# Patient Record
Sex: FEMALE | Race: WHITE | NOT HISPANIC OR LATINO | Employment: OTHER | URBAN - METROPOLITAN AREA
[De-identification: names, ages, dates, MRNs, and addresses within clinical notes are randomized per-mention and may not be internally consistent; named-entity substitution may affect disease eponyms.]

---

## 2017-05-09 ENCOUNTER — ONCOLOGY VISIT (OUTPATIENT)
Dept: ONCOLOGY | Facility: CLINIC | Age: 82
End: 2017-05-09
Attending: INTERNAL MEDICINE
Payer: COMMERCIAL

## 2017-05-09 VITALS
HEIGHT: 62 IN | TEMPERATURE: 98.5 F | WEIGHT: 112.1 LBS | BODY MASS INDEX: 20.63 KG/M2 | DIASTOLIC BLOOD PRESSURE: 79 MMHG | RESPIRATION RATE: 16 BRPM | OXYGEN SATURATION: 96 % | SYSTOLIC BLOOD PRESSURE: 130 MMHG | HEART RATE: 77 BPM

## 2017-05-09 DIAGNOSIS — C49.9 SARCOMA (H): Primary | ICD-10-CM

## 2017-05-09 DIAGNOSIS — R53.83 FATIGUE, UNSPECIFIED TYPE: ICD-10-CM

## 2017-05-09 DIAGNOSIS — E03.9 HYPOTHYROIDISM, UNSPECIFIED TYPE: Primary | ICD-10-CM

## 2017-05-09 DIAGNOSIS — C49.9 SARCOMA (H): ICD-10-CM

## 2017-05-09 DIAGNOSIS — F41.9 ANXIETY: ICD-10-CM

## 2017-05-09 LAB — TSH SERPL DL<=0.005 MIU/L-ACNC: 2.47 MU/L (ref 0.4–4)

## 2017-05-09 PROCEDURE — 99212 OFFICE O/P EST SF 10 MIN: CPT | Mod: ZF

## 2017-05-09 PROCEDURE — 36415 COLL VENOUS BLD VENIPUNCTURE: CPT | Performed by: INTERNAL MEDICINE

## 2017-05-09 PROCEDURE — 84443 ASSAY THYROID STIM HORMONE: CPT | Performed by: INTERNAL MEDICINE

## 2017-05-09 PROCEDURE — 99215 OFFICE O/P EST HI 40 MIN: CPT | Mod: 25 | Performed by: INTERNAL MEDICINE

## 2017-05-09 RX ORDER — ACETAMINOPHEN 500 MG
500-1000 TABLET ORAL PRN
Status: ON HOLD | COMMUNITY
End: 2018-06-14

## 2017-05-09 ASSESSMENT — PAIN SCALES - GENERAL: PAINLEVEL: NO PAIN (0)

## 2017-05-09 NOTE — LETTER
"5/9/2017       RE: Jigna Allen  [address] [place] [address]     Dear Colleague,    Thank you for referring your patient, Jigna Allen, to the [facility]. Please see a copy of my visit note below.    5-9-17     I saw Jigna Allen for f/u of a sarcoma of the right calf.   -  Background   In brief, she was doing well until August 2009, when after doing a lot of walking while on vacation, she developed some discomfort and swelling in the proximal right calf. 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   She had 4 cycles of doxil/ifos finishing about Feb 2010. There was <10% viable cells at surgery.   She finished post op XRT about June 2010. She did have a post-op staph infection and had some subsequent wound healing problems.   -       Interval history    She had problems w fatigue last year and was treated for Lyme disease, though labs were confusing.  She had an episode of Lyme disease treated in July 2011    She is anxious and just got a zoloft Rx but didn't start yet due to the warning sheets.  Her mother in law is now 99 and is living w [facility] and they are taking care of her and that's stressful  [place] was [place] in [place].  She is on T4 and was told she had \"low blood\" in [place].    She had CTS surgery on her hand L last summer but is not sure it helped much and it now tingles.    She has a rectocoele and found a pessary very effective.    She notes occasional tinnitus that can be bothersome and has a history of hearing loss and now has a hearing aid but it does not help much and she often does not use it.  Dr Ojeda felt the CT finding in T4 was a benign hemangioma.     The rest of a 10 point ROS is otherwise negative as I confirmed w/ her today.   -   Background PMH, FH,SH   PH notable for rectocele, diverticulosis, eczema, hyperlipidemia, hypothyroidism, one episode of documented leukopenia. She had cataracts removed.   History " "of patellofemoral pain syndrome   FH notable for a cancer in the father, uncle, and some cousins and also glioblastoma in a brother. Paternal grandmother had stomach cancer. Her father had testicular cancer and  at age 87.   She has two adult children. 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 She does not smoke or abuse alcohol or other drugs.   She is  and came with her .   ALLERGIES: She describes allergies to Niacin and ferrous sulfate manifested as a red rash.   -   On examination, she appeared comfortable in good spirits.     /79 (BP Location: Left arm, Patient Position: Chair, Cuff Size: Adult Regular)  Pulse 77  Temp 98.5  F (36.9  C) (Oral)  Resp 16  Ht 1.575 m (5' 2.01\")  Wt 50.8 kg (112 lb 1.6 oz)  SpO2 96%  BMI 20.5 kg/m2  HEENT No icterus.  Some bilateral decreased hearing.   NECK no thyromegaly.  CHEST chest is clear.   CV RRR with no significant murmur.   ABD No HSMT.   LYMPH No lymphadenopathy.   NEURO Romberg and heel/toe walking are good but heel is better L.   EXT R leg healed w/ XRT changes.  No significant edema.    Mood a bit anxious.    -   No labs.    -   I reviewed the new CXR and I see no clear recurrence, but it has not yet been formally read.     -  She has a h/o of a likely hemangioma in a pedicle.     We will plan to see her about 5-9 (she is now about 7 years out) with a CXR if the formal read today is OK.  We will check a TSH today  I reviewed some relaxation web sites w her and she will do that tid for 3 weeks and take the zoloft from Dr Ko daily for 3 weeks and then see how things are going.  I suggested they have her mother in law see a doctor too as she sounds like she may be angry/depressed and might benefit from some intervention.  We will arrange a visit w palliative re the anxiety to assess.  She will call if she has other questions in the interim.   t> 40 min most C&C  -  Edouard Bryan M.D.   Professor, Hematology, " Oncology and Transplantation   Phone: [phone]     cc:   Wayne Castelan MD   [address] [address]     Elisabeth Ko MD   [facility] [place]   [address] [place] [address]     Bryon Ojeda M.D.   [place] [address]

## 2017-05-09 NOTE — MR AVS SNAPSHOT
After Visit Summary   5/9/2017    Jigna Allen    MRN: [id number]           Patient Information     Date Of Birth          1935        Visit Information        Provider Department      5/9/2017 9:30 AM Edouard Bryan MD [facility]        Today's Diagnoses     Hypothyroidism, unspecified type    -  1    Sarcoma (H)        Anxiety        Fatigue, unspecified type           Follow-ups after your visit        Additional Services     PALLIATIVE CARE REFERRAL       Need to be seen in next 1-2 weeks                  Your next 10 appointments already scheduled     May 15, 2018  8:30 AM CDT   (Arrive by 8:15 AM)   XR CHEST 2 VIEWS with UCXR1   [facility] [place] [facility] ([facility] [place])    [address]  [address] [facility] [address] [phone]           Please bring a list of your current medicines to your exam. (Include vitamins, minerals and over-thecounter medicines.) Leave your valuables at home.  Tell your doctor if there is a chance you may be pregnant.  You do not need to do anything special for this exam.            May 15, 2018  9:30 AM CDT   (Arrive by 9:15 AM)   Return Visit with Edouard Bryan MD   [facility] ([facility] [place])    [address]  [address] [facility] [address] [phone]              Who to contact     If you have questions or need follow up information about today's clinic visit or your schedule please contact [facility] directly at [phone].  Normal or non-critical lab and imaging results will be communicated to you by MyChart, letter or phone within 4 business days after the clinic has received the results. If you do not hear from us within 7 days, please contact the clinic through MyChart or phone. If you have a critical or abnormal lab result, we will notify you by phone as soon as possible.  Submit refill requests  "through [employer] or call your pharmacy and they will forward the refill request to us. Please allow 3 business days for your refill to be completed.          Additional Information About Your Visit        [email or web address] Information     [employer] gives you secure access to your electronic health record. If you see a primary care provider, you can also send messages to your care team and make appointments. If you have questions, please call your primary care clinic.  If you do not have a primary care provider, please call [phone] and they will assist you.        Care EveryWhere ID     This is your Care EveryWhere ID. This could be used by other organizations to access your [place] medical records  [id number]        Your Vitals Were     Pulse Temperature Respirations Height Pulse Oximetry BMI (Body Mass Index)    77 98.5  F (36.9  C) (Oral) 16 1.575 m (5' 2.01\") 96% 20.5 kg/m2       Blood Pressure from Last 3 Encounters:   05/09/17 130/79   09/16/16 138/67   09/15/16 148/68    Weight from Last 3 Encounters:   05/10/17 51.3 kg (113 lb 1.6 oz)   05/09/17 50.8 kg (112 lb 1.6 oz)   09/16/16 53.4 kg (117 lb 12.8 oz)              We Performed the Following     PALLIATIVE CARE REFERRAL     TSH with free T4 reflex        Primary Care Provider Office Phone # Fax #    Elisabeth Ko [phone]       [address] [address]        Thank you!     Thank you for choosing [facility]  for your care. Our goal is always to provide you with excellent care. Hearing back from our patients is one way we can continue to improve our services. Please take a few minutes to complete the written survey that you may receive in the mail after your visit with us. Thank you!             Your Updated Medication List - Protect others around you: Learn how to safely use, store and throw away your medicines at www.disposemymeds.org.          This list is accurate as of: 5/9/17 11:59 " PM.  Always use your most recent med list.                   Brand Name Dispense Instructions for use    acetaminophen 500 MG tablet    TYLENOL     Take 500-1,000 mg by mouth as needed       doxycycline Monohydrate 100 MG Tabs     56 tablet    Take 100 mg by mouth 2 times daily       LEVOTHROID 75 MCG tablet   Generic drug:  levothyroxine      Take 88 mcg by mouth daily       TYLENOL PM EXTRA STRENGTH PO      Take 1 tablet by mouth nightly as needed Reported on 5/9/2017       VITAMIN B 12 PO      Take by mouth daily Reported on 5/9/2017

## 2017-05-09 NOTE — NURSING NOTE
"Oncology Rooming Note    May 9, 2017 9:39 AM   Jigna Allen is a 82 year old female who presents for:    Chief Complaint   Patient presents with     Oncology Clinic Visit     Sarcoma F/U     Initial Vitals: /79 (BP Location: Left arm, Patient Position: Chair, Cuff Size: Adult Regular)  Pulse 77  Temp 98.5  F (36.9  C) (Oral)  Resp 16  Ht 1.575 m (5' 2.01\")  Wt 50.8 kg (112 lb 1.6 oz)  SpO2 96%  BMI 20.5 kg/m2 Estimated body mass index is 20.5 kg/(m^2) as calculated from the following:    Height as of this encounter: 1.575 m (5' 2.01\").    Weight as of this encounter: 50.8 kg (112 lb 1.6 oz). Body surface area is 1.49 meters squared.  No Pain (0) Comment: Data Unavailable   No LMP recorded. Patient is postmenopausal.  Allergies reviewed: Yes  Medications reviewed: Yes    Medications: Medication refills not needed today.  Pharmacy name entered into [employer]:    [facility] - [place], [place] - [address] - [place], [place] - [address]    Clinical concerns:  provider was notified.    7 minutes for nursing intake (face to face time)     Waleska Jose CMA              "

## 2017-05-09 NOTE — PROGRESS NOTES
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   -  Background   In brief, she was doing well until 2009, when after doing a lot of walking while on vacation, she developed some discomfort and swelling in the proximal right calf. 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   She had 4 cycles of doxil/ifos finishing about 2010. There was <10% viable cells at surgery.   She finished post op XRT about 2010. She did have a post-op staph infection and had some subsequent wound healing problems.   -       Interval history    She had problems w fatigue last year and was treated for Lyme disease, though labs were confusing.  She had an episode of Lyme disease treated in 2011    She is anxious and just got a zoloft Rx but didn't start yet due to the warning sheets.  Her mother in law is now 99 and is living w [facility] and they are taking care of her and that's stressful  [place].  She is on T4 and was told she had \"low blood\" in [place].    She had CTS surgery on her hand L last summer but is not sure it helped much and it now tingles.    She has a rectocoele and found a pessary very effective.    She notes occasional tinnitus that can be bothersome and has a history of hearing loss and now has a hearing aid but it does not help much and she often does not use it.  Dr Ojeda felt the CT finding in T4 was a benign hemangioma.     The rest of a 10 point ROS is otherwise negative as I confirmed w/ her today.   -   Background PMH, FH,SH   PH notable for rectocele, diverticulosis, eczema, hyperlipidemia, hypothyroidism, one episode of documented leukopenia. She had cataracts removed.   History of patellofemoral pain syndrome   FH notable for a cancer in the father, uncle, and some cousins and also glioblastoma in a brother. Paternal grandmother had stomach cancer. Her father had testicular cancer and  at age 87.   She has " "two adult children. 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 She does not smoke or abuse alcohol or other drugs.   She is  and came with her .   ALLERGIES: She describes allergies to Niacin and ferrous sulfate manifested as a red rash.   -   On examination, she appeared comfortable in good spirits.     /79 (BP Location: Left arm, Patient Position: Chair, Cuff Size: Adult Regular)  Pulse 77  Temp 98.5  F (36.9  C) (Oral)  Resp 16  Ht 1.575 m (5' 2.01\")  Wt 50.8 kg (112 lb 1.6 oz)  SpO2 96%  BMI 20.5 kg/m2  HEENT No icterus.  Some bilateral decreased hearing.   NECK no thyromegaly.  CHEST chest is clear.   CV RRR with no significant murmur.   ABD No HSMT.   LYMPH No lymphadenopathy.   NEURO Romberg and heel/toe walking are good but heel is better L.   EXT R leg healed w/ XRT changes.  No significant edema.    Mood a bit anxious.    -   No labs.    -   I reviewed the new CXR and I see no clear recurrence, but it has not yet been formally read.     -  She has a h/o of a likely hemangioma in a pedicle.     We will plan to see her about 5-9 (she is now about 7 years out) with a CXR if the formal read today is OK.  We will check a TSH today  I reviewed some relaxation web sites w her and she will do that tid for 3 weeks and take the zoloft from Dr Ko daily for 3 weeks and then see how things are going.  I suggested they have her mother in law see a doctor too as she sounds like she may be angry/depressed and might benefit from some intervention.  We will arrange a visit w palliative re the anxiety to assess.  She will call if she has other questions in the interim.   t> 40 min most C&C  -  Edouard Bryan M.D.   Professor, Hematology, Oncology and Transplantation   Phone: [phone]   cc:   Wayne Castelan MD   [address] [address]   Elisabeth Ko MD   [facility] [place]   [address] [place] [address]   Bryon SEGOVIA " OMAR Ojeda   [facility] [address]

## 2017-05-10 ENCOUNTER — OFFICE VISIT (OUTPATIENT)
Dept: PALLIATIVE CARE | Facility: CLINIC | Age: 82
End: 2017-05-10
Attending: EMERGENCY MEDICINE
Payer: COMMERCIAL

## 2017-05-10 VITALS
BODY MASS INDEX: 20.81 KG/M2 | RESPIRATION RATE: 12 BRPM | HEART RATE: 76 BPM | TEMPERATURE: 98.6 F | OXYGEN SATURATION: 98 % | HEIGHT: 62 IN | WEIGHT: 113.1 LBS

## 2017-05-10 DIAGNOSIS — F43.22 ADJUSTMENT DISORDER WITH ANXIOUS MOOD: Primary | ICD-10-CM

## 2017-05-10 DIAGNOSIS — C49.9 SARCOMA (H): ICD-10-CM

## 2017-05-10 PROCEDURE — 99212 OFFICE O/P EST SF 10 MIN: CPT | Mod: ZF

## 2017-05-10 PROCEDURE — 99204 OFFICE O/P NEW MOD 45 MIN: CPT | Mod: GC | Performed by: EMERGENCY MEDICINE

## 2017-05-10 ASSESSMENT — PAIN SCALES - GENERAL: PAINLEVEL: NO PAIN (0)

## 2017-05-10 NOTE — NURSING NOTE
"Oncology Rooming Note    May 10, 2017 2:43 PM   Jigna Allen is a 82 year old female who presents for:    Chief Complaint   Patient presents with     Oncology Clinic Visit     sarcoma      Initial Vitals: Pulse 76  Temp 98.6  F (37  C) (Oral)  Resp 12  Ht 1.575 m (5' 2.01\")  Wt 51.3 kg (113 lb 1.6 oz)  SpO2 98%  BMI 20.68 kg/m2 Estimated body mass index is 20.68 kg/(m^2) as calculated from the following:    Height as of this encounter: 1.575 m (5' 2.01\").    Weight as of this encounter: 51.3 kg (113 lb 1.6 oz). Body surface area is 1.5 meters squared.  No Pain (0) Comment: Data Unavailable   No LMP recorded. Patient is postmenopausal.  Allergies reviewed: Yes  Medications reviewed: Yes    Medications: Medication refills not needed today.  Pharmacy name entered into [employer]:    [facility] - [place], [place] - [address] [facility] [address] - [place], [place] - [address]    Clinical concerns: no Dr. Yates was NOT notified.    5 minutes for nursing intake (face to face time)     [place] CMA              "

## 2017-05-10 NOTE — LETTER
5/10/2017       RE: Jigna Allen  [address] [place] [address]     Dear Colleague,    Thank you for referring your patient, Jigna Allen, to the [facility] at [facility]. Please see a copy of my visit note below.    Palliative Care Outpatient Clinic Consultation Note    Patient:  Jigna Allen    Chief Complaint:   Jigna Allen 82 year old female who is presenting to the palliative medicine clinic today at the request of Dr. Bryan for a palliative care consultation secondary to anxiety.   The patient's primary care provider is:  Elisabeth Ko.     History of Present Illness:  Jigna Allen is a 82 year old female with a previous history of a sarcoma in her right leg. In 2009, she noticed some swelling and pain in her right calf. 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 By 2/2010 she completed 4 cycles of chemotherapy with doxil/ifosphamide before undergoing surgical resection. She had post op XRT until 6/2010. Jigna has noticed some weight loss and fatigue since last summer. At that time, her 98 year old mother in law had moved in with her and her . 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 At first she thought her symptoms were a sign that her cancer has returned and sought out her oncologist - Dr. Bryan. His evaluation showed no evidence of recurrence and referred her to the palliative medicine clinic to help with her anxiety.    About 2 weeks ago, her primary doctor gave her a prescription for Zoloft 50 mg. She held off on taking any but took her first dose last night. It caused some loose stools and a restless feeling making sleeping very difficult. She feels shame for not running her business well in the past and is very concerned about  any upcoming financial implications it can have. She also has feelings of guilt over the situation with her mother in law living with her and her . She is realizing she isn't able to travel or enjoy the life of custodial she expected with her living there.    Distressing Symptom/s:  Anxiety with some associated poor appetite and some weight loss.      Patient's Disease Understanding: Jigna admits she gets anxious about her current living and financial situation. She feels ashamed of the position she is in. She was told by a counselor from [employer] that this is a curable condition.      Coping: She admits she in not coping well with her stress and is hopeful for help through the clinic.     Social History  Living Situation: In a home with  Harley  Children: 2 adult daughters, one in [place] and one in [place], [place].  Actual/Potential Caregiver(s): family and a close friend - Keely  Support System: family and friends.  Occupation: retired   Hobbies: swimming, walking, reading and traveling  Substance Use/History of misuse: none  Financial Concerns: currently yes, is having tax and IRS issues.  Spiritual Background: maverick  Spiritual Concerns/Needs: none  Social History   Substance Use Topics     Smoking status: Never Smoker     Smokeless tobacco: Never Used     Alcohol use Not on file       Family History  No family history on file. 97 yo mother is alive but frail      Advance Care Planning:  Advance Directive:    Brought in a copy today dated 10/27/14.  Mentions: attempt full resuscitation unless my doctor has already diagnosed me with severe permanent brain damage or a terminal illness.  Where is written copy located: will be scanned into [employer]  Health Care Agent Contact Information:  Harley Tiffany  SHANEL:   None     Allergies   Allergen Reactions     Niacin      skin rash     Ferrous Sulfate Rash     Current Outpatient Prescriptions   Medication Sig Dispense Refill      acetaminophen (TYLENOL) 500 MG tablet Take 500-1,000 mg by mouth as needed       doxycycline Monohydrate 100 MG TABS Take 100 mg by mouth 2 times daily (Patient not taking: Reported on 5/9/2017) 56 tablet 0     Cyanocobalamin (VITAMIN B 12 PO) Take by mouth daily Reported on 5/9/2017       levothyroxine (LEVOTHROID) 75 MCG tablet Take 88 mcg by mouth daily        Diphenhydramine-APAP, sleep, (TYLENOL PM EXTRA STRENGTH PO) Take 1 tablet by mouth nightly as needed Reported on 5/9/2017       No past medical history on file.  No past surgical history on file.    REVIEW OF SYSTEMS:   ROS: 10 point ROS neg other than the symptoms noted above in the HPI and here:  Palliative Symptom Review (0=no symptom/no concern, 1=mild, 2=moderate, 3=severe):      Pain: 0      Fatigue: 1      Nausea: 0      Constipation: 0      Diarrhea: 1      Depressive Symptoms: 1      Anxiety: 2      Drowsiness: 0      Poor Appetite: 1      Shortness of Breath: 0      Insomnia: 1-2      Overall (0 good/no concerns, 3 very poor):  1           Physical Exam:   Vitals were reviewed  Temp: 98.6  F (37  C) Temp src: Oral   Pulse: 76   Resp: 12 SpO2: 98 %      Gen: Appears comfortable thin cooperative  HEENT:  Conjunctiva clear. Sclera anicteric pharynx clear.  CV: RRR without murmur  Resp: normal work of breathing, without wheezing  Abd: soft, nt, nd, +BS   Msk: no gross deformity   Skin:  no jaundice  Ext: warm, well perfused. no edema  Neuro:  EOM, vision, Speech fluent. Moves all extremities equally  Mental status/Psych: alert. Oriented. Asks/answers questions appropriately. Affect is normal        Data Reviewed:  LABS:  5/9/17  TSH  2.47    IMAGING:  CXR 5/9/17  Impression:   1. Clear lungs.  2. [place] right thoracic scoliosis.      Impressions:  Palliative Performance Score:  80-90%  Decision Making Capacity:  Good     Remote history of pleomorphic sarcoma  Anxiety     Recommendations & Counseling:  Jigna is reluctant to take anymore Zoloft with the  loose stools and restlessness last night. I recommended she continue the Zoloft but to take a half a pill for the next few days (25 mg) and take it in the morning which might not effect her sleep as much. I also placed a referral to our palliative  for counseling as Jigna feels her needs are urgent.    The patient was discussed with Dr. Cathy Yates MD  Palliative medicine fellow  Beeper [phone]    Attending Note:  Patient seen and evaluated with Dr Yates and I agree with/confirm his findings/recs in this note. Long-term sarcoma survivor.   Anxiety and mood disturbance related to high social stressors. Just initiating sertraline. Needs counseling/psychotherapy.     Thank you for involving us in the patient's care.   Kvng Morgan MD / Palliative Medicine / Pager [phone] / [facility] Inpatient Team Consult Pager [phone] (answered 8am-430pm M-F) - ok to text page via Track[employer] / After-Hours Answering Service [phone] / Palliative Clinic in the [facility] at the [facility] - [phone] (scheduling); [phone] (triage).      Again, thank you for allowing me to participate in the care of your patient.      Sincerely,    He Yates MD

## 2017-05-10 NOTE — PROGRESS NOTES
Palliative Care Outpatient Clinic Consultation Note    Patient:  Jigna Allen    Chief Complaint:   Jigna Allen 82 year old female who is presenting to the palliative medicine clinic today at the request of Dr. Bryan for a palliative care consultation secondary to anxiety.   The patient's primary care provider is:  Elisabeth Ko.     History of Present Illness:  Jigna Allen is a 82 year old female with a previous history of a sarcoma in her right leg. In 2009, she noticed some swelling and pain in her right calf. 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 By 2/2010 she completed 4 cycles of chemotherapy with doxil/ifosphamide before undergoing surgical resection. She had post op XRT until 6/2010. Jigna has noticed some weight loss and fatigue since last summer. At that time, her 98 year old mother in law had moved in with her and her . 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 At first she thought her symptoms were a sign that her cancer has returned and sought out her oncologist - Dr. Bryan. His evaluation showed no evidence of recurrence and referred her to the palliative medicine clinic to help with her anxiety.    About 2 weeks ago, her primary doctor gave her a prescription for Zoloft 50 mg. She held off on taking any but took her first dose last night. It caused some loose stools and a restless feeling making sleeping very difficult. She feels shame for not running her business well in the past and is very concerned about any upcoming financial implications it can have. She also has feelings of guilt over the situation with her mother in law living with her and her . She is realizing she isn't able to travel or enjoy the life of [place] she expected with her living there.    Distressing  Symptom/s:  Anxiety with some associated poor appetite and some weight loss.      Patient's Disease Understanding: Jigna admits she gets anxious about her current living and financial situation. She feels ashamed of the position she is in. She was told by a counselor from [facility] that this is a curable condition.      Coping: She admits she in not coping well with her stress and is hopeful for help through the clinic.     Social History  Living Situation: In a home with  Harley  Children: 2 adult daughters, one in [place] and one in [place], [place].  Actual/Potential Caregiver(s): family and a close friend - Keely  Support System: family and friends.  Occupation: retired   Hobbies: swimming, walking, reading and traveling  Substance Use/History of misuse: none  Financial Concerns: currently yes, is having tax and IRS issues.  Spiritual Background: Faith  Spiritual Concerns/Needs: none  Social History   Substance Use Topics     Smoking status: Never Smoker     Smokeless tobacco: Never Used     Alcohol use Not on file       Family History  No family history on file. 97 yo mother is alive but frail      Advance Care Planning:  Advance Directive:    Brought in a copy today dated 10/27/14.  Mentions: attempt full resuscitation unless my doctor has already diagnosed me with severe permanent brain damage or a terminal illness.  Where is written copy located: will be scanned into [employer]  Health Care Agent Contact Information:  Harley Allen  SHANEL:   None     Allergies   Allergen Reactions     Niacin      skin rash     Ferrous Sulfate Rash     Current Outpatient Prescriptions   Medication Sig Dispense Refill     acetaminophen (TYLENOL) 500 MG tablet Take 500-1,000 mg by mouth as needed       doxycycline Monohydrate 100 MG TABS Take 100 mg by mouth 2 times daily (Patient not taking: Reported on 5/9/2017) 56 tablet 0     Cyanocobalamin (VITAMIN B 12 PO) Take by mouth daily Reported on 5/9/2017        levothyroxine (LEVOTHROID) 75 MCG tablet Take 88 mcg by mouth daily        Diphenhydramine-APAP, sleep, (TYLENOL PM EXTRA STRENGTH PO) Take 1 tablet by mouth nightly as needed Reported on 5/9/2017       No past medical history on file.  No past surgical history on file.    REVIEW OF SYSTEMS:   ROS: 10 point ROS neg other than the symptoms noted above in the HPI and here:  Palliative Symptom Review (0=no symptom/no concern, 1=mild, 2=moderate, 3=severe):      Pain: 0      Fatigue: 1      Nausea: 0      Constipation: 0      Diarrhea: 1      Depressive Symptoms: 1      Anxiety: 2      Drowsiness: 0      Poor Appetite: 1      Shortness of Breath: 0      Insomnia: 1-2      Overall (0 good/no concerns, 3 very poor):  1           Physical Exam:   Vitals were reviewed  Temp: 98.6  F (37  C) Temp src: Oral   Pulse: 76   Resp: 12 SpO2: 98 %      Gen: Appears comfortable thin cooperative  HEENT:  Conjunctiva clear. Sclera anicteric pharynx clear.  CV: RRR without murmur  Resp: normal work of breathing, without wheezing  Abd: soft, nt, nd, +BS   Msk: no gross deformity   Skin:  no jaundice  Ext: warm, well perfused. no edema  Neuro:  EOM, vision, Speech fluent. Moves all extremities equally  Mental status/Psych: alert. Oriented. Asks/answers questions appropriately. Affect is normal        Data Reviewed:  LABS:  5/9/17  TSH  2.47    IMAGING:  CXR 5/9/17  Impression:   1. Clear lungs.  2. [place] right thoracic scoliosis.      Impressions:  Palliative Performance Score:  80-90%  Decision Making Capacity:  Good     Remote history of pleomorphic sarcoma  Anxiety     Recommendations & Counseling:  Jigna is reluctant to take anymore Zoloft with the loose stools and restlessness last night. I recommended she continue the Zoloft but to take a half a pill for the next few days (25 mg) and take it in the morning which might not effect her sleep as much. I also placed a referral to our palliative  for counseling as Jigna feels  her needs are urgent.    The patient was discussed with Dr. Cathy Yates MD  Palliative medicine fellow  Beeper [phone]    Attending Note:  Patient seen and evaluated with Dr Yates and I agree with/confirm his findings/recs in this note. Long-term sarcoma survivor.   Anxiety and mood disturbance related to high social stressors. Just initiating sertraline. Needs counseling/psychotherapy.     Thank you for involving us in the patient's care.   Kvng Morgan MD / Palliative Medicine / Pager [phone] / [facility] Inpatient Team Consult Pager [phone] (answered 8am-430pm M-F) - ok to text page via [employer] / After-Hours Answering Service [phone] / Palliative Clinic in the [facility] at the [facility] - [phone] (scheduling); [phone] (triage).

## 2017-05-10 NOTE — MR AVS SNAPSHOT
After Visit Summary   5/10/2017    Jigna Allen    MRN: [id number]           Patient Information     Date Of Birth          1935        Visit Information        Provider Department      5/10/2017 2:45 PM He Yates MD [facility]        Today's Diagnoses     Adjustment disorder with anxious mood    -  1    Sarcoma (H)           Follow-ups after your visit        Your next 10 appointments already scheduled     May 17, 2017  2:00 PM CDT   (Arrive by 1:45 PM)   NEW WITH ROOM with Mary Torres [facility],  2 117 CONSULT RM   [facility] ([facility])    [address] [place] [id number] [phone]            May 15, 2018  8:30 AM CDT   (Arrive by 8:15 AM)   XR CHEST 2 VIEWS with UCXR1   [facility] ([facility])    [address] [place] [id number] [phone]           Please bring a list of your current medicines to your exam. (Include vitamins, minerals and over-thecounter medicines.) Leave your valuables at home.  Tell your doctor if there is a chance you may be pregnant.  You do not need to do anything special for this exam.            May 15, 2018  9:30 AM CDT   (Arrive by 9:15 AM)   Return Visit with Edouard Bryan MD   [facility] ([facility])    [address] [place] [address] [phone]              Who to contact     If you have questions or need follow up information about today's clinic visit or your schedule please contact [facility] directly at [phone].  Normal or non-critical lab and imaging results will be communicated to you by MyChart, letter or phone within 4 business days after the clinic has received the results. If you do not hear from us within 7 days, please contact the clinic through  "MyChart or phone. If you have a critical or abnormal lab result, we will notify you by phone as soon as possible.  Submit refill requests through [employer] or call your pharmacy and they will forward the refill request to us. Please allow 3 business days for your refill to be completed.          Additional Information About Your Visit        [employer]hart Information     [employer] gives you secure access to your electronic health record. If you see a primary care provider, you can also send messages to your care team and make appointments. If you have questions, please call your primary care clinic.  If you do not have a primary care provider, please call [phone] and they will assist you.        Care EveryWhere ID     This is your Care EveryWhere ID. This could be used by other organizations to access your [place] medical records  [id number]        Your Vitals Were     Pulse Temperature Respirations Height Pulse Oximetry BMI (Body Mass Index)    76 98.6  F (37  C) (Oral) 12 1.575 m (5' 2.01\") 98% 20.68 kg/m2       Blood Pressure from Last 3 Encounters:   05/09/17 130/79   09/16/16 138/67   09/15/16 148/68    Weight from Last 3 Encounters:   05/10/17 51.3 kg (113 lb 1.6 oz)   05/09/17 50.8 kg (112 lb 1.6 oz)   09/16/16 53.4 kg (117 lb 12.8 oz)              Today, you had the following     No orders found for display       Primary Care Provider Office Phone # Fax #    Elisabeth Ko [phone]       [address] [address]        Thank you!     Thank you for choosing [facility]  for your care. Our goal is always to provide you with excellent care. Hearing back from our patients is one way we can continue to improve our services. Please take a few minutes to complete the written survey that you may receive in the mail after your visit with us. Thank you!             Your Updated Medication List - Protect others around you: Learn how to safely use, " store and throw away your medicines at www.disposemymeds.org.          This list is accurate as of: 5/10/17 11:59 PM.  Always use your most recent med list.                   Brand Name Dispense Instructions for use    acetaminophen 500 MG tablet    TYLENOL     Take 500-1,000 mg by mouth as needed       doxycycline Monohydrate 100 MG Tabs     56 tablet    Take 100 mg by mouth 2 times daily       LEVOTHROID 75 MCG tablet   Generic drug:  levothyroxine      Take 88 mcg by mouth daily       TYLENOL PM EXTRA STRENGTH PO      Take 1 tablet by mouth nightly as needed Reported on 5/9/2017       VITAMIN B 12 PO      Take by mouth daily Reported on 5/9/2017

## 2017-05-16 PROBLEM — F43.22 ADJUSTMENT DISORDER WITH ANXIOUS MOOD: Status: ACTIVE | Noted: 2017-05-16

## 2017-05-19 ENCOUNTER — OFFICE VISIT (OUTPATIENT)
Dept: PALLIATIVE CARE | Facility: CLINIC | Age: 82
End: 2017-05-19
Attending: SOCIAL WORKER
Payer: COMMERCIAL

## 2017-05-19 DIAGNOSIS — F41.1 GENERALIZED ANXIETY DISORDER: Primary | ICD-10-CM

## 2017-05-19 PROCEDURE — 40000114 ZZH STATISTIC NO CHARGE CLINIC VISIT

## 2017-05-19 PROCEDURE — 90791 PSYCH DIAGNOSTIC EVALUATION: CPT | Mod: ZP | Performed by: SOCIAL WORKER

## 2017-05-19 NOTE — LETTER
2017       RE: Jigna Allen  [address] [place] [address]     Dear Colleague,    Thank you for referring your patient, Jigna Allen, to the [facility]. Please see a copy of my visit note below.    Palliative Care Counseling Services - Clinical Social Work Initial Evaluation    PLEASE NOTE:  THIS IS A MENTAL HEALTH NOTE.  OTHER PROVIDERS VIEWING THIS NOTE SHOULD USE THIS ONLY FOR UNDERSTANDING THE CONTEXT OF THE PATIENT S EXPERIENCE.  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 I met with her today for initial palliative care clinical social work evaluation.    Referred by: Dr Myron Yates, palliative MD fellow    Reason for referral: Anxiety    Preferred Name: Jigna    Mental Status Exam: (List all that apply)      Appearance: Appropriate      Eye Contact: Good       Orientation: Yes, x4      Mood: Anxious      Affect: Appropriate      Thought Content: Clear         Thought Form: Logical      Psychomotor Behavior: Normal    Family:       Marital status:     Years :     Years together:      Name of spouse/partner: Ganesh      Children: 2 adult daughters - [place] (miscarriage last summer, around same time as anxiety onset for Jigna) and [place] (2 children)      Parents: Father  and Mother       Siblings:       Other:     Living situation: House      Number of stories:       Difficulty accessing and/or getting around living space: no    Education highest level: PhD in Library Sciences     Employment history:      Current employment status:  Retired         Kind of work:  , retired in  with accolades, 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, a passion      Spouses/SO current employment status: Retired      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    Financial:       Descriptor: Comfortable - concerns and fears about some records hard to locate from consulting years      Sources of income: Social Security (custodial) and custodial Account(s)       Health insurance: Through employer and COBRA    Legal concerns: yes       Area(s) of concern: has no LTC insurance, fears/regrets related to that - also having some fears re: having misplaced some records from past consulting    Support system: Family and Friends - especially friend Kia - enjoying book \"Staying Sharp\" and trying to implement -  seems not to fully understand her recent concerns/fears    Jenna/Spirituality:       Belong to a jenna community: yes     Specify:  Juma      Identify with a particular Christian: Juma - has taken \"[employer] trek\" in [place]      Identify as spiritual: yes      How find expression: Prayer, Reading Bible/other Scientologist text and Attending services    Hope:      What do you hope for:    \"My mother in law to settled in well in her own place for the summer\" - \"My  is hoping we can take a trip to [place] after that, but I am feeling empty of hope lately. 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 I had worked very hard on it. I feel stymied, and now I am getting older so I feel like I have missed these opportunities.      What gives you hope:    Not feeling a lot of hope right now.     Coping: Try to think of people I could see, projects that would make me happy. I have not been doing this things and have been delaying/avoiding visits with friends lately.     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 - I fought against a move that would threaten it, and eventually my suggestions were followed and the collection is OK today, but I worried I would lose my job over it - I was very scared but I stood up for what I believed in,  Perceived/Actual threat: N/A         Symptoms of stress disorder: denies active      Duration of disturbance:       Symptoms currently being experienced: No      Significant distress/impairment present: no    Medical History/Issues (patient account): Just returned from trip to [place] in Sep 2009 and noticed a lump back of my knee, it was a traumatic surprised to have it diagnosed as sarcoma. I have really never been ill. I went to Urgent Care and from there I was admitted to [facility] \"suspicious of cancer\"! I had chemo first then surgery and radiation, got a staph infection in my leg and the whole process was about 2 years in total. I am worried that my cancer has recurred, but my oncologist says that is anxiety, that I do not have cancer now based on the regular scans and tests.    Mental Health History (patient account): I was diagnosed with anxiety last summer, and I was referred to a counselor who I respected. They gave me \"The Anxiety Handbook\". I have used it, but my anxiety lingers.     My primary care doctor Dr Ko recently prescribed me Sertraline 50 mg, I waited a few days before taking it, I didn't know what to expect. I hope it will help me sleep, Have been taking about 10 days so far.         Suicidal ideation: No - \"But I am worried that I am allowing myself to get run down, like with my recent weight loss, and that that is somehow passively suicidal\"      Suicide attempt(s): no  When:   How:       Protective factors: Temple beliefs, telling myself \"you just have to get through this\"      Current Risk: Low to moderate    Current Mental Health Concerns: Anxiety, depressed mood, discouragement, grief " - Specific fears including cancer returning, other health worries ie Lyme dz, colon problems, worries about financial records she has misplaced, and withdrawing from friends, feeling hopeless at times.    Grief vs Depression:  Endorses symptoms for: Grief and depressive episode    CD History:       Alcohol use: Never      Amount:       History of problem behaviors related to alcohol use: no      Street drug use: Never      Substances used:       History of problem behaviors related to street drug use: no      Taking medications as prescribed?  Yes    Health Care Directive: Has one:  yes       If yes, copy in EMR: Yes       Basic information regarding health care directive provided: no       Health Care Agent(s): Named in health care directive     POLST: Has one: no       If yes, copy in EMR: No      Basic information regarding POLST provided: no            Pain/Discomfort Issues: Fatigue, Sleep problems, Anxiety, Existential distress   and depressed mood, weight loss    Internal Resources (positive memories, sources of michelle): Family life - where I was raised - raising kids, traveling with them to help them become internationally astute, acquire languages - one daughter speaks fluent Sammarinese and the other is almost fluent in Czech as well as Sammarinese. I have enjoyed studying Azerbaijani and Wolof.    Perceived Needs: Has seen therapists in past for sleep problems and anxiety (in summer 2016). Is receptive to continued counseling in both areas, especially anxiety. Receptive to body-mind approaches.    Resource needs/Referrals: None    Assessment:  Jigna presents as a very accomplished, educated and intelligent woman with many strengths and resources, who has been struggling with intense anxiety and insomnia along with unexplained and unintended weight loss since summer 2016, during which time her daughter lost a baby to miscarriage and she became worried about some consulting income records she misplaced, as well as  worrying that her sarcoma was perhaps returning. In recent years she has also experienced significant loss and grief professionally including related to writing a book and winning a Fulbright to [place], then [place], only to have both cancelled for reasons outside her control. She describes withdrawing recently from social supports and worries that she could die from her weight loss if it continues. Sleep continues to be a problem despite 7 weeks of CBT-I last fall; she admits she has stopped using the CBT-I guidelines she received then. Of note, she also describes caring for her mother in law 24/7 at her home along with her , and describes her MIL as critical and difficult to care for at times. She is hoping for a break from this role over the summer as her MIL stays at a summer home.    Intervention: Initial clinical social work evaluation was conducted.  Clinical social work interventions available in Palliative Care Clinic were discussed, including counseling related to serious illness, behavioral interventions for symptom management, consultation regarding goals of care/health care directive/POLST, and Life Legacy work.     Plan: Jigna will follow up in 1 - 2 weeks.    DSM5 Diagnoses: 300.02 (F41.1) Generalized Anxiety Disorder    Time Spent with Patient/Family: 50 minutes    Mary Torres [facility]    DO NOT SEND ANY LETTERS

## 2017-05-19 NOTE — MR AVS SNAPSHOT
After Visit Summary   5/19/2017    Jigna Allen    MRN: [id number]           Patient Information     Date Of Birth          1935        Visit Information        Provider Department      5/19/2017 10:00 AM Mary Torres LICSW; UC 2 114 CONSULT [facility]        Today's Diagnoses     Generalized anxiety disorder    -  1       Follow-ups after your visit        Your next 10 appointments already scheduled     Jun 07, 2017 11:00 AM CDT   (Arrive by 10:45 AM)   RETURN WITH ROOM with CATHERINE Conley, UC 2 118 CONSULT [facility] ([facility])    [address]  2nd Floor  [place] [address] [phone]            Jun 14, 2017 10:00 AM CDT   (Arrive by 9:45 AM)   RETURN WITH ROOM with CATHERINE Conley, UC 2 118 CONSULT [facility] ([facility])    [address]  2nd Floor  [facility] [address]   [phone]            Jun 21, 2017 10:00 AM CDT   (Arrive by 9:45 AM)   RETURN WITH ROOM with CATHERINE Conley, UC 2 118 CONSULT [facility] ([facility])    [address]  [address]nd [facility] [address] [phone]            May 15, 2018  8:30 AM CDT   (Arrive by 8:15 AM)   XR CHEST 2 VIEWS with UCXR1   [facility] ([facility])    [address]  1st Floor  [place] [address] [phone]           Please bring a list of your current medicines to your exam. (Include vitamins, minerals and over-thecounter medicines.) Leave your valuables at home.  Tell your doctor if there is a chance you may be pregnant.  You do not need to do anything special for this exam.            May 15, 2018  9:30 AM CDT   (Arrive by 9:15 AM)   Return Visit with Edouard Bryan MD   [facility] ([facility])    [address]  2nd Floor  [facility] [address] [phone]              Who to contact     If you have questions or need follow up information about today's clinic visit or your schedule please contact [facility] directly at [phone].  Normal or non-critical lab and imaging results will be communicated to you by MyChart, letter or phone within 4 business days after the clinic has received the results. If you do not hear from us within 7 days, please contact the clinic through [employer] or phone. If you have a critical or abnormal lab result, we will notify you by phone as soon as possible.  Submit refill requests through [employer] or call your pharmacy and they will forward the refill request to us. Please allow 3 business days for your refill to be completed.          Additional Information About Your Visit        [employer] Information     [employer] gives you secure access to your electronic health record. If you see a primary care provider, you can also send messages to your care team and make appointments. If you have questions, please call your primary care clinic.  If you do not have a primary care provider, please call [phone] and they will assist you.        Care EveryWhere ID     This is your Care EveryWhere ID. This could be used by other organizations to access your [place] medical records  [id number]         Blood Pressure from Last 3 Encounters:   05/09/17 130/79   09/16/16 138/67   09/15/16 148/68    Weight from Last 3 Encounters:   05/10/17 51.3 kg (113 lb 1.6 oz)   05/09/17 50.8 kg (112 lb 1.6 oz)   09/16/16 53.4 kg (117 lb 12.8 oz)              Today, you had the following     No orders found for display       Primary Care Provider Office Phone # Fax #    Elisabeth Ko [phone]       [email or web address] [address] [facility] [address]  [facility] [address]        Thank you!     Thank you for choosing [facility]  for your care. Our goal is always to provide you with excellent care. Hearing back from our patients is one way we can continue to improve our services. Please take a few minutes to complete the written survey that you may receive in the mail after your visit with us. Thank you!             Your Updated Medication List - Protect others around you: Learn how to safely use, store and throw away your medicines at www.disposemymeds.org.          This list is accurate as of: 5/19/17 11:59 PM.  Always use your most recent med list.                   Brand Name Dispense Instructions for use    acetaminophen 500 MG tablet    TYLENOL     Take 500-1,000 mg by mouth as needed       doxycycline Monohydrate 100 MG Tabs     56 tablet    Take 100 mg by mouth 2 times daily       LEVOTHROID 75 MCG tablet   Generic drug:  levothyroxine      Take 88 mcg by mouth daily       TYLENOL PM EXTRA STRENGTH PO      Take 1 tablet by mouth nightly as needed Reported on 5/9/2017       VITAMIN B 12 PO      Take by mouth daily Reported on 5/9/2017

## 2017-05-19 NOTE — PROGRESS NOTES
Palliative Care Counseling Services - Clinical Social Work Initial Evaluation    PLEASE NOTE:  THIS IS A MENTAL HEALTH NOTE.  OTHER PROVIDERS VIEWING THIS NOTE SHOULD USE THIS ONLY FOR UNDERSTANDING THE CONTEXT OF THE PATIENT S EXPERIENCE.  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 I met with her today for initial palliative care clinical social work evaluation.    Referred by: Dr Myron Yates, palliative MD fellow    Reason for referral: Anxiety    Preferred Name: Jigna    Mental Status Exam: (List all that apply)      Appearance: Appropriate      Eye Contact: Good       Orientation: Yes, x4      Mood: Anxious      Affect: Appropriate      Thought Content: Clear         Thought Form: Logical      Psychomotor Behavior: Normal    Family:       Marital status:     Years :     Years together:      Name of spouse/partner: Ganesh      Children: 2 adult daughters - [place] (miscarriage last summer, around same time as anxiety onset for Jigna) and [place] (2 children)      Parents: Father  and Mother       Siblings:       Other:     Living situation: House      Number of stories:       Difficulty accessing and/or getting around living space: no    Education highest level: PhD in Library Sciences     Employment history:      Current employment status:  Retired         Kind of work:  , retired in  with accolades, 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, a passion      Spouses/SO current employment status: Retired      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    Financial:       Descriptor: Comfortable - concerns and fears about some records hard to locate from consulting years      Sources of income: Social Security ([facility]) and [facility] Account(s)       Health insurance: Through employer and COBRA    Legal  "concerns: yes       Area(s) of concern: has no LTC insurance, fears/regrets related to that - also having some fears re: having misplaced some records from past consulting    Support system: Family and Friends - especially friend Kia - enjoying book \"Staying Sharp\" and trying to implement -  seems not to fully understand her recent concerns/fears    Jenna/Spirituality:       Belong to a jenna community: yes     Specify:  Jew      Identify with a particular Orthodoxy: Jew - has taken \"[place] trek\" in [place]      Identify as spiritual: yes      How find expression: Prayer, Reading Bible/other Anglican text and Attending services    Hope:      What do you hope for:    \"My mother in law to settled in well in her own place for the summer\" - \"My  is hoping we can take a trip to [place] after that, but I am feeling empty of hope lately. 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 I had worked very hard on it. I feel stymied, and now I am getting older so I feel like I have missed these opportunities.      What gives you hope:    Not feeling a lot of hope right now.     Coping: Try to think of people I could see, projects that would make me happy. I have not been doing this things and have been delaying/avoiding visits with friends lately.     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 - I fought against a move that would threaten it, and eventually my suggestions were followed and the collection is OK today, but I worried I would lose my job over it - I was very scared but I stood up for what I believed in,  " "Perceived/Actual threat: N/A         Symptoms of stress disorder: denies active      Duration of disturbance:       Symptoms currently being experienced: No      Significant distress/impairment present: no    Medical History/Issues (patient account): Just returned from trip to [place] in Sep 2009 and noticed a lump back of my knee, it was a traumatic surprised to have it diagnosed as sarcoma. I have really never been ill. I went to Urgent Care and from there I was admitted to [facility] \"suspicious of cancer\"! I had chemo first then surgery and radiation, got a staph infection in my leg and the whole process was about 2 years in total. I am worried that my cancer has recurred, but my oncologist says that is anxiety, that I do not have cancer now based on the regular scans and tests.    Mental Health History (patient account): I was diagnosed with anxiety last summer, and I was referred to a counselor who I respected. They gave me \"The Anxiety Handbook\". I have used it, but my anxiety lingers.     My primary care doctor Dr Ko recently prescribed me Sertraline 50 mg, I waited a few days before taking it, I didn't know what to expect. I hope it will help me sleep, Have been taking about 10 days so far.         Suicidal ideation: No - \"But I am worried that I am allowing myself to get run down, like with my recent weight loss, and that that is somehow passively suicidal\"      Suicide attempt(s): no  When:   How:       Protective factors: Mormonism beliefs, telling myself \"you just have to get through this\"      Current Risk: Low to moderate    Current Mental Health Concerns: Anxiety, depressed mood, discouragement, grief - Specific fears including cancer returning, other health worries ie Lyme dz, colon problems, worries about financial records she has misplaced, and withdrawing from friends, feeling hopeless at times.    Grief vs Depression:  Endorses symptoms for: Grief and depressive episode    CD History: "       Alcohol use: Never      Amount:       History of problem behaviors related to alcohol use: no      Street drug use: Never      Substances used:       History of problem behaviors related to street drug use: no      Taking medications as prescribed?  Yes    Health Care Directive: Has one:  yes       If yes, copy in EMR: Yes       Basic information regarding health care directive provided: no       Health Care Agent(s): Named in health care directive     POLST: Has one: no       If yes, copy in EMR: No      Basic information regarding POLST provided: no            Pain/Discomfort Issues: Fatigue, Sleep problems, Anxiety, Existential distress   and depressed mood, weight loss    Internal Resources (positive memories, sources of michelle): Family life - where I was raised - raising kids, traveling with them to help them become internationally astute, acquire languages - one daughter speaks fluent [place] and the other is almost fluent in [place] as well as [place]. I have enjoyed studying Bengali and Yi.    Perceived Needs: Has seen therapists in past for sleep problems and anxiety (in summer 2016). Is receptive to continued counseling in both areas, especially anxiety. Receptive to body-mind approaches.    Resource needs/Referrals: None    Assessment:  Jigna presents as a very accomplished, educated and intelligent woman with many strengths and resources, who has been struggling with intense anxiety and insomnia along with unexplained and unintended weight loss since summer 2016, during which time her daughter lost a baby to miscarriage and she became worried about some consulting income records she misplaced, as well as worrying that her sarcoma was perhaps returning. In recent years she has also experienced significant loss and grief professionally including related to writing a book and winning a Fulbright to [place], then [place], only to have both cancelled for reasons outside her control. She describes  withdrawing recently from social supports and worries that she could die from her weight loss if it continues. Sleep continues to be a problem despite 7 weeks of CBT-I last fall; she admits she has stopped using the CBT-I guidelines she received then. Of note, she also describes caring for her mother in law 24/7 at her home along with her , and describes her MIL as critical and difficult to care for at times. She is hoping for a break from this role over the summer as her MIL stays at a summer home.    Intervention: Initial clinical social work evaluation was conducted.  Clinical social work interventions available in Palliative Care Clinic were discussed, including counseling related to serious illness, behavioral interventions for symptom management, consultation regarding goals of care/health care directive/POLST, and Life Legacy work.     Plan: Jigna will follow up in 1 - 2 weeks.    DSM5 Diagnoses: 300.02 (F41.1) Generalized Anxiety Disorder    Time Spent with Patient/Family: 50 minutes    Mary Torres [facility]    DO NOT SEND ANY LETTERS

## 2017-05-24 ENCOUNTER — TELEPHONE (OUTPATIENT)
Dept: PALLIATIVE CARE | Facility: CLINIC | Age: 82
End: 2017-05-24

## 2017-05-24 NOTE — TELEPHONE ENCOUNTER
Palliative Care Clinical Social Work Phone Contact    Data: Jigna and I had agreed she would see me at 10am today 5/24, but I unfortunately neglected to complete the process with scheduling, so another person was scheduled at that time. Jigna contacted scheduling asking about this yesterday.    Intervention: Called and left message on home phone, then reached on cell phone, offered 11am appt today since another patient cancelled, and discussed with Jigna. She expressed preference to meet next Wednesday May 31 at 10am instead. I requested this to be scheduled for her. I also shared my direct office number with her and encouraged contacting me that way as needed in the future. She and  are planning trip up north to help her mother in law get settled in a new home over the holiday weekend.    Response/Assessment: Appreciative of outreach; frustrated with scheduling problems yesterday; receptive to meeting on May 31. Anticipating trip up north with hopes for mother in law to be settled in a new housing situation (she has been living with Jigna and  for several years).    Plan: See for psychotherapy with palliative care focus on May 31 at 10am at [facility].    JOURDAN Chilel, [facility]  Clinical , Palliative Care Program  [facility]  Office Phone: [phone]  [facility]: [phone]  [facility]: [phone]

## 2017-05-31 ENCOUNTER — OFFICE VISIT (OUTPATIENT)
Dept: PALLIATIVE CARE | Facility: CLINIC | Age: 82
End: 2017-05-31
Attending: SOCIAL WORKER
Payer: COMMERCIAL

## 2017-05-31 DIAGNOSIS — F32.1 MODERATE SINGLE CURRENT EPISODE OF MAJOR DEPRESSIVE DISORDER (H): ICD-10-CM

## 2017-05-31 DIAGNOSIS — F41.1 GAD (GENERALIZED ANXIETY DISORDER): Primary | ICD-10-CM

## 2017-05-31 PROCEDURE — 40000114 ZZH STATISTIC NO CHARGE CLINIC VISIT

## 2017-05-31 PROCEDURE — 90834 PSYTX W PT 45 MINUTES: CPT | Mod: ZP | Performed by: SOCIAL WORKER

## 2017-05-31 NOTE — PROGRESS NOTES
"Palliative Care Clinical Social Work Return Appointment    PLEASE NOTE:  THIS IS A MENTAL HEALTH NOTE.  OTHER PROVIDERS VIEWING THIS NOTE SHOULD USE THIS ONLY FOR UNDERSTANDING THE CONTEXT OF THE PATIENT S EXPERIENCE.  TOPICS DESCRIBED IN THIS NOTE SHOULD NOT BE REFERENCED TO THE PATIENT BY MEDICAL PROVIDERS.    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    Mental Status Exam:(List all that apply)      Appearance: Appropriate      Eye Contact: Good       Orientation: Yes, x4      Mood: Anxious, some low mood also      Affect: Appropriate, full range      Thought Content: Clear         Thought Form: Logical      Psychomotor Behavior: Normal    Visit summary:   Mental Health (Anxiety, depression, other symptoms): Jigna describes symptoms consistent with anxiety disorder as well as episode of major depression. Discussion today included ACT focused approach with focus on increasing tolerance of uncomfortable feelings while taking intentional actions toward valued/enjoyed activities that have been reduced in recent months. Discussion includes impacts of mental health stigma and \"snap out of it\" types of advice from loved ones. Jigna voices many fears and concerns, and we discuss cognitive factors, behavioral factors and avoidance in contributing to anxiety and depression as well as physiological causes for which SSRI is probably the most appropriate intervention,along with therapy. She has been taking Sertraline for 3 weeks, daily, half pill in mornings, and feels it is causing some drowsiness and worries about driving when taking it.     Insomnia may also be contributing to drowsiness. Today we discuss past CBT-I work with [facility]. Although she worked toward improving sleep efficiency for 6 - 7 weeks with this provider, she was finding it frustrating and difficult to feel sleep deprived (a part of the " "treatment process in her case - she was trying to sleep only 12 - 6, found that she woke at 4am anyway and had trouble resuming sleep, felt very tired at 6am). She chose to begin sleeping in later in mornings, allowing naps, and generally discontinued CBT-I efforts when she was in [place]. She is not sure she would like to resume that kind of treatment but we discuss this option as well as continuing CBT-I generally to assess best strategies. She is currently going to bed around 11:30 or 12 midnight, and waking several times, including early in morning, then staying in bed till around 8am. She has a TV show she enjoys from 10:30 - 11:30. She feels tired a lot and worries that she may not be safe to drive.    We also discuss her concerns about weight loss. She describes eating 3 substantial meals today. There is loss of enjoyment of cooking and eating, partly related to caring for CONSTANCE who is critical and limited regarding diet. (CONSTANCE has [place] for the [place], just dropped off 2 days ago.) She has appt with PCP next week regarding this concern. She has met with a dietician through [facility]. Cognitive coping options discussed including \"I am working with the appropriate professionals to address this concern.\" She voices worry that she could be wasting away due to this weight loss. Her weight was typically ~ 135 and is now 110.    There are fears about wellbeing of CONSTANCE as well as caregiver strain.    Depression symptoms endorsed include anhedonia, loss of interest in eating, trouble sleeping, low mood, negative self perception. We discuss today her fears that depression may be a worse mental christiane condition than anxiety or less treatable. Encouragement provided regarding recovery potential.    Adjustment to Illness: Not focus today. Discussion of differences and similarities between physical and mental health conditions.    Relationships: Describes feeling her  has limited understanding of her feelings " "of \"not feeling normal\" or \"not feeling healthy.\" Is feeling relief about her mother in law being moved into house [place] for the summer with daily caregivers lined up; yet still worried that there may be problems with this plan. If not, hope is for MIL to be up north all summer, reducing caregiving strain for Jigna.    Also talks about a close friend who she has not seen for 1;1 visit in several months. This friend has voiced \"I don't want to lose you\" and Jigna perceives her friend could be hurt by hearing how emotionally off Jigna is feeling. Exploration of thoughts and feelings and encouragement to consider spending time with this close friend despite feeling anxious; discussion of square breathing, soft belly breathing, and self compassion/self calming approaches to manage/reduce anxiety in the moment.    End of Life and Advance Care Planning: Not focus today.    Main therapeutic interventions provided this session include:  Facilitating processing of feelings and thoughts; ACT approaches including values focus; acceptance and committed action discussion; psychoeducation; assessment; treatment planning    Plan:  Jigna will return for psychotherapy with palliative care focus in 1 week at [facility] on Wed June 7 at 11am. Jigna plans to use relaxed square breathing, self calming, and choose 1 - 3 activities that would be normal for her to engage in this week, to do despite presence of anxiety or depressed mood symptoms.    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      Anticipated number of sessions to complete episode of care:  12         Treatment " Goal(s)  Date Goal Dates  Reviewed Status   5/31/2017 Goal 1:  Client will develop effective strategies for anxiety following treatment for sarcoma.     Initiated    Objective #1A (Client Action)  Client will Identify triggers for anxiety/panic attacks, Identify early signs/symptoms of anxiety and Practice self awareness exercises.    Intervention(s)  Therapist will Provide psychoeducation and Facilitate processing of thoughts and feelings .    Initiated    Objective #1B  Client will Use/practice relaxation strategies and Identify effective coping strategies.    Intervention(s)  Therapist will Provide psychoeducation, Provide behavioral intervention training and Facilitate structured problem solving.    Initiated    Objective #1C  Client will Effectively communicate with medical provider re needed information and Effectively communicate with family/friends re needs.    Intervention(s)  Therapist will Provide psychoeducation and Facilitate processing of thoughts and feelings.    Initiated           Date Goal Dates  Reviewed Status    Goal 2:  Client will develop effective strategies for coping with depressed mood and other difficult emotions following sarcoma treatment..    Initiated    Objective #2A (Client Action)  Client will Identify triggers/early signs of depressed mood.    Intervention(s) (Therapist Action)  Therapist will provide psychoeducation and Facilitate processing of thoughts and feelings.    Initiated    Objective #2B  Client will Follow realistic exercise plan, Identify activities that provide comfort and/or pleasure, Participate in activities that provide comfort and/or pleasure and Identify an activity that would provide meaning/contribute to feeling of self worth, and effectively communicate needs to others.    Intervention(s)  Therapist will Provide psychoeducation, Facilitate processing of thoughts and feelings and Facilitate structured problem solving.    Initiated    Objective #2C  Client will  Participate in self-regulation practice (e.g.: meditation, relaxation exercises, self hypnosis) per day.    Intervention(s)  Therapist will Provide psychoeducation and Provide behavioral intervention training.    Initiated           Date Goal Dates  Reviewed Status   5/31/2017 Goal 3:  Client will address and work toward resolving concerns about her wellbeing with health care providers as needed.    Initiated    Objective #3A (Client Action)  Client will Communicate effectively with medical provider re needed information for example related to sleep and weight concerns.    Intervention(s) (Therapist Action)  Therapist will Make referrals to appropriate colleagues as needed, Facilitate processing of thoughts and feelings and Facilitate structured problem solving.    Initiated                     Client has not reviewed nor agreed to the above plan. Plan to review at future session.    JOURDAN Chilel, LICSW      DO NOT SEND ANY LETTERS

## 2017-05-31 NOTE — LETTER
"5/31/2017       RE: Jigna Allen  [address] [place] [address]     Dear Colleague,    Thank you for referring your patient, Jigna Allen, to the [facility]. Please see a copy of my visit note below.    Palliative Care Clinical Social Work Return Appointment    PLEASE NOTE:  THIS IS A MENTAL HEALTH NOTE.  OTHER PROVIDERS VIEWING THIS NOTE SHOULD USE THIS ONLY FOR UNDERSTANDING THE CONTEXT OF THE PATIENT S EXPERIENCE.  TOPICS DESCRIBED IN THIS NOTE SHOULD NOT BE REFERENCED TO THE PATIENT BY MEDICAL PROVIDERS.    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    Mental Status Exam:(List all that apply)      Appearance: Appropriate      Eye Contact: Good       Orientation: Yes, x4      Mood: Anxious, some low mood also      Affect: Appropriate, full range      Thought Content: Clear         Thought Form: Logical      Psychomotor Behavior: Normal    Visit summary:   Mental Health (Anxiety, depression, other symptoms): Jigna describes symptoms consistent with anxiety disorder as well as episode of major depression. Discussion today included ACT focused approach with focus on increasing tolerance of uncomfortable feelings while taking intentional actions toward valued/enjoyed activities that have been reduced in recent months. Discussion includes impacts of mental health stigma and \"snap out of it\" types of advice from loved ones. Jigna voices many fears and concerns, and we discuss cognitive factors, behavioral factors and avoidance in contributing to anxiety and depression as well as physiological causes for which SSRI is probably the most appropriate intervention,along with therapy. She has been taking Sertraline for 3 weeks, daily, half pill in mornings, and feels it is causing some drowsiness and worries about driving when taking it. " "    Insomnia may also be contributing to drowsiness. Today we discuss past CBT-I work with therapist [facility]. Although she worked toward improving sleep efficiency for 6 - 7 weeks with this provider, she was finding it frustrating and difficult to feel sleep deprived (a part of the treatment process in her case - she was trying to sleep only 12 - 6, found that she woke at 4am anyway and had trouble resuming sleep, felt very tired at 6am). She chose to begin sleeping in later in mornings, allowing naps, and generally discontinued CBT-I efforts when she was in [place]. She is not sure she would like to resume that kind of treatment but we discuss this option as well as continuing CBT-I generally to assess best strategies. She is currently going to bed around 11:30 or 12 midnight, and waking several times, including early in morning, then staying in bed till around 8am. She has a TV show she enjoys from 10:30 - 11:30. She feels tired a lot and worries that she may not be safe to drive.    We also discuss her concerns about weight loss. She describes eating 3 substantial meals today. There is loss of enjoyment of cooking and eating, partly related to caring for CONSTANCE who is critical and limited regarding diet. (CONSTANCE has moved [place] for the [place], just dropped off 2 days ago.) She has appt with PCP next week regarding this concern. She has met with a dietician through [facility]. Cognitive coping options discussed including \"I am working with the appropriate professionals to address this concern.\" She voices worry that she could be wasting away due to this weight loss. Her weight was typically ~ 135 and is now 110.    There are fears about wellbeing of CONSTANCE as well as caregiver strain.    Depression symptoms endorsed include anhedonia, loss of interest in eating, trouble sleeping, low mood, negative self perception. We discuss today her fears that depression may be a worse mental christiane condition than anxiety " "or less treatable. Encouragement provided regarding recovery potential.    Adjustment to Illness: Not focus today. Discussion of differences and similarities between physical and mental health conditions.    Relationships: Describes feeling her  has limited understanding of her feelings of \"not feeling normal\" or \"not feeling healthy.\" Is feeling relief about her mother in law being moved into house up north for the summer with daily caregivers lined up; yet still worried that there may be problems with this plan. If not, hope is for MIL to be up north all summer, reducing caregiving strain for Jigna.    Also talks about a close friend who she has not seen for 1;1 visit in several months. This friend has voiced \"I don't want to lose you\" and Jigna perceives her friend could be hurt by hearing how emotionally off Jigna is feeling. Exploration of thoughts and feelings and encouragement to consider spending time with this close friend despite feeling anxious; discussion of square breathing, soft belly breathing, and self compassion/self calming approaches to manage/reduce anxiety in the moment.    End of Life and Advance Care Planning: Not focus today.    Main therapeutic interventions provided this session include:  Facilitating processing of feelings and thoughts; ACT approaches including values focus; acceptance and committed action discussion; psychoeducation; assessment; treatment planning    Plan:  Jigna will return for psychotherapy with palliative care focus [place] 1 [place] [facility] [place] at 11am. Jigna plans to use relaxed square breathing, self calming, and choose 1 - 3 activities that would be normal for her to engage in this week, to do despite presence of anxiety or depressed mood symptoms.    Time spent with patient/family:  50 minutes    Palliative Care Counseling Treatment Plan    Client's Name: Jigna Valdez  YOB: 1935     Date: 5/31/2017    Initial DSM5 Diagnoses:   296.22 Major " Depressive Disorder, Single Episode, Moderate _  300.02 (F41.1) Generalized Anxiety Disorder      Referral / Collaboration:  .Depending on needs over time, referral to a community therapist may be indicated.      Anticipated number of sessions to complete episode of care:  12         Treatment Goal(s)  Date Goal Dates  Reviewed Status   5/31/2017 Goal 1:  Client will develop effective strategies for anxiety following treatment for sarcoma.     Initiated    Objective #1A (Client Action)  Client will Identify triggers for anxiety/panic attacks, Identify early signs/symptoms of anxiety and Practice self awareness exercises.    Intervention(s)  Therapist will Provide psychoeducation and Facilitate processing of thoughts and feelings .    Initiated    Objective #1B  Client will Use/practice relaxation strategies and Identify effective coping strategies.    Intervention(s)  Therapist will Provide psychoeducation, Provide behavioral intervention training and Facilitate structured problem solving.    Initiated    Objective #1C  Client will Effectively communicate with medical provider re needed information and Effectively communicate with family/friends re needs.    Intervention(s)  Therapist will Provide psychoeducation and Facilitate processing of thoughts and feelings.    Initiated           Date Goal Dates  Reviewed Status    Goal 2:  Client will develop effective strategies for coping with depressed mood and other difficult emotions following sarcoma treatment..    Initiated    Objective #2A (Client Action)  Client will Identify triggers/early signs of depressed mood.    Intervention(s) (Therapist Action)  Therapist will provide psychoeducation and Facilitate processing of thoughts and feelings.    Initiated    Objective #2B  Client will Follow realistic exercise plan, Identify activities that provide comfort and/or pleasure, Participate in activities that provide comfort and/or pleasure and Identify an activity that  would provide meaning/contribute to feeling of self worth, and effectively communicate needs to others.    Intervention(s)  Therapist will Provide psychoeducation, Facilitate processing of thoughts and feelings and Facilitate structured problem solving.    Initiated    Objective #2C  Client will Participate in self-regulation practice (e.g.: meditation, relaxation exercises, self hypnosis) per day.    Intervention(s)  Therapist will Provide psychoeducation and Provide behavioral intervention training.    Initiated           Date Goal Dates  Reviewed Status   5/31/2017 Goal 3:  Client will address and work toward resolving concerns about her wellbeing with health care providers as needed.    Initiated    Objective #3A (Client Action)  Client will Communicate effectively with medical provider re needed information for example related to sleep and weight concerns.    Intervention(s) (Therapist Action)  Therapist will Make referrals to appropriate colleagues as needed, Facilitate processing of thoughts and feelings and Facilitate structured problem solving.    Initiated                     Client has not reviewed nor agreed to the above plan. Plan to review at future session.    JOURDAN Chilel, LICSW      DO NOT SEND ANY LETTERS

## 2017-05-31 NOTE — MR AVS SNAPSHOT
After Visit Summary   5/31/2017    Jigna Allen    MRN: [id number]           Patient Information     Date Of Birth          1935        Visit Information        Provider Department      5/31/2017 10:00 AM Mary Torres LICSW; [facility]ULT [facility]        Today's Diagnoses     YAMILET (generalized anxiety disorder)    -  1    Moderate single current episode of major depressive disorder (H)           Follow-ups after your visit        Your next 10 appointments already scheduled     Jun 07, 2017 11:00 AM CDT   (Arrive by 10:45 AM)   RETURN WITH ROOM with CATHERINE Conley, UC [facility]ULT [facility] ([facility])    [address] [place] [address]  [address] [facility] [id number] [phone]            Jun 14, 2017 10:00 AM CDT   (Arrive by 9:45 AM)   RETURN WITH ROOM with CATHERINE Conley, UC 2 118 [facility]ULT [facility] ([facility])    [address] [place] [address]  [address] [facility] [address]   [phone]            Jun 21, 2017 10:00 AM CDT   (Arrive by 9:45 AM)   RETURN WITH ROOM with CATHERINE Conley, UC 2 118 [facility]ULT [facility] ([facility])    [address] [place] [address]  [address] [facility] [address] [phone]            May 15, 2018  8:30 AM CDT   (Arrive by 8:15 AM)   XR CHEST 2 VIEWS with UCXR1   [facility] ([facility])    [address] [place] [address]  [address] [facility] [id number] [phone]           Please bring a list of your current medicines to your exam. (Include vitamins, minerals and over-thecounter medicines.) Leave your valuables at home.  Tell your doctor if there is a chance you may be pregnant.  You do not need to do anything special for this exam.            May 15, 2018  9:30 AM CDT   (Arrive by 9:15  AM)   Return Visit with Edouard Bryan MD   [facility] ([facility])    [address]  [address]nd Floor  [facility] [address] [phone]              Who to contact     If you have questions or need follow up information about today's clinic visit or your schedule please contact [facility] directly at [phone].  Normal or non-critical lab and imaging results will be communicated to you by [employer], letter or phone within 4 business days after the clinic has received the results. If you do not hear from us within 7 days, please contact the clinic through [employer] or phone. If you have a critical or abnormal lab result, we will notify you by phone as soon as possible.  Submit refill requests through [employer] or call your pharmacy and they will forward the refill request to us. Please allow 3 business days for your refill to be completed.          Additional Information About Your Visit        [employer] Information     [employer] gives you secure access to your electronic health record. If you see a primary care provider, you can also send messages to your care team and make appointments. If you have questions, please call your primary care clinic.  If you do not have a primary care provider, please call [phone] and they will assist you.        Care EveryWhere ID     This is your Care EveryWhere ID. This could be used by other organizations to access your [facility] medical records  [id number]         Blood Pressure from Last 3 Encounters:   05/09/17 130/79   09/16/16 138/67   09/15/16 148/68    Weight from Last 3 Encounters:   05/10/17 51.3 kg (113 lb 1.6 oz)   05/09/17 50.8 kg (112 lb 1.6 oz)   09/16/16 53.4 kg (117 lb 12.8 oz)              Today, you had the following     No orders found for display       Primary Care Provider Office Phone # Fax #    Elisabeth Ko [phone]       [facility] [address]  [address]  [facility] [address]        Thank you!     Thank you for choosing [facility]  for your care. Our goal is always to provide you with excellent care. Hearing back from our patients is one way we can continue to improve our services. Please take a few minutes to complete the written survey that you may receive in the mail after your visit with us. Thank you!             Your Updated Medication List - Protect others around you: Learn how to safely use, store and throw away your medicines at www.disposemymeds.org.          This list is accurate as of: 5/31/17  4:43 PM.  Always use your most recent med list.                   Brand Name Dispense Instructions for use    acetaminophen 500 MG tablet    TYLENOL     Take 500-1,000 mg by mouth as needed       doxycycline Monohydrate 100 MG Tabs     56 tablet    Take 100 mg by mouth 2 times daily       LEVOTHROID 75 MCG tablet   Generic drug:  levothyroxine      Take 88 mcg by mouth daily       TYLENOL PM EXTRA STRENGTH PO      Take 1 tablet by mouth nightly as needed Reported on 5/9/2017       VITAMIN B 12 PO      Take by mouth daily Reported on 5/9/2017

## 2017-06-07 ENCOUNTER — OFFICE VISIT (OUTPATIENT)
Dept: PALLIATIVE CARE | Facility: CLINIC | Age: 82
End: 2017-06-07
Attending: SOCIAL WORKER
Payer: COMMERCIAL

## 2017-06-07 DIAGNOSIS — Z51.5 ENCOUNTER FOR PALLIATIVE CARE: ICD-10-CM

## 2017-06-07 DIAGNOSIS — F41.1 GAD (GENERALIZED ANXIETY DISORDER): Primary | ICD-10-CM

## 2017-06-07 DIAGNOSIS — F32.1 MODERATE SINGLE CURRENT EPISODE OF MAJOR DEPRESSIVE DISORDER (H): ICD-10-CM

## 2017-06-07 PROCEDURE — 40000114 ZZH STATISTIC NO CHARGE CLINIC VISIT

## 2017-06-07 PROCEDURE — 90834 PSYTX W PT 45 MINUTES: CPT | Mod: ZP | Performed by: SOCIAL WORKER

## 2017-06-07 NOTE — LETTER
"6/7/2017       RE: Jigna Allen  [address] [place] [address]     Dear Colleague,    Thank you for referring your patient, Jigna Allen, to the [facility] at [facility]. Please see a copy of my visit note below.    Palliative Care Clinical Social Work Return Appointment    PLEASE NOTE:  THIS IS A MENTAL HEALTH NOTE.  OTHER PROVIDERS VIEWING THIS NOTE SHOULD USE THIS ONLY FOR UNDERSTANDING THE CONTEXT OF THE PATIENT S EXPERIENCE.  TOPICS DESCRIBED IN THIS NOTE SHOULD NOT BE REFERENCED TO THE PATIENT BY MEDICAL PROVIDERS.    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    Mental Status Exam:(List all that apply)      Appearance: Appropriate      Eye Contact: Good       Orientation: Yes, x4      Mood: Anxious, some low mood also      Affect: Appropriate, full range      Thought Content: Clear         Thought Form: Logical      Psychomotor Behavior: Normal    Visit summary:   Mental Health (Anxiety, depression, other symptoms): Jigna continues to endorse and show symptoms of anxiety today. She does endorse symptoms of depression. Discussion of her concerns about being labeled as depressed today. She is receptive to perspective that this helps us to focus our efforts to help her feel healthy and well again. She saw her PCP recently who prescribed Mirtazapine along with Sertraline. Jigna is not feeling comfortable with the plan of treating her mental health concerns with medications. We discuss other approaches and I encourage her to consider these medications as resources if needed over time. She has stopped Sertraline in past 3 days. She feels that it did not help. She took half doses daily for 3 weeks and did not notice any benefits. She has chosen not to start Mirtazapine.     She describes her goal as \"to become healthy again, hopefully without using " "medication - to improve my diet and regain weight (just met with RD today and there are some recommendations especially re: increasing healthy fats); to improve my sleep to 7 - 8 hours per night; to care for others and be social, at least going out to lunch once a week; to increase physical activity daily; to make forward strides on projects again; to find michelle.\"    We discuss work of psychiatrist Dionicio Hoang MD - We will discus further next week. Provided titles for bibliotherapy - Chemistry of Michelle and Chemistry of Calm. We also discuss complementary medicine approaches to mental health care, including massage, healing touch, and acupuncture.    We review homework from last week - to participate in 1 - 3 normal activities, including social activities, despite feelings of anxiety or depressed mood, using relaxed square breathing and a self calming approach as needed. She did reach out to her cousin and good friend Kia. They were not available. We discuss the benefits of having reached out. She also attended a large social event which she would have preferred to avoid. She found she was able to talk with many people and present in an appropriate way, even though she does not feel like herself recently.    Discussion today of potential visit to see daughter in [place] this weekend. She knows it is best time for daughter and family yet feels worried that her daughter will be troubled to see her \"not herself\" due to anxiety and depressed mood. I encourage her that she can and must make her own choices and I support this - yet also encourage her to consider the potential that 1) others cannot perceive our private events/state as much as we often imagine; 2) she may be able to model for her daughter some positive ways of coping/living with anxiety/depression. Her daughter has said \"I'm glad you are getting help, mom.\" Daughter and  have been having some fertility struggles. Daughter is  in school.    We " "review relationship between sleep and anxiety/ depressed mood. 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 Her focus today is wanting to improve sleep. She expresses much resistance to revisiting the \"sleep deprivation\" plan she was on with previous CBT-I therapist last fall. We review sleep hygiene guidelines in detail, seeking things she can change this week. We also review sleep diary which she will complete this week.     Homework this week is focused on sleep hygiene changes:  - Eliminating watching TV after 8pm;   - Avoiding naps and if unable to avoid, setting timer for 15 minutes;   - Setting alarm for 7:30am in the mornings;   - Going to bed when tired or around 12 mid.   - Avoiding chocolate after 2pm.    Adjustment to Illness: Not focus today.    Relationships: See above. Jigna continues to voice thoughts about wanting to avoid being with others during this time, because she does not feel like herself and is concerned that others may reject,  or feel differently about her if they see her as she is currently feeling. Motivational interviewing, psychoeducation, exploration of unhelpful cognitions including \"mind reading\" and \"emotional reasoning\" explored today, with emphasis on rolling with resistance and encouraging Jigna to consider continuing some social connections during this time since they have been sources of michelle in the past and remain important to her. Regaining confidence in social contexts is a priority for Jigna, but she prefers to focus on feeling better as a first step.    End of Life and Advance Care Planning: Not focus today.    Main therapeutic interventions provided this session include:  Facilitating processing of feelings and thoughts; ACT approaches including values focus; acceptance and committed action discussion; psychoeducation; assessment; treatment planning    Plan:  Jigna will return for psychotherapy with palliative care " focus in 1 week at [facility] on Wed June 14 at 10am.     Jigna plans to make changes to her sleep hygiene including: - Eliminating watching TV after 8pm; - Avoiding naps and if unable to avoid, settng timer for 15 minutes; - Setting alarm for 7:30am in the mornings; - Going to bed when tired or around 12 mid. - Avoiding chocolate after 2pm.    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      Anticipated number of sessions to complete episode of care:  12         Treatment Goal(s)  Date Goal Dates  Reviewed Status   5/31/2017 Goal 1:  Client will develop effective strategies for anxiety following treatment for sarcoma.     New    Objective #1A (Client Action)  Client will Identify triggers for anxiety/panic attacks, Identify early signs/symptoms of anxiety and Practice self awareness exercises.    Intervention(s)  Therapist will Provide psychoeducation and Facilitate processing of thoughts and feelings .    New    Objective #1B  Client will Use/practice relaxation strategies and Identify effective coping strategies.    Intervention(s)  Therapist will Provide psychoeducation, Provide behavioral intervention training and Facilitate structured problem solving.    New    Objective #1C  Client will Effectively communicate with medical provider re needed information and Effectively communicate with family/friends re needs.    Intervention(s)  Therapist will Provide psychoeducation and Facilitate processing of thoughts and feelings.    New           Date Goal Dates  Reviewed Status   5/31/2017 Goal 2:  Client will increase michelle in her life and develop effective  strategies for coping with depressed mood and other difficult emotions.    New    Objective #2A (Client Action)  Client will Identify triggers/early signs of depressed mood.    Intervention(s) (Therapist Action)  Therapist will provide psychoeducation and Facilitate processing of thoughts and feelings.    New    Objective #2B  Client will Follow realistic exercise plan, Identify activities that provide comfort and/or pleasure, Participate in activities that provide comfort and/or pleasure and Identify an activity that would provide meaning/contribute to feeling of self worth, and effectively communicate needs to others. Develop new goals and increase productive or creative daily activities. Over time, increase frequency of seeing friends and family members.    Intervention(s)  Therapist will Provide psychoeducation, Facilitate processing of thoughts and feelings and Facilitate structured problem solving.    New    Objective #2C  Client will Participate in self-regulation practice (e.g.: meditation, relaxation exercises, self hypnosis) per day and/or gratitude practices.    Intervention(s)  Therapist will Provide psychoeducation and Provide behavioral intervention training.    New           Date Goal Dates  Reviewed Status   5/31/2017 Goal 3:  Client will make progress toward increased wellbeing and health, including working to other health care providers as needed.    New    Objective #3A (Client Action)  Client will Communicate effectively with medical provider re needed information for example related to sleep and diet/ weight-loss concerns.    Intervention(s) (Therapist Action)  Therapist will Make referrals to appropriate colleagues as needed, Facilitate processing of thoughts and feelings and Facilitate structured problem solving.   New    Objective #3B (Client Action)  Client will improve sleep, with long term goal to sleep well for 7 - 8 hours each night.    Intervention(s) (Therapist Action)  Therapist will  provide Cognitive Behavioral Therapy for Insomnia, review sleep hygiene options, and provide structured problem solving. Therapist will also address anxiety and depression with goal to assist with      Objective #3C (Client Action)  Client will continue to increase physical activity (adding stretching and faster walking 10 min per day in addition to daily slow walk) and adjust diet as advised by dietitian and physician, toward return to healthy weight and energy level.    Intervention(s) (Therapist Action)  Therapist will provide structured problem solving, psychoeducation, and bibliotherapy and referrals as appropriate.         Client has reviewed and agreed to the above plan on June 7, 2017.    JOURDAN Chilel, LICSW      DO NOT SEND ANY LETTERS

## 2017-06-07 NOTE — PROGRESS NOTES
"Palliative Care Clinical Social Work Return Appointment    PLEASE NOTE:  THIS IS A MENTAL HEALTH NOTE.  OTHER PROVIDERS VIEWING THIS NOTE SHOULD USE THIS ONLY FOR UNDERSTANDING THE CONTEXT OF THE PATIENT S EXPERIENCE.  TOPICS DESCRIBED IN THIS NOTE SHOULD NOT BE REFERENCED TO THE PATIENT BY MEDICAL PROVIDERS.    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    Mental Status Exam:(List all that apply)      Appearance: Appropriate      Eye Contact: Good       Orientation: Yes, x4      Mood: Anxious, some low mood also      Affect: Appropriate, full range      Thought Content: Clear         Thought Form: Logical      Psychomotor Behavior: Normal    Visit summary:   Mental Health (Anxiety, depression, other symptoms): Jigna continues to endorse and show symptoms of anxiety today. She does endorse symptoms of depression. Discussion of her concerns about being labeled as depressed today. She is receptive to perspective that this helps us to focus our efforts to help her feel healthy and well again. She saw her PCP recently who prescribed Mirtazapine along with Sertraline. Jigna is not feeling comfortable with the plan of treating her mental health concerns with medications. We discuss other approaches and I encourage her to consider these medications as resources if needed over time. She has stopped Sertraline in past 3 days. She feels that it did not help. She took half doses daily for 3 weeks and did not notice any benefits. She has chosen not to start Mirtazapine.     She describes her goal as \"to become healthy again, hopefully without using medication - to improve my diet and regain weight (just met with RD today and there are some recommendations especially re: increasing healthy fats); to improve my sleep to 7 - 8 hours per night; to care for others and be social, at least going out to lunch once a week; to increase " "physical activity daily; to make forward strides on projects again; to find michelle.\"    We discuss work of psychiatrist Dionicio Hoang MD - We will discus further next week. Provided titles for bibliotherapy - Chemistry of Michelle and Chemistry of Calm. We also discuss complementary medicine approaches to mental health care, including massage, healing touch, and acupuncture.    We review homework from last week - to participate in 1 - 3 normal activities, including social activities, despite feelings of anxiety or depressed mood, using relaxed square breathing and a self calming approach as needed. She did reach out to her cousin and good friend Kia. They were not available. We discuss the benefits of having reached out. She also attended a large social event which she would have preferred to avoid. She found she was able to talk with many people and present in an appropriate way, even though she does not feel like herself recently.    Discussion today of potential visit to see daughter in [place] this weekend. She knows it is best time for daughter and family yet feels worried that her daughter will be troubled to see her \"not herself\" due to anxiety and depressed mood. I encourage her that she can and must make her own choices and I support this - yet also encourage her to consider the potential that 1) others cannot perceive our private events/state as much as we often imagine; 2) she may be able to model for her daughter some positive ways of coping/living with anxiety/depression. Her daughter has said \"I'm glad you are getting help, mom.\" Daughter and  have been having some fertility struggles. Daughter is  in school.    We review relationship between sleep and anxiety/ depressed mood. 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 Her focus today is wanting to improve sleep. She expresses much resistance to revisiting the \"sleep deprivation\" plan she was " "on with previous CBT-I therapist last fall. We review sleep hygiene guidelines in detail, seeking things she can change this week. We also review sleep diary which she will complete this week.     Homework this week is focused on sleep hygiene changes:  - Eliminating watching TV after 8pm;   - Avoiding naps and if unable to avoid, setting timer for 15 minutes;   - Setting alarm for 7:30am in the mornings;   - Going to bed when tired or around 12 mid.   - Avoiding chocolate after 2pm.    Adjustment to Illness: Not focus today.    Relationships: See above. Jigna continues to voice thoughts about wanting to avoid being with others during this time, because she does not feel like herself and is concerned that others may reject,  or feel differently about her if they see her as she is currently feeling. Motivational interviewing, psychoeducation, exploration of unhelpful cognitions including \"mind reading\" and \"emotional reasoning\" explored today, with emphasis on rolling with resistance and encouraging Jigna to consider continuing some social connections during this time since they have been sources of michelle in the past and remain important to her. Regaining confidence in social contexts is a priority for Jigna, but she prefers to focus on feeling better as a first step.    End of Life and Advance Care Planning: Not focus today.    Main therapeutic interventions provided this session include:  Facilitating processing of feelings and thoughts; ACT approaches including values focus; acceptance and committed action discussion; psychoeducation; assessment; treatment planning    Plan:  Jigna will return for psychotherapy with palliative care focus in 1 week at [facility] [place] on Wed June 14 at 10am.     Jigna plans to make changes to her sleep hygiene including: - Eliminating watching TV after 8pm; - Avoiding naps and if unable to avoid, settng timer for 15 minutes; - Setting alarm for 7:30am in the mornings; - Going to bed when " tired or around 12 mid. - Avoiding chocolate after 2pm.    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      Anticipated number of sessions to complete episode of care:  12         Treatment Goal(s)  Date Goal Dates  Reviewed Status   5/31/2017 Goal 1:  Client will develop effective strategies for anxiety following treatment for sarcoma.     New    Objective #1A (Client Action)  Client will Identify triggers for anxiety/panic attacks, Identify early signs/symptoms of anxiety and Practice self awareness exercises.    Intervention(s)  Therapist will Provide psychoeducation and Facilitate processing of thoughts and feelings .    New    Objective #1B  Client will Use/practice relaxation strategies and Identify effective coping strategies.    Intervention(s)  Therapist will Provide psychoeducation, Provide behavioral intervention training and Facilitate structured problem solving.    New    Objective #1C  Client will Effectively communicate with medical provider re needed information and Effectively communicate with family/friends re needs.    Intervention(s)  Therapist will Provide psychoeducation and Facilitate processing of thoughts and feelings.    New           Date Goal Dates  Reviewed Status   5/31/2017 Goal 2:  Client will increase michelle in her life and develop effective strategies for coping with depressed mood and other difficult emotions.    New    Objective #2A (Client Action)  Client will Identify triggers/early signs of depressed mood.    Intervention(s) (Therapist Action)  Therapist will provide psychoeducation and Facilitate processing of  thoughts and feelings.    New    Objective #2B  Client will Follow realistic exercise plan, Identify activities that provide comfort and/or pleasure, Participate in activities that provide comfort and/or pleasure and Identify an activity that would provide meaning/contribute to feeling of self worth, and effectively communicate needs to others. Develop new goals and increase productive or creative daily activities. Over time, increase frequency of seeing friends and family members.    Intervention(s)  Therapist will Provide psychoeducation, Facilitate processing of thoughts and feelings and Facilitate structured problem solving.    New    Objective #2C  Client will Participate in self-regulation practice (e.g.: meditation, relaxation exercises, self hypnosis) per day and/or gratitude practices.    Intervention(s)  Therapist will Provide psychoeducation and Provide behavioral intervention training.    New           Date Goal Dates  Reviewed Status   5/31/2017 Goal 3:  Client will make progress toward increased wellbeing and health, including working to other health care providers as needed.    New    Objective #3A (Client Action)  Client will Communicate effectively with medical provider re needed information for example related to sleep and diet/ weight-loss concerns.    Intervention(s) (Therapist Action)  Therapist will Make referrals to appropriate colleagues as needed, Facilitate processing of thoughts and feelings and Facilitate structured problem solving.   New    Objective #3B (Client Action)  Client will improve sleep, with long term goal to sleep well for 7 - 8 hours each night.    Intervention(s) (Therapist Action)  Therapist will provide Cognitive Behavioral Therapy for Insomnia, review sleep hygiene options, and provide structured problem solving. Therapist will also address anxiety and depression with goal to assist with      Objective #3C (Client Action)  Client will continue to increase physical  activity (adding stretching and faster walking 10 min per day in addition to daily slow walk) and adjust diet as advised by dietitian and physician, toward return to healthy weight and energy level.    Intervention(s) (Therapist Action)  Therapist will provide structured problem solving, psychoeducation, and bibliotherapy and referrals as appropriate.         Client has reviewed and agreed to the above plan on June 7, 2017.    JOURDAN Chilel, LICSW      DO NOT SEND ANY LETTERS

## 2017-06-07 NOTE — MR AVS SNAPSHOT
After Visit Summary   6/7/2017    Jigna Allen    MRN: [id number]           Patient Information     Date Of Birth          1935        Visit Information        Provider Department      6/7/2017 11:00 AM Mary Torres LICSW; UC 2 118 CONSULT [facility]        Today's Diagnoses     YAMILET (generalized anxiety disorder)    -  1    Moderate single current episode of major depressive disorder (H)        Encounter for palliative care           Follow-ups after your visit        Your next 10 appointments already scheduled     Jun 14, 2017 10:00 AM CDT   (Arrive by 9:45 AM)   RETURN WITH ROOM with CATHERINE Conley, UC 2 118 CONSULT [facility] ([facility])    [address] [place] [address] [id number] [phone]            Jun 21, 2017 10:00 AM CDT   (Arrive by 9:45 AM)   RETURN WITH ROOM with CATHERINE Conley, UC 2 118 CONSULT [facility] ([facility])    [address] [place] [address] [id number] [phone]            May 15, 2018  8:30 AM CDT   (Arrive by 8:15 AM)   XR CHEST 2 VIEWS with UCXR1   [facility] ([facility])    [address] [place] [id number] [phone]           Please bring a list of your current medicines to your exam. (Include vitamins, minerals and over-thecounter medicines.) Leave your valuables at home.  Tell your doctor if there is a chance you may be pregnant.  You do not need to do anything special for this exam.            May 15, 2018  9:30 AM CDT   (Arrive by 9:15 AM)   Return Visit with Edouard Bryan MD   [facility] ([facility])    [address] [place] [address] [id number] [phone]              Who to contact     If you have  questions or need follow up information about today's clinic visit or your schedule please contact [facility] directly at [phone].  Normal or non-critical lab and imaging results will be communicated to you by MyChart, letter or phone within 4 business days after the clinic has received the results. If you do not hear from us within 7 days, please contact the clinic through [employer] or phone. If you have a critical or abnormal lab result, we will notify you by phone as soon as possible.  Submit refill requests through [employer] or call your pharmacy and they will forward the refill request to us. Please allow 3 business days for your refill to be completed.          Additional Information About Your Visit        [employer] Information     [employer] gives you secure access to your electronic health record. If you see a primary care provider, you can also send messages to your care team and make appointments. If you have questions, please call your primary care clinic.  If you do not have a primary care provider, please call [phone] and they will assist you.        Care EveryWhere ID     This is your Care EveryWhere ID. This could be used by other organizations to access your [place] medical records  [id number]         Blood Pressure from Last 3 Encounters:   05/09/17 130/79   09/16/16 138/67   09/15/16 148/68    Weight from Last 3 Encounters:   05/10/17 51.3 kg (113 lb 1.6 oz)   05/09/17 50.8 kg (112 lb 1.6 oz)   09/16/16 53.4 kg (117 lb 12.8 oz)              Today, you had the following     No orders found for display       Primary Care Provider Office Phone # Fax #    Elisabeth Ko [phone]       [place] [address] [address]        Thank you!     Thank you for choosing [facility]  for your care. Our goal is always to provide you with excellent care. Hearing back from our patients is one way we can continue to improve our  services. Please take a few minutes to complete the written survey that you may receive in the mail after your visit with us. Thank you!             Your Updated Medication List - Protect others around you: Learn how to safely use, store and throw away your medicines at www.disposemymeds.org.          This list is accurate as of: 6/7/17  3:01 PM.  Always use your most recent med list.                   Brand Name Dispense Instructions for use    acetaminophen 500 MG tablet    TYLENOL     Take 500-1,000 mg by mouth as needed       doxycycline Monohydrate 100 MG Tabs     56 tablet    Take 100 mg by mouth 2 times daily       LEVOTHROID 75 MCG tablet   Generic drug:  levothyroxine      Take 88 mcg by mouth daily       TYLENOL PM EXTRA STRENGTH PO      Take 1 tablet by mouth nightly as needed Reported on 5/9/2017       VITAMIN B 12 PO      Take by mouth daily Reported on 5/9/2017

## 2017-06-16 ENCOUNTER — OFFICE VISIT (OUTPATIENT)
Dept: PALLIATIVE CARE | Facility: CLINIC | Age: 82
End: 2017-06-16
Attending: SOCIAL WORKER
Payer: COMMERCIAL

## 2017-06-16 DIAGNOSIS — F41.1 GAD (GENERALIZED ANXIETY DISORDER): Primary | ICD-10-CM

## 2017-06-16 DIAGNOSIS — Z51.5 ENCOUNTER FOR PALLIATIVE CARE: ICD-10-CM

## 2017-06-16 DIAGNOSIS — F32.1 MODERATE SINGLE CURRENT EPISODE OF MAJOR DEPRESSIVE DISORDER (H): ICD-10-CM

## 2017-06-16 PROCEDURE — 90834 PSYTX W PT 45 MINUTES: CPT | Mod: ZP | Performed by: SOCIAL WORKER

## 2017-06-16 NOTE — LETTER
"6/16/2017       RE: Jigna Allen  [address] [place] [address]     Dear Colleague,    Thank you for referring your patient, Jigna Allen, to the [facility] at [facility]. Please see a copy of my visit note below.    error    Palliative Care Clinical Social Work Return Appointment    PLEASE NOTE:  THIS IS A MENTAL HEALTH NOTE.  OTHER PROVIDERS VIEWING THIS NOTE SHOULD USE THIS ONLY FOR UNDERSTANDING THE CONTEXT OF THE PATIENT S EXPERIENCE.  TOPICS DESCRIBED IN THIS NOTE SHOULD NOT BE REFERENCED TO THE PATIENT BY MEDICAL PROVIDERS.    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    Mental Status Exam:(List all that apply)      Appearance: Appropriate      Eye Contact: Good       Orientation: Yes, x4      Mood: Anxious, some low mood also      Affect: Appropriate, full range      Thought Content: Clear         Thought Form: Logical      Psychomotor Behavior: Normal    Visit summary:   Mental Health (Anxiety, depression, other symptoms):  Discussion today of different forms of anxiety using Dr Dionicio Hoang' model (Chemistry of Calm). Jigna identifies scarcity/hoarding; worry; and rumination as common styles, naming bottom-up, top-down and \"muddled-middle\" styles of anxiety. Psychoeducation and discussion of addressing anxiety holistically. She also brings book \"Staying Sharp\" to share, which is also by Dr Hoang. In addition to structured problem solving, psychoeducation and motivational interviewing related to sleep hygiene and other lifestyle changes Jigna is working on, increased focus today on thoughts and feelings. Beginning to explore themes re: mindfulness of thoughts, defusion from thoughts, development of self as observer, and present moment focus. Of note, ruminating thoughts re: worries about how she will cope in future when MIL returns and " "needs caregiving.     Mindfulness practice training today using image of sitting on riverbank and observing as thoughts come, pass by and go - If you get into the river at some point, return to riverbank again and return to observing.    Reviewed homework. Generally has not made the sleep hygiene changes we discussed, partly due to trip to visit daughter. Enjoyed the trip and found it nourishing, including 1 really good night's sleep there initially \"putting myself in someone else's complete care.\" Was able to avoid late night TV there and also avoided naps except during drives there and back. Discussion of achievable goals, slow progress, and the barrier of getting frustrated/ discouraged.    Homework plan for this week: Focusing on no TV after 8pm (read/leave room where  is watching); set alarm for 7:30am, get up right away, eat breakfast, take a short walk outside. Plans to avoid naps all together. If lying down to nap, plans to put alarm in another room and set for 15 minutes. Importance of improving sleep rated as very high.    Adjustment to Illness: Continues to work with dietician on adjusting diet. Enjoyed eating at daughter's with fresher and different foods. Considering going to a different grocery store at home. Also saw PT when visiting daughter and plans to add the exercises in daily.    Relationships: Visit with daughter supportive and helpful. She felt very cared for and encouraged by her daughter. Daughter also told her that she did not notice anything strange about how Jigna is acting. This was a relief in some ways regarding others' experiences of her private events/ internal experience. They may visit daughter again in August.    Relationship with  includes some conflicts as Jigna wants to make changes, ie re: grocery store.    Fears about returning to caregiving role for MIL are important. Discussion at next session or soon is indicated re: determining a workable plan for Jigna and " setting boundaries if she needs to do so for her wellbeing.    End of Life and Advance Care Planning: Not focus today.    Main therapeutic interventions provided this session include:  Facilitating processing of feelings and thoughts; ACT approaches including mindfulness, defusion from thoughts, and committed action; psychoeducation; assessment; collaborative treatment planning; motivational interviewing and discussion of goal to have small successes each week yet accept the larger process may take time.    Plan:  Jigna will return for psych[facility] with [facility] [place] [facility] [place] at 10am.     Before next session, Jigna plans to:  Go to bed when tired  Set alarm for 7:30 every morning, rise right away, eat breakfast and take a short walk  Avoid naps or set alarm outside room for 15 minutes  Begin noticing the feelings that arise when having ruminative thoughts (ie re MIL)  Consider getting Chemistry of Calm/ bibliotherapy      Time spent with patient/family:  50 minutes    Palliative Care Counseling Treatment Plan    Client's Name: Jigna Valdez  YOB: 1935     Date: 5/31/2017    Initial DSM5 Diagnoses:   296.22 Major Depressive Disorder, Single Episode, Moderate _  300.02 (F41.1) Generalized Anxiety Disorder      Referral / Collaboration:  .Depending on needs over time, referral to a community therapist may be indicated.      Anticipated number of sessions to complete episode of care:  12         Treatment Goal(s)  Date Goal Dates  Reviewed Status   5/31/2017 Goal 1:  Client will develop effective strategies for anxiety following treatment for sarcoma.     New    Objective #1A (Client Action)  Client will Identify triggers for anxiety/panic attacks, Identify early signs/symptoms of anxiety and Practice self awareness exercises.    Intervention(s)  Therapist will Provide psychoeducation and Facilitate processing of thoughts and feelings .    New    Objective #1B  Client will Use/practice  relaxation strategies and Identify effective coping strategies.    Intervention(s)  Therapist will Provide psychoeducation, Provide behavioral intervention training and Facilitate structured problem solving.    New    Objective #1C  Client will Effectively communicate with medical provider re needed information and Effectively communicate with family/friends re needs.    Intervention(s)  Therapist will Provide psychoeducation and Facilitate processing of thoughts and feelings.    New           Date Goal Dates  Reviewed Status   5/31/2017 Goal 2:  Client will increase michelle in her life and develop effective strategies for coping with depressed mood and other difficult emotions.    New    Objective #2A (Client Action)  Client will Identify triggers/early signs of depressed mood.    Intervention(s) (Therapist Action)  Therapist will provide psychoeducation and Facilitate processing of thoughts and feelings.    New    Objective #2B  Client will Follow realistic exercise plan, Identify activities that provide comfort and/or pleasure, Participate in activities that provide comfort and/or pleasure and Identify an activity that would provide meaning/contribute to feeling of self worth, and effectively communicate needs to others. Develop new goals and increase productive or creative daily activities. Over time, increase frequency of seeing friends and family members.    Intervention(s)  Therapist will Provide psychoeducation, Facilitate processing of thoughts and feelings and Facilitate structured problem solving.    New    Objective #2C  Client will Participate in self-regulation practice (e.g.: meditation, relaxation exercises, self hypnosis) per day and/or gratitude practices.    Intervention(s)  Therapist will Provide psychoeducation and Provide behavioral intervention training.    New           Date Goal Dates  Reviewed Status   5/31/2017 Goal 3:  Client will make progress toward increased wellbeing and health, including  working to other health care providers as needed.    New    Objective #3A (Client Action)  Client will Communicate effectively with medical provider re needed information for example related to sleep and diet/ weight-loss concerns.    Intervention(s) (Therapist Action)  Therapist will Make referrals to appropriate colleagues as needed, Facilitate processing of thoughts and feelings and Facilitate structured problem solving.   New    Objective #3B (Client Action)  Client will improve sleep, with long term goal to sleep well for 7 - 8 hours each night.    Intervention(s) (Therapist Action)  Therapist will provide Cognitive Behavioral Therapy for Insomnia, review sleep hygiene options, and provide structured problem solving. Therapist will also address anxiety and depression with goal to assist with      Objective #3C (Client Action)  Client will continue to increase physical activity (adding stretching and faster walking 10 min per day in addition to daily slow walk) and adjust diet as advised by dietitian and physician, toward return to healthy weight and energy level.    Intervention(s) (Therapist Action)  Therapist will provide structured problem solving, psychoeducation, and bibliotherapy and referrals as appropriate.         Client has reviewed and agreed to the above plan on June 7, 2017.    JOURDAN Chilel, LICSW      DO NOT SEND ANY LETTERS

## 2017-06-16 NOTE — MR AVS SNAPSHOT
After Visit Summary   6/16/2017    Jigna Allen    MRN: [id number]           Patient Information     Date Of Birth          1935        Visit Information        Provider Department      6/16/2017 10:00 AM Mary Torres LICSW; UC 2 118 CONSULT [facility]        Today's Diagnoses     YAMILET (generalized anxiety disorder)    -  1    Moderate single current episode of major depressive disorder (H)        Encounter for palliative care           Follow-ups after your visit        Your next 10 appointments already scheduled     Jun 21, 2017 10:00 AM CDT   (Arrive by 9:45 AM)   RETURN WITH ROOM with CATHERINE Conley, UC 2 118 CONSULT [facility] ([facility])    [address] [place] [address]  [address] [facility] [address]   [phone]            Jun 28, 2017 11:00 AM CDT   (Arrive by 10:45 AM)   RETURN WITH ROOM with CATHERINE Conley, UC 2 116 CONSULT [facility] ([facility])    [address] [place] [address]  [address] [facility] [address]   [phone]            Jul 12, 2017  9:00 AM CDT   (Arrive by 8:45 AM)   RETURN WITH ROOM with CATHERINE Conley UC 2 119 CONSULT [facility] ([facility])    [address] [place] [id number]   [phone]            May 15, 2018  8:30 AM CDT   (Arrive by 8:15 AM)   XR CHEST 2 VIEWS with UCXR1   [facility] ([facility])    [address] [place] [address]  [address] [facility] [address]   [phone]           Please bring a list of your current medicines to your exam. (Include vitamins, minerals and over-thecounter medicines.) Leave your valuables at home.  Tell your doctor if there is a chance you may be pregnant.  You do not need to do anything special for this exam.            May 15,  2018  9:30 AM CDT   (Arrive by 9:15 AM)   Return Visit with Edouard Bryan MD   [facility] ([facility])    [address] [address] [phone]              Who to contact     If you have questions or need follow up information about today's clinic visit or your schedule please contact [facility] directly at [phone].  Normal or non-critical lab and imaging results will be communicated to you by [employer], letter or phone within 4 business days after the clinic has received the results. If you do not hear from us within 7 days, please contact the clinic through [employer] or phone. If you have a critical or abnormal lab result, we will notify you by phone as soon as possible.  Submit refill requests through [employer] or call your pharmacy and they will forward the refill request to us. Please allow 3 business days for your refill to be completed.          Additional Information About Your Visit        [employer] Information     [employer] gives you secure access to your electronic health record. If you see a primary care provider, you can also send messages to your care team and make appointments. If you have questions, please call your primary care clinic.  If you do not have a primary care provider, please call [phone] and they will assist you.        Care EveryWhere ID     This is your Care EveryWhere ID. This could be used by other organizations to access your [place] medical records  [id number]         Blood Pressure from Last 3 Encounters:   05/09/17 130/79   09/16/16 138/67   09/15/16 148/68    Weight from Last 3 Encounters:   05/10/17 51.3 kg (113 lb 1.6 oz)   05/09/17 50.8 kg (112 lb 1.6 oz)   09/16/16 53.4 kg (117 lb 12.8 oz)              Today, you had the following     No orders found for display       Primary Care Provider Office Phone # Fax #    Elisabeth Ko [phone]        [facility] [address]  [facility] [address]        Thank you!     Thank you for choosing [facility]IN[facility]  for your care. Our goal is always to provide you with excellent care. Hearing back from our patients is one way we can continue to improve our services. Please take a few minutes to complete the written survey that you may receive in the mail after your visit with us. Thank you!             Your Updated Medication List - Protect others around you: Learn how to safely use, store and throw away your medicines at www.disposemymeds.org.          This list is accurate as of: 6/16/17 11:59 PM.  Always use your most recent med list.                   Brand Name Dispense Instructions for use    acetaminophen 500 MG tablet    TYLENOL     Take 500-1,000 mg by mouth as needed       doxycycline Monohydrate 100 MG Tabs     56 tablet    Take 100 mg by mouth 2 times daily       LEVOTHROID 75 MCG tablet   Generic drug:  levothyroxine      Take 88 mcg by mouth daily       TYLENOL PM EXTRA STRENGTH PO      Take 1 tablet by mouth nightly as needed Reported on 5/9/2017       VITAMIN B 12 PO      Take by mouth daily Reported on 5/9/2017

## 2017-06-17 NOTE — PROGRESS NOTES
"Palliative Care Clinical Social Work Return Appointment    PLEASE NOTE:  THIS IS A MENTAL HEALTH NOTE.  OTHER PROVIDERS VIEWING THIS NOTE SHOULD USE THIS ONLY FOR UNDERSTANDING THE CONTEXT OF THE PATIENT S EXPERIENCE.  TOPICS DESCRIBED IN THIS NOTE SHOULD NOT BE REFERENCED TO THE PATIENT BY MEDICAL PROVIDERS.    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    Mental Status Exam:(List all that apply)      Appearance: Appropriate      Eye Contact: Good       Orientation: Yes, x4      Mood: Anxious, some low mood also      Affect: Appropriate, full range      Thought Content: Clear         Thought Form: Logical      Psychomotor Behavior: Normal    Visit summary:   Mental Health (Anxiety, depression, other symptoms):  Discussion today of different forms of anxiety using Dr Dionicio Hoang' model (Chemistry of Calm). Jigna identifies scarcity/hoarding; worry; and rumination as common styles, naming bottom-up, top-down and \"muddled-middle\" styles of anxiety. Psychoeducation and discussion of addressing anxiety holistically. She also brings book \"Staying Sharp\" to share, which is also by Dr Hoang. In addition to structured problem solving, psychoeducation and motivational interviewing related to sleep hygiene and other lifestyle changes Jigna is working on, increased focus today on thoughts and feelings. Beginning to explore themes re: mindfulness of thoughts, defusion from thoughts, development of self as observer, and present moment focus. Of note, ruminating thoughts re: worries about how she will cope in future when MIL returns and needs caregiving.     Mindfulness practice training today using image of sitting on riverbank and observing as thoughts come, pass by and go - If you get into the river at some point, return to riverbank again and return to observing.    Reviewed homework. Generally has not made the sleep hygiene " "changes we discussed, partly due to trip to visit daughter. Enjoyed the trip and found it nourishing, including 1 really good night's sleep there initially \"putting myself in someone else's complete care.\" Was able to avoid late night TV there and also avoided naps except during drives there and back. Discussion of achievable goals, slow progress, and the barrier of getting frustrated/ discouraged.    Homework plan for this week: Focusing on no TV after 8pm (read/leave room where  is watching); set alarm for 7:30am, get up right away, eat breakfast, take a short walk outside. Plans to avoid naps all together. If lying down to nap, plans to put alarm in another room and set for 15 minutes. Importance of improving sleep rated as very high.    Adjustment to Illness: Continues to work with dietician on adjusting diet. Enjoyed eating at daughter's with fresher and different foods. Considering going to a different grocery store at home. Also saw PT when visiting daughter and plans to add the exercises in daily.    Relationships: Visit with daughter supportive and helpful. She felt very cared for and encouraged by her daughter. Daughter also told her that she did not notice anything strange about how Jigna is acting. This was a relief in some ways regarding others' experiences of her private events/ internal experience. They may visit daughter again in August.    Relationship with  includes some conflicts as Jigna wants to make changes, ie re: grocery store.    Fears about returning to caregiving role for MIL are important. Discussion at next session or soon is indicated re: determining a workable plan for Jigna and setting boundaries if she needs to do so for her wellbeing.    End of Life and Advance Care Planning: Not focus today.    Main therapeutic interventions provided this session include:  Facilitating processing of feelings and thoughts; ACT approaches including mindfulness, defusion from thoughts, " and committed action; psychoeducation; assessment; collaborative treatment planning; motivational interviewing and discussion of goal to have small successes each week yet accept the larger process may take time.    Plan:  Jigna will return for psychotherapy with palliative care focus at [facility] on Wed June 21 at 10am.     Before next session, Jigna plans to:  Go to bed when tired  Set alarm for 7:30 every morning, rise right away, eat breakfast and take a short walk  Avoid naps or set alarm outside room for 15 minutes  Begin noticing the feelings that arise when having ruminative thoughts (ie re MIL)  Consider getting Chemistry of Calm/ bibliotherapy      Time spent with patient/family:  50 minutes    Palliative Care Counseling Treatment Plan    Client's Name: Jigna Valdez  YOB: 1935     Date: 5/31/2017    Initial DSM5 Diagnoses:   296.22 Major Depressive Disorder, Single Episode, Moderate _  300.02 (F41.1) Generalized Anxiety Disorder      Referral / Collaboration:  .Depending on needs over time, referral to a community therapist may be indicated.      Anticipated number of sessions to complete episode of care:  12         Treatment Goal(s)  Date Goal Dates  Reviewed Status   5/31/2017 Goal 1:  Client will develop effective strategies for anxiety following treatment for sarcoma.     New    Objective #1A (Client Action)  Client will Identify triggers for anxiety/panic attacks, Identify early signs/symptoms of anxiety and Practice self awareness exercises.    Intervention(s)  Therapist will Provide psychoeducation and Facilitate processing of thoughts and feelings .    New    Objective #1B  Client will Use/practice relaxation strategies and Identify effective coping strategies.    Intervention(s)  Therapist will Provide psychoeducation, Provide behavioral intervention training and Facilitate structured problem solving.    New    Objective #1C  Client will Effectively communicate with medical provider re  needed information and Effectively communicate with family/friends re needs.    Intervention(s)  Therapist will Provide psychoeducation and Facilitate processing of thoughts and feelings.    New           Date Goal Dates  Reviewed Status   5/31/2017 Goal 2:  Client will increase michelle in her life and develop effective strategies for coping with depressed mood and other difficult emotions.    New    Objective #2A (Client Action)  Client will Identify triggers/early signs of depressed mood.    Intervention(s) (Therapist Action)  Therapist will provide psychoeducation and Facilitate processing of thoughts and feelings.    New    Objective #2B  Client will Follow realistic exercise plan, Identify activities that provide comfort and/or pleasure, Participate in activities that provide comfort and/or pleasure and Identify an activity that would provide meaning/contribute to feeling of self worth, and effectively communicate needs to others. Develop new goals and increase productive or creative daily activities. Over time, increase frequency of seeing friends and family members.    Intervention(s)  Therapist will Provide psychoeducation, Facilitate processing of thoughts and feelings and Facilitate structured problem solving.    New    Objective #2C  Client will Participate in self-regulation practice (e.g.: meditation, relaxation exercises, self hypnosis) per day and/or gratitude practices.    Intervention(s)  Therapist will Provide psychoeducation and Provide behavioral intervention training.    New           Date Goal Dates  Reviewed Status   5/31/2017 Goal 3:  Client will make progress toward increased wellbeing and health, including working to other health care providers as needed.    New    Objective #3A (Client Action)  Client will Communicate effectively with medical provider re needed information for example related to sleep and diet/ weight-loss concerns.    Intervention(s) (Therapist Action)  Therapist will Make  referrals to appropriate colleagues as needed, Facilitate processing of thoughts and feelings and Facilitate structured problem solving.   New    Objective #3B (Client Action)  Client will improve sleep, with long term goal to sleep well for 7 - 8 hours each night.    Intervention(s) (Therapist Action)  Therapist will provide Cognitive Behavioral Therapy for Insomnia, review sleep hygiene options, and provide structured problem solving. Therapist will also address anxiety and depression with goal to assist with      Objective #3C (Client Action)  Client will continue to increase physical activity (adding stretching and faster walking 10 min per day in addition to daily slow walk) and adjust diet as advised by dietitian and physician, toward return to healthy weight and energy level.    Intervention(s) (Therapist Action)  Therapist will provide structured problem solving, psychoeducation, and bibliotherapy and referrals as appropriate.         Client has reviewed and agreed to the above plan on June 7, 2017.    JOURDAN Chilel, LICSW      DO NOT SEND ANY LETTERS

## 2017-06-21 ENCOUNTER — OFFICE VISIT (OUTPATIENT)
Dept: PALLIATIVE CARE | Facility: CLINIC | Age: 82
End: 2017-06-21
Attending: SOCIAL WORKER
Payer: COMMERCIAL

## 2017-06-21 DIAGNOSIS — F41.1 GAD (GENERALIZED ANXIETY DISORDER): Primary | ICD-10-CM

## 2017-06-21 DIAGNOSIS — F32.1 MODERATE SINGLE CURRENT EPISODE OF MAJOR DEPRESSIVE DISORDER (H): ICD-10-CM

## 2017-06-21 DIAGNOSIS — Z51.5 ENCOUNTER FOR PALLIATIVE CARE: ICD-10-CM

## 2017-06-21 PROCEDURE — 40000114 ZZH STATISTIC NO CHARGE CLINIC VISIT

## 2017-06-21 PROCEDURE — 90834 PSYTX W PT 45 MINUTES: CPT | Mod: ZP | Performed by: SOCIAL WORKER

## 2017-06-21 NOTE — PROGRESS NOTES
Palliative Care Clinical Social Work Return Appointment    PLEASE NOTE:  THIS IS A MENTAL HEALTH NOTE.  OTHER PROVIDERS VIEWING THIS NOTE SHOULD USE THIS ONLY FOR UNDERSTANDING THE CONTEXT OF THE PATIENT S EXPERIENCE.  TOPICS DESCRIBED IN THIS NOTE SHOULD NOT BE REFERENCED TO THE PATIENT BY MEDICAL PROVIDERS.    Jigna is an 82 year old woman with history of sarcoma and concerns of anxiety, depression and insomnia, seen today for a return psychotherapy appointment.    Mental Status Exam:(List all that apply)      Appearance: Appropriate      Eye Contact: Good       Orientation: Yes, x4      Mood: Anxious, some low mood also      Affect: Appropriate, full range      Thought Content: Clear         Thought Form: Logical      Psychomotor Behavior: Normal    Visit summary:   Mental Health (Anxiety, depression, other symptoms): Discussion today focused on person and environment / diathesis and stress models of mental health, and I encouraged more detailed discussion of her experiences and concerns related to mother in law, age 99, for whom Jigna has been playing a key caregiving role for about 30 years, and more so during past decade. Themes include chronic stress, resentment that has increased, values and goals and hopes for this time in Jigna's life (ie desire to spend more time with her daughters and doing other creative work ie related to writing, libraries/books), specific challenges related to CONSTANCE's personality (ie stating that she will die if she is asked to live in an assisted living setting) and concerns for her 's wellbeing also.     Ongoing work with insomnia. Discussion today of subjective nature of the project and of what defines success as absence of distress re sleep patterns and rest. Also review that sleep hygiene is first step, with CBT-I interventions to be considered next if still not satisfied with sleep. Description of usual pattern is: bed at 12, wakes at 3 and trouble falling back to sleep,  but does fall asleep since awakened by alarm, up at 7:30, having trouble avoiding 1 hour morning nap and 1 hour afternoon nap. Also has intermittent foot pain (arthritis) and this makes initiating sleep harder.  Having trouble not watching TV his  in evenings. We discuss perhaps making the cut off 9pm to stop watching TV.    Adjustment to Illness: Continues to work with dietician on adjusting diet. Has been striving to eat more calories and follow guidance of dietician. Some discussion today of her uncertainty about how long she will live and how long her  will live. This motivates focus on how she wants to spend the time she has.    Relationships: Focus today on challenging care giving relationship with mother in law. Having some feelings of dread as she plans trip to visit [facility] over . Discussion of daughter in [place] and her perspective that Jigna needs to protect herself at this point from the stress of caring for [facility]. Other daughter speaks with [facility] daily and enjoys these calls, is less aware of the caregiving stress for Jigna.  tends to encourage and expect that Jigna will put aside her feelings and wishes and continue care giving activities along with him. Shares that her father  in  after short illness, her mother had LTC insurance and got care that way, and with 5 siblings they supported her collectively till her death in . Sister lives out of state and is supportive and aware of stress of recent years. 3 brothers also live out of state and are not aware. Jigna is worried that Ganesh could be depressed or have other health problems going on. He seems to sleep the majority of the time recently.    Themes include discussion of benefits of setting boundaries, costs of not doing so, and self care/ looking at bigger picture as part of care giver's responsibility. Today we agreed that Jigna will invite  Ganesh to next session for further discussion.    End of Life and Advance Care  Planning: Discussion of recent work with atty to address estate planning for Jigna and  as well as his mother, especially related to their home. Jigna and Ganesh are aware of continuum of care options yet MIL has always strongly voiced desire to live with them [place].    Main therapeutic interventions provided this session include:  Facilitating processing of feelings and thoughts; ACT approaches including values and committed action; psychoeducation; motivational interviewing and reviewed discussion of goal to have small successes each week yet accept the larger process may take time.    Plan:  Jigna will return for psychotherapy with palliative care focus at [facility] [place] on Wed June 28 at 10am.     Before next session, Jigna plans to:  Go to bed when tired  Set alarm for 7:30 every morning, rise right away, eat breakfast and take a short walk  Avoid naps or set alarm outside room for 15 minutes  Avoid TV after 9pm  Discuss caregiving concerns with , invite him to our next meeting for visit focused on planning for MIL care in future    Next appts reviewed because times are a bit different.      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      Anticipated number of sessions to complete episode of care:  12         Treatment Goal(s)  Date Goal Dates  Reviewed Status   5/31/2017 Goal 1:  Client will develop effective strategies for anxiety following treatment for sarcoma.     New    Objective #1A (Client Action)  Client will Identify triggers for anxiety/panic attacks, Identify early  signs/symptoms of anxiety and Practice self awareness exercises.    Intervention(s)  Therapist will Provide psychoeducation and Facilitate processing of thoughts and feelings .    New    Objective #1B  Client will Use/practice relaxation strategies and Identify effective coping strategies.    Intervention(s)  Therapist will Provide psychoeducation, Provide behavioral intervention training and Facilitate structured problem solving.    New    Objective #1C  Client will Effectively communicate with medical provider re needed information and Effectively communicate with family/friends re needs.    Intervention(s)  Therapist will Provide psychoeducation and Facilitate processing of thoughts and feelings.    New           Date Goal Dates  Reviewed Status   5/31/2017 Goal 2:  Client will increase michelle in her life and develop effective strategies for coping with depressed mood and other difficult emotions.    New    Objective #2A (Client Action)  Client will Identify triggers/early signs of depressed mood.    Intervention(s) (Therapist Action)  Therapist will provide psychoeducation and Facilitate processing of thoughts and feelings.    New    Objective #2B  Client will Follow realistic exercise plan, Identify activities that provide comfort and/or pleasure, Participate in activities that provide comfort and/or pleasure and Identify an activity that would provide meaning/contribute to feeling of self worth, and effectively communicate needs to others. Develop new goals and increase productive or creative daily activities. Over time, increase frequency of seeing friends and family members.    Intervention(s)  Therapist will Provide psychoeducation, Facilitate processing of thoughts and feelings and Facilitate structured problem solving.    New    Objective #2C  Client will Participate in self-regulation practice (e.g.: meditation, relaxation exercises, self hypnosis) per day and/or gratitude  practices.    Intervention(s)  Therapist will Provide psychoeducation and Provide behavioral intervention training.    New           Date Goal Dates  Reviewed Status   5/31/2017 Goal 3:  Client will make progress toward increased wellbeing and health, including working to other health care providers as needed.    New    Objective #3A (Client Action)  Client will Communicate effectively with medical provider re needed information for example related to sleep and diet/ weight-loss concerns.    Intervention(s) (Therapist Action)  Therapist will Make referrals to appropriate colleagues as needed, Facilitate processing of thoughts and feelings and Facilitate structured problem solving.   New    Objective #3B (Client Action)  Client will improve sleep, with long term goal to sleep well for 7 - 8 hours each night.    Intervention(s) (Therapist Action)  Therapist will provide Cognitive Behavioral Therapy for Insomnia, review sleep hygiene options, and provide structured problem solving. Therapist will also address anxiety and depression with goal to assist with      Objective #3C (Client Action)  Client will continue to increase physical activity (adding stretching and faster walking 10 min per day in addition to daily slow walk) and adjust diet as advised by dietitian and physician, toward return to healthy weight and energy level.    Intervention(s) (Therapist Action)  Therapist will provide structured problem solving, psychoeducation, and bibliotherapy and referrals as appropriate.         Client has reviewed and agreed to the above plan on June 7, 2017.    JOURDAN Chilel, LICSW      DO NOT SEND ANY LETTERS

## 2017-06-21 NOTE — LETTER
6/21/2017       RE: Jigna Allen  [address] [place] [address]     Dear Colleague,    Thank you for referring your patient, Jigna Allen, to the [facility] at [facility]. Please see a copy of my visit note below.    Palliative Care Clinical Social Work Return Appointment    PLEASE NOTE:  THIS IS A MENTAL HEALTH NOTE.  OTHER PROVIDERS VIEWING THIS NOTE SHOULD USE THIS ONLY FOR UNDERSTANDING THE CONTEXT OF THE PATIENT S EXPERIENCE.  TOPICS DESCRIBED IN THIS NOTE SHOULD NOT BE REFERENCED TO THE PATIENT BY MEDICAL PROVIDERS.    Jigna is an 82 year old woman with history of sarcoma and concerns of anxiety, depression and insomnia, seen today for a return psychotherapy appointment.    Mental Status Exam:(List all that apply)      Appearance: Appropriate      Eye Contact: Good       Orientation: Yes, x4      Mood: Anxious, some low mood also      Affect: Appropriate, full range      Thought Content: Clear         Thought Form: Logical      Psychomotor Behavior: Normal    Visit summary:   Mental Health (Anxiety, depression, other symptoms): Discussion today focused on person and environment / diathesis and stress models of mental health, and I encouraged more detailed discussion of her experiences and concerns related to mother in law, age 99, for whom Jigna has been playing a key caregiving role for about 30 years, and more so during past decade. Themes include chronic stress, resentment that has increased, values and goals and hopes for this time in Jigna's life (ie desire to spend more time with her daughters and doing other creative work ie related to writing, libraries/books), specific challenges related to CONSTANCE's personality (ie stating that she will die if she is asked to live in an assisted living setting) and concerns for her 's wellbeing also.     Ongoing work with insomnia. Discussion today of subjective nature of the project and of what  defines success as absence of distress re sleep patterns and rest. Also review that sleep hygiene is first step, with CBT-I interventions to be considered next if still not satisfied with sleep. Description of usual pattern is: bed at 12, wakes at 3 and trouble falling back to sleep, but does fall asleep since awakened by alarm, up at 7:30, having trouble avoiding 1 hour morning nap and 1 hour afternoon nap. Also has intermittent foot pain (arthritis) and this makes initiating sleep harder.  Having trouble not watching TV his  in evenings. We discuss perhaps making the cut off 9pm to stop watching TV.    Adjustment to Illness: Continues to work with dietician on adjusting diet. Has been striving to eat more calories and follow guidance of dietician. Some discussion today of her uncertainty about how long she will live and how long her  will live. This motivates focus on how she wants to spend the time she has.    Relationships: Focus today on challenging care giving relationship with mother in law. Having some feelings of dread as she plans trip to visit [facility] over . Discussion of daughter in [place] and her perspective that Jigna needs to protect herself at this point from the stress of caring for [facility]. Other daughter speaks with [facility] daily and enjoys these calls, is less aware of the caregiving stress for Jigna.  tends to encourage and expect that Jigna will put aside her feelings and wishes and continue care giving activities along with him. Shares that her father  in  after short illness, her mother had LTC insurance and got care that way, and with 5 siblings they supported her collectively till her death in . Sister lives out of state and is supportive and aware of stress of recent years. 3 brothers also live out of state and are not aware. Jigna is worried that Ganesh could be depressed or have other health problems going on. He seems to sleep the majority of the time  recently.    Themes include discussion of benefits of setting boundaries, costs of not doing so, and self care/ looking at bigger picture as part of care giver's responsibility. Today we agreed that Jigna will invite  Ganesh to next session for further discussion.    End of Life and Advance Care Planning: Discussion of recent work with atty to address estate planning for Jigna and  as well as his mother, especially related to their home. Jigna and Ganesh are aware of continuum of care options yet MIL has always strongly voiced desire to live with them [place].    Main therapeutic interventions provided this session include:  Facilitating processing of feelings and thoughts; ACT approaches including values and committed action; psychoeducation; motivational interviewing and reviewed discussion of goal to have small successes each week yet accept the larger process may take time.    Plan:  Jigna will return for psychotherapy with palliative care focus at [facility] on Wed June 28 at 10am.     Before next session, Jigna plans to:  Go to bed when tired  Set alarm for 7:30 every morning, rise right away, eat breakfast and take a short walk  Avoid naps or set alarm outside room for 15 minutes  Avoid TV after 9pm  Discuss caregiving concerns with , invite him to our next meeting for visit focused on planning for MIL care in future    Next appts reviewed because times are a bit different.      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      Anticipated number of sessions to complete episode of  care:  12         Treatment Goal(s)  Date Goal Dates  Reviewed Status   5/31/2017 Goal 1:  Client will develop effective strategies for anxiety following treatment for sarcoma.     New    Objective #1A (Client Action)  Client will Identify triggers for anxiety/panic attacks, Identify early signs/symptoms of anxiety and Practice self awareness exercises.    Intervention(s)  Therapist will Provide psychoeducation and Facilitate processing of thoughts and feelings .    New    Objective #1B  Client will Use/practice relaxation strategies and Identify effective coping strategies.    Intervention(s)  Therapist will Provide psychoeducation, Provide behavioral intervention training and Facilitate structured problem solving.    New    Objective #1C  Client will Effectively communicate with medical provider re needed information and Effectively communicate with family/friends re needs.    Intervention(s)  Therapist will Provide psychoeducation and Facilitate processing of thoughts and feelings.    New           Date Goal Dates  Reviewed Status   5/31/2017 Goal 2:  Client will increase michelle in her life and develop effective strategies for coping with depressed mood and other difficult emotions.    New    Objective #2A (Client Action)  Client will Identify triggers/early signs of depressed mood.    Intervention(s) (Therapist Action)  Therapist will provide psychoeducation and Facilitate processing of thoughts and feelings.    New    Objective #2B  Client will Follow realistic exercise plan, Identify activities that provide comfort and/or pleasure, Participate in activities that provide comfort and/or pleasure and Identify an activity that would provide meaning/contribute to feeling of self worth, and effectively communicate needs to others. Develop new goals and increase productive or creative daily activities. Over time, increase frequency of seeing friends and family members.    Intervention(s)  Therapist will Provide  psychoeducation, Facilitate processing of thoughts and feelings and Facilitate structured problem solving.    New    Objective #2C  Client will Participate in self-regulation practice (e.g.: meditation, relaxation exercises, self hypnosis) per day and/or gratitude practices.    Intervention(s)  Therapist will Provide psychoeducation and Provide behavioral intervention training.    New           Date Goal Dates  Reviewed Status   5/31/2017 Goal 3:  Client will make progress toward increased wellbeing and health, including working to other health care providers as needed.    New    Objective #3A (Client Action)  Client will Communicate effectively with medical provider re needed information for example related to sleep and diet/ weight-loss concerns.    Intervention(s) (Therapist Action)  Therapist will Make referrals to appropriate colleagues as needed, Facilitate processing of thoughts and feelings and Facilitate structured problem solving.   New    Objective #3B (Client Action)  Client will improve sleep, with long term goal to sleep well for 7 - 8 hours each night.    Intervention(s) (Therapist Action)  Therapist will provide Cognitive Behavioral Therapy for Insomnia, review sleep hygiene options, and provide structured problem solving. Therapist will also address anxiety and depression with goal to assist with      Objective #3C (Client Action)  Client will continue to increase physical activity (adding stretching and faster walking 10 min per day in addition to daily slow walk) and adjust diet as advised by dietitian and physician, toward return to healthy weight and energy level.    Intervention(s) (Therapist Action)  Therapist will provide structured problem solving, psychoeducation, and bibliotherapy and referrals as appropriate.         Client has reviewed and agreed to the above plan on June 7, 2017.    JOURDAN Chilel, LICSW      DO NOT SEND ANY LETTERS

## 2017-06-21 NOTE — MR AVS SNAPSHOT
After Visit Summary   6/21/2017    Jigna Allen    MRN: [id number]           Patient Information     Date Of Birth          1935        Visit Information        Provider Department      6/21/2017 10:00 AM Mary Torres LICSW; [facility]ULT [facility]        Today's Diagnoses     YAMILET (generalized anxiety disorder)    -  1    Moderate single current episode of major depressive disorder (H)        Encounter for palliative care           Follow-ups after your visit        Your next 10 appointments already scheduled     Jun 28, 2017 11:00 AM CDT   (Arrive by 10:45 AM)   RETURN WITH ROOM with CATHERINE Conley, [facility] ([facility])    [address]  [address] [facility] [address]   [phone]            Jul 12, 2017  9:00 AM CDT   (Arrive by 8:45 AM)   RETURN WITH ROOM with CATHERINE Conley, UC [facility]ULT [facility] ([facility])    [address]  [address] [facility] [address]   [phone]            Jul 19, 2017 10:00 AM CDT   (Arrive by 9:45 AM)   RETURN WITH ROOM with CATHERINE Conley, [facility]ULT [facility] ([facility])    [address]  [address] [facility] [address]   [phone]            Jul 26, 2017 10:00 AM CDT   (Arrive by 9:45 AM)   RETURN WITH ROOM with CATHERINE Conley [facility] ([facility])    [address]  [address]  [place] [address]   [phone]            May 15, 2018  8:30 AM CDT   (Arrive by 8:15 AM)   XR CHEST 2 VIEWS with UCXR1   [facility] ([facility])    [address]  [address]st [address]  [place] [address]   [phone]            Please bring a list of your current medicines to your exam. (Include vitamins, minerals and over-thecounter medicines.) Leave your valuables at home.  Tell your doctor if there is a chance you may be pregnant.  You do not need to do anything special for this exam.            May 15, 2018  9:30 AM CDT   (Arrive by 9:15 AM)   Return Visit with Edouard Bryan MD   [facility] ([facility])    [address]  [address]  [facility] [address] [phone]              Who to contact     If you have questions or need follow up information about today's clinic visit or your schedule please contact [facility] directly at [phone].  Normal or non-critical lab and imaging results will be communicated to you by MyChart, letter or phone within 4 business days after the clinic has received the results. If you do not hear from us within 7 days, please contact the clinic through MyChart or phone. If you have a critical or abnormal lab result, we will notify you by phone as soon as possible.  Submit refill requests through [employer] or call your pharmacy and they will forward the refill request to us. Please allow 3 business days for your refill to be completed.          Additional Information About Your Visit        [employer] Information     [employer] gives you secure access to your electronic health record. If you see a primary care provider, you can also send messages to your care team and make appointments. If you have questions, please call your primary care clinic.  If you do not have a primary care provider, please call [phone] and they will assist you.        Care EveryWhere ID     This is your Care EveryWhere ID. This could be used by other organizations to access your [place] medical records  [id number]         Blood Pressure from Last 3 Encounters:   05/09/17 130/79   09/16/16 138/67   09/15/16 148/68    Weight from Last 3  Encounters:   05/10/17 51.3 kg (113 lb 1.6 oz)   05/09/17 50.8 kg (112 lb 1.6 oz)   09/16/16 53.4 kg (117 lb 12.8 oz)              Today, you had the following     No orders found for display       Primary Care Provider Office Phone # Fax #    Elisabeth Ko [phone]       [facility] [address]  [facility] [address]        Equal Access to Services     MIKEY CANO : Hadii aad ku hadasho Soomaali, waaxda luqadaha, qaybta kaalmada adeegyada, waxay idiin hayaan cecil coker . So [facility] [phone].    ATENCIÓN: Si habla español, tiene a jade disposición servicios gratuitos de asistencia lingüística. [facility] [phone].    We comply with applicable federal civil rights laws and [place] laws. We do not discriminate on the basis of race, color, national origin, age, disability sex, sexual orientation or gender identity.            Thank you!     Thank you for choosing [facility]  for your care. Our goal is always to provide you with excellent care. Hearing back from our patients is one way we can continue to improve our services. Please take a few minutes to complete the written survey that you may receive in the mail after your visit with us. Thank you!             Your Updated Medication List - Protect others around you: Learn how to safely use, store and throw away your medicines at www.disposemymeds.org.          This list is accurate as of: 6/21/17  4:49 PM.  Always use your most recent med list.                   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

## 2017-06-28 ENCOUNTER — OFFICE VISIT (OUTPATIENT)
Dept: PALLIATIVE CARE | Facility: CLINIC | Age: 82
End: 2017-06-28
Attending: SOCIAL WORKER
Payer: COMMERCIAL

## 2017-06-28 DIAGNOSIS — F32.1 MODERATE SINGLE CURRENT EPISODE OF MAJOR DEPRESSIVE DISORDER (H): ICD-10-CM

## 2017-06-28 DIAGNOSIS — F41.1 GAD (GENERALIZED ANXIETY DISORDER): Primary | ICD-10-CM

## 2017-06-28 DIAGNOSIS — Z51.5 ENCOUNTER FOR PALLIATIVE CARE: ICD-10-CM

## 2017-06-28 PROCEDURE — 40000114 ZZH STATISTIC NO CHARGE CLINIC VISIT

## 2017-06-28 PROCEDURE — 90834 PSYTX W PT 45 MINUTES: CPT | Mod: ZP | Performed by: SOCIAL WORKER

## 2017-06-28 NOTE — PROGRESS NOTES
"Palliative Care Clinical Social Work Return Appointment    PLEASE NOTE:  THIS IS A MENTAL HEALTH NOTE.  OTHER PROVIDERS VIEWING THIS NOTE SHOULD USE THIS ONLY FOR UNDERSTANDING THE CONTEXT OF THE PATIENT S EXPERIENCE.  TOPICS DESCRIBED IN THIS NOTE SHOULD NOT BE REFERENCED TO THE PATIENT BY MEDICAL PROVIDERS.    Jigna is an 82 year old woman with history of sarcoma and concerns of anxiety, depression and insomnia, seen today for a return psychotherapy appointment.    Mental Status Exam:(List all that apply)      Appearance: Appropriate      Eye Contact: Good       Orientation: Yes, x4      Mood: anxious, engaged, verbal      Affect: Appropriate, full range      Thought Content: Clear         Thought Form: Logical      Psychomotor Behavior: Normal    Visit summary:   Mental Health (Anxiety, depression, other symptoms): Anxiety and depression continue to be present, yet with improvement and increasing self awareness described. Jigna describes seeing clearly the impact of her caregiving role on her mood and experience of her life. Her  was not willing to join her today to discuss that. She tells me she thinks that is because he knows it would be a discussion of caregiving concerns, and he doesn't want to face the need for changes. Jigna also describes observing her own patterns of avoidance and \"denial\".  Self talk themes include critical messages and tone. Self compassion education provided today including the opportunity to see our suffering (her caregiving situation, chronic stress, anxiety and depression symptoms); remember that many people experience similar suffering, giving it context (there are others facing similar family caregiving challenges); and speaking kindly to ourselves (ie \"I'm taking small steps to improve things for myself, I'm doing what I can\"). Jigna was receptive to this perspective taking.     Jigna continues to process regarding desire to improve sleep. She shares completed sleep diary " "today. She continues to struggle to initiate sleep and to wake 1 - 2x/night. She is in bed ~ 8 - 9 hours per night. She feels tired every day. She has sometimes watched TV after 9pm and does want to change this, especially since the shows/news often increase anxiety. She also naps daily 10 - 30 mins and wants to change this because of goal to sleep through the night. Her sleep goal for the coming 2 weeks is to continue to avoid evening TV and naps. We also discuss that I can review CBT-I treatment options and assess additional potential interventions.Discussion also of relationship between anxiety and sleep. She describes \"pushing thoughts away\" at HS. This does not always work and rumination/worry do impact sleep.    We discuss mindfulness practices in working with anxiety and depression. I provided education in mindful walking practice focusing on five senses. Psychoeducation provided re benefits in reducing fusion with thoughts, expanding perspective. She plans to use this approach to fully enjoy her walks whenever she remembers. Walks are daily.    Psychoeducation provided regarding boundaries, challenges of setting new boundaries in close relationships, and importance of sometimes allowing others' needs not to be met when to meet their every need is causing us harm - ie discussion that as her MIL returns for visit this coming week, she can make small adjustments to sometimes prioritize her own wishes and needs - ie take a walk on her own; cook something she likes; be aware of impact of critical statements by MIL; and perhaps schedule time away with friend Kia, who has been supporting her in considering making changes in her caregiving role due to impact on her. She describes ambivalence about making changes - loyalty to her , habit, resistance to facing anger from  or MIL, gratitude to , religous beliefs that caregiving is right. Yet, she does voice that she has been \"in collapse\" and " thinks a change is needed. Discussion of making very small changes to start out - she was receptive.      Adjustment to Illness: Not focus today    Relationships: Continuing to process caregiving role for [employer], concerns about her , his resistance to any changes in the caregiving routine, fears for impacts on herself. Friend Kia recently spoke to her about her worries for her, is supportive.    End of Life and Advance Care Planning: Not focus today.    Main therapeutic interventions provided this session include:  Facilitating processing of feelings and thoughts; psychoeducation especially regarding anxiety, sleep, and boundaries; motivational interviewing     Plan:  Jigna will return for psychotherapy with palliative care focus at [facility] [place] on Tuesday July 11 at 10am.     Before next session, Jigna plans to:  Go to bed when tired  Set alarm for 7:30 every morning, rise right away, eat breakfast and take a short walk  Avoid naps or set alarm outside room for 15 minutes  Avoid TV after 9pm  Choose small actions of being more assertive in caregiving situation - toward things she will enjoy - ie take a walk, see a friend, cook something she likes      Time spent with patient/family:  50 minutes    Palliative Care Counseling Treatment Plan    Client's Name: Jigna Valdez  YOB: 1935     Date: 5/31/2017    Initial DSM5 Diagnoses:   296.22 Major Depressive Disorder, Single Episode, Moderate _  300.02 (F41.1) Generalized Anxiety Disorder      Referral / Collaboration:  .Depending on needs over time, referral to a community therapist may be indicated.      Anticipated number of sessions to complete episode of care:  12         Treatment Goal(s)  Date Goal Dates  Reviewed Status   5/31/2017 Goal 1:  Client will develop effective strategies for anxiety following treatment for sarcoma.     New    Objective #1A (Client Action)  Client will Identify triggers for anxiety/panic attacks, Identify early signs/symptoms of  anxiety and Practice self awareness exercises.    Intervention(s)  Therapist will Provide psychoeducation and Facilitate processing of thoughts and feelings .    New    Objective #1B  Client will Use/practice relaxation strategies and Identify effective coping strategies.    Intervention(s)  Therapist will Provide psychoeducation, Provide behavioral intervention training and Facilitate structured problem solving.    New    Objective #1C  Client will Effectively communicate with medical provider re needed information and Effectively communicate with family/friends re needs.    Intervention(s)  Therapist will Provide psychoeducation and Facilitate processing of thoughts and feelings.    New           Date Goal Dates  Reviewed Status   5/31/2017 Goal 2:  Client will increase michelle in her life and develop effective strategies for coping with depressed mood and other difficult emotions.    New    Objective #2A (Client Action)  Client will Identify triggers/early signs of depressed mood.    Intervention(s) (Therapist Action)  Therapist will provide psychoeducation and Facilitate processing of thoughts and feelings.    New    Objective #2B  Client will Follow realistic exercise plan, Identify activities that provide comfort and/or pleasure, Participate in activities that provide comfort and/or pleasure and Identify an activity that would provide meaning/contribute to feeling of self worth, and effectively communicate needs to others. Develop new goals and increase productive or creative daily activities. Over time, increase frequency of seeing friends and family members.    Intervention(s)  Therapist will Provide psychoeducation, Facilitate processing of thoughts and feelings and Facilitate structured problem solving.    New    Objective #2C  Client will Participate in self-regulation practice (e.g.: meditation, relaxation exercises, self hypnosis) per day and/or gratitude practices.    Intervention(s)  Therapist will  Provide psychoeducation and Provide behavioral intervention training.    New           Date Goal Dates  Reviewed Status   5/31/2017 Goal 3:  Client will make progress toward increased wellbeing and health, including working to other health care providers as needed.    New    Objective #3A (Client Action)  Client will Communicate effectively with medical provider re needed information for example related to sleep and diet/ weight-loss concerns.    Intervention(s) (Therapist Action)  Therapist will Make referrals to appropriate colleagues as needed, Facilitate processing of thoughts and feelings and Facilitate structured problem solving.   New    Objective #3B (Client Action)  Client will improve sleep, with long term goal to sleep well for 7 - 8 hours each night.    Intervention(s) (Therapist Action)  Therapist will provide Cognitive Behavioral Therapy for Insomnia, review sleep hygiene options, and provide structured problem solving. Therapist will also address anxiety and depression with goal to assist with      Objective #3C (Client Action)  Client will continue to increase physical activity (adding stretching and faster walking 10 min per day in addition to daily slow walk) and adjust diet as advised by dietitian and physician, toward return to healthy weight and energy level.    Intervention(s) (Therapist Action)  Therapist will provide structured problem solving, psychoeducation, and bibliotherapy and referrals as appropriate.         Client has reviewed and agreed to the above plan on June 7, 2017.    JOURDAN Chilel, LICSW      DO NOT SEND ANY LETTERS

## 2017-06-28 NOTE — LETTER
"6/28/2017       RE: Jigna Allen  [address] [place] [address]     Dear Colleague,    Thank you for referring your patient, Jigna Allen, to the [facility] at [facility]. Please see a copy of my visit note below.    Palliative Care Clinical Social Work Return Appointment    PLEASE NOTE:  THIS IS A MENTAL HEALTH NOTE.  OTHER PROVIDERS VIEWING THIS NOTE SHOULD USE THIS ONLY FOR UNDERSTANDING THE CONTEXT OF THE PATIENT S EXPERIENCE.  TOPICS DESCRIBED IN THIS NOTE SHOULD NOT BE REFERENCED TO THE PATIENT BY MEDICAL PROVIDERS.    Jigna is an 82 year old woman with history of sarcoma and concerns of anxiety, depression and insomnia, seen today for a return psychotherapy appointment.    Mental Status Exam:(List all that apply)      Appearance: Appropriate      Eye Contact: Good       Orientation: Yes, x4      Mood: anxious, engaged, verbal      Affect: Appropriate, full range      Thought Content: Clear         Thought Form: Logical      Psychomotor Behavior: Normal    Visit summary:   Mental Health (Anxiety, depression, other symptoms): Anxiety and depression continue to be present, yet with improvement and increasing self awareness described. Jigna describes seeing clearly the impact of her caregiving role on her mood and experience of her life. Her  was not willing to join her today to discuss that. She tells me she thinks that is because he knows it would be a discussion of caregiving concerns, and he doesn't want to face the need for changes. Jigna also describes observing her own patterns of avoidance and \"denial\".  Self talk themes include critical messages and tone. Self compassion education provided today including the opportunity to see our suffering (her caregiving situation, chronic stress, anxiety and depression symptoms); remember that many people experience similar suffering, giving it context (there are others facing similar family " "caregiving challenges); and speaking kindly to ourselves (ie \"I'm taking small steps to improve things for myself, I'm doing what I can\"). Jigna was receptive to this perspective taking.     Jigna continues to process regarding desire to improve sleep. She shares completed sleep diary today. She continues to struggle to initiate sleep and to wake 1 - 2x/night. She is in bed ~ 8 - 9 hours per night. She feels tired every day. She has sometimes watched TV after 9pm and does want to change this, especially since the shows/news often increase anxiety. She also naps daily 10 - 30 mins and wants to change this because of goal to sleep through the night. Her sleep goal for the coming 2 weeks is to continue to avoid evening TV and naps. We also discuss that I can review CBT-I treatment options and assess additional potential interventions.Discussion also of relationship between anxiety and sleep. She describes \"pushing thoughts away\" at HS. This does not always work and rumination/worry do impact sleep.    We discuss mindfulness practices in working with anxiety and depression. I provided education in mindful walking practice focusing on five senses. Psychoeducation provided re benefits in reducing fusion with thoughts, expanding perspective. She plans to use this approach to fully enjoy her walks whenever she remembers. Walks are daily.    Psychoeducation provided regarding boundaries, challenges of setting new boundaries in close relationships, and importance of sometimes allowing others' needs not to be met when to meet their every need is causing us harm - ie discussion that as her MIL returns for visit this coming week, she can make small adjustments to sometimes prioritize her own wishes and needs - ie take a walk on her own; cook something she likes; be aware of impact of critical statements by MIL; and perhaps schedule time away with friend Kia, who has been supporting her in considering making changes in her " "caregiving role due to impact on her. She describes ambivalence about making changes - loyalty to her , habit, resistance to facing anger from  or MIL, gratitude to , religous beliefs that caregiving is right. Yet, she does voice that she has been \"in collapse\" and thinks a change is needed. Discussion of making very small changes to start out - she was receptive.      Adjustment to Illness: Not focus today    Relationships: Continuing to process caregiving role for MIL, concerns about her , his resistance to any changes in the caregiving routine, fears for impacts on herself. Friend Kia recently spoke to her about her worries for her, is supportive.    End of Life and Advance Care Planning: Not focus today.    Main therapeutic interventions provided this session include:  Facilitating processing of feelings and thoughts; psychoeducation especially regarding anxiety, sleep, and boundaries; motivational interviewing     Plan:  [place]ign[place] [facility] [place] 10[place].     Before next session, Jigna plans to:  Go to bed when tired  Set alarm for 7:30 every morning, rise right away, eat breakfast and take a short walk  Avoid naps or set alarm outside room for 15 minutes  Avoid TV after 9pm  Choose small actions of being more assertive in caregiving situation - toward things she will enjoy - ie take a walk, see a friend, cook something she likes      Time spent with patient/family:  50 minutes    Palliative Care Counseling Treatment Plan    Client's Name: Jigna Valdez  YOB: 1935     Date: 5/31/2017    Initial DSM5 Diagnoses:   296.22 Major Depressive Disorder, Single Episode, Moderate _  300.02 (F41.1) Generalized Anxiety Disorder      Referral / Collaboration:  .Depending on needs over time, referral to a community therapist may be indicated.      Anticipated number of sessions to complete episode of " care:  12         Treatment Goal(s)  Date Goal Dates  Reviewed Status   5/31/2017 Goal 1:  Client will develop effective strategies for anxiety following treatment for sarcoma.     New    Objective #1A (Client Action)  Client will Identify triggers for anxiety/panic attacks, Identify early signs/symptoms of anxiety and Practice self awareness exercises.    Intervention(s)  Therapist will Provide psychoeducation and Facilitate processing of thoughts and feelings .    New    Objective #1B  Client will Use/practice relaxation strategies and Identify effective coping strategies.    Intervention(s)  Therapist will Provide psychoeducation, Provide behavioral intervention training and Facilitate structured problem solving.    New    Objective #1C  Client will Effectively communicate with medical provider re needed information and Effectively communicate with family/friends re needs.    Intervention(s)  Therapist will Provide psychoeducation and Facilitate processing of thoughts and feelings.    New           Date Goal Dates  Reviewed Status   5/31/2017 Goal 2:  Client will increase michelle in her life and develop effective strategies for coping with depressed mood and other difficult emotions.    New    Objective #2A (Client Action)  Client will Identify triggers/early signs of depressed mood.    Intervention(s) (Therapist Action)  Therapist will provide psychoeducation and Facilitate processing of thoughts and feelings.    New    Objective #2B  Client will Follow realistic exercise plan, Identify activities that provide comfort and/or pleasure, Participate in activities that provide comfort and/or pleasure and Identify an activity that would provide meaning/contribute to feeling of self worth, and effectively communicate needs to others. Develop new goals and increase productive or creative daily activities. Over time, increase frequency of seeing friends and family members.    Intervention(s)  Therapist will Provide  psychoeducation, Facilitate processing of thoughts and feelings and Facilitate structured problem solving.    New    Objective #2C  Client will Participate in self-regulation practice (e.g.: meditation, relaxation exercises, self hypnosis) per day and/or gratitude practices.    Intervention(s)  Therapist will Provide psychoeducation and Provide behavioral intervention training.    New           Date Goal Dates  Reviewed Status   5/31/2017 Goal 3:  Client will make progress toward increased wellbeing and health, including working to other health care providers as needed.    New    Objective #3A (Client Action)  Client will Communicate effectively with medical provider re needed information for example related to sleep and diet/ weight-loss concerns.    Intervention(s) (Therapist Action)  Therapist will Make referrals to appropriate colleagues as needed, Facilitate processing of thoughts and feelings and Facilitate structured problem solving.   New    Objective #3B (Client Action)  Client will improve sleep, with long term goal to sleep well for 7 - 8 hours each night.    Intervention(s) (Therapist Action)  Therapist will provide Cognitive Behavioral Therapy for Insomnia, review sleep hygiene options, and provide structured problem solving. Therapist will also address anxiety and depression with goal to assist with      Objective #3C (Client Action)  Client will continue to increase physical activity (adding stretching and faster walking 10 min per day in addition to daily slow walk) and adjust diet as advised by dietitian and physician, toward return to healthy weight and energy level.    Intervention(s) (Therapist Action)  Therapist will provide structured problem solving, psychoeducation, and bibliotherapy and referrals as appropriate.         Client has reviewed and agreed to the above plan on June 7, 2017.    JOURDAN Chilel, LICSW      DO NOT SEND ANY LETTERS

## 2017-06-28 NOTE — MR AVS SNAPSHOT
After Visit Summary   6/28/2017    Jigna Allen    MRN: [id number]           Patient Information     Date Of Birth          1935        Visit Information        Provider Department      6/28/2017 11:00 AM Mary Torres LICSW; UC [facility] CONSULT [facility]        Today's Diagnoses     YAMILET (generalized anxiety disorder)    -  1    Encounter for palliative care        Moderate single current episode of major depressive disorder (H)           Follow-ups after your visit        Your next 10 appointments already scheduled     Jul 11, 2017 10:00 AM CDT   (Arrive by 9:45 AM)   RETURN WITH ROOM with CATHERINE Conley, UC 2 [facility] CONSULT [facility] ([facility])    [address] [place] [id number]   [phone]            Jul 12, 2017  9:00 AM CDT   (Arrive by 8:45 AM)   RETURN WITH ROOM with CATHERINE Conley, UC 2 119 CONSULT [facility] ([facility])    [address] [place] [address]  [address] [facility] [address]   [phone]            Jul 19, 2017 10:00 AM CDT   (Arrive by 9:45 AM)   RETURN WITH ROOM with CATHERINE Conley UC 2 119 CONSULT [facility] ([facility])    [address] [place] [address]  [address] [facility] [address]   [phone]            Jul 26, 2017 10:00 AM CDT   (Arrive by 9:45 AM)   RETURN WITH ROOM with CATHERINE Conley UC 2 118 CONSULT [facility] ([facility])    [address] [place] [address]  [address] [facility] [address]   [phone]            Aug 02, 2017 10:00 AM CDT   (Arrive by 9:45 AM)   RETURN WITH ROOM with CATHERINE Conley UC 2 119 CONSULT [facility] ([facility])    [address] [place] [address]  [facility] [address] [phone]              Who to contact     If you have questions or need follow up information about today's clinic visit or your schedule please contact [facility] directly at [phone].  Normal or non-critical lab and imaging results will be communicated to you by MyChart, letter or phone within 4 business days after the clinic has received the results. If you do not hear from us within 7 days, please contact the clinic through MyChart or phone. If you have a critical or abnormal lab result, we will notify you by phone as soon as possible.  Submit refill requests through [employer] or call your pharmacy and they will forward the refill request to us. Please allow 3 business days for your refill to be completed.          Additional Information About Your Visit        [facility] Information     [employer] gives you secure access to your electronic health record. If you see a primary care provider, you can also send messages to your care team and make appointments. If you have questions, please call your primary care clinic.  If you do not have a primary care provider, please call [phone] and they will assist you.        Care EveryWhere ID     This is your Care EveryWhere ID. This could be used by other organizations to access your [facility] medical records  [id number]         Blood Pressure from Last 3 Encounters:   05/09/17 130/79   09/16/16 138/67   09/15/16 148/68    Weight from Last 3 Encounters:   05/10/17 51.3 kg (113 lb 1.6 oz)   05/09/17 50.8 kg (112 lb 1.6 oz)   09/16/16 53.4 kg (117 lb 12.8 oz)              Today, you had the following     No orders found for display       Primary Care Provider Office Phone # Fax #    Elisabeth Ko [phone]       [facility] [address]  [facility] [address]        Equal Access to Services     MIKEY CANO AH: Denis Hu, kristen escalante, cesia wilson,  narinder marquezfish trujillo'aan ah. So [facility] [phone].    ATENCIÓN: Si yadiel ortega, tiene a jade disposición servicios gratuitos de asistencia lingüística. Samira al [phone].    We comply with applicable federal civil rights laws and [place] laws. We do not discriminate on the basis of race, color, national origin, age, disability sex, sexual orientation or gender identity.            Thank you!     Thank you for choosing [facility]  for your care. Our goal is always to provide you with excellent care. Hearing back from our patients is one way we can continue to improve our services. Please take a few minutes to complete the written survey that you may receive in the mail after your visit with us. Thank you!             Your Updated Medication List - Protect others around you: Learn how to safely use, store and throw away your medicines at www.disposemymeds.org.          This list is accurate as of: 6/28/17  4:21 PM.  Always use your most recent med list.                   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

## 2017-07-11 ENCOUNTER — OFFICE VISIT (OUTPATIENT)
Dept: PALLIATIVE CARE | Facility: CLINIC | Age: 82
End: 2017-07-11
Attending: SOCIAL WORKER
Payer: COMMERCIAL

## 2017-07-11 DIAGNOSIS — Z51.5 ENCOUNTER FOR PALLIATIVE CARE: ICD-10-CM

## 2017-07-11 DIAGNOSIS — F41.1 GAD (GENERALIZED ANXIETY DISORDER): Primary | ICD-10-CM

## 2017-07-11 PROCEDURE — 40000114 ZZH STATISTIC NO CHARGE CLINIC VISIT

## 2017-07-11 PROCEDURE — 90834 PSYTX W PT 45 MINUTES: CPT | Mod: ZP | Performed by: SOCIAL WORKER

## 2017-07-11 NOTE — PROGRESS NOTES
"Palliative Care Clinical Social Work Return Appointment    PLEASE NOTE:  THIS IS A MENTAL HEALTH NOTE.  OTHER PROVIDERS VIEWING THIS NOTE SHOULD USE THIS ONLY FOR UNDERSTANDING THE CONTEXT OF THE PATIENT S EXPERIENCE.  TOPICS DESCRIBED IN THIS NOTE SHOULD NOT BE REFERENCED TO THE PATIENT BY MEDICAL PROVIDERS.    Jigna (\"Kwame\") is an 82 year old woman with history of sarcoma and concerns of anxiety, depression and insomnia, seen today for a return psychotherapy appointment.    Mental Status Exam:(List all that apply)      Appearance: Appropriate      Eye Contact: Good       Orientation: Yes, x4      Mood: anxious, engaged, verbal      Affect: Appropriate, full range      Thought Content: Clear         Thought Form: Logical      Psychomotor Behavior: Normal    Visit summary:   Mental Health (Anxiety, depression, other symptoms): Jigna describes improvements in her anxiety symptoms. She reports regular use of five senses mindful walk. She enjoys this and shared it with her MIL in their daily walks; [facility] also enjoyed this practice. We also discuss benefits of setting boundaries, addressing needed communication, and reduction of avoidance through deliberate small steps of exposure (ie increasing driving, which she has been fearing). She describes recent cognitive reassessment at home ie re her fears about driving. Setting boundaries did increase her feeling of options and control during visit from [facility] last week, ie going out to lunch with a cousin on her own.     Today I introduced mindful breathing practice as an added stress reduction practice. Reviewed the \"three breaths practice\" and encouraged regular use of this along with five senses walk as part of overall practices to reduce and manage anxiety.    Adjustment to Illness: We also discuss her fears that her cancer may be recurring ie in her lower spine (she is having trouble walking; appt with PCP in 2 weeks about this); numbness in left hand; and ongoing " fatigue. We discuss that even though her oncologist has been fairly sure that her fears are not founded, she does have the option to advocate for herself and make requests for more evaluation if she finds she is having concerning symptoms. She plans to consider this, and will see PCP soon as well.    Discussion of fatigue vs insomnia. There are still sleep problems, but Jigna does identify fatigue. We discuss that post- cancer treatment fatigue does occur, and that [facility] care MDs sometimes treat this with medication (Adderall). Jigna has preferred to avoid medications (including d/c ing SSRI). We discuss that behavioral strategies to reduce and manage fatigue include behavioral activation or having an active and engaged life, including physical activity as well as other meaningful activities in daily life.    Did not discuss work with RD today. Jigna is having problems with her leg, but otherwise likes to walk regularly, would enjoy Tom, and we discuss chair yoga.    Regarding sleep problems, we discussed that Jigna continues to watch TV in evenings with . She is feeling very tired in mornings when alarm goes off at 7:30. She prefers not to sleep till 8am but we discuss this option. She reports waking when her  comes to bed, later than her; often she then has trouble returning to sleep. We discuss trialing sleeping in a separate room and bed to see if avoiding sleep interruption is helpful. She agrees to do so 1 - 2x this week.    Relationships: Reports that visits with her cousin and her sister and brother in law were particularly enjoyable during the past week. She invited her sister over for dinner and enjoyed cooking the meal. She also has offered help to a neighbor needing a ride to the [place] and feels good about that.    Continuing to process caregiving role for CONSTANCE. Describes feeling less significantly impacted by her week long visit with use of setting intentional small boundaries - ie  "being in another room when she needed a break; choosing not to always respond to calls/requests; choosing not to call on phone daily now that [place]; and lunch out with cousin alone despite MIL wanting to come too. It is important to her during this time of her life to have activities other than caregiving for MIL. Travel with , seeing their daughters, and time for creativity and service to others in the community are all named as valued activities for Jigna.    We discuss \"serenity prayer\" framework of changing what we can change, accepting what we cannot, including for example the choices and actions of others in many cases, including  and MIL.     I also provided information about caregiver coaching and counseling services through [facility].    End of Life and Advance Care Planning: Not focus today.    Main therapeutic interventions provided this session include:  Facilitating processing of feelings and thoughts; psychoeducation regarding anxiety, fatigue, sleep, and boundaries; motivational interviewing     Plan:  Jigna will return for psychotherapy with palliative care focus at [facility] on Wednesday July 19 at 11am.     Before next session, Jigna plans to:  Sleep in her own separate room/ bed 1 - 2x/week  Go to bed when tired, avoid TV for 30 min before bed, avoid naps  Use mindfulness practices to reduce stress and anxiety including \"five senses walk\" and \"three breaths practice\"  Continue to choose small actions of being more assertive in caregiving situation - toward things she will enjoy - ie take a walk, see a friend, cook something she likes    Time spent with patient/family:  50 minutes    Palliative Care Counseling Treatment Plan    Client's Name: Jigna Valdez  YOB: 1935     Date: 5/31/2017    Initial DSM5 Diagnoses:   296.22 Major Depressive Disorder, Single Episode, Moderate _  300.02 (F41.1) Generalized Anxiety Disorder      Referral / Collaboration:  .Depending " on needs over time, referral to a community therapist may be indicated.      Anticipated number of sessions to complete episode of care:  12         Treatment Goal(s)  Date Goal Dates  Reviewed Status   5/31/2017 Goal 1:  Client will develop effective strategies for anxiety following treatment for sarcoma.     Continued    Objective #1A (Client Action)  Client will Identify triggers for anxiety/panic attacks, Identify early signs/symptoms of anxiety and Practice self awareness exercises.    Intervention(s)  Therapist will Provide psychoeducation and Facilitate processing of thoughts and feelings .    Continued    Objective #1B  Client will Use/practice relaxation strategies and Identify effective coping strategies.    Intervention(s)  Therapist will Provide psychoeducation, Provide behavioral intervention training and Facilitate structured problem solving.    Continued    Objective #1C  Client will Effectively communicate with medical provider re needed information and Effectively communicate with family/friends re needs.    Intervention(s)  Therapist will Provide psychoeducation and Facilitate processing of thoughts and feelings.    Continued           Date Goal Dates  Reviewed Status   5/31/2017 Goal 2:  Client will increase michelle in her life and develop effective strategies for coping with depressed mood and other difficult emotions.    Continued    Objective #2A (Client Action)  Client will Identify triggers/early signs of depressed mood.    Intervention(s) (Therapist Action)  Therapist will provide psychoeducation and Facilitate processing of thoughts and feelings.    Continued    Objective #2B  Client will Follow realistic exercise plan, Identify activities that provide comfort and/or pleasure, Participate in activities that provide comfort and/or pleasure and Identify an activity that would provide meaning/contribute to feeling of self worth, and effectively communicate needs to others. Develop new goals and  increase productive or creative daily activities. Over time, increase frequency of seeing friends and family members.    Intervention(s)  Therapist will Provide psychoeducation, Facilitate processing of thoughts and feelings and Facilitate structured problem solving.    Continued    Objective #2C  Client will Participate in self-regulation practice (e.g.: meditation, relaxation exercises, self hypnosis) per day and/or gratitude practices.    Intervention(s)  Therapist will Provide psychoeducation and Provide behavioral intervention training.    Continued           Date Goal Dates  Reviewed Status   5/31/2017 Goal 3:  Client will make progress toward increased wellbeing and health, including working to other health care providers as needed.    Continued    Objective #3A (Client Action)  Client will Communicate effectively with medical provider re needed information for example related to sleep and diet/ weight-loss concerns.    Intervention(s) (Therapist Action)  Therapist will Make referrals to appropriate colleagues as needed, Facilitate processing of thoughts and feelings and Facilitate structured problem solving.   Continued    Objective #3B (Client Action)  Client will improve sleep, with long term goal to sleep well for 7 - 8 hours each night.    Intervention(s) (Therapist Action)  Therapist will provide Cognitive Behavioral Therapy for Insomnia, review sleep hygiene options, and provide structured problem solving. Therapist will also address anxiety and depression with goal to assist with  Continued    Objective #3C (Client Action)  Client will continue to increase physical activity (adding stretching and faster walking 10 min per day in addition to daily slow walk) and adjust diet as advised by dietitian and physician, toward return to healthy weight and energy level.    Intervention(s) (Therapist Action)  Therapist will provide structured problem solving, psychoeducation, and bibliotherapy and referrals as  appropriate.  Continued       Client has reviewed and agreed to the above plan on June 7, 2017.    JOURDAN Chilel, LICSW      DO NOT SEND ANY LETTERS

## 2017-07-11 NOTE — LETTER
"7/11/2017       RE: Jigna Allen  [address] [place] [address]     Dear Colleague,    Thank you for referring your patient, Jigna Allen, to the [facility] at [facility]. Please see a copy of my visit note below.    Palliative Care Clinical Social Work Return Appointment    PLEASE NOTE:  THIS IS A MENTAL HEALTH NOTE.  OTHER PROVIDERS VIEWING THIS NOTE SHOULD USE THIS ONLY FOR UNDERSTANDING THE CONTEXT OF THE PATIENT S EXPERIENCE.  TOPICS DESCRIBED IN THIS NOTE SHOULD NOT BE REFERENCED TO THE PATIENT BY MEDICAL PROVIDERS.    Jigna (\"Kah-facundo\") is an 82 year old woman with history of sarcoma and concerns of anxiety, depression and insomnia, seen today for a return psychotherapy appointment.    Mental Status Exam:(List all that apply)      Appearance: Appropriate      Eye Contact: Good       Orientation: Yes, x4      Mood: anxious, engaged, verbal      Affect: Appropriate, full range      Thought Content: Clear         Thought Form: Logical      Psychomotor Behavior: Normal    Visit summary:   Mental Health (Anxiety, depression, other symptoms): Jigna describes improvements in her anxiety symptoms. She reports regular use of five senses mindful walk. She enjoys this and shared it with her MIL in their daily walks; [facility] also enjoyed this practice. We also discuss benefits of setting boundaries, addressing needed communication, and reduction of avoidance through deliberate small steps of exposure (ie increasing driving, which she has been fearing). She describes recent cognitive reassessment at home ie re her fears about driving. Setting boundaries did increase her feeling of options and control during visit from [facility] last week, ie going out to lunch with a cousin on her own.     Today I introduced mindful breathing practice as an added stress reduction practice. Reviewed the \"three breaths practice\" and encouraged regular use of this along with five " senses walk as part of overall practices to reduce and manage anxiety.    Adjustment to Illness: We also discuss her fears that her cancer may be recurring ie in her lower spine (she is having trouble walking; appt with PCP in 2 weeks about this); numbness in left hand; and ongoing fatigue. We discuss that even though her oncologist has been fairly sure that her fears are not founded, she does have the option to advocate for herself and make requests for more evaluation if she finds she is having concerning symptoms. She plans to consider this, and will see PCP soon as well.    Discussion of fatigue vs insomnia. There are still sleep problems, but Jigna does identify fatigue. We discuss that post- cancer treatment fatigue does occur, and that [facility] care MDs sometimes treat this with medication (Adderall). Jigna has preferred to avoid medications (including d/c ing SSRI). We discuss that behavioral strategies to reduce and manage fatigue include behavioral activation or having an active and engaged life, including physical activity as well as other meaningful activities in daily life.    Did not discuss work with RD today. Jigna is having problems with her leg, but otherwise likes to walk regularly, would enjoy Tom, and we discuss chair yoga.    Regarding sleep problems, we discussed that Jigna continues to watch TV in evenings with . She is feeling very tired in mornings when alarm goes off at 7:30. She prefers not to sleep till 8am but we discuss this option. She reports waking when her  comes to bed, later than her; often she then has trouble returning to sleep. We discuss trialing sleeping in a separate room and bed to see if avoiding sleep interruption is helpful. She agrees to do so 1 - 2x this week.    Relationships: Reports that visits with her cousin and her sister and brother in law were particularly enjoyable during the past week. She invited her sister over for dinner and enjoyed cooking  "the meal. She also has offered help to a neighbor needing a ride to the  and feels good about that.    Continuing to process caregiving role for CONSTANCE. Describes feeling less significantly impacted by her week long visit with use of setting intentional small boundaries - ie being in another room when she needed a break; choosing not to always respond to calls/requests; choosing not to call on phone daily now that [place]; and lunch out with cousin alone despite MIL wanting to come too. It is important to her during this time of her life to have activities other than caregiving for MIL. Travel with , seeing their daughters, and time for creativity and service to others in the community are all named as valued activities for Jigna.    We discuss \"serenity prayer\" framework of changing what we can change, accepting what we cannot, including for example the choices and actions of others in many cases, including  and MIL.     I also provided information about caregiver coaching and counseling services through [facility].    End of Life and Advance Care Planning: Not focus today.    Main therapeutic interventions provided this session include:  Facilitating processing of feelings and thoughts; psychoeducation regarding anxiety, fatigue, sleep, and boundaries; motivational interviewing     Plan:  Jigna will return for psychotherapy with palliative care focus at [facility] on Wednesday July 19 at 11am.     Before next session, Jigna plans to:  Sleep in her own separate room/ bed 1 - 2x/week  Go to bed when tired, avoid TV for 30 min before bed, avoid naps  Use mindfulness practices to reduce stress and anxiety including \"five senses walk\" and \"three breaths practice\"  Continue to choose small actions of being more assertive in caregiving situation - toward things she will enjoy - ie take a walk, see a friend, cook something she likes    Time spent with patient/family:  50 minutes    Palliative " Care Counseling Treatment Plan    Client's Name: Jigna Valdez  YOB: 1935     Date: 5/31/2017    Initial DSM5 Diagnoses:   296.22 Major Depressive Disorder, Single Episode, Moderate _  300.02 (F41.1) Generalized Anxiety Disorder      Referral / Collaboration:  .Depending on needs over time, referral to a community therapist may be indicated.      Anticipated number of sessions to complete episode of care:  12         Treatment Goal(s)  Date Goal Dates  Reviewed Status   5/31/2017 Goal 1:  Client will develop effective strategies for anxiety following treatment for sarcoma.     Continued    Objective #1A (Client Action)  Client will Identify triggers for anxiety/panic attacks, Identify early signs/symptoms of anxiety and Practice self awareness exercises.    Intervention(s)  Therapist will Provide psychoeducation and Facilitate processing of thoughts and feelings .    Continued    Objective #1B  Client will Use/practice relaxation strategies and Identify effective coping strategies.    Intervention(s)  Therapist will Provide psychoeducation, Provide behavioral intervention training and Facilitate structured problem solving.    Continued    Objective #1C  Client will Effectively communicate with medical provider re needed information and Effectively communicate with family/friends re needs.    Intervention(s)  Therapist will Provide psychoeducation and Facilitate processing of thoughts and feelings.    Continued           Date Goal Dates  Reviewed Status   5/31/2017 Goal 2:  Client will increase michelle in her life and develop effective strategies for coping with depressed mood and other difficult emotions.    Continued    Objective #2A (Client Action)  Client will Identify triggers/early signs of depressed mood.    Intervention(s) (Therapist Action)  Therapist will provide psychoeducation and Facilitate processing of thoughts and feelings.    Continued    Objective #2B  Client will Follow realistic exercise  plan, Identify activities that provide comfort and/or pleasure, Participate in activities that provide comfort and/or pleasure and Identify an activity that would provide meaning/contribute to feeling of self worth, and effectively communicate needs to others. Develop new goals and increase productive or creative daily activities. Over time, increase frequency of seeing friends and family members.    Intervention(s)  Therapist will Provide psychoeducation, Facilitate processing of thoughts and feelings and Facilitate structured problem solving.    Continued    Objective #2C  Client will Participate in self-regulation practice (e.g.: meditation, relaxation exercises, self hypnosis) per day and/or gratitude practices.    Intervention(s)  Therapist will Provide psychoeducation and Provide behavioral intervention training.    Continued           Date Goal Dates  Reviewed Status   5/31/2017 Goal 3:  Client will make progress toward increased wellbeing and health, including working to other health care providers as needed.    Continued    Objective #3A (Client Action)  Client will Communicate effectively with medical provider re needed information for example related to sleep and diet/ weight-loss concerns.    Intervention(s) (Therapist Action)  Therapist will Make referrals to appropriate colleagues as needed, Facilitate processing of thoughts and feelings and Facilitate structured problem solving.   Continued    Objective #3B (Client Action)  Client will improve sleep, with long term goal to sleep well for 7 - 8 hours each night.    Intervention(s) (Therapist Action)  Therapist will provide Cognitive Behavioral Therapy for Insomnia, review sleep hygiene options, and provide structured problem solving. Therapist will also address anxiety and depression with goal to assist with  Continued    Objective #3C (Client Action)  Client will continue to increase physical activity (adding stretching and faster walking 10 min per  day in addition to daily slow walk) and adjust diet as advised by dietitian and physician, toward return to healthy weight and energy level.    Intervention(s) (Therapist Action)  Therapist will provide structured problem solving, psychoeducation, and bibliotherapy and referrals as appropriate.  Continued       Client has reviewed and agreed to the above plan on June 7, 2017.    JOURDAN Chilel, LICSW      DO NOT SEND ANY LETTERS

## 2017-07-11 NOTE — MR AVS SNAPSHOT
After Visit Summary   7/11/2017    Jigna Allen    MRN: [id number]           Patient Information     Date Of Birth          1935        Visit Information        Provider Department      7/11/2017 10:00 AM Mary Torres LICSW; UC 2 [facility] CONSULT [facility]        Today's Diagnoses     YAMILET (generalized anxiety disorder)    -  1    Encounter for palliative care           Follow-ups after your visit        Your next 10 appointments already scheduled     Jul 19, 2017 10:00 AM CDT   (Arrive by 9:45 AM)   RETURN WITH ROOM with CATHERINE Conley, UC 2 119 CONSULT [facility] ([facility])    [address] [place] [address]  [address] [facility] [id number] [phone]            Jul 26, 2017 10:00 AM CDT   (Arrive by 9:45 AM)   RETURN WITH ROOM with CATHERINE Conley, UC 2 118 CONSULT [facility] ([facility])    [address] [place] [address]  [address] [facility] [address]   [phone]            Aug 02, 2017 10:00 AM CDT   (Arrive by 9:45 AM)   RETURN WITH ROOM with CATHERINE Conley, UC 2 [facility] CONSULT [facility] ([facility])    [address] [place] [address]   [phone]            Aug 09, 2017 10:00 AM CDT   (Arrive by 9:45 AM)   RETURN WITH ROOM with CATHERINE Conley, UC 2 119 CONSULT [facility] ([facility])    [address] [place] [address]  [address] [facility] [id number] [phone]            May 15, 2018  8:30 AM CDT   (Arrive by 8:15 AM)   XR CHEST 2 VIEWS with UCXR1   [facility] ([facility])    [address] [place] [address]  [address] [facility] [id number] [phone]           Please bring a list of your current medicines to your exam. (Include  vitamins, minerals and over-thecounter medicines.) Leave your valuables at home.  Tell your doctor if there is a chance you may be pregnant.  You do not need to do anything special for this exam.            May 15, 2018  9:30 AM CDT   (Arrive by 9:15 AM)   Return Visit with Edouard Bryan MD   [facility] ([facility])    [address]  2nd Floor  [facility] [address] [phone]              Who to contact     If you have questions or need follow up information about today's clinic visit or your schedule please contact [facility] directly at [phone].  Normal or non-critical lab and imaging results will be communicated to you by [employer], letter or phone within 4 business days after the clinic has received the results. If you do not hear from us within 7 days, please contact the clinic through [employer] or phone. If you have a critical or abnormal lab result, we will notify you by phone as soon as possible.  Submit refill requests through [employer] or call your pharmacy and they will forward the refill request to us. Please allow 3 business days for your refill to be completed.          Additional Information About Your Visit        [facility]-i Information     [employer] gives you secure access to your electronic health record. If you see a primary care provider, you can also send messages to your care team and make appointments. If you have questions, please call your primary care clinic.  If you do not have a primary care provider, please call [phone] and they will assist you.        Care EveryWhere ID     This is your Care EveryWhere ID. This could be used by other organizations to access your [place] medical records  [id number]         Blood Pressure from Last 3 Encounters:   05/09/17 130/79   09/16/16 138/67   09/15/16 148/68    Weight from Last 3 Encounters:   05/10/17 51.3 kg (113 lb 1.6 oz)   05/09/17 50.8 kg (112 lb 1.6  oz)   09/16/16 53.4 kg (117 lb 12.8 oz)              Today, you had the following     No orders found for display       Primary Care Provider Office Phone # Fax #    Elisabeth Ko [phone]       [facility] [address]  [facility] [address]        Equal Access to Services     [facility]: Hadii aad ku hadasho Soomaali, waaxda luqadaha, qaybta kaalmada adeegyada, waxay idiin hayaan adeeg khsoysh laalen . So [facility] [phone].    ATENCIÓN: Si habla español, tiene a jade disposición servicios gratuitos de asistencia lingüística. Llame al [phone].    We comply with applicable federal civil rights laws and [place] laws. We do not discriminate on the basis of race, color, national origin, age, disability sex, sexual orientation or gender identity.            Thank you!     Thank you for choosing [facility]  for your care. Our goal is always to provide you with excellent care. Hearing back from our patients is one way we can continue to improve our services. Please take a few minutes to complete the written survey that you may receive in the mail after your visit with us. Thank you!             Your Updated Medication List - Protect others around you: Learn how to safely use, store and throw away your medicines at www.disposemymeds.org.          This list is accurate as of: 7/11/17  1:52 PM.  Always use your most recent med list.                   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

## 2017-07-19 ENCOUNTER — OFFICE VISIT (OUTPATIENT)
Dept: PALLIATIVE CARE | Facility: CLINIC | Age: 82
End: 2017-07-19
Attending: SOCIAL WORKER
Payer: COMMERCIAL

## 2017-07-19 DIAGNOSIS — Z51.5 ENCOUNTER FOR PALLIATIVE CARE: ICD-10-CM

## 2017-07-19 DIAGNOSIS — F41.1 GAD (GENERALIZED ANXIETY DISORDER): Primary | ICD-10-CM

## 2017-07-19 PROCEDURE — 90834 PSYTX W PT 45 MINUTES: CPT | Mod: ZP | Performed by: SOCIAL WORKER

## 2017-07-19 PROCEDURE — 40000114 ZZH STATISTIC NO CHARGE CLINIC VISIT

## 2017-07-19 NOTE — LETTER
"7/19/2017       RE: Jigna Allen  [address] [place] [address]     Dear Colleague,    Thank you for referring your patient, Jigna Allen, to the [facility] at [facility]. Please see a copy of my visit note below.    Palliative Care Clinical Social Work Return Appointment    PLEASE NOTE:  THIS IS A MENTAL HEALTH NOTE.  OTHER PROVIDERS VIEWING THIS NOTE SHOULD USE THIS ONLY FOR UNDERSTANDING THE CONTEXT OF THE PATIENT S EXPERIENCE.  TOPICS DESCRIBED IN THIS NOTE SHOULD NOT BE REFERENCED TO THE PATIENT BY MEDICAL PROVIDERS.    Jigna (\"Kwame\") is an 82 year old woman with history of sarcoma and concerns of anxiety, depression, fatigue and insomnia, seen today for a return psychotherapy appointment.    Mental Status Exam:(List all that apply)      Appearance: Appropriate      Eye Contact: Good       Orientation: Yes, x4      Mood: anxious, engaged, verbal      Affect: Appropriate, full range      Thought Content: Clear         Thought Form: Logical      Psychomotor Behavior: Normal    Visit summary:   Mental Health (Anxiety, depression, other symptoms): Jigna described continued significant progress this past week. To address depressed mood and fatigue, she has been striving to return to activities which she previously enjoyed. Recently she names 4 social activities in which she engaged with her . She notes feeling engaged and enjoying these activities, although a bit of a sense of distance or observing herself which she did not like. She noted that although she has thoughts that people will notice there is something different or wrong with her (ie anxiety), she noticed that several interactions went smoothly and there were no signs that others thought she was different nor any expressions of concern from them. The sense of purpose and social activity focus was helpful and she was pleased that she succeeded in doing these activities. We " "discuss michelle and increase in social, intellectual, and physical activities, ideally on most days, as indications for depressed mood and for anhedonia and fatigue.    Jigna continues to have anxiety about upcoming times of needing to provide care and support for her mother in law. We brainstormed specific strategies for coping during possible long periods of caregiving (6 months Sep - 2018, in [place] and [place], living with  and mother in law). She likes the idea of continuing to take small steps to set boundaries for herself and her wishes. We discussed the potential of scheduled 2 hour breaks for her own activities every morning, perhaps more time in afternoons as well. Reframing of caregiving as similar to \"a job\" and balance of time off with time on. She is contemplating this approach vs traveling to see daughters during the time MIL is at her home, vs staying in [place] for parts of time  and MIL go to [place].    She is also trying to decide whether to attend a family  in [facility]. This would be a longer drive on her own, in face of anxiety about driving. She has also been digging into family history with goal to have the right things to say at this event.    Discussion of sleep. She is discouraged about it. We discussed reasons she had not followed through on homework to try sleeping in a separate room to avoid being awakened around 1 - 2am by  who comes to bed later. Barrier is light in the room. Discussed room darkening as first step. Psychoeducation re: \"sleep hunger\" vs \"waking urge\" and the peaks and valleys for each - and related risks of trouble resuming sleep following being awakened after just initiating sleep at night.    Adjustment to Illness: Continues to have fears about cancer recurrence, as well as some shame and hesitance to ask her oncology team about her concerns. Will see PCP tomorrow and plans to ask re what she feels is a lump under right knee. Right leg has been more bowed " "making walking harder. Also plans to discuss sleep and fatigue concerns with PCP, which I encouraged. We also discussed that she has not yet exhausted \"low hanging fruit\" re sleep hygiene - plan is to continue working on that. If eventually these efforts are not effective re: sleep, I encouraged her to reconsider her Rx for Remeron from PCP.    Relationships: Enjoying various social activities last week. Has been in contact with daughter in [place]. Has been talking with  about travel to [place] or to [place]. Anxiety about this, yet interest also. Describes losing track of  at [facility] and relates this to need to have a plan for reconnecting if they travel together.    End of Life and Advance Care Planning: Not focus today.    Main therapeutic interventions provided this session include:  Facilitating processing of feelings and thoughts; psychoeducation regarding anxiety, fatigue, sleep, depression, and boundaries; motivational interviewing     Plan:  [place] [facility] [place].     Before next session, Jigna plans to:  Engage in \"normal activities\" to resume social and physical activities (consider swimming with a friend, aerobics)  Sleep in her own separate room/ bed 1 - 2x/week; darken window in that room  Go to bed when tired, avoid TV for 30 min before bed, avoid naps  Use mindfulness practices to reduce stress and anxiety including \"five senses walk\" and \"three breaths practice\"  Continue to choose small actions of being more assertive in caregiving situation - toward things she will enjoy - ie take a walk, see a friend, cook something she likes. Consider developing daily routine (morning activities scheduled) in advance of return to 24/7 caregiving in September.    Time spent with patient/family:  50 minutes    Palliative Care Counseling Treatment Plan    Client's Name: Jigna Valdez  YOB: 1935     Date: 5/31/2017    Initial " DSM5 Diagnoses:   296.22 Major Depressive Disorder, Single Episode, Moderate _  300.02 (F41.1) Generalized Anxiety Disorder      Referral / Collaboration:  .Depending on needs over time, referral to a community therapist may be indicated.      Anticipated number of sessions to complete episode of care:  12         Treatment Goal(s)  Date Goal Dates  Reviewed Status   5/31/2017 Goal 1:  Client will develop effective strategies for anxiety following treatment for sarcoma.     Continued    Objective #1A (Client Action)  Client will Identify triggers for anxiety/panic attacks, Identify early signs/symptoms of anxiety and Practice self awareness exercises.    Intervention(s)  Therapist will Provide psychoeducation and Facilitate processing of thoughts and feelings .    Continued    Objective #1B  Client will Use/practice relaxation strategies and Identify effective coping strategies.    Intervention(s)  Therapist will Provide psychoeducation, Provide behavioral intervention training and Facilitate structured problem solving.    Continued    Objective #1C  Client will Effectively communicate with medical provider re needed information and Effectively communicate with family/friends re needs.    Intervention(s)  Therapist will Provide psychoeducation and Facilitate processing of thoughts and feelings.    Continued           Date Goal Dates  Reviewed Status   5/31/2017 Goal 2:  Client will increase michelle in her life and develop effective strategies for coping with depressed mood and other difficult emotions.    Continued    Objective #2A (Client Action)  Client will Identify triggers/early signs of depressed mood.    Intervention(s) (Therapist Action)  Therapist will provide psychoeducation and Facilitate processing of thoughts and feelings.    Continued    Objective #2B  Client will Follow realistic exercise plan, Identify activities that provide comfort and/or pleasure, Participate in activities that provide comfort and/or  pleasure and Identify an activity that would provide meaning/contribute to feeling of self worth, and effectively communicate needs to others. Develop new goals and increase productive or creative daily activities. Over time, increase frequency of seeing friends and family members.    Intervention(s)  Therapist will Provide psychoeducation, Facilitate processing of thoughts and feelings and Facilitate structured problem solving.    Continued    Objective #2C  Client will Participate in self-regulation practice (e.g.: meditation, relaxation exercises, self hypnosis) per day and/or gratitude practices.    Intervention(s)  Therapist will Provide psychoeducation and Provide behavioral intervention training.    Continued           Date Goal Dates  Reviewed Status   5/31/2017 Goal 3:  Client will make progress toward increased wellbeing and health, including working to other health care providers as needed.    Continued    Objective #3A (Client Action)  Client will Communicate effectively with medical provider re needed information for example related to sleep and diet/ weight-loss concerns.    Intervention(s) (Therapist Action)  Therapist will Make referrals to appropriate colleagues as needed, Facilitate processing of thoughts and feelings and Facilitate structured problem solving.   Continued    Objective #3B (Client Action)  Client will improve sleep, with long term goal to sleep well for 7 - 8 hours each night.    Intervention(s) (Therapist Action)  Therapist will provide Cognitive Behavioral Therapy for Insomnia, review sleep hygiene options, and provide structured problem solving. Therapist will also address anxiety and depression with goal to assist with  Continued    Objective #3C (Client Action)  Client will continue to increase physical activity (adding stretching and faster walking 10 min per day in addition to daily slow walk) and adjust diet as advised by dietitian and physician, toward return to healthy  weight and energy level.    Intervention(s) (Therapist Action)  Therapist will provide structured problem solving, psychoeducation, and bibliotherapy and referrals as appropriate.  Continued       Client has reviewed and agreed to the above plan on June 7, 2017.    JOURDAN Chilel, [facility]      DO NOT SEND ANY LETTERS    Again, thank you for allowing me to participate in the care of your patient.      Sincerely,    CATHERINE Lancaster

## 2017-07-19 NOTE — PROGRESS NOTES
"Palliative Care Clinical Social Work Return Appointment    PLEASE NOTE:  THIS IS A MENTAL HEALTH NOTE.  OTHER PROVIDERS VIEWING THIS NOTE SHOULD USE THIS ONLY FOR UNDERSTANDING THE CONTEXT OF THE PATIENT S EXPERIENCE.  TOPICS DESCRIBED IN THIS NOTE SHOULD NOT BE REFERENCED TO THE PATIENT BY MEDICAL PROVIDERS.    Jigna (\"Kwame\") is an 82 year old woman with history of sarcoma and concerns of anxiety, depression, fatigue and insomnia, seen today for a return psychotherapy appointment.    Mental Status Exam:(List all that apply)      Appearance: Appropriate      Eye Contact: Good       Orientation: Yes, x4      Mood: anxious, engaged, verbal      Affect: Appropriate, full range      Thought Content: Clear         Thought Form: Logical      Psychomotor Behavior: Normal    Visit summary:   Mental Health (Anxiety, depression, other symptoms): Jigna described continued significant progress this past week. To address depressed mood and fatigue, she has been striving to return to activities which she previously enjoyed. Recently she names 4 social activities in which she engaged with her . She notes feeling engaged and enjoying these activities, although a bit of a sense of distance or observing herself which she did not like. She noted that although she has thoughts that people will notice there is something different or wrong with her (ie anxiety), she noticed that several interactions went smoothly and there were no signs that others thought she was different nor any expressions of concern from them. The sense of purpose and social activity focus was helpful and she was pleased that she succeeded in doing these activities. We discuss michelle and increase in social, intellectual, and physical activities, ideally on most days, as indications for depressed mood and for anhedonia and fatigue.    Jigna continues to have anxiety about upcoming times of needing to provide care and support for her mother in law. We " "brainstormed specific strategies for coping during possible long periods of caregiving (6 months Sep - 2018, in [place] and [place], living with  and mother in law). She likes the idea of continuing to take small steps to set boundaries for herself and her wishes. We discussed the potential of scheduled 2 hour breaks for her own activities every morning, perhaps more time in afternoons as well. Reframing of caregiving as similar to \"a job\" and balance of time off with time on. She is contemplating this approach vs traveling to see daughters during the time MIL is at her home, vs staying in [place] for parts of time  and MIL go to [place].    She is also trying to decide whether to attend a family  in [facility]. This would be a longer drive on her own, in face of anxiety about driving. She has also been digging into family history with goal to have the right things to say at this event.    Discussion of sleep. She is discouraged about it. We discussed reasons she had not followed through on homework to try sleeping in a separate room to avoid being awakened around 1 - 2am by  who comes to bed later. Barrier is light in the room. Discussed room darkening as first step. Psychoeducation re: \"sleep hunger\" vs \"waking urge\" and the peaks and valleys for each - and related risks of trouble resuming sleep following being awakened after just initiating sleep at night.    Adjustment to Illness: Continues to have fears about cancer recurrence, as well as some shame and hesitance to ask her oncology team about her concerns. Will see PCP tomorrow and plans to ask re what she feels is a lump under right knee. Right leg has been more bowed making walking harder. Also plans to discuss sleep and fatigue concerns with PCP, which I encouraged. We also discussed that she has not yet exhausted \"low hanging fruit\" re sleep hygiene - plan is to continue working on that. If eventually these efforts are not effective re: sleep, " "I encouraged her to reconsider her Rx for Remeron from PCP.    Relationships: Enjoying various social activities last week. Has been in contact with daughter in [place]. Has been talking with  about travel to [place] or to [place]. Anxiety about this, yet interest also. Describes losing track of  at [facility] and relates this to need to have a plan for reconnecting if they travel together.    End of Life and Advance Care Planning: Not focus today.    Main therapeutic interventions provided this session include:  Facilitating processing of feelings and thoughts; psychoeducation regarding anxiety, fatigue, sleep, depression, and boundaries; motivational interviewing     Plan:  Jigna will return [place] psychotherapy with [place] care [place] [facility] [place] at 11am.     Before next session, Jigna plans to:  Engage in \"normal activities\" to resume social and physical activities (consider swimming with a friend, aerobics)  Sleep in her own separate room/ bed 1 - 2x/week; darken window in that room  Go to bed when tired, avoid TV for 30 min before bed, avoid naps  Use mindfulness practices to reduce stress and anxiety including \"five senses walk\" and \"three breaths practice\"  Continue to choose small actions of being more assertive in caregiving situation - toward things she will enjoy - ie take a walk, see a friend, cook something she likes. Consider developing daily routine (morning activities scheduled) in advance of return to 24/7 caregiving in September.    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      Anticipated number of sessions to " complete episode of care:  12         Treatment Goal(s)  Date Goal Dates  Reviewed Status   5/31/2017 Goal 1:  Client will develop effective strategies for anxiety following treatment for sarcoma.     Continued    Objective #1A (Client Action)  Client will Identify triggers for anxiety/panic attacks, Identify early signs/symptoms of anxiety and Practice self awareness exercises.    Intervention(s)  Therapist will Provide psychoeducation and Facilitate processing of thoughts and feelings .    Continued    Objective #1B  Client will Use/practice relaxation strategies and Identify effective coping strategies.    Intervention(s)  Therapist will Provide psychoeducation, Provide behavioral intervention training and Facilitate structured problem solving.    Continued    Objective #1C  Client will Effectively communicate with medical provider re needed information and Effectively communicate with family/friends re needs.    Intervention(s)  Therapist will Provide psychoeducation and Facilitate processing of thoughts and feelings.    Continued           Date Goal Dates  Reviewed Status   5/31/2017 Goal 2:  Client will increase michelle in her life and develop effective strategies for coping with depressed mood and other difficult emotions.    Continued    Objective #2A (Client Action)  Client will Identify triggers/early signs of depressed mood.    Intervention(s) (Therapist Action)  Therapist will provide psychoeducation and Facilitate processing of thoughts and feelings.    Continued    Objective #2B  Client will Follow realistic exercise plan, Identify activities that provide comfort and/or pleasure, Participate in activities that provide comfort and/or pleasure and Identify an activity that would provide meaning/contribute to feeling of self worth, and effectively communicate needs to others. Develop new goals and increase productive or creative daily activities. Over time, increase frequency of seeing friends and family  members.    Intervention(s)  Therapist will Provide psychoeducation, Facilitate processing of thoughts and feelings and Facilitate structured problem solving.    Continued    Objective #2C  Client will Participate in self-regulation practice (e.g.: meditation, relaxation exercises, self hypnosis) per day and/or gratitude practices.    Intervention(s)  Therapist will Provide psychoeducation and Provide behavioral intervention training.    Continued           Date Goal Dates  Reviewed Status   5/31/2017 Goal 3:  Client will make progress toward increased wellbeing and health, including working to other health care providers as needed.    Continued    Objective #3A (Client Action)  Client will Communicate effectively with medical provider re needed information for example related to sleep and diet/ weight-loss concerns.    Intervention(s) (Therapist Action)  Therapist will Make referrals to appropriate colleagues as needed, Facilitate processing of thoughts and feelings and Facilitate structured problem solving.   Continued    Objective #3B (Client Action)  Client will improve sleep, with long term goal to sleep well for 7 - 8 hours each night.    Intervention(s) (Therapist Action)  Therapist will provide Cognitive Behavioral Therapy for Insomnia, review sleep hygiene options, and provide structured problem solving. Therapist will also address anxiety and depression with goal to assist with  Continued    Objective #3C (Client Action)  Client will continue to increase physical activity (adding stretching and faster walking 10 min per day in addition to daily slow walk) and adjust diet as advised by dietitian and physician, toward return to healthy weight and energy level.    Intervention(s) (Therapist Action)  Therapist will provide structured problem solving, psychoeducation, and bibliotherapy and referrals as appropriate.  Continued       Client has reviewed and agreed to the above plan on June 7, 2017.    Mary  JOURDAN Torres, LICSW      DO NOT SEND ANY LETTERS

## 2017-07-19 NOTE — MR AVS SNAPSHOT
After Visit Summary   7/19/2017    Jigna Allen    MRN: [id number]           Patient Information     Date Of Birth          1935        Visit Information        Provider Department      7/19/2017 10:00 AM Mary Torres LICSW; UC 2 119 CONSULT [facility]        Today's Diagnoses     YAMILET (generalized anxiety disorder)    -  1    Encounter for palliative care           Follow-ups after your visit        Your next 10 appointments already scheduled     Jul 26, 2017 10:00 AM CDT   (Arrive by 9:45 AM)   RETURN WITH ROOM with CATHERINE Conley, UC 2 118 CONSULT [facility]    [address] [place] [id number] [phone]            Aug 02, 2017 10:00 AM CDT   (Arrive by 9:45 AM)   RETURN WITH ROOM with CATHERINE Conley, UC 2 119 CONSULT [facility]    [address] [place] [id number] [phone]            Aug 09, 2017 10:00 AM CDT   (Arrive by 9:45 AM)   RETURN WITH ROOM with CATHERINE Conley, UC 2 119 CONSULT [facility]    [address] [place] [address] [id number] [phone]            Aug 16, 2017 10:00 AM CDT   (Arrive by 9:45 AM)   RETURN WITH ROOM with CATHERINE Conley UC 2 119 CONSULT [facility]    [address] [place] [address] [id number] [phone]            Aug 30, 2017 10:00 AM CDT   (Arrive by 9:45 AM)   RETURN WITH ROOM with CATHERINE Conley UC 2 119 CONSULT [facility]    [address] [place] [id number] [phone]              Who to contact      If you have questions or need follow up information about today's clinic visit or your schedule please contact [facility] directly at [phone].  Normal or non-critical lab and imaging results will be communicated to you by MyChart, letter or phone within 4 business days after the clinic has received the results. If you do not hear from us within 7 days, please contact the clinic through Mingle360hart or phone. If you have a critical or abnormal lab result, we will notify you by phone as soon as possible.  Submit refill requests through DripDrop or call your pharmacy and they will forward the refill request to us. Please allow 3 business days for your refill to be completed.          Additional Information About Your Visit        DripDrop Information     DripDrop gives you secure access to your electronic health record. If you see a primary care provider, you can also send messages to your care team and make appointments. If you have questions, please call your primary care clinic.  If you do not have a primary care provider, please call [phone] and they will assist you.        Care EveryWhere ID     This is your Care EveryWhere ID. This could be used by other organizations to access your [place] medical records  [id number]         Blood Pressure from Last 3 Encounters:   05/09/17 130/79   09/16/16 138/67   09/15/16 148/68    Weight from Last 3 Encounters:   05/10/17 51.3 kg (113 lb 1.6 oz)   05/09/17 50.8 kg (112 lb 1.6 oz)   09/16/16 53.4 kg (117 lb 12.8 oz)              Today, you had the following     No orders found for display       Primary Care Provider Office Phone # Fax #    Elisabeth Ko [phone]       [facility] [address] [facility] [address]        Equal Access to Services     MIKEY CANO : Denis Hu, kristen escalante, cesia wilson, narinder potter. So [facility] [phone].    ATENCIÓN: Si  yadiel ortega, tiene a jade disposición servicios gratuitos de asistencia lingüística. Samira cartwright [phone].    We comply with applicable federal civil rights laws and [place] laws. We do not discriminate on the basis of race, color, national origin, age, disability sex, sexual orientation or gender identity.            Thank you!     Thank you for choosing [facility]  for your care. Our goal is always to provide you with excellent care. Hearing back from our patients is one way we can continue to improve our services. Please take a few minutes to complete the written survey that you may receive in the mail after your visit with us. Thank you!             Your Updated Medication List - Protect others around you: Learn how to safely use, store and throw away your medicines at www.disposemymeds.org.          This list is accurate as of: 7/19/17  3:20 PM.  Always use your most recent med list.                   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

## 2017-07-26 ENCOUNTER — OFFICE VISIT (OUTPATIENT)
Dept: PALLIATIVE CARE | Facility: CLINIC | Age: 82
End: 2017-07-26
Attending: SOCIAL WORKER
Payer: COMMERCIAL

## 2017-07-26 DIAGNOSIS — Z51.5 ENCOUNTER FOR PALLIATIVE CARE: ICD-10-CM

## 2017-07-26 DIAGNOSIS — F41.1 GAD (GENERALIZED ANXIETY DISORDER): Primary | ICD-10-CM

## 2017-07-26 PROCEDURE — 40000114 ZZH STATISTIC NO CHARGE CLINIC VISIT

## 2017-07-26 PROCEDURE — 90834 PSYTX W PT 45 MINUTES: CPT | Mod: ZP | Performed by: SOCIAL WORKER

## 2017-07-26 NOTE — PROGRESS NOTES
"Palliative Care Clinical Social Work Return Appointment    PLEASE NOTE:  THIS IS A MENTAL HEALTH NOTE.  OTHER PROVIDERS VIEWING THIS NOTE SHOULD USE THIS ONLY FOR UNDERSTANDING THE CONTEXT OF THE PATIENT S EXPERIENCE.  TOPICS DESCRIBED IN THIS NOTE SHOULD NOT BE REFERENCED TO THE PATIENT BY MEDICAL PROVIDERS.    Jigna (\"Kwame\") is an 82 year old woman with history of sarcoma, seen today for a return psychotherapy appointment to address Generalized Anxiety Disorder, depressed mood, fatigue and insomnia.    Mental Status Exam:(List all that apply)      Appearance: Appropriate      Eye Contact: Good       Orientation: Yes, x4      Mood: discouraged, anxious, verbal and engaged      Affect: Appropriate, full range      Thought Content: Clear         Thought Form: Logical      Psychomotor Behavior: Normal    Visit summary:   Mental Health (Anxiety, depression, other symptoms): Focus today on cognitive coping. We reflect on Jigna's significant progress in recent weeks especially with increasing social and physical activity in her life, despite continuing to experience some anxiety and fatigue. She has gone from perhaps 1 social event per week at maximum last spring, to several major social activities during the past week and continuing to plan and schedule these activities. She does note feeling energized when interacting with people, and acknowledges that her life is much more active compared with several months ago.     Overall, Jigna continues to experience anxiety. She is also feeling some discouragement about sleep, fatigue, her right knee bowing and trouble walking, and about generally not feeling well. She saw her PCP this week, who continues to tell her that there does not appear to be a physical reason for her feelings of malaise, etc, although she is referred to ortho re: her knee. We also discuss again her fears that she is having a recurrence of her chemo. She also processes difficult thoughts connected with " planned visit to assist her mother in law in early August, sooner than anticipated.     We discussed fatigue, sleepiness and insomnia, and clarified that Jigna's symptoms are consistent with mild-moderate fatigue, possibly related to past cancer treatment and yet she describes it as arising just in the past 18 months, although she completed cancer treatment 7 years ago. Sleepiness is not her key problem. Insomnia as subjectively experienced remains clinically significant. She does note that she has transitioned to sleeping in her own room, with room darkened. She is often going to bed about 10:30 or 11, then waking at 4am and feeling that it is hard to get back to sleep, yet finding she is sometimes still waking to the alarm at 7:30. There is a small improvement noted by being in this room, in that she is not awakening at 12:30 or 1 as her  comes to bed. She is still very concerned about her sleep. Regarding fatigue, we discuss trialing use of a light therapy box 30 min per day. She has one and agrees to do this. We discuss that maintaining/increasing social and physical activities are also indicated. In addition to attending an event at her former employer, a sarcoma event, and a family  this past week, Jigna also plans a lunch event today and attended an exercise class yesterday.    Cognitive factors related to anxiety and depressed mood explored in more depth today. Continued education and interventions related to thought defusion. Discussion of unhelpful thoughts (cognitive distortions) as well as tracking situation-thought-feeling-action as well as developing helpful cognitive coping statements with which to respond internally to increase options when faced with a troubling situation (ie her mother in law communicating her wishes or criticisms regarding Jigna's cooking, housekeeping or other activities). Review that in relation to caregiving role she has options regarding her own actions (ie  "choosing to take 2 hours away vs 24/7 caregiving) and also regarding how she relates to her habitual thoughts. There are themes of catastrophizing and emotional reasoning and we discuss these and generally what it means for thoughts to be helpful vs not helpful. Discussion of \"even though... I can...\" type of cognitive coping statements. She agreed to track times of thoughts that are disturbing her. She is aware that many thoughts are \"tapes\" for her or repetitive, ruminative distressing thoughts. She voices interest and willingness in working with these thoughts to decrease their negative impact on her. Emphasized this work is addititive, along with continued behavioral changes and boundary setting in caregiving role.    Adjustment to Illness: Continued fears that there is a physical reason for her knee problem and her fatigue. We discuss her recent PCP appt, reassurance there and referral to ortho. We also discuss again whether she would like to request further oncology assessment. She is hesitating, not wanting to request anything unwarranted. We discuss option for me to consult with the team at her request. She wonders about getting an MRI. She fears sarcoma returning at site where it was previously behind knee or in lungs. Cognitive interventions and encouragement to advocate for herself as needed to increase her confidence that all assessment is complete.     Relationships: Describes positive activities recently with  including road trip to family  and visiting family [place] after that. Is worried about planned visit to [facility] in early Aug. Enjoying social events, especially seeing many former colleagues at a recent celebration.    End of Life and Advance Care Planning: Not focus today.    Main therapeutic interventions provided this session include:  Facilitating processing of feelings and thoughts; cognitive coping facilitation and education; psychoeducation regarding anxiety, fatigue, sleep, " "depression; motivational interviewing    Plan:  Jigna will return for psychotherapy with palliative care focus at [facility] in 1 week.    Before next session, Jigna plans to:  Use light box 30 min per day in morning.  Use written thought record to track any distressing thoughts, and develop helpful cognitive coping statements  Add daily routine of 1 hour working on sorting items including books and kitchenware, then rewarding herself/relaxing    Continue regular social and physical activities  Continue to sleep in her own separate darkened room; go to bed when tired, avoid TV for 30 min before bed, avoid naps, wake at 7:30.  Continue mindfulness practices to reduce stress and anxiety including \"five senses walk\" and \"three breaths practice\"  Continue to choose small actions of being more assertive in caregiving situation - toward things she will enjoy - ie take a walk, see a friend, cook something she likes.  Consider developing daily routine (morning activities scheduled) in advance of return to 24/7 caregiving in September.        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      Anticipated number of sessions to complete episode of care:  12         Treatment Goal(s)  Date Goal Dates  Reviewed Status   5/31/2017 Goal 1:  Client will develop effective strategies for anxiety following treatment for sarcoma.     Continued    Objective #1A (Client Action)  Client will Identify triggers for anxiety/panic attacks, Identify early signs/symptoms of anxiety and Practice self awareness exercises.    Intervention(s)  Therapist will Provide psychoeducation and " Facilitate processing of thoughts and feelings .    Continued    Objective #1B  Client will Use/practice relaxation strategies and Identify effective coping strategies.    Intervention(s)  Therapist will Provide psychoeducation, Provide behavioral intervention training and Facilitate structured problem solving.    Continued    Objective #1C  Client will Effectively communicate with medical provider re needed information and Effectively communicate with family/friends re needs.    Intervention(s)  Therapist will Provide psychoeducation and Facilitate processing of thoughts and feelings.    Continued           Date Goal Dates  Reviewed Status   5/31/2017 Goal 2:  Client will increase michelle in her life and develop effective strategies for coping with depressed mood and other difficult emotions.    Continued    Objective #2A (Client Action)  Client will Identify triggers/early signs of depressed mood.    Intervention(s) (Therapist Action)  Therapist will provide psychoeducation and Facilitate processing of thoughts and feelings.    Continued    Objective #2B  Client will Follow realistic exercise plan, Identify activities that provide comfort and/or pleasure, Participate in activities that provide comfort and/or pleasure and Identify an activity that would provide meaning/contribute to feeling of self worth, and effectively communicate needs to others. Develop new goals and increase productive or creative daily activities. Over time, increase frequency of seeing friends and family members.    Intervention(s)  Therapist will Provide psychoeducation, Facilitate processing of thoughts and feelings and Facilitate structured problem solving.    Continued    Objective #2C  Client will Participate in self-regulation practice (e.g.: meditation, relaxation exercises, self hypnosis) per day and/or gratitude practices.    Intervention(s)  Therapist will Provide psychoeducation and Provide behavioral intervention training.     Continued           Date Goal Dates  Reviewed Status   5/31/2017 Goal 3:  Client will make progress toward increased wellbeing and health, including working to other health care providers as needed.    Continued    Objective #3A (Client Action)  Client will Communicate effectively with medical provider re needed information for example related to sleep and diet/ weight-loss concerns.    Intervention(s) (Therapist Action)  Therapist will Make referrals to appropriate colleagues as needed, Facilitate processing of thoughts and feelings and Facilitate structured problem solving.   Continued    Objective #3B (Client Action)  Client will improve sleep, with long term goal to sleep well for 7 - 8 hours each night.    Intervention(s) (Therapist Action)  Therapist will provide Cognitive Behavioral Therapy for Insomnia, review sleep hygiene options, and provide structured problem solving. Therapist will also address anxiety and depression with goal to assist with  Continued    Objective #3C (Client Action)  Client will continue to increase physical activity (adding stretching and faster walking 10 min per day in addition to daily slow walk) and adjust diet as advised by dietitian and physician, toward return to healthy weight and energy level.    Intervention(s) (Therapist Action)  Therapist will provide structured problem solving, psychoeducation, and bibliotherapy and referrals as appropriate.  Continued       Client has reviewed and agreed to the above plan on June 7, 2017.    JOURDAN Chilel, LICSW      DO NOT SEND ANY LETTERS

## 2017-07-26 NOTE — MR AVS SNAPSHOT
After Visit Summary   7/26/2017    Jigna Allen    MRN: [id number]           Patient Information     Date Of Birth          1935        Visit Information        Provider Department      7/26/2017 10:00 AM Mary Torres LICSW; UC 2 118 CONSULT [facility]        Today's Diagnoses     YAMILET (generalized anxiety disorder)    -  1    Encounter for palliative care           Follow-ups after your visit        Your next 10 appointments already scheduled     Aug 02, 2017 10:00 AM CDT   (Arrive by 9:45 AM)   RETURN WITH ROOM with CATHERINE Conley, UC 2 119 CONSULT [facility] ([facility])    [address] [place] [address]  [address] [facility] [id number] [phone]            Aug 09, 2017 10:00 AM CDT   (Arrive by 9:45 AM)   RETURN WITH ROOM with CATHERINE Conley, UC 2 119 CONSULT [facility] ([facility])    [address] [place] [address]  2nd [facility] [address]   [phone]            Aug 16, 2017 10:00 AM CDT   (Arrive by 9:45 AM)   RETURN WITH ROOM with CATHERINE Conley, UC 2 119 CONSULT [facility] ([facility])    [address] [place] [address]   [phone]            Aug 30, 2017 10:00 AM CDT   (Arrive by 9:45 AM)   RETURN WITH ROOM with CATHERINE Conley, UC 2 119 CONSULT [facility] ([facility])    [address] [place] [address]  [address]nd [facility] [id number] [phone]            May 15, 2018  8:30 AM CDT   (Arrive by 8:15 AM)   XR CHEST 2 VIEWS with UCXR1   [facility] ([facility])    [address] [place] [address]  1st [facility] [id number] [phone]           Please bring a list of your current medicines to your exam. (Include  vitamins, minerals and over-thecounter medicines.) Leave your valuables at home.  Tell your doctor if there is a chance you may be pregnant.  You do not need to do anything special for this exam.            May 15, 2018  9:30 AM CDT   (Arrive by 9:15 AM)   Return Visit with Edouard Bryan MD   [facility] ([facility])    [address] [facility]  2nd Floor  [facility] [address] [phone]              Who to contact     If you have questions or need follow up information about today's clinic visit or your schedule please contact [facility] directly at [phone].  Normal or non-critical lab and imaging results will be communicated to you by PopUpstershart, letter or phone within 4 business days after the clinic has received the results. If you do not hear from us within 7 days, please contact the clinic through PopUpstershart or phone. If you have a critical or abnormal lab result, we will notify you by phone as soon as possible.  Submit refill requests through SocialBrowse or call your pharmacy and they will forward the refill request to us. Please allow 3 business days for your refill to be completed.          Additional Information About Your Visit        PopUpstersharDTVCast Information     SocialBrowse gives you secure access to your electronic health record. If you see a primary care provider, you can also send messages to your care team and make appointments. If you have questions, please call your primary care clinic.  If you do not have a primary care provider, please call [phone] and they will assist you.        Care EveryWhere ID     This is your Care EveryWhere ID. This could be used by other organizations to access your [place] medical records  [id number]         Blood Pressure from Last 3 Encounters:   05/09/17 130/79   09/16/16 138/67   09/15/16 148/68    Weight from Last 3 Encounters:   05/10/17 51.3 kg (113 lb 1.6 oz)   05/09/17 50.8 kg (112 lb 1.6  oz)   09/16/16 53.4 kg (117 lb 12.8 oz)              Today, you had the following     No orders found for display       Primary Care Provider Office Phone # Fax #    Elisabeth Ko [phone]       [facility] [address]  [facility] [address]        Equal Access to Services     [facility]: Hadii aad ku hadasho Soomaali, waaxda luqadaha, qaybta kaalmada adeegyada, waxay idiin hayaan adeeg khsoysh laalen . So [facility] [phone].    ATENCIÓN: Si habla español, tiene a jade disposición servicios gratuitos de asistencia lingüística. Llame al [phone].    We comply with applicable federal civil rights laws and [place] laws. We do not discriminate on the basis of race, color, national origin, age, disability sex, sexual orientation or gender identity.            Thank you!     Thank you for choosing [facility]  for your care. Our goal is always to provide you with excellent care. Hearing back from our patients is one way we can continue to improve our services. Please take a few minutes to complete the written survey that you may receive in the mail after your visit with us. Thank you!             Your Updated Medication List - Protect others around you: Learn how to safely use, store and throw away your medicines at www.disposemymeds.org.          This list is accurate as of: 7/26/17  1:29 PM.  Always use your most recent med list.                   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

## 2017-07-26 NOTE — LETTER
"7/26/2017       RE: Jigna Allen  [address] [place] [address]     Dear Colleague,    Thank you for referring your patient, Jigna Allen, to the [facility] at [facility]. Please see a copy of my visit note below.    Palliative Care Clinical Social Work Return Appointment    PLEASE NOTE:  THIS IS A MENTAL HEALTH NOTE.  OTHER PROVIDERS VIEWING THIS NOTE SHOULD USE THIS ONLY FOR UNDERSTANDING THE CONTEXT OF THE PATIENT S EXPERIENCE.  TOPICS DESCRIBED IN THIS NOTE SHOULD NOT BE REFERENCED TO THE PATIENT BY MEDICAL PROVIDERS.    Jigna (\"Kwame\") is an 82 year old woman with history of sarcoma, seen today for a return psychotherapy appointment to address Generalized Anxiety Disorder, depressed mood, fatigue and insomnia.    Mental Status Exam:(List all that apply)      Appearance: Appropriate      Eye Contact: Good       Orientation: Yes, x4      Mood: discouraged, anxious, verbal and engaged      Affect: Appropriate, full range      Thought Content: Clear         Thought Form: Logical      Psychomotor Behavior: Normal    Visit summary:   Mental Health (Anxiety, depression, other symptoms): Focus today on cognitive coping. We reflect on Jigna's significant progress in recent weeks especially with increasing social and physical activity in her life, despite continuing to experience some anxiety and fatigue. She has gone from perhaps 1 social event per week at maximum last spring, to several major social activities during the past week and continuing to plan and schedule these activities. She does note feeling energized when interacting with people, and acknowledges that her life is much more active compared with several months ago.     Overall, Jigna continues to experience anxiety. She is also feeling some discouragement about sleep, fatigue, her right knee bowing and trouble walking, and about generally not feeling well. She saw her PCP this week, " who continues to tell her that there does not appear to be a physical reason for her feelings of malaise, etc, although she is referred to ortho re: her knee. We also discuss again her fears that she is having a recurrence of her chemo. She also processes difficult thoughts connected with planned visit to assist her mother in law in early August, sooner than anticipated.     We discussed fatigue, sleepiness and insomnia, and clarified that Jigna's symptoms are consistent with mild-moderate fatigue, possibly related to past cancer treatment and yet she describes it as arising just in the past 18 months, although she completed cancer treatment 7 years ago. Sleepiness is not her key problem. Insomnia as subjectively experienced remains clinically significant. She does note that she has transitioned to sleeping in her own room, with room darkened. She is often going to bed about 10:30 or 11, then waking at 4am and feeling that it is hard to get back to sleep, yet finding she is sometimes still waking to the alarm at 7:30. There is a small improvement noted by being in this room, in that she is not awakening at 12:30 or 1 as her  comes to bed. She is still very concerned about her sleep. Regarding fatigue, we discuss trialing use of a light therapy box 30 min per day. She has one and agrees to do this. We discuss that maintaining/increasing social and physical activities are also indicated. In addition to attending an event at her former employer, a sarcoma event, and a family  this past week, Jigna also plans a lunch event today and attended an exercise class yesterday.    Cognitive factors related to anxiety and depressed mood explored in more depth today. Continued education and interventions related to thought defusion. Discussion of unhelpful thoughts (cognitive distortions) as well as tracking situation-thought-feeling-action as well as developing helpful cognitive coping statements with which to  "respond internally to increase options when faced with a troubling situation (ie her mother in law communicating her wishes or criticisms regarding Jigna's cooking, housekeeping or other activities). Review that in relation to caregiving role she has options regarding her own actions (ie choosing to take 2 hours away vs 24/7 caregiving) and also regarding how she relates to her habitual thoughts. There are themes of catastrophizing and emotional reasoning and we discuss these and generally what it means for thoughts to be helpful vs not helpful. Discussion of \"even though... I can...\" type of cognitive coping statements. She agreed to track times of thoughts that are disturbing her. She is aware that many thoughts are \"tapes\" for her or repetitive, ruminative distressing thoughts. She voices interest and willingness in working with these thoughts to decrease their negative impact on her. Emphasized this work is addititive, along with continued behavioral changes and boundary setting in caregiving role.    Adjustment to Illness: Continued fears that there is a physical reason for her knee problem and her fatigue. We discuss her recent PCP appt, reassurance there and referral to ortho. We also discuss again whether she would like to request further oncology assessment. She is hesitating, not wanting to request anything unwarranted. We discuss option for me to consult with the team at her request. She wonders about getting an MRI. She fears sarcoma returning at site where it was previously behind knee or in lungs. Cognitive interventions and encouragement to advocate for herself as needed to increase her confidence that all assessment is complete.     Relationships: Describes positive activities recently with  including road trip to family  and visiting family [place] after that. Is worried about planned visit to [facility] in early Aug. Enjoying social events, especially seeing many former colleagues at a " "recent celebration.    End of Life and Advance Care Planning: Not focus today.    Main therapeutic interventions provided this session include:  Facilitating processing of feelings and thoughts; cognitive coping facilitation and education; psychoeducation regarding anxiety, fatigue, sleep, depression; motivational interviewing    Plan:  Jigna will return for psychotherapy with palliative care focus at [facility] in 1 week.    Before next session, Jigna plans to:  Use light box 30 min per day in morning.  Use written thought record to track any distressing thoughts, and develop helpful cognitive coping statements  Add daily routine of 1 hour working on sorting items including books and kitchenware, then rewarding herself/relaxing    Continue regular social and physical activities  Continue to sleep in her own separate darkened room; go to bed when tired, avoid TV for 30 min before bed, avoid naps, wake at 7:30.  Continue mindfulness practices to reduce stress and anxiety including \"five senses walk\" and \"three breaths practice\"  Continue to choose small actions of being more assertive in caregiving situation - toward things she will enjoy - ie take a walk, see a friend, cook something she likes.  Consider developing daily routine (morning activities scheduled) in advance of return to 24/7 caregiving in September.        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      Anticipated number of sessions to complete episode of care:  12         Treatment Goal(s)  Date Goal Dates  Reviewed Status   5/31/2017 Goal 1:  Client will develop effective strategies for anxiety " following treatment for sarcoma.     Continued    Objective #1A (Client Action)  Client will Identify triggers for anxiety/panic attacks, Identify early signs/symptoms of anxiety and Practice self awareness exercises.    Intervention(s)  Therapist will Provide psychoeducation and Facilitate processing of thoughts and feelings .    Continued    Objective #1B  Client will Use/practice relaxation strategies and Identify effective coping strategies.    Intervention(s)  Therapist will Provide psychoeducation, Provide behavioral intervention training and Facilitate structured problem solving.    Continued    Objective #1C  Client will Effectively communicate with medical provider re needed information and Effectively communicate with family/friends re needs.    Intervention(s)  Therapist will Provide psychoeducation and Facilitate processing of thoughts and feelings.    Continued           Date Goal Dates  Reviewed Status   5/31/2017 Goal 2:  Client will increase michelle in her life and develop effective strategies for coping with depressed mood and other difficult emotions.    Continued    Objective #2A (Client Action)  Client will Identify triggers/early signs of depressed mood.    Intervention(s) (Therapist Action)  Therapist will provide psychoeducation and Facilitate processing of thoughts and feelings.    Continued    Objective #2B  Client will Follow realistic exercise plan, Identify activities that provide comfort and/or pleasure, Participate in activities that provide comfort and/or pleasure and Identify an activity that would provide meaning/contribute to feeling of self worth, and effectively communicate needs to others. Develop new goals and increase productive or creative daily activities. Over time, increase frequency of seeing friends and family members.    Intervention(s)  Therapist will Provide psychoeducation, Facilitate processing of thoughts and feelings and Facilitate structured problem solving.     Continued    Objective #2C  Client will Participate in self-regulation practice (e.g.: meditation, relaxation exercises, self hypnosis) per day and/or gratitude practices.    Intervention(s)  Therapist will Provide psychoeducation and Provide behavioral intervention training.    Continued           Date Goal Dates  Reviewed Status   5/31/2017 Goal 3:  Client will make progress toward increased wellbeing and health, including working to other health care providers as needed.    Continued    Objective #3A (Client Action)  Client will Communicate effectively with medical provider re needed information for example related to sleep and diet/ weight-loss concerns.    Intervention(s) (Therapist Action)  Therapist will Make referrals to appropriate colleagues as needed, Facilitate processing of thoughts and feelings and Facilitate structured problem solving.   Continued    Objective #3B (Client Action)  Client will improve sleep, with long term goal to sleep well for 7 - 8 hours each night.    Intervention(s) (Therapist Action)  Therapist will provide Cognitive Behavioral Therapy for Insomnia, review sleep hygiene options, and provide structured problem solving. Therapist will also address anxiety and depression with goal to assist with  Continued    Objective #3C (Client Action)  Client will continue to increase physical activity (adding stretching and faster walking 10 min per day in addition to daily slow walk) and adjust diet as advised by dietitian and physician, toward return to healthy weight and energy level.    Intervention(s) (Therapist Action)  Therapist will provide structured problem solving, psychoeducation, and bibliotherapy and referrals as appropriate.  Continued       Client has reviewed and agreed to the above plan on June 7, 2017.    JOURDAN Chilel, LICSW      DO NOT SEND ANY LETTERS

## 2017-07-31 ENCOUNTER — TRANSFERRED RECORDS (OUTPATIENT)
Dept: HEALTH INFORMATION MANAGEMENT | Facility: CLINIC | Age: 82
End: 2017-07-31

## 2017-08-02 ENCOUNTER — TRANSFERRED RECORDS (OUTPATIENT)
Dept: HEALTH INFORMATION MANAGEMENT | Facility: CLINIC | Age: 82
End: 2017-08-02

## 2017-08-02 ENCOUNTER — OFFICE VISIT (OUTPATIENT)
Dept: PALLIATIVE CARE | Facility: CLINIC | Age: 82
End: 2017-08-02
Attending: SOCIAL WORKER
Payer: COMMERCIAL

## 2017-08-02 DIAGNOSIS — F41.1 GAD (GENERALIZED ANXIETY DISORDER): Primary | ICD-10-CM

## 2017-08-02 DIAGNOSIS — Z51.5 ENCOUNTER FOR PALLIATIVE CARE: ICD-10-CM

## 2017-08-02 PROCEDURE — 40000114 ZZH STATISTIC NO CHARGE CLINIC VISIT

## 2017-08-02 PROCEDURE — 90834 PSYTX W PT 45 MINUTES: CPT | Mod: ZP | Performed by: SOCIAL WORKER

## 2017-08-02 NOTE — LETTER
"8/2/2017       RE: Jigna Allen  [address] [place] [address]     Dear Colleague,    Thank you for referring your patient, Jigna Allen, to the [facility] at [facility]. Please see a copy of my visit note below.    Palliative Care Clinical Social Work Return Appointment    PLEASE NOTE:  THIS IS A MENTAL HEALTH NOTE.  OTHER PROVIDERS VIEWING THIS NOTE SHOULD USE THIS ONLY FOR UNDERSTANDING THE CONTEXT OF THE PATIENT S EXPERIENCE.  TOPICS DESCRIBED IN THIS NOTE SHOULD NOT BE REFERENCED TO THE PATIENT BY MEDICAL PROVIDERS.    Jigna (\"Kwame\") is an 82 year old woman with history of sarcoma, seen today for a return psychotherapy appointment to address Generalized Anxiety Disorder, depressed mood, fatigue and insomnia.    Mental Status Exam:(List all that apply)      Appearance: Appropriate      Eye Contact: Good       Orientation: Yes, x4      Mood:  Mild anxiety, optimistic, engaged, verbal      Affect: Appropriate, full range      Thought Content: Clear         Thought Form: Logical      Psychomotor Behavior: Normal    Visit summary:   Mental Health (Anxiety, depression, other symptoms): Overall, Jigna is no longer endorsing depression symptoms today. She notes actively feeling michelle and contentment during a social gathering last night. She finds herself feeling good and energized during social interactions, and observes that behavioral activation (scheduling and attending social and cultural/intellectual activities despite worries/resistance) has significantly changed her avoidance patterns and anehedonia. She plans as a next step to choose volunteer activities that suit her interests and strengths, perhaps tutoring or work with English language learners. She continues to have some anxiety especially related to her caregiving role for her mother in law, yet notes significant sense of relief and optimism related to use of cognitive coping along with " "planning for increased boundary setting to support activities that provide her with purpose, meaning, and enjoyment, in addition to her caregiving role. We discuss anxiety and depression symptoms as \"alarm signals\" regarding needs not getting met. She expresses interest in considering this framing. We also discuss personality type theories as they relate to her needs vs her 's needs and preferences. We also discuss importance and barriers related to engaging in social, cultural/intellectual, and volunteer activities even during times when her mother in law is living with them (motivational interviewing).    Focus on cognitive coping continued today. Jigna described using cognitive coping approach to re-frame thoughts - ie \"Why is my  calling his mom again?\" --> anger, discouragement shifted intentionally to \"Even though he is supporting his mom, he is also doing things to support and make things better for me, including those social activities he has helped to schedule and attend recently. I've enjoyed those things.\" She is able to give several examples of reframing unhelpful cognitions, identifying thoughts as thoughts, and noticing the emotional benefits and increased sense of options as she works with her thoughts. She has not been using written thought log consistently yet is using the approach regularly. She likes using \"Even though... I can...\"    We discuss potentially meeting less frequently (every 2 weeks). She will consider but for now prefers to keep weekly appts on schedule. She is out of town next week and will return in 2 weeks.    Adjustment to Illness: Jigna followed up with Ortho re her right knee (where she has had pain/bowing/trouble walking). 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 Ortho did MRI which did not show cancer, but showed inflammation and fluid. Plan is to drain it today. Cortisone not offered so she worries there will " "be pain after. She is very relieved that no cancer recurrence is seen.    Discussion of options for perspectives on insomnia, including subjective nature of insomnia. Reflection on some improvements, and option to continue to make changes with hope of increasing time asleep each night (she has been appreciating noticing what may cause less good sleep, ie when she has chosen to nap, etc); other option of \"radical acceptance.\" Discussion of defining what is enough sleep to function vs ideal sleep hoped for. Jigna is receptive to considering this cognitive reframing re sleep. I also remain willing to continue exploring changes to sleep pattern as she chooses in future.    Relationships: Discussion of personality preferences and differences from ; relationship with mother in law; cognitive coping options when in caregiving role as well as continued planning for boundary setting and remaining engaged in activities of purpose and pleasure unrelated to caregiving.    End of Life and Advance Care Planning: Discussion of conscious decisions about how she wants to use however many years of life remain for her.    Main therapeutic interventions provided this session include:  Facilitating processing of feelings and thoughts; cognitive coping facilitation and education; psychoeducation regarding anxiety, fatigue, insomnia, depression; motivational interviewing    Plan:  Jigna will return for psychotherapy with palliative care focus at [facility] in 2 weeks.    Before next session, Jigna plans to:  Continue use of cognitive coping to increase awareness of distressing thoughts and their impact, and to develop helpful cognitive coping statements  Continue regular social and physical activities  Consider volunteering options and initiate  Consider cognitive coping related to sleep patterns    Past goals continued:  Continue daily routine of 1 hour working on sorting items including books and kitchenware, then rewarding " "herself/relaxing  ?Use light box 30 min per day in morning.  Continue to sleep in her own separate darkened room; go to bed when tired, avoid TV for 30 min before bed, avoid naps, wake at 7:30.  Continue mindfulness practices to reduce stress and anxiety including \"five senses walk\" and \"three breaths practice\"  Continue to choose small actions of being more assertive in caregiving situation - toward things she will enjoy - ie take a walk, see a friend, cook something she likes.  Consider developing daily routine (morning activities scheduled) in advance of return to 24/7 caregiving in September.      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      Anticipated number of sessions to complete episode of care:  12         Treatment Goal(s)  Date Goal Dates  Reviewed Status   5/31/2017 Goal 1:  Client will develop effective strategies for anxiety following treatment for sarcoma.     Continued    Objective #1A (Client Action)  Client will Identify triggers for anxiety/panic attacks, Identify early signs/symptoms of anxiety and Practice self awareness exercises.    Intervention(s)  Therapist will Provide psychoeducation and Facilitate processing of thoughts and feelings .    Continued    Objective #1B  Client will Use/practice relaxation strategies and Identify effective coping strategies.    Intervention(s)  Therapist will Provide psychoeducation, Provide behavioral intervention training and Facilitate structured problem solving.    Continued    Objective #1C  Client will Effectively communicate with medical provider re needed information and Effectively " communicate with family/friends re needs.    Intervention(s)  Therapist will Provide psychoeducation and Facilitate processing of thoughts and feelings.    Continued           Date Goal Dates  Reviewed Status   5/31/2017 Goal 2:  Client will increase michelle in her life and develop effective strategies for coping with depressed mood and other difficult emotions.    Continued    Objective #2A (Client Action)  Client will Identify triggers/early signs of depressed mood.    Intervention(s) (Therapist Action)  Therapist will provide psychoeducation and Facilitate processing of thoughts and feelings.    Continued    Objective #2B  Client will Follow realistic exercise plan, Identify activities that provide comfort and/or pleasure, Participate in activities that provide comfort and/or pleasure and Identify an activity that would provide meaning/contribute to feeling of self worth, and effectively communicate needs to others. Develop new goals and increase productive or creative daily activities. Over time, increase frequency of seeing friends and family members.    Intervention(s)  Therapist will Provide psychoeducation, Facilitate processing of thoughts and feelings and Facilitate structured problem solving.    Completed - But to continue in future as needed    Objective #2C  Client will Participate in self-regulation practice (e.g.: meditation, relaxation exercises, self hypnosis) per day and/or gratitude practices.    Intervention(s)  Therapist will Provide psychoeducation and Provide behavioral intervention training.    Continued           Date Goal Dates  Reviewed Status   5/31/2017 Goal 3:  Client will make progress toward increased wellbeing and health, including working to other health care providers as needed.    Continued    Objective #3A (Client Action)  Client will Communicate effectively with medical provider re needed information for example related to sleep and diet/ weight-loss concerns.    Intervention(s)  (Therapist Action)  Therapist will Make referrals to appropriate colleagues as needed, Facilitate processing of thoughts and feelings and Facilitate structured problem solving.   Completed - but to continue as needed     Objective #3B (Client Action)  Client will improve sleep, with long term goal to sleep well for 7 - 8 hours each night.    Intervention(s) (Therapist Action)  Therapist will provide Cognitive Behavioral Therapy for Insomnia, review sleep hygiene options, and provide structured problem solving. Therapist will also address anxiety and depression with goal to assist with  Continued - but considering completion    Objective #3C (Client Action)  Client will continue to increase physical activity (adding stretching and faster walking 10 min per day in addition to daily slow walk) and adjust diet as advised by dietitian and physician, toward return to healthy weight and energy level.    Intervention(s) (Therapist Action)  Therapist will provide structured problem solving, psychoeducation, and bibliotherapy and referrals as appropriate.  Continued - On pause due to right knee arthritis/inflammation       Client has reviewed and agreed to the above plan on June 7, 2017.    JOURDAN Chilel, LICSW      DO NOT SEND ANY LETTERS

## 2017-08-02 NOTE — MR AVS SNAPSHOT
After Visit Summary   8/2/2017    Jigna Allen    MRN: [id number]           Patient Information     Date Of Birth          1935        Visit Information        Provider Department      8/2/2017 10:00 AM Mary Torres LICSW; UC 2 119 CONSULT [facility]ly [facility]        Today's Diagnoses     YAMILET (generalized anxiety disorder)    -  1    Encounter for palliative care           Follow-ups after your visit        Your next 10 appointments already scheduled     Aug 16, 2017 10:00 AM CDT   (Arrive by 9:45 AM)   RETURN WITH ROOM with CATHERINE Conley,  2 119 CONSULT [facility] ([facility])    [address] [place] [id number] [phone]            Aug 30, 2017 10:00 AM CDT   (Arrive by 9:45 AM)   RETURN WITH ROOM with CATHERINE Conley,  2 119 CONSULT [facility] ([facility])    [address] [place] [address] [phone]            May 15, 2018  8:30 AM CDT   (Arrive by 8:15 AM)   XR CHEST 2 VIEWS with UCXR1   [facility] ([facility])    [address] [place] [address] [phone]           Please bring a list of your current medicines to your exam. (Include vitamins, minerals and over-thecounter medicines.) Leave your valuables at home.  Tell your doctor if there is a chance you may be pregnant.  You do not need to do anything special for this exam.            May 15, 2018  9:30 AM CDT   (Arrive by 9:15 AM)   Return Visit with Edouard Bryan MD   [facility] ([facility])    [address] [place] [address] [phone]              Who to contact     If you have questions or need follow up information about today's clinic visit or your  schedule please contact [facility] directly at [phone].  Normal or non-critical lab and imaging results will be communicated to you by MyChart, letter or phone within 4 business days after the clinic has received the results. If you do not hear from us within 7 days, please contact the clinic through [employer] or phone. If you have a critical or abnormal lab result, we will notify you by phone as soon as possible.  Submit refill requests through [employer] or call your pharmacy and they will forward the refill request to us. Please allow 3 business days for your refill to be completed.          Additional Information About Your Visit        [employer] Information     [employer] gives you secure access to your electronic health record. If you see a primary care provider, you can also send messages to your care team and make appointments. If you have questions, please call your primary care clinic.  If you do not have a primary care provider, please call [phone] and they will assist you.        Care EveryWhere ID     This is your Care EveryWhere ID. This could be used by other organizations to access your [place] medical records  [id number]         Blood Pressure from Last 3 Encounters:   05/09/17 130/79   09/16/16 138/67   09/15/16 148/68    Weight from Last 3 Encounters:   05/10/17 51.3 kg (113 lb 1.6 oz)   05/09/17 50.8 kg (112 lb 1.6 oz)   09/16/16 53.4 kg (117 lb 12.8 oz)              Today, you had the following     No orders found for display       Primary Care Provider Office Phone # Fax #    Elisabeth Ko [phone]       [facility] [address]  [place] [facility] [address]        Equal Access to Services     [facility] : Hadii lula hester hadsamantha Sosloan, waaxda luqadaha, qaybta kaalmada steve, narinder potter. So [facility] [phone].    ATENCIÓN: Si habla español, tiene a jade disposición servicios gratuitos de asistencia lingüística.  Samira cartwright [phone].    We comply with applicable federal civil rights laws and [place] laws. We do not discriminate on the basis of race, color, national origin, age, disability sex, sexual orientation or gender identity.            Thank you!     Thank you for choosing [facility]  for your care. Our goal is always to provide you with excellent care. Hearing back from our patients is one way we can continue to improve our services. Please take a few minutes to complete the written survey that you may receive in the mail after your visit with us. Thank you!             Your Updated Medication List - Protect others around you: Learn how to safely use, store and throw away your medicines at www.disposemymeds.org.          This list is accurate as of: 8/2/17 11:21 PM.  Always use your most recent med list.                   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

## 2017-08-02 NOTE — PROGRESS NOTES
"Palliative Care Clinical Social Work Return Appointment    PLEASE NOTE:  THIS IS A MENTAL HEALTH NOTE.  OTHER PROVIDERS VIEWING THIS NOTE SHOULD USE THIS ONLY FOR UNDERSTANDING THE CONTEXT OF THE PATIENT S EXPERIENCE.  TOPICS DESCRIBED IN THIS NOTE SHOULD NOT BE REFERENCED TO THE PATIENT BY MEDICAL PROVIDERS.    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    Mental Status Exam:(List all that apply)      Appearance: Appropriate      Eye Contact: Good       Orientation: Yes, x4      Mood:  Mild anxiety, optimistic, engaged, verbal      Affect: Appropriate, full range      Thought Content: Clear         Thought Form: Logical      Psychomotor Behavior: Normal    Visit summary:   Mental Health (Anxiety, depression, other symptoms): Overall, Jigna is no longer endorsing depression symptoms today. She notes actively feeling michelle and contentment during a social gathering last night. She finds herself feeling good and energized during social interactions, and observes that behavioral activation (scheduling and attending social and cultural/intellectual activities despite worries/resistance) has significantly changed her avoidance patterns and anehedonia. She plans as a next step to choose volunteer activities that suit her interests and strengths, perhaps tutoring or work with English language learners. She continues to have some anxiety especially related to her caregiving role for her mother in law, yet notes significant sense of relief and optimism related to use of cognitive coping along with planning for increased boundary setting to support activities that provide her with purpose, meaning, and enjoyment, in addition to her caregiving role. We discuss anxiety and depression symptoms as \"alarm signals\" regarding needs not getting met. She expresses interest in considering " "this framing. We also discuss personality type theories as they relate to her needs vs her 's needs and preferences. We also discuss importance and barriers related to engaging in social, cultural/intellectual, and volunteer activities even during times when her mother in law is living with them (motivational interviewing).    Focus on cognitive coping continued today. Jigna described using cognitive coping approach to re-frame thoughts - ie \"Why is my  calling his mom again?\" --> anger, discouragement shifted intentionally to \"Even though he is supporting his mom, he is also doing things to support and make things better for me, including those social activities he has helped to schedule and attend recently. I've enjoyed those things.\" She is able to give several examples of reframing unhelpful cognitions, identifying thoughts as thoughts, and noticing the emotional benefits and increased sense of options as she works with her thoughts. She has not been using written thought log consistently yet is using the approach regularly. She likes using \"Even though... I can...\"    We discuss potentially meeting less frequently (every 2 weeks). She will consider but for now prefers to keep weekly appts on schedule. She is out of town next week and will return in 2 weeks.    Adjustment to Illness: Jigna followed up with Ortho re her right knee (where she has had pain/bowing/trouble walking). 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 Ortho did MRI which did not show cancer, but showed inflammation and fluid. Plan is to drain it today. Cortisone not offered so she worries there will be pain after. She is very relieved that no cancer recurrence is seen.    Discussion of options for perspectives on insomnia, including subjective nature of insomnia. Reflection on some improvements, and option to continue to make changes with hope of increasing time asleep each night " "(she has been appreciating noticing what may cause less good sleep, ie when she has chosen to nap, etc); other option of \"radical acceptance.\" Discussion of defining what is enough sleep to function vs ideal sleep hoped for. Jigna is receptive to considering this cognitive reframing re sleep. I also remain willing to continue exploring changes to sleep pattern as she chooses in future.    Relationships: Discussion of personality preferences and differences from ; relationship with mother in law; cognitive coping options when in caregiving role as well as continued planning for boundary setting and remaining engaged in activities of purpose and pleasure unrelated to caregiving.    End of Life and Advance Care Planning: Discussion of conscious decisions about how she wants to use however many years of life remain for her.    Main therapeutic interventions provided this session include:  Facilitating processing of feelings and thoughts; cognitive coping facilitation and education; psychoeducation regarding anxiety, fatigue, insomnia, depression; motivational interviewing    Plan:  Jigna will return for psychotherapy with palliative care focus at [facility] in 2 weeks.    Before next session, Jigna plans to:  Continue use of cognitive coping to increase awareness of distressing thoughts and their impact, and to develop helpful cognitive coping statements  Continue regular social and physical activities  Consider volunteering options and initiate  Consider cognitive coping related to sleep patterns    Past goals continued:  Continue daily routine of 1 hour working on sorting items including books and kitchenware, then rewarding herself/relaxing  ?Use light box 30 min per day in morning.  Continue to sleep in her own separate darkened room; go to bed when tired, avoid TV for 30 min before bed, avoid naps, wake at 7:30.  Continue mindfulness practices to reduce stress and anxiety including \"five senses walk\" and \"three " "breaths practice\"  Continue to choose small actions of being more assertive in caregiving situation - toward things she will enjoy - ie take a walk, see a friend, cook something she likes.  Consider developing daily routine (morning activities scheduled) in advance of return to 24/7 caregiving in September.      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      Anticipated number of sessions to complete episode of care:  12         Treatment Goal(s)  Date Goal Dates  Reviewed Status   5/31/2017 Goal 1:  Client will develop effective strategies for anxiety following treatment for sarcoma.     Continued    Objective #1A (Client Action)  Client will Identify triggers for anxiety/panic attacks, Identify early signs/symptoms of anxiety and Practice self awareness exercises.    Intervention(s)  Therapist will Provide psychoeducation and Facilitate processing of thoughts and feelings .    Continued    Objective #1B  Client will Use/practice relaxation strategies and Identify effective coping strategies.    Intervention(s)  Therapist will Provide psychoeducation, Provide behavioral intervention training and Facilitate structured problem solving.    Continued    Objective #1C  Client will Effectively communicate with medical provider re needed information and Effectively communicate with family/friends re needs.    Intervention(s)  Therapist will Provide psychoeducation and Facilitate processing of thoughts and feelings.    Continued           Date Goal Dates  Reviewed Status   5/31/2017 Goal 2:  Client will increase michelle in her life and develop effective " strategies for coping with depressed mood and other difficult emotions.    Continued    Objective #2A (Client Action)  Client will Identify triggers/early signs of depressed mood.    Intervention(s) (Therapist Action)  Therapist will provide psychoeducation and Facilitate processing of thoughts and feelings.    Continued    Objective #2B  Client will Follow realistic exercise plan, Identify activities that provide comfort and/or pleasure, Participate in activities that provide comfort and/or pleasure and Identify an activity that would provide meaning/contribute to feeling of self worth, and effectively communicate needs to others. Develop new goals and increase productive or creative daily activities. Over time, increase frequency of seeing friends and family members.    Intervention(s)  Therapist will Provide psychoeducation, Facilitate processing of thoughts and feelings and Facilitate structured problem solving.    Completed - But to continue in future as needed    Objective #2C  Client will Participate in self-regulation practice (e.g.: meditation, relaxation exercises, self hypnosis) per day and/or gratitude practices.    Intervention(s)  Therapist will Provide psychoeducation and Provide behavioral intervention training.    Continued           Date Goal Dates  Reviewed Status   5/31/2017 Goal 3:  Client will make progress toward increased wellbeing and health, including working to other health care providers as needed.    Continued    Objective #3A (Client Action)  Client will Communicate effectively with medical provider re needed information for example related to sleep and diet/ weight-loss concerns.    Intervention(s) (Therapist Action)  Therapist will Make referrals to appropriate colleagues as needed, Facilitate processing of thoughts and feelings and Facilitate structured problem solving.   Completed - but to continue as needed     Objective #3B (Client Action)  Client will improve sleep, with long  term goal to sleep well for 7 - 8 hours each night.    Intervention(s) (Therapist Action)  Therapist will provide Cognitive Behavioral Therapy for Insomnia, review sleep hygiene options, and provide structured problem solving. Therapist will also address anxiety and depression with goal to assist with  Continued - but considering completion    Objective #3C (Client Action)  Client will continue to increase physical activity (adding stretching and faster walking 10 min per day in addition to daily slow walk) and adjust diet as advised by dietitian and physician, toward return to healthy weight and energy level.    Intervention(s) (Therapist Action)  Therapist will provide structured problem solving, psychoeducation, and bibliotherapy and referrals as appropriate.  Continued - On pause due to right knee arthritis/inflammation       Client has reviewed and agreed to the above plan on June 7, 2017.    JOUDRAN Chilel, LICSW      DO NOT SEND ANY LETTERS

## 2017-08-16 ENCOUNTER — OFFICE VISIT (OUTPATIENT)
Dept: PALLIATIVE CARE | Facility: CLINIC | Age: 82
End: 2017-08-16
Attending: SOCIAL WORKER
Payer: COMMERCIAL

## 2017-08-16 DIAGNOSIS — F41.1 GAD (GENERALIZED ANXIETY DISORDER): Primary | ICD-10-CM

## 2017-08-16 DIAGNOSIS — Z51.5 ENCOUNTER FOR PALLIATIVE CARE: ICD-10-CM

## 2017-08-16 PROCEDURE — 90834 PSYTX W PT 45 MINUTES: CPT | Mod: ZP | Performed by: SOCIAL WORKER

## 2017-08-16 PROCEDURE — 40000114 ZZH STATISTIC NO CHARGE CLINIC VISIT

## 2017-08-16 NOTE — MR AVS SNAPSHOT
After Visit Summary   8/16/2017    Jigna Allen    MRN: [id number]           Patient Information     Date Of Birth          1935        Visit Information        Provider Department      8/16/2017 10:00 AM Mary Torres LICSW; UC 2 119 CONSULT [facility]        Today's Diagnoses     YAMILET (generalized anxiety disorder)    -  1    Encounter for palliative care           Follow-ups after your visit        Your next 10 appointments already scheduled     Sep 06, 2017 10:00 AM CDT   (Arrive by 9:45 AM)   RETURN WITH ROOM with CATHERINE Conley, UC 2 119 CONSULT [facility] ([facility])    [address]  2[address] [facility] [id number] [phone]            Sep 20, 2017 10:00 AM CDT   (Arrive by 9:45 AM)   RETURN WITH ROOM with CATHERINE Conley, UC 2 119 CONSULT [facility] ([facility])    [address] [place] [address]  [address] [facility] [address]   [phone]            Sep 27, 2017 10:00 AM CDT   (Arrive by 9:45 AM)   RETURN WITH ROOM with CATHERINE Conley, UC 2 119 CONSULT [facility] ([facility])    [address] [place] [address]  [address] [facility] [id number] [phone]            May 15, 2018  8:30 AM CDT   (Arrive by 8:15 AM)   XR CHEST 2 VIEWS with UCXR1   [facility] [place] [facility] ([facility])    [address] [place] [address]  1st Floor  [facility] [id number] [phone]           Please bring a list of your current medicines to your exam. (Include vitamins, minerals and over-thecounter medicines.) Leave your valuables at home.  Tell your doctor if there is a chance you may be pregnant.  You do not need to do anything special for this exam.            May 15, 2018  9:30 AM CDT   (Arrive by 9:15 AM)   Return Visit with Edouard  Gabo Bryan MD   [facility] ([facility])    [address]  2nd Floor  [facility] [address] [phone]              Who to contact     If you have questions or need follow up information about today's clinic visit or your schedule please contact [facility] directly at [phone].  Normal or non-critical lab and imaging results will be communicated to you by MyChart, letter or phone within 4 business days after the clinic has received the results. If you do not hear from us within 7 days, please contact the clinic through [employer] or phone. If you have a critical or abnormal lab result, we will notify you by phone as soon as possible.  Submit refill requests through [employer] or call your pharmacy and they will forward the refill request to us. Please allow 3 business days for your refill to be completed.          Additional Information About Your Visit        [employer] Information     [employer] gives you secure access to your electronic health record. If you see a primary care provider, you can also send messages to your care team and make appointments. If you have questions, please call your primary care clinic.  If you do not have a primary care provider, please call [phone] and they will assist you.        Care EveryWhere ID     This is your Care EveryWhere ID. This could be used by other organizations to access your [place] medical records  [id number]         Blood Pressure from Last 3 Encounters:   05/09/17 130/79   09/16/16 138/67   09/15/16 148/68    Weight from Last 3 Encounters:   05/10/17 51.3 kg (113 lb 1.6 oz)   05/09/17 50.8 kg (112 lb 1.6 oz)   09/16/16 53.4 kg (117 lb 12.8 oz)              Today, you had the following     No orders found for display       Primary Care Provider Office Phone # Fax #    Elisabeth Ko [phone]       [employer] [address]  [facility] [address]         Equal Access to Services     [facility] : Hadii aad ku hadmelmagali Jacekali, waangieda luqsirisha, qamarshata janidenanarinder porter. So [facility] [phone].    ATENCIÓN: Si habla español, tiene a jade disposición servicios gratuitos de asistencia lingüística. Llame al [phone].    We comply with applicable federal civil rights laws and [place] laws. We do not discriminate on the basis of race, color, national origin, age, disability sex, sexual orientation or gender identity.            Thank you!     Thank you for choosing [facility]  for your care. Our goal is always to provide you with excellent care. Hearing back from our patients is one way we can continue to improve our services. Please take a few minutes to complete the written survey that you may receive in the mail after your visit with us. Thank you!             Your Updated Medication List - Protect others around you: Learn how to safely use, store and throw away your medicines at www.disposemymeds.org.          This list is accurate as of: 8/16/17  5:01 PM.  Always use your most recent med list.                   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

## 2017-08-16 NOTE — PROGRESS NOTES
"Palliative Care Clinical Social Work Return Appointment    PLEASE NOTE:  THIS IS A MENTAL HEALTH NOTE.  OTHER PROVIDERS VIEWING THIS NOTE SHOULD USE THIS ONLY FOR UNDERSTANDING THE CONTEXT OF THE PATIENT S EXPERIENCE.  TOPICS DESCRIBED IN THIS NOTE SHOULD NOT BE REFERENCED TO THE PATIENT BY MEDICAL PROVIDERS.    Jigna (\"Kwame\") is an 82 year old woman with history of sarcoma, seen today for a return psychotherapy appointment to address Generalized Anxiety Disorder, depressed mood, fatigue and insomnia.    Mental Status Exam:(List all that apply)      Appearance: Appropriate      Eye Contact: Good       Orientation: Yes, x4      Mood:  Euthymic, optimistic, some worries      Affect: Appropriate, full range      Thought Content: Clear         Thought Form: Logical      Psychomotor Behavior: Normal    Visit summary:   Mental Health (anxiety, depression, other symptoms): Jigna's affect is bright today with generally slower speech patterns and other indications of reduced anxiety compared with previous visits. She describes worries about her health (see below) and processes thoughts and emotions related to health, relationships with mother in law, , daughter, and others, and her desires to volunteer and contribute more as part of this chapter in her life.    Focus of visit today is psychoeducation related to difficult emotions such as envy. Specific self compassion strategies taught including kind self talk, equanimity/defusion/mindful awareness; self soothing; acceptance without engaging \"the story.\" Discussion of the relationship between thoughts and emotions and options for mindful self compassion approach to both. Jigna was receptive, took notes, and expressed wanting to gail about these ideas and explore them for herself.    Jigna voices positive feelings about recently hosting a dinner party and feeling normal/like herself; increase generally in social activities; and reduced level of distress about the " "comments made by her mother in law during their recent visit. She described using cognitive coping tools including \"Even though... I know/I can...\" as helpful when she noticed feeling troubled about something.     Jigna is feeling frustrated with herself about not having yet engaged in a plan for volunteering. She remains interested in E[employer] or choir as options for volunteering. Exploration today of barriers, cognitive, perceived, and practical. Jigna agreed that she will call one  before next appt to learn more and discuss the barriers she is worried may prevent her from being a good fit in various roles of interest. She voices continued motivation to include volunteering in her life. We also discuss whether volunteering may be possible while in [place] over the summer. She is considering that although there may be barriers including transportation. She does acknowledge she has made some progress on organizing and sorting items for donation at home.    Adjustment to Illness: Significant source of distress currently. MRI of right knee showed \"bone on bone\" situation. She is unsure if knee replacement will be offered. She has been instructed not to walk on it much. This feels very limited especially related to her daily routines of walking with her  and her desires to travel. She attended senior exercise at [employer] recently and plans to continue to explore other ways to stay physically active. She is waiting to hear more about treatment options. There are fears about long term impact if not able to walk much.    Continues to use light box every morning while making breakfast with goal to see if it improves fatigue. Does not know for sure if it is helping. Does name increased level of activity recently. (Product she is using is \"Veralux Happy Light [place] 5K\").     Jigna has decided to stop work on sleep for now, continue with current sleep hygiene, but temporarily accept this quality and " "quantity of sleep. We discuss that she can resume work on it in future any time she chooses.    Relationships: Se above - processing and psychoeducation re: cognitive and emotional coping options in close relationships.    Enjoyed visit from daughter and  (from [place]). Sister and niece also came over for dinner party which all enjoyed.    Visited [facility] and got through the week \"OK\". Has been feeling some sadness about not having enough space in [place] to have both her daughters and their families visit over holidays. Also noting emotions re daughter from [place]'s relationship with [facility], which is also close. We discuss balancing acceptance with honoring and choosing to pursue some wishes and desires (ie potentially to have the whole family together at some point this year).    End of Life and Advance Care Planning: Not a focus today.    Main therapeutic interventions provided this session include:  Facilitating processing of feelings and thoughts; cognitive coping facilitation and education; psychoeducation regarding difficult emotions; motivational interviewing    Plan:  Jign[place] will [place] [facility] [place]    Before next session, Jigna plans to:  Call one  to learn more  Continue use of cognitive coping  Continue using light box in mornings (30 min) to address fatigue  Continue to explore/find ways to maintain physical activity despite limits on walking        Time spent with patient/family:  50 minutes    Palliative Care Counseling Treatment Plan    Client's Name: Jigna Valdez  YOB: 1935     Date: 5/31/2017    Initial DSM5 Diagnoses:   296.22 Major Depressive Disorder, Single Episode, Moderate _  300.02 (F41.1) Generalized Anxiety Disorder      Referral / Collaboration:  .Depending on needs over time, referral to a community therapist may be indicated.      Anticipated number of sessions to complete episode of care:  12      Treatment " Goal(s)  Date Goal Dates  Reviewed Status   5/31/2017 Goal 1:  Client will develop effective strategies for anxiety following treatment for sarcoma.     Continued    Objective #1A (Client Action)  Client will Identify triggers for anxiety/panic attacks, Identify early signs/symptoms of anxiety and Practice self awareness exercises.    Intervention(s)  Therapist will Provide psychoeducation and Facilitate processing of thoughts and feelings .    Continued    Objective #1B  Client will Use/practice relaxation strategies and Identify effective coping strategies.    Intervention(s)  Therapist will Provide psychoeducation, Provide behavioral intervention training and Facilitate structured problem solving.    Continued    Objective #1C  Client will Effectively communicate with medical provider re needed information and Effectively communicate with family/friends re needs.    Intervention(s)  Therapist will Provide psychoeducation and Facilitate processing of thoughts and feelings.    Continued       Date Goal Dates  Reviewed Status   5/31/2017 Goal 2:  Client will increase michelle in her life and develop effective strategies for coping with depressed mood and other difficult emotions.    Continued    Objective #2A (Client Action)  Client will Identify triggers/early signs of depressed mood.    Intervention(s) (Therapist Action)  Therapist will provide psychoeducation and Facilitate processing of thoughts and feelings.    Continued    Objective #2B  Client will Follow realistic exercise plan, Identify activities that provide comfort and/or pleasure, Participate in activities that provide comfort and/or pleasure and Identify an activity that would provide meaning/contribute to feeling of self worth, and effectively communicate needs to others. Develop new goals and increase productive or creative daily activities. Over time, increase frequency of seeing friends and family members.    Intervention(s)  Therapist will Provide  psychoeducation, Facilitate processing of thoughts and feelings and Facilitate structured problem solving.    Completed - But to continue in future as needed    Objective #2C  Client will Participate in self-regulation practice (e.g.: meditation, relaxation exercises, self hypnosis) per day and/or gratitude practices.    Intervention(s)  Therapist will Provide psychoeducation and Provide behavioral intervention training.    Objective #2D  Client will increase self awareness and options for relating to difficult emotions. Added 8/16/2017    Intervention(s)  Therapist will Provide psychoeducation and Provide behavioral intervention training.  Continued       Date Goal Dates  Reviewed Status   5/31/2017 Goal 3:  Client will make progress toward increased wellbeing and health, including working with other health care providers as needed.    Continued    Objective #3A (Client Action)  Client will Communicate effectively with medical provider re needed information for example related to sleep and diet/ weight-loss concerns.    Intervention(s) (Therapist Action)  Therapist will Make referrals to appropriate colleagues as needed, Facilitate processing of thoughts and feelings and Facilitate structured problem solving.   Completed - but to continue as needed     Objective #3B (Client Action)  Client will improve sleep, with long term goal to sleep well for 7 - 8 hours each night.    Intervention(s) (Therapist Action)  Therapist will provide Cognitive Behavioral Therapy for Insomnia, review sleep hygiene options, and provide structured problem solving. Therapist will also address anxiety and depression with goal to assist with  Continued - but considering completion    Objective #3C (Client Action)  Client will continue to increase physical activity (adding stretching and faster walking 10 min per day in addition to daily slow walk) and adjust diet as advised by dietitian and physician, toward return to healthy weight and  energy level.    Intervention(s) (Therapist Action)  Therapist will provide structured problem solving, psychoeducation, and bibliotherapy and referrals as appropriate.  Continued - On pause due to right knee arthritis/inflammation       Client has reviewed and agreed to the above plan on June 7, 2017.    JOURDAN Chilel, LICSW      DO NOT SEND ANY LETTERS

## 2017-08-16 NOTE — LETTER
"8/16/2017       RE: Jigna Allen  [address] [place] [address]     Dear Colleague,    Thank you for referring your patient, Jigna Allen, to the [facility] at [facility]. Please see a copy of my visit note below.    Palliative Care Clinical Social Work Return Appointment    PLEASE NOTE:  THIS IS A MENTAL HEALTH NOTE.  OTHER PROVIDERS VIEWING THIS NOTE SHOULD USE THIS ONLY FOR UNDERSTANDING THE CONTEXT OF THE PATIENT S EXPERIENCE.  TOPICS DESCRIBED IN THIS NOTE SHOULD NOT BE REFERENCED TO THE PATIENT BY MEDICAL PROVIDERS.    Jigna (\"Kah-facundo\") is an 82 year old woman with history of sarcoma, seen today for a return psychotherapy appointment to address Generalized Anxiety Disorder, depressed mood, fatigue and insomnia.    Mental Status Exam:(List all that apply)      Appearance: Appropriate      Eye Contact: Good       Orientation: Yes, x4      Mood:  Euthymic, optimistic, some worries      Affect: Appropriate, full range      Thought Content: Clear         Thought Form: Logical      Psychomotor Behavior: Normal    Visit summary:   Mental Health (anxiety, depression, other symptoms): Jigna's affect is bright today with generally slower speech patterns and other indications of reduced anxiety compared with previous visits. She describes worries about her health (see below) and processes thoughts and emotions related to health, relationships with mother in law, , daughter, and others, and her desires to volunteer and contribute more as part of this chapter in her life.    Focus of visit today is psychoeducation related to difficult emotions such as envy. Specific self compassion strategies taught including kind self talk, equanimity/defusion/mindful awareness; self soothing; acceptance without engaging \"the story.\" Discussion of the relationship between thoughts and emotions and options for mindful self compassion approach to both. Jigna was " "receptive, took notes, and expressed wanting to gail about these ideas and explore them for herself.    Jigna voices positive feelings about recently hosting a dinner party and feeling normal/like herself; increase generally in social activities; and reduced level of distress about the comments made by her mother in law during their recent visit. She described using cognitive coping tools including \"Even though... I know/I can...\" as helpful when she noticed feeling troubled about something.     Jigna is feeling frustrated with herself about not having yet engaged in a plan for volunteering. She remains interested in E[employer] or choir as options for volunteering. Exploration today of barriers, cognitive, perceived, and practical. Jigna agreed that she will call one  before next appt to learn more and discuss the barriers she is worried may prevent her from being a good fit in various roles of interest. She voices continued motivation to include volunteering in her life. We also discuss whether volunteering may be possible while in [place] over the summer. She is considering that although there may be barriers including transportation. She does acknowledge she has made some progress on organizing and sorting items for donation at home.    Adjustment to Illness: Significant source of distress currently. MRI of right knee showed \"bone on bone\" situation. She is unsure if knee replacement will be offered. She has been instructed not to walk on it much. This feels very limited especially related to her daily routines of walking with her  and her desires to travel. She attended senior exercise at [employer] recently and plans to continue to explore other ways to stay physically active. She is waiting to hear more about treatment options. There are fears about long term impact if not able to walk much.    Continues to use light box every morning while making breakfast with goal to see if it improves fatigue. " "Does not know for sure if it is helping. Does name increased level of activity recently. (Product she is using is \"Veralux Happy Light [place] 5K\").     Jigna has decided to stop work on sleep for now, continue with current sleep hygiene, but temporarily accept this quality and quantity of sleep. We discuss that she can resume work on it in future any time she chooses.    Relationships: Se above - processing and psychoeducation re: cognitive and emotional coping options in close relationships.    Enjoyed visit from daughter and  (from [place]). Sister and niece also came over for dinner party which all enjoyed.    Visited [email or web address] and got through the week \"OK\". Has been feeling some sadness about not having enough space in [place] to have both her daughters and their families visit over holidays. Also noting emotions re daughter from [place]'s relationship with [facility] [[email or web address] which is also close. We discuss balancing acceptance with honoring and choosing to pursue some wishes and desires (ie potentially to have the whole family together at some point this year).    End of Life and Advance Care Planning: Not a focus today.    Main therapeutic interventions provided this session include:  Facilitating processing of feelings and thoughts; cognitive coping facilitation and education; psychoeducation regarding difficult emotions; motivational interviewing    Plan:  [place] [facility] [place]    Before next session, Jigna plans to:  Call one  to learn more  Continue use of cognitive coping  Continue using light box in mornings (30 min) to address fatigue  Continue to explore/find ways to maintain physical activity despite limits on walking        Time spent with patient/family:  50 minutes    Palliative Care Counseling Treatment Plan    Client's Name: Jigna Valdez  YOB: 1935     Date: 5/31/2017    Initial DSM5 Diagnoses:   296.22 Major Depressive " Disorder, Single Episode, Moderate _  300.02 (F41.1) Generalized Anxiety Disorder      Referral / Collaboration:  .Depending on needs over time, referral to a community therapist may be indicated.      Anticipated number of sessions to complete episode of care:  12      Treatment Goal(s)  Date Goal Dates  Reviewed Status   5/31/2017 Goal 1:  Client will develop effective strategies for anxiety following treatment for sarcoma.     Continued    Objective #1A (Client Action)  Client will Identify triggers for anxiety/panic attacks, Identify early signs/symptoms of anxiety and Practice self awareness exercises.    Intervention(s)  Therapist will Provide psychoeducation and Facilitate processing of thoughts and feelings .    Continued    Objective #1B  Client will Use/practice relaxation strategies and Identify effective coping strategies.    Intervention(s)  Therapist will Provide psychoeducation, Provide behavioral intervention training and Facilitate structured problem solving.    Continued    Objective #1C  Client will Effectively communicate with medical provider re needed information and Effectively communicate with family/friends re needs.    Intervention(s)  Therapist will Provide psychoeducation and Facilitate processing of thoughts and feelings.    Continued       Date Goal Dates  Reviewed Status   5/31/2017 Goal 2:  Client will increase michelle in her life and develop effective strategies for coping with depressed mood and other difficult emotions.    Continued    Objective #2A (Client Action)  Client will Identify triggers/early signs of depressed mood.    Intervention(s) (Therapist Action)  Therapist will provide psychoeducation and Facilitate processing of thoughts and feelings.    Continued    Objective #2B  Client will Follow realistic exercise plan, Identify activities that provide comfort and/or pleasure, Participate in activities that provide comfort and/or pleasure and Identify an activity that would  provide meaning/contribute to feeling of self worth, and effectively communicate needs to others. Develop new goals and increase productive or creative daily activities. Over time, increase frequency of seeing friends and family members.    Intervention(s)  Therapist will Provide psychoeducation, Facilitate processing of thoughts and feelings and Facilitate structured problem solving.    Completed - But to continue in future as needed    Objective #2C  Client will Participate in self-regulation practice (e.g.: meditation, relaxation exercises, self hypnosis) per day and/or gratitude practices.    Intervention(s)  Therapist will Provide psychoeducation and Provide behavioral intervention training.    Objective #2D  Client will increase self awareness and options for relating to difficult emotions. Added 8/16/2017    Intervention(s)  Therapist will Provide psychoeducation and Provide behavioral intervention training.  Continued       Date Goal Dates  Reviewed Status   5/31/2017 Goal 3:  Client will make progress toward increased wellbeing and health, including working with other health care providers as needed.    Continued    Objective #3A (Client Action)  Client will Communicate effectively with medical provider re needed information for example related to sleep and diet/ weight-loss concerns.    Intervention(s) (Therapist Action)  Therapist will Make referrals to appropriate colleagues as needed, Facilitate processing of thoughts and feelings and Facilitate structured problem solving.   Completed - but to continue as needed     Objective #3B (Client Action)  Client will improve sleep, with long term goal to sleep well for 7 - 8 hours each night.    Intervention(s) (Therapist Action)  Therapist will provide Cognitive Behavioral Therapy for Insomnia, review sleep hygiene options, and provide structured problem solving. Therapist will also address anxiety and depression with goal to assist with  Continued - but  considering completion    Objective #3C (Client Action)  Client will continue to increase physical activity (adding stretching and faster walking 10 min per day in addition to daily slow walk) and adjust diet as advised by dietitian and physician, toward return to healthy weight and energy level.    Intervention(s) (Therapist Action)  Therapist will provide structured problem solving, psychoeducation, and bibliotherapy and referrals as appropriate.  Continued - On pause due to right knee arthritis/inflammation       Client has reviewed and agreed to the above plan on June 7, 2017.    JOURDAN Chilel, LICSW      DO NOT SEND ANY LETTERS

## 2017-08-28 ENCOUNTER — TRANSFERRED RECORDS (OUTPATIENT)
Dept: HEALTH INFORMATION MANAGEMENT | Facility: CLINIC | Age: 82
End: 2017-08-28

## 2017-09-01 ENCOUNTER — DOCUMENTATION ONLY (OUTPATIENT)
Dept: OTHER | Facility: CLINIC | Age: 82
End: 2017-09-01

## 2017-09-01 PROBLEM — Z71.89 ACP (ADVANCE CARE PLANNING): Chronic | Status: ACTIVE | Noted: 2017-09-01

## 2017-09-05 ENCOUNTER — TRANSFERRED RECORDS (OUTPATIENT)
Dept: HEALTH INFORMATION MANAGEMENT | Facility: CLINIC | Age: 82
End: 2017-09-05

## 2017-09-06 ENCOUNTER — OFFICE VISIT (OUTPATIENT)
Dept: PALLIATIVE CARE | Facility: CLINIC | Age: 82
End: 2017-09-06
Attending: SOCIAL WORKER
Payer: COMMERCIAL

## 2017-09-06 DIAGNOSIS — F41.1 GAD (GENERALIZED ANXIETY DISORDER): Primary | ICD-10-CM

## 2017-09-06 DIAGNOSIS — Z51.5 ENCOUNTER FOR PALLIATIVE CARE: ICD-10-CM

## 2017-09-06 PROCEDURE — 40000114 ZZH STATISTIC NO CHARGE CLINIC VISIT

## 2017-09-06 PROCEDURE — 90834 PSYTX W PT 45 MINUTES: CPT | Mod: ZP | Performed by: SOCIAL WORKER

## 2017-09-06 NOTE — LETTER
"9/6/2017       RE: Jigna Allen  [address] [place] [address]     Dear Colleague,    Thank you for referring your patient, Jigna Allen, to the [facility] at [facility]. Please see a copy of my visit note below.    Palliative Care Clinical Social Work Return Appointment    PLEASE NOTE:  THIS IS A MENTAL HEALTH NOTE.  OTHER PROVIDERS VIEWING THIS NOTE SHOULD USE THIS ONLY FOR UNDERSTANDING THE CONTEXT OF THE PATIENT S EXPERIENCE.  TOPICS DESCRIBED IN THIS NOTE SHOULD NOT BE REFERENCED TO THE PATIENT BY MEDICAL PROVIDERS.    Jigna (\"Kah-facundo\") is an 82 year old woman with history of sarcoma, seen today for a return psychotherapy appointment to address Generalized Anxiety Disorder, depressed mood, and fatigue.    Mental Status Exam:(List all that apply)      Appearance: Appropriate      Eye Contact: Good       Orientation: Yes, x4      Mood:  Euthymic, some worries      Affect: Appropriate, full range      Thought Content: Clear         Thought Form: Logical      Psychomotor Behavior: Normal    Visit summary:   Mental Health (thoughts, feelings, actions, symptoms): Overall, Jigna describes continued major reduction in anxiety and now steady re-engagement in social activities, which she has been enjoying. Her affect is generally bright today with focus toward addressing her concerns.    Jigna processes concerns in two major areas of her life today - her health (see below), and her family relationships, especially with her [facility]. [facility] [place] caregiver supports, but may return as early as late Sep or probably by mid-Oct. Jigna is coping by \"keeping thoughts of her blocked\" and is finding this helpful in terms of not ruminating about her caregiver role concerns for the future, and enjoying this time without that caregiving role. She describes avoiding phone calls from [facility]. Despite generally not thinking about MIL, she does have " "remaining concerns that when CONSTANCE returns to their home, Jigna may not cope well over time. We focus on this today. I encourage continued intentional cognitive compartmentalization in order to enjoy life now, yet planning specific times to proactively strategize for the future to promote ongoing wellbeing for Jigna.    Jigna shares more information with me about history, which she did not share in the past (she attributes not sharing to shame). CONSTANCE in fact used to live in a trailer in [place], but the families she lived near reported her intruding into their lives so much that they insisted the trailer be sold. Jigna and Ganesh handled this. Jigna describes specific actions of CONSTANCE which led to this outcome. Based on learning more details re CONSTANCE's behavior in other relationships as well, we discuss perspectives/potential ways of viewing the situation including \"emotional abuse\" and related chronic stress reaction on Jigna's part.     We reviewed cognitive coping options, which Jigna does find helpful especially \"Even though... I know/I can...\" particularly as an internal way of responding to criticism she hears from her MIL when they are together.    We also reviewed behavioral options including taking intentional \"time off\" every day to be out of the house, with  \"on duty\" to support MIL as needed. Jigna has been also contemplating whether the situation with CONSTANCE may even lead to her wanting to move to live near a daughter, possibly no longer living with Ganesh. This is of course very significant to contemplate. We discuss potentially planning a short visit on her own to visit a daughter, as one way to explore how this could feel. Psychoeducation provided regarding \"change back\" responses of a family system when one member chooses/needs to do things differently, and the internal strength involved in managing this kind of transition.    Jigna processes balancing values of self preservation as well as being helpful to " others. She currently sees self care as essential in order to continue to help others.    Anxiety about world situations processed (ie [place], DACA). Coping through focus on actions she can take - ie writing letters, making calls, donating, talking with people she knows. Also finds benefit in placing these concerns in God's hands.    Adjustment to Illness: Considerable stress and uncertainty connected with right knee problems. She has been seeing various specialists, will see PT tomorrow, still hoping there may be an option for knee replacement, but not sure, and has been told to avoid bearing weight on it. Not walking would be a huge loss for her. She is hoping to return to stationary bike and swimming for exercise. She can drive to [employer] for this. She is also worried about losing her ability to drive. MD has OK'd it for now. Plans to discuss more with PT tomorrow. Driving feels very essential for maintaining activity and independence, especially related to family concerns.    Medical concerns have interfered with follow up re: hopes to begin volunteering.    Relationships: See above, discussion of family dynamics, caregiver role, and decisions Jigna is currently considering.    End of Life and Advance Care Planning: Not a focus today.    Main therapeutic interventions provided this session include:  Facilitating processing of feelings and thoughts; cognitive coping review; psychoeducation regarding family dynamics and changes; motivational interviewing    Plan:  Jigna will return for psychotherapy with palliative care focus at [facility] in 2 weeks on Thursday Sep 21 at 10am.    Before next session:   - Consider specific strategies/promises to self care for when MIL returns  - Consider exercise options with knee concern  - Continue use of cognitive coping skills      Time spent with patient/family:  50 minutes    Palliative Care Counseling Treatment Plan    Client's Name: Jigna Valdez  YOB: 1935     Date:  5/31/2017    Initial DSM5 Diagnoses:   296.22 Major Depressive Disorder, Single Episode, Moderate _  300.02 (F41.1) Generalized Anxiety Disorder      Referral / Collaboration:  .Depending on needs over time, referral to a community therapist may be indicated.      Anticipated number of sessions to complete episode of care:  12      Treatment Goal(s)  Date Goal Dates  Reviewed Status   5/31/2017 Goal 1:  Client will develop effective strategies for anxiety following treatment for sarcoma.     Continued    Objective #1A (Client Action)  Client will Identify triggers for anxiety/panic attacks, Identify early signs/symptoms of anxiety and Practice self awareness exercises.    Intervention(s)  Therapist will Provide psychoeducation and Facilitate processing of thoughts and feelings .    Continued    Objective #1B  Client will Use/practice relaxation strategies and Identify effective coping strategies.    Intervention(s)  Therapist will Provide psychoeducation, Provide behavioral intervention training and Facilitate structured problem solving.    Continued    Objective #1C  Client will Effectively communicate with medical provider re needed information and Effectively communicate with family/friends re needs.    Intervention(s)  Therapist will Provide psychoeducation and Facilitate processing of thoughts and feelings.    Continued       Date Goal Dates  Reviewed Status   5/31/2017 Goal 2:  Client will increase michelle in her life and develop effective strategies for coping with depressed mood and other difficult emotions.    Continued    Objective #2A (Client Action)  Client will Identify triggers/early signs of depressed mood.    Intervention(s) (Therapist Action)  Therapist will provide psychoeducation and Facilitate processing of thoughts and feelings.    Continued    Objective #2B  Client will Follow realistic exercise plan, Identify activities that provide comfort and/or pleasure, Participate in activities that provide  comfort and/or pleasure and Identify an activity that would provide meaning/contribute to feeling of self worth, and effectively communicate needs to others. Develop new goals and increase productive or creative daily activities. Over time, increase frequency of seeing friends and family members.    Intervention(s)  Therapist will Provide psychoeducation, Facilitate processing of thoughts and feelings and Facilitate structured problem solving.    Completed - But to continue in future as needed    Objective #2C  Client will Participate in self-regulation practice (e.g.: meditation, relaxation exercises, self hypnosis) per day and/or gratitude practices.    Intervention(s)  Therapist will Provide psychoeducation and Provide behavioral intervention training.    Objective #2D  Client will increase self awareness and options for relating to difficult emotions. Added 8/16/2017    Intervention(s)  Therapist will Provide psychoeducation and Provide behavioral intervention training.  Continued       Date Goal Dates  Reviewed Status   5/31/2017 Goal 3:  Client will make progress toward increased wellbeing and health, including working with other health care providers as needed.    Continued    Objective #3A (Client Action)  Client will Communicate effectively with medical provider re needed information for example related to sleep and diet/ weight-loss concerns.    Intervention(s) (Therapist Action)  Therapist will Make referrals to appropriate colleagues as needed, Facilitate processing of thoughts and feelings and Facilitate structured problem solving.   Completed - but to continue as needed     Objective #3B (Client Action)  Client will improve sleep, with long term goal to sleep well for 7 - 8 hours each night.    Intervention(s) (Therapist Action)  Therapist will provide Cognitive Behavioral Therapy for Insomnia, review sleep hygiene options, and provide structured problem solving. Therapist will also address anxiety and  depression with goal to assist with  Continued - but considering completion    Objective #3C (Client Action)  Client will continue to increase physical activity (adding stretching and faster walking 10 min per day in addition to daily slow walk) and adjust diet as advised by dietitian and physician, toward return to healthy weight and energy level.    Intervention(s) (Therapist Action)  Therapist will provide structured problem solving, psychoeducation, and bibliotherapy and referrals as appropriate.  Continued - On pause due to right knee arthritis/inflammation       Client has reviewed and agreed to the above plan on June 7, 2017.    JOURDAN Chilel, LICSW      DO NOT SEND ANY LETTERS    Again, thank you for allowing me to participate in the care of your patient.      Sincerely,    KAREN LancasterSW

## 2017-09-06 NOTE — LETTER
Date:September 8, 2017      Provider requested that no letter be sent. Do not send.       [facility] Health Information

## 2017-09-06 NOTE — MR AVS SNAPSHOT
After Visit Summary   9/6/2017    Jigna Allen    MRN: [id number]           Patient Information     Date Of Birth          1935        Visit Information        Provider Department      9/6/2017 10:00 AM Mary Torres LICSW; UC 2 [facility]ULT [facility]        Today's Diagnoses     YAMILET (generalized anxiety disorder)    -  1    Encounter for palliative care           Follow-ups after your visit        Your next 10 appointments already scheduled     Sep 21, 2017 10:00 AM CDT   (Arrive by 9:45 AM)   RETURN WITH ROOM with CATHERINE Conley, [facility]SULT [facility] ([facility])    [address] [place] [address] [id number] [phone]            Oct 05, 2017 10:00 AM CDT   (Arrive by 9:45 AM)   RETURN WITH ROOM with CATHERINE Conley,  2 119 CONSULT [facility] ([facility])    [address] [place] [address] [id number] [phone]            May 15, 2018  8:30 AM CDT   (Arrive by 8:15 AM)   XR CHEST 2 VIEWS with UCXR1   [facility] ([facility])    [address] [place] [address] [id number] [phone]           Please bring a list of your current medicines to your exam. (Include vitamins, minerals and over-thecounter medicines.) Leave your valuables at home.  Tell your doctor if there is a chance you may be pregnant.  You do not need to do anything special for this exam.            May 15, 2018  9:30 AM CDT   (Arrive by 9:15 AM)   Return Visit with Edouard Bryan MD   [facility] ([facility])    [address] [place] [id number] [phone]              Who to contact     If you have questions or need follow up information about today's clinic visit or your  schedule please contact [facility]IN[facility] directly at [phone].  Normal or non-critical lab and imaging results will be communicated to you by MyChart, letter or phone within 4 business days after the clinic has received the results. If you do not hear from us within 7 days, please contact the clinic through [facility] or phone. If you have a critical or abnormal lab result, we will notify you by phone as soon as possible.  Submit refill requests through [employer] or call your pharmacy and they will forward the refill request to us. Please allow 3 business days for your refill to be completed.          Additional Information About Your Visit        [facility] Management Information     [employer] gives you secure access to your electronic health record. If you see a primary care provider, you can also send messages to your care team and make appointments. If you have questions, please call your primary care clinic.  If you do not have a primary care provider, please call [phone] and they will assist you.        Care EveryWhere ID     This is your Care EveryWhere ID. This could be used by other organizations to access your [facility] medical records  [id number]         Blood Pressure from Last 3 Encounters:   05/09/17 130/79   09/16/16 138/67   09/15/16 148/68    Weight from Last 3 Encounters:   05/10/17 51.3 kg (113 lb 1.6 oz)   05/09/17 50.8 kg (112 lb 1.6 oz)   09/16/16 53.4 kg (117 lb 12.8 oz)              Today, you had the following     No orders found for display       Primary Care Provider Office Phone # Fax #    Elisabeth Ko [phone]       [facility] [address]  [facility] [phone]        Equal Access to Services     [facility] : Hadii lula hester hadsamantha Sosloan, waaxda luqadaha, qaybta kaalmada steve, narinder potter. So [facility] [phone].    ATENCIÓN: Si habla español, tiene a jade disposición servicios gratuitos de asistencia lingüística.  aSmira cartwright [phone].    We comply with applicable federal civil rights laws and [place] laws. We do not discriminate on the basis of race, color, national origin, age, disability sex, sexual orientation or gender identity.            Thank you!     Thank you for choosing [facility]INIC  for your care. Our goal is always to provide you with excellent care. Hearing back from our patients is one way we can continue to improve our services. Please take a few minutes to complete the written survey that you may receive in the mail after your visit with us. Thank you!             Your Updated Medication List - Protect others around you: Learn how to safely use, store and throw away your medicines at www.disposemymeds.org.          This list is accurate as of: 9/6/17  3:43 PM.  Always use your most recent med list.                   

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
## 2017-09-21 ENCOUNTER — OFFICE VISIT (OUTPATIENT)
Dept: PALLIATIVE CARE | Facility: CLINIC | Age: 82
End: 2017-09-21
Attending: SOCIAL WORKER
Payer: COMMERCIAL

## 2017-09-21 DIAGNOSIS — F41.1 GAD (GENERALIZED ANXIETY DISORDER): Primary | ICD-10-CM

## 2017-09-21 DIAGNOSIS — F32.4 MAJOR DEPRESSIVE DISORDER WITH SINGLE EPISODE, IN PARTIAL REMISSION (H): ICD-10-CM

## 2017-09-21 DIAGNOSIS — Z51.5 ENCOUNTER FOR PALLIATIVE CARE: ICD-10-CM

## 2017-09-21 PROCEDURE — 90834 PSYTX W PT 45 MINUTES: CPT | Mod: ZP | Performed by: SOCIAL WORKER

## 2017-09-21 NOTE — LETTER
Date:September 25, 2017      Patient was self referred, no letter generated. Do not send.        [facility] Information
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
## 2017-09-21 NOTE — MR AVS SNAPSHOT
After Visit Summary   9/21/2017    Jigna Allen    MRN: [id number]           Patient Information     Date Of Birth          1935        Visit Information        Provider Department      9/21/2017 10:00 AM Mary Torres LICSW; [facility]        Today's Diagnoses     YAMILET (generalized anxiety disorder)    -  1    Encounter for palliative care        Major depressive disorder with single episode, in partial remission (H)           Follow-ups after your visit        Your next 10 appointments already scheduled     Oct 05, 2017 10:00 AM CDT   (Arrive by 9:45 AM)   RETURN WITH ROOM with CATHERINE Conley, [facility] ([facility])    [address] [place] [id number] [phone]            Oct 19, 2017 10:00 AM CDT   (Arrive by 9:45 AM)   RETURN WITH ROOM with CATHERINE Conley, [facility] ([facility])    [address] [place] [id number] [phone]            May 15, 2018  8:30 AM CDT   (Arrive by 8:15 AM)   XR CHEST 2 VIEWS with UC[facility]   [facility] ([facility])    [address] [place] [id number] [phone]           Please bring a list of your current medicines to your exam. (Include vitamins, minerals and over-thecounter medicines.) Leave your valuables at home.  Tell your doctor if there is a chance you may be pregnant.  You do not need to do anything special for this exam.            May 15, 2018  9:30 AM CDT   (Arrive by 9:15 AM)   Return Visit with Edouard Bryan MD   [facility] ([facility])    [address] [place] [id number] [phone]              Who to contact     If you  have questions or need follow up information about today's clinic visit or your schedule please contact [facility] directly at [phone].  Normal or non-critical lab and imaging results will be communicated to you by MyChart, letter or phone within 4 business days after the clinic has received the results. If you do not hear from us within 7 days, please contact the clinic through "[employer]hart or phone. If you have a critical or abnormal lab result, we will notify you by phone as soon as possible.  Submit refill requests through [employer] or call your pharmacy and they will forward the refill request to us. Please allow 3 business days for your refill to be completed.          Additional Information About Your Visit        "[employer]har[employer]idyo Information     [employer] gives you secure access to your electronic health record. If you see a primary care provider, you can also send messages to your care team and make appointments. If you have questions, please call your primary care clinic.  If you do not have a primary care provider, please call [phone] and they will assist you.        Care EveryWhere ID     This is your Care EveryWhere ID. This could be used by other organizations to access your [facility] medical records  [id number]         Blood Pressure from Last 3 Encounters:   05/09/17 130/79   09/16/16 138/67   09/15/16 148/68    Weight from Last 3 Encounters:   05/10/17 51.3 kg (113 lb 1.6 oz)   05/09/17 50.8 kg (112 lb 1.6 oz)   09/16/16 53.4 kg (117 lb 12.8 oz)              Today, you had the following     No orders found for display       Primary Care Provider Office Phone # Fax #    Elisabeth Ko [phone]       [facility] [address] [place] [id number]        Equal Access to Services     MIKEY CANO : Denis Hu, kristen escalante, cesia wilson, narinder potter. So [facility] [phone].    ATENCIÓN: Pastora cotto  español, tiene a jade disposición servicios gratuitos de asistencia lingüística. Samira cartwright [phone].    We comply with applicable federal civil rights laws and [place] laws. We do not discriminate on the basis of race, color, national origin, age, disability sex, sexual orientation or gender identity.            Thank you!     Thank you for choosing [facility]  for your care. Our goal is always to provide you with excellent care. Hearing back from our patients is one way we can continue to improve our services. Please take a few minutes to complete the written survey that you may receive in the mail after your visit with us. Thank you!             Your Updated Medication List - Protect others around you: Learn how to safely use, store and throw away your medicines at www.disposemymeds.org.          This list is accurate as of: 9/21/17 11:59 PM.  Always use your most recent med list.                   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

## 2017-10-05 ENCOUNTER — OFFICE VISIT (OUTPATIENT)
Dept: PALLIATIVE CARE | Facility: CLINIC | Age: 82
End: 2017-10-05
Attending: SOCIAL WORKER
Payer: COMMERCIAL

## 2017-10-05 DIAGNOSIS — F41.1 GAD (GENERALIZED ANXIETY DISORDER): Primary | ICD-10-CM

## 2017-10-05 DIAGNOSIS — Z51.5 ENCOUNTER FOR PALLIATIVE CARE: ICD-10-CM

## 2017-10-05 PROCEDURE — 90834 PSYTX W PT 45 MINUTES: CPT | Mod: ZP | Performed by: SOCIAL WORKER

## 2017-10-05 NOTE — LETTER
"10/5/2017       RE: Jigna Allen  [address] [place] [address]     Dear Colleague,    Thank you for referring your patient, Jigna Allen, to the [facility] at [facility]. Please see a copy of my visit note below.    Palliative Care Clinical Social Work Return Appointment    PLEASE NOTE:  THIS IS A MENTAL HEALTH NOTE.  OTHER PROVIDERS VIEWING THIS NOTE SHOULD USE THIS ONLY FOR UNDERSTANDING THE CONTEXT OF THE PATIENT S EXPERIENCE.  TOPICS DESCRIBED IN THIS NOTE SHOULD NOT BE REFERENCED TO THE PATIENT BY MEDICAL PROVIDERS.    Jigna (\"Kah-facundo\") is an 82 year old woman with history of sarcoma, seen today for a return psychotherapy appointment to address Generalized Anxiety Disorder, depressed mood, and fatigue.    Mental Status Exam:(List all that apply)      Appearance: Appropriate      Eye Contact: Good       Orientation: Yes, x4      Mood:  Euthymic, some worries      Affect: Appropriate, full range      Thought Content: Clear         Thought Form: Logical      Psychomotor Behavior: Normal    Visit summary:   Mental Health (thoughts, feelings, actions, symptoms): Jigna's larson concerns today are re: communication with and supporting her daughters; coping once MIL returns to her home; continued cognitive \"fogginess\"; and exercise ([facility] to [facility], desire to increase exercise). Cognitive, structured problem solving, psychoeducational and motivational interviewing interventions are focus today.     Regarding follow up after last appt, she [facility]. She processes barriers including anxiety about driving there alone, resistance to going alone and feeling lonely, past stressful memories of a lock in locker room not working, desire for her  to join her there, and concerns about balance/ slipping in the pool area given new problems with knee. She endorses significant " "motivation to go, generally focused on the overall benefits of exercise including for wellbeing, for sleep, body to be more in shape and feel better. She describes considering setting up a session with a . We discuss potential benefits given her new health barriers to group classes. Stage of change seems to be planning. Engages in change talk. Expresses frustration that she has not gone yet. Names owning this decision for herself, vs waiting for 's decision to go. Daily floor and upper body exercises at home continued. Limited ability to exercise lower body at home given knee problems.    She has continued attending Religious choir practices and plans to continue singing when the choir is part of services, though not in their wider community performances. Has also started ushering and volunteering at [employer] more including reading from [employer] at last service. She has been focusing on \"going to where the people are\"/ striving to increase social activities, and has enjoyed this. Has chosen this vs [facility] volunteer role for now. Also recently got together with friend Kia and enjoyed this.    Continues to negotiate with  re: hiring a caregiver while they are in [place] to spend several hours each day with MIL. He continues to resist but she expresses strong preference that this happen. Continued processing re: dynamics and decision making in their relationship.    One daughter, Lawanda who is , lives in [place] and is a , had a miscarriage a little over a year ago and has continued fertility efforts. She is considering donor egg process after several unsuccessful IVFs. Jigna processes concerns and desires to support daughter effectively during this time. Psychoeducation and counseling provided related to looking at her own thoughts, beliefs, concerns and feelings, providing empathy, reflective listening and support, sharing her support for this decision either way, and " "potentially encouraging her daughter to look into counseling as she wrestles with the difficult fertility challenges both current and past.     Other daughter in [place] has told Jigna and Ganesh she will be going with family to [place] over Christmas holiday to see NOVA's family there. Jigan processes worries, not having control over the decision, and concerns this will limit time this family has to visit her and Ganesh this coming year.    Continuing to process re: MIL, we focus today on specific strategies for communication and interaction in presence of dementia symptoms, forgetfulness, and perseveration as well as grief that things are different from how they were in the past. Cognitive coping discussed re: option to not feel she has to argue, fix the situation, or make MIL happy, but can reflect in a caring way or allow MIL to be where she is.    We also discuss re: general stress as Jinga fears that MIL may outlive her and/or her . There continues to be distress re: her life in this final chapter, however long, being focused on MIL caregiving. We discuss full range of options - ie not black and white issue - including FELIPE but still some caregiving role even then, to very few changes to practical situation but adjusting how she dirk with it, ie cognitive coping, taking breaks. Reflection and validation offered re: how significant a life change it would be were she to put her foot down with her  and refuse to do caregiving role. She fears that this would mean the end of the marriage or at least of living together. She continues to plan for smaller coping options in the short term, we discuss ability to continue to adjust strategy over time and assess what is working and is not.     Did see PCP recently who did US of bladder, not yet read. Continues to feel \"foggy\" mentally. We discuss various options for thinking about this. I do not identify any significant changes cognitively based on gross observation, but " "of course this is not a neuropsych eval. We discuss potential for hay fever to factor in as she is also feeling very congested recently in past month and this is the time period she has felt more mentally foggy. We also discuss her anxiety re: seeing dementia possibly developing in a long time close friend.    Helpful activities: Exercise (at home, and potentially at Y including swimming, bike, and/or ); social activities (time with friends, choir, volunteering at [employer], intellectual or professional activities)    Helpful cognitions: \"Even though I sometimes feel trapped or powerless, I do have choices\"    Unhelpful, triggering of exacerbating factors: Dynamic of decisionmaking with  who prefers to continue caregiving in their home for MIL; MIL statements perceives as critical; 24/7 caregiving for MIL during parts of the year; avoidance due to anxiety; significant knee problems limiting activities      Adjustment to Illness: See above - goal continues to be increased exercise, go to Y. Would like to work on sleep again at next visit. I encourage also following up with [facility] for additional assessment and holistic intervention re: sleep. We can continue to discuss as well.    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 75th anniversary is coming up with event planned and she is invited and will attend, yet struggles with feelings of displacement, uncertainty about role/ focus during this time of her life. Brings [employer] book to share today. Generally reflects on wondering if she wants to try to keep up to speed in her old areas of expertise, or instead let go of that and invite other priorities for the future.    Main therapeutic interventions provided this session include: "  Cognitive Behavioral Therapy interventions (psychoeducation, communication skills education, cognitive reframing re: catastrophizing and black and white thought patterns, and cognitive coping skills review); Motivational Interviewing interventions    Plan:  Jigna will return for psychotherapy with palliative care focus in 2 weeks at [facility]. She would like to see me every other week till they go to [place] sometime in November, still TBD.    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      Anticipated number of sessions to complete episode of care:  12      Treatment Goal(s)  Date Goal Dates  Reviewed Status   5/31/2017 Goal 1:  Client will develop effective strategies for anxiety following treatment for sarcoma.     Continued    Objective #1A (Client Action)  Client will Identify triggers for anxiety/panic attacks, Identify early signs/symptoms of anxiety and Practice self awareness exercises.    Intervention(s)  Therapist will Provide psychoeducation and Facilitate processing of thoughts and feelings .    Continued    Objective #1B  Client will Use/practice relaxation strategies and Identify effective coping strategies.    Intervention(s)  Therapist will Provide psychoeducation, Provide behavioral intervention training and Facilitate structured problem solving.    Continued    Objective #1C  Client will Effectively communicate with medical provider re needed information and Effectively communicate with family/friends re needs.    Intervention(s)  Therapist will Provide psychoeducation and Facilitate processing of thoughts and feelings.    Continued       Date  Goal Dates  Reviewed Status   5/31/2017 Goal 2:  Client will increase michelle in her life and develop effective strategies for coping with depressed mood and other difficult emotions.    Continued    Objective #2A (Client Action)  Client will Identify triggers/early signs of depressed mood.    Intervention(s) (Therapist Action)  Therapist will provide psychoeducation and Facilitate processing of thoughts and feelings.    Continued    Objective #2B  Client will Follow realistic exercise plan, Identify activities that provide comfort and/or pleasure, Participate in activities that provide comfort and/or pleasure and Identify an activity that would provide meaning/contribute to feeling of self worth, and effectively communicate needs to others. Develop new goals and increase productive or creative daily activities. Over time, increase frequency of seeing friends and family members.    Intervention(s)  Therapist will Provide psychoeducation, Facilitate processing of thoughts and feelings and Facilitate structured problem solving.    Completed - But to continue in future as needed    Objective #2C  Client will Participate in self-regulation practice (e.g.: meditation, relaxation exercises, self hypnosis) per day and/or gratitude practices.    Intervention(s)  Therapist will Provide psychoeducation and Provide behavioral intervention training.    Objective #2D  Client will increase self awareness and options for relating to difficult emotions. Added 8/16/2017    Intervention(s)  Therapist will Provide psychoeducation and Provide behavioral intervention training.  Continued       Date Goal Dates  Reviewed Status   5/31/2017 Goal 3:  Client will make progress toward increased wellbeing and health, including working with other health care providers as needed.    Continued    Objective #3A (Client Action)  Client will Communicate effectively with medical provider re needed information for example related to sleep and diet/  weight-loss concerns.    Intervention(s) (Therapist Action)  Therapist will Make referrals to appropriate colleagues as needed, Facilitate processing of thoughts and feelings and Facilitate structured problem solving.   Completed - but to continue as needed     Objective #3B (Client Action)  Client will improve sleep, with long term goal to sleep well for 7 - 8 hours each night.    Intervention(s) (Therapist Action)  Therapist will provide Cognitive Behavioral Therapy for Insomnia, review sleep hygiene options, and provide structured problem solving. Therapist will also address anxiety and depression with goal to assist with  Continued - but considering completion    Objective #3C (Client Action)  Client will continue to increase physical activity (adding stretching and faster walking 10 min per day in addition to daily slow walk) and adjust diet as advised by dietitian and physician, toward return to healthy weight and energy level.    Intervention(s) (Therapist Action)  Therapist will provide structured problem solving, psychoeducation, and bibliotherapy and referrals as appropriate.  Continued - On pause due to right knee arthritis/inflammation       Client has reviewed and agreed to the above plan on June 7, 2017.    JOURDAN Chilel, [facility]      DO NOT SEND ANY LETTERS    Again, thank you for allowing me to participate in the care of your patient.      Sincerely,    CATHERINE Lancaster

## 2017-10-05 NOTE — LETTER
Date:October 9, 2017      Patient was self referred, no letter generated. Do not send.        [facility] Health Information

## 2017-10-05 NOTE — MR AVS SNAPSHOT
After Visit Summary   10/5/2017    Jigna Allen    MRN: [id number]           Patient Information     Date Of Birth          1935        Visit Information        Provider Department      10/5/2017 10:00 AM Mary Torres LICSW; UC 2 [facility] CONSULT [facility]        Today's Diagnoses     YAMILET (generalized anxiety disorder)    -  1    Encounter for palliative care           Follow-ups after your visit        Your next 10 appointments already scheduled     Oct 19, 2017 10:00 AM CDT   (Arrive by 9:45 AM)   RETURN WITH ROOM with CATHERINE Conley, UC 2 [facility] CONSULT [facility] ([facility])    [address] [place] [address]  2nd [facility] [address] [phone]            May 15, 2018  8:30 AM CDT   (Arrive by 8:15 AM)   XR CHEST 2 VIEWS with UCXR1   [facility] ([facility])    [address] [place] [address] [phone]           Please bring a list of your current medicines to your exam. (Include vitamins, minerals and over-thecounter medicines.) Leave your valuables at home.  Tell your doctor if there is a chance you may be pregnant.  You do not need to do anything special for this exam.            May 15, 2018  9:30 AM CDT   (Arrive by 9:15 AM)   Return Visit with Edouard Bryan MD   [facility] ([facility])    [address] [place] [address] [phone]              Who to contact     If you have questions or need follow up information about today's clinic visit or your schedule please contact [facility] directly at [phone].  Normal or non-critical lab and imaging results will be communicated to you by MyChart, letter or phone within 4 business days after the clinic has received the results. If you do not hear from us within 7  days, please contact the clinic through [employer] or phone. If you have a critical or abnormal lab result, we will notify you by phone as soon as possible.  Submit refill requests through [employer] or call your pharmacy and they will forward the refill request to us. Please allow 3 business days for your refill to be completed.          Additional Information About Your Visit        [employer] Information     [employer] gives you secure access to your electronic health record. If you see a primary care provider, you can also send messages to your care team and make appointments. If you have questions, please call your primary care clinic.  If you do not have a primary care provider, please call [phone] and they will assist you.        Care EveryWhere ID     This is your Care EveryWhere ID. This could be used by other organizations to access your [facility] medical records  [id number]         Blood Pressure from Last 3 Encounters:   05/09/17 130/79   09/16/16 138/67   09/15/16 148/68    Weight from Last 3 Encounters:   05/10/17 51.3 kg (113 lb 1.6 oz)   05/09/17 50.8 kg (112 lb 1.6 oz)   09/16/16 53.4 kg (117 lb 12.8 oz)              Today, you had the following     No orders found for display       Primary Care Provider Office Phone # Fax #    Elisabeth Ko [phone]       [facility] [address] [facility] [address]        Equal Access to Services     [facility] : Hadii aad ku hadasho Soomaali, waaxda luqadaha, qaybta kaalmada adeegyada, narinder coker . So [facility] [phone].    ATENCIÓN: Si habla español, tiene a jade disposición servicios gratuitos de asistencia lingüística. Samira al [phone].    We comply with applicable federal civil rights laws and [place] laws. We do not discriminate on the basis of race, color, national origin, age, disability, sex, sexual orientation, or gender identity.            Thank you!     Thank you for choosing [facility]  for your care. Our goal is always to provide you with excellent care. Hearing back from our patients is one way we can continue to improve our services. Please take a few minutes to complete the written survey that you may receive in the mail after your visit with us. Thank you!             Your Updated Medication List - Protect others around you: Learn how to safely use, store and throw away your medicines at www.disposemymeds.org.          This list is accurate as of: 10/5/17  3:49 PM.  Always use your most recent med list.                   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

## 2017-10-05 NOTE — PROGRESS NOTES
"Palliative Care Clinical Social Work Return Appointment    PLEASE NOTE:  THIS IS A MENTAL HEALTH NOTE.  OTHER PROVIDERS VIEWING THIS NOTE SHOULD USE THIS ONLY FOR UNDERSTANDING THE CONTEXT OF THE PATIENT S EXPERIENCE.  TOPICS DESCRIBED IN THIS NOTE SHOULD NOT BE REFERENCED TO THE PATIENT BY MEDICAL PROVIDERS.    Jigna (\"Kah-facundo\") is an 82 year old woman with history of sarcoma, seen today for a return psychotherapy appointment to address Generalized Anxiety Disorder, depressed mood, and fatigue.    Mental Status Exam:(List all that apply)      Appearance: Appropriate      Eye Contact: Good       Orientation: Yes, x4      Mood:  Euthymic, some worries      Affect: Appropriate, full range      Thought Content: Clear         Thought Form: Logical      Psychomotor Behavior: Normal    Visit summary:   Mental Health (thoughts, feelings, actions, symptoms): Jigna's larson concerns today are re: communication with and supporting her daughters; coping once MIL returns to her home; continued cognitive \"fogginess\"; and [facility], desire to increase exercise). Cognitive, structured problem solving, psychoeducational and motivational interviewing interventions are focus today.     Regarding follow up after last appt, [facility]. She processes barriers including anxiety about driving there alone, resistance to going alone and feeling lonely, past stressful memories of a lock in locker room not working, desire for her  to join her there, and concerns about balance/ slipping in the pool area given new problems with knee. She endorses significant motivation to go, generally focused on the overall benefits of exercise including for wellbeing, for sleep, body to be more in shape and feel better. She describes considering setting up a session with a . We discuss potential benefits given her new health barriers to group " "classes. Stage of change seems to be planning. Engages in change talk. Expresses frustration that she has not gone yet. Names owning this decision for herself, vs waiting for 's decision to go. Daily floor and upper body exercises at home continued. Limited ability to exercise lower body at home given knee problems.    She has continued attending [employer] choir practices and plans to continue singing when the choir is part of services, though not in their wider community performances. Has also started ushering and volunteering at [employer] more including reading from scripture at last service. She has been focusing on \"going to where the people are\"/ striving to increase social activities, and has enjoyed this. Has chosen this vs [employer] volunteer role for now. Also recently got together with friend Kia and enjoyed this.    Continues to negotiate with  re: hiring a caregiver while they are in [place] to spend several hours each day with MIL. He continues to resist but she expresses strong preference that this happen. Continued processing re: dynamics and decision making in their relationship.    One daughter, Lawanda who is , lives in [place] and is a , had a miscarriage a little over a year ago and has continued fertility efforts. She is considering donor egg process after several unsuccessful IVFs. Jigna processes concerns and desires to support daughter effectively during this time. Psychoeducation and counseling provided related to looking at her own thoughts, beliefs, concerns and feelings, providing empathy, reflective listening and support, sharing her support for this decision either way, and potentially encouraging her daughter to look into counseling as she wrestles with the difficult fertility challenges both current and past.     Other daughter in [place] has told Jigna and Ganesh she will be going with family to [place] over Christmas holiday to see NOVA's family there. Jigna processes " "worries, not having control over the decision, and concerns this will limit time this family has to visit her and Ganesh this coming year.    Continuing to process re: MIL, we focus today on specific strategies for communication and interaction in presence of dementia symptoms, forgetfulness, and perseveration as well as grief that things are different from how they were in the past. Cognitive coping discussed re: option to not feel she has to argue, fix the situation, or make MIL happy, but can reflect in a caring way or allow MIL to be where she is.    We also discuss re: general stress as Jigna fears that CONSTANCE may outlive her and/or her . There continues to be distress re: her life in this final chapter, however long, being focused on MIL caregiving. We discuss full range of options - ie not black and white issue - including FELIPE but still some caregiving role even then, to very few changes to practical situation but adjusting how she dirk with it, ie cognitive coping, taking breaks. Reflection and validation offered re: how significant a life change it would be were she to put her foot down with her  and refuse to do caregiving role. She fears that this would mean the end of the marriage or at least of living together. She continues to plan for smaller coping options in the short term, we discuss ability to continue to adjust strategy over time and assess what is working and is not.     Did see PCP recently who did US of bladder, not yet read. Continues to feel \"foggy\" mentally. We discuss various options for thinking about this. I do not identify any significant changes cognitively based on gross observation, but of course this is not a neuropsych eval. We discuss potential for hay fever to factor in as she is also feeling very congested recently in past month and this is the time period she has felt more mentally foggy. We also discuss her anxiety re: seeing dementia possibly developing in a long " "time close friend.    Helpful activities: Exercise (at home, and potentially at Y including swimming, bike, and/or ); social activities (time with friends, choir, volunteering at [employer], intellectual or professional activities)    Helpful cognitions: \"Even though I sometimes feel trapped or powerless, I do have choices\"    Unhelpful, triggering of exacerbating factors: Dynamic of decisionmaking with  who prefers to continue caregiving in their home for MIL; MIL statements perceives as critical; 24/7 caregiving for MIL during parts of the year; avoidance due to anxiety; significant knee problems limiting activities      Adjustment to Illness: See above - goal continues to be increased exercise, go to Y. Would like to work on sleep again at next visit. I encourage also following up with [facility] for additional assessment and holistic intervention re: sleep. We can continue to discuss as well.    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 75th anniversary is coming up with event planned and she is invited and will attend, yet struggles with feelings of displacement, uncertainty about role/ focus during this time of her life. Brings [employer] book to share today. Generally reflects on wondering if she wants to try to keep up to speed in her old areas of expertise, or instead let go of that and invite other priorities for the future.    Main therapeutic interventions provided this session include:  Cognitive Behavioral Therapy interventions (psychoeducation, communication skills education, cognitive reframing re: catastrophizing and black and white thought patterns, and cognitive coping skills review); Motivational Interviewing interventions    Plan:  Jigna will return for " [place] [facility] [place] She would like to see me every other week till they go to [place] sometime in November, still TBD.    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      Anticipated number of sessions to complete episode of care:  12      Treatment Goal(s)  Date Goal Dates  Reviewed Status   5/31/2017 Goal 1:  Client will develop effective strategies for anxiety following treatment for sarcoma.     Continued    Objective #1A (Client Action)  Client will Identify triggers for anxiety/panic attacks, Identify early signs/symptoms of anxiety and Practice self awareness exercises.    Intervention(s)  Therapist will Provide psychoeducation and Facilitate processing of thoughts and feelings .    Continued    Objective #1B  Client will Use/practice relaxation strategies and Identify effective coping strategies.    Intervention(s)  Therapist will Provide psychoeducation, Provide behavioral intervention training and Facilitate structured problem solving.    Continued    Objective #1C  Client will Effectively communicate with medical provider re needed information and Effectively communicate with family/friends re needs.    Intervention(s)  Therapist will Provide psychoeducation and Facilitate processing of thoughts and feelings.    Continued       Date Goal Dates  Reviewed Status   5/31/2017 Goal 2:  Client will increase michelle in her life and develop effective strategies for coping with depressed mood and other difficult emotions.    Continued    Objective #2A (Client Action)  Client will Identify triggers/early signs of depressed  mood.    Intervention(s) (Therapist Action)  Therapist will provide psychoeducation and Facilitate processing of thoughts and feelings.    Continued    Objective #2B  Client will Follow realistic exercise plan, Identify activities that provide comfort and/or pleasure, Participate in activities that provide comfort and/or pleasure and Identify an activity that would provide meaning/contribute to feeling of self worth, and effectively communicate needs to others. Develop new goals and increase productive or creative daily activities. Over time, increase frequency of seeing friends and family members.    Intervention(s)  Therapist will Provide psychoeducation, Facilitate processing of thoughts and feelings and Facilitate structured problem solving.    Completed - But to continue in future as needed    Objective #2C  Client will Participate in self-regulation practice (e.g.: meditation, relaxation exercises, self hypnosis) per day and/or gratitude practices.    Intervention(s)  Therapist will Provide psychoeducation and Provide behavioral intervention training.    Objective #2D  Client will increase self awareness and options for relating to difficult emotions. Added 8/16/2017    Intervention(s)  Therapist will Provide psychoeducation and Provide behavioral intervention training.  Continued       Date Goal Dates  Reviewed Status   5/31/2017 Goal 3:  Client will make progress toward increased wellbeing and health, including working with other health care providers as needed.    Continued    Objective #3A (Client Action)  Client will Communicate effectively with medical provider re needed information for example related to sleep and diet/ weight-loss concerns.    Intervention(s) (Therapist Action)  Therapist will Make referrals to appropriate colleagues as needed, Facilitate processing of thoughts and feelings and Facilitate structured problem solving.   Completed - but to continue as needed     Objective #3B (Client  Action)  Client will improve sleep, with long term goal to sleep well for 7 - 8 hours each night.    Intervention(s) (Therapist Action)  Therapist will provide Cognitive Behavioral Therapy for Insomnia, review sleep hygiene options, and provide structured problem solving. Therapist will also address anxiety and depression with goal to assist with  Continued - but considering completion    Objective #3C (Client Action)  Client will continue to increase physical activity (adding stretching and faster walking 10 min per day in addition to daily slow walk) and adjust diet as advised by dietitian and physician, toward return to healthy weight and energy level.    Intervention(s) (Therapist Action)  Therapist will provide structured problem solving, psychoeducation, and bibliotherapy and referrals as appropriate.  Continued - On pause due to right knee arthritis/inflammation       Client has reviewed and agreed to the above plan on June 7, 2017.    JOURDAN Chilel, LICSW      DO NOT SEND ANY LETTERS

## 2017-10-19 ENCOUNTER — OFFICE VISIT (OUTPATIENT)
Dept: PALLIATIVE CARE | Facility: CLINIC | Age: 82
End: 2017-10-19
Attending: SOCIAL WORKER
Payer: COMMERCIAL

## 2017-10-19 DIAGNOSIS — Z51.5 ENCOUNTER FOR PALLIATIVE CARE: ICD-10-CM

## 2017-10-19 DIAGNOSIS — F41.1 GAD (GENERALIZED ANXIETY DISORDER): Primary | ICD-10-CM

## 2017-10-19 PROCEDURE — 90834 PSYTX W PT 45 MINUTES: CPT | Mod: ZP | Performed by: SOCIAL WORKER

## 2017-10-19 PROCEDURE — 40000114 ZZH STATISTIC NO CHARGE CLINIC VISIT

## 2017-10-19 NOTE — PROGRESS NOTES
"Palliative Care Clinical Social Work Return Appointment    PLEASE NOTE:  THIS IS A MENTAL HEALTH NOTE.  OTHER PROVIDERS VIEWING THIS NOTE SHOULD USE THIS ONLY FOR UNDERSTANDING THE CONTEXT OF THE PATIENT S EXPERIENCE.  TOPICS DESCRIBED IN THIS NOTE SHOULD NOT BE REFERENCED TO THE PATIENT BY MEDICAL PROVIDERS.    Jigna (\"Kah-facundo\") is an 82 year old woman with history of sarcoma, seen today for a return psychotherapy appointment to address Generalized Anxiety Disorder, depressed mood, and fatigue.    Mental Status Exam:(List all that apply)      Appearance: Appropriate      Eye Contact: Good       Orientation: Yes, x4      Mood:  \"Discouraged\", overwhelmed, anxious      Affect: Appropriate, full range      Thought Content: Clear         Thought Form: Logical      Psychomotor Behavior: Normal    Visit summary: Themes of discouragement, frustration and increased anxiety. Discussion emphasizing the potentially chronic nature of anxiety disorders, and the need to continue to balance level of stress with self care activities in order to promote wellbeing over time. Progressive Muscle Relaxation training in session today.    Mental Health (thoughts, feelings, actions, symptoms): Jigna processes feelings and thoughts connected with her career/sense of legacy;  body changes without diagnosis or answer yet regarding why; daughter's fertility concerns; MIL returning home next week and needing caregiving supports; health problems of cousin (at [facility] this week) and greatniece (opiate addiction); general worries about interpersonal effectiveness; concerns about 's slowness re: buying tickets to [place]; and ongoing concerns about sleep.    She is worried something is wrong with her body. She continues to fill her clothes out less well, \"my core is shrinking\", and although weight is relatively stable and MD has not found anything wrong despite many tests, Jigna voices still feeling convinced there is something physical " wrong that has not been found yet. She is tired of all the appts. She asks if anxiety is causing the body changes. We discuss that no, anxiety cannot cause body changes like that, but I offer reframing/some gentle disputing re: beliefs that there is an illness or problem if MD is saying there is not. I emphasize not knowing and leaving this to Jigna and MDs, but encourage her t consider that sometimes anxiety presents as really believing our worries, and that anxiety can increase with others stressors, which are high currently in her life. I wonder with her if body shape changes could possibly be part of normal aging, and encourage ongoing follow up with MD re her concerns.    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 This was a major part of her career. She did mention the oversight to the director. Feelings of social anxiety about that yet also feeling a need at the time to set the record straight.    Daughter has had 3rd IVF fail; has decided not to go ahead with donor egg process. Grief and loss. Sharing more with Jigna recently. Jigna voices feeling unable to help her, struggling with that.    Other daughter chose to cancel planned trip to [place]; they may visit over [place] in [place] after all.     has started to voice more concerns about potential added strain when his mother returns to living with them. Has been delaying purchase of tickets to go to [place]. Jigna is feeling some dread re: return of MIL to their home. She describes a belief that her  must be looking at her and wondering what is going on with her/what is wrong. We discuss unhelpful thought patterns.    Regarding sleep, Jigna tells me that she is usually sleeping in a separate room darker and less interruptions ie from  coming to bed later. She goes to bed about 11:30 and wakes at 6,  "takes thyroid medicine, then lays back down usually till 8:30. She feels as if she does not sleep at all but just lies there. She does wake to the alarm. She names that even when asleep, she is worried that she is not achieving deep enough level of sleep. She does not feel refreshed. We discuss sleep in more depth. She shares that she did sleep study and worked with sleep therapist last year, and that sleep study indicated 87% sleep efficiency. They were working on improving it using initially CBT-I sleep deprivation approach.    Since her concerns about sleep quality are difficult for me to assess, and sleep deprivation/CBT-I would be my next step as well to increase sleep efficiency, we discuss best next options. I encourage her to follow up either with the past program, or [facility]. Jigna expressed interest in doing this.    Body-Mind Skills Education:  Guided in 15 minute Progressive Muscle Relaxation with tensing and then relaxing muscles. After experiencing this, Jigna reported feeling much more relaxed. Her affect brightened. She voiced plan to do this practice several times per week in coming weeks, perhaps when lying down to sleep.    Coping:  Helpful activities: Exercise (at home, and potentially at Y including swimming, bike, and/or ); social activities (time with friends, choir, volunteering at [employer], intellectual or professional activities)    Helpful cognitions: \"Even though I sometimes feel trapped or powerless, I do have choices\"    Unhelpful, triggering of exacerbating factors: Dynamic of decisionmaking with  who prefers to continue caregiving in their home for MIL; MIL statements perceives as critical; 24/7 caregiving for MIL during parts of the year; avoidance due to anxiety; significant knee problems limiting activities      Adjustment to Illness: Not focus today.    Relationships: See above - relationships with daughters,  and MIL ongoing focus    End " of Life and Advance Care Planning: Not focus today.    Main therapeutic interventions provided this session include:  Cognitive Behavioral Therapy interventions (psychoeducation, communication skills education, cognitive reframing runhelpful thought patterns, and cognitive coping skills review); Motivational Interviewing interventions; Body-Mind skills education (PMR with muscle tensing)    Plan:  [place] [facility] [place] She would like to see me every other week till they go to [place] sometime in November, still TBD.    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      Anticipated number of sessions to complete episode of care:  12      Treatment Goal(s)  Date Goal Dates  Reviewed Status   5/31/2017 Goal 1:  Client will develop effective strategies for anxiety following treatment for sarcoma.     Continued    Objective #1A (Client Action)  Client will Identify triggers for anxiety/panic attacks, Identify early signs/symptoms of anxiety and Practice self awareness exercises.    Intervention(s)  Therapist will Provide psychoeducation and Facilitate processing of thoughts and feelings .    Continued    Objective #1B  Client will Use/practice relaxation strategies and Identify effective coping strategies.    Intervention(s)  Therapist will Provide psychoeducation, Provide behavioral intervention training and Facilitate structured problem solving.    Continued    Objective #1C  Client will Effectively communicate with medical provider re needed information and Effectively communicate with family/friends re  needs.    Intervention(s)  Therapist will Provide psychoeducation and Facilitate processing of thoughts and feelings.    Continued       Date Goal Dates  Reviewed Status   5/31/2017 Goal 2:  Client will increase michelle in her life and develop effective strategies for coping with depressed mood and other difficult emotions.    Continued    Objective #2A (Client Action)  Client will Identify triggers/early signs of depressed mood.    Intervention(s) (Therapist Action)  Therapist will provide psychoeducation and Facilitate processing of thoughts and feelings.    Continued    Objective #2B  Client will Follow realistic exercise plan, Identify activities that provide comfort and/or pleasure, Participate in activities that provide comfort and/or pleasure and Identify an activity that would provide meaning/contribute to feeling of self worth, and effectively communicate needs to others. Develop new goals and increase productive or creative daily activities. Over time, increase frequency of seeing friends and family members.    Intervention(s)  Therapist will Provide psychoeducation, Facilitate processing of thoughts and feelings and Facilitate structured problem solving.    Completed - But to continue in future as needed    Objective #2C  Client will Participate in self-regulation practice (e.g.: meditation, relaxation exercises, self hypnosis) per day and/or gratitude practices.    Intervention(s)  Therapist will Provide psychoeducation and Provide behavioral intervention training.    Objective #2D  Client will increase self awareness and options for relating to difficult emotions. Added 8/16/2017    Intervention(s)  Therapist will Provide psychoeducation and Provide behavioral intervention training.  Continued       Date Goal Dates  Reviewed Status   5/31/2017 Goal 3:  Client will make progress toward increased wellbeing and health, including working with other health care providers as needed.    Continued    Objective #3A  (Client Action)  Client will Communicate effectively with medical provider re needed information for example related to sleep and diet/ weight-loss concerns.    Intervention(s) (Therapist Action)  Therapist will Make referrals to appropriate colleagues as needed, Facilitate processing of thoughts and feelings and Facilitate structured problem solving.   Completed - but to continue as needed     Objective #3B (Client Action)  Client will improve sleep, with long term goal to sleep well for 7 - 8 hours each night.    Intervention(s) (Therapist Action)  Therapist will provide Cognitive Behavioral Therapy for Insomnia, review sleep hygiene options, and provide structured problem solving. Therapist will also address anxiety and depression with goal to assist with  Continued - but considering completion    Objective #3C (Client Action)  Client will continue to increase physical activity (adding stretching and faster walking 10 min per day in addition to daily slow walk) and adjust diet as advised by dietitian and physician, toward return to healthy weight and energy level.    Intervention(s) (Therapist Action)  Therapist will provide structured problem solving, psychoeducation, and bibliotherapy and referrals as appropriate.  Continued - On pause due to right knee arthritis/inflammation       Client has reviewed and agreed to the above plan on June 7, 2017.    JOURDAN Chilel, LICSW      DO NOT SEND ANY LETTERS

## 2017-10-19 NOTE — MR AVS SNAPSHOT
After Visit Summary   10/19/2017    Jigna Allen    MRN: [id number]           Patient Information     Date Of Birth          1935        Visit Information        Provider Department      10/19/2017 10:00 AM Mary Torres LICSW; UC 2 119 CONSULT [facility]        Today's Diagnoses     YAMILET (generalized anxiety disorder)    -  1    Encounter for palliative care           Follow-ups after your visit        Your next 10 appointments already scheduled     Nov 02, 2017 11:00 AM CDT   (Arrive by 10:45 AM)   RETURN WITH ROOM with CATHERINE Conley, UC 2 119 CONSULT [facility] ([facility])    [address] [place] [address]  2nd [facility] [address]   [phone]            Nov 16, 2017 10:00 AM CST   (Arrive by 9:45 AM)   RETURN WITH ROOM with CATHERINE Conley, UC 2 119 CONSULT [facility] ([facility])    [address] [place] [address]  2nd [facility] [address]   [phone]            Nov 30, 2017 10:00 AM CST   (Arrive by 9:45 AM)   RETURN WITH ROOM with CATHERINE Conley, UC 2 119 CONSULT [facility] ([facility])    [address] [place] [address]   [phone]            May 15, 2018  8:30 AM CDT   (Arrive by 8:15 AM)   XR CHEST 2 VIEWS with UCXR1   [facility] [place] [facility] ([facility])    [address] [place] [address]  1st [facility] [address] [phone]           Please bring a list of your current medicines to your exam. (Include vitamins, minerals and over-thecounter medicines.) Leave your valuables at home.  Tell your doctor if there is a chance you may be pregnant.  You do not need to do anything special for this exam.            May 15, 2018  9:30 AM CDT   (Arrive by 9:15 AM)   Return Visit with Edouard  Gabo Bryan MD   [facility] ([facility] [place])    [address] [facility]  2nd Floor  [facility] [address] [phone]              Who to contact     If you have questions or need follow up information about today's clinic visit or your schedule please contact [facility] directly at [phone].  Normal or non-critical lab and imaging results will be communicated to you by MyChart, letter or phone within 4 business days after the clinic has received the results. If you do not hear from us within 7 days, please contact the clinic through KOPIS MOBILEhart or phone. If you have a critical or abnormal lab result, we will notify you by phone as soon as possible.  Submit refill requests through Loladex or call your pharmacy and they will forward the refill request to us. Please allow 3 business days for your refill to be completed.          Additional Information About Your Visit        KOPIS MOBILEhart Information     Lol[employer]x gives you secure access to your electronic health record. If you see a primary care provider, you can also send messages to your care team and make appointments. If you have questions, please call your primary care clinic.  If you do not have a primary care provider, please call [phone] and they will assist you.        Care EveryWhere ID     This is your Care EveryWhere ID. This could be used by other organizations to access your [place] [facility] medical records  [id number]         Blood Pressure from Last 3 Encounters:   05/09/17 130/79   09/16/16 138/67   09/15/16 148/68    Weight from Last 3 Encounters:   05/10/17 51.3 kg (113 lb 1.6 oz)   05/09/17 50.8 kg (112 lb 1.6 oz)   09/16/16 53.4 kg (117 lb 12.8 oz)              Today, you had the following     No orders found for display       Primary Care Provider Office Phone # Fax #    Elisabeth Ko [phone]       [place] [address]  [facility] [address]         Equal Access to Services     [facility] : Hadii aad ku hadmelmagali Jacekali, waangieda luqadaha, qaybta kadenanarinder porter. So [facility] [phone].    ATENCIÓN: Si habla español, tiene a jade disposición servicios gratuitos de asistencia lingüística. Llame al [phone].    We comply with applicable federal civil rights laws and [place] laws. We do not discriminate on the basis of race, color, national origin, age, disability, sex, sexual orientation, or gender identity.            Thank you!     Thank you for choosing [facility]  for your care. Our goal is always to provide you with excellent care. Hearing back from our patients is one way we can continue to improve our services. Please take a few minutes to complete the written survey that you may receive in the mail after your visit with us. Thank you!             Your Updated Medication List - Protect others around you: Learn how to safely use, store and throw away your medicines at www.disposemymeds.org.          This list is accurate as of: 10/19/17 11:59 PM.  Always use your most recent med list.                   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

## 2017-10-19 NOTE — LETTER
Date:October 23, 2017      Patient was self referred, no letter generated. Do not send.        [facility] Health Information

## 2017-10-19 NOTE — LETTER
"10/19/2017       RE: Jigna Allen  [address] [place] [address]     Dear Colleague,    Thank you for referring your patient, Jigna Allen, to the [facility] at [facility]. Please see a copy of my visit note below.    Palliative Care Clinical Social Work Return Appointment    PLEASE NOTE:  THIS IS A MENTAL HEALTH NOTE.  OTHER PROVIDERS VIEWING THIS NOTE SHOULD USE THIS ONLY FOR UNDERSTANDING THE CONTEXT OF THE PATIENT S EXPERIENCE.  TOPICS DESCRIBED IN THIS NOTE SHOULD NOT BE REFERENCED TO THE PATIENT BY MEDICAL PROVIDERS.    Jigna (\"Kah-facundo\") is an 82 year old woman with history of sarcoma, seen today for a return psychotherapy appointment to address Generalized Anxiety Disorder, depressed mood, and fatigue.    Mental Status Exam:(List all that apply)      Appearance: Appropriate      Eye Contact: Good       Orientation: Yes, x4      Mood:  \"Discouraged\", overwhelmed, anxious      Affect: Appropriate, full range      Thought Content: Clear         Thought Form: Logical      Psychomotor Behavior: Normal    Visit summary: Themes of discouragement, frustration and increased anxiety. Discussion emphasizing the potentially chronic nature of anxiety disorders, and the need to continue to balance level of stress with self care activities in order to promote wellbeing over time. Progressive Muscle Relaxation training in session today.    Mental Health (thoughts, feelings, actions, symptoms): Jigna processes feelings and thoughts connected with her career/sense of legacy;  body changes without diagnosis or answer yet regarding why; daughter's fertility concerns; MIL returning home next week and needing caregiving supports; health problems of cousin (at [facility] this week) and greatniece (opiate addiction); general worries about interpersonal effectiveness; concerns about 's slowness re: buying tickets to [place]; and ongoing concerns about " "sleep.    She is worried something is wrong with her body. She continues to fill her clothes out less well, \"my core is shrinking\", and although weight is relatively stable and MD has not found anything wrong despite many tests, Jigna voices still feeling convinced there is something physical wrong that has not been found yet. She is tired of all the appts. She asks if anxiety is causing the body changes. We discuss that no, anxiety cannot cause body changes like that, but I offer reframing/some gentle disputing re: beliefs that there is an illness or problem if MD is saying there is not. I emphasize not knowing and leaving this to Jigna and MDs, but encourage her t consider that sometimes anxiety presents as really believing our worries, and that anxiety can increase with others stressors, which are high currently in her life. I wonder with her if body shape changes could possibly be part of normal aging, and encourage ongoing follow up with MD re her concerns.    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 This was a major part of her career. She did mention the oversight to the director. Feelings of social anxiety about that yet also feeling a need at the time to set the record straight.    Daughter has had 3rd IVF fail; has decided not to go ahead with donor egg process. Grief and loss. Sharing more with Jigna recently. Jigna voices feeling unable to help her, struggling with that.    Other daughter chose to cancel planned trip to [place]; they may visit over [place] in [place] after all.     has started to voice more concerns about potential added strain when his mother returns to living with them. Has been delaying purchase of tickets to go to [place]. Jigna is feeling some dread re: return of MIL to their home. She describes a belief that her  must be looking at " "her and wondering what is going on with her/what is wrong. We discuss unhelpful thought patterns.    Regarding sleep, Jigna tells me that she is usually sleeping in a separate room darker and less interruptions ie from  coming to bed later. She goes to bed about 11:30 and wakes at 6, takes thyroid medicine, then lays back down usually till 8:30. She feels as if she does not sleep at all but just lies there. She does wake to the alarm. She names that even when asleep, she is worried that she is not achieving deep enough level of sleep. She does not feel refreshed. We discuss sleep in more depth. She shares that she did sleep study and worked with sleep therapist last year, and that sleep study indicated 87% sleep efficiency. They were working on improving it using initially CBT-I sleep deprivation approach.    Since her concerns about sleep quality are difficult for me to assess, and sleep deprivation/CBT-I would be my next step as well to increase sleep efficiency, we discuss best next options. I encourage her to follow up either with the past program, or [facility]. Jigna expressed interest in doing this.    Body-Mind Skills Education:  Guided in 15 minute Progressive Muscle Relaxation with tensing and then relaxing muscles. After experiencing this, Jigna reported feeling much more relaxed. Her affect brightened. She voiced plan to do this practice several times per week in coming weeks, perhaps when lying down to sleep.    Coping:  Helpful activities: Exercise (at home, and potentially at Y including swimming, bike, and/or ); social activities (time with friends, choir, volunteering at [employer], intellectual or professional activities)    Helpful cognitions: \"Even though I sometimes feel trapped or powerless, I do have choices\"    Unhelpful, triggering of exacerbating factors: Dynamic of decisionmaking with  who prefers to continue caregiving in their home for MIL; MIL " statements perceives as critical; 24/7 caregiving for MIL during parts of the year; avoidance due to anxiety; significant knee problems limiting activities      Adjustment to Illness: Not focus today.    Relationships: See above - relationships with daughters,  and MIL ongoing focus    End of Life and Advance Care Planning: Not focus today.    Main therapeutic interventions provided this session include:  Cognitive Behavioral Therapy interventions (psychoeducation, communication skills education, cognitive reframing runhelpful thought patterns, and cognitive coping skills review); Motivational Interviewing interventions; Body-Mind skills education (PMR with muscle tensing)    Plan:  Jigna will return for psychotherapy with palliative care focus in 2 weeks at [facility]. She would like to see me every other week till they go to [place] sometime in November, still TBD.    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      Anticipated number of sessions to complete episode of care:  12      Treatment Goal(s)  Date Goal Dates  Reviewed Status   5/31/2017 Goal 1:  Client will develop effective strategies for anxiety following treatment for sarcoma.     Continued    Objective #1A (Client Action)  Client will Identify triggers for anxiety/panic attacks, Identify early signs/symptoms of anxiety and Practice self awareness exercises.    Intervention(s)  Therapist will Provide psychoeducation and Facilitate processing of thoughts and feelings .    Continued    Objective #1B  Client will Use/practice relaxation strategies and Identify effective coping  strategies.    Intervention(s)  Therapist will Provide psychoeducation, Provide behavioral intervention training and Facilitate structured problem solving.    Continued    Objective #1C  Client will Effectively communicate with medical provider re needed information and Effectively communicate with family/friends re needs.    Intervention(s)  Therapist will Provide psychoeducation and Facilitate processing of thoughts and feelings.    Continued       Date Goal Dates  Reviewed Status   5/31/2017 Goal 2:  Client will increase michelle in her life and develop effective strategies for coping with depressed mood and other difficult emotions.    Continued    Objective #2A (Client Action)  Client will Identify triggers/early signs of depressed mood.    Intervention(s) (Therapist Action)  Therapist will provide psychoeducation and Facilitate processing of thoughts and feelings.    Continued    Objective #2B  Client will Follow realistic exercise plan, Identify activities that provide comfort and/or pleasure, Participate in activities that provide comfort and/or pleasure and Identify an activity that would provide meaning/contribute to feeling of self worth, and effectively communicate needs to others. Develop new goals and increase productive or creative daily activities. Over time, increase frequency of seeing friends and family members.    Intervention(s)  Therapist will Provide psychoeducation, Facilitate processing of thoughts and feelings and Facilitate structured problem solving.    Completed - But to continue in future as needed    Objective #2C  Client will Participate in self-regulation practice (e.g.: meditation, relaxation exercises, self hypnosis) per day and/or gratitude practices.    Intervention(s)  Therapist will Provide psychoeducation and Provide behavioral intervention training.    Objective #2D  Client will increase self awareness and options for relating to difficult emotions. Added  8/16/2017    Intervention(s)  Therapist will Provide psychoeducation and Provide behavioral intervention training.  Continued       Date Goal Dates  Reviewed Status   5/31/2017 Goal 3:  Client will make progress toward increased wellbeing and health, including working with other health care providers as needed.    Continued    Objective #3A (Client Action)  Client will Communicate effectively with medical provider re needed information for example related to sleep and diet/ weight-loss concerns.    Intervention(s) (Therapist Action)  Therapist will Make referrals to appropriate colleagues as needed, Facilitate processing of thoughts and feelings and Facilitate structured problem solving.   Completed - but to continue as needed     Objective #3B (Client Action)  Client will improve sleep, with long term goal to sleep well for 7 - 8 hours each night.    Intervention(s) (Therapist Action)  Therapist will provide Cognitive Behavioral Therapy for Insomnia, review sleep hygiene options, and provide structured problem solving. Therapist will also address anxiety and depression with goal to assist with  Continued - but considering completion    Objective #3C (Client Action)  Client will continue to increase physical activity (adding stretching and faster walking 10 min per day in addition to daily slow walk) and adjust diet as advised by dietitian and physician, toward return to healthy weight and energy level.    Intervention(s) (Therapist Action)  Therapist will provide structured problem solving, psychoeducation, and bibliotherapy and referrals as appropriate.  Continued - On pause due to right knee arthritis/inflammation       Client has reviewed and agreed to the above plan on June 7, 2017.    JOURDAN Chilel, [facility]      DO NOT SEND ANY LETTERS    Again, thank you for allowing me to participate in the care of your patient.      Sincerely,    Mary Torres [facility]

## 2017-11-16 ENCOUNTER — OFFICE VISIT (OUTPATIENT)
Dept: PALLIATIVE CARE | Facility: CLINIC | Age: 82
End: 2017-11-16
Attending: SOCIAL WORKER
Payer: COMMERCIAL

## 2017-11-16 DIAGNOSIS — Z51.5 ENCOUNTER FOR PALLIATIVE CARE: ICD-10-CM

## 2017-11-16 DIAGNOSIS — F41.1 GAD (GENERALIZED ANXIETY DISORDER): Primary | ICD-10-CM

## 2017-11-16 PROCEDURE — 40000114 ZZH STATISTIC NO CHARGE CLINIC VISIT

## 2017-11-16 PROCEDURE — 90834 PSYTX W PT 45 MINUTES: CPT | Mod: ZP | Performed by: SOCIAL WORKER

## 2017-11-16 NOTE — MR AVS SNAPSHOT
After Visit Summary   11/16/2017    Jigna Allen    MRN: [id number]           Patient Information     Date Of Birth          1935        Visit Information        Provider Department      11/16/2017 10:00 AM Mary Torres [facility];  2 119 CONSULT [facility]        Today's Diagnoses     YAMILET (generalized anxiety disorder)    -  1    Encounter for palliative care           Follow-ups after your visit        Your next 10 appointments already scheduled     May 15, 2018  8:30 AM CDT   (Arrive by 8:15 AM)   XR CHEST 2 VIEWS with UCXR1   [facility] [place] [facility] ([facility] [place])    [address] [facility]  1st [facility] [address] [phone]           Please bring a list of your current medicines to your exam. (Include vitamins, minerals and over-thecounter medicines.) Leave your valuables at home.  Tell your doctor if there is a chance you may be pregnant.  You do not need to do anything special for this exam.            May 15, 2018  9:30 AM CDT   (Arrive by 9:15 AM)   Return Visit with Edouard Bryan MD   [facility] ([facility] [place])    [address] [address] [phone]              Who to contact     If you have questions or need follow up information about today's clinic visit or your schedule please contact [facility] CANC[facility] directly at [phone].  Normal or non-critical lab and imaging results will be communicated to you by MyChart, letter or phone within 4 business days after the clinic has received the results. If you do not hear from us within 7 days, please contact the clinic through MyChart or phone. If you have a critical or abnormal lab result, we will notify you by phone as soon as possible.  Submit refill requests through Broadersheet or call your pharmacy and they will forward the refill request to us. Please allow 3  business days for your refill to be completed.          Additional Information About Your Visit        MyChart Information     metraTechart gives you secure access to your electronic health record. If you see a primary care provider, you can also send messages to your care team and make appointments. If you have questions, please call your primary care clinic.  If you do not have a primary care provider, please call [phone] and they will assist you.        Care EveryWhere ID     This is your Care EveryWhere ID. This could be used by other organizations to access your [place] medical records  [id number]         Blood Pressure from Last 3 Encounters:   05/09/17 130/79   09/16/16 138/67   09/15/16 148/68    Weight from Last 3 Encounters:   05/10/17 51.3 kg (113 lb 1.6 oz)   05/09/17 50.8 kg (112 lb 1.6 oz)   09/16/16 53.4 kg (117 lb 12.8 oz)              Today, you had the following     No orders found for display       Primary Care Provider Office Phone # Fax #    Elisabeth Ko [phone]       On license of [facility] [address] [facility] [address]        Equal Access to Services     NARCISA CANO : Hadii aad ku merlyno Sosloan, waaxda luqadaha, qaybta kaalmada adefishyada, narinder potter. So [facility] [phone].    ATENCIÓN: Si habla español, tiene a jade disposición servicios gratuitos de asistencia lingüística. [facility] [phone].    We comply with applicable federal civil rights laws and [place] laws. We do not discriminate on the basis of race, color, national origin, age, disability, sex, sexual orientation, or gender identity.            Thank you!     Thank you for choosing [facility]  for your care. Our goal is always to provide you with excellent care. Hearing back from our patients is one way we can continue to improve our services. Please take a few minutes to complete the written survey that you may receive in the mail after your  visit with us. Thank you!             Your Updated Medication List - Protect others around you: Learn how to safely use, store and throw away your medicines at www.disposemymeds.org.          This list is accurate as of: 11/16/17 11:59 PM.  Always use your most recent med list.                   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

## 2017-11-16 NOTE — LETTER
Date:November 20, 2017      Patient was self referred, no letter generated. Do not send.        [facility] Health Information

## 2017-11-17 NOTE — PROGRESS NOTES
"Palliative Care Clinical Social Work Return Appointment    PLEASE NOTE:  THIS IS A MENTAL HEALTH NOTE.  OTHER PROVIDERS VIEWING THIS NOTE SHOULD USE THIS ONLY FOR UNDERSTANDING THE CONTEXT OF THE PATIENT S EXPERIENCE.  TOPICS DESCRIBED IN THIS NOTE SHOULD NOT BE REFERENCED TO THE PATIENT BY MEDICAL PROVIDERS.    Jigna (\"Kwame\") is an 82 year old woman with history of sarcoma, seen today for a return psychotherapy appointment to address Generalized Anxiety Disorder, depressed mood, and fatigue.    Mental Status Exam:(List all that apply)      Appearance: Appropriate      Eye Contact: Good       Orientation: Yes, x4      Mood:  Reflective, worried      Affect: Appropriate, full range      Thought Content: Clear         Thought Form: Logical      Psychomotor Behavior: Normal    Visit summary: Preparing for winter in [place] with  and mother in law. This is last appt for now and will reschedule on return in spring. I also offered that she can call for support while she is in [place], if needed. Self hypnosis training provided today with focus on developing automatic coping thoughts to self soothe and cope with caregiving role. Review of psychoeducation related to acceptance and committed action approaches to living with chronic anxiety.    Mental Health (thoughts, feelings, actions, symptoms): Jigna processes difficult aspects of caregiving for her mother in law. She has returned and is living with Jigna and her . They will leave for [place] this coming weekend. She describes using cognitive coping through working on not arguing with MIL about comments/complaints ie about choice of TV station or what is available to eat, but internally responding as part of helping maintain her own peace of mind. Her MIL requests frequent involvement and attention from Jigna, wanting to go with her when she goes out, etc. And expressing feeling that she is in the way and missing her past independence.    Further guidance offered re: " "cognitive coping including to step back from specifics and reflect on the overall perceived message from MIL (\"things are not OK, you are not OK or enough, I need more, I am dissatisfied\") and useful cognitive coping responses in terms of overall perspective (\"I am OK, I am doing enough; although things are not ideal from her point of view, MIL is safe and has her needs met\"; \"although [facility] is not aware of or focused on this, me maintaining my sanity and having varied activities each day are important for me, my , and my kids\").    Jigna has been advocating for a caregiver to be with MIL 5 hours per day. Her  has agreed and they are negotiating details with a specific person in [place]. She is receptive to continued encouragement that she add positive activities to her life in [place], despite barriers. She has been very much enjoying choir here in [place] and plans to restart in spring when they return. This took action against feelings of anxiety and avoidance and she did find it rewarding and helpful.    Discussion of \"serenity prayer\" and reflection on what she can influence to help herself maintain sanity, things she cannot control including the opinions and behavior of her MIL, and she also talks about benefit of a recent prayer [place] and feeling a connection with the Devika and especially the Holy Spirit, trust that perhaps God will help with this difficult experience.     Jigna expresses that it is discouraging to imagine she may have anxiety as part of her life for long term. We discuss option of acceptance of symptoms of anxiety along with attention shift to activities that bring meaning and michelle, and curiosity over time about how the anxiety may be present or not as present. Either way, living a life that she feels good about is the goal, though not always easy to achieve.    Jigna reflects on the past 7 months of therapy. Consolidation and termination discussion today including reflection on her increase " "in social activity and creative activity during the summer and fall. Jigna expresses that learning about the connection between body and mind has been new and interesting for her.    Body-Mind Skills Education:    Clinical Hypnosis Training:  Clinical self hypnosis training was discussed and explained and any questions were answered.  Preferences were determined.  Patient was then led in a clinical self hypnosis training.  Method of induction:  Eyes closing and progressive muscle relaxation with image of \"warm wave\" and \"releasing anything you don't need as you relax\"  Method of deepening:  Internal elevator, 7 floors, stopping at each floor to sense \"how it is to be on the 3rd floor today etc\"  Techniques used/taught:  Wonderful place and inviting automatic helpful thoughts. Wonderful place is lake, on the shore, at sunset, waves lapping shore, resting on a blanket, taste of elderberry juice in 7up, a duck going by. Helpful thoughts identified are \"I'm OK\"/general sense of OKness and \"I can find some creative ways to keep myself going.\" Suggestions given for ability to return to this place and state anytime she chooses.  Method of returning patient to usual awake/alert state:  Elevator up, move feet, hands, shoulders, open eyes  Training session audio recorded:  No - Jigna declines recording, tells me she does not use CDs well, and that she feels she will be able to remember and use this exercise.  Patient report following training session:  \"That was very pleasant, really nice. I felt that I was able to really relax. I don't know if I was in a deep state, but I felt really good during the exercise and I feel good and relaxed now. I think I will be able to do this myself.\" Affect bright, breathing appeared to slow, steady, and relax over course of exercise.    Coping:   Helpful activities: Exercise (at home, and potentially at Y including swimming, bike, and/or ); social activities (time with " "friends, choir, volunteering at Religious, intellectual or professional activities)    Helpful cognitions: \"Even though I sometimes feel trapped or powerless, I do have choices\" - \"I'm OK\" - \"Things may be OK\" - \"I can find some creative ways to keep myself going\"    Unhelpful, triggering of exacerbating factors: Dynamic of decisionmaking with  who prefers to continue caregiving in their home for MIL; MIL statements perceives as critical; 24/7 caregiving for MIL during parts of the year; avoidance due to anxiety  (ie of driving, social settings); significant knee problems limiting activities    Adjustment to Illness: Not focus today.    Relationships: See above - caregiving role for MIL is focus today.    End of Life and Advance Care Planning: Not focus today.    Main therapeutic interventions provided this session include:  Cognitive Behavioral Therapy interventions (psychoeducation, cognitive reframing runhelpful thought patterns, and cognitive coping skills review); Motivational Interviewing interventions; Body-Mind skills education (self hypnosis training with cognitive therapy focus)    Plan:  Jigna will return for psychotherapy with palliative care focus in spring 2018 as needed. She is welcome to contact me by phone if needed while in [place] this winter. She will continue use of cognitive coping, committed actions aligned with values, and body-mind skills including mindfulness, progressive muscle relaxation, and self hypnosis.    Time spent with patient/family:  50 minutes (Start 10:05am, end 10:55am)    Palliative Care Counseling Treatment Plan    Client's Name: Jigna Valdez  YOB: 1935     Date: 5/31/2017    Initial DSM5 Diagnoses:   296.22 Major Depressive Disorder, Single Episode, Moderate _  300.02 (F41.1) Generalized Anxiety Disorder      Referral / Collaboration:  .Depending on needs over time, referral to a community therapist may be indicated.      Anticipated number of sessions to " complete episode of care:  12      Treatment Goal(s)  Date Goal Dates  Reviewed Status   5/31/2017 Goal 1:  Client will develop effective strategies for anxiety following treatment for sarcoma.     Continued    Objective #1A (Client Action)  Client will Identify triggers for anxiety/panic attacks, Identify early signs/symptoms of anxiety and Practice self awareness exercises.    Intervention(s)  Therapist will Provide psychoeducation and Facilitate processing of thoughts and feelings .    Continued    Objective #1B  Client will Use/practice relaxation strategies and Identify effective coping strategies.    Intervention(s)  Therapist will Provide psychoeducation, Provide behavioral intervention training and Facilitate structured problem solving.    Continued    Objective #1C  Client will Effectively communicate with medical provider re needed information and Effectively communicate with family/friends re needs.    Intervention(s)  Therapist will Provide psychoeducation and Facilitate processing of thoughts and feelings.    Continued       Date Goal Dates  Reviewed Status   5/31/2017 Goal 2:  Client will increase michelle in her life and develop effective strategies for coping with depressed mood and other difficult emotions.    Continued    Objective #2A (Client Action)  Client will Identify triggers/early signs of depressed mood.    Intervention(s) (Therapist Action)  Therapist will provide psychoeducation and Facilitate processing of thoughts and feelings.    Continued    Objective #2B  Client will Follow realistic exercise plan, Identify activities that provide comfort and/or pleasure, Participate in activities that provide comfort and/or pleasure and Identify an activity that would provide meaning/contribute to feeling of self worth, and effectively communicate needs to others. Develop new goals and increase productive or creative daily activities. Over time, increase frequency of seeing friends and family  members.    Intervention(s)  Therapist will Provide psychoeducation, Facilitate processing of thoughts and feelings and Facilitate structured problem solving.    Completed - But to continue in future as needed    Objective #2C  Client will Participate in self-regulation practice (e.g.: meditation, relaxation exercises, self hypnosis) per day and/or gratitude practices.    Intervention(s)  Therapist will Provide psychoeducation and Provide behavioral intervention training.    Objective #2D  Client will increase self awareness and options for relating to difficult emotions. Added 8/16/2017    Intervention(s)  Therapist will Provide psychoeducation and Provide behavioral intervention training.  Continued       Date Goal Dates  Reviewed Status   5/31/2017 Goal 3:  Client will make progress toward increased wellbeing and health, including working with other health care providers as needed.    Continued    Objective #3A (Client Action)  Client will Communicate effectively with medical provider re needed information for example related to sleep and diet/ weight-loss concerns.    Intervention(s) (Therapist Action)  Therapist will Make referrals to appropriate colleagues as needed, Facilitate processing of thoughts and feelings and Facilitate structured problem solving.   Completed - but to continue as needed     Objective #3B (Client Action)  Client will improve sleep, with long term goal to sleep well for 7 - 8 hours each night.    Intervention(s) (Therapist Action)  Therapist will provide Cognitive Behavioral Therapy for Insomnia, review sleep hygiene options, and provide structured problem solving. Therapist will also address anxiety and depression with goal to assist with  Continued - but considering completion    Objective #3C (Client Action)  Client will continue to increase physical activity (adding stretching and faster walking 10 min per day in addition to daily slow walk) and adjust diet as advised by dietitian  and physician, toward return to healthy weight and energy level.    Intervention(s) (Therapist Action)  Therapist will provide structured problem solving, psychoeducation, and bibliotherapy and referrals as appropriate.  Continued - On pause due to right knee arthritis/inflammation       Client has reviewed and agreed to the above plan on June 7, 2017.    JOURDAN Chilel, LICSW      DO NOT SEND ANY LETTERS

## 2018-01-16 ENCOUNTER — TELEPHONE (OUTPATIENT)
Dept: ORTHOPEDICS | Facility: CLINIC | Age: 83
End: 2018-01-16

## 2018-01-16 NOTE — TELEPHONE ENCOUNTER
Patient called to request address to send imaging cd and reports to be scanned in for review by Dr. Ojeda.  Patient was provided with address to department office to send CD's so that Adriana MUNGUAI, imaging coordinator, can scans the imaging/reports to PACS.    Tessie Quintero LPN

## 2018-01-16 NOTE — TELEPHONE ENCOUNTER
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     Patient calls today stating that her knee is giving her more issues and that the lower part of her leg is bowing. She had an MRI done at [facility] on 7/31/17 and is requesting that Dr. Ojeda take a look at it. She is in [place] caring for her mother-in-law and is wondering if this is something she will need to come back for. She is contacting [facility] to have this sent to us. Dr. Ojeda's team notified of request and that MRI will be coming.

## 2018-02-16 ENCOUNTER — TRANSFERRED RECORDS (OUTPATIENT)
Dept: HEALTH INFORMATION MANAGEMENT | Facility: CLINIC | Age: 83
End: 2018-02-16

## 2018-04-27 ENCOUNTER — OFFICE VISIT (OUTPATIENT)
Dept: ORTHOPEDICS | Facility: CLINIC | Age: 83
End: 2018-04-27
Payer: COMMERCIAL

## 2018-04-27 VITALS — BODY MASS INDEX: 22.73 KG/M2 | WEIGHT: 123.5 LBS | HEIGHT: 62 IN

## 2018-04-27 DIAGNOSIS — C49.9 SARCOMA OF SOFT TISSUE (H): Primary | ICD-10-CM

## 2018-04-27 NOTE — PROGRESS NOTES
Diagnosis: 1. Right calf high grade MFH.   2. 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 PET CT study with neoadjuvant chemotherapy, surgical resection Feb 15, 2010.   Complications post-op of skin loss with draining seroma, treated with I & D and systemic antibiotics.   2. Non operative treatment.    Area seen today with her .  She has had trouble over the winter months with her right knee.  This is a site of her prior sarcoma resection and soft tissue reconstruction radiation.  Her main complaint is the varus orientation of her knee and the need [place]    She denies pain within the but is bothered by the alignment and bothered by the brace.    On physical exam her knee motion is from full extension to 100  of flexion.  Her knee is an obvious varus orientation.  She has an effusion throughout the right knee.    X-rays were obtained which show progressive collapse of the medial portion of the proximal tibia.  This could be a combination of postradiation osteonecrosis and collapse versus post radiation for osteoporotic insufficiency fractures.    Impression: Jigna is a very complex right knee.  In addition to the oncologic complexity she did not have a significant varus deformity.  She may be a candidate for knee arthroplasty.      Plan: Recommend that she see Dr. Travis in consultation as he is expert in complex knee arthroplasty and oncology.  I will emphasize to her that the goals of surgery would need to be quite clear in the typical goal for knee arthroplasty his pain relief.    She seen today as an established patient.

## 2018-04-27 NOTE — NURSING NOTE
"Chief Complaint   Patient presents with     Surgical Followup     Pt. states that she is here today for Right Leg Bowing but states that the Bowing is getting worse. She states that she was seen by her PCP who told her that the Femur is collapsing and told her that she should not be walking on that Leg. S/p Sarcoma, Right posterior calf musculature DOS: 2/15/2010     RECHECK     Pt. states that she and her  went to [place] and during their time there she was seen by an Orthopaedic Surgeon (Genoveva Hendrickson) who told her that she should have it looked at by her Original Surgeon.        83 year old  1935    Ht 1.575 m (5' 2\")  Wt 56 kg (123 lb 8 oz)  BMI 22.59 kg/m2                    [facility] - [place], [place] - [address] [facility] [address] - [place], [place] - [address]    Allergies   Allergen Reactions     Colchicine      Other reaction(s): Gastrointestinal  diarrhea     Niacin      skin rash     Ferrous Sulfate Rash     Current Outpatient Prescriptions   Medication     acetaminophen (TYLENOL) 500 MG tablet     levothyroxine (LEVOTHROID) 75 MCG tablet     No current facility-administered medications for this visit.                      "

## 2018-04-27 NOTE — LETTER
4/27/2018       RE: Jigna Allen  [address] [place] [address]     Dear Colleague,    Thank you for referring your patient, Jigna Allen, to the [facility] at [facility]. Please see a copy of my visit note below.    Diagnosis: 1. Right calf high grade MFH.   2. 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 PET CT study with neoadjuvant chemotherapy, surgical resection Feb 15, 2010.   Complications post-op of skin loss with draining seroma, treated with I & D and systemic antibiotics.   2. Non operative treatment.    Area seen today with her .  She has had trouble over the winter months with her right knee.  This is a site of her prior sarcoma resection and soft tissue reconstruction radiation.  Her main complaint is the varus orientation of her knee and the need to wear [place]    She denies pain within the but is bothered by the alignment and bothered by the brace.    On physical exam her knee motion is from full extension to 100  of flexion.  Her knee is an obvious varus orientation.  She has an effusion throughout the right knee.    X-rays were obtained which show progressive collapse of the medial portion of the proximal tibia.  This could be a combination of postradiation osteonecrosis and collapse versus post radiation for osteoporotic insufficiency fractures.    Impression: Jigna is a very complex right knee.  In addition to the oncologic complexity she did not have a significant varus deformity.  She may be a candidate for knee arthroplasty.      Plan: Recommend that she see Dr. Travis in consultation as he is expert in complex knee arthroplasty and oncology.  I will emphasize to her that the goals of surgery would need to be quite clear in the typical goal for knee arthroplasty his pain relief.    She seen today as an established  patient.      Again, thank you for allowing me to participate in the care of your patient.      Sincerely,    Bryon Ojeda MD

## 2018-04-27 NOTE — MR AVS SNAPSHOT
After Visit Summary   4/27/2018    Jigna Allen    MRN: [id number]           Patient Information     Date Of Birth          1935        Visit Information        Provider Department      4/27/2018 11:30 AM Bryno Ojeda MD [facility]        Today's Diagnoses     Sarcoma of soft tissue (H)    -  1       Follow-ups after your visit        Your next 10 appointments already scheduled     May 03, 2018 11:30 AM CDT   (Arrive by 11:15 AM)   New Patient Visit with He Gomez MD   [facility] ([facility])    [address]  4th Floor  [facility] [id number] [phone]            May 15, 2018  8:30 AM CDT   (Arrive by 8:15 AM)   XR CHEST 2 VIEWS with UCXR1   [facility] ([facility])    [address] [place] [address]  1st Floor  [facility] [id number] [phone]           Please bring a list of your current medicines to your exam. (Include vitamins, minerals and over-thecounter medicines.) Leave your valuables at home.  Tell your doctor if there is a chance you may be pregnant.  You do not need to do anything special for this exam.            May 15, 2018  9:30 AM CDT   (Arrive by 9:15 AM)   Return Visit with Edouard Bryan MD   [facility] ([facility])    [address] [place] [address]  [address]  [facility] [id number] [phone]              Who to contact     Please call your clinic at [phone] to:    Ask questions about your health    Make or cancel appointments    Discuss your medicines    Learn about your test results    Speak to your doctor            Additional Information About Your Visit        MyChart Information     [employer] gives you secure access to your electronic health record. If you see a primary care provider, you can also send messages to your care team and make appointments. If you have questions, please call your  "primary care clinic.  If you do not have a primary care provider, please call [phone] and they will assist you.      [employer] is an electronic gateway that provides easy, online access to your medical records. With [employer], you can request a clinic appointment, read your test results, renew a prescription or communicate with your care team.     To access your existing account, please contact your [facility] or call [phone] for assistance.        Care EveryWhere ID     This is your Care EveryWhere ID. This could be used by other organizations to access your [place] medical records  [id number]        Your Vitals Were     Height BMI (Body Mass Index)                1.575 m (5' 2\") 22.59 kg/m2           Blood Pressure from Last 3 Encounters:   05/09/17 130/79   09/16/16 138/67   09/15/16 148/68    Weight from Last 3 Encounters:   04/27/18 56 kg (123 lb 8 oz)   05/10/17 51.3 kg (113 lb 1.6 oz)   05/09/17 50.8 kg (112 lb 1.6 oz)              Today, you had the following     No orders found for display         Today's Medication Changes          These changes are accurate as of 4/27/18  5:25 PM.  If you have any questions, ask your nurse or doctor.               Stop taking these medicines if you haven't already. Please contact your care team if you have questions.     doxycycline Monohydrate 100 MG Tabs   Stopped by:  Bryon Ojeda MD           TYLENOL PM EXTRA STRENGTH PO   Stopped by:  Bryon Ojeda MD           VITAMIN B 12 PO   Stopped by:  Bryon Ojeda MD                    Primary Care Provider Office Phone # Fax #    Elisabeth Ko [phone]       [address] [address]        Equal Access to Services     MIKEY CANO AH: Denis Hu, waaxda luqadaha, qaybta kaalmada narinder wilson. So [facility] [phone].    ATENCIÓN: Si mino topete " a jade disposición servicios gratuitos de asistencia lingüística. Samira cartwright [phone].    We comply with applicable federal civil rights laws and [place] laws. We do not discriminate on the basis of race, color, national origin, age, disability, sex, sexual orientation, or gender identity.            Thank you!     Thank you for choosing [facility]  for your care. Our goal is always to provide you with excellent care. Hearing back from our patients is one way we can continue to improve our services. Please take a few minutes to complete the written survey that you may receive in the mail after your visit with us. Thank you!             Your Updated Medication List - Protect others around you: Learn how to safely use, store and throw away your medicines at www.disposemymeds.org.          This list is accurate as of 4/27/18  5:25 PM.  Always use your most recent med list.                   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

## 2018-04-30 DIAGNOSIS — M19.90 OSTEOARTHRITIS: Primary | ICD-10-CM

## 2018-04-30 ASSESSMENT — ENCOUNTER SYMPTOMS
NECK PAIN: 0
DISTURBANCES IN COORDINATION: 0
DIZZINESS: 0
HEMATURIA: 0
MUSCLE WEAKNESS: 0
SINUS CONGESTION: 0
HOARSE VOICE: 0
PARALYSIS: 0
SPEECH CHANGE: 0
SEIZURES: 0
FLANK PAIN: 0
WEAKNESS: 0
JOINT SWELLING: 1
MEMORY LOSS: 1
TINGLING: 1
DECREASED CONCENTRATION: 0
INSOMNIA: 1
MYALGIAS: 0
HEADACHES: 0
STIFFNESS: 1
SMELL DISTURBANCE: 0
NECK MASS: 0
SORE THROAT: 0
DIFFICULTY URINATING: 0
DEPRESSION: 0
PANIC: 0
NUMBNESS: 0
DYSURIA: 0
LOSS OF CONSCIOUSNESS: 0
TREMORS: 0
BACK PAIN: 0
MUSCLE CRAMPS: 0
NERVOUS/ANXIOUS: 1
TASTE DISTURBANCE: 0
SINUS PAIN: 0
TROUBLE SWALLOWING: 0

## 2018-05-01 NOTE — TELEPHONE ENCOUNTER
FUTURE VISIT INFORMATION      FUTURE VISIT INFORMATION:    Date: 5/3/18    Time: 1130    Location: ORTHO  REFERRAL INFORMATION:    Referring provider:  DR RAMOS    Referring providers clinic:  ORTHO    Reason for visit/diagnosis   R KNEE      RECORDS RECEIVED FROM: INTERNAL   DATE RECEIVED: 5/1/18   NOTES STATUS DETAILS   OFFICE NOTE from referring provider Internal 4/27/18   OFFICE NOTE from other specialist N/A    DISCHARGE SUMMARY from hospital N/A    DISCHARGE REPORT from the ER N/A    OPERATIVE REPORT N/A    MEDICATION LIST N/A    IMPLANT RECORD/STICKER N/A    LABS     CBC/DIFF Internal    CULTURES N/A    IMAGING  (NEED IMAGES & REPORTS) N/A    INJECTIONS DONE IN RADIOLOGY N/A    MRI Internal 12/2/13*   CT SCAN N/A    XRAYS (IMAGES & REPORTS) N/A    TUMOR     PATHOLOGY  Slides & report N/A

## 2018-05-03 ENCOUNTER — OFFICE VISIT (OUTPATIENT)
Dept: ORTHOPEDICS | Facility: CLINIC | Age: 83
End: 2018-05-03
Payer: COMMERCIAL

## 2018-05-03 ENCOUNTER — RADIANT APPOINTMENT (OUTPATIENT)
Dept: GENERAL RADIOLOGY | Facility: CLINIC | Age: 83
End: 2018-05-03
Payer: COMMERCIAL

## 2018-05-03 ENCOUNTER — RADIANT APPOINTMENT (OUTPATIENT)
Dept: GENERAL RADIOLOGY | Facility: CLINIC | Age: 83
End: 2018-05-03
Attending: ORTHOPAEDIC SURGERY
Payer: COMMERCIAL

## 2018-05-03 ENCOUNTER — PRE VISIT (OUTPATIENT)
Dept: ORTHOPEDICS | Facility: CLINIC | Age: 83
End: 2018-05-03

## 2018-05-03 DIAGNOSIS — M21.161 GENU VARUM OF RIGHT LOWER EXTREMITY: Primary | ICD-10-CM

## 2018-05-03 DIAGNOSIS — C49.9 SARCOMA OF SOFT TISSUE (H): ICD-10-CM

## 2018-05-03 DIAGNOSIS — M21.161 GENU VARUM OF RIGHT LOWER EXTREMITY: ICD-10-CM

## 2018-05-03 DIAGNOSIS — M19.90 OSTEOARTHRITIS: ICD-10-CM

## 2018-05-03 ASSESSMENT — KOOS JR
HOW SEVERE IS YOUR KNEE STIFFNESS AFTER FIRST WAKING IN MORNING: MILD
HOW SEVERE IS YOUR KNEE STIFFNESS AFTER FIRST WAKING IN MORNING: 1

## 2018-05-03 ASSESSMENT — ACTIVITIES OF DAILY LIVING (ADL): FUNCTION,_DAILY_LIVING_SCORE: 76.47

## 2018-05-03 NOTE — PROGRESS NOTES
[facility], Orthopaedic Surgery Consultation  by He Gomez M.D.    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  This is more of a cosmetic and functional issue at this point as she has no pain.  We discussed treatment options including Operative and nonoperative. We discussed risks and benefits of reconstruction of the knee with total knee arthroplasty and specifically her increased risk of infection. She is undecided regarding surgery. In the interim we have offered her a medial unloading brace. She will follow up PRN.             History of Present Illness:   83 year old female  chief complaint: Right knee pain    HPI:  Jigna is an 83-year-old female who is status post resection of UPS in right proximal tibia and neoadjuvant chemo and post op radiation in 2010.  She presents today after request by Dr. Ojeda for evaluation and discussion regarding her right knee.  She has had progressive deformity in her right knee over the last few years.  Last imaging evaluation was in 2015 here and this is definitely progressed since then.  She has no pain whatsoever in the knee.  She continues to walk on it.  She does have some slight hesitancy with the knee and and does not trust it fully as she walks down the stairs sideways.  Other than that she continues to be active with her  although this is limiting her due to the awkwardness of the right leg.  She does notice that this is shorter on that side as well.  She has no sensation about the knee on the posterior aspect.  She does have sensation on the anterior aspect.  No sensory issues and the rest of the leg , lives with her  and is very active with walks and traveling.  She denies any numbness or  tingling in the leg.  No history of blood clots or bleeding disorders.  Does not smoke.  Rare alcohol use.        Background history:  Diagnosis: 1. Right calf high grade MFH.   2. 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 neoadjuvant chemotherapy, surgical resection Feb 15, 2010.   Complications post-op of skin loss with draining seroma, treated with I & D and systemic antibiotics.   She had 4 cycles of doxil/ifos finishing about Feb 2010. There was <10% viable cells at surgery.   She finished post op XRT about June 2010.               Physical Exam:     EXAMINATION pertinent findings:   VITAL SIGNS: not currently breastfeeding.  There is no height or weight on file to calculate BMI.  RESP: non labored breathing   ABD: benign   SKIN: grossly normal   LYMPHATIC: grossly normal   NEURO: grossly normal   VASCULAR: satisfactory perfusion of all extremities   MUSCULOSKELETAL:   Examination of the bilateral lower extremity shows a severe varus deformity of the right knee.  She has a well-healed but fibrotic posterior knee scar.  Range of motion is 0-110 .  She has got marked pseudolaxity with valgus stress.  She does have some anterior drawer laxity as well.  She has intact extensor mechanism.  She has no pain with range of motion of the hip.  No pain throughout the knee.  She does have intact sensation on the anterior aspect of the knee.    Her sensation is intact to light touch in DP, SP, tibial nerves.  She does have palpable dorsalis pedis pulse.  She has 5/5 strength in EHL, FHL, tib ant, gastrocsoleus complex.                  Data:   All laboratory data reviewed  All imaging studies reviewed by me  AP lateral and full length standing alignment films of the right knee were reviewed today.  She has significant 46  varus deformity.  This is centered at the proximal tibial plateau where there is been collapse of the medial plateau as well as subluxation of the tibia  laterally.      Patient seen and discussed with Dr. Jason Oliveira  PGY4       He Gomez MD  [facility] Family Professor  Oncology and Adult Reconstructive Surgery  Dept Orthopaedic Surgery, [facility]  [phone] office, [phone] pager  [email or web address]            DATA for DOCUMENTATION:         Past Medical History:     Patient Active Problem List   Diagnosis     Sarcoma of soft tissue (H)     Macrocytosis     Sarcoma (H)     Post-traumatic osteoarthritis of both knees     Fatigue, unspecified type     Hypothyroidism, unspecified type     Anxiety     Lyme disease     Adjustment disorder with anxious mood     ACP (advance care planning)     Past Medical History:   Diagnosis Date     Arthritis 1990?     Cancer (H) 2009     Thyroid disease 1990?       Also see scanned health assessment forms.       Past Surgical History:   No past surgical history on file.         Social History:     Social History     Social History     Marital status:      Spouse name: N/A     Number of children: N/A     Years of education: N/A     Occupational History     Not on file.     Social History Main Topics     Smoking status: Never Smoker     Smokeless tobacco: Never Used     Alcohol use No     Drug use: No     Sexual activity: Yes     Partners: Male     Birth control/ protection: Post-menopausal     Other Topics Concern     Not on file     Social History Narrative            Family History:       Family History   Problem Relation Age of Onset     CANCER Father      Prostate Cancer Father      Other Cancer Father      CANCER Brother             Medications:     Current Outpatient Prescriptions   Medication Sig     acetaminophen (TYLENOL) 500 MG tablet Take 500-1,000 mg by mouth as needed     levothyroxine (LEVOTHROID) 75 MCG tablet Take 75 mcg by mouth daily      No current facility-administered medications for this visit.               Review of Systems:   A comprehensive 10 point review of systems  (constitutional, ENT, cardiac, peripheral vascular, lymphatic, respiratory, GI, , Musculoskeletal, skin, Neurological) was performed and found to be negative except as described in this note.     See intake form completed by patient        Answers for HPI/ROS submitted by the patient on 4/30/2018   General Symptoms: No  Skin Symptoms: No  HENT Symptoms: Yes  EYE SYMPTOMS: No  HEART SYMPTOMS: No  LUNG SYMPTOMS: No  INTESTINAL SYMPTOMS: No  URINARY SYMPTOMS: Yes  GYNECOLOGIC SYMPTOMS: No  BREAST SYMPTOMS: No  SKELETAL SYMPTOMS: Yes  BLOOD SYMPTOMS: No  NERVOUS SYSTEM SYMPTOMS: Yes  MENTAL HEALTH SYMPTOMS: Yes  Ear pain: No  Ear discharge: No  Hearing loss: Yes  Tinnitus: Yes  Nosebleeds: No  Congestion: No  Sinus pain: No  Trouble swallowing: No   Voice hoarseness: No  Mouth sores: No  Sore throat: No  Tooth pain: No  Gum tenderness: No  Bleeding gums: No  Change in taste: No  Change in sense of smell: No  Dry mouth: No  Hearing aid used: No  Neck lump: No  Trouble holding urine or incontinence: No  Pain or burning: No  Trouble starting or stopping: No  Increased frequency of urination: No  Blood in urine: No  Decreased frequency of urination: No  Frequent nighttime urination: Yes  Flank pain: No  Difficulty emptying bladder: No  Back pain: No  Muscle aches: No  Neck pain: No  Swollen joints: Yes  Bone pain: No  Muscle cramps: No  Muscle weakness: No  Joint stiffness: Yes  Bone fracture: Yes  Trouble with coordination: No  Dizziness or trouble with balance: No  Fainting or black-out spells: No  Memory loss: Yes  Headache: No  Seizures: No  Speech problems: No  Tingling: Yes  Tremor: No  Weakness: No  Difficulty walking: Yes  Paralysis: No  Numbness: No  Nervous or Anxious: Yes  Depression: No  Trouble sleeping: Yes  Trouble thinking or concentrating: No  Mood changes: No  Panic attacks: No    Attending MD (Dr. He Gomez) Attestation :  This patient was seen and evaluated by me including a history, exam, and  interpretation of all imaging and/or lab data.  Either a training physician (resident/fellow), who also saw the patient, or scribe has documented the clinic visit in the attached note.    MD Pelon Cid Family Professor  Oncology and Adult Reconstructive Surgery  Dept Orthopaedic Surgery, [facility]  [phone] office, [phone] pager  [email or web address]

## 2018-05-03 NOTE — LETTER
5/3/2018       RE: Jigna Allen  [address] [place] [address]     Dear Colleague,    Thank you for referring your patient, Jigna Allen, to the [facility] at [facility]. Please see a copy of my visit note below.        [facility], Orthopaedic Surgery Consultation  by He Gomez M.D.    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  This is more of a cosmetic and functional issue at this point as she has no pain.  We discussed treatment options including Operative and nonoperative. We discussed risks and benefits of reconstruction of the knee with total knee arthroplasty and specifically her increased risk of infection. She is undecided regarding surgery. In the interim we have offered her a medial unloading brace. She will follow up PRN.             History of Present Illness:   83 year old female  chief complaint: Right knee pain    HPI:  Jigna is an 83-year-old female who is status post resection of UPS in right proximal tibia and neoadjuvant chemo and post op radiation in 2010.  She presents today after request by Dr. Ojeda for evaluation and discussion regarding her right knee.  She has had progressive deformity in her right knee over the last few years.  Last imaging evaluation was in 2015 here and this is definitely progressed since then.  She has no pain whatsoever in the knee.  She continues to walk on it.  She does have some slight hesitancy with the knee and and does not trust it fully as she walks down the stairs sideways.  Other than that she continues to be active with her  although this is limiting her due to the awkwardness of the right leg.  She does notice that this is  shorter on that side as well.  She has no sensation about the knee on the posterior aspect.  She does have sensation on the anterior aspect.  No sensory issues and the rest of the leg , lives with her  and is very active with walks and traveling.  She denies any numbness or tingling in the leg.  No history of blood clots or bleeding disorders.  Does not smoke.  Rare alcohol use.        Background history:  Diagnosis: 1. Right calf high grade MFH.   2. 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 neoadjuvant chemotherapy, surgical resection Feb 15, 2010.   Complications post-op of skin loss with draining seroma, treated with I & D and systemic antibiotics.   She had 4 cycles of doxil/ifos finishing about Feb 2010. There was <10% viable cells at surgery.   She finished post op XRT about June 2010.               Physical Exam:     EXAMINATION pertinent findings:   VITAL SIGNS: not currently breastfeeding.  There is no height or weight on file to calculate BMI.  RESP: non labored breathing   ABD: benign   SKIN: grossly normal   LYMPHATIC: grossly normal   NEURO: grossly normal   VASCULAR: satisfactory perfusion of all extremities   MUSCULOSKELETAL:   Examination of the bilateral lower extremity shows a severe varus deformity of the right knee.  She has a well-healed but fibrotic posterior knee scar.  Range of motion is 0-110 .  She has got marked pseudolaxity with valgus stress.  She does have some anterior drawer laxity as well.  She has intact extensor mechanism.  She has no pain with range of motion of the hip.  No pain throughout the knee.  She does have intact sensation on the anterior aspect of the knee.    Her sensation is intact to light touch in DP, SP, tibial nerves.  She does have palpable dorsalis pedis pulse.  She has 5/5 strength in EHL, FHL, tib ant, gastrocsoleus complex.                  Data:   All laboratory data reviewed  All imaging studies reviewed  by me  AP lateral and full length standing alignment films of the right knee were reviewed today.  She has significant 46  varus deformity.  This is centered at the proximal tibial plateau where there is been collapse of the medial plateau as well as subluxation of the tibia laterally.      Patient seen and discussed with Dr. Jason Oliveira  PGY4       MD Pelon Cid Family Professor  Oncology and Adult Reconstructive Surgery  Dept Orthopaedic Surgery, [facility]  [phone] office, [phone] pager  [email or web address]            DATA for DOCUMENTATION:         Past Medical History:     Patient Active Problem List   Diagnosis     Sarcoma of soft tissue (H)     Macrocytosis     Sarcoma (H)     Post-traumatic osteoarthritis of both knees     Fatigue, unspecified type     Hypothyroidism, unspecified type     Anxiety     Lyme disease     Adjustment disorder with anxious mood     ACP (advance care planning)     Past Medical History:   Diagnosis Date     Arthritis 1990?     Cancer (H) 2009     Thyroid disease 1990?       Also see scanned health assessment forms.       Past Surgical History:   No past surgical history on file.         Social History:     Social History     Social History     Marital status:      Spouse name: N/A     Number of children: N/A     Years of education: N/A     Occupational History     Not on file.     Social History Main Topics     Smoking status: Never Smoker     Smokeless tobacco: Never Used     Alcohol use No     Drug use: No     Sexual activity: Yes     Partners: Male     Birth control/ protection: Post-menopausal     Other Topics Concern     Not on file     Social History Narrative            Family History:       Family History   Problem Relation Age of Onset     CANCER Father      Prostate Cancer Father      Other Cancer Father      CANCER Brother             Medications:     Current Outpatient Prescriptions   Medication Sig     acetaminophen  (TYLENOL) 500 MG tablet Take 500-1,000 mg by mouth as needed     levothyroxine (LEVOTHROID) 75 MCG tablet Take 75 mcg by mouth daily      No current facility-administered medications for this visit.               Review of Systems:   A comprehensive 10 point review of systems (constitutional, ENT, cardiac, peripheral vascular, lymphatic, respiratory, GI, , Musculoskeletal, skin, Neurological) was performed and found to be negative except as described in this note.     See intake form completed by patient      Again, thank you for allowing me to participate in the care of your patient.      Sincerely,    He Gomez MD

## 2018-05-03 NOTE — NURSING NOTE
Chief Complaint   Patient presents with     Consult     Pt. states that she is here today for Right Knee Pain, Osteoarthritis, and Bowing of her Entire Leg S/p  Sarcoma, right posterior calf musculature DOS: 2/15/2010 done by Dr. Ojeda. Referring:  GERARDO OJEDAIS       83 year old  1935          [facility] - [place], [place] - [address]    Allergies   Allergen Reactions     Colchicine      Other reaction(s): Gastrointestinal  diarrhea     Niacin      skin rash     Ferrous Sulfate Rash     Current Outpatient Prescriptions   Medication     acetaminophen (TYLENOL) 500 MG tablet     levothyroxine (LEVOTHROID) 75 MCG tablet     No current facility-administered medications for this visit.          Questionnaires:    KOOS Knee Survey Assessment    Knee Outcome Survey ADL Scale (TERRENCE Garcia; EZRA Hodge; CEZAR Young; Aristides, ELEUTERIO; CY Jioner; 1998) 5/3/2018   Do you have swelling in your knee? Always   Do you feel grinding, hear clicking or any other type of noise when your knee moves? Sometimes   Does your knee catch or hang up when moving? Never   Can you straighten your knee fully? Always   Can you bend your knee fully? Never   How severe is your knee joint stiffness after first wakening in the morning? Mild   How severe is your knee stiffness after sitting, lying or resting LATER IN THE DAY? Mild   How often do you experience knee pain? Never   Twisting/pivoting on your knee None   Straightening knee fully None   Bending knee fully None   Walking on flat surface None   Going up or down stairs None   At night while in bed None   Sitting or lying None   Standing upright None   Descending stairs Moderate   Ascending stairs Moderate   Rising from sitting Moderate   Standing None   Bending to floor/ an object Mild   Walking on flat surface Mild   Getting in/out of car Mild   Going shopping Mild   Putting on socks/stockings None   Rising from bed None   Taking off socks/stockings None   Lying in  bed (turning over, maintaining knee position) None   Getting in/out of bath None   Sitting None   Getting on/off toilet None   Heavy domestic duties (moving heavy boxes, scrubbing floors, etc) Extreme   Light domestic duties (cooking, dusting, etc) Moderate   Squatting Extreme   Running Extreme   Jumping Extreme   Twisting/pivoting on your injured knee Extreme   Kneeling Extreme   How often are you aware of your knee problem? Constantly   Have you modified your life style to avoid potentially damaging activities to your knee? Totally   How much are you troubled with lack of confidence in your knee? Totally   In general, how much difficulty do you have with your knee? Extreme   Pain Score 100   Symptoms Score 57.14   Function, Daily Living Score 76.47   Sports and Rec Score 0   Quality of Life Score 0            Promis 10 Assessment    PROMIS 10 5/3/2018   In general, would you say your health is: Very good   In general, would you say your quality of life is: Very good   In general, how would you rate your physical health? Very good   In general, how would you rate your mental health, including your mood and your ability to think? Very good   In general, how would you rate your satisfaction with your social activities and relationships? Good   In general, please rate how well you carry out your usual social activities and roles Good   To what extent are you able to carry out your everyday physical activities such as walking, climbing stairs, carrying groceries, or moving a chair? Moderately   How often have you been bothered by emotional problems such as feeling anxious, depressed or irritable? Sometimes   How would you rate your fatigue on average? Moderate   How would you rate your pain on average?   0 = No Pain  to  10 = Worst Imaginable Pain 0   In general, would you say your health is: 4   In general, would you say your quality of life is: 4   In general, how would you rate your physical health? 4   In general,  how would you rate your mental health, including your mood and your ability to think? 4   In general, how would you rate your satisfaction with your social activities and relationships? 3   In general, please rate how well you carry out your usual social activities and roles. (This includes activities at home, at work and in your community, and responsibilities as a parent, child, spouse, employee, friend, etc.) 3   To what extent are you able to carry out your everyday physical activities such as walking, climbing stairs, carrying groceries, or moving a chair? 3   In the past 7 days, how often have you been bothered by emotional problems such as feeling anxious, depressed, or irritable? 3   In the past 7 days, how would you rate your fatigue on average? 3   In the past 7 days, how would you rate your pain on average, where 0 means no pain, and 10 means worst imaginable pain? 0   Global Mental Health Score 14   Global Physical Health Score 15   PROMIS TOTAL - SUBSCORES 29   Some recent data might be hidden

## 2018-05-03 NOTE — MR AVS SNAPSHOT
After Visit Summary   5/3/2018    Jigna Allen    MRN: [id number]           Patient Information     Date Of Birth          1935        Visit Information        Provider Department      5/3/2018 11:30 AM He Gomez MD [facility]        Today's Diagnoses     Genu varum of right lower extremity    -  1    Sarcoma of soft tissue (H)           Follow-ups after your visit        Additional Services     ORTHOTICS REFERRAL (external)       **This referral order prints off in the [facility]  (Orthotics & Prosthetics) Central Scheduling Office**    The [facility] Scheduling Staff will contact the patient to schedule appointments.     Central Scheduling Contact Information: [phone] ([place])    Orthotics: Right  Knee medial  Brace    Please be aware that coverage of these services is subject to the terms and limitations of your health insurance plan.  Call member services at your health plan with any benefit or coverage questions.      Please bring the following to your appointment:    >>   Any x-rays, CTs or MRIs which have been performed.  Contact the facility where they were done to arrange for  prior to your scheduled appointment.    >>   List of current medications   >>   This referral request   >>   Any documents/labs given to you for this referral                  Your next 10 appointments already scheduled     May 15, 2018  8:30 AM CDT   XR CHEST 2 VIEWS with UCXR1   [facility] ([facility])    [address] [place] [address] [phone]           Please bring a list of your current medicines to your exam. (Include vitamins, minerals and over-thecounter medicines.) Leave your valuables at home.  Tell your doctor if there is a chance you may be pregnant.  You do not need to do anything special for this exam.            May 15, 2018  9:30 AM CDT   (Arrive  by 9:15 AM)   Return Visit with Edouard Bryan MD   [facility] ([facility])    [address]  [address]  [facility] [address] [phone]              Who to contact     Please call your clinic at [phone] to:    Ask questions about your health    Make or cancel appointments    Discuss your medicines    Learn about your test results    Speak to your doctor            Additional Information About Your Visit        [email or web address] Information     [employer] gives you secure access to your electronic health record. If you see a primary care provider, you can also send messages to your care team and make appointments. If you have questions, please call your primary care clinic.  If you do not have a primary care provider, please call [phone] and they will assist you.      Se[employer]es is an electronic gateway that provides easy, online access to your medical records. With [employer], you can request a clinic appointment, read your test results, renew a prescription or communicate with your care team.     To access your existing account, please contact your [facility] or call [phone] for assistance.        Care EveryWhere ID     This is your Care EveryWhere ID. This could be used by other organizations to access your [facility] medical records  [id number]         Blood Pressure from Last 3 Encounters:   No data found for BP    Weight from Last 3 Encounters:   No data found for Wt              Today, you had the following     No orders found for display       Primary Care Provider Office Phone # Fax #    Elisabeth Ko [phone]       [address] [address]        Equal Access to Services     MIKEY CANO : Hadii lula Hu, wasara escalante, qaybta narinder wick. So [place] [phone].    ATENCIÓN: Si yadiel ortega,  tiene a jade disposición servicios gratuitos de asistencia lingüística. Samira cartwright [phone].    We comply with applicable federal civil rights laws and [place] laws. We do not discriminate on the basis of race, color, national origin, age, disability, sex, sexual orientation, or gender identity.            Thank you!     Thank you for choosing [facility]  for your care. Our goal is always to provide you with excellent care. Hearing back from our patients is one way we can continue to improve our services. Please take a few minutes to complete the written survey that you may receive in the mail after your visit with us. Thank you!             Your Updated Medication List - Protect others around you: Learn how to safely use, store and throw away your medicines at www.disposemymeds.org.          This list is accurate as of 5/3/18 11:59 PM.  Always use your most recent med list.                   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

## 2018-05-14 ENCOUNTER — TELEPHONE (OUTPATIENT)
Dept: ORTHOPEDICS | Facility: CLINIC | Age: 83
End: 2018-05-14

## 2018-05-14 DIAGNOSIS — M17.11 PRIMARY OSTEOARTHRITIS OF RIGHT KNEE: Primary | ICD-10-CM

## 2018-05-14 NOTE — PROGRESS NOTES
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   -  Background   In brief, she was doing well until August 2009, when after doing a lot of walking while on vacation, she developed some discomfort and swelling in the proximal right calf. 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   She had 4 cycles of doxil/ifos finishing about Feb 2010. There was <10% viable cells at surgery.   She finished post op XRT about June 2010. She did have a post-op staph infection and had some subsequent wound healing problems.   -      Interval history     She had some problems w her R knee w deformity but not much pain.  She is having surgery June 12.    She still has problems w fatigue and was treated for Lyme disease, though labs were confusing.  She had an episode of Lyme disease treated in July 2011    Her mother in law is now 100 and is living w them and they are taking care of her and that's stressful.  [place] was not very active in [place] largely due to care for her mother in law.  Not on zoloft and not very anxious now.    She had CTS surgery on her hand L but is not sure it helped much and it still tingles.    She has a rectocoele and found a pessary very effective.  Occ incontinence.    Some occasional constipation and we discussed trying some colace.    She notes occasional tinnitus that can be bothersome and has a history of hearing loss and has a hearing aid but it does not help much and she often does not use it.  Dr Ojeda felt the CT finding in T4 was a benign hemangioma.  She remains on 75 mgc/d T4.    The rest of a 10 point ROS is otherwise negative as I confirmed w/ her today.     -   Background PMH, FH,SH   PH notable for rectocele, diverticulosis, eczema, hyperlipidemia, hypothyroidism, one episode of documented leukopenia. She had cataracts removed.   History of patellofemoral pain syndrome   FH notable for a cancer in the father, uncle, and some  "cousins and also glioblastoma in a brother. Paternal grandmother had stomach cancer. Her father had testicular cancer and  at age 87.   She has two adult children. She works as a professor and  at the [employer] She does not smoke or abuse alcohol or other drugs.   She is  and came with her .   ALLERGIES: She describes allergies to Niacin and ferrous sulfate manifested as a red rash.   -     On examination, she appeared comfortable in good spirits.  /74  Pulse 72  Temp 97.4  F (36.3  C) (Oral)  Resp 16  Ht 1.575 m (5' 2.01\")  Wt 55.2 kg (121 lb 12.8 oz)  SpO2 97%  BMI 22.27 kg/m2  HEENT No icterus.  Some bilateral decreased hearing.   NECK no thyromegaly.  CHEST chest is clear.  LYMPH No lymphadenopathy.    CV RRR with no significant murmur.   ABD No HSMT.   NEURO good ambulation w cane, normal mentation  EXT R leg healed w/ XRT changes and bowing.  No significant edema.    PSYCH Mood a bit anxious.     -   No labs.     -   I reviewed the new CXR and I see no clear recurrence, but it has not yet been formally read.      -  She also has a h/o of a likely hemangioma in a pedicle seen on earlier imaging.  -    It has now been about 8 years since last treatment.  She will have the knee surgery next month.  We will plan to see her about 5-9 (she is now about 8 years out) with a CXR if the formal read today is OK.  She will call if she has other questions in the interim.   -  Edouard Bryan M.D.   Professor, Hematology, Oncology and Transplantation   Phone: [phone]   cc:   Wayne Castelan MD   [address] [address]   Elisabeth Ko MD   [facility]   [address] [place] [address]   Bryon Ojeda M.D.   [facility] 588   "

## 2018-05-14 NOTE — TELEPHONE ENCOUNTER
[facility] Call Center    Phone Message    May a detailed message be left on voicemail: yes    Reason for Call: Other: Pt calling with questions about TKA.  Pt states that last she met with Dr. Gomez he recommended that surgery, but pt then recieved order for orthotics, so she wants to know whether surgery is still planned.  Pt needs more clearfication on orthotics order     Action Taken: Message routed to:  [facility] ([facility] Orthopedics       5/14/18 - I called Jigna to discuss total knee replacement.  Dr. Gomez had prescribed an  brace because he wasn't sure that she was ready for surgery.  She has decided that she would like to proceed in June - she chose June 12th.  She will call to register for the Joint Replacement Class.  She has recently seen her dentist.  She will also make an appointment in the PAC within 30 days of surgery. Surgery packet was mailed to her home.

## 2018-05-15 ENCOUNTER — RADIANT APPOINTMENT (OUTPATIENT)
Dept: GENERAL RADIOLOGY | Facility: CLINIC | Age: 83
End: 2018-05-15
Attending: INTERNAL MEDICINE
Payer: COMMERCIAL

## 2018-05-15 ENCOUNTER — DOCUMENTATION ONLY (OUTPATIENT)
Dept: ORTHOPEDICS | Facility: CLINIC | Age: 83
End: 2018-05-15

## 2018-05-15 ENCOUNTER — ONCOLOGY VISIT (OUTPATIENT)
Dept: ONCOLOGY | Facility: CLINIC | Age: 83
End: 2018-05-15
Attending: INTERNAL MEDICINE
Payer: COMMERCIAL

## 2018-05-15 VITALS
RESPIRATION RATE: 16 BRPM | BODY MASS INDEX: 22.41 KG/M2 | HEIGHT: 62 IN | WEIGHT: 121.8 LBS | HEART RATE: 72 BPM | OXYGEN SATURATION: 97 % | SYSTOLIC BLOOD PRESSURE: 141 MMHG | TEMPERATURE: 97.4 F | DIASTOLIC BLOOD PRESSURE: 74 MMHG

## 2018-05-15 DIAGNOSIS — C49.9 SARCOMA OF SOFT TISSUE (H): Primary | ICD-10-CM

## 2018-05-15 DIAGNOSIS — R53.83 FATIGUE, UNSPECIFIED TYPE: ICD-10-CM

## 2018-05-15 DIAGNOSIS — M17.2 POST-TRAUMATIC OSTEOARTHRITIS OF BOTH KNEES: ICD-10-CM

## 2018-05-15 DIAGNOSIS — C49.9 SARCOMA (H): ICD-10-CM

## 2018-05-15 DIAGNOSIS — C49.9 SARCOMA OF SOFT TISSUE (H): ICD-10-CM

## 2018-05-15 DIAGNOSIS — E03.9 HYPOTHYROIDISM, UNSPECIFIED TYPE: ICD-10-CM

## 2018-05-15 LAB — RADIOLOGIST FLAGS: NORMAL

## 2018-05-15 PROCEDURE — 99214 OFFICE O/P EST MOD 30 MIN: CPT | Mod: ZP | Performed by: INTERNAL MEDICINE

## 2018-05-15 PROCEDURE — G0463 HOSPITAL OUTPT CLINIC VISIT: HCPCS | Mod: ZF

## 2018-05-15 ASSESSMENT — PAIN SCALES - GENERAL: PAINLEVEL: NO PAIN (0)

## 2018-05-15 NOTE — PROGRESS NOTES
Smiley-op worksheet received and forwarded to surgery scheduling. Patient has been scheduled for surgery with Dr. Gomez for 6/12/18 at [place] per Mere Cruz RN to Dr. Gomez.

## 2018-05-15 NOTE — NURSING NOTE
"Oncology Rooming Note    May 15, 2018 8:57 AM   Jigna Allen is a 83 year old female who presents for:    Chief Complaint   Patient presents with     Oncology Clinic Visit     one year f/u Sarcoma     Initial Vitals: /74  Pulse 72  Temp 97.4  F (36.3  C) (Oral)  Resp 16  Ht 1.575 m (5' 2.01\")  Wt 55.2 kg (121 lb 12.8 oz)  SpO2 97%  BMI 22.27 kg/m2 Estimated body mass index is 22.27 kg/(m^2) as calculated from the following:    Height as of this encounter: 1.575 m (5' 2.01\").    Weight as of this encounter: 55.2 kg (121 lb 12.8 oz). Body surface area is 1.55 meters squared.  No Pain (0) Comment: Data Unavailable   No LMP recorded. Patient is postmenopausal.  Allergies reviewed: Yes  Medications reviewed: Yes    Medications: Medication refills not needed today.  Pharmacy name entered into [employer]:    [facility] - [place], [place] - [address] [facility] - [place], [place] - [address]    Clinical concerns: Yes, Patient states she is scheduled for a right total knee in June. Dr Bryan was notified.    10 minutes for nursing intake (face to face time)     JOSE FRANCISCO LEVINE LPN            "

## 2018-05-15 NOTE — LETTER
5/15/2018       RE: Jigna Allen  [address] [place] [address]     Dear Colleague,    Thank you for referring your patient, Jigna Allen, to the [facility]. Please see a copy of my visit note below.    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   -  Background   In brief, she was doing well until August 2009, when after doing a lot of walking while on vacation, she developed some discomfort and swelling in the proximal right calf. 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   She had 4 cycles of doxil/ifos finishing about Feb 2010. There was <10% viable cells at surgery.   She finished post op XRT about June 2010. She did have a post-op staph infection and had some subsequent wound healing problems.   -      Interval history     She had some problems w her R knee w deformity but not much pain.  She is having surgery June 12.    She still has problems w fatigue and was treated for Lyme disease, though labs were confusing.  She had an episode of Lyme disease treated in July 2011    Her mother in law is now 100 and is living w them and they are taking care of her and that's stressful.  [place] was not very active in [place] largely due to care for her mother in law.  Not on zoloft and not very anxious now.    She had CTS surgery on her hand L but is not sure it helped much and it still tingles.    She has a rectocoele and found a pessary very effective.  Occ incontinence.    Some occasional constipation and we discussed trying some colace.    She notes occasional tinnitus that can be bothersome and has a history of hearing loss and has a hearing aid but it does not help much and she often does not use it.  Dr Ojeda felt the CT finding in T4 was a benign hemangioma.  She remains on 75 mgc/d T4.    The rest of a 10 point ROS is otherwise negative as I confirmed w/ her today.     -   Background PMH, FH,SH   PH notable  "for rectocele, diverticulosis, eczema, hyperlipidemia, hypothyroidism, one episode of documented leukopenia. She had cataracts removed.   History of patellofemoral pain syndrome   FH notable for a cancer in the father, uncle, and some cousins and also glioblastoma in a brother. Paternal grandmother had stomach cancer. Her father had testicular cancer and  at age 87.   She has two adult children. 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 She does not smoke or abuse alcohol or other drugs.   She is  and came with her .   ALLERGIES: She describes allergies to Niacin and ferrous sulfate manifested as a red rash.   -     On examination, she appeared comfortable in good spirits.  /74  Pulse 72  Temp 97.4  F (36.3  C) (Oral)  Resp 16  Ht 1.575 m (5' 2.01\")  Wt 55.2 kg (121 lb 12.8 oz)  SpO2 97%  BMI 22.27 kg/m2  HEENT No icterus.  Some bilateral decreased hearing.   NECK no thyromegaly.  CHEST chest is clear.  LYMPH No lymphadenopathy.    CV RRR with no significant murmur.   ABD No HSMT.   NEURO good ambulation w cane, normal mentation  EXT R leg healed w/ XRT changes and bowing.  No significant edema.    PSYCH Mood a bit anxious.     -   No labs.     -   I reviewed the new CXR and I see no clear recurrence, but it has not yet been formally read.      -  She also has a h/o of a likely hemangioma in a pedicle seen on earlier imaging.  -    It has now been about 8 years since last treatment.  She will have the knee surgery next month.  We will plan to see her about 5-9 (she is now about 8 years out) with a CXR if the formal read today is OK.  She will call if she has other questions in the interim.   -  Edouard Bryan M.D.   Professor, Hematology, Oncology and Transplantation   Phone: [phone]     cc:   Wayne Castelan MD   [address] [address]     Elisabeth Ko MD   [facility] [place]   [address] [place] " [address]     Bryon Ojeda M.D.   [facility] 492

## 2018-05-15 NOTE — MR AVS SNAPSHOT
After Visit Summary   5/15/2018    Jigna Allen    MRN: [id number]           Patient Information     Date Of Birth          1935        Visit Information        Provider Department      5/15/2018 9:30 AM Edouard Bryan MD [facility]        Today's Diagnoses     Sarcoma of soft tissue (H)        Post-traumatic osteoarthritis of both knees        Fatigue, unspecified type        Hypothyroidism, unspecified type           Follow-ups after your visit        Your next 10 appointments already scheduled     May 15, 2018  9:30 AM CDT   (Arrive by 9:15 AM)   Return Visit with Edouard Bryan MD   [facility] ([facility])    [address]  [address]  [facility] [id number] [phone]            Jun 12, 2018   Procedure with He Gomez MD   [facility] [place] (--)    [address] [address]   [phone]            May 09, 2019  9:00 AM CDT   XR CHEST 2 VIEWS with UCXR1   [facility] ([facility])    [address] [place] [address]  1st Floor  [facility] [address] [phone]           Please bring a list of your current medicines to your exam. (Include vitamins, minerals and over-thecounter medicines.) Leave your valuables at home.  Tell your doctor if there is a chance you may be pregnant.  You do not need to do anything special for this exam.            May 09, 2019 11:00 AM CDT   (Arrive by 10:45 AM)   Return Visit with Edouard Bryan MD   [facility] ([facility])    [address]  [address]  [facility] [address] [phone]              Future tests that were ordered for you today     Open Future Orders        Priority Expected Expires Ordered    XR Chest 2 Views Routine 5/9/2019 5/15/2019 5/15/2018            Who to contact     If you have questions or need follow up  "information about today's clinic visit or your schedule please contact [facility] directly at [phone].  Normal or non-critical lab and imaging results will be communicated to you by MyChart, letter or phone within 4 business days after the clinic has received the results. If you do not hear from us within 7 days, please contact the clinic through Isent[employer] or phone. If you have a critical or abnormal lab result, we will notify you by phone as soon as possible.  Submit refill requests through [employer] or call your pharmacy and they will forward the refill request to us. Please allow 3 business days for your refill to be completed.          Additional Information About Your Visit        [employer] Information     [employer] gives you secure access to your electronic health record. If you see a primary care provider, you can also send messages to your care team and make appointments. If you have questions, please call your primary care clinic.  If you do not have a primary care provider, please call [phone] and they will assist you.        Care EveryWhere ID     This is your Care EveryWhere ID. This could be used by other organizations to access your [facility] medical records  [id number]        Your Vitals Were     Pulse Temperature Respirations Height Pulse Oximetry BMI (Body Mass Index)    72 97.4  F (36.3  C) (Oral) 16 1.575 m (5' 2.01\") 97% 22.27 kg/m2       Blood Pressure from Last 3 Encounters:   05/15/18 141/74   05/09/17 130/79   09/16/16 138/67    Weight from Last 3 Encounters:   05/15/18 55.2 kg (121 lb 12.8 oz)   04/27/18 56 kg (123 lb 8 oz)   05/10/17 51.3 kg (113 lb 1.6 oz)               Primary Care Provider Office Phone # Fax #    Elisabeth Ko [phone]       [address] [address]        Equal Access to Services     MIKEY CANO AH: Hadii aad ku hadasho Soomaali, waaxda luqadaha, qaybta kaalmada almaegsiddhartha, narinder portillo " cecil tovarsoybarb trujilloToyaaacristina ah. So [facility] [phone].    ATENCIÓN: Si habla jordan, tiene a jade disposición servicios gratuitos de asistencia lingüística. Samira al [phone].    We comply with applicable federal civil rights laws and [place] laws. We do not discriminate on the basis of race, color, national origin, age, disability, sex, sexual orientation, or gender identity.            Thank you!     Thank you for choosing [facility]  for your care. Our goal is always to provide you with excellent care. Hearing back from our patients is one way we can continue to improve our services. Please take a few minutes to complete the written survey that you may receive in the mail after your visit with us. Thank you!             Your Updated Medication List - Protect others around you: Learn how to safely use, store and throw away your medicines at www.disposemymeds.org.          This list is accurate as of 5/15/18  9:28 AM.  Always use your most recent med list.                   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

## 2018-05-17 ENCOUNTER — TELEPHONE (OUTPATIENT)
Dept: ONCOLOGY | Facility: CLINIC | Age: 83
End: 2018-05-17

## 2018-05-17 ENCOUNTER — RADIANT APPOINTMENT (OUTPATIENT)
Dept: CT IMAGING | Facility: CLINIC | Age: 83
End: 2018-05-17
Attending: INTERNAL MEDICINE
Payer: COMMERCIAL

## 2018-05-17 DIAGNOSIS — A69.20 LYME DISEASE: ICD-10-CM

## 2018-05-17 DIAGNOSIS — E03.9 HYPOTHYROIDISM, UNSPECIFIED TYPE: ICD-10-CM

## 2018-05-17 DIAGNOSIS — R53.83 FATIGUE, UNSPECIFIED TYPE: ICD-10-CM

## 2018-05-17 DIAGNOSIS — D75.89 MACROCYTOSIS: ICD-10-CM

## 2018-05-17 DIAGNOSIS — C49.9 SARCOMA OF SOFT TISSUE (H): ICD-10-CM

## 2018-05-17 DIAGNOSIS — R91.8 PULMONARY NODULES: Primary | ICD-10-CM

## 2018-05-17 NOTE — TELEPHONE ENCOUNTER
----- Message from Edouard Bryan MD sent at 5/17/2018  1:53 PM CDT -----  Regarding: need to add extra RTC - please call patient today re below      CT shows a nonspecific finding that is not very worrisome but we should look again sooner than planned.  There may have been a mild lung infection recently.    Please let patient know this and arrange RTC me on 8-30    noncontrast chest CT 8-28

## 2018-05-17 NOTE — TELEPHONE ENCOUNTER
I spoke to Jigna (and  via speaker), we discussed Dr Bryan's recommendations below. CT chest today (5/17/18) shows a nonspecific finding in her lungs, possibly remnants of a mild lung infection. Pt denies any recent coughs, SOB, illness or close/prolonged exposure to ill contacts. MD recommends routine follow up with another CT in 3 months followed by appt with him. Pt agrees & verbalizes understanding of this plan.

## 2018-05-21 DIAGNOSIS — M17.2 POST-TRAUMATIC OSTEOARTHRITIS OF BOTH KNEES: Primary | ICD-10-CM

## 2018-05-22 ENCOUNTER — HOSPITAL ENCOUNTER (OUTPATIENT)
Dept: EDUCATION SERVICES | Facility: CLINIC | Age: 83
Discharge: HOME | End: 2018-05-22
Payer: MEDICARE

## 2018-05-22 DIAGNOSIS — M17.2 POST-TRAUMATIC OSTEOARTHRITIS OF BOTH KNEES: ICD-10-CM

## 2018-05-22 LAB
ABO + RH BLD: NORMAL
ABO + RH BLD: NORMAL
BLD GP AB SCN SERPL QL: NORMAL
BLOOD BANK CMNT PATIENT-IMP: NORMAL
BLOOD BANK CMNT PATIENT-IMP: NORMAL
SPECIMEN EXP DATE BLD: NORMAL

## 2018-05-22 PROCEDURE — 86901 BLOOD TYPING SEROLOGIC RH(D): CPT | Performed by: ORTHOPAEDIC SURGERY

## 2018-05-22 PROCEDURE — 86900 BLOOD TYPING SEROLOGIC ABO: CPT | Performed by: ORTHOPAEDIC SURGERY

## 2018-05-22 PROCEDURE — 86850 RBC ANTIBODY SCREEN: CPT | Performed by: ORTHOPAEDIC SURGERY

## 2018-05-22 PROCEDURE — 36415 COLL VENOUS BLD VENIPUNCTURE: CPT | Performed by: ORTHOPAEDIC SURGERY

## 2018-05-22 NOTE — PLAN OF CARE
5/22/18 Patient(pt)and  to PLC Pre Op Joint Replacement group class.Both very attentive,asking many appropriate questions,able to answer teach back ,and verbalized understanding of content presented.Continue to reinforce information.Pt.and  stated they fell a bit overwhelmed.I encouraged them to read the TJR book and make sure and contact their surgeon or myself with any further questions or concerns.I also reinforced that all staff will continue to reinforce this information.See also education flow sheet.  Written material given and reviewed in group class: PLC Class packet and Joint Replacement Book.

## 2018-06-05 ENCOUNTER — APPOINTMENT (OUTPATIENT)
Dept: SURGERY | Facility: CLINIC | Age: 83
End: 2018-06-05
Payer: COMMERCIAL

## 2018-06-05 ENCOUNTER — OFFICE VISIT (OUTPATIENT)
Dept: SURGERY | Facility: CLINIC | Age: 83
End: 2018-06-05
Payer: COMMERCIAL

## 2018-06-05 ENCOUNTER — ALLIED HEALTH/NURSE VISIT (OUTPATIENT)
Dept: SURGERY | Facility: CLINIC | Age: 83
End: 2018-06-05
Payer: COMMERCIAL

## 2018-06-05 ENCOUNTER — ANESTHESIA EVENT (OUTPATIENT)
Dept: SURGERY | Facility: CLINIC | Age: 83
DRG: 470 | End: 2018-06-05
Payer: MEDICARE

## 2018-06-05 VITALS
WEIGHT: 122.5 LBS | SYSTOLIC BLOOD PRESSURE: 130 MMHG | TEMPERATURE: 97.7 F | BODY MASS INDEX: 22.54 KG/M2 | HEIGHT: 62 IN | OXYGEN SATURATION: 98 % | HEART RATE: 89 BPM | DIASTOLIC BLOOD PRESSURE: 70 MMHG

## 2018-06-05 DIAGNOSIS — Z01.818 PREOP GENERAL PHYSICAL EXAM: Primary | ICD-10-CM

## 2018-06-05 DIAGNOSIS — Z01.818 PREOP GENERAL PHYSICAL EXAM: ICD-10-CM

## 2018-06-05 LAB
ALBUMIN UR-MCNC: NEGATIVE MG/DL
ANION GAP SERPL CALCULATED.3IONS-SCNC: 7 MMOL/L (ref 3–14)
APPEARANCE UR: CLEAR
BILIRUB UR QL STRIP: NEGATIVE
BUN SERPL-MCNC: 16 MG/DL (ref 7–30)
CALCIUM SERPL-MCNC: 8.8 MG/DL (ref 8.5–10.1)
CHLORIDE SERPL-SCNC: 102 MMOL/L (ref 94–109)
CO2 SERPL-SCNC: 31 MMOL/L (ref 20–32)
COLOR UR AUTO: YELLOW
CREAT SERPL-MCNC: 0.73 MG/DL (ref 0.52–1.04)
ERYTHROCYTE [DISTWIDTH] IN BLOOD BY AUTOMATED COUNT: 12.9 % (ref 10–15)
GFR SERPL CREATININE-BSD FRML MDRD: 77 ML/MIN/1.7M2
GLUCOSE SERPL-MCNC: 81 MG/DL (ref 70–99)
GLUCOSE UR STRIP-MCNC: NEGATIVE MG/DL
HBA1C MFR BLD: 5.6 % (ref 0–5.6)
HCT VFR BLD AUTO: 44.5 % (ref 35–47)
HGB BLD-MCNC: 14.3 G/DL (ref 11.7–15.7)
HGB UR QL STRIP: NEGATIVE
INR PPP: 0.98 (ref 0.86–1.14)
KETONES UR STRIP-MCNC: NEGATIVE MG/DL
LEUKOCYTE ESTERASE UR QL STRIP: ABNORMAL
MCH RBC QN AUTO: 31.8 PG (ref 26.5–33)
MCHC RBC AUTO-ENTMCNC: 32.1 G/DL (ref 31.5–36.5)
MCV RBC AUTO: 99 FL (ref 78–100)
MUCOUS THREADS #/AREA URNS LPF: PRESENT /LPF
NITRATE UR QL: NEGATIVE
PH UR STRIP: 5 PH (ref 5–7)
PLATELET # BLD AUTO: 197 10E9/L (ref 150–450)
POTASSIUM SERPL-SCNC: 3.7 MMOL/L (ref 3.4–5.3)
RBC # BLD AUTO: 4.49 10E12/L (ref 3.8–5.2)
RBC #/AREA URNS AUTO: 1 /HPF (ref 0–2)
SODIUM SERPL-SCNC: 140 MMOL/L (ref 133–144)
SOURCE: ABNORMAL
SP GR UR STRIP: 1.02 (ref 1–1.03)
SQUAMOUS #/AREA URNS AUTO: 5 /HPF (ref 0–1)
UROBILINOGEN UR STRIP-MCNC: 2 MG/DL (ref 0–2)
WBC # BLD AUTO: 4.8 10E9/L (ref 4–11)
WBC #/AREA URNS AUTO: 5 /HPF (ref 0–5)

## 2018-06-05 NOTE — H&P
Pre-Operative H & P     CC:  Preoperative exam to assess for increased cardiopulmonary risk while undergoing surgery and anesthesia.    Date of Encounter: 6/5/2018  Primary Care Physician:  Elisabeth Ko    Reason for visit:  Preop general physical exam  oateoarthritis      HPI  Jigna Allen is a 83 year old female who presents for pre-operative H & P in preparation for Right Total Knee Replacement on 6/12/2018 by Dr. Gomez in treatment of Osteoarthritis Right Knee at [facility].     History is obtained from the patient.   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 Post-op was complicated by staph infection. There is obvious deformity of right leg and she will occasionally use a cane. Her activity is affected by osteoarthritis and she was evaluated by Dr. Gomez and above procedure planned.      Past Medical History  Past Medical History:   Diagnosis Date     Anxiety      Arthritis 1990?     Hypothyroid      Sarcoma (H) 2009       Past Surgical History  Past Surgical History:   Procedure Laterality Date     CARPAL TUNNEL RELEASE RT/LT       excise sarcoma Right     right leg     TUBAL LIGATION         Hx of Blood transfusions/reactions: yes, no reactions    Hx of abnormal bleeding or anti-platelet use: none    Menstrual history: No LMP recorded. Patient is postmenopausal.:     Steroid use in the last year: none    Personal or FH with difficulty with Anesthesia:  Slow to wake up        Prior to Admission Medications  Current Outpatient Prescriptions   Medication Sig Dispense Refill     acetaminophen (TYLENOL) 500 MG tablet Take 500-1,000 mg by mouth as needed       levothyroxine (LEVOTHROID) 75 MCG tablet Take 75 mcg by mouth daily          Allergies  Allergies   Allergen Reactions     Colchicine      Other reaction(s): Gastrointestinal  diarrhea     Niacin      skin rash     Ferrous Sulfate Rash       Social  History  Social History     Social History     Marital status:      Spouse name: N/A     Number of children: N/A     Years of education: N/A     Occupational History     Not on file.     Social History Main Topics     Smoking status: Never Smoker     Smokeless tobacco: Never Used     Alcohol use No     Drug use: No     Sexual activity: Yes     Partners: Male     Birth control/ protection: Post-menopausal     Other Topics Concern     Not on file     Social History Narrative       Family History  Family History   Problem Relation Age of Onset     CANCER Father      Prostate Cancer Father      Other Cancer Father      CANCER Brother            Anesthesia Evaluation     . Pt has had prior anesthetic. Type: MAC and General    History of anesthetic complications    Slow to wake up      ROS/MED HX    ENT/Pulmonary:  - neg pulmonary ROS     Neurologic:  - neg neurologic ROS     Cardiovascular:  - neg cardiovascular ROS   (+) ----. : . . . :. . Previous cardiac testing date:results:date: results:ECG reviewed date:6/5/2018 results:NSR, age undetermined septal infarct date: results:          METS/Exercise Tolerance:     Hematologic:     (+) History of Transfusion no previous transfusion reaction -      Musculoskeletal:   (+) , , other musculoskeletal- right leg sarcoma, excision in 2010      GI/Hepatic:  - neg GI/hepatic ROS       Renal/Genitourinary:  - ROS Renal section negative       Endo:     (+) thyroid problem hypothyroidism, .      Psychiatric:     (+) psychiatric history anxiety      Infectious Disease:  - neg infectious disease ROS       Malignancy:   (+) Malignancy History of Other  Other CA right leg Active status post         Other:    (+) No chance of pregnancy C-spine cleared: N/A, no H/O Chronic Pain,no other significant disability            Physical Exam  Normal systems: cardiovascular, pulmonary and dental    Airway   Mallampati: II  TM distance: >3 FB  Neck ROM: full    Dental  "  Normal    Cardiovascular   Rhythm and rate: regular and normal      Pulmonary   Clear to ausultation        The complete review of systems is negative other than noted in the HPI or here.   Temp: 97.7  F (36.5  C)   BP: 130/70 Pulse: 89     SpO2: 98 %         122 lbs 8 oz  5' 2\"   Body mass index is 22.41 kg/(m^2).       Physical Exam  Constitutional: Awake, alert, cooperative, no apparent distress, and appears stated age.  Eyes: Pupils equal, round and reactive to light, extra ocular muscles intact, sclera clear, conjunctiva normal.  HENT: Normocephalic, oral pharynx with moist mucus membranes, good dentition. No goiter appreciated.   Respiratory: Clear to auscultation bilaterally, no crackles or wheezing.  Cardiovascular: Regular rate and rhythm, normal S1 and S2, and no murmur noted.  Carotids +2, no bruits. No edema. Palpable pulses to radial  DP and PT arteries.   GI: Normal bowel sounds, soft, non-distended, non-tender, no masses palpated, no hepatosplenomegaly.  Surgical scars: right leg  Lymph/Hematologic: No cervical lymphadenopathy and no supraclavicular lymphadenopathy.  Genitourinary:  Deferred   Skin: Warm and dry.  No rashes at anticipated surgical site.   Musculoskeletal: Full ROM of neck. There is no redness, warmth, or swelling of the joints. Gross motor strength is normal.  Uses cane for support, right leg deformity  Neurologic: Awake, alert, oriented to name, place and time. Cranial nerves II-XII are grossly intact. Gait is normal.   Neuropsychiatric: Calm, cooperative. Normal affect.     Labs: (personally reviewed)  Results for FEDERICA SMITH (MRN [id number]) as of 6/5/2018 13:42   Ref. Range 5/22/2018 13:00 6/5/2018 11:12 6/5/2018 11:22   Sodium Latest Ref Range: 133 - 144 mmol/L  140    Potassium Latest Ref Range: 3.4 - 5.3 mmol/L  3.7    Chloride Latest Ref Range: 94 - 109 mmol/L  102    Carbon Dioxide Latest Ref Range: 20 - 32 mmol/L  31    Urea Nitrogen Latest Ref Range: 7 - 30 mg/dL  16  "   Creatinine Latest Ref Range: 0.52 - 1.04 mg/dL  0.73    GFR Estimate Latest Ref Range: >60 mL/min/1.7m2  77    GFR Estimate If Black Latest Ref Range: >60 mL/min/1.7m2  >90    Calcium Latest Ref Range: 8.5 - 10.1 mg/dL  8.8    Anion Gap Latest Ref Range: 3 - 14 mmol/L  7    Hemoglobin A1C Latest Ref Range: 0 - 5.6 %  5.6    Glucose Latest Ref Range: 70 - 99 mg/dL  81    WBC Latest Ref Range: 4.0 - 11.0 10e9/L  4.8    Hemoglobin Latest Ref Range: 11.7 - 15.7 g/dL  14.3    Hematocrit Latest Ref Range: 35.0 - 47.0 %  44.5    Platelet Count Latest Ref Range: 150 - 450 10e9/L  197    RBC Count Latest Ref Range: 3.8 - 5.2 10e12/L  4.49    MCV Latest Ref Range: 78 - 100 fl  99    MCH Latest Ref Range: 26.5 - 33.0 pg  31.8    MCHC Latest Ref Range: 31.5 - 36.5 g/dL  32.1    RDW Latest Ref Range: 10.0 - 15.0 %  12.9    INR Latest Ref Range: 0.86 - 1.14   0.98    ABO Unknown B     RH(D) Unknown Neg     Antibody Screen Unknown Neg     Test Valid Only At Latest Units:     [facility]...     Specimen Expires Unknown 05/25/2018     Blood Bank Comment Unknown Preadmit type and...     Color Urine Unknown   Yellow   Appearance Urine Unknown   Clear   Glucose Urine Latest Ref Range: NEG^Negative mg/dL   Negative   Bilirubin Urine Latest Ref Range: NEG^Negative    Negative   Ketones Urine Latest Ref Range: NEG^Negative mg/dL   Negative   Specific Gravity Urine Latest Ref Range: 1.003 - 1.035    1.020   pH Urine Latest Ref Range: 5.0 - 7.0 pH   5.0   Protein Albumin Urine Latest Ref Range: NEG^Negative mg/dL   Negative   Urobilinogen mg/dL Latest Ref Range: 0.0 - 2.0 mg/dL   2.0   Nitrite Urine Latest Ref Range: NEG^Negative    Negative   Blood Urine Latest Ref Range: NEG^Negative    Negative   Leukocyte Esterase Urine Latest Ref Range: NEG^Negative    Large (A)   Source Unknown   Midstream Urine   WBC Urine Latest Ref Range: 0 - 5 /HPF   5   RBC Urine Latest Ref Range: 0 - 2 /HPF   1   Squamous Epithelial /HPF Urine Latest Ref  Range: 0 - 1 /HPF   5 (H)   Mucous Urine Latest Ref Range: NEG^Negative /LPF   Present (A)   URINE CULTURE AEROBIC BACTERIAL Unknown   Rpt       EKG: Personally reviewed but formal cardiology read pendin2018 NSR, age undetermined septal infarct      Echocardiogram 2018 (scanned in from OSH)    CONCLUSION:   1) LVEF 65%. Overall normal wall motion and contractility  2) Mild concentric left ventricular hypertrophy  3) Mild aortic valve sclerosis without stenosis  4) Color doppler demonstrates mild tricuspid valve regurgitation       Outside records reviewed from: care everywhere    ASSESSMENT and PLAN  Jigna Allen is a 83 year old female scheduled to undergo Right Total Knee Replacement on 2018 by Dr. Gomez in treatment of Osteoarthritis Right Knee. She has the following specific operative considerations:   - RCRI : Low serious cardiac risks.  0.4% risk of major adverse cardiac event.   - Anesthesia considerations:  Refer to PAC assessment in anesthesia records  - VTE risk: 0.5%  - IRA # of risks  = low risk  - Post-op delirium risk: high risk due to age  - Risk of PONV score = 2.  If > 2, anti-emetic intervention recommended.     Previous anesthesia without complications, although she notes that she is slow to wake up.  1) Cardiac: Echocardiogram in 2016 shows EF 65%. Request made for stress echo done in [place] 2018. She denies cardiac symptoms. No edema, PND or orthopnea. METS well over 4 despite knee pain.  2) Pulmonary: Never smoked, denies pulmonary symptoms.  3) MSK: Biopsy positive high grade pleomorphic sarcoma in , received radiation 2010 followed by resection. Post-op was complicated by staph infection. There is obvious deformity of right leg and she will occasionally use a cane  4) Endo: hypothyroidism, daily synthroid           I spent 30 minutes with patient, greater than 50% educating on preop meds, counseling on anesthesia and coordinating care for Osteoarthritis Right  Knee  Pt optimized for surgery. AVS with information on surgery time/arrival time, meds and NPO status given by nursing staff    Patient was discussed with Dr Templeton.    WOOD Moody CNS  Preoperative Assessment Center  [facility]  Phone: [phone]  Fax: [phone]

## 2018-06-05 NOTE — ANESTHESIA PREPROCEDURE EVALUATION
Anesthesia Evaluation     . Pt has had prior anesthetic. Type: MAC and General    History of anesthetic complications    Slow to wake up      ROS/MED HX    ENT/Pulmonary:  - neg pulmonary ROS     Neurologic:  - neg neurologic ROS     Cardiovascular:  - neg cardiovascular ROS   (+) ----. : . . . :. . Previous cardiac testing Echodate:1/31/2018results:EF 65%date: results:ECG reviewed date:6/5/2018 results:NSR, age undetermined septal infarct date: results:          METS/Exercise Tolerance:     Hematologic: Comments: Denies blood thinners or bleeding disorders  Platelets WNL    (+) History of Transfusion no previous transfusion reaction -      Musculoskeletal:   (+) , , other musculoskeletal- right leg sarcoma, excision in 2010      GI/Hepatic:  - neg GI/hepatic ROS       Renal/Genitourinary:  - ROS Renal section negative       Endo:     (+) thyroid problem hypothyroidism, .      Psychiatric:     (+) psychiatric history anxiety      Infectious Disease:  - neg infectious disease ROS       Malignancy:   (+) Malignancy History of Other  Other CA right leg Active status post         Other:    (+) No chance of pregnancy C-spine cleared: N/A, no H/O Chronic Pain,no other significant disability                    Physical Exam  Normal systems: cardiovascular, pulmonary and dental    Airway   Mallampati: II  TM distance: >3 FB  Neck ROM: full    Dental     Cardiovascular   Rhythm and rate: regular and normal      Pulmonary    breath sounds clear to auscultation    Other findings:   Echocardiogram 1/31/2018 (scanned in from OSH)    CONCLUSION:   1) LVEF 65%. Overall normal wall motion and contractility  2) Mild concentric left ventricular hypertrophy  3) Mild aortic valve sclerosis without stenosis  4) Color doppler demonstrates mild tricuspid valve regurgitation         Results for SARAH FEDERICA N (MRN [id number]) as of 6/5/2018 13:42    5/22/2018 13:00  ABO: B  RH(D): Neg  Antibody Screen: Neg  Test Valid Only At: [place]  [facility]...  Specimen Expires: 05/25/2018  Blood Bank Comment: Preadmit type and...    6/5/2018 11:12  Sodium: 140  Potassium: 3.7  Chloride: 102  Carbon Dioxide: 31  Urea Nitrogen: 16  Creatinine: 0.73  GFR Estimate: 77  GFR Estimate If Black: >90  Calcium: 8.8  Anion Gap: 7  Hemoglobin A1C: 5.6  Glucose: 81  WBC: 4.8  Hemoglobin: 14.3  Hematocrit: 44.5  Platelet Count: 197  RBC Count: 4.49  MCV: 99  MCH: 31.8  MCHC: 32.1  RDW: 12.9  INR: 0.98    6/5/2018 11:22  Color Urine: Yellow  Appearance Urine: Clear  Glucose Urine: Negative  Bilirubin Urine: Negative  Ketones Urine: Negative  Specific Gravity Urine: 1.020  pH Urine: 5.0  Protein Albumin Urine: Negative  Urobilinogen mg/dL: 2.0  Nitrite Urine: Negative  Blood Urine: Negative  Leukocyte Esterase Urine: Large (A)  Source: Midstream Urine  WBC Urine: 5  RBC Urine: 1  Squamous Epithelial /HPF Urine: 5 (H)  Mucous Urine: Present (A)  URINE CULTURE AEROBIC BACTERIAL: Rpt           PAC Discussion and Assessment    ASA Classification: 3  Case is suitable for: [place]  Anesthetic techniques and relevant risks discussed: GA  Invasive monitoring and risk discussed: Yes  Types:   Possibility and Risk of blood transfusion discussed: Yes  NPO instructions given:   Additional anesthetic preparation and risks discussed:   Needs early admission to pre-op area:   Other:     PAC Resident/NP Anesthesia Assessment:  Jigna Allen is a 82 yo female scheduled for Right Total Knee Replacement on 6/12/2018 by Dr. Gomez in treatment of Osteoarthritis Right Knee      Previous anesthesia without complications, although she notes that she is slow to wake up.    1) Cardiac: Echocardiogram in 2016 shows EF 65%. Request made for stress echo done in [place] 2/2018. She denies cardiac symptoms. No edema, PND or orthopnea. METS well over 4 despite knee pain.  2) Pulmonary: Never smoked, denies pulmonary symptoms.  3) MSK: Biopsy positive high grade pleomorphic sarcoma in 2009, received radiation  2/2010 followed by resection. Post-op was complicated by staph infection. There is obvious deformity of right leg and she will occasionally use a cane  4) Endo: hypothyroidism, daily synthroid               Reviewed and Signed by PAC Mid-Level Provider/Resident  Mid-Level Provider/Resident: WOOD Sanders  Date: 6/5/2018  Time: 1050    Attending Anesthesiologist Anesthesia Assessment:  83 year old for TKA in management of knee pain. 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 Labs all normal.    Patient/case discussed with CARLEE. No need to see patient. Patient is appropriate for the planned procedure without further work-up or medical management.      Reviewed and Signed by PAC Anesthesiologist  Anesthesiologist: russ  Date: 6/5/2018  Time:   Pass/Fail: Pass  Disposition:     PAC Pharmacist Assessment:        Pharmacist:   Date:   Time:      Anesthesia Plan      History & Physical Review  History and physical reviewed and following examination; no interval change.    ASA Status:  2 .    NPO Status:  > 8 hours    Plan for Spinal with Intravenous and Propofol induction. Maintenance will be TIVA.    PONV prophylaxis:  Ondansetron (or other 5HT-3) and Dexamethasone or Solumedrol  Spinal anesthesia was discussed with the patient. They understand the risks include headache, pruritus, nausea/vomiting, blood pressure changes, bleeding, infection, nerve damage, and failure of block. Questions answered. Patient consents.     Discussed GA as backup plan.       Postoperative Care  Postoperative pain management:  IV analgesics, Oral pain medications and Multi-modal analgesia.      Consents  Anesthetic plan, risks, benefits and alternatives discussed with:  Patient..        ANESTHESIA PREOP EVALUATION    PROCEDURE: Procedure(s):  Right Total Knee Replacement  - Wound Class:  I-Clean    HPI: Jigna Allen is a 83 year old female who presents for above procedure 2/2    PAST MEDICAL HISTORY:    Past Medical History:   Diagnosis Date     Anxiety      Arthritis 1990?     Complication of anesthesia     slow to wakeup     Hypothyroid      Sarcoma (H) 2009       PAST SURGICAL HISTORY:    Past Surgical History:   Procedure Laterality Date     CARPAL TUNNEL RELEASE RT/LT       excise sarcoma Right     right leg     TUBAL LIGATION         PAST ANESTHESIA HISTORY:     No personal or family h/o anesthesia problems    SOCIAL HISTORY:       Social History   Substance Use Topics     Smoking status: Never Smoker     Smokeless tobacco: Never Used     Alcohol use Yes      Comment: rarely       ALLERGIES:     Allergies   Allergen Reactions     Colchicine      Other reaction(s): Gastrointestinal  diarrhea     Niacin      skin rash     Ferrous Sulfate Rash       MEDICATIONS:     Prescriptions Prior to Admission   Medication Sig Dispense Refill Last Dose     levothyroxine (LEVOTHROID) 75 MCG tablet Take 75 mcg by mouth daily    6/12/2018 at Unknown time     acetaminophen (TYLENOL) 500 MG tablet Take 500-1,000 mg by mouth as needed   More than a month at Unknown time       No current outpatient prescriptions on file.       Current Facility-Administered Medications Ordered in Epic   Medication Dose Route Frequency Provider Last Rate Last Dose     ceFAZolin (ANCEF) intermittent infusion 2 g in 100 mL dextrose PRE-MIX  2 g Intravenous Pre-Op/Pre-procedure x 1 dose He Gomez MD         lactated ringers infusion   Intravenous Continuous Radhalong Patience Hutchinson MD         lidocaine 1 % 1 mL  1 mL Other Q1H PRN Patience Medellin MD         sodium chloride 0.9 % 8.95 mL with ropivacaine (NAROPIN) 200 mg, EPINEPHrine (ADRENALIN) 300 mcg, ketorolac (TORADOL) 15 mg, morphine 2.5 mg   INTRA-ARTICULAR Once He Gomez MD         tranexamic acid (CYKLOKAPRON) 1 g in sodium chloride 0.9 % 50 mL  bolus  1 g Intravenous Once He Gomez MD         tranexamic acid (CYKLOKAPRON) 1 g in sodium chloride 0.9 % 50 mL bolus  1 g Intravenous Once He Gomez MD         No current [facility]-ordered outpatient prescriptions on file.       PHYSICAL EXAM:    Vitals: T 97.7, P 73, /68, R Data Unavailable, SpO2 98%, Weight   Wt Readings from Last 2 Encounters:   18 54.8 kg (120 lb 13 oz)   18 55.6 kg (122 lb 8 oz)       See doc flowsheet    Temp: 36.5  C (97.7  F) Temp  Min: 36.5  C (97.7  F)  Max: 36.5  C (97.7  F)    No Data Recorded  SpO2: 98 % SpO2  Min: 98 %  Max: 98 %  Pulse: 73 Pulse  Min: 73  Max: 73    No Data Recorded  BP: 148/68 Systolic (24hrs), Av , Min:148 , Max:148   Diastolic (24hrs), Av, Min:68, Max:68      NPO STATUS: see doc flowsheet    LABS:    BMP:  Recent Labs   Lab Test  18   1112   NA  140   POTASSIUM  3.7   CHLORIDE  102   CO2  31   BUN  16   CR  0.73   GLC  81   RAVEN  8.8       LFTs:   Recent Labs   Lab Test  05/10/16   1035   PROTTOTAL  7.5   ALBUMIN  3.8   BILITOTAL  0.8   ALKPHOS  91   AST  19   ALT  29       CBC:   Recent Labs   Lab Test  18   1112   WBC  4.8   RBC  4.49   HGB  14.3   HCT  44.5   MCV  99   MCH  31.8   MCHC  32.1   RDW  12.9   PLT  197       Coags:  Recent Labs   Lab Test  18   1112   INR  0.98       Imaging:  No orders to display       Jorge Alberto Teran MD  Anesthesiology Staff  Pager [phone]    2018  9:22 AM                      .

## 2018-06-05 NOTE — PATIENT INSTRUCTIONS
Preparing for Your Surgery      Name:  Jigna Allen   MRN:  [id number]   :  1935   Today's Date:  2018     Arriving for surgery:  Surgery date:  2018  Arrival time:  08:00 AM  Please come to:     [facility] [address]  [address] [place]  [address]    - parking is available in front of [facility] from 5:15AM to 8:00PM. If you prefer, park your car in the Green Lot.    -Proceed to the 3rd floor, check in at the Adult Surgery Waiting Lounge. [phone]    If an escort is needed stop at the Information Desk in the lobby. Inform the information person that you are here for surgery. An escort to the Adult Surgery Waiting Lounge will be provided.        What can I eat or drink?  -  You may have solid food or milk products until 8 hours prior to your surgery. 2:00 AM  -  After 2:00 AM you may have water, apple juice or 7up/Sprite until 2 hours prior to your surgery. 08:00AM      -  Nothing after 08:00AM    Which medicines can I take?  -  Do NOT take these medications in the morning, the day of surgery:  Stop vitamins and supplements one week prior to surgery.      -  Please take these medications the day of surgery  Levothyroxine and Acetaminophen, if needed    How do I prepare myself?  -  Take two showers: one the night before surgery; and one the morning of surgery.         Use Scrubcare or Hibiclens to wash from neck down.  You may use your own     shampoo and conditioner. No other hair products.   -  Do NOT use lotion, powder, deodorant, or antiperspirant the day of your surgery.  -  Do NOT wear any makeup, fingernail polish or jewelry.  - Do not bring your own medications to the hospital, except for inhalers and eye   drops.  -  Bring your ID and insurance card.    Questions or Concerns:  If you have questions or concerns regarding the day of surgery, please call: for [facility] call [phone].  After surgery please call your  surgeons office.           AFTER YOUR SURGERY  Breathing exercises   Breathing exercises help you recover faster. Take deep breaths and let the air out slowly. This will:     Help you wake up after surgery.    Help prevent complications like pneumonia.  Preventing complications will help you go home sooner.   Nausea and vomiting   You may feel sick to your stomach after surgery; if so, let your nurse know.    Pain control:  After surgery, you may have pain. Our goal is to help you manage your pain. Pain medicine will help you feel comfortable enough to do activities that will help you heal.  These activities may include breathing exercises, walking and physical therapy.   To help your health care team treat your pain we will ask: 1) If you have pain  2) where it is located 3) describe your pain in your words  Methods of pain control include medications given by mouth, vein or by nerve block for some surgeries.  We may give you a pain control pump that will:  1) Deliver the medicine through a tube placed in your vein  2) Control the amount of medicine you receive  3) Allow you to push a button to deliver a dose of pain medicine  Sequential Compression Device (SCD) or Pneumo Boots:  You may need to wear SCD S on your legs or feet. These are wraps connected to a machine that pumps in air and releases it. The repeated pumping helps prevent blood clots from forming.

## 2018-06-05 NOTE — MR AVS SNAPSHOT
After Visit Summary   2018    Jigna Allen    MRN: [id number]           Patient Information     Date Of Birth          1935        Visit Information        Provider Department      2018 11:00 AM Rn, [facility]        Care Instructions    Preparing for Your Surgery      Name:  Jigna Allen   MRN:  [id number]   :  1935   Today's Date:  2018     Arriving for surgery:  Surgery date:  2018  Arrival time:  08:00 AM  Please come to:     [facility] [address]  [address] [place]  [address]    - parking is available in front of [facility] from 5:15AM to 8:00PM. If you prefer, park your car in the Green Lot.    -Proceed to the 3rd floor, check in at the Adult Surgery Waiting Lounge. [phone]    If an escort is needed stop at the Information Desk in the lobby. Inform the information person that you are here for surgery. An escort to the Adult Surgery Waiting Lounge will be provided.        What can I eat or drink?  -  You may have solid food or milk products until 8 hours prior to your surgery. 2:00 AM  -  After 2:00 AM you may have water, apple juice or 7up/Sprite until 2 hours prior to your surgery. 08:00AM      -  Nothing after 08:00AM    Which medicines can I take?  -  Do NOT take these medications in the morning, the day of surgery:  Stop vitamins and supplements one week prior to surgery.      -  Please take these medications the day of surgery  Levothyroxine and Acetaminophen, if needed    How do I prepare myself?  -  Take two showers: one the night before surgery; and one the morning of surgery.         Use Scrubcare or Hibiclens to wash from neck down.  You may use your own     shampoo and conditioner. No other hair products.   -  Do NOT use lotion, powder, deodorant, or antiperspirant the day of your surgery.  -  Do NOT wear any makeup, fingernail polish or  anastacio.  - Do not bring your own medications to the hospital, except for inhalers and eye   drops.  -  Bring your ID and insurance card.    Questions or Concerns:  If you have questions or concerns regarding the day of surgery, please call: for [place] call [phone].  After surgery please call your surgeons office.           AFTER YOUR SURGERY  Breathing exercises   Breathing exercises help you recover faster. Take deep breaths and let the air out slowly. This will:     Help you wake up after surgery.    Help prevent complications like pneumonia.  Preventing complications will help you go home sooner.   Nausea and vomiting   You may feel sick to your stomach after surgery; if so, let your nurse know.    Pain control:  After surgery, you may have pain. Our goal is to help you manage your pain. Pain medicine will help you feel comfortable enough to do activities that will help you heal.  These activities may include breathing exercises, walking and physical therapy.   To help your health care team treat your pain we will ask: 1) If you have pain  2) where it is located 3) describe your pain in your words  Methods of pain control include medications given by mouth, vein or by nerve block for some surgeries.  We may give you a pain control pump that will:  1) Deliver the medicine through a tube placed in your vein  2) Control the amount of medicine you receive  3) Allow you to push a button to deliver a dose of pain medicine  Sequential Compression Device (SCD) or Pneumo Boots:  You may need to wear SCD S on your legs or feet. These are wraps connected to a machine that pumps in air and releases it. The repeated pumping helps prevent blood clots from forming.           Follow-ups after your visit        Your next 10 appointments already scheduled     Jun 05, 2018 11:00 AM CDT   (Arrive by 10:45 AM)   PAC RN ASSESSMENT with  Pac Rn   [facility] ([facility])     [address]  [address] [facility] [id number] [phone]            Jun 05, 2018 11:30 AM CDT   (Arrive by 11:15 AM)   PAC Anesthesia Consult with  Pac Anesthesiologist   [facility] ([facility])    [address] [place] [address]  [address] [facility] [id number] [phone]            Jun 12, 2018   Procedure with He Gomez MD   [facility] [place], Same Day Surgery (--)    [address]  [place] [id number] [phone]            Aug 28, 2018 10:40 AM CDT   CT CHEST W/O CONTRAST with UCCT1   [facility] CT ([facility])    [address] [place] [address]  [address] [facility] [id number] [phone]           Please bring any scans or X-rays taken at other hospitals, if similar tests were done. Also bring a list of your medicines, including vitamins, minerals and over-the-counter drugs. It is safest to leave personal items at home.  Be sure to tell your doctor:   If you have any allergies.   If there s any chance you are pregnant.   If you are breastfeeding.  You do not need to do anything special to prepare for this exam.  Please wear loose clothing, such as a sweat suit or jogging clothes. Avoid snaps, zippers and other metal. We may ask you to undress and put on a hospital gown.            Aug 30, 2018  3:30 PM CDT   (Arrive by 3:15 PM)   Return Visit with Edoaurd Bryan MD   [facility] ([facility])    [address] [place] [address]  [address]  [facility] [address] [phone]            May 09, 2019  9:00 AM CDT   XR CHEST 2 VIEWS with UCXR1   [facility] Xray ([facility])    [address] [place] [address]  [address] [facility] [id number] [phone]           Please bring a list of your current medicines to your exam. (Include vitamins, minerals and over-thecounter medicines.) Leave your valuables at home.  Tell  your doctor if there is a chance you may be pregnant.  You do not need to do anything special for this exam.            May 09, 2019 11:00 AM CDT   (Arrive by 10:45 AM)   Return Visit with Edouard Bryan MD   [facility] ([facility] [place])    [address]  [address]  [facility] [address] [phone]              Future tests that were ordered for you today     Open Future Orders        Priority Expected Expires Ordered    EKG 12-lead complete w/read - Clinics Routine 6/5/2018 7/5/2018 6/5/2018    UA reflex to Microscopic and Culture Routine 6/5/2018 7/5/2018 6/5/2018    INR Routine 6/5/2018 7/5/2018 6/5/2018    Hemoglobin A1c Routine 6/5/2018 7/5/2018 6/5/2018    ABO/Rh type and screen Routine 6/5/2018 7/5/2018 6/5/2018    Basic metabolic panel Routine 6/5/2018 7/5/2018 6/5/2018    CBC with platelets Routine 6/5/2018 7/5/2018 6/5/2018            Who to contact     Please call your clinic at [phone] to:    Ask questions about your health    Make or cancel appointments    Discuss your medicines    Learn about your test results    Speak to your doctor            Additional Information About Your Visit        Yarraa Information     Yarraa gives you secure access to your electronic health record. If you see a primary care provider, you can also send messages to your care team and make appointments. If you have questions, please call your primary care clinic.  If you do not have a primary care provider, please call [phone] and they will assist you.      Yarraa is an electronic gateway that provides easy, online access to your medical records. With [employer]a, you can request a clinic appointment, read your test results, renew a prescription or communicate with your care team.     To access your existing account, please contact your [facility] or call [phone] for assistance.        Care EveryWhere ID     This is  your Care EveryWhere ID. This could be used by other organizations to access your [facility] medical records  [id number]         Blood Pressure from Last 3 Encounters:   06/05/18 130/70   05/15/18 141/74   05/09/17 130/79    Weight from Last 3 Encounters:   06/05/18 55.6 kg (122 lb 8 oz)   05/15/18 55.2 kg (121 lb 12.8 oz)   04/27/18 56 kg (123 lb 8 oz)              Today, you had the following     No orders found for display       Primary Care Provider Office Phone # Fax #    Elisabeth Ko [phone]       [facility] [address] [facility] [address]        Equal Access to Services     MIKEY CANO : Hadii aad ku hadasho Sosloan, waaxda luqadaha, qaybta kaalmada adeegyada, waxfidelina potter. So [facility] [phone].    ATENCIÓN: Si habla español, tiene a jade disposición servicios gratuitos de asistencia lingüística. Ll[facility] [phone].    We comply with applicable federal civil rights laws and [place] laws. We do not discriminate on the basis of race, color, national origin, age, disability, sex, sexual orientation, or gender identity.            Thank you!     Thank you for choosing [facility]  for your care. Our goal is always to provide you with excellent care. Hearing back from our patients is one way we can continue to improve our services. Please take a few minutes to complete the written survey that you may receive in the mail after your visit with us. Thank you!             Your Updated Medication List - Protect others around you: Learn how to safely use, store and throw away your medicines at www.disposemymeds.org.          This list is accurate as of 6/5/18 10:02 AM.  Always use your most recent med list.                   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

## 2018-06-06 LAB
BACTERIA SPEC CULT: NORMAL
SPECIMEN SOURCE: NORMAL

## 2018-06-11 ENCOUNTER — TELEPHONE (OUTPATIENT)
Dept: ORTHOPEDICS | Facility: CLINIC | Age: 83
End: 2018-06-11

## 2018-06-11 NOTE — TELEPHONE ENCOUNTER
Jigna called with concerns re: her UA and a possible infection that might postpone her surgery.  I assured her that <10,000 colonies did not mean she has a UTI.  We will proceed with the knee replacement tomorrow.

## 2018-06-12 ENCOUNTER — HOSPITAL ENCOUNTER (INPATIENT)
Facility: CLINIC | Age: 83
LOS: 3 days | Discharge: NURSING, REHAB OR LONG-TERM CARE | DRG: 470 | End: 2018-06-15
Attending: ORTHOPAEDIC SURGERY | Admitting: ORTHOPAEDIC SURGERY
Payer: MEDICARE

## 2018-06-12 ENCOUNTER — APPOINTMENT (OUTPATIENT)
Dept: GENERAL RADIOLOGY | Facility: CLINIC | Age: 83
DRG: 470 | End: 2018-06-12
Attending: PHYSICIAN ASSISTANT
Payer: MEDICARE

## 2018-06-12 ENCOUNTER — APPOINTMENT (OUTPATIENT)
Dept: GENERAL RADIOLOGY | Facility: CLINIC | Age: 83
DRG: 470 | End: 2018-06-12
Attending: ORTHOPAEDIC SURGERY
Payer: MEDICARE

## 2018-06-12 ENCOUNTER — ANESTHESIA (OUTPATIENT)
Dept: SURGERY | Facility: CLINIC | Age: 83
DRG: 470 | End: 2018-06-12
Payer: MEDICARE

## 2018-06-12 DIAGNOSIS — Z98.890 STATUS POST KNEE SURGERY: Primary | ICD-10-CM

## 2018-06-12 LAB
GLUCOSE BLDC GLUCOMTR-MCNC: 94 MG/DL (ref 70–99)
INR PPP: 1 (ref 0.86–1.14)

## 2018-06-12 PROCEDURE — 25000128 H RX IP 250 OP 636: Performed by: ANESTHESIOLOGY

## 2018-06-12 PROCEDURE — 36415 COLL VENOUS BLD VENIPUNCTURE: CPT | Performed by: ORTHOPAEDIC SURGERY

## 2018-06-12 PROCEDURE — 71000014 ZZH RECOVERY PHASE 1 LEVEL 2 FIRST HR: Performed by: ORTHOPAEDIC SURGERY

## 2018-06-12 PROCEDURE — 25000128 H RX IP 250 OP 636: Performed by: ORTHOPAEDIC SURGERY

## 2018-06-12 PROCEDURE — 25000128 H RX IP 250 OP 636: Performed by: INTERNAL MEDICINE

## 2018-06-12 PROCEDURE — 36000062 ZZH SURGERY LEVEL 4 1ST 30 MIN - UMMC: Performed by: ORTHOPAEDIC SURGERY

## 2018-06-12 PROCEDURE — 12000001 ZZH R&B MED SURG/OB UMMC

## 2018-06-12 PROCEDURE — 25000125 ZZHC RX 250: Performed by: NURSE ANESTHETIST, CERTIFIED REGISTERED

## 2018-06-12 PROCEDURE — 40000170 ZZH STATISTIC PRE-PROCEDURE ASSESSMENT II: Performed by: ORTHOPAEDIC SURGERY

## 2018-06-12 PROCEDURE — 40000986 XR KNEE PORT RT 1/2 VW: Mod: RT

## 2018-06-12 PROCEDURE — 99222 1ST HOSP IP/OBS MODERATE 55: CPT | Performed by: NURSE PRACTITIONER

## 2018-06-12 PROCEDURE — C1713 ANCHOR/SCREW BN/BN,TIS/BN: HCPCS | Performed by: ORTHOPAEDIC SURGERY

## 2018-06-12 PROCEDURE — A9270 NON-COVERED ITEM OR SERVICE: HCPCS | Mod: GY | Performed by: ORTHOPAEDIC SURGERY

## 2018-06-12 PROCEDURE — 27210995 ZZH RX 272: Performed by: ORTHOPAEDIC SURGERY

## 2018-06-12 PROCEDURE — C1776 JOINT DEVICE (IMPLANTABLE): HCPCS | Performed by: ORTHOPAEDIC SURGERY

## 2018-06-12 PROCEDURE — 25000132 ZZH RX MED GY IP 250 OP 250 PS 637: Mod: GY | Performed by: ORTHOPAEDIC SURGERY

## 2018-06-12 PROCEDURE — 71000015 ZZH RECOVERY PHASE 1 LEVEL 2 EA ADDTL HR: Performed by: ORTHOPAEDIC SURGERY

## 2018-06-12 PROCEDURE — 99207 ZZC CONSULT E&M CHANGED TO INITIAL LEVEL: CPT | Performed by: NURSE PRACTITIONER

## 2018-06-12 PROCEDURE — 25800025 ZZH RX 258: Performed by: ORTHOPAEDIC SURGERY

## 2018-06-12 PROCEDURE — 37000009 ZZH ANESTHESIA TECHNICAL FEE, EACH ADDTL 15 MIN: Performed by: ORTHOPAEDIC SURGERY

## 2018-06-12 PROCEDURE — 36000064 ZZH SURGERY LEVEL 4 EA 15 ADDTL MIN - UMMC: Performed by: ORTHOPAEDIC SURGERY

## 2018-06-12 PROCEDURE — 85610 PROTHROMBIN TIME: CPT | Performed by: ORTHOPAEDIC SURGERY

## 2018-06-12 PROCEDURE — 27210794 ZZH OR GENERAL SUPPLY STERILE: Performed by: ORTHOPAEDIC SURGERY

## 2018-06-12 PROCEDURE — 00000146 ZZHCL STATISTIC GLUCOSE BY METER IP

## 2018-06-12 PROCEDURE — 25000128 H RX IP 250 OP 636: Performed by: PHYSICIAN ASSISTANT

## 2018-06-12 PROCEDURE — A9270 NON-COVERED ITEM OR SERVICE: HCPCS | Mod: GY | Performed by: PHYSICIAN ASSISTANT

## 2018-06-12 PROCEDURE — 0QUG07Z SUPPLEMENT RIGHT TIBIA WITH AUTOLOGOUS TISSUE SUBSTITUTE, OPEN APPROACH: ICD-10-PCS | Performed by: ORTHOPAEDIC SURGERY

## 2018-06-12 PROCEDURE — 37000008 ZZH ANESTHESIA TECHNICAL FEE, 1ST 30 MIN: Performed by: ORTHOPAEDIC SURGERY

## 2018-06-12 PROCEDURE — 27810169 ZZH OR IMPLANT GENERAL: Performed by: ORTHOPAEDIC SURGERY

## 2018-06-12 PROCEDURE — 0SRC0J9 REPLACEMENT OF RIGHT KNEE JOINT WITH SYNTHETIC SUBSTITUTE, CEMENTED, OPEN APPROACH: ICD-10-PCS | Performed by: ORTHOPAEDIC SURGERY

## 2018-06-12 PROCEDURE — 25000125 ZZHC RX 250: Performed by: ORTHOPAEDIC SURGERY

## 2018-06-12 PROCEDURE — 25000128 H RX IP 250 OP 636: Performed by: NURSE ANESTHETIST, CERTIFIED REGISTERED

## 2018-06-12 PROCEDURE — 25000132 ZZH RX MED GY IP 250 OP 250 PS 637: Mod: GY | Performed by: PHYSICIAN ASSISTANT

## 2018-06-12 DEVICE — IMP SCR SYN CANC 4.0X35MM PART THRD SS 207.035
Type: IMPLANTABLE DEVICE | Site: KNEE | Status: NON-FUNCTIONAL
Removed: 2021-03-09

## 2018-06-12 DEVICE — IMPLANTABLE DEVICE
Type: IMPLANTABLE DEVICE | Site: KNEE | Status: NON-FUNCTIONAL
Removed: 2021-03-09

## 2018-06-12 DEVICE — IMP WIRE KIRSCHNER 0.062X4" 3715-3-040: Type: IMPLANTABLE DEVICE | Status: FUNCTIONAL

## 2018-06-12 DEVICE — IMP COMP PATELLA HOWM TRI ASYM 32X10MM 5551-G-320
Type: IMPLANTABLE DEVICE | Site: KNEE | Status: NON-FUNCTIONAL
Removed: 2021-03-09

## 2018-06-12 DEVICE — BONE CEMENT SIMPLEX FULL DOSE 6191-1-001
Type: IMPLANTABLE DEVICE | Site: KNEE | Status: NON-FUNCTIONAL
Removed: 2021-03-09

## 2018-06-12 DEVICE — IMP INSERT BASEPLATE TIBIAL HOWM TRI 4 5521-B-400
Type: IMPLANTABLE DEVICE | Site: KNEE | Status: NON-FUNCTIONAL
Removed: 2021-03-09

## 2018-06-12 RX ORDER — SODIUM CHLORIDE, SODIUM LACTATE, POTASSIUM CHLORIDE, CALCIUM CHLORIDE 600; 310; 30; 20 MG/100ML; MG/100ML; MG/100ML; MG/100ML
INJECTION, SOLUTION INTRAVENOUS CONTINUOUS
Status: DISCONTINUED | OUTPATIENT
Start: 2018-06-12 | End: 2018-06-13

## 2018-06-12 RX ORDER — ONDANSETRON 4 MG/1
4 TABLET, ORALLY DISINTEGRATING ORAL EVERY 6 HOURS PRN
Status: DISCONTINUED | OUTPATIENT
Start: 2018-06-12 | End: 2018-06-15 | Stop reason: HOSPADM

## 2018-06-12 RX ORDER — ACETAMINOPHEN 500 MG
1000 TABLET ORAL ONCE
Status: COMPLETED | OUTPATIENT
Start: 2018-06-12 | End: 2018-06-12

## 2018-06-12 RX ORDER — AMOXICILLIN 250 MG
1 CAPSULE ORAL 2 TIMES DAILY
Status: DISCONTINUED | OUTPATIENT
Start: 2018-06-12 | End: 2018-06-15 | Stop reason: HOSPADM

## 2018-06-12 RX ORDER — SODIUM CHLORIDE, SODIUM LACTATE, POTASSIUM CHLORIDE, CALCIUM CHLORIDE 600; 310; 30; 20 MG/100ML; MG/100ML; MG/100ML; MG/100ML
INJECTION, SOLUTION INTRAVENOUS
Status: DISPENSED
Start: 2018-06-12 | End: 2018-06-13

## 2018-06-12 RX ORDER — ONDANSETRON 2 MG/ML
INJECTION INTRAMUSCULAR; INTRAVENOUS PRN
Status: DISCONTINUED | OUTPATIENT
Start: 2018-06-12 | End: 2018-06-12

## 2018-06-12 RX ORDER — GABAPENTIN 100 MG/1
100 CAPSULE ORAL AT BEDTIME
Status: COMPLETED | OUTPATIENT
Start: 2018-06-12 | End: 2018-06-14

## 2018-06-12 RX ORDER — SODIUM CHLORIDE, SODIUM LACTATE, POTASSIUM CHLORIDE, CALCIUM CHLORIDE 600; 310; 30; 20 MG/100ML; MG/100ML; MG/100ML; MG/100ML
INJECTION, SOLUTION INTRAVENOUS CONTINUOUS
Status: DISCONTINUED | OUTPATIENT
Start: 2018-06-12 | End: 2018-06-12 | Stop reason: HOSPADM

## 2018-06-12 RX ORDER — PROPOFOL 10 MG/ML
INJECTION, EMULSION INTRAVENOUS CONTINUOUS PRN
Status: DISCONTINUED | OUTPATIENT
Start: 2018-06-12 | End: 2018-06-12

## 2018-06-12 RX ORDER — BUPIVACAINE HYDROCHLORIDE 7.5 MG/ML
INJECTION, SOLUTION EPIDURAL; RETROBULBAR PRN
Status: DISCONTINUED | OUTPATIENT
Start: 2018-06-12 | End: 2018-06-12

## 2018-06-12 RX ORDER — LIDOCAINE 40 MG/G
CREAM TOPICAL
Status: DISCONTINUED | OUTPATIENT
Start: 2018-06-12 | End: 2018-06-15 | Stop reason: HOSPADM

## 2018-06-12 RX ORDER — FENTANYL CITRATE 50 UG/ML
INJECTION, SOLUTION INTRAMUSCULAR; INTRAVENOUS PRN
Status: DISCONTINUED | OUTPATIENT
Start: 2018-06-12 | End: 2018-06-12

## 2018-06-12 RX ORDER — CEFAZOLIN SODIUM 2 G/100ML
2 INJECTION, SOLUTION INTRAVENOUS
Status: COMPLETED | OUTPATIENT
Start: 2018-06-12 | End: 2018-06-12

## 2018-06-12 RX ORDER — MAGNESIUM HYDROXIDE 1200 MG/15ML
LIQUID ORAL PRN
Status: DISCONTINUED | OUTPATIENT
Start: 2018-06-12 | End: 2018-06-12 | Stop reason: HOSPADM

## 2018-06-12 RX ORDER — PROPOFOL 10 MG/ML
INJECTION, EMULSION INTRAVENOUS PRN
Status: DISCONTINUED | OUTPATIENT
Start: 2018-06-12 | End: 2018-06-12

## 2018-06-12 RX ORDER — CEFAZOLIN SODIUM 1 G/3ML
1 INJECTION, POWDER, FOR SOLUTION INTRAMUSCULAR; INTRAVENOUS EVERY 8 HOURS
Status: COMPLETED | OUTPATIENT
Start: 2018-06-12 | End: 2018-06-13

## 2018-06-12 RX ORDER — NALOXONE HYDROCHLORIDE 0.4 MG/ML
.1-.4 INJECTION, SOLUTION INTRAMUSCULAR; INTRAVENOUS; SUBCUTANEOUS
Status: DISCONTINUED | OUTPATIENT
Start: 2018-06-12 | End: 2018-06-12 | Stop reason: HOSPADM

## 2018-06-12 RX ORDER — WARFARIN SODIUM 2.5 MG/1
2.5 TABLET ORAL
Status: COMPLETED | OUTPATIENT
Start: 2018-06-12 | End: 2018-06-12

## 2018-06-12 RX ORDER — ONDANSETRON 4 MG/1
4 TABLET, ORALLY DISINTEGRATING ORAL EVERY 30 MIN PRN
Status: DISCONTINUED | OUTPATIENT
Start: 2018-06-12 | End: 2018-06-12 | Stop reason: HOSPADM

## 2018-06-12 RX ORDER — ACETAMINOPHEN 325 MG/1
975 TABLET ORAL EVERY 8 HOURS
Status: DISCONTINUED | OUTPATIENT
Start: 2018-06-12 | End: 2018-06-14

## 2018-06-12 RX ORDER — FENTANYL CITRATE 50 UG/ML
25-50 INJECTION, SOLUTION INTRAMUSCULAR; INTRAVENOUS EVERY 5 MIN PRN
Status: DISCONTINUED | OUTPATIENT
Start: 2018-06-12 | End: 2018-06-12 | Stop reason: HOSPADM

## 2018-06-12 RX ORDER — NALOXONE HYDROCHLORIDE 0.4 MG/ML
.1-.4 INJECTION, SOLUTION INTRAMUSCULAR; INTRAVENOUS; SUBCUTANEOUS
Status: DISCONTINUED | OUTPATIENT
Start: 2018-06-12 | End: 2018-06-15

## 2018-06-12 RX ORDER — HYDROMORPHONE HYDROCHLORIDE 1 MG/ML
.2-.4 INJECTION, SOLUTION INTRAMUSCULAR; INTRAVENOUS; SUBCUTANEOUS
Status: DISCONTINUED | OUTPATIENT
Start: 2018-06-12 | End: 2018-06-14

## 2018-06-12 RX ORDER — SODIUM CHLORIDE, SODIUM LACTATE, POTASSIUM CHLORIDE, CALCIUM CHLORIDE 600; 310; 30; 20 MG/100ML; MG/100ML; MG/100ML; MG/100ML
INJECTION, SOLUTION INTRAVENOUS CONTINUOUS PRN
Status: DISCONTINUED | OUTPATIENT
Start: 2018-06-12 | End: 2018-06-12

## 2018-06-12 RX ORDER — AMOXICILLIN 250 MG
2 CAPSULE ORAL 2 TIMES DAILY
Status: DISCONTINUED | OUTPATIENT
Start: 2018-06-12 | End: 2018-06-15 | Stop reason: HOSPADM

## 2018-06-12 RX ORDER — LEVOTHYROXINE SODIUM 75 UG/1
75 TABLET ORAL DAILY
Status: DISCONTINUED | OUTPATIENT
Start: 2018-06-13 | End: 2018-06-15 | Stop reason: HOSPADM

## 2018-06-12 RX ORDER — CEFAZOLIN SODIUM 1 G/3ML
INJECTION, POWDER, FOR SOLUTION INTRAMUSCULAR; INTRAVENOUS PRN
Status: DISCONTINUED | OUTPATIENT
Start: 2018-06-12 | End: 2018-06-12

## 2018-06-12 RX ORDER — HYDROMORPHONE HYDROCHLORIDE 1 MG/ML
.3-.5 INJECTION, SOLUTION INTRAMUSCULAR; INTRAVENOUS; SUBCUTANEOUS EVERY 10 MIN PRN
Status: DISCONTINUED | OUTPATIENT
Start: 2018-06-12 | End: 2018-06-12 | Stop reason: HOSPADM

## 2018-06-12 RX ORDER — NALOXONE HYDROCHLORIDE 0.4 MG/ML
.1-.4 INJECTION, SOLUTION INTRAMUSCULAR; INTRAVENOUS; SUBCUTANEOUS
Status: DISCONTINUED | OUTPATIENT
Start: 2018-06-12 | End: 2018-06-15 | Stop reason: HOSPADM

## 2018-06-12 RX ORDER — ACETAMINOPHEN 325 MG/1
650 TABLET ORAL EVERY 4 HOURS PRN
Status: DISCONTINUED | OUTPATIENT
Start: 2018-06-15 | End: 2018-06-14

## 2018-06-12 RX ORDER — BISACODYL 10 MG
10 SUPPOSITORY, RECTAL RECTAL DAILY
Status: DISCONTINUED | OUTPATIENT
Start: 2018-06-13 | End: 2018-06-15 | Stop reason: HOSPADM

## 2018-06-12 RX ORDER — GLYCOPYRROLATE 0.2 MG/ML
INJECTION, SOLUTION INTRAMUSCULAR; INTRAVENOUS PRN
Status: DISCONTINUED | OUTPATIENT
Start: 2018-06-12 | End: 2018-06-12

## 2018-06-12 RX ORDER — ONDANSETRON 2 MG/ML
4 INJECTION INTRAMUSCULAR; INTRAVENOUS EVERY 30 MIN PRN
Status: DISCONTINUED | OUTPATIENT
Start: 2018-06-12 | End: 2018-06-12 | Stop reason: HOSPADM

## 2018-06-12 RX ORDER — PROCHLORPERAZINE MALEATE 5 MG
5 TABLET ORAL EVERY 6 HOURS PRN
Status: DISCONTINUED | OUTPATIENT
Start: 2018-06-12 | End: 2018-06-15 | Stop reason: HOSPADM

## 2018-06-12 RX ORDER — POLYETHYLENE GLYCOL 3350 17 G/17G
17 POWDER, FOR SOLUTION ORAL DAILY PRN
Status: DISCONTINUED | OUTPATIENT
Start: 2018-06-12 | End: 2018-06-14

## 2018-06-12 RX ORDER — ONDANSETRON 2 MG/ML
4 INJECTION INTRAMUSCULAR; INTRAVENOUS EVERY 6 HOURS PRN
Status: DISCONTINUED | OUTPATIENT
Start: 2018-06-12 | End: 2018-06-15 | Stop reason: HOSPADM

## 2018-06-12 RX ADMIN — PROPOFOL 10 MG: 10 INJECTION, EMULSION INTRAVENOUS at 12:00

## 2018-06-12 RX ADMIN — PROPOFOL 10 MG: 10 INJECTION, EMULSION INTRAVENOUS at 12:05

## 2018-06-12 RX ADMIN — FENTANYL CITRATE 25 MCG: 50 INJECTION INTRAMUSCULAR; INTRAVENOUS at 14:28

## 2018-06-12 RX ADMIN — MAGNESIUM HYDROXIDE 15 ML: 400 SUSPENSION ORAL at 19:32

## 2018-06-12 RX ADMIN — GABAPENTIN 100 MG: 100 CAPSULE ORAL at 21:20

## 2018-06-12 RX ADMIN — PROPOFOL 10 MG: 10 INJECTION, EMULSION INTRAVENOUS at 13:14

## 2018-06-12 RX ADMIN — SENNOSIDES AND DOCUSATE SODIUM 2 TABLET: 8.6; 5 TABLET ORAL at 19:29

## 2018-06-12 RX ADMIN — FENTANYL CITRATE 25 MCG: 50 INJECTION, SOLUTION INTRAMUSCULAR; INTRAVENOUS at 10:15

## 2018-06-12 RX ADMIN — PROPOFOL 10 MG: 10 INJECTION, EMULSION INTRAVENOUS at 12:37

## 2018-06-12 RX ADMIN — Medication 0.2 MG: at 11:15

## 2018-06-12 RX ADMIN — ACETAMINOPHEN 975 MG: 325 TABLET, FILM COATED ORAL at 17:38

## 2018-06-12 RX ADMIN — CEFAZOLIN 1 G: 1 INJECTION, POWDER, FOR SOLUTION INTRAMUSCULAR; INTRAVENOUS at 12:20

## 2018-06-12 RX ADMIN — SODIUM CHLORIDE 1 G: 9 INJECTION, SOLUTION INTRAVENOUS at 10:10

## 2018-06-12 RX ADMIN — BUPIVACAINE HYDROCHLORIDE 1.4 ML: 7.5 INJECTION, SOLUTION EPIDURAL; RETROBULBAR at 10:17

## 2018-06-12 RX ADMIN — PROPOFOL 30 MG: 10 INJECTION, EMULSION INTRAVENOUS at 13:34

## 2018-06-12 RX ADMIN — FENTANYL CITRATE 25 MCG: 50 INJECTION, SOLUTION INTRAMUSCULAR; INTRAVENOUS at 13:49

## 2018-06-12 RX ADMIN — PROPOFOL 10 MG: 10 INJECTION, EMULSION INTRAVENOUS at 12:55

## 2018-06-12 RX ADMIN — SODIUM CHLORIDE, POTASSIUM CHLORIDE, SODIUM LACTATE AND CALCIUM CHLORIDE: 600; 310; 30; 20 INJECTION, SOLUTION INTRAVENOUS at 09:58

## 2018-06-12 RX ADMIN — PROPOFOL 10 MG: 10 INJECTION, EMULSION INTRAVENOUS at 11:42

## 2018-06-12 RX ADMIN — WARFARIN SODIUM 2.5 MG: 2.5 TABLET ORAL at 18:40

## 2018-06-12 RX ADMIN — PROPOFOL 10 MG: 10 INJECTION, EMULSION INTRAVENOUS at 12:28

## 2018-06-12 RX ADMIN — ACETAMINOPHEN 1000 MG: 500 TABLET ORAL at 08:34

## 2018-06-12 RX ADMIN — CEFAZOLIN 1 G: 1 INJECTION, POWDER, FOR SOLUTION INTRAMUSCULAR; INTRAVENOUS at 18:39

## 2018-06-12 RX ADMIN — SODIUM CHLORIDE, POTASSIUM CHLORIDE, SODIUM LACTATE AND CALCIUM CHLORIDE: 600; 310; 30; 20 INJECTION, SOLUTION INTRAVENOUS at 18:39

## 2018-06-12 RX ADMIN — FENTANYL CITRATE 50 MCG: 50 INJECTION, SOLUTION INTRAMUSCULAR; INTRAVENOUS at 14:00

## 2018-06-12 RX ADMIN — PHENYLEPHRINE HYDROCHLORIDE 0.2 MCG/KG/MIN: 10 INJECTION, SOLUTION INTRAMUSCULAR; INTRAVENOUS; SUBCUTANEOUS at 10:24

## 2018-06-12 RX ADMIN — SODIUM CHLORIDE, POTASSIUM CHLORIDE, SODIUM LACTATE AND CALCIUM CHLORIDE: 600; 310; 30; 20 INJECTION, SOLUTION INTRAVENOUS at 11:45

## 2018-06-12 RX ADMIN — ONDANSETRON 4 MG: 2 INJECTION INTRAMUSCULAR; INTRAVENOUS at 13:37

## 2018-06-12 RX ADMIN — PROPOFOL 10 MG: 10 INJECTION, EMULSION INTRAVENOUS at 12:09

## 2018-06-12 RX ADMIN — CEFAZOLIN SODIUM 2 G: 2 INJECTION, SOLUTION INTRAVENOUS at 10:20

## 2018-06-12 RX ADMIN — Medication 0.2 MG: at 15:15

## 2018-06-12 RX ADMIN — PROPOFOL 50 MCG/KG/MIN: 10 INJECTION, EMULSION INTRAVENOUS at 10:19

## 2018-06-12 RX ADMIN — PROPOFOL 10 MG: 10 INJECTION, EMULSION INTRAVENOUS at 12:19

## 2018-06-12 RX ADMIN — PROPOFOL 10 MG: 10 INJECTION, EMULSION INTRAVENOUS at 12:14

## 2018-06-12 NOTE — ANESTHESIA CARE TRANSFER NOTE
Patient: Jigna Allen    Procedure(s):  Right Total Knee Replacement  - Wound Class: I-Clean    Diagnosis: Osteoarthritis Right Knee  Diagnosis Additional Information: No value filed.    Anesthesia Type:   Spinal     Note:  Airway :Face Mask  Patient transferred to:PACU  Comments: Arrived in PACU, report to RN, vitals stable, patient comfortable.  Handoff Report: Identifed the Patient, Identified the Reponsible Provider, Reviewed the pertinent medical history, Discussed the surgical course, Reviewed Intra-OP anesthesia mangement and issues during anesthesia, Set expectations for post-procedure period and Allowed opportunity for questions and acknowledgement of understanding      Vitals: (Last set prior to Anesthesia Care Transfer)    CRNA VITALS  6/12/2018 1325 - 6/12/2018 1405      6/12/2018             Pulse: 77    SpO2: 97 %    Resp Rate (observed): (!)  1                Electronically Signed By: WOOD Shah CRNA  June 12, 2018  2:05 PM

## 2018-06-12 NOTE — ANESTHESIA PROCEDURE NOTES
Spinal/LP Procedure Note    Spinal Block  Staff:     Anesthesiologist:  ANIKA SOFIA  Location: In OR BEFORE Induction  Procedure Start/Stop Times:      6/12/2018 10:05 AM     6/12/2018 10:17 AM    patient identified, IV checked, site marked, risks and benefits discussed, informed consent, monitors and equipment checked, pre-op evaluation, at physician/surgeon's request and post-op pain management      Correct Patient: Yes      Correct Position: Yes      Correct Site: Yes      Correct Procedure: Yes      Correct Laterality:  N/A    Site Marked:  N/A  Procedure:     Procedure:  Intrathecal    ASA:  2    Position:  Sitting    Sterile Prep: chloraprep, mask, sterile gloves and patient draped      Insertion site:  L3-4    Approach:  Midline    Needle Type:  Shanta    Needle gauge (G):  25    Local Skin Infiltration:  1% lidocaine    amount (ml):  3    Needle Length (in):  3.5    Introducer used: Yes      Introducer gauge:  20 G    Attempts:  1    Redirects:  0    CSF:  Clear    Paresthesias:  No    Time injected:  10:17

## 2018-06-12 NOTE — ANESTHESIA POSTPROCEDURE EVALUATION
Patient: Jigna Allen    Procedure(s):  Right Total Knee Replacement  - Wound Class: I-Clean    Diagnosis:Osteoarthritis Right Knee  Diagnosis Additional Information: No value filed.    Anesthesia Type:  Spinal    Note:  Anesthesia Post Evaluation    Patient location during evaluation: Phase 2  Patient participation: Able to participate in evaluation but full recovery from regional anesthesia has not yet ocurrred but is anticipated to occur within 48 hours  Level of consciousness: awake and alert  Pain management: adequate  Airway patency: patent  Cardiovascular status: acceptable and stable  Respiratory status: room air  Hydration status: acceptable  PONV: none     Anesthetic complications: None          Last vitals:  Vitals:    06/12/18 1500 06/12/18 1515 06/12/18 1545   BP: 135/70 151/63 132/66   Pulse:      Resp: 15 14 16   Temp:   36.8  C (98.2  F)   SpO2: 93% 97% 94%         Electronically Signed By: Patience Hutchinson MD  June 12, 2018  4:18 PM

## 2018-06-12 NOTE — PHARMACY
Clinical Pharmacy - Warfarin Dosing Consult     Pharmacy has been consulted to manage this patient s warfarin therapy.  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 Goal INR: 1.8-2.5       INR   Date Value Ref Range Status   06/12/2018 1.00 0.86 - 1.14 Final   06/05/2018 0.98 0.86 - 1.14 Final       Recommend warfarin 2.5 mg today.  Pharmacy will monitor Jigna Allen daily and order warfarin doses to achieve specified goal.    INR daily-ordered    Please contact pharmacy as soon as possible if the warfarin needs to be held for a procedure or if the warfarin goals change.    Amelia Mccray  PharmD,BCPS  June 12, 2018

## 2018-06-12 NOTE — IP AVS SNAPSHOT
Jigna Allen N #[id number] (CSN: [id number])  (83 year old F)  (Adm: 18)     [id number]                8A: [phone]            Patient Demographics     Patient Name Sex          Age SSN Address Phone    Jigna Allen Female 1935 (83 year old) [id number] [place] [address] [phone] (Home)  [phone] (Mobile) *Preferred*      Emergency Contact(s)     Name Relation Home Work Mobile    Harley Allen Spouse [phone]    Indu Adler Daughter   [phone]      Admission Information     Attending Provider Admitting Provider Admission Type Admission Date/Time    He Gomez MD Cheng, Edward Y, MD Elective 18  0735    Discharge Date Hospital Service Auth/Cert Status Service Area     Surgery Incomplete [facility]    Unit Room/Bed Admission Status       [facility]  Admission (Confirmed)       Admission     Complaint    Osteoarthritis Right Knee, Status post knee surgery      Hospital Account     Name Acct ID Class Status Primary Coverage    Jigna Allen N [id number] Inpatient Open MEDICARE - MEDICARE FOR HB SUPPLEMENT            Guarantor Account (for Hospital Account #[id number])     Name Relation to Pt Service Area Active? Acct Type    Jigna Allen Self FCS Yes Personal/Family    Address Phone          [address] [place] [address] [phone](H)              Coverage Information (for Hospital Account #[id number])     1. MEDICARE/MEDICARE FOR HB SUPPLEMENT     F/O Payor/Plan Precert #    MEDICARE/MEDICARE FOR HB SUPPLEMENT     Subscriber Subscriber #    Jigna Allen HILARY [id number]    Address Phone    ATTN CLAIMS  [address]  [facility] [place] [address] [phone]          2. HEALTHPARTNERS/HEALTHPARTNERS FREEDOM PLAN     F/O Payor/Plan Precert #    HEALTHPARTNERS/HEALTHPARTNERS FREEDOM PLAN     Subscriber Subscriber #    TiffanyJigna waters HILARY [id number]    Address Phone    [address]  [place]  "[address] [phone]                                                      INTERAGENCY TRANSFER FORM - PHYSICIAN ORDERS   6/12/2018                       UR 8A: [phone]            Attending Provider: He Gomez MD     Allergies:  Colchicine, Niacin, Ferrous Sulfate    Infection:  None   Service:  SURGERY    Ht:  1.575 m (5' 2\")   Wt:  54.8 kg (120 lb 13 oz)   Admission Wt:  54.8 kg (120 lb 13 oz)    BMI:  22.1 kg/m 2   BSA:  1.55 m 2            ED Clinical Impression     Diagnosis Description Comment Added By Time Added    Status post knee surgery [Z98.890] Status post knee surgery [Z98.890]  Hanane Gibson APRN CNP 6/14/2018  3:21 PM      Hospital Problems as of 6/15/2018              Priority Class Noted POA    Status post knee surgery Medium  6/12/2018 Yes      Non-Hospital Problems as of 6/15/2018              Priority Class Noted    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    Macrocytosis Medium  9/3/2013    Sarcoma (H) Medium  10/7/2014    Post-traumatic osteoarthritis of both knees Medium  11/6/2015    Fatigue, unspecified type Medium  9/8/2016    Hypothyroidism, unspecified type Medium  9/8/2016    Anxiety Medium  9/8/2016    Lyme disease Medium  9/8/2016    Adjustment disorder with anxious mood Medium  5/16/2017    ACP (advance care planning) Medium  9/1/2017    Pulmonary nodules Medium  5/17/2018      Code Status History     Date Active Date Inactive Code Status Order ID Comments User Context    6/15/2018 11:51 AM  Full Code [id number]  Hanane Gibson APRN CNP Outpatient    6/12/2018  4:37 PM 6/15/2018 11:51 AM Full Code [id number]  Wilner Carr PA-C Inpatient      Current Code Status     Date Active Code Status Order ID Comments User Context       Prior      Summary of Visit     Reason for your hospital stay       You were admitted following your total knee arthroplasty       Reason for your hospital stay       Right TKA                Medication Review      START taking  "       Dose / Directions Comments    bisacodyl 10 MG Suppository   Commonly known as:  DULCOLAX        Dose:  10 mg   Place 1 suppository (10 mg) rectally daily as needed for constipation   Quantity:  30 suppository   Refills:  0    Use if 3 days and no BM       polyethylene glycol Packet   Commonly known as:  MIRALAX/GLYCOLAX        Dose:  17 g   Take 17 g by mouth daily   Quantity:  7 packet   Refills:  0        senna-docusate 8.6-50 MG per tablet   Commonly known as:  SENOKOT-S;PERICOLACE        Dose:  1 tablet   Take 1 tablet by mouth 2 times daily   Quantity:  100 tablet   Refills:  0        warfarin 3 MG tablet   Commonly known as:  COUMADIN        Take 3 mg today, tomorrow. INR on Sunday. LOT 30 days. Facility MD to dose warfarin. If pt d/c before 30 days please arrange for outpatient warfarin   Quantity:  30 tablet   Refills:  0          CONTINUE these medications which may have CHANGED, or have new prescriptions. If we are uncertain of the size of tablets/capsules you have at home, strength may be listed as something that might have changed.        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 Gomez approximately four weeks after surgery.     Your follow up appointments will be at the location that you regularly see Dr. Gomez:    [facility]  [address] [place], [place] [id number] [phone]    Physical therapy:   A prescription for physical therapy will be provided at the time of discharge.      ACTIVITY  Weight bearing status:   You may bear weight on your operative extremity as tolerated, using assistive devices (walker, cane) as needed. As you begin to feel more comfortable ambulating, you may gradually transition from a walker to a cane. Eventually, you should wean from all assistive devices. Although we would like you to discontinue use of assistive devices as soon as possible, do not transition until you have worked with your physical therapist to achieve safe balance and comfort.     Continuous passive motion machine:   Perform CPM exercises for six to eight hours per day for the first four weeks after surgery. The CPM should be set at 0 degrees to flexion tolerance with a goal of 90 degrees. Advance the CPM settings aggressively in increments of 5 degrees every 30 minutes until goal is achieved.     Exercises:   Perform the following exercises at least three times per day for the first four weeks after surgery to prevent complications, such as blood clots in your legs:  1) Point and flex your feet  2) Move your ankle around in big circles  3) Wiggle your toes   Also, perform thigh muscle tightening exercises for 10 to 15 minutes at least three times per day for the first four weeks after surgery.    Athletic Activities:  Activities such as swimming, bicycling, jogging, running, and stop-and-go sports should be avoided until permitted by your provider.    Driving:  Driving is not permitted until directed by your provider. Typically, driving is restricted for three to  four weeks after right knee surgery and three weeks after left knee surgery. Under no circumstance are you permitted to drive while using narcotic pain medications.    Return to Work:  You may return to work when directed by your provider. Typically, patients with desk/sitting jobs can return to work within two weeks while patients with heavy labor jobs can return to work around three months after surgery.      COMFORT AND PAIN MANAGEMENT  Elevation:   During times of inactivity throughout the first two weeks after surgery, make an effort to decrease swelling by elevating your operative extremity. This is most effectively done by lying down and placing several pillows lengthwise under your thigh and calf to raise your toes above the level of your nose. To ensure that you knee remains in full extension, do not place pillows directly under your knee.     Icing:  An ice pack will be provided to control swelling and discomfort after surgery. Place a thin towel on your skin and apply the ice pack overtop. You may apply ice for 20 minutes as often as two times per hour.    Pain Medications:  You will be discharged with acetaminophen (Tylenol) and a narcotic medication for pain management after surgery. Acetaminophen can help to effectively manage pain when used as prescribed, however, do not exceed the maximum daily dose of 3000 mg. The narcotic pain medication should be reserved for severe, breakthrough pain. Take the narcotic medication as prescribed and use only as often as necessary.       ANTICOAGULATION  Take warfarin as prescribed for a total of four weeks after surgery. This medication will require weekly INR checks (INR goal 1.5-2.5).       WOUND CARE AND SHOWERING  Wound care:  Keep the dressing on, clean, and dry for the first five days after surgery. You may then remove the dressing and replace it with fresh 4x4s and tubigrip (or ACE wrap). Your surgical incision was closed with a clear knotless suture and  tails were left at the margins of the incision. These tails can be left in place until your follow up appointment. The steri strips (small white tape that is directly on the incision areas) should be left in place until they fall off or are removed at your first office visit.    Showering:  You may shower ten days after surgery provided your incision is intact and dry without drainage. If there is fluid at the incision site, cover the incision with a non-permeable plastic bag. You may allow water to run over the incision, but do not soak or submerge the incision. Do not scrub the incision.     Tub Bathing:  Tub bathing, swimming, or any other activities that cause your incision to be submerged should be avoided until allowed by your provider. Typically, patients are allowed to return to these activities four weeks after surgery.      CONTACTING YOUR PHYSICIAN:  You may experience symptoms that require follow-up before your scheduled appointment. Please contact Dr. Gomez s office if you experience:  1) Pain that persists or worsens in the first few days after surgery  2) Excessive redness or drainage of cloudy or bloody material from the wounds (clear red tinted fluid and some mild drainage should be expected) or drainage of any kind five days after surgery  3) A temperature elevation greater than 101.5    4) Pain, swelling or redness in your calf  5) Numbness or weakness in your leg or foot      Regular business hours (Monday - Friday, 8am - 5pm):  [facility]: [phone]    If you have any other concerns during normal business hours, please contact Dr. Gomez's nurse, Mere Cruz RN, at [phone] during normal business hours 8 am - 5 pm (leave message as needed). If your concern is urgent, please page Mere Cruz RN at [phone] and key in your 10 digit phone number followed by the # sign after the beep.     After hours and weekends:  [facility] on  call Orthopedic resident: [phone]    If you have an emergent concern, contact the Emergency Department at the [facility] - [phone] - or [facility] - [phone] - [place].       Discharge Instructions       BMP, Hgb 6/18 -results to provider.       General info for SNF       Length of Stay Estimate: Short Term Care: Estimated # of Days <30  Condition at Discharge: Improving  Level of care:skilled   Rehabilitation Potential: Excellent  Admission H&P remains valid and up-to-date: Yes  Recent Chemotherapy: N/A  Use Nursing Home Standing Orders: Yes       Hip precautions           Mantoux instructions       Give two-step Mantoux (PPD) Per Facility Policy Yes       Weight bearing status                 Referrals     Occupational Therapy Adult Consult       Evaluate and treat as clinically indicated.    Reason:  Mobilization tka protocol       Physical Therapy Adult Consult       Evaluate and treat as clinically indicated.    Reason:  Mobilization, tka protocol             Follow-Up Appointment Instructions     Follow Up and recommended labs and tests       INR Sunday and daily as needed for warfarin follow up. Please arrange warfarin follow up if pt discharges before 30 days             Your next 10 appointments already scheduled     Aug 28, 2018 10:40 AM CDT   CT CHEST W/O CONTRAST with UCCT1   [facility] ([facility])    [address]  1st Floor  [facility] [address] [phone]           Please bring any scans or X-rays taken at other hospitals, if similar tests were done. Also bring a list of your medicines, including vitamins, minerals and over-the-counter drugs. It is safest to leave personal items at home.  Be sure to tell your doctor:   If you have any allergies.   If there s any chance you are pregnant.   If you are breastfeeding.  You do not need to do anything special to prepare for this exam.  Please wear loose clothing, such as a sweat  "suit or jogging clothes. Avoid snaps, zippers and other metal. We may ask you to undress and put on a hospital gown.            Aug 30, 2018  3:30 PM CDT   (Arrive by 3:15 PM)   Return Visit with Edouard Bryan MD   [facility] ([facility])    [address]  [address]  [facility] [address] [phone]            May 09, 2019  9:00 AM CDT   XR CHEST 2 VIEWS with UCXR1   [facility] ([facility])    [address]  1st Floor  [facility] [address] [phone]           Please bring a list of your current medicines to your exam. (Include vitamins, minerals and over-thecounter medicines.) Leave your valuables at home.  Tell your doctor if there is a chance you may be pregnant.  You do not need to do anything special for this exam.            May 09, 2019 11:00 AM CDT   (Arrive by 10:45 AM)   Return Visit with Edouard Bryan MD   [facility] ([facility])    [address]  [address]  [facility] [address] [phone]              Statement of Approval     Ordered          06/15/18 0846  I have reviewed and agree with all the recommendations and orders detailed in this document.  EFFECTIVE NOW     Approved and electronically signed by:  Thanh Thorne MD           06/15/18 0846  I have reviewed and agree with all the recommendations and orders detailed in this document.  EFFECTIVE NOW     Approved and electronically signed by:  Thanh Thorne MD                                                 INTERAGENCY TRANSFER FORM - NURSING   6/12/2018                       UR 8A: [phone]            Attending Provider: He Gomez MD     Allergies:  Colchicine, Niacin, Ferrous Sulfate    Infection:  None   Service:  SURGERY    Ht:  1.575 m (5' 2\")   Wt:  54.8 kg (120 lb 13 oz)   Admission Wt:  54.8 kg (120 lb 13 oz)    " BMI:  22.1 kg/m 2   BSA:  1.55 m 2            Advance Directives        Scanned docmt in ACP Activity?           No scanned doc        Immunizations     None      ASSESSMENT     Discharge Profile Flowsheet     EXPECTED DISCHARGE     SKIN      Expected Discharge Date  06/15/18 06/13/18 1125   Inspection of bony prominences  Full except (identify areas not inspected) 06/15/18 1139    DISCHARGE NEEDS ASSESSMENT     Full except areas not inspected   -- (not under right leg) 06/15/18 1139    Concerns To Be Addressed  all concerns addressed in this encounter 06/13/18 1125   Procedural focused assessment (identify areas inspected)   -- (left thigh cautery pad site) 06/12/18 1348    Patient/family verbalizes understanding of discharge plan recommendations?  Yes 06/13/18 1125   Inspection under devices  Full except (identify device(s) not inspected) 06/15/18 0022    Medical Team notified of plan?  yes 06/13/18 1125   Skin WDL  ex 06/15/18 1139    Readmission Within The Last 30 Days  no previous admission in last 30 days 06/13/18 1125   Additional Documentation  Incision (LDA) 06/13/18 0930    Anticipated Changes Related to Illness  inability to care for self;inability to care for someone else 06/13/18 1125   Not Inspected under devices  Other (UTV under knee dressing) 06/14/18 0025    Equipment Currently Used at Home  -- (uses cane if out but generally doesn't use cane in home) 06/13/18 1623   Skin Color/Characteristics  without discoloration 06/15/18 1139    Transportation Available  car;family or friend will provide 06/13/18 1149   Skin Temperature  warm 06/15/18 1139    Current Discharge Risk  other (see comments) 06/13/18 1125   Skin Moisture  dry 06/15/18 1139    GASTROINTESTINAL (ADULT,PEDIATRIC,OB)     Skin Elasticity  quick return to original state 06/15/18 1139    GI WDL  passing flatus;bowel sounds 06/15/18 0955   Skin Integrity  incision(s) 06/15/18 1139    Last Bowel Movement  06/10/18 06/15/18 0955   SAFETY       "Passing flatus  yes 06/15/18 0955   Safety WDL  ex 06/15/18 1139    COMMUNICATION ASSESSMENT     All Alarms  none present 06/15/18 1139    Patient's communication style  spoken language (English or Bilingual) 06/05/18 1043   Safety Factors  patient up in chair;bed in low position;wheels locked;call light in reach;upper side rails raised x 2;ID band on 06/15/18 1139                 Assessment WDL (Within Defined Limits) Definitions           Safety WDL     Effective: 09/28/15    Row Information: <b>WDL Definition:</b> Bed in low position, wheels locked; call light in reach; upper side rails up x 2; ID band on<br> <font color=\"gray\"><i>Item=AS safety wdl>>List=AS safety wdl>>Version=F14</i></font>      Skin WDL     Effective: 09/28/15    Row Information: <b>WDL Definition:</b> Warm; dry; intact; elastic; without discoloration; pressure points without redness<br> <font color=\"gray\"><i>Item=AS skin wdl>>List=AS skin wdl>>Version=F14</i></font>      Vitals     Vital Signs Flowsheet     VITAL SIGNS     Comfort  comfortably manageable 06/15/18 0940    Temp  97.1  F (36.2  C) 06/15/18 0855   Change in Pain  about the same 06/15/18 0940    Temp src  Oral 06/15/18 0855   Pain Control  partially effective 06/15/18 0940    Resp  16 06/15/18 0855   Functioning  can do most things, but pain gets in the way of some 06/15/18 0940    Pulse  73 06/12/18 0758   Sleep  normal sleep 06/14/18 0924    Heart Rate  88 06/15/18 0855   ANALGESIA SIDE EFFECTS MONITORING      Pulse/Heart Rate Source  Monitor 06/15/18 0855   Side Effects Monitoring: Respiratory Quality  R 06/15/18 0940    BP  120/59 06/15/18 1143   Side Effects Monitoring: Respiratory Depth  N 06/15/18 0940    BP Location  Right arm 06/15/18 0855   Side Effects Monitoring: Sedation Level  1 06/15/18 0940    OXYGEN THERAPY     HEIGHT AND WEIGHT      SpO2  99 % 06/15/18 0855   Height  1.575 m (5' 2\") 06/12/18 0758    O2 Device  None (Room air) 06/15/18 0855   Weight  54.8 kg (120 " lb 13 oz) 06/12/18 0758    Oxygen Delivery  1 LPM 06/13/18 0648   BSA (Calculated - sq m)  1.55 06/12/18 0758    RESPIRATORY MONITORING     BMI (Calculated)  22.14 06/12/18 0758    Respiratory Monitoring (EtCO2)  48 mmHg 06/13/18 0802   POSITIONING      Integrated Pulmonary Index (IPI)  10 06/13/18 0802   Body Position  independently positioning 06/15/18 1139    PAIN/COMFORT     Head of Bed (HOB)  HOB at 20-30 degrees 06/15/18 0644    Patient Currently in Pain  yes 06/15/18 0939   Positioning/Transfer Devices  pillows 06/15/18 0022    Preferred Pain Scale  CAPA (Clinically Aligned Pain Assessment) ([facility] Adults Only) 06/15/18 0939   Chair  Recline and up in chair 06/15/18 1143    0-10 Pain Scale  0 06/12/18 0834   ECG      Pain Location  Knee 06/15/18 0939   ECG Rhythm  Normal sinus rhythm 06/12/18 1422    Pain Orientation  Right 06/15/18 0939   Ectopy  None 06/12/18 1422    Pain Descriptors  Aching 06/15/18 0939   Lead Monitored  Lead II;V 5 06/12/18 1422    Pain Intervention(s)  Cold applied 06/15/18 0939   DAILY CARE      Response to Interventions  Decrease in pain 06/13/18 0741   Activity Management  activity adjusted per tolerance;activity encouraged 06/15/18 1139    CLINICALLY ALIGNED PAIN ASSESSMENT (CAPA) ([facility] ADULTS ONLY)     Activity Assistance Provided  assistance, stand-by 06/15/18 1139            Patient Lines/Drains/Airways Status    Active LINES/DRAINS/AIRWAYS     Name: Placement date: Placement time: Site: Days: Last dressing change:    Incision/Surgical Site 06/12/18 Right Knee 06/12/18   1347    3             Patient Lines/Drains/Airways Status    Active PICC/CVC     None            Intake/Output Detail Report     None      Last Void/BM       Most Recent Value    Urine Occurrence 1 at 06/15/2018 0100    Stool Occurrence       Case Management/Discharge Planning     Case Management/Discharge Planning Flowsheet     LIVING ENVIRONMENT     Patient/family  verbalizes understanding of discharge plan recommendations?  Yes 06/13/18 1125    Lives With  spouse 06/13/18 1149   Medical Team notified of plan?  yes 06/13/18 1125    Living Arrangements  house 06/13/18 1149   Readmission Within The Last 30 Days  no previous admission in last 30 days 06/13/18 1125    COPING/STRESS     Anticipated Changes Related to Illness  inability to care for self;inability to care for someone else 06/13/18 1125    Major Change/Loss/Stressor  surgery/procedure 06/05/18 1043   Transportation Available  car;family or friend will provide 06/13/18 1149    EXPECTED DISCHARGE     Current Discharge Risk  other (see comments) 06/13/18 1125    Expected Discharge Date  06/15/18 06/13/18 1125   FINAL RESOURCES      ASSESSMENT/CONCERNS TO BE ADDRESSED     Equipment Currently Used at Home  -- (uses cane if out but generally doesn't use cane in home) 06/13/18 1623    Concerns To Be Addressed  all concerns addressed in this encounter 06/13/18 1125   / CAREGIVER      DISCHARGE PLANNING     Filed Complexity Screen Score  3 06/13/18 1134                  UR 8A: [phone]            Medication Administration Report for Jigna Allen HILARY as of 06/15/18 1451   Legend:    Given Hold Not Given Due Canceled Entry Other Actions    Time Time (Time) Time  Time-Action       Inactive    Active    Linked        Medications 06/09/18 06/10/18 06/11/18 06/12/18 06/13/18 06/14/18 06/15/18    acetaminophen (TYLENOL) tablet 1,000 mg  Dose: 1,000 mg  Freq: EVERY 6 HOURS Route: PO  Start: 06/14/18 1430   Admin Instructions: Maximum acetaminophen dose from all sources = 75 mg/kg/day not to exceed 4 gram    Admin. Amount: 2 tablet (2 × 500 mg tablet)  Last Admin: 06/15/18 0942  Dispense Loc: [facility]S [facility]AST          1611 (1,000 mg)-Given       2146 (1,000 mg)-Given        0330 (1,000 mg)-Given       (0742)-Not Given       0942 (1,000 mg)-Given       [ ] 1430       [ ] 2030           bisacodyl (DULCOLAX) Suppository 10  "mg  Dose: 10 mg  Freq: DAILY Route: RE  Start: 06/13/18 0800   Admin Instructions: Start POD 1.  May discontinue if patient having bowel movement.    Admin. Amount: 1 suppository (1 × 10 mg suppository)  Last Admin: 06/15/18 0905  Dispense Loc: [facility]  POC: Post-procedure         (0744)-Not Given        (0800)-Not Given        0905 (10 mg)-Given           levothyroxine (SYNTHROID/LEVOTHROID) tablet 75 mcg  Dose: 75 mcg  Freq: DAILY Route: PO  Start: 06/13/18 0800   Admin Instructions: Separate oral administration of iron- or calcium-containing products and levothyroxine by at least 4 hours.    Admin. Amount: 1 tablet (1 × 75 mcg tablet)  Last Admin: 06/15/18 0606  Dispense Loc: [facility] ADS 8AEAST  POC: Post-procedure         0744 (75 mcg)-Given        0610 (75 mcg)-Given        0606 (75 mcg)-Given           lidocaine (LMX4) kit  Freq: EVERY 1 HOUR PRN Route: Top  PRN Reason: pain  PRN Comment: with VAD insertion or accessing implanted port.  Start: 06/12/18 1637   Admin Instructions: Do NOT give if patient has a history of allergy to any local anesthetic or any \"halina\" product.   Apply 30 minutes prior to VAD insertion or port access.  MAX Dose:  2.5 g (  of 5 g tube)    Dispense Loc: [facility] Floor Stock  POC: Post-procedure               lidocaine 1 % 1 mL  Dose: 1 mL  Freq: EVERY 1 HOUR PRN Route: OTHER  PRN Comment: mild pain with VAD insertion or accessing implanted port  Start: 06/12/18 1637   Admin Instructions: Do NOT give if patient has a history of allergy to any local anesthetic or any \"halina\" product. MAX dose 1 mL subcutaneous OR intradermal in divided doses.    Admin. Amount: 1 mL  Dispense Loc: [facility] ADS 8[facility]AST  Volume: 2 mL  POC: Post-procedure               naloxone (NARCAN) injection 0.1-0.4 mg  Dose: 0.1-0.4 mg  Freq: EVERY 2 MIN PRN Route: IV  PRN Reason: opioid reversal  Start: 06/12/18 1637   Admin Instructions: For respiratory rate LESS than or EQUAL to 8.  Partial reversal dose:  " 0.1 mg titrated q 2 minutes for Analgesia Side Effects Monitoring Sedation Level of 3 (frequently drowsy, arousable, drifts to sleep during conversation).Full reversal dose:  0.4 mg bolus for Analgesia Side Effects Monitoring Sedation Level of 4 (somnolent, minimal or no response to stimulation).  For ordered doses up to 2mg give IVP. Give each 0.4mg over 15 seconds in emergency situations. For non-emergent situations further dilute in 9mL of NS to facilitate titration of response.    Admin. Amount: 0.1-0.4 mg = 0.25-1 mL Conc: 0.4 mg/mL  Dispense Loc: [facility] ADS 8AEAST  Volume: 1 mL  POC: Post-procedure               ondansetron (ZOFRAN-ODT) ODT tab 4 mg  Dose: 4 mg  Freq: EVERY 6 HOURS PRN Route: PO  PRN Reasons: nausea,vomiting  Start: 06/12/18 1637   Admin Instructions: This is Step 1 of nausea and vomiting management.  If nausea not resolved in 15 minutes, go to Step 2 prochlorperazine (COMPAZINE). Do not push through foil backing. Peel back foil and gently remove. Place on tongue immediately. Administration with liquid unnecessary  With dry hands, peel back foil backing and gently remove tablet; do not push oral disintegrating tablet through foil backing; administer immediately on tongue and oral disintegrating tablet dissolves in seconds; then swallow with saliva; liquid not required.    Admin. Amount: 1 tablet (1 × 4 mg tablet)  Last Admin: 06/13/18 1027  Dispense Loc: [facility] ADS 8[facility]AST  POC: Post-procedure         1027 (4 mg)-Given            Or  ondansetron (ZOFRAN) injection 4 mg  Dose: 4 mg  Freq: EVERY 6 HOURS PRN Route: IV  PRN Reasons: nausea,vomiting  Start: 06/12/18 1637   Admin Instructions: This is Step 1 of nausea and vomiting management.  If nausea not resolved in 15 minutes, go to Step 2 prochlorperazine (COMPAZINE).  Irritant. For ordered doses up to 4 mg, give IV Push undiluted over 2-5 minutes.    Admin. Amount: 4 mg = 2 mL Conc: 4 mg/2 mL  Dispense Loc: [facility] ADS [facility]AST  Infused Over:  2-5 Minutes  Volume: 2 mL  POC: Post-procedure                      polyethylene glycol (MIRALAX/GLYCOLAX) Packet 17 g  Dose: 17 g  Freq: DAILY Route: PO  Start: 06/14/18 1430   Admin Instructions: 1 Packet = 17 grams. Mixed prescribed dose in 8 ounces of water. Follow with 8 oz. of water.    Admin. Amount: 17 g  Last Admin: 06/15/18 0743  Dispense Loc: [facility] ADS 8[facility]AST          1611 (17 g)-Given        0743 (17 g)-Given           prochlorperazine (COMPAZINE) injection 5 mg  Dose: 5 mg  Freq: EVERY 6 HOURS PRN Route: IV  PRN Reasons: nausea,vomiting  Start: 06/12/18 1637   Admin Instructions: This is Step 2 of nausea and vomiting management.   If nausea not resolved in 15 minutes, give metoclopramide (REGLAN) if ordered (step 3 of nausea and vomiting management)  For ordered doses up to 10 mg, give IV Push undiluted. Each 5mg over 1 minute.    Admin. Amount: 5 mg = 1 mL Conc: 5 mg/mL  [facility]  Infused Over: 1-2 Minutes  Volume: 1 mL  POC: Post-procedure              Or  prochlorperazine (COMPAZINE) tablet 5 mg  Dose: 5 mg  Freq: EVERY 6 HOURS PRN Route: PO  PRN Reasons: nausea,vomiting  Start: 06/12/18 1637   Admin Instructions: This is Step 2 of nausea and vomiting management.   If nausea not resolved in 15 minutes, give metoclopramide (REGLAN) if ordered (step 3 of nausea and vomiting management)    Admin. Amount: 1 tablet (1 × 5 mg tablet)  Dispense Loc: [facility] ADS [facility]  POC: Post-procedure               senna-docusate (SENOKOT-S;PERICOLACE) 8.6-50 MG per tablet 1 tablet  Dose: 1 tablet  Freq: 2 TIMES DAILY Route: PO  Start: 06/12/18 1637   Admin Instructions: If no bowel movement in 24 hours, increase to 2 tablets PO.  Hold for loose stools.    Admin. Amount: 1 tablet  Dispense Loc: [facility] ADS [facility]  POC: Post-procedure                                                     [ ] 2000          Or  senna-docusate (SENOKOT-S;PERICOLACE) 8.6-50 MG per tablet 2 tablet  Dose: 2  tablet  Freq: 2 TIMES DAILY Route: PO  Start: 18 163   Admin Instructions: Hold for loose stools.    Admin. Amount: 2 tablet  Last Admin: 06/15/18 0743  Dispense Loc: [facility] ADS [facility]  POC: Post-procedure        1929 (2 tablet)-Given        0744 (2 tablet)-Given       2111 (2 tablet)-Given        0755 (2 tablet)-Given       2146 (2 tablet)-Given        0743 (2 tablet)-Given       [ ]            sodium chloride (PF) 0.9% PF flush 3 mL  Dose: 3 mL  Freq: EVERY 8 HOURS Route: IK  Start: 18 1645   Admin Instructions: And Q1H PRN, to lock peripheral IV dormant line.    Admin. Amount: 3 mL  Dispense Loc: [facility] Floor Stock  Volume: 3 mL  POC: Post-procedure        ()-Not Given               (0855)-Not Given                      (0845)-Not Given       (1645)-Not Given        (0115)-Not Given       (0745)-Not Given       [ ] 164           sodium chloride (PF) 0.9% PF flush 3 mL  Dose: 3 mL  Freq: EVERY 1 HOUR PRN Route: IK  PRN Reason: line flush  PRN Comment: for peripheral IV flush post IV meds  Start: 18   Admin. Amount: 3 mL  Dispense Loc: [facility] Floor Stock  Volume: 3 mL  POC: Post-procedure               Warfarin Therapy Reminder (Check START DATE - warfarin may be starting in the FUTURE)  Dose: 1 each  Freq: CONTINUOUS PRN Route: XX  Start: 18   Admin Instructions: Reorder warfarin (COUMADIN) daily  Patient is on Warfarin Therapy - check for daily order    Admin. Amount: 1 each  Dispense Loc: [facility]  POC: Post-procedure              Future Medications  Medications 06/09/18 06/10/18 06/11/18 06/12/18 06/13/18 06/14/18 06/15/18       warfarin (COUMADIN) tablet 3 mg  Dose: 3 mg  Freq: ONCE AT 6PM Route: PO  Start: 06/15/18 1800   Admin. Amount: 1 tablet (1 × 3 mg tablet)  Dispense Loc: [facility] ADS [facility]  Administrations Remainin           [ ] 1800          Completed Medications  Medications 06/09/18 06/10/18 06/11/18 06/12/18 06/13/18 06/14/18 06/15/18          Dose: 10 mg  Freq: ONCE Route: RE  Start: 18 1430   End: 18 1611   Admin. Amount: 1 suppository (1 × 10 mg suppository)  Last Admin: 18  Dispense Loc: [facility] ADS [facility]  Administrations Remainin          1611 (10 mg)-Given              Dose: 10 mg  Freq: ONCE Route: RE  Start: 18 1330   End: 18 183   Admin Instructions: Give later today if no BM    Admin. Amount: 1 suppository (1 × 10 mg suppository)  Last Admin: 18  Dispense Loc: [facility] ADS [facility]  Administrations Remainin         1839 (10 mg)-Given               Dose: 1 g  Freq: EVERY 8 HOURS Route: IV  Indications of Use: PERIOPERATIVE PHARMACOPROPHYLAXIS  Start: 18   End: 18   Admin Instructions: First post-op dose due 8 hours after intra-op dose, see eMAR.    Admin. Amount: 1 g  Last Admin: 18  Dispense Loc: [facility] ADS [facility]  Infused Over: 30 Minutes  Administrations Remainin  POC: Post-procedure        1839 (1 g)-New Bag        0156 (1 g)-New Bag               Dose: 100 mg  Freq: AT BEDTIME Route: PO  Start: 18   End: 18   Admin. Amount: 1 capsule (1 × 100 mg capsule)  Last Admin: 18  Dispense Loc: [facility] ADS [facility]  Administrations Remainin  POC: Post-procedure        2120 (100 mg)-Given        2111 (100 mg)-Given        2146 (100 mg)-Given              Dose: 15 mL  Freq: 2 TIMES DAILY Route: PO  Start: 18   End: 18   Admin Instructions: Start evening of surgery.    Admin. Amount: 15 mL  Last Admin: 18  Dispense Loc: [facility] ADS [facility]  Administrations Remainin  Volume: 30 mL  POC: Post-procedure        193 (15 mL)-Given        0743 ( )-Given               Dose: 1 enema  Freq: ONCE Route: RE  Start: 06/15/18 1030   End: 06/15/18 1123   Admin Instructions: For children greater than or equal to 12 years    Admin. Amount: 1 enema  Last Admin: 06/15/18 1123  Dispense Loc: [facility] Main  Pharmacy  Administrations Remainin           1123 (1 enema)-Given             Dose: 2.5 mg  Freq: ONCE AT 6PM Route: PO  Start: 18 1800   End: 18   Admin. Amount: 1 tablet (1 × 2.5 mg tablet)  Last Admin: 18  Dispense Loc: [facility] ADS [facility]  Administrations Remainin          1718 (2.5 mg)-Given              Dose: 2.5 mg  Freq: ONCE AT 6PM Route: PO  Start: 18 1800   End: 18   Admin. Amount: 1 tablet (1 × 2.5 mg tablet)  Last Admin: 18  Dispense Loc: [facility] ADS [facility]  Administrations Remainin         1742 (2.5 mg)-Given               Dose: 2.5 mg  Freq: ONCE AT 6PM Route: PO  Start: 18   End: 18   Admin. Amount: 1 tablet (1 × 2.5 mg tablet)  Last Admin: 18  Dispense Loc: [facility] ADS [facility]  Administrations Remainin        1840 (2.5 mg)-Given             Discontinued Medications  Medications 06/09/18 06/10/18 06/11/18 06/12/18 06/13/18 06/14/18 06/15/18         Dose: 650 mg  Freq: EVERY 4 HOURS PRN Route: PO  PRN Reason: other  PRN Comment: multimodal surgical pain management along with NSAIDS and opioid medication as indicated based on pain control and physical function.  Start: 06/15/18 1300   End: 18 141   Admin Instructions: May give first dose 4 hours after last scheduled dose of acetaminophen.  Maximum acetaminophen dose from all sources = 75 mg/kg/day not to exceed 4 grams/day.    Admin. Amount: 2 tablet (2 × 325 mg tablet)  Dispense Loc: [facility]  POC: Post-procedure          1415-Med Discontinued          Dose: 975 mg  Freq: EVERY 8 HOURS Route: PO  Start: 18 1645   End: 18 1415   Admin Instructions: Do not use if patient has an active opioid/acetaminophen combined analgesic product ordered for pain.  Maximum acetaminophen dose from all sources = 75 mg/kg/day not to exceed 4 grams/day.    Admin. Amount: 3 tablet (3 × 325 mg tablet)  Last Admin: 18 0756  Dispense Loc:  [facility] ADS [facility]  Administrations Remainin  POC: Post-procedure        1738 (975 mg)-Given        0149 (975 mg)-Given       0855 (975 mg)-Given       1742 (975 mg)-Given        (0009)-Not Given [C]       0756 (975 mg)-Given       1415-Med Discontinued          Dose: 25-50 mcg  Freq: EVERY 5 MIN PRN Route: IV  PRN Reason: other  PRN Comment: acute pain while in PACU.  Start: 18   End: 18   Admin Instructions: MAX cumulative dose = 250 mcg. Use fentaNYL (SUBLIMAZE) initially, as a short acting agent for acute pain control. If insufficient, or a longer acting agent is needed, begin morphine or HYDROmorphone (DILAUDID) if ordered.  For ordered doses up to 100 mcg give IV Push undiluted over a minimum of 3-5 minutes.    Admin. Amount: 25-50 mcg = 0.5-1 mL Conc: 50 mcg/mL  Last Admin: 18  Dispense Loc: [facility] ADS ANES-OR1  Volume: 2 mL  POC: PACU        1428 (25 mcg)-Given       1615-Med Discontinued            Dose: 1-2 mg  Freq: EVERY 4 HOURS PRN Route: PO  PRN Reason: moderate to severe pain  Start: 18   End: 18   Admin. Amount: 1-2 half-tab (1-2 × 1 mg half-tab)  Last Admin: 18 0833  Dispense Loc: [facility] ADS [facility]         0642 (1 mg)-Given        0833 (1 mg)-Given       1415-Med Discontinued          Dose: 0.2-0.4 mg  Freq: EVERY 2 HOURS PRN Route: IV  PRN Reason: other  PRN Comment: pain control or improvement in physical function. Hold dose for analgesic side effects.  Start: 18   End: 18 1442   Admin Instructions: Start at the lowest dose.  May adjust dose by 0.1 mg every 2 hours as needed.   Notify provider to assess for uncontrolled pain or analgesic side effects.  Hold while on IV PCA or with regular IV opioid dosing.  For ordered doses up to 4 mg give IV Push undiluted. Administer each 2mg over 2-5 minutes.    Admin. Amount: 0.2-0.4 mg  Dispense Loc: [facility] ADS [facility]  POC: Post-procedure          1442-Med Discontinued           Dose: 0.3-0.5 mg  Freq: EVERY 10 MIN PRN Route: IV  PRN Reason: other  PRN Comment: acute pain.  May administer if Respiratory Rate is greater than 10  Start: 18 1328   End: 18 1615   Admin Instructions: Max cumulative dose = 2 mg  If fentaNYL (SUBLIMAZE) is also ordered, use HYDROmorphone (DILAUDID) if pain control insufficient with fentaNYL (SUMBLIMAZE) or a longer acting agent is needed.  For ordered doses up to 4 mg give IV Push undiluted. Administer each 2mg over 2-5 minutes.    Admin. Amount: 0.3-0.5 mg  Last Admin: 18 151  Dispense Loc: [facility] ADS ANES-OR1  POC: PACU/Phase II        1515 (0.2 mg)-Given       1615-Med Discontinued            Rate: 100 mL/hr   Freq: CONTINUOUS Route: IV  Start: 18 1330   End: 18 1615   Admin Instructions: Continue until IV catheter is weaned    Dispense Loc: [facility] Floor Stock  Volume: 1,000 mL  POC: PACU/Phase II               1615-Med Discontinued            Rate: 50 mL/hr   Freq: CONTINUOUS Route: IV  Start: 18 1645   End: 18 1510   Admin Instructions: Discontinue when patient PO intake is GREATER than 500 mL per shift.    Last Admin: 18 1839  Dispense Loc: [facility] Floor Stock  Volume: 1,000 mL  POC: Post-procedure        1839 ( )-New Bag        1510-Med Discontinued           Start: 18 1626   End: 18 0429   Admin Instructions: Ry Crespo : cabinet override    Administrations Remainin                0429-Med Discontinued           Dose: 0.1-0.4 mg  Freq: EVERY 2 MIN PRN Route: IV  PRN Reason: opioid reversal  Start: 18 161   End: 06/15/18 1134   Admin Instructions: For respiratory rate LESS than or EQUAL to 8.  Partial reversal dose:  0.1 mg titrated q 2 minutes for Analgesia Side Effects Monitoring Sedation Level of 3 (frequently drowsy, arousable, drifts to sleep during conversation).Full reversal dose:  0.4 mg bolus for Analgesia Side Effects Monitoring Sedation Level of 4 (somnolent,  minimal or no response to stimulation).  For ordered doses up to 2mg give IVP. Give each 0.4mg over 15 seconds in emergency situations. For non-emergent situations further dilute in 9mL of NS to facilitate titration of response.    Admin. Amount: 0.1-0.4 mg = 0.25-1 mL Conc: 0.4 mg/mL  Dispense Loc: [facility] ADS [facility]  Volume: 1 mL           1134-Med Discontinued         Dose: 0.1-0.4 mg  Freq: EVERY 2 MIN PRN Route: IV  PRN Reason: opioid reversal  Start: 06/12/18 1328   End: 06/12/18 1615   Admin Instructions: For apnea or imminent respiratory arrest: give 0.4 mg IV undiluted Q 2 minutes PRN until desired degree of reversal is obtained, stop opioid and notify provider. Continue monitoring until discharge are criteria met for a minimum of 2 hours.  For severe sedation, decrease in respiratory depth, quality or Respiratory Rate greater than 8: give 0.1 mg IV Q 2 minutes x 3 doses, stop opioid and notify provider.  Try to minimize reversal of analgesia especially in end-of-life patients.  Continue monitoring until discharge criteria are met for a minimum of 2 hours.  For ordered doses up to 2mg give IVP. Give each 0.4mg over 15 seconds in emergency situations. For non-emergent situations further dilute in 9mL of NS to facilitate titration of response.    Admin. Amount: 0.1-0.4 mg = 0.25-1 mL Conc: 0.4 mg/mL  Dispense Loc: [facility] ADS OR-[place]  Volume: 1 mL  POC: PACU/Phase II        1615-Med Discontinued            Dose: 4 mg  Freq: EVERY 30 MIN PRN Route: PO  PRN Reasons: nausea,vomiting  Start: 06/12/18 1328   End: 06/12/18 1615   Admin Instructions: MAX total dose = 8 mg, including OR dosing. This is step 1 of nausea and vomiting management.  If not resolved in 15 minutes, then go to step 2 [prochlorperazine (COMPAZINE) if ordered].  With dry hands, peel back foil backing and gently remove tablet; do not push oral disintegrating tablet through foil backing; administer immediately on tongue and oral disintegrating  tablet dissolves in seconds; then swallow with saliva; liquid not required.    Admin. Amount: 1 tablet (1 × 4 mg tablet)  Dispense Loc: [facility]  Administrations Remainin  POC: PACU/Phase II        1615-Med Discontinued         Or    Dose: 4 mg  Freq: EVERY 30 MIN PRN Route: IV  PRN Reasons: nausea,vomiting  Start: 18   End: 18   Admin Instructions: MAX total dose = 8 mg, including OR dosing. This is step 1 of nausea and vomiting management.  If not resolved in 15 minutes, then go to step 2 [prochlorperazine (COMPAZINE) if ordered].  Irritant. For ordered doses up to 4 mg, give IV Push undiluted over 2-5 minutes.    Admin. Amount: 4 mg = 2 mL Conc: 4 mg/2 mL  Dispense Loc: [facility] ADS PREOP/PACU  Infused Over: 2-5 Minutes  Administrations Remainin  Volume: 2 mL  POC: PACU/Phase II        -Med Discontinued            Freq: CONTINUOUS PRN Route: XX  Start: 18   End: 18   Admin Instructions: May administer oral pain medications as ordered by surgeon for take home use.  Discontinue IV pain medication prior to administration of oral pain medication.    Dispense Loc: [facility] Main Pharmacy  POC: PACU/Phase II        -Med Discontinued            Dose: 2.5-5 mg  Freq: EVERY 4 HOURS PRN Route: PO  PRN Reason: other  PRN Comment: pain control or improvement in physical function. Hold dose for analgesic side effects.  Start: 18   End: 18   Admin Instructions: Start with the lowest dose. May adjust dose by 5 mg every 4 hours as needed. Notify provider to assess for uncontrolled pain or analgesic side effects. Hold while on PCA or with regular IV opioid dosing. Maximum total is 60 mg in 24 hours.    Admin. Amount: 1-2 half-tab (1-2 × 2.5 mg half-tab)  Dispense Loc: [facility] ADS [facility]  POC: Post-procedure        -Med Discontinued            Rate: 1.64-16.44 mL/hr Dose: 0.1-1 mcg/kg/min  Weight Dosing Info: 54.8 kg  Freq: CONTINUOUS  Route: IV  Last Dose: Stopped (18 1156)  Start: 18 1000   End: 18 1615   Admin Instructions: Anesthesia Provider to titrate intra-op.  Vesicant.    Admin. Amount: 5.48-54.8 mcg/min  Last Admin: 18 1147  Dispense Loc: [facility] OR  Volume: 50 mL  POC: Intra-procedure   Mixture Administration Information:   Medication Type Amount   phenylephrine 10 MG/ML SOLN Medications 0.2 mg/mL   sodium chloride 0.9 % SOLN Base 50 mL                1024 (0.2 mcg/kg/min)-New Bag       1050 (0.3 mcg/kg/min)-Rate/Dose Change       1058 (0.4 mcg/kg/min)-Rate/Dose Change       1103 (0.5 mcg/kg/min)-Rate/Dose Change       1122 (0.3 mcg/kg/min)-Rate/Dose Change       1147 (0.1 mcg/kg/min)-Rate/Dose Change       1156-Stopped       1615-Med Discontinued            Dose: 17 g  Freq: DAILY PRN Route: PO  PRN Reason: constipation  Start: 18 1740   End: 18 1420   Admin Instructions: 1 Packet = 17 grams. Mixed prescribed dose in 8 ounces of water. Follow with 8 oz. of water.    Admin. Amount: 17 g  Dispense Loc: [facility] ADS 8[facility]          1420-Med Discontinued          Freq: PRN  Start: 18 1255   End: 18   Last Admin: 18 1255  Volume: 100 mL  POC: Intra-procedure   Mixture Administration Information:   Medication Type Amount   ropivacaine 10 MG/ML SOLN Medications 200 mg   ketorolac 15 MG/ML SOLN Medications 15 mg   morphine 10 MG/ML SOLN Medications 2.5 mg   EPINEPHrine 1 MG/ML SOLN Medications 0.3 mg   sodium chloride 0.9 % SOLN QS Base 78.45 mL                1255 ( )-Given [C]       1615-Med Discontinued            Freq: ONCE Route: IX  Start: 18 1230   End: 18   Dispense Loc: [facility] OR  Administrations Remainin  Volume: 50 mL  POC: Intra-procedure   Mixture Administration Information:   Medication Type Amount   ropivacaine 5 MG/ML SOLN Medications 200 mg   EPINEPHrine 1 MG/ML SOLN Medications 300 mcg   ketorolac 30 MG/ML SOLN Medications 15  mg   morphine 10 MG/ML SOLN Medications 2.5 mg   sodium chloride 0.9 % SOLN QS Base 8.95 mL                       1615-Med Discontinued            Freq: PRN  Start: 06/12/18 1309   End: 06/12/18 1615   Last Admin: 06/12/18 1309  POC: Intra-procedure        1309 (2,400 mL)-Given       1615-Med Discontinued            Freq: PRN  Start: 06/12/18 1220   End: 06/12/18 1615   Last Admin: 06/12/18 1220  POC: Intra-procedure        1220 (250 mL)-Given       1615-Med Discontinued       Medications 06/09/18 06/10/18 06/11/18 06/12/18 06/13/18 06/14/18 06/15/18               INTERAGENCY TRANSFER FORM - NOTES (H&P, Discharge Summary, Consults, Procedures, Therapies)   6/12/2018                       UR 8A: [phone]               History & Physicals      H&P signed by Ila Anderson at 6/14/2018 12:16 PM      Author:  Ila Anderson Service:  (none) Author Type:  Physician    Filed:  6/14/2018 12:16 PM Date of Service:  6/14/2018  9:56 AM Creation Time:  6/14/2018 12:16 PM    Status:  Signed :  Ila Anderson (Physician)     Scan on 6/14/2018 12:16 PM by Justin Provider : [facility] PRE-OP H/P/06/05/2018 1          Revision History        User Key Date/Time User Provider Type Action    > [N/A] 6/14/2018 12:16 PM Justin Provider Physician Sign            H&P by Issac Rodriguez APRN CNS at 6/5/2018 10:00 AM     Author:  Issac Rodriguez APRN CNS Service:  (none) Author Type:  Clinical Nurse Specialist    Filed:  6/5/2018  1:44 PM Encounter Date:  6/5/2018 Status:  Signed    :  Issac Rodriguez APRN CNS (Clinical Nurse Specialist)             Pre-Operative H & P     CC:  Preoperative exam to assess for increased cardiopulmonary risk while undergoing surgery and anesthesia.    Date of Encounter: 6/5/2018  Primary Care Physician:  Elisabeth Ko[NM1.1]    Reason for visit:  Preop general physical exam  oateoarthritis[NM1.2]      HPI  Jigna Allen is a 83 year old female who presents for  pre-operative H & P in preparation for[NM1.1] Right Total Knee Replacement on 6/12/2018 by Dr. 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 Post-op was complicated by staph infection. There is obvious deformity of right leg and she will occasionally use a cane[NM1.3]. Her activity is affected by osteoarthritis and she was evaluated by Dr. Gomez and above procedure planned.[NM1.2]      Past Medical History[NM1.1]  Past Medical History:   Diagnosis Date     Anxiety      Arthritis 1990?     Hypothyroid      Sarcoma (H) 2009[NM1.5]       Past Surgical History[NM1.1]  Past Surgical History:   Procedure Laterality Date     CARPAL TUNNEL RELEASE RT/LT       excise sarcoma Right     right leg     TUBAL LIGATION[NM1.5]         Hx of Blood transfusions/reactions: yes, no reactions    Hx of abnormal bleeding or anti-platelet use: none    Menstrual history:[NM1.1] No LMP recorded. Patient is postmenopausal.[NM1.5]:     Steroid use in the last year: none    Personal or FH with difficulty with Anesthesia:  Slow to wake up        Prior to Admission Medications[NM1.1]  Current Outpatient Prescriptions   Medication Sig Dispense Refill     acetaminophen (TYLENOL) 500 MG tablet Take 500-1,000 mg by mouth as needed       levothyroxine (LEVOTHROID) 75 MCG tablet Take 75 mcg by mouth daily[NM1.5]          Allergies[NM1.1]  Allergies   Allergen Reactions     Colchicine      Other reaction(s): Gastrointestinal  diarrhea     Niacin      skin rash     Ferrous Sulfate Rash[NM1.5]       Social History[NM1.1]  Social History     Social History     Marital status:      Spouse name: N/A     Number of children: N/A     Years of education: N/A     Occupational History     Not on file.      Social History Main Topics     Smoking status: Never Smoker     Smokeless tobacco: Never Used     Alcohol use No     Drug use: No     Sexual activity: Yes     Partners: Male     Birth control/ protection: Post-menopausal     Other Topics Concern     Not on file     Social History Narrative[NM1.5]       Family History[NM1.1]  Family History   Problem Relation Age of Onset     CANCER Father      Prostate Cancer Father      Other Cancer Father      CANCER Brother[NM1.5]            Anesthesia Evaluation     . Pt has had prior anesthetic. Type: MAC and General    History of anesthetic complications    Slow to wake up      ROS/MED HX    ENT/Pulmonary:  - neg pulmonary ROS     Neurologic:  - neg neurologic ROS     Cardiovascular:  - neg cardiovascular ROS   (+) ----. : . . . :. . Previous cardiac testing date:results:date: results:ECG reviewed date:6/5/2018 results:NSR, age undetermined septal infarct date: results:          METS/Exercise Tolerance:     Hematologic:     (+) History of Transfusion no previous transfusion reaction -      Musculoskeletal:   (+) , , other musculoskeletal- right leg sarcoma, excision in 2010      GI/Hepatic:  - neg GI/hepatic ROS       Renal/Genitourinary:  - ROS Renal section negative       Endo:     (+) thyroid problem hypothyroidism, .      Psychiatric:     (+) psychiatric history anxiety      Infectious Disease:  - neg infectious disease ROS       Malignancy:   (+) Malignancy History of Other  Other CA right leg Active status post         Other:    (+) No chance of pregnancy C-spine cleared: N/A, no H/O Chronic Pain,no other significant disability            Physical Exam  Normal systems: cardiovascular, pulmonary and dental    Airway   Mallampati: II  TM distance: >3 FB  Neck ROM: full    Dental   Normal    Cardiovascular   Rhythm and rate: regular and normal      Pulmonary   Clear to ausultation        The complete review of systems is negative other than noted in the HPI or  "here.[NM1.1]   Temp: 97.7  F (36.5  C)   BP: 130/70 Pulse: 89     SpO2: 98 %         122 lbs 8 oz  5' 2\"   Body mass index is 22.41 kg/(m^2).[NM1.5]       Physical Exam  Constitutional: Awake, alert, cooperative, no apparent distress, and appears stated age.  Eyes: Pupils equal, round and reactive to light, extra ocular muscles intact, sclera clear, conjunctiva normal.  HENT: Normocephalic, oral pharynx with moist mucus membranes, good dentition. No goiter appreciated.   Respiratory: Clear to auscultation bilaterally, no crackles or wheezing.  Cardiovascular: Regular rate and rhythm, normal S1 and S2, and no murmur noted.  Carotids +2, no bruits. No edema. Palpable pulses to radial  DP and PT arteries.   GI: Normal bowel sounds, soft, non-distended, non-tender, no masses palpated, no hepatosplenomegaly.  Surgical scars:[NM1.1] right leg[NM1.3]  Lymph/Hematologic: No cervical lymphadenopathy and no supraclavicular lymphadenopathy.  Genitourinary:[NM1.1]  Deferred[NM1.3]   Skin: Warm and dry.  No rashes at anticipated surgical site.   Musculoskeletal: Full ROM of neck. There is no redness, warmth, or swelling of the joints. Gross motor strength is normal.  Uses cane for support, right leg deformity  Neurologic: Awake, alert, oriented to name, place and time. Cranial nerves II-XII are grossly intact. Gait is normal.   Neuropsychiatric: Calm, cooperative. Normal affect.     Labs: (personally reviewed)[NM1.1]  Results for FEDERICA SMITH (MRN [id number]) as of 6/5/2018 13:42   Ref. Range 5/22/2018 13:00 6/5/2018 11:12 6/5/2018 11:22   Sodium Latest Ref Range: 133 - 144 mmol/L  140    Potassium Latest Ref Range: 3.4 - 5.3 mmol/L  3.7    Chloride Latest Ref Range: 94 - 109 mmol/L  102    Carbon Dioxide Latest Ref Range: 20 - 32 mmol/L  31    Urea Nitrogen Latest Ref Range: 7 - 30 mg/dL  16    Creatinine Latest Ref Range: 0.52 - 1.04 mg/dL  0.73    GFR Estimate Latest Ref Range: >60 mL/min/1.7m2  77    GFR Estimate If Black " Latest Ref Range: >60 mL/min/1.7m2  >90    Calcium Latest Ref Range: 8.5 - 10.1 mg/dL  8.8    Anion Gap Latest Ref Range: 3 - 14 mmol/L  7    Hemoglobin A1C Latest Ref Range: 0 - 5.6 %  5.6    Glucose Latest Ref Range: 70 - 99 mg/dL  81    WBC Latest Ref Range: 4.0 - 11.0 10e9/L  4.8    Hemoglobin Latest Ref Range: 11.7 - 15.7 g/dL  14.3    Hematocrit Latest Ref Range: 35.0 - 47.0 %  44.5    Platelet Count Latest Ref Range: 150 - 450 10e9/L  197    RBC Count Latest Ref Range: 3.8 - 5.2 10e12/L  4.49    MCV Latest Ref Range: 78 - 100 fl  99    MCH Latest Ref Range: 26.5 - 33.0 pg  31.8    MCHC Latest Ref Range: 31.5 - 36.5 g/dL  32.1    RDW Latest Ref Range: 10.0 - 15.0 %  12.9    INR Latest Ref Range: 0.86 - 1.14   0.98    ABO Unknown B     RH(D) Unknown Neg     Antibody Screen Unknown Neg     Test Valid Only At Latest Units:     [facility]...     Specimen Expires Unknown 05/25/2018     Blood Bank Comment Unknown Preadmit type and...     Color Urine Unknown   Yellow   Appearance Urine Unknown   Clear   Glucose Urine Latest Ref Range: NEG^Negative mg/dL   Negative   Bilirubin Urine Latest Ref Range: NEG^Negative    Negative   Ketones Urine Latest Ref Range: NEG^Negative mg/dL   Negative   Specific Gravity Urine Latest Ref Range: 1.003 - 1.035    1.020   pH Urine Latest Ref Range: 5.0 - 7.0 pH   5.0   Protein Albumin Urine Latest Ref Range: NEG^Negative mg/dL   Negative   Urobilinogen mg/dL Latest Ref Range: 0.0 - 2.0 mg/dL   2.0   Nitrite Urine Latest Ref Range: NEG^Negative    Negative   Blood Urine Latest Ref Range: NEG^Negative    Negative   Leukocyte Esterase Urine Latest Ref Range: NEG^Negative    Large (A)   Source Unknown   Midstream Urine   WBC Urine Latest Ref Range: 0 - 5 /HPF   5   RBC Urine Latest Ref Range: 0 - 2 /HPF   1   Squamous Epithelial /HPF Urine Latest Ref Range: 0 - 1 /HPF   5 (H)   Mucous Urine Latest Ref Range: NEG^Negative /LPF   Present (A)   URINE CULTURE AEROBIC BACTERIAL Unknown    Rpt[NM1.6]       EKG: Personally reviewed but formal cardiology read pending:[NM1.1] 6/5/2018 NSR, age undetermined septal infarct[NM1.3]      Echocardiogram 1/31/2018 (scanned in from OSH)    CONCLUSION:   1) LVEF 65%. Overall normal wall motion and contractility  2) Mild concentric left ventricular hypertrophy  3) Mild aortic valve sclerosis without stenosis  4) Color doppler demonstrates mild tricuspid valve regurgitation[NM1.6]       Outside records reviewed from:[NM1.1] care everywhere[NM1.3]    ASSESSMENT and PLAN  Jigna Allen is a 83 year old female scheduled to undergo[NM1.1] Right Total Knee Replacement on 6/12/2018 by Dr. Gomez in treatment of Osteoarthritis Right Knee[NM1.3].[NM1.1] She[NM1.4] has the following specific operative considerations:   - RCRI :[NM1.1] Low serious cardiac risks[NM1.4].[NM1.1]  0.4%[NM1.4] risk of major adverse cardiac event.   - Anesthesia considerations:  Refer to PAC assessment in anesthesia records  - VTE risk:[NM1.1] 0.5%[NM1.4]  - IRA # of risks[NM1.1] 1[NM1.4]/8 =[NM1.1] low risk[NM1.4]  - Post-op delirium risk:[NM1.1] high risk due to age[NM1.4]  - Risk of PONV score =[NM1.1] 2[NM1.4].  If > 2, anti-emetic intervention recommended.[NM1.1]     Previous anesthesia without complications, although she notes that she is slow to wake up.  1) Cardiac: Echocardiogram in 2016 shows EF 65%. Request made for stress echo done in [place] 2/2018. She denies cardiac symptoms. No edema, PND or orthopnea. METS well over 4 despite knee pain.  2) Pulmonary: Never smoked, denies pulmonary symptoms.  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 Post-op was complicated by staph infection. There is obvious deformity of right leg and she will occasionally use a cane  4) Endo: hypothyroidism, daily synthroid           I spent 30 minutes with patient, greater than 50% educating on preop meds, counseling on anesthesia and  coordinating care for Osteoarthritis Right Knee[NM1.3]  Pt optimized for surgery. AVS with information on surgery time/arrival time, meds and NPO status given by nursing staff[NM1.4]    Patient was discussed with[NM1.1] Dr Templeton[NM1.4].    [facility]  Preoperative Assessment Center  [facility]  Clinic and Surgery Center  Phone: [phone]  Fax: [phone][NM1.1]     Revision History        User Key Date/Time User Provider Type Action    > NM1.5 6/5/2018  1:44 PM Issac Rodriguez APRN CNS Clinical Nurse Specialist Sign     NM1.6 6/5/2018  1:42 PM Issac Rodriguez APRN CNS Clinical Nurse Specialist      NM1.2 6/5/2018 11:46 AM Issac Rodriguez APRN CNS Clinical Nurse Specialist      NM1.4 6/5/2018 10:54 AM Issac Rodriguez APRN CNS Clinical Nurse Specialist      NM1.3 6/5/2018 10:51 AM Issac Rodriguez APRN CNS Clinical Nurse Specialist      NM1.1 6/5/2018 10:18 AM Issac Rodriguez APRN CNS Clinical Nurse Specialist                      Discharge Summaries      Discharge Summaries by Hanane Gibson APRN CNP at 6/15/2018  8:47 AM     Author:  Hanane Gibson APRN CNP Service:  Orthopedics Author Type:  Nurse Practitioner    Filed:  6/15/2018 11:52 AM Date of Service:  6/15/2018  8:47 AM Creation Time:  6/15/2018  8:46 AM    Status:  Cosign Needed :  Hanane Gibson APRN CNP (Nurse Practitioner)    Cosign Required:  Yes             ORTHOPAEDIC DISCHARGE SUMMARY     Date of Admission: 6/12/2018  Date of Discharge: 6/15/2018  Disposition: Rehab  Staff Physician: He Gomez MD  Primary Care Provider: Elisabeth Ko    DISCHARGE DIAGNOSIS:  Osteoarthritis Right Knee    PROCEDURES: Procedure(s):  ARTHROPLASTY KNEE on 6/12/2018    BRIEF HISTORY:  Hx of knee pain and OA    HOSPITAL COURSE:    Surgery was uncomplicated. Jigna Allen has done well post-operatively. Medicine was consulted post operatively to aid  in management of medical comorbidities. See final recommendations below. The patient received routine nursing cares and is medically stable. Vital signs are stable. The patient is tolerating a regular diet without GI distress/nausea or vomiting. Voiding spontaneously. All PT/OT goals have been met for safe mobility. Pain is now controlled on oral medications which will be available on discharge. Stool softeners have been used while taking pain medications to help prevent constipation. Jigna Allen is deemed medically safe to discharge.     Antibiotics:   given periop and 24 hours postop.   DVT prophylaxis:  warfarin daily for 6 weeks  PT Progress: Has met PT/OT goals for safe mobility.    Pain Meds:  Weaned off all IV pain meds by discharge.  Inpatient Events: No significant events or complications.     Discharge orders and instructions as below.    FOLLOWUP:    Follow up with Dr. hart at 4 weeks postoperatively.  Future Appointments  Date Time Provider Department Center   6/15/2018 10:00 AM Jr Mckeon, OT [facility] [place]   6/15/2018 10:30 AM Claribel Romo, PT [facility] [place]   [facility]   8/30/2018 3:30 PM Edouard Bryan MD [facility]   5/9/2019 9:00 AM [facility]   5/9/2019 11:00 AM Edouard Bryan MD [facility]       Appointments on [facility]. Call [phone] if you haven't heard regarding these appointments within 7 days of discharge.    PLANNED DISCHARGE ORDERS:[MM1.1]           Current Discharge Medication List      START taking these medications    Details   bisacodyl (DULCOLAX) 10 MG Suppository Place 1 suppository (10 mg) rectally daily as needed for constipation  Qty: 30 suppository    Comments: Use if 3 days and no BM  Associated Diagnoses: Status post knee surgery      polyethylene glycol (MIRALAX/GLYCOLAX) Packet Take 17 g by mouth daily  Qty: 7 packet, Refills: 0    Associated Diagnoses: Status post  knee surgery      senna-docusate (SENOKOT-S;PERICOLACE) 8.6-50 MG per tablet Take 1 tablet by mouth 2 times daily  Qty: 100 tablet, Refills: 0    Associated Diagnoses: Status post knee surgery      warfarin (COUMADIN) 3 MG tablet Take 3 mg today, tomorrow. INR on Sunday. LOT 30 days. Facility MD to dose warfarin. 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 Gomez approximately four weeks after surgery.     Your follow up appointments will be at the location that you regularly see Dr. Gomez:    [facility]  [address] [place] [address]  [phone]    Physical therapy:   A prescription for physical therapy will be provided at the time of discharge.      ACTIVITY  Weight bearing status:   You may bear weight on your operative extremity as tolerated, using assistive devices (walker, cane) as needed. As you begin to feel more comfortable ambulating, you may gradually transition from a walker to a cane. Eventually, you should wean from all assistive  devices. Although we would like you to discontinue use of assistive devices as soon as possible, do not transition until you have worked with your physical therapist to achieve safe balance and comfort.     Continuous passive motion machine:   Perform CPM exercises for six to eight hours per day for the first four weeks after surgery. The CPM should be set at 0 degrees to flexion tolerance with a goal of 90 degrees. Advance the CPM settings aggressively in increments of 5 degrees every 30 minutes until goal is achieved.     Exercises:   Perform the following exercises at least three times per day for the first four weeks after surgery to prevent complications, such as blood clots in your legs:  1) Point and flex your feet  2) Move your ankle around in big circles  3) Wiggle your toes   Also, perform thigh muscle tightening exercises for 10 to 15 minutes at least three times per day for the first four weeks after surgery.    Athletic Activities:  Activities such as swimming, bicycling, jogging, running, and stop-and-go sports should be avoided until permitted by your provider.    Driving:  Driving is not permitted until directed by your provider. Typically, driving is restricted for three to four weeks after right knee surgery and three weeks after left knee surgery. Under no circumstance are you permitted to drive while using narcotic pain medications.    Return to Work:  You may return to work when directed by your provider. Typically, patients with desk/sitting jobs can return to work within two weeks while patients with heavy labor jobs can return to work around three months after surgery.      COMFORT AND PAIN MANAGEMENT  Elevation:   During times of inactivity throughout the first two weeks after surgery, make an effort to decrease swelling by elevating your operative extremity. This is most effectively done by lying down and placing several pillows lengthwise under your thigh and calf to raise your toes above  the level of your nose. To ensure that you knee remains in full extension, do not place pillows directly under your knee.     Icing:  An ice pack will be provided to control swelling and discomfort after surgery. Place a thin towel on your skin and apply the ice pack overtop. You may apply ice for 20 minutes as often as two times per hour.    Pain Medications:  You will be discharged with acetaminophen (Tylenol) and a narcotic medication for pain management after surgery. Acetaminophen can help to effectively manage pain when used as prescribed, however, do not exceed the maximum daily dose of 3000 mg. The narcotic pain medication should be reserved for severe, breakthrough pain. Take the narcotic medication as prescribed and use only as often as necessary.       ANTICOAGULATION  Take warfarin as prescribed for a total of four weeks after surgery. This medication will require weekly INR checks (INR goal 1.5-2.5).       WOUND CARE AND SHOWERING  Wound care:  Keep the dressing on, clean, and dry for the first five days after surgery. You may then remove the dressing and replace it with fresh 4x4s and tubigrip (or ACE wrap). Your surgical incision was closed with a clear knotless suture and tails were left at the margins of the incision. These tails can be left in place until your follow up appointment. The steri strips (small white tape that is directly on the incision areas) should be left in place until they fall off or are removed at your first office visit.    Showering:  You may shower ten days after surgery provided your incision is intact and dry without drainage. If there is fluid at the incision site, cover the incision with a non-permeable plastic bag. You may allow water to run over the incision, but do not soak or submerge the incision. Do not scrub the incision.     Tub Bathing:  Tub bathing, swimming, or any other activities that cause your incision to be submerged should be avoided until allowed by your  provider. Typically, patients are allowed to return to these activities four weeks after surgery.      CONTACTING YOUR PHYSICIAN:  You may experience symptoms that require follow-up before your scheduled appointment. Please contact Dr. Gomez s office if you experience:  1) Pain that persists or worsens in the first few days after surgery  2) Excessive redness or drainage of cloudy or bloody material from the wounds (clear red tinted fluid and some mild drainage should be expected) or drainage of any kind five days after surgery  3) A temperature elevation greater than 101.5    4) Pain, swelling or redness in your calf  5) Numbness or weakness in your leg or foot      Regular business hours (Monday - Friday, 8am - 5pm):  [facility]: [phone]    If you have any other concerns during normal business hours, please contact Dr. Gomez's nurse, Mere Cruz RN, at [phone] during normal business hours 8 am - 5 pm (leave message as needed). If your concern is urgent, please page Mere Cruz RN at [phone] and key in your 10 digit phone number followed by the # sign after the beep.     After hours and weekends:  [facility] on call Orthopedic resident: [phone]    If you have an emergent concern, contact the Emergency Department at the [place] - [phone] - or [place] - [phone] - [place].     General info for SNF   Order Comments: Length of Stay Estimate: Short Term Care: Estimated # of Days <30  Condition at Discharge: Improving  Level of care:skilled   Rehabilitation Potential: Excellent  Admission H&P remains valid and up-to-date: Yes  Recent Chemotherapy: N/A  Use Nursing Home Standing Orders: Yes     Mantoux instructions   Order Comments: Give two-step Mantoux (PPD) Per Facility Policy Yes     Reason for your hospital stay   Order Comments: Right TKA     Weight bearing status     Physical Therapy Adult Consult   Order  Comments: Evaluate and treat as clinically indicated.    Reason:  Mobilization, tka protocol     Occupational Therapy Adult Consult   Order Comments: Evaluate and treat as clinically indicated.    Reason:  Mobilization tka protocol     Hip precautions     Diet   Order Comments: Return to your pre surgery diet   Order Specific Question Answer Comments   Is discharge order? Yes      Advance Diet as Tolerated   Order Comments: Follow this diet upon discharge: Regular   Order Specific Question Answer Comments   Is discharge order? Yes      Assign Questionnaire Series to Patient[MM1.2]         Thanh Thorne MD  PGY-4 Orthopaedic Surgery[MM1.1]    Discharge summary updated by me to reflect changes in plan/medication.[MB1.1]          Revision History        User Key Date/Time User Provider Type Action    > MB1.1 6/15/2018 11:52 AM Hanane Gibson APRN CNP Nurse Practitioner Sign     MM1.2 6/15/2018  8:47 AM Thanh Thorne MD Resident Sign     MM1.1 6/15/2018  8:46 AM Thanh Thorne MD Resident                      Consult Notes      Consults by Nella Feng MSW at 2018 11:34 AM     Author:  Nella Feng MSW Service:  Social Work Author Type:      Filed:  2018 12:02 PM Date of Service:  2018 11:34 AM Creation Time:  2018 11:34 AM    Status:  Signed :  Nella Feng MSW ()         Social Work: Assessment with Discharge Plan    Patient Name:  Jigna Allen  :  1935  Age:  83 year old  MRN:  [id number]  Risk/Complexity Score:  Filed Complexity Screen Score: 3  Completed assessment with:  18  Presenting Information   Reason for Referral:  Discharge plan  Date of Intake:  2018  Referral Source:  8A IDT  Decision Maker:  Pt  Alternate Decision Maker:  Spouse Harley per policy as he is NOK    Living Situation:  House  Previous Functional Status:  Independent  Patient and family understanding of  "hospitalization:  Seeking TKA   Cultural/Language/Spiritual Considerations:  Amish, experiencing \"edwigewhich generation\" as her 40 year old daughter terminated a pregnancy due to fetal issues, 42 y.o. Daughter is pregnant and expecting twins, 100 y.o. MIL is having increased need for support, refusal to move into Assisted Living  Adjustment to Illness:  Identifies family strength of experiencing resiliency, accepting of need for TCU to support health and  healing    Physical Health  Reason for Admission:[MK1.1]  No diagnosis found.[MK1.2]  Services Needed/Recommended:  TCU    Mental Health/Chemical Dependency  Diagnosis:  Adjustment disorder  Support/Services in Place:  Support from family, friends. Daughter is seeking therapy for bereavement support  Services Needed/Recommended:  Writer and pt discussed possible need for pt to have support from counseling, also discussed [facility] Link[facility] Line as resource for family and her 89 y.o. [facility]    Support System  Significant relationship at present time:  spouse  Family of origin is available for support:  yes  Other support available:  friends  Gaps in support system:  Active caretaker for her [facility]  Patient is caregiver to:  None     Provider Information   Primary Care Physician:  Elisabeth Ko   [phone]   [facility] [address] / [facility] [address]      :      Financial   Income Source:  Pension, social security  Financial Concerns:  None noted  Insurance:    Payor/Plan Subscriber Name Rel Member # Group #   MEDICARE - MEDICARE F* FEDERICA SMITH N  [id number]       ATTN CLAIMS, [address]   [facility] FEDERICA SMITH N  [id number]      [address]       Discharge Plan   Patient and family discharge goal:  TCU-requested referrals to [facility]  Provided education on discharge plan:  YES  Patient agreeable to discharge plan:  YES  A list of Medicare Certified Facilities was provided to the patient " and/or family to encourage patient choice. Patient's choices for facility are:  [facility] and [facility]  Will NH provide Skilled rehabilitation or complex medical:  YES  General information regarding anticipated insurance coverage and possible out of pocket cost was discussed. Patient and patient's family are aware patient may incur the cost of transportation to the facility, pending insurance payment: YES-transportation estimated costs from [facility]   Barriers to discharge:  Medical stability    Discharge Recommendations   Anticipated Disposition:  Facility:  TCU at [facility] or [facility]   Transportation Needs:  Family:  spouse to provide      Additional comments   Writer introduced role/reason for visit. Pt was receptive, and agrees with TCU referrals.  Her family has had previous experience with [facility] and they live close to [facility]. She will speak w/spouse about preference for facility. Writer offered to make additional referral to [facility], but pt prefers [facility] or [facility].    Pt shared with writer that she and her spouse are experiencing an additional stressful time due to the care needs of her 100 year old Mother-In-Law who lives in [place], [place]. Pts MIL needs additional care than what  care takers and pt's spouse can provide; but she is not willing to consider move to [facility] or SNF.  Additionally, pt's 40 y.o. Daughter, who has experienced fertility issues recently became pregnant but terminated the pregnancy due to fetal health issues.  Pt's 42 y.o. Daughter became pregnant with twins at roughly same time. Pt identifies belief her daughters are resilient but that she worries about their relationship and how her 40 y.o. Daughter is experiencing grief. Pt identifies these situations are causing her emotional distress- pt has insight and verbalizes her emotional distress as situational and temporary. Pt identifies having support of  friends but expresses hope her spouse will agree to move his mother into a facility soon. She also expresses finding comfort in her sidney.    Writer used Active Listening, education, supportive counseling that provided validation and reassurance for pt.     Writer called and spoke w/Admissions at [facility], referral information sent via Epic. Writer called and left voicemail message for [facility] and sent referral information via Epic. Await Assessment response. SW con't to follow    [facility] [phone] F: [phone]    [facility] [phone] F:[phone][MK1.1]         Revision History        User Key Date/Time User Provider Type Action    > MK1.2 6/13/2018 12:02 PM Nella Feng MSW  Sign     MK1.1 6/13/2018 11:34 AM Nella Feng MSW              Consults by Yesenia Angulo APRN CNP at 6/12/2018  4:42 PM     Author:  Yesenia Angulo APRN CNP Service:  Hospitalist Author Type:  Nurse Practitioner    Filed:  6/12/2018  5:53 PM Date of Service:  6/12/2018  4:42 PM Creation Time:  6/12/2018  4:41 PM    Status:  Attested :  Yesenia Angulo APRN CNP (Nurse Practitioner)    Cosigner:  Fahad Hi MD at 6/12/2018  7:31 PM         Consult Orders:    1. Internal Medicine Adult IP Consult for [facility]: Patient to be seen: Routine within 24 hrs; Call back #: [id number]; Perioperative medical management; Consultant may enter orders: Yes [[id number]] ordered by Wilner Carr PA-C at 06/12/18 0941           Attestation signed by Fahad Hi MD at 6/12/2018  7:31 PM        Attestation:  Physician Attestation   I, Fahad Hi, have reviewed and discussed with the advanced practice provider their history, physical and plan for Jigna Allen. Patient seen and examined briefly.  Reluctant to use opiates.  Tolerated IV dilaudid in PACU.  As such oxycodone changed to  low dose dilaudid prn.  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  Neg chemical stress test earlier this year in [place].  Prone to constipation with last BM 2 days ago.  Orders reviewed.  This should be billed as an advanced practice provider only visit.    Fahad Hi  Date of Service (when I saw the patient): I did not personally see this patient today.                                 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  Preop Hgb 14.3.  EBL 150cc.[LO1.1]  Currently denies pain.    - Agree w/ scheduled Tylenol, Gabapentin, and low dose Oxycodone PRN for pain control   - Hgb, INR in am   - Further management, AC, pain control per Ortho[LO1.2]      # Hypothyroidism - Currently on Synthroid 75mcg.[LO1.1] Last TSH 1.07 on 5/10/18.[LO1.2]   - Continue PTA Synthroid[LO1.1]     # Constipation - Bowel regimen with senna docusate and PRN Miralax.[LO1.2]      # Hx high grade pleomorp[LO1.1]h[LO1.2]hic sarcoma (2009)[LO1.1] - Follows with Oncology (Dr. Bryan) annually, last seen on 5/15/18.  S[LO1.2]/p radiation and resection (2010)[LO1.1] c/b staph infection and deformity of RLE.     - No current issues     # Anxiety - Stable. Not currently on anxiolytics.[LO1.2]     Recommendations reviewed with attending physician [LO1.1] [place][LO1.2].     Yesenia Angulo CNP  Hospitalist Service   Pager: [phone]       Reason for  "Visit:[LO1.1]   Perioperative medical management[LO1.2]          History of Present Illness:   History is obtained from the patient and medical record.     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  Jigna is resting in bed,  is at bedside.  She denies pain this afternoon.  Had been managing her arthritis pain at home with Tylenol, though reports not taking over the last week at least.  Typically very active prior to worsening arthritis.  Reports that her R leg had started to \"bow\" following tumor resection, but pain was never significant.  No nausea, vomiting, or abdominal pain following surgery today.      Jigna reports a history of hypothyroidism.  She has no underlying cardiovascular disease, including MI or HTN.  Reports having a pessary in place.  No issues with UTI or dysuria.  She currently denies chest pain, dyspnea, abdominal pain, fevers, nausea, vomiting, lightheadedness, and leg pain.[LO1.2]            Review of Systems:   A 10 point ROS was performed and negative unless otherwise noted in HPI.           Past Medical History:   Reviewed and updated in Epic.[LO1.1]  Past Medical History:   Diagnosis Date     Anxiety      Arthritis 1990?     Complication of anesthesia     slow to wakeup     Hypothyroid      Sarcoma (H) 2009[LO1.3]             Past Surgical History:   Reviewed and updated in Epic.[LO1.1]  Past Surgical History:   Procedure Laterality Date     CARPAL TUNNEL RELEASE RT/LT       excise sarcoma Right     right leg     TUBAL LIGATION[LO1.3]               Social History:   Reviewed and updated in Epic.[LO1.1]  Social History     Social History     Marital status:      Spouse name: N/A "     Number of children: N/A     Years of education: N/A     Occupational History     Not on file.     Social History Main Topics     Smoking status: Never Smoker     Smokeless tobacco: Never Used     Alcohol use Yes      Comment: rarely     Drug use: No     Sexual activity: Yes     Partners: Male     Birth control/ protection: Post-menopausal     Other Topics Concern     Not on file     Social History Narrative[LO1.3]              Family History:   Reviewed and updated in Epic.[LO1.1]  Family History   Problem Relation Age of Onset     CANCER Father      Prostate Cancer Father      Other Cancer Father      CANCER Brother[LO1.3]              Allergies:[LO1.1]     Allergies   Allergen Reactions     Colchicine      Other reaction(s): Gastrointestinal  diarrhea     Niacin      skin rash     Ferrous Sulfate Rash[LO1.3]             Medications:[LO1.1]     Current Facility-Administered Medications   Medication     [START ON 6/15/2018] acetaminophen (TYLENOL) tablet 650 mg     acetaminophen (TYLENOL) tablet 975 mg     [START ON 6/13/2018] bisacodyl (DULCOLAX) Suppository 10 mg     ceFAZolin (ANCEF) 1 g vial to attach to  ml bag for ADULT or 50 ml bag for PEDS     gabapentin (NEURONTIN) capsule 100 mg     HYDROmorphone (PF) (DILAUDID) injection 0.2-0.4 mg     lactated ringers infusion     lactated ringers injection     levothyroxine (SYNTHROID/LEVOTHROID) tablet 75 mcg     lidocaine (LMX4) kit     lidocaine 1 % 1 mL     magnesium hydroxide (MILK OF MAGNESIA) suspension 15 mL     naloxone (NARCAN) injection 0.1-0.4 mg     naloxone (NARCAN) injection 0.1-0.4 mg     ondansetron (ZOFRAN-ODT) ODT tab 4 mg    Or     ondansetron (ZOFRAN) injection 4 mg     oxyCODONE IR (ROXICODONE) half-tab 2.5-5 mg     prochlorperazine (COMPAZINE) injection 5 mg    Or     prochlorperazine (COMPAZINE) tablet 5 mg     senna-docusate (SENOKOT-S;PERICOLACE) 8.6-50 MG per tablet 1 tablet    Or     senna-docusate (SENOKOT-S;PERICOLACE) 8.6-50 MG  "per tablet 2 tablet     sodium chloride (PF) 0.9% PF flush 3 mL     sodium chloride (PF) 0.9% PF flush 3 mL     Warfarin Therapy Reminder (Check START DATE - warfarin may be starting in the FUTURE)[LO1.3]            Physical Exam:[LO1.1]   Blood pressure 125/57, pulse 73, temperature 97.2  F (36.2  C), temperature source Oral, resp. rate 15, height 1.575 m (5' 2\"), weight 54.8 kg (120 lb 13 oz), SpO2 92 %, not currently breastfeeding.  Body mass index is 22.1 kg/(m^2).[LO1.3]    GENERAL: Alert and oriented x 3. Well nourished, well developed.[LO1.1]  Thin body habitus.[LO1.2]  No acute distress.    HEENT: Normocephalic, atraumatic. Anicteric sclera. Mucous membranes moist.   CV: RRR. S1, S2. No murmurs appreciated.   RESPIRATORY: Effort normal on[LO1.1] 2L NC[LO1.2]. Lungs CTAB with no wheezing, rales, or rhonchi.   GI: Abdomen soft and non distended, bowel sounds present x all 4 quadrants. No tenderness, rebound, or guarding.   NEUROLOGICAL: No focal deficits. Follows commands.  Strength[LO1.1] 5/5[LO1.2] in upper extremities.[LO1.1] Sensation globally intact.[LO1.2]   MUSCULOSKELETAL: No joint swelling or tenderness.[LO1.1] Wiggles toes on both feet.[LO1.2]    EXTREMITIES:[LO1.1] No obvious peripheral edema.[LO1.2]   SKIN: Grossly warm, dry, and intact. No jaundice. No rashes.             Data:   I personally reviewed the following studies:    ROUTINE IP LABS (Last four results)  CMP[LO1.1] No lab results found in last 7 days.[LO1.3]  CBC[LO1.1] No lab results found in last 7 days.[LO1.3]  INR[LO1.1] No lab results found in last 7 days.            Unresulted Labs Ordered in the Past 30 Days of this Admission     No orders found from 4/13/2018 to 6/13/2018.[LO1.3]              Revision History        User Key Date/Time User Provider Type Action    > LO1.2 6/12/2018  5:53 PM Yesenia Angulo APRN CNP Nurse Practitioner Sign     LO1.3 6/12/2018  4:42 PM Yesenia Angulo APRN CNP Nurse " "Practitioner      LO1.1 6/12/2018  4:41 PM Yesenia Angulo APRN CNP Nurse Practitioner                      Progress Notes - Physician (Notes for yesterday and today)      Progress Notes by Fahad Hi MD at 6/15/2018 10:25 AM     Author:  Fahad Hi MD Service:  Hospitalist Author Type:  Physician    Filed:  6/15/2018 10:31 AM Date of Service:  6/15/2018 10:25 AM Creation Time:  6/15/2018 10:25 AM    Status:  Signed :  Fahad Hi MD (Physician)         [facility] Internal Medicine Progress Note            Interval History:   Record reviewed. Seen  with RN. Plan for DC to TCU today.  Transport by . Adequate pain control with ES tylenol.  Ambulatory without nausea or LH.  Improved mental clarity.    No CP, SOB, cough, reflux, abd pain, bloating.  Passing flatus.    Small hard (pea-sized) BM with supp 6/13.  Last prior BM 6/10.  No results with supp this AM.  LH if BR apparently related to straining.   Voiding with urge incontinence (had PTA).          Medications:   All medications reviewed today          Physical Exam:   Blood pressure 138/51, pulse 73, temperature 97.1  F (36.2  C), temperature source Oral, resp. rate 16, height 1.575 m (5' 2\"), weight 54.8 kg (120 lb 13 oz), SpO2 99 %, not currently breastfeeding.    Intake/Output Summary (Last 24 hours) at 06/13/18 1459  Last data filed at 06/13/18 0624   Gross per 24 hour   Intake              350 ml   Output             3770 ml   Net            -3420 ml          General:  Alert.  Appropriate.  No distress.  Off O2.     Heent:      Neck:    Skin:    Chest:  clear    Cardiac:  Reg without gallop, 1/V1 BOB right 2nd ICS without radiation to carotids.   No JVD.     Abdomen:  Non distended, soft, non-tender.  BS normal.     Extremities:  Perfused.  No edema, calf, posterior thigh tenderness to suggest DVT.     Neuro:            Data:     Results for orders placed or performed during the hospital " encounter of 06/12/18 (from the past 24 hour(s))   INR   Result Value Ref Range    INR 1.27 (H) 0.86 - 1.14[WR1.1]     Hemoglobin   Date Value Ref Range Status   06/14/2018 11.0 (L) 11.7 - 15.7 g/dL Final   06/13/2018 11.5 (L) 11.7 - 15.7 g/dL Final[WR1.2]   ]  Last Basic Metabolic Panel:  Lab Results   Component Value Date     06/14/2018      Lab Results   Component Value Date    POTASSIUM 4.2 06/14/2018     Lab Results   Component Value Date    CHLORIDE 105 06/14/2018     Lab Results   Component Value Date    RAVEN 8.1 06/14/2018     Lab Results   Component Value Date    CO2 30 06/14/2018     Lab Results   Component Value Date    BUN 12 06/14/2018     Lab Results   Component Value Date    CR 0.67 06/14/2018     Lab Results   Component Value Date     06/14/2018                Assessment and Plan:   1)  S/p R total knee replacement.  Adequate pain control with ES tylenol only.   2)  Acute blood loss anemia, mild.  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  4)  Hypothyroid on replacement.  5)  Constipation.  Minimal results 6/13.   6)  Vague nausea, sense of decrease clarity potential opiate effect. Resolved.     PLAN:  1)  Clinically stable for DC to TCU pending BM.  Fleet enema this AM.  TCU departure later this afternoon.  2)  Meds, orders reviewed.  Change ES tylenol to prn.  Needs coumadin dosing.  RN to check standing BP.   Disposition Plan   Expected discharge today to TCU.      Entered: Fahad Hi 06/15/2018, 10:25 AM              Attestation:  I have reviewed today's vital signs, notes, medications, labs and imaging.     Fahad Hi MD[WR1.1]             Revision History        User Key Date/Time User Provider Type Action    > WR1.2 6/15/2018 10:31 AM Fahad Hi MD Physician Sign     WR1.1 6/15/2018 10:25 AM Fahad Hi MD Physician             Progress  Notes by Thanh Thorne MD at 6/15/2018  5:24 AM     Author:  Thanh Thorne MD Service:  Orthopedics Author Type:  Resident    Filed:  6/15/2018  7:51 AM Date of Service:  6/15/2018  5:24 AM Creation Time:  6/15/2018  5:24 AM    Status:  Signed :  Thanh Thorne MD (Resident)         Orthopaedics Progress Note  Patient Name: Jigna Allen  Date of Service: 6/13/2018  Hospital Day: 3    Subjective:  No acute events overnight. Patient resting in bed this AM with well-controlled pain. Denies n/v/f/c.    Objective:  Vitals (last 24 hours):  Temp:  [98  F (36.7  C)-98.3  F (36.8  C)] 98.3  F (36.8  C)  Heart Rate:  [71-80] 71  Resp:  [16] 16  BP: (120-148)/(48-53) 148/48  SpO2:  [97 %-98 %] 98 %    Physical Exam  General: NAD, AOx3  CV: Regular rate  Pulm: Non-labored respirations  MSK:   RLE: Surgical site dressing c/d/i, no surround erythema, 2+ DP pulses b/l, SILT s/s/sp/dp/t, fires ehl/fhl/ta/gcs    Labs (last 24 hours):  Hemoglobin   Date Value Ref Range Status   06/14/2018 11.0 (L) 11.7 - 15.7 g/dL Final       WBC   Date Value Ref Range Status   06/05/2018 4.8 4.0 - 11.0 10e9/L Final       Last Basic Metabolic Panel:  Lab Results   Component Value Date     06/05/2018      Lab Results   Component Value Date    POTASSIUM 3.7 06/05/2018     Lab Results   Component Value Date    CHLORIDE 102 06/05/2018     Lab Results   Component Value Date    RAVEN 8.8 06/05/2018     Lab Results   Component Value Date    CO2 31 06/05/2018     Lab Results   Component Value Date    BUN 16 06/05/2018     Lab Results   Component Value Date    CR 0.73 06/05/2018     Lab Results   Component Value Date    GLC 81 06/05/2018       Assessment:  Jigna Allen is a 83 year old female s/p right total knee arthroplasty by Dr. Gomez on 6/12 .     Plan:    Activity: Up with assistance. Knee ROM as tolerated.    Weight bearing status: WBAT    Antibiotics: Ancef x 24 hours completed    Pain control: orals prn, IV  "for breakthrough    DVT Prophylaxis: Warfarin x 4 weeks and mechanical    Drains: Removed 6/14     Dressing: change prn    Diet: ADAT    PT/OT    Consults: IM    Follow-up with Dr. Gomez. Patient to call to arrange appointments.     Discharge pending PT/OT, pain control and medical clearance likely to TCU today.      This patient was seen in conjunction with Dr. Thanh Thorne, PGY-4.     Michael Gaona, MS4  Medical Student      I have personally seen the patient and agree with the above note.    Thanh Thorne MD  PGY-4, Orthopaedic Surgery  [phone][RD1.1]         Revision History        User Key Date/Time User Provider Type Action    > [N/A] 6/15/2018  7:51 AM Thanh Thorne MD Resident Sign     RD1.1 6/15/2018  5:24 AM Michael Gaona Medical Student Share            Progress Notes by Fahad Hi MD at 6/14/2018  2:34 PM     Author:  Fahad Hi MD Service:  Hospitalist Author Type:  Physician    Filed:  6/14/2018  2:41 PM Date of Service:  6/14/2018  2:34 PM Creation Time:  6/14/2018  2:34 PM    Status:  Addendum :  Fahad Hi MD (Physician)         [facility] Internal Medicine Progress Note            Interval History:   Record reviewed. Discussed with RN.  Pain control in general adequate.    Again with mobilization notes mild LH and nausea.  No emesis.  Feels as though \"not clear\".  No CP, SOB, cough, reflux, abd pain.  Small hard BM with supp 6/13.    Voiding with urge incontinence (had PTA).          Medications:   All medications reviewed today          Physical Exam:   Blood pressure 120/53, pulse 73, temperature 98  F (36.7  C), temperature source Oral, resp. rate 16, height 1.575 m (5' 2\"), weight 54.8 kg (120 lb 13 oz), SpO2 97 %, not currently breastfeeding.    Intake/Output Summary (Last 24 hours) at 06/13/18 1459  Last data filed at 06/13/18 0624   Gross per 24 hour   Intake              350 ml   Output             3770 ml   Net   "          -3420 ml          General:  Alert.  Appropriate.  No distress.  Off O2.     Heent:      Neck:    Skin:    Chest:  clear    Cardiac:  Reg without gallop, 1/V1 BOB right 2nd ICS without radiation to carotids.   No JVD.     Abdomen:  Non distended, soft, non-tender.  BS normal.     Extremities:  Perfused.  No edema, calf, posterior thigh tenderness to suggest DVT.     Neuro:            Data:     Results for orders placed or performed during the hospital encounter of 06/12/18 (from the past 24 hour(s))   Hemoglobin   Result Value Ref Range    Hemoglobin 11.0 (L) 11.7 - 15.7 g/dL   INR   Result Value Ref Range    INR 1.23 (H) 0.86 - 1.14   Basic metabolic panel   Result Value Ref Range    Sodium 143 133 - 144 mmol/L    Potassium 4.2 3.4 - 5.3 mmol/L    Chloride 105 94 - 109 mmol/L    Carbon Dioxide 30 20 - 32 mmol/L    Anion Gap 8 3 - 14 mmol/L    Glucose 133 (H) 70 - 99 mg/dL    Urea Nitrogen 12 7 - 30 mg/dL    Creatinine 0.67 0.52 - 1.04 mg/dL    GFR Estimate 85 >60 mL/min/1.7m2    GFR Estimate If Black >90 >60 mL/min/1.7m2    Calcium 8.1 (L) 8.5 - 10.1 mg/dL              Assessment and Plan:   1)  S/p R total knee replacement.  Pain control generally adequate with limited opiates (only 1 mg dilaudid this AM).   2)  Acute blood loss anemia, mild.  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  4)  Hypothyroid on replacement.  5)  Constipation.  Minimal results 6/13.   6)  Vague nausea, sense of decrease clarity potential opiate effect.     PLAN:  1)  DC Dilaudid.  ES tylenol 1000 mg q6h scheduled.    2)  IS, bowel meds/repeat supp[WR1.1]/add miralax[WR1.2], mobilize, coumadin. 3)  Trend labs.[WR1.1]  Check BP when up.[WR1.2]  Monitor clinically.  4)  Dispo - probable TCU 6/15.   Disposition Plan   Expected discharge in 1 days to TCU.      Entered: Fahad Hi 06/14/2018, 2:34 PM               Attestation:  I have reviewed today's vital signs, notes, medications, labs and imaging.     Fahad Hi MD[WR1.1]             Revision History        User Key Date/Time User Provider Type Action    > WR1.2 6/14/2018  2:41 PM Fahad Hi MD Physician Addend     WR1.1 6/14/2018  2:40 PM Fahad Hi MD Physician Sign            Progress Notes by Thanh Thorne MD at 6/14/2018  5:08 AM     Author:  Thanh Thorne MD Service:  Orthopedics Author Type:  Resident    Filed:  6/14/2018  8:25 AM Date of Service:  6/14/2018  5:08 AM Creation Time:  6/14/2018  5:08 AM    Status:  Signed :  Thanh Thorne MD (Resident)         Orthopaedics Progress Note  Patient Name: Jigna Allen  Date of Service: 6/13/2018  Hospital Day: 2    Subjective:  No acute events overnight. Patient resting in bed this AM with well-controlled pain. Denies n/v/f/c.    Objective:  Vitals (last 24 hours):  Temp:  [97  F (36.1  C)-99.1  F (37.3  C)] 99.1  F (37.3  C)  Heart Rate:  [62-68] 68  Resp:  [14-16] 16  BP: (113-144)/(38-58) 114/38  SpO2:  [96 %-98 %] 96 %    Physical Exam  General: NAD, AOx3  CV: Regular rate  Pulm: Non-labored respirations  MSK:   RLE: Surgical site dressing c/d/i, no surround erythema, 2+ DP pulses b/l, SILT s/s/sp/dp/t, fires ehl/fhl/ta/gcs    Labs (last 24 hours):  Hemoglobin   Date Value Ref Range Status   06/13/2018 11.5 (L) 11.7 - 15.7 g/dL Final       WBC   Date Value Ref Range Status   06/05/2018 4.8 4.0 - 11.0 10e9/L Final       Last Basic Metabolic Panel:  Lab Results   Component Value Date     06/05/2018      Lab Results   Component Value Date    POTASSIUM 3.7 06/05/2018     Lab Results   Component Value Date    CHLORIDE 102 06/05/2018     Lab Results   Component Value Date    RAVEN 8.8 06/05/2018     Lab Results   Component Value Date    CO2 31 06/05/2018     Lab Results   Component Value Date    BUN 16 06/05/2018     Lab Results   Component  Value Date    CR 0.73 06/05/2018     Lab Results   Component Value Date    GLC 81 06/05/2018       Assessment:  Jigna Allen is a 83 year old female s/p right total knee arthroplasty by Dr. Gomez on 6/12 .     Plan:    Activity: Up with assistance. Knee ROM as tolerated.    Weight bearing status: WBAT    Antibiotics: Ancef x 24 hours completed    Pain control: orals prn, IV for breakthrough    DVT Prophylaxis: Warfarin x 4 weeks and mechanical    Drains: 130 cc last shift, removed today.     Dressing:[RD1.1] change prn[RD1.2]    Diet: ADAT    PT/OT    Consults: IM    Follow-up with Dr. Gomez. Patient to call to arrange appointments.     Discharge pending PT/OT, pain control and medical clearance likely[RD1.1] to TCU[RD1.2] .      This patient was seen in conjunction with Dr. Thanh Thorne, PGY-4.     Michael Gaona, MS4  Medical Student      I have personally seen the patient and agree with the above note.    Thanh Thorne MD  PGY-4, Orthopaedic Surgery  [phone][RD1.1]         Revision History        User Key Date/Time User Provider Type Action    > [N/A] 6/14/2018  8:25 AM Thanh Thorne MD Resident Sign     RD1.2 6/14/2018  6:09 AM Michael Gaona Medical Student Share     RD1.1 6/14/2018  5:11 AM Michael Gaona Medical Student Share                  Procedure Notes     No notes of this type exist for this encounter.         Progress Notes - Therapies (Notes from 06/12/18 through 06/15/18)      Progress Notes by Lizzeth Holland PT at 6/13/2018  9:45 AM     Author:  Lizzeth Holland PT Service:  Physical Medicine and Rehabilitation Author Type:  Physical Therapist    Filed:  6/13/2018  4:23 PM Date of Service:  6/13/2018  9:45 AM Creation Time:  6/13/2018  4:23 PM    Status:  Signed :  Lizzeth Holland PT (Physical Therapist)          06/13/18 0845   Quick Adds   Type of Visit Initial PT Evaluation   Living Environment   Lives With spouse   Living Arrangements house   Home  Accessibility stairs to enter home;stairs within home   Number of Stairs to Enter Home 1  (1 step from the deck and 1 to enter the house, )   Number of Stairs Within Home (no need to access basement immediately, laundry)   Transportation Available car;family or friend will provide   Living Environment Comment one story house, a duplex : laundry and guest room are downstairs.   No grab bars in the shower,, difficulty rising from built in bench prior to surgery   Self-Care   Dominant Hand right   Usual Activity Tolerance poor   Current Activity Tolerance poor   Regular Exercise yes   Activity/Exercise Type strength training   Exercise Amount/Frequency daily   Equipment Currently Used at Home (uses cane if out but generally doesn't use cane in home)   Activity/Exercise/Self-Care Comment ADL's indpendent,  I asked  for help with showering and things but he didn't.    Functional Level Prior   Ambulation 1-->assistive equipment   Transferring 0-->independent   Toileting 0-->independent   Bathing 0-->independent   Dressing 0-->independent   Eating 0-->independent   Communication 0-->understands/communicates without difficulty   Swallowing 0-->swallows foods/liquids without difficulty   Cognition 0 - no cognition issues reported   Fall history within last six months no   Which of the above functional risks had a recent onset or change? none   Prior Functional Level Comment Furniture walked in the house, used cane outside, tooks stairs one at a time and used both railings,  Rode a bike at the gym until about February   General Information   Onset of Illness/Injury or Date of Surgery - Date 06/12/18   Referring Physician Dr. Gomez/ Wilner Carr PAWINDY   Patient/Family Goals Statement Pt wants to be able to walk for recreation   Pertinent History of Current Problem (include personal factors and/or comorbidities that impact the POC) POD #1 rt TKA. 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  Pt states her spouse is busy with his 100 yr old mother and will not be available to assist consistently.    Cognitive Status Examination   Orientation orientation to person, place and time   Level of Consciousness alert   Follows Commands and Answers Questions 100% of the time   Personal Safety and Judgment intact   Memory intact   Cognitive Comment repeated instruction about not getting up by herself x2.    Pain Assessment   Patient Currently in Pain Yes, see Vital Sign flowsheet   Integumentary/Edema   Integumentary/Edema Comments intact dressing, no draiage   Posture    Posture Comments flexed at hip and knee in standing.    Range of Motion (ROM)   ROM Comment 0-12-70 AROM of the right knee, limited in DF to neutral,  left foot goes to approx 10   Strength   Strength Comments tested functionally:  Pt slightly weaker on the right foot compaired to left,  Pt is able to perform multiple SLR's independently on the right.  LLE is WFL,    Bed Mobility   Bed Mobility Comments supine ,<>sit with contact guard. Pt likes to try move by herself first.    Transfer Skills   Transfer Comments sit<>stand with cues for hand placement and contact guard, mildly impulsive,    Gait   Gait Comments Walked about 10 total with min A and ww.  Walks on her toe with hip and knee flexed. When cued to put heel down, she leans over.    Balance   Balance Comments decreased in standing, needs UE support due to pain and inability to move to flat foot position.    Sensory Examination   Sensory Perception Comments intact per pt   Modality Interventions   Planned Modality Interventions Cryotherapy  "  Clinical Impression   Criteria for Skilled Therapeutic Intervention yes, treatment indicated   PT Diagnosis impaired functional mobility s/p right TKA    Influenced by the following impairments pain,  decr balance,strength, ROM on RLE, pain,    Functional limitations due to impairments decr independence with gait, decr. tolerance for activity.    Clinical Presentation Stable/Uncomplicated   Clinical Presentation Rationale Pt is motivated to return to  increased activity,  h/o cancer   Clinical Decision Making (Complexity) Low complexity   Therapy Frequency` 2 times/day   Predicted Duration of Therapy Intervention (days/wks) 3 days   Anticipated Equipment Needs at Discharge (pt has a 3 wheeled walker and a cane)   Anticipated Discharge Disposition Transitional Care Facility   Risk & Benefits of therapy have been explained Yes   Patient, Family & other staff in agreement with plan of care Yes   [facility] TM \"6 Clicks\"   2016, Trustees of [facility], under license to [employer].  All rights reserved.   6 Clicks Short Forms Basic Mobility Inpatient Short Form   [facility] AM-PAC  \"6 Clicks\" V.2 Basic Mobility Inpatient Short Form   1. Turning from your back to your side while in a flat bed without using bedrails? 4 - None   2. Moving from lying on your back to sitting on the side of a flat bed without using bedrails? 3 - A Little   3. Moving to and from a bed to a chair (including a wheelchair)? 2 - A Lot   4. Standing up from a chair using your arms (e.g., wheelchair, or bedside chair)? 3 - A Little   5. To walk in hospital room? 2 - A Lot   6. Climbing 3-5 steps with a railing? 2 - A Lot   Basic Mobility Raw Score (Score out of 24.Lower scores equate to lower levels of function) 16[PK1.1]        Revision History        User Key Date/Time User Provider Type Action    > PK1.1 6/13/2018  4:23 PM Lizzeth Holland, PT Physical Therapist Sign            Progress Notes by Jr Mckeon, OT " at 6/13/2018 11:58 AM     Author:  Jr Mckeon OT Service:  (none) Author Type:  Occupational Therapist    Filed:  6/13/2018 11:58 AM Date of Service:  6/13/2018 11:58 AM Creation Time:  6/13/2018 11:58 AM    Status:  Signed :  Jr Mckeon OT (Occupational Therapist)          06/13/18 1146   Quick Adds   Type of Visit Initial Occupational Therapy Evaluation   Living Environment   Lives With spouse   Living Arrangements house   Home Accessibility stairs to enter home;stairs within home   Number of Stairs to Enter Home 1   Transportation Available car;family or friend will provide   Living Environment Comment one story house, a duplex : laundry and guest room is downstairs. No grab bars in the shower,, difficulty rising from built in bench   Self-Care   Dominant Hand right   Usual Activity Tolerance poor   Activity/Exercise/Self-Care Comment Pt reports previously independent with ADL at baseline   Functional Level Prior   Ambulation 1-->assistive equipment   Transferring 0-->independent   Toileting 0-->independent   Bathing 0-->independent   Dressing 0-->independent   Eating 0-->independent   Communication 0-->understands/communicates without difficulty   Swallowing 0-->swallows foods/liquids without difficulty   Cognition 0 - no cognition issues reported   Fall history within last six months no   Which of the above functional risks had a recent onset or change? none   Prior Functional Level Comment Furniture walked in the house, used cane outside, tooks stairs one at a time and used both railings, Rode a bike at the gym until about February   General Information   Onset of Illness/Injury or Date of Surgery - Date 06/12/18   Referring Physician He Gomez   Patient/Family Goals Statement To get more comfortable with moving around   Additional Occupational Profile Info/Pertinent History of Current Problem Jigna Allen is a 83 year old female s/p right total knee arthroplasty by Dr. Gomez on 6/12     Precautions/Limitations fall precautions   Weight-Bearing Status - LUE full weight-bearing   Weight-Bearing Status - RUE full weight-bearing   Weight-Bearing Status - LLE full weight-bearing   Weight-Bearing Status - RLE weight-bearing as tolerated   General Info Comments Activity: Ambulate with assistance   Cognitive Status Examination   Orientation orientation to person, place and time   Level of Consciousness alert   Able to Follow Commands WNL/WFL   Personal Safety (Cognitive) WNL/WFL   Memory intact   Attention No deficits were identified   Organization/Problem Solving No deficits were identified   Executive Function No deficits were identified   Visual Perception   Visual Perception No deficits were identified   Sensory Examination   Sensory Quick Adds No deficits were identified   Pain Assessment   Patient Currently in Pain Yes, see Vital Sign flowsheet   Integumentary/Edema   Integumentary/Edema no deficits were identifed   Range of Motion (ROM)   ROM Comment B UE WFL   Strength   Strength Comments B UE WFL   Transfer Skill: Bed to Chair/Chair to Bed   Level of [place]: Bed to Chair stand-by assist   Physical Assist/Nonphysical Assist: Bed to Chair supervision   Weight-Bearing Restrictions full weight-bearing   Assistive Device - Transfer Skill Bed to Chair Chair to Bed Rehab Eval standard walker   Instrumental Activities of Daily Living (IADL)   Previous Responsibilities meal prep;housekeeping;laundry;shopping;medication management;yardwork;finances   Activities of Daily Living Analysis   Impairments Contributing to Impaired Activities of Daily Living pain;fear and anxiety   General Therapy Interventions   Planned Therapy Interventions ADL retraining;IADL retraining;home program guidelines;progressive activity/exercise   Clinical Impression   Criteria for Skilled Therapeutic Interventions Met yes, treatment indicated   OT Diagnosis decreased independence with ADL   Influenced by the following  "impairments pain, decreased activity tolerance   Assessment of Occupational Performance 3-5 Performance Deficits   Identified Performance Deficits dressing, grooming, bathing, toileting   Clinical Decision Making (Complexity) Low complexity   Therapy Frequency daily   Predicted Duration of Therapy Intervention (days/wks) 4 days   Anticipated Equipment Needs at Discharge shower chair;reacher   Anticipated Discharge Disposition Home with Assist;Transitional Care Facility   Risks and Benefits of Treatment have been explained. Yes   Patient, Family & other staff in agreement with plan of care Yes   Clinical Impression Comments Pt presents with decreased activity tolerance and post surgical pain in R LE, resulting in decreased independence with ADL.  Pt will benefit from skilled OT services to increase independence with ADL and provide education on compensatory techniques.   [facility] AM-PAC TM \"6 Clicks\"   2016, Trustees of [facility], under license to [employer].  All rights reserved.   6 Clicks Short Forms Daily Activity Inpatient Short Form   [facility] AM-PAC  \"6 Clicks\" Daily Activity Inpatient Short Form   1. Putting on and taking off regular lower body clothing? 4 - None   2. Bathing (including washing, rinsing, drying)? 2 - A Lot   3. Toileting, which includes using toilet, bedpan or urinal? 4 - None   4. Putting on and taking off regular upper body clothing? 4 - None   5. Taking care of personal grooming such as brushing teeth? 4 - None   6. Eating meals? 4 - None   Daily Activity Raw Score (Score out of 24.Lower scores equate to lower levels of function) 22   Total Evaluation Time   Total Evaluation Time (Minutes) 5[LS1.1]        Revision History        User Key Date/Time User Provider Type Action    > LS1.1 6/13/2018 11:58 AM Jr Mckeon, OT Occupational Therapist Sign                                                      INTERAGENCY TRANSFER FORM - LAB / IMAGING / EKG / EMG RESULTS "   6/12/2018                        8A: [phone]            Unresulted Labs (24h ago through future)    Start       Ordered    06/13/18 0600  INR  (warfarin (COUMADIN) Pharmacy Consult  )  DAILY,   Routine      06/12/18 1637    Unscheduled  Hemoglobin  CONDITIONAL X 2,   Routine     Comments:  Release on POD 1 and POD 2 if the morning Hgb is less than 8.0    06/12/18 1637         Lab Results - 3 Days      INR [[id number]] (Abnormal)  Resulted: 06/15/18 0619, Result status: Final result    Ordering provider: Wilner Carr PA-C  06/15/18 0000 Resulting lab: [facility]    Specimen Information    Type Source Collected On   Blood  06/15/18 0554          Components       Value Reference Range Flag Lab   INR 1.27 0.86 - 1.14 H 13            Basic metabolic panel [[id number]] (Abnormal)  Resulted: 06/14/18 0703, Result status: Final result    Ordering provider: aFhad Hi MD  06/14/18 0000 Resulting lab: [facility]    Specimen Information    Type Source Collected On   Blood  06/14/18 0631          Components       Value Reference Range Flag Lab   Sodium 143 133 - 144 mmol/L  153   Potassium 4.2 3.4 - 5.3 mmol/L  153   Chloride 105 94 - 109 mmol/L  153   Carbon Dioxide 30 20 - 32 mmol/L  153   Anion Gap 8 3 - 14 mmol/L  153   Glucose 133 70 - 99 mg/dL H 153   Urea Nitrogen 12 7 - 30 mg/dL  153   Creatinine 0.67 0.52 - 1.04 mg/dL  153   GFR Estimate 85 >60 mL/min/1.7m2  153   Comment:  Non  GFR Calc   GFR Estimate If Black >90 >60 mL/min/1.7m2  153   Comment:  African American GFR Calc   Calcium 8.1 8.5 - 10.1 mg/dL L 153            INR [[id number]] (Abnormal)  Resulted: 06/14/18 0653, Result status: Final result    Ordering provider: Wilner Carr PA-C  06/14/18 0000 Resulting lab: [facility]    Specimen Information    Type Source Collected On   Blood  06/14/18 0631          Components        Value Reference Range Flag Lab   INR 1.23 0.86 - 1.14 H 13            Hemoglobin [[id number]] (Abnormal)  Resulted: 06/14/18 0645, Result status: Final result    Ordering provider: Wilner Carr PA-C  06/14/18 0000 Resulting lab: [facility]    Specimen Information    Type Source Collected On   Blood  06/14/18 0631          Components       Value Reference Range Flag Lab   Hemoglobin 11.0 11.7 - 15.7 g/dL L 13            Basic metabolic panel [[id number]] (Abnormal)  Resulted: 06/13/18 0657, Result status: Final result    Ordering provider: Fahad Hi MD  06/13/18 0001 Resulting lab: [facility]    Specimen Information    Type Source Collected On   Blood  06/13/18 0622          Components       Value Reference Range Flag Lab   Sodium 141 133 - 144 mmol/L  153   Potassium 5.0 3.4 - 5.3 mmol/L  153   Chloride 105 94 - 109 mmol/L  153   Carbon Dioxide 32 20 - 32 mmol/L  153   Anion Gap 4 3 - 14 mmol/L  153   Glucose 112 70 - 99 mg/dL H 153   Urea Nitrogen 11 7 - 30 mg/dL  153   Creatinine 0.69 0.52 - 1.04 mg/dL  153   GFR Estimate 82 >60 mL/min/1.7m2  153   Comment:  Non  GFR Calc   GFR Estimate If Black >90 >60 mL/min/1.7m2  153   Comment:  African American GFR Calc   Calcium 8.2 8.5 - 10.1 mg/dL L 153            Magnesium [[id number]]  Resulted: 06/13/18 0657, Result status: Final result    Ordering provider: Fahad Hi MD  06/13/18 0001 Resulting lab: [facility]    Specimen Information    Type Source Collected On   Blood  06/13/18 0622          Components       Value Reference Range Flag Lab   Magnesium 2.1 1.6 - 2.3 mg/dL  153            INR [[id number]]  Resulted: 06/13/18 0646, Result status: Final result    Ordering provider: Wilner Carr PA-C  06/13/18 0001 Resulting lab: [facility]    Specimen Information    Type Source Collected On   Blood  06/13/18  0622          Components       Value Reference Range Flag Lab   INR 1.09 0.86 - 1.14  13            Hemoglobin [[id number]] (Abnormal)  Resulted: 06/13/18 0639, Result status: Final result    Ordering provider: Wilner Carr PA-C  06/13/18 0001 Resulting lab: [facility]    Specimen Information    Type Source Collected On   Blood  06/13/18 0622          Components       Value Reference Range Flag Lab   Hemoglobin 11.5 11.7 - 15.7 g/dL L 13            INR [[id number]]  Resulted: 06/12/18 1753, Result status: Final result    Ordering provider: He Gomez MD  06/12/18 1657 Resulting lab: [facility]    Specimen Information    Type Source Collected On   Blood  06/12/18 1735          Components       Value Reference Range Flag Lab   INR 1.00 0.86 - 1.14  13            Glucose by meter [[id number]]  Resulted: 06/12/18 0904, Result status: Final result    Ordering provider: He Gomez MD  06/12/18 0852 Resulting lab: POINT OF CARE TEST, GLUCOSE    Specimen Information    Type Source Collected On     06/12/18 0852          Components       Value Reference Range Flag Lab   Glucose 94 70 - 99 mg/dL  170            Testing Performed By     Lab - Abbreviation Name Director Address Valid Date Range    13 - Unknown [facility] Unknown [address] [place] [address] 01/15/15 0916 - Present    153 - Unknown [facility] Unknown Unknown 04/28/11 1033 - Present    170 - Unknown POINT OF CARE TEST, GLUCOSE Unknown Unknown 10/31/11 1114 - Present               Imaging Results - 3 Days      XR Tibia & Fibula Port Right 2 Views [[id number]]  Resulted: 06/13/18 1354, Result status: Final result    Ordering provider: He Gomez MD  06/13/18 1058 Resulted by: Cesario Cotto DO    Performed: 06/13/18 1214 - 06/13/18 1223 Resulting lab: RADIOLOGY RESULTS    Narrative:       EXAM: XR TIBIA  & FIBULA PORT RT 2 VW  6/13/2018 12:23 PM      HISTORY: s/p revision TKA;     COMPARISON: 5/3/2018, 6/12/2018    FINDINGS: AP and lateral views of the left leg.    No acute osseous abnormality.    Long stem total right knee arthroplasty. Femoral component  incompletely visualized. 2 screws through the posterior tibial  plateau. Postsurgical subcutaneous gas. Surgical drain partially  visualized.    Unchanged surgical clips projecting over the medial proximal right  leg. Posttraumatic changes of the ankle.       Impression:       IMPRESSION: Total right knee arthroplasty without evidence of hardware  complication. Femoral component incompletely visualized.    NINI BAIG MD (Joe)      XR Knee Port Right 1/2 Views [[id number]]  Resulted: 06/12/18 1517, Result status: Final result    Ordering provider: Wilner Carr PA-C  06/12/18 1419 Resulted by: Nael Thompson MD Luchsinger, Samuel Joseph, DO    Performed: 06/12/18 1424 - 06/12/18 1502 Resulting lab: RADIOLOGY RESULTS    Narrative:       EXAM: XR KNEE PORT RT 1/2 VW  6/12/2018 3:02 PM      HISTORY: Post-Op Total Knee;     COMPARISON: Earlier same day, 5/3/2018    FINDINGS: Portable AP and lateral views of the right knee.    Long stem right total knee arthroplasty. Components appear well  seated. Distal aspect of the tibial stem not imaged. There are 2  screws through the posterior tibial plateau. Postsurgical subcutaneous  gas. Surgical drain noted. Unchanged scattered surgical clips  projecting over the medial leg.       Impression:       IMPRESSION: Postsurgical changes of a new right total knee  arthroplasty.    I have personally reviewed the examination and initial interpretation  and I agree with the findings.    NAEL THOMPSON MD      XR Knee Port Right 1/2 Views [[id number]]  Resulted: 06/12/18 1311, Result status: Final result    Ordering provider: He Gomez MD  06/12/18 1150 Resulted by: Nael Thompson MD    Performed:  06/12/18 1215 - 06/12/18 1230 Resulting lab: RADIOLOGY RESULTS    Narrative:       Exam: Single frontal view of the right knee dated 6/12/2018.    COMPARISON: 5/3/2018.    CLINICAL HISTORY: Placement of a knee arthroplasty.    FINDINGS: Single intraoperative frontal view of the right knee was  obtained. New postsurgical changes of placement of a right total knee  arthroplasty with longstem femoral and longstem tibial components. The  hardware appears intact. Surgical clips noted.      Impression:       IMPRESSION: Intraoperative film with new postsurgical changes of  placement of a right total knee arthroplasty, with longstem femoral  and longstem tibial components.    NAEL FRIEDMAN MD      Testing Performed By     Lab - Abbreviation Name Director Address Valid Date Range    104 - Rad Rslts RADIOLOGY RESULTS Unknown Unknown 02/16/05 1553 - Present               ECG/EMG Results      ECHO CARDIAC - HIM SCAN [[id number]]  Resulted: 01/31/18 0000, Result status: Final result    Ordering provider: Scan, Provider  01/31/18 0000     1    Type Source Collected On     01/31/18 0000                           Jigna Allen N #[id number] (CSN: N/A)  (83 year old F)              Encounter-Level Documents:     There are no encounter-level documents.      Order-Level Documents - 06/12/2018:     Scan on 6/5/2018  3:30 PM by Outside, Provider : ECHOCARDIOGRAM (below)

## 2018-06-12 NOTE — IP AVS SNAPSHOT
MRN:[id number]                      After Visit Summary   6/12/2018    Jigna Allen    MRN: [id number]           Thank you!     Thank you for choosing [facility] for your care. Our goal is always to provide you with excellent care. Hearing back from our patients is one way we can continue to improve our services. Please take a few minutes to complete the written survey that you may receive in the mail after you visit with us. Thank you!        Patient Information     Date Of Birth          1935        About your hospital stay     You were admitted on:  June 12, 2018 You last received care in the:  [facility]    You were discharged on:  Maria Del Carmen 15, 2018        Reason for your hospital stay       You were admitted following your total knee arthroplasty            Reason for your hospital stay       Right TKA                  Who to Call     For medical emergencies, please call 911.  For non-urgent questions about your medical care, please call your primary care provider or clinic, [phone]  For questions related to your surgery, please call your surgery clinic        Attending Provider     Provider Specialty    He Gomez MD Orthopedics       Primary Care Provider Office Phone # Fax #    Elisabeth Ko [phone]      After Care Instructions     Advance Diet as Tolerated       Follow this diet upon discharge: Regular            Diet       Return to your pre surgery diet            Discharge Instructions       TOTAL KNEE ARTHROPLASTY POST OPERATIVE DISCHARGE INSTRUCTIONS    FOLLOW UP APPOINTMENT  You are scheduled for a post operative appointment with Dr. Gomez approximately four weeks after surgery.     Your follow up appointments will be at the location that you regularly see Dr. Gomez:    [facility]  [address] [place] [id number] [phone]    Physical therapy:   A prescription for physical therapy will be  provided at the time of discharge.      ACTIVITY  Weight bearing status:   You may bear weight on your operative extremity as tolerated, using assistive devices (walker, cane) as needed. As you begin to feel more comfortable ambulating, you may gradually transition from a walker to a cane. Eventually, you should wean from all assistive devices. Although we would like you to discontinue use of assistive devices as soon as possible, do not transition until you have worked with your physical therapist to achieve safe balance and comfort.     Continuous passive motion machine:   Perform CPM exercises for six to eight hours per day for the first four weeks after surgery. The CPM should be set at 0 degrees to flexion tolerance with a goal of 90 degrees. Advance the CPM settings aggressively in increments of 5 degrees every 30 minutes until goal is achieved.     Exercises:   Perform the following exercises at least three times per day for the first four weeks after surgery to prevent complications, such as blood clots in your legs:  1) Point and flex your feet  2) Move your ankle around in big circles  3) Wiggle your toes   Also, perform thigh muscle tightening exercises for 10 to 15 minutes at least three times per day for the first four weeks after surgery.    Athletic Activities:  Activities such as swimming, bicycling, jogging, running, and stop-and-go sports should be avoided until permitted by your provider.    Driving:  Driving is not permitted until directed by your provider. Typically, driving is restricted for three to four weeks after right knee surgery and three weeks after left knee surgery. Under no circumstance are you permitted to drive while using narcotic pain medications.    Return to Work:  You may return to work when directed by your provider. Typically, patients with desk/sitting jobs can return to work within two weeks while patients with heavy labor jobs can return to work around three months after  surgery.      COMFORT AND PAIN MANAGEMENT  Elevation:   During times of inactivity throughout the first two weeks after surgery, make an effort to decrease swelling by elevating your operative extremity. This is most effectively done by lying down and placing several pillows lengthwise under your thigh and calf to raise your toes above the level of your nose. To ensure that you knee remains in full extension, do not place pillows directly under your knee.     Icing:  An ice pack will be provided to control swelling and discomfort after surgery. Place a thin towel on your skin and apply the ice pack overtop. You may apply ice for 20 minutes as often as two times per hour.    Pain Medications:  You will be discharged with acetaminophen (Tylenol) and a narcotic medication for pain management after surgery. Acetaminophen can help to effectively manage pain when used as prescribed, however, do not exceed the maximum daily dose of 3000 mg. The narcotic pain medication should be reserved for severe, breakthrough pain. Take the narcotic medication as prescribed and use only as often as necessary.       ANTICOAGULATION  Take warfarin as prescribed for a total of four weeks after surgery. This medication will require weekly INR checks (INR goal 1.5-2.5).       WOUND CARE AND SHOWERING  Wound care:  Keep the dressing on, clean, and dry for the first five days after surgery. You may then remove the dressing and replace it with fresh 4x4s and tubigrip (or ACE wrap). Your surgical incision was closed with a clear knotless suture and tails were left at the margins of the incision. These tails can be left in place until your follow up appointment. The steri strips (small white tape that is directly on the incision areas) should be left in place until they fall off or are removed at your first office visit.    Showering:  You may shower ten days after surgery provided your incision is intact and dry without drainage. If there is  fluid at the incision site, cover the incision with a non-permeable plastic bag. You may allow water to run over the incision, but do not soak or submerge the incision. Do not scrub the incision.     Tub Bathing:  Tub bathing, swimming, or any other activities that cause your incision to be submerged should be avoided until allowed by your provider. Typically, patients are allowed to return to these activities four weeks after surgery.      CONTACTING YOUR PHYSICIAN:  You may experience symptoms that require follow-up before your scheduled appointment. Please contact Dr. Gomez s office if you experience:  1) Pain that persists or worsens in the first few days after surgery  2) Excessive redness or drainage of cloudy or bloody material from the wounds (clear red tinted fluid and some mild drainage should be expected) or drainage of any kind five days after surgery  3) A temperature elevation greater than 101.5    4) Pain, swelling or redness in your calf  5) Numbness or weakness in your leg or foot      Regular business hours (Monday - Friday, 8am - 5pm):  [facility]: [phone]    If you have any other concerns during normal business hours, please contact Dr. Gomez's nurse, Mere Cruz RN, at [phone] during normal business hours 8 am - 5 pm (leave message as needed). If your concern is urgent, please page Mere Cruz RN at [phone] and key in your 10 digit phone number followed by the # sign after the beep.     After hours and weekends:  [facility] on call Orthopedic resident: [phone]    If you have an emergent concern, contact the Emergency Department at the [facility] - [phone] - or [place] - [phone] - [place].            Discharge Instructions       BMP, Hgb 6/18 -results to provider.            General info for SNF       Length of Stay Estimate: Short Term Care: Estimated # of Days <30  Condition at Discharge:  Improving  Level of care:skilled   Rehabilitation Potential: Excellent  Admission H&P remains valid and up-to-date: Yes  Recent Chemotherapy: N/A  Use Nursing Home Standing Orders: Yes            Hip precautions           Mantoux instructions       Give two-step Mantoux (PPD) Per Facility Policy Yes            Weight bearing status                 Follow-up Appointments     Follow Up and recommended labs and tests       INR Sunday and daily as needed for warfarin follow up. Please arrange warfarin follow up if pt discharges before 30 days                  Your next 10 appointments already scheduled     Aug 28, 2018 10:40 AM CDT   CT CHEST W/O CONTRAST with UCCT1   [facility] ([facility])    [address]  [address] [place] [id number] [phone]           Please bring any scans or X-rays taken at other hospitals, if similar tests were done. Also bring a list of your medicines, including vitamins, minerals and over-the-counter drugs. It is safest to leave personal items at home.  Be sure to tell your doctor:   If you have any allergies.   If there s any chance you are pregnant.   If you are breastfeeding.  You do not need to do anything special to prepare for this exam.  Please wear loose clothing, such as a sweat suit or jogging clothes. Avoid snaps, zippers and other metal. We may ask you to undress and put on a hospital gown.            Aug 30, 2018  3:30 PM CDT   (Arrive by 3:15 PM)   Return Visit with Edouard Bryan MD   [facility] ([facility])    [address]  [address]  [facility] [place] [facility] [id number] [phone]            May 09, 2019  9:00 AM CDT   XR CHEST 2 VIEWS with UCXR1   [facility] ([facility])    [address]  [address] [place] [id number] [phone]           Please bring a list of your current medicines to your exam.  (Include vitamins, minerals and over-thecounter medicines.) Leave your valuables at home.  Tell your doctor if there is a chance you may be pregnant.  You do not need to do anything special for this exam.            May 09, 2019 11:00 AM CDT   (Arrive by 10:45 AM)   Return Visit with Edouard Bryan MD   [facility] ([facility])    [address]  [address]  [facility] [address] [phone]              Additional Services     Occupational Therapy Adult Consult       Evaluate and treat as clinically indicated.    Reason:  Mobilization tka protocol            Physical Therapy Adult Consult       Evaluate and treat as clinically indicated.    Reason:  Mobilization, tka protocol                  Future tests that were ordered for you     Assign Questionnaire Series to Patient                 Warfarin Instruction     You have started taking a medicine called warfarin. This is a blood-thinning medicine (anticoagulant). It helps prevent and treat blood clots.      Before leaving the hospital, make sure you know how much to take and how long to take it.      You will need regular blood tests to make sure your blood is clotting safely. It is very important to see your doctor for regular blood tests.    Talk to your doctor before taking any new medicine (this includes over-the-counter drugs and herbal products). Many medicines can interact with warfarin. This may cause more bleeding or too much clotting.     Eating a lot of vitamin K--found in green, leafy vegetables--can change the way warfarin works in your body. Do NOT avoid these foods. Instead, try to eat the same amount each day.     Bleeding is the most common side-effect of warfarin. You may notice bleeding gums, a bloody nose, bruises and bleeding longer when you cut yourself. See a doctor at once if:   o You cough up blood  o You find blood in your stool (poop)  o You have a deep cut, or a cut that  "bleeds longer than 10 minutes   o You have a bad cut, hard fall, accident or hit your head (go to urgent care or the emergency room).    For women who can get pregnant: This medicine can harm an unborn baby. Be very careful not to get pregnant while taking this medicine. If you think you might be pregnant, call your doctor right away.    For more information, read \"Guide to Warfarin Therapy,  the booklet you received in the hospital.        Pending Results     No orders found from 6/10/2018 to 6/13/2018.            Statement of Approval     Ordered          06/15/18 0809  I have reviewed and agree with all the recommendations and orders detailed in this document.  EFFECTIVE NOW     Approved and electronically signed by:  Thanh Thorne MD           06/15/18 0880  I have reviewed and agree with all the recommendations and orders detailed in this document.  EFFECTIVE NOW     Approved and electronically signed by:  Thanh Thorne MD             Admission Information     Date & Time Provider Department Dept. Phone    6/12/2018 He Gomez MD  8A [phone]      Your Vitals Were     Blood Pressure Pulse Temperature Respirations Height Weight    120/59 73 97.1  F (36.2  C) (Oral) 16 1.575 m (5' 2\") 54.8 kg (120 lb 13 oz)    Pulse Oximetry BMI (Body Mass Index)                99% 22.1 kg/m2          MyChart Information     [employer] gives you secure access to your electronic health record. If you see a primary care provider, you can also send messages to your care team and make appointments. If you have questions, please call your primary care clinic.  If you do not have a primary care provider, please call [phone] and they will assist you.        Care EveryWhere ID     This is your Care EveryWhere ID. This could be used by other organizations to access your [facility] medical records  [id number]        Equal Access to Services     MIKEY CANO : kristen Bill, " cesia wilson, narinder pinedaaan ah. So [facility] [phone].    ATENCIÓN: Si yadiel ortega, tiene a jade disposición servicios gratuitos de asistencia lingüística. Samira al [phone].    We comply with applicable federal civil rights laws and [place] laws. We do not discriminate on the basis of race, color, national origin, age, disability, sex, sexual orientation, or gender identity.               Review of your medicines      START taking        Dose / Directions    bisacodyl 10 MG Suppository   Commonly known as:  DULCOLAX        Dose:  10 mg   Place 1 suppository (10 mg) rectally daily as needed for constipation   Quantity:  30 suppository   Refills:  0       polyethylene glycol Packet   Commonly known as:  MIRALAX/GLYCOLAX        Dose:  17 g   Take 17 g by mouth daily   Quantity:  7 packet   Refills:  0       senna-docusate 8.6-50 MG per tablet   Commonly known as:  SENOKOT-S;PERICOLACE        Dose:  1 tablet   Take 1 tablet by mouth 2 times daily   Quantity:  100 tablet   Refills:  0       warfarin 3 MG tablet   Commonly known as:  COUMADIN        Take 3 mg today, tomorrow. INR on Sunday. LOT 30 days. Facility MD to dose warfarin. If pt d/c before 30 days please arrange for outpatient warfarin   Quantity:  30 tablet   Refills:  0         CONTINUE these medicines which may have CHANGED, or have new prescriptions. If we are uncertain of the size of tablets/capsules you have at home, strength may be listed as something that might have changed.        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 Ask your nurse if you have questions.     You don't need a prescription for these medications     acetaminophen 500 MG tablet    bisacodyl 10 MG Suppository    polyethylene glycol Packet    senna-docusate 8.6-50 MG per tablet    warfarin 3 MG tablet                Protect others around you: Learn how to safely use, store and throw away your medicines at www.disposemymeds.org.             Medication List: This is a list of all your medications and when to take them. Check marks below indicate your daily home schedule. Keep this list as a reference.      Medications           Morning Afternoon Evening Bedtime As Needed    acetaminophen 500 MG tablet   Commonly known as:  TYLENOL   Take 2 tablets (1,000 mg) by mouth every 6 hours as needed for mild pain   Last time this was given:  1,000 mg on 6/15/2018  9:42 AM                                bisacodyl 10 MG Suppository   Commonly known as:  DULCOLAX   Place 1 suppository (10 mg) rectally daily as needed for constipation   Last time this was given:  10 mg on 6/15/2018  9:05 AM                                LEVOTHROID 75 MCG tablet   Take 75 mcg by mouth daily   Last time this was given:  75 mcg on 6/15/2018  6:06 AM   Generic drug:  levothyroxine                                polyethylene glycol Packet   Commonly known as:  MIRALAX/GLYCOLAX   Take 17 g by mouth daily   Last time this was given:  17 g on 6/15/2018  7:43 AM                                senna-docusate 8.6-50 MG per tablet   Commonly known as:  SENOKOT-S;PERICOLACE   Take 1 tablet by mouth 2 times daily   Last time this was given:  2 tablets on 6/15/2018  7:43 AM                                warfarin 3 MG tablet   Commonly  known as:  COUMADIN   Take 3 mg today, tomorrow. INR on Sunday. LOT 30 days. Facility MD to dose warfarin. If pt d/c before 30 days please arrange for outpatient warfarin   Last time this was given:  2.5 mg on 6/14/2018  5:18 PM

## 2018-06-12 NOTE — CONSULTS
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     # S/p R total knee replacement - POD #0.  Preop Hgb 14.3.  EBL 150cc.  Currently denies pain.    - Agree w/ scheduled Tylenol, Gabapentin, and low dose Oxycodone PRN for pain control   - Hgb, INR in am   - Further management, AC, pain control per Ortho      # Hypothyroidism - Currently on Synthroid 75mcg. Last TSH 1.07 on 5/10/18.   - Continue PTA Synthroid     # Constipation - Bowel regimen with senna docusate and PRN Miralax.      # Hx high grade pleomorphhic sarcoma (2009) - Follows with Oncology (Dr. Bryan) annually, last seen on 5/15/18.  S/p radiation and resection (2010) c/b staph infection and deformity of RLE.     - No current issues     # Anxiety - Stable. Not currently on anxiolytics.     Recommendations reviewed with attending physician Dr. Hi.     Yesenia Angulo CNP  Hospitalist Service   Pager: [phone]       Reason for Visit:   Perioperative medical management          History of Present Illness:   History is obtained from the patient and medical record.     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     Internal Medicine service was asked to see patient for perioperative medical management.  Jigna is resting in bed,  is at  "bedside.  She denies pain this afternoon.  Had been managing her arthritis pain at home with Tylenol, though reports not taking over the last week at least.  Typically very active prior to worsening arthritis.  Reports that her R leg had started to \"bow\" following tumor resection, but pain was never significant.  No nausea, vomiting, or abdominal pain following surgery today.      Jigna reports a history of hypothyroidism.  She has no underlying cardiovascular disease, including MI or HTN.  Reports having a pessary in place.  No issues with UTI or dysuria.  She currently denies chest pain, dyspnea, abdominal pain, fevers, nausea, vomiting, lightheadedness, and leg pain.            Review of Systems:   A 10 point ROS was performed and negative unless otherwise noted in HPI.           Past Medical History:   Reviewed and updated in Epic.  Past Medical History:   Diagnosis Date     Anxiety      Arthritis 1990?     Complication of anesthesia     slow to wakeup     Hypothyroid      Sarcoma (H) 2009             Past Surgical History:   Reviewed and updated in Epic.  Past Surgical History:   Procedure Laterality Date     CARPAL TUNNEL RELEASE RT/LT       excise sarcoma Right     right leg     TUBAL LIGATION               Social History:   Reviewed and updated in [employer].  Social History     Social History     Marital status:      Spouse name: N/A     Number of children: N/A     Years of education: N/A     Occupational History     Not on file.     Social History Main Topics     Smoking status: Never Smoker     Smokeless tobacco: Never Used     Alcohol use Yes      Comment: rarely     Drug use: No     Sexual activity: Yes     Partners: Male     Birth control/ protection: Post-menopausal     Other Topics Concern     Not on file     Social History Narrative              Family History:   Reviewed and updated in Epic.  Family History   Problem Relation Age of Onset     CANCER Father      Prostate Cancer Father      Other " "Cancer Father      CANCER Brother              Allergies:     Allergies   Allergen Reactions     Colchicine      Other reaction(s): Gastrointestinal  diarrhea     Niacin      skin rash     Ferrous Sulfate Rash             Medications:     Current Facility-Administered Medications   Medication     [START ON 6/15/2018] acetaminophen (TYLENOL) tablet 650 mg     acetaminophen (TYLENOL) tablet 975 mg     [START ON 6/13/2018] bisacodyl (DULCOLAX) Suppository 10 mg     ceFAZolin (ANCEF) 1 g vial to attach to  ml bag for ADULT or 50 ml bag for PEDS     gabapentin (NEURONTIN) capsule 100 mg     HYDROmorphone (PF) (DILAUDID) injection 0.2-0.4 mg     lactated ringers infusion     lactated ringers injection     levothyroxine (SYNTHROID/LEVOTHROID) tablet 75 mcg     lidocaine (LMX4) kit     lidocaine 1 % 1 mL     magnesium hydroxide (MILK OF MAGNESIA) suspension 15 mL     naloxone (NARCAN) injection 0.1-0.4 mg     naloxone (NARCAN) injection 0.1-0.4 mg     ondansetron (ZOFRAN-ODT) ODT tab 4 mg    Or     ondansetron (ZOFRAN) injection 4 mg     oxyCODONE IR (ROXICODONE) half-tab 2.5-5 mg     prochlorperazine (COMPAZINE) injection 5 mg    Or     prochlorperazine (COMPAZINE) tablet 5 mg     senna-docusate (SENOKOT-S;PERICOLACE) 8.6-50 MG per tablet 1 tablet    Or     senna-docusate (SENOKOT-S;PERICOLACE) 8.6-50 MG per tablet 2 tablet     sodium chloride (PF) 0.9% PF flush 3 mL     sodium chloride (PF) 0.9% PF flush 3 mL     Warfarin Therapy Reminder (Check START DATE - warfarin may be starting in the FUTURE)            Physical Exam:   Blood pressure 125/57, pulse 73, temperature 97.2  F (36.2  C), temperature source Oral, resp. rate 15, height 1.575 m (5' 2\"), weight 54.8 kg (120 lb 13 oz), SpO2 92 %, not currently breastfeeding.  Body mass index is 22.1 kg/(m^2).    GENERAL: Alert and oriented x 3. Well nourished, well developed.  Thin body habitus.  No acute distress.    HEENT: Normocephalic, atraumatic. Anicteric sclera. " Mucous membranes moist.   CV: RRR. S1, S2. No murmurs appreciated.   RESPIRATORY: Effort normal on 2L NC. Lungs CTAB with no wheezing, rales, or rhonchi.   GI: Abdomen soft and non distended, bowel sounds present x all 4 quadrants. No tenderness, rebound, or guarding.   NEUROLOGICAL: No focal deficits. Follows commands.  Strength 5/5 in upper extremities. Sensation globally intact.   MUSCULOSKELETAL: No joint swelling or tenderness. Wiggles toes on both feet.    EXTREMITIES: No obvious peripheral edema.   SKIN: Grossly warm, dry, and intact. No jaundice. No rashes.             Data:   I personally reviewed the following studies:    ROUTINE IP LABS (Last four results)  CMP No lab results found in last 7 days.  CBC No lab results found in last 7 days.  INR No lab results found in last 7 days.            Unresulted Labs Ordered in the Past 30 Days of this Admission     No orders found from 4/13/2018 to 6/13/2018.

## 2018-06-12 NOTE — OR NURSING
PACU to Inpatient Nursing Handoff    Patient Jigna Allen is a 83 year old female who speaks English.   Procedure Procedure(s):  Right Total Knee Replacement  - Wound Class: I-Clean   Surgeon(s) Primary: He Gomez MD  Assisting: Wilner Carr PA-C  Resident - Assisting: Yovany Vaughn MD     Allergies   Allergen Reactions     Colchicine      Other reaction(s): Gastrointestinal  diarrhea     Niacin      skin rash     Ferrous Sulfate Rash       Isolation  No active isolations     Past Medical History   has a past medical history of Anxiety; Arthritis (1990?); Complication of anesthesia; Hypothyroid; and Sarcoma (H) (2009).    Anesthesia Spinal   Dermatome Level Dermatomes Left: S2  Dermatomes Right: S2   Preop Meds acetaminophen (Tylenol) - time given: 0834   Nerve block Not applicable   Intraop Meds fentanyl (Sublimaze): 100 mcg total   Local Meds Yes - Local Cocktail (morphine, ropivacaine, epinephrine, Toradol)   Antibiotics cefazolin (Ancef) - last given at 1220     Pain Patient Currently in Pain: yes  Comfort: tolerable with discomfort  Pain Control: partially effective   PACU meds  fentanyl (Sublimaze): 25 mcg (total dose) last given at 1500   hydromorphone (Dilaudid): 0.2 mg (total dose) last given at 1516    PCA / epidural No   Capnography     Telemetry ECG Rhythm: Normal sinus rhythm   Inpatient Telemetry Monitor Ordered? No        Labs Glucose Lab Results   Component Value Date    GLC 81 06/05/2018       Hgb Lab Results   Component Value Date    HGB 14.3 06/05/2018       INR Lab Results   Component Value Date    INR 0.98 06/05/2018      PACU Imaging Completed     Wound/Incision Incision/Surgical Site 06/12/18 Right Knee (Active)   Incision Assessment UTV 6/12/2018  2:00 PM   Smiley-Incision Assessment UTV 6/12/2018  2:00 PM   Closure SARA 6/12/2018  2:00 PM   Incision Drainage Amount None 6/12/2018  2:00 PM   Dressing Intervention Clean, dry, intact 6/12/2018  2:00 PM   Number of days:0       CMS Peripheral Neurovascular WDL: WDL (06/12/18 1400)  All Extremities Temperature: warm (06/12/18 1400)  All Extremities Color: no discoloration (06/12/18 1400)  All Extremities Sensation: no tingling;no numbness (06/12/18 1400)  RLE Temperature: warm (06/12/18 1400)  RLE Color: no discoloration (06/12/18 1400)  RLE Sensation: no tingling;no numbness (06/12/18 1400)   Equipment ice pack and continuous passive motion machine (CPM)   Other LDA       IV Access Peripheral IV 06/12/18 Right Hand (Active)   Site Assessment WDL 6/12/2018  2:00 PM   Line Status Infusing 6/12/2018  2:00 PM   Phlebitis Scale 0-->no symptoms 6/12/2018  2:00 PM   Infiltration Scale 0 6/12/2018  2:00 PM   Infiltration Site Treatment Method  None 6/12/2018  2:00 PM   Number of days:0      Blood Products Not applicable EBL 50   mL   Intake/Output Date 06/12/18 0700 - 06/13/18 0659   Shift 8540-6473 0119-4723 9102-7535 24 Hour Total   I  N  T  A  K  E   P.O.  75  75    I.V. 1600   1600    Shift Total  (mL/kg) 1600  (29.2) 75  (1.37)  1675  (30.57)   O  U  T  P  U  T   Urine 800   800    Shift Total  (mL/kg) 800  (14.6)   800  (14.6)   Weight (kg) 54.8 54.8 54.8 54.8        Drains / Vera Closed/Suction Drain Right Knee Bulb 15 Kinyarwanda (Active)   Number of days:0       Urethral Catheter Latex 16 fr (Active)   Number of days:0      Time of void PreOp Void Prior to Procedure: 0830 (06/12/18 0839)    PostOp      Diapered? No   Bladder Scan     PO 75 mL (06/12/18 1500)  tolerating sips and crackers     Vitals    B/P: 151/63  T: 98.1  F (36.7  C)    Temp src: Oral  P:  Pulse: 73 (06/12/18 0700)    Heart Rate: 86 (06/12/18 1515)     R: 14  O2:  SpO2: 97 %    O2 Device: Nasal cannula (06/12/18 1515)    Oxygen Delivery: 2 LPM (06/12/18 1515)         Family/support present    Patient belongings Patient Belongings: glasses;clothing;shoes;plastic bag;tote  Disposition of Belongings: Locker   Patient transported on bed   DC meds/scripts (obs/outpt)  clothes   Inpatient Pain Meds Released? Yes       Special needs/considerations None   Tasks needing completion None       Indu Topete, RN  ASCOM [id number]

## 2018-06-12 NOTE — BRIEF OP NOTE
Orthopaedic Surgery Brief Op-Note      Patient: Jigna Allen  : 1935  Date of Service: 2018 1:57 PM    Pre-operative Diagnosis: Osteoarthritis Right Knee  Post-operative Diagnosis: same    Procedure(s) Performed: Procedure(s):  Right Total Knee Replacement  - Wound Class: I-Clean    Staff: Dr. Gomez  Assistants:   MD Wilner Adler PA-C    Anesthesia: General  EBL: 150 cc  UOP: see anesthesia record  Tourniquet Time: 120 minutes at 250 mmHg    Implants:     Implant Name Type Inv. Item Serial No.  Lot No. LRB No. Used   BONE CEMENT SIMPLEX FULL DOSE 6191-1-001 Cement, Bone BONE CEMENT SIMPLEX FULL DOSE 6191-1-001  [employer] [id number] Right 3   IMP SCR SYN CANC 4.0X35MM PART THRD .035 Metallic Hardware/[place] IMP SCR SYN CANC 4.0X35MM PART THRD .035  [employer]  Right 2   IMP INSERT BASEPLATE TIBIAL HOWM TRI 4 5521-B-400 Total Joint Component/Insert IMP INSERT BASEPLATE TIBIAL HOWM TRI 4 5521-B-400  [employer] [id number] Right 1   IMP COMP FEM STRK TRIATHLN TS SZ 4 RT 5512-F-402 Total Joint Component/Insert IMP COMP FEM STRK TRIATHLN TS SZ 4 RT 5512-F-402  [employer] [id number] Right 1   IMP ADAPTER FEM STRK TRIATHLN 4MM OFFSET 5570-S-040 Total Joint Component/Insert IMP ADAPTER FEM STRK TRIATHLN 4MM OFFSET 5570-S-040  [employer] [id number] Right 1   IMP STEM FEM STRK PRESS FIT TRIATHLN [id number] 5565-S-017 Total Joint Component/Insert IMP STEM FEM STRK PRESS FIT TRIATHLN [id number] 5565-S-017  [employer] [id number] Right 1   [employer] Triathlon Offset Adapter    [employer] [id number] Right 1   [employer] Triathlon Fluted Stem jose manuel 11mm lnth 150mm typ TS    [employer] [id number] Right 1   IMP COMP PATELLA HOWM TRI ASYM [id number] 5551-G-320 Total Joint Component/Insert IMP COMP PATELLA HOWM TRI ASYM [id number] 5551-G-320  [employer] [id number] Right 1   IMP INSERT TIBIAL ARTICULAR STRK TRI X3 SZ 4 11MM 8892-G-367 Total Joint  Component/Insert IMP INSERT TIBIAL ARTICULAR STRK TRI X3 SZ 4 11MM 5537-G-411   [employer] [id number] Right 1     Drains: JUAN R drain  Intra-op Labs/Cxs/Specimens: none  Complications: No apparent complications during procedure  Findings: Please see dictated operative note for details    Disposition: Stable to PACU, then admit to Orthopaedics.    Post-Op Plan:  Assessment/Plan: Jigna Allen is a 83 year old female s/p Procedure(s):  Right Total Knee Replacement  - Wound Class: I-Clean on 6/12/2018 with Dr. Gomez.    Activity: Up with assist and assistive devices as needed until independent. Knee ROM as tolerated. Advance CPM as tolerated to goal of 0 to 90 degrees.  Weight bearing status: WBAT    Antibiotics: C efazolin x 24 hours   Diet: Begin with clear fluids and progress diet as tolerated. Bowel regimen. Anti-emetics PRN.    DVT prophylaxis:  Mechanical while in hospital with Warfarin starting POD 0 (Pharmacy to dose) x 4 weeks  Elevation: Elevate heels off of bed on pillows, no pillows behind the knee at any time    Wound Care: Dressing change at bedside by Ortho on POD #1  Drains: Document output per shift, Ortho will discontinue on POD #1-2.    Pain management: Orals PRN, IV for breakthrough only  X-rays: AP/Lat R knee XR in PACU  Physical Therapy: Mobilization, ROM, ADL's  Occupational Therapy: ADL's  Labs: Trend Hgb on PODs #1 & 2  Cultures: None  Consults: PT, OT. Hospitalist, appreciate assistance in caring for this patient throughout the perioperative period    Future Appointments  Date Time Provider Department Center   6/13/2018 6:30 AM UR OT OVERFLOW UROT [place]   6/13/2018 9:30 AM Lizzeth Holland, PT [facility] [place]   6/13/2018 1:30 PM Lizzeth Holland, PT [facility] [place]   [facility]   8/30/2018 3:30 PM Edouard Bryan MD [facility]   5/9/2019 9:00 AM [facility]XR1 [facility]   5/9/2019 11:00 AM Edouard Bryan MD [facility]       Disposition: Pending  progress with therapies, pain control on orals, and medical stability, anticipate discharge to Home vs TCU on POD #2-3.    I positioned and prepped the patient. I placed and held retractors to provide adequate exposure that allowed the case to proceed in a safe, efficient manner. I assisted in identifying and protecting important structures. I ligated blood vessels to maintain hemostasis and minimize bleeding risk. I suctioned fluids when needed. I assisted with the proper selection and positioning of any implants required for the case. I performed wound closure of the operative incisions.    An experienced physician assistant was medically necessary to ensure a safe and efficient procedure. The assistance that I provided decreased operative time and thereby, reduced the risk of infection and complications from prolonged time of anesthesia. My assistance was vital in achieving best practices.      Wilner Carr PA-C 6/12/2018 1:57 PM  Orthopaedic Surgery     Please page me at [phone] with any questions/concerns during regular weekday hours before 5pm. If there is no response, if it is a weekend, or if it is during evening hours then please page the orthopaedic surgery resident on call.

## 2018-06-12 NOTE — LETTER
Transition Communication Hand-off for Care Transitions to Next Level of Care Provider    Name: Jigna Allen  : 1935  MRN #: [id number]  Primary Care Provider: Elisabeth Ko     Primary Clinic: [facility] [address]  [facility] [address]     Reason for Hospitalization:  Osteoarthritis Right Knee  Status post knee surgery  Admit Date/Time: 2018  7:35 AM  Discharge Date: 6/15/18  Payor Source: Payor: [employer] / Plan: [employer] / Product Type: HMO /     Reason for Communication Hand-off Referral: Other Discharge Plan    Discharge Plan: [facility] [phone] F: [phone]  Discharge Plan:       Most Recent Value    Concerns To Be Addressed all concerns addressed in this encounter           Concern for non-adherence with plan of care:   Y/N N  Discharge Needs Assessment:  Needs       Most Recent Value    Anticipated Changes Related to Illness inability to care for self, inability to care for someone else    Equipment Currently Used at Home -- [uses cane if out but generally doesn't use cane in home]    Transportation Available car, family or friend will provide    Current Discharge Risk other (see comments)              Follow-up plan:  Future Appointments  Date Time Provider Department Center   6/15/2018 10:30 AM Claribel Romo, PT [facility]   2018 10:40 AM [facility]   2018 3:30 PM Edouard Bryan MD [facility]   2019 9:00 AM UCXR1 [facility]   2019 11:00 AM Edouard Bryan MD [facility]       Any outstanding tests or procedures:        Referrals     Future Labs/Procedures    Occupational Therapy Adult Consult     Comments:    Evaluate and treat as clinically indicated.    Reason:  Mobilization tka protocol    Physical Therapy Adult Consult     Comments:    Evaluate and treat as clinically indicated.    Reason:  Mobilization, tka protocol            JOSE Carrillo   8A/10A ortho/Med/surg and Adult  [facility]    [phone]  [email or web address]    AVS/Discharge Summary is the source of truth; this is a helpful guide for improved communication of patient story

## 2018-06-13 ENCOUNTER — DOCUMENTATION ONLY (OUTPATIENT)
Dept: OTHER | Facility: CLINIC | Age: 83
End: 2018-06-13

## 2018-06-13 ENCOUNTER — APPOINTMENT (OUTPATIENT)
Dept: OCCUPATIONAL THERAPY | Facility: CLINIC | Age: 83
DRG: 470 | End: 2018-06-13
Attending: PHYSICIAN ASSISTANT
Payer: MEDICARE

## 2018-06-13 ENCOUNTER — APPOINTMENT (OUTPATIENT)
Dept: GENERAL RADIOLOGY | Facility: CLINIC | Age: 83
DRG: 470 | End: 2018-06-13
Attending: ORTHOPAEDIC SURGERY
Payer: MEDICARE

## 2018-06-13 ENCOUNTER — APPOINTMENT (OUTPATIENT)
Dept: PHYSICAL THERAPY | Facility: CLINIC | Age: 83
DRG: 470 | End: 2018-06-13
Attending: PHYSICIAN ASSISTANT
Payer: MEDICARE

## 2018-06-13 ENCOUNTER — APPOINTMENT (OUTPATIENT)
Dept: PHYSICAL THERAPY | Facility: CLINIC | Age: 83
DRG: 470 | End: 2018-06-13
Attending: ORTHOPAEDIC SURGERY
Payer: MEDICARE

## 2018-06-13 LAB
ANION GAP SERPL CALCULATED.3IONS-SCNC: 4 MMOL/L (ref 3–14)
BUN SERPL-MCNC: 11 MG/DL (ref 7–30)
CALCIUM SERPL-MCNC: 8.2 MG/DL (ref 8.5–10.1)
CHLORIDE SERPL-SCNC: 105 MMOL/L (ref 94–109)
CO2 SERPL-SCNC: 32 MMOL/L (ref 20–32)
CREAT SERPL-MCNC: 0.69 MG/DL (ref 0.52–1.04)
GFR SERPL CREATININE-BSD FRML MDRD: 82 ML/MIN/1.7M2
GLUCOSE SERPL-MCNC: 112 MG/DL (ref 70–99)
HGB BLD-MCNC: 11.5 G/DL (ref 11.7–15.7)
INR PPP: 1.09 (ref 0.86–1.14)
MAGNESIUM SERPL-MCNC: 2.1 MG/DL (ref 1.6–2.3)
POTASSIUM SERPL-SCNC: 5 MMOL/L (ref 3.4–5.3)
SODIUM SERPL-SCNC: 141 MMOL/L (ref 133–144)

## 2018-06-13 PROCEDURE — 85610 PROTHROMBIN TIME: CPT | Performed by: PHYSICIAN ASSISTANT

## 2018-06-13 PROCEDURE — 73590 X-RAY EXAM OF LOWER LEG: CPT | Mod: RT

## 2018-06-13 PROCEDURE — 97116 GAIT TRAINING THERAPY: CPT | Mod: GP | Performed by: PHYSICAL THERAPIST

## 2018-06-13 PROCEDURE — 40000193 ZZH STATISTIC PT WARD VISIT: Performed by: PHYSICAL THERAPIST

## 2018-06-13 PROCEDURE — A9270 NON-COVERED ITEM OR SERVICE: HCPCS | Mod: GY | Performed by: PHYSICIAN ASSISTANT

## 2018-06-13 PROCEDURE — 97530 THERAPEUTIC ACTIVITIES: CPT | Mod: GP | Performed by: PHYSICAL THERAPIST

## 2018-06-13 PROCEDURE — 12000001 ZZH R&B MED SURG/OB UMMC

## 2018-06-13 PROCEDURE — 97530 THERAPEUTIC ACTIVITIES: CPT | Mod: GO | Performed by: OCCUPATIONAL THERAPIST

## 2018-06-13 PROCEDURE — 97165 OT EVAL LOW COMPLEX 30 MIN: CPT | Mod: GO | Performed by: OCCUPATIONAL THERAPIST

## 2018-06-13 PROCEDURE — 97161 PT EVAL LOW COMPLEX 20 MIN: CPT | Mod: GP | Performed by: PHYSICAL THERAPIST

## 2018-06-13 PROCEDURE — 80048 BASIC METABOLIC PNL TOTAL CA: CPT | Performed by: PHYSICIAN ASSISTANT

## 2018-06-13 PROCEDURE — 99232 SBSQ HOSP IP/OBS MODERATE 35: CPT | Performed by: INTERNAL MEDICINE

## 2018-06-13 PROCEDURE — 36415 COLL VENOUS BLD VENIPUNCTURE: CPT | Performed by: PHYSICIAN ASSISTANT

## 2018-06-13 PROCEDURE — 25000132 ZZH RX MED GY IP 250 OP 250 PS 637: Mod: GY | Performed by: INTERNAL MEDICINE

## 2018-06-13 PROCEDURE — 97110 THERAPEUTIC EXERCISES: CPT | Mod: GP | Performed by: PHYSICAL THERAPIST

## 2018-06-13 PROCEDURE — 25000132 ZZH RX MED GY IP 250 OP 250 PS 637: Mod: GY | Performed by: ORTHOPAEDIC SURGERY

## 2018-06-13 PROCEDURE — 25000132 ZZH RX MED GY IP 250 OP 250 PS 637: Mod: GY | Performed by: PHYSICIAN ASSISTANT

## 2018-06-13 PROCEDURE — 83735 ASSAY OF MAGNESIUM: CPT | Performed by: PHYSICIAN ASSISTANT

## 2018-06-13 PROCEDURE — 40000133 ZZH STATISTIC OT WARD VISIT: Performed by: OCCUPATIONAL THERAPIST

## 2018-06-13 PROCEDURE — 97535 SELF CARE MNGMENT TRAINING: CPT | Mod: GO | Performed by: OCCUPATIONAL THERAPIST

## 2018-06-13 PROCEDURE — 25000125 ZZHC RX 250: Performed by: PHYSICIAN ASSISTANT

## 2018-06-13 PROCEDURE — 25000128 H RX IP 250 OP 636: Performed by: PHYSICIAN ASSISTANT

## 2018-06-13 PROCEDURE — A9270 NON-COVERED ITEM OR SERVICE: HCPCS | Mod: GY | Performed by: INTERNAL MEDICINE

## 2018-06-13 PROCEDURE — A9270 NON-COVERED ITEM OR SERVICE: HCPCS | Mod: GY | Performed by: ORTHOPAEDIC SURGERY

## 2018-06-13 PROCEDURE — 85018 HEMOGLOBIN: CPT | Performed by: PHYSICIAN ASSISTANT

## 2018-06-13 RX ORDER — WARFARIN SODIUM 2.5 MG/1
2.5 TABLET ORAL
Status: COMPLETED | OUTPATIENT
Start: 2018-06-13 | End: 2018-06-13

## 2018-06-13 RX ORDER — BISACODYL 10 MG
10 SUPPOSITORY, RECTAL RECTAL ONCE
Status: COMPLETED | OUTPATIENT
Start: 2018-06-13 | End: 2018-06-13

## 2018-06-13 RX ADMIN — LEVOTHYROXINE SODIUM 75 MCG: 75 TABLET ORAL at 07:44

## 2018-06-13 RX ADMIN — BISACODYL 10 MG: 10 SUPPOSITORY RECTAL at 18:39

## 2018-06-13 RX ADMIN — ACETAMINOPHEN 975 MG: 325 TABLET, FILM COATED ORAL at 17:42

## 2018-06-13 RX ADMIN — MAGNESIUM HYDROXIDE: 400 SUSPENSION ORAL at 07:43

## 2018-06-13 RX ADMIN — ACETAMINOPHEN 975 MG: 325 TABLET, FILM COATED ORAL at 08:55

## 2018-06-13 RX ADMIN — GABAPENTIN 100 MG: 100 CAPSULE ORAL at 21:11

## 2018-06-13 RX ADMIN — CEFAZOLIN 1 G: 1 INJECTION, POWDER, FOR SOLUTION INTRAMUSCULAR; INTRAVENOUS at 01:56

## 2018-06-13 RX ADMIN — ONDANSETRON 4 MG: 4 TABLET, ORALLY DISINTEGRATING ORAL at 10:27

## 2018-06-13 RX ADMIN — Medication 1 MG: at 06:42

## 2018-06-13 RX ADMIN — SENNOSIDES AND DOCUSATE SODIUM 2 TABLET: 8.6; 5 TABLET ORAL at 21:11

## 2018-06-13 RX ADMIN — SENNOSIDES AND DOCUSATE SODIUM 2 TABLET: 8.6; 5 TABLET ORAL at 07:44

## 2018-06-13 RX ADMIN — ACETAMINOPHEN 975 MG: 325 TABLET, FILM COATED ORAL at 01:49

## 2018-06-13 RX ADMIN — WARFARIN SODIUM 2.5 MG: 2.5 TABLET ORAL at 17:42

## 2018-06-13 ASSESSMENT — ACTIVITIES OF DAILY LIVING (ADL): PREVIOUS_RESPONSIBILITIES: MEAL PREP;HOUSEKEEPING;LAUNDRY;SHOPPING;MEDICATION MANAGEMENT;YARDWORK;FINANCES

## 2018-06-13 NOTE — PROGRESS NOTES
Orthopaedics Progress Note  Patient Name: Jigna Allen  Date of Service: 6/13/2018  Hospital Day: 1    Subjective:  No acute events overnight. Patient resting in bed this AM with well-controlled pain. Denies n/v/f/c.    Objective:  Vitals (last 24 hours):  Temp:  [97.2  F (36.2  C)-99.7  F (37.6  C)] 97.2  F (36.2  C)  Pulse:  [73] 73  Heart Rate:  [55-86] 55  Resp:  [10-18] 15  BP: (105-151)/(51-81) 135/55  SpO2:  [81 %-98 %] 94 %    Physical Exam  General: NAD, AOx3  CV: Regular rate  Pulm: Non-labored respirations  MSK:   RLE: Surgical site dressing c/d/i, no surround erythema, 2+ DP pulses b/l, SILT s/s/sp/dp/t, fires ehl/fhl/ta/gcs    Labs (last 24 hours):  Hemoglobin   Date Value Ref Range Status   06/05/2018 14.3 11.7 - 15.7 g/dL Final       WBC   Date Value Ref Range Status   06/05/2018 4.8 4.0 - 11.0 10e9/L Final       Last Basic Metabolic Panel:  Lab Results   Component Value Date     06/05/2018      Lab Results   Component Value Date    POTASSIUM 3.7 06/05/2018     Lab Results   Component Value Date    CHLORIDE 102 06/05/2018     Lab Results   Component Value Date    RAVEN 8.8 06/05/2018     Lab Results   Component Value Date    CO2 31 06/05/2018     Lab Results   Component Value Date    BUN 16 06/05/2018     Lab Results   Component Value Date    CR 0.73 06/05/2018     Lab Results   Component Value Date    GLC 81 06/05/2018       Assessment:  Jigna Allen is a 83 year old female s/p right total knee arthroplasty by Dr. Gomez on 6/12 .     Plan:    Activity: Up with assistance. Knee ROM as tolerated.    Weight bearing status: WBAT    Antibiotics: Ancef x 24 hours     Pain control: orals prn, IV for breakthrough    DVT Prophylaxis: Warfarin x 4 weeks and mechanical    Drains: 340 cc out last shift, likely remove POD#1-2     Dressing: Change POD#1    Diet: ADAT    PT/OT    Consults: IM    Follow-up with Dr. Gomez. Patient to call to arrange appointments.     Discharge pending PT/OT, pain control and  medical clearance likely tomorrow vs. friday      This patient was seen in conjunction with Dr. Thanh Thorne, PGY-4.     Michael Gaona, MS4  Medical Student      I have personally seen the patient and agree with the above note.    Thanh Thorne MD  PGY-4, Orthopaedic Surgery  [phone]

## 2018-06-13 NOTE — PLAN OF CARE
"Problem: Patient Care Overview  Goal: Plan of Care/Patient Progress Review  Discharge Planner PT   Patient plan for discharge: Pt wants TCU stay for recovery  Current status: Feeling \"off\". Took a couple of steps in the room with the wheeled walker. Moderate pain. Assist of one. Reinforced she is to ask for help with transfers. Encouraged up to chair later.  BP was 120/55. AROM 12-70, weakness with right DF yet.   Barriers to return to prior living situation: Pt states her  will not be able to provide much care.   Recommendations for discharge: TCU  Rationale for recommendations: PT maximizing independence with gait and transfers. Strengthening and ROM to increase independence with mobility.        Entered by: Lizzeth Holland 06/13/2018 9:31 AM           "

## 2018-06-13 NOTE — OP NOTE
OPERATION REPORT     DATE OF OPERATION: 6/12/2018     SURGEON: He Gomez MD.     ASSISTANT: Yovany Vaugnh, Fellow, CHELSEA Guadalupe    ANESTHESIOLOGIST: Anesthesiologist: Patience Medellin MD  CRNA: Marina Hughes APRN CRNA; Monae Larsen APRN CRNA    ANESTHESIA: Spinal    PREOPERATIVE DIAGNOSIS: Right knee degenerative joint disease.     POSTOPERATIVE DIAGNOSIS: Right knee degenerative joint disease.     OPERATION(S) PERFORMED: Right total knee arthroplasty.     BACKGROUND:  Patient with end-stage DJD of the Right knee related to prior mass and radiation with significant medial tibial collapse.  She has failed non operative treatment including injections, PT and NSAIDS, and has elected for TKA.     OPERATIVE FINDINGS: Large medial tibial defect requiring the use of revision instrumentation     OPERATIVE PROCEDURE: After informed consent was obtained, the patient brought to operating room on 6/12/2018. Spinal and Block were performed by the department of anesthesiology. The patient was sedated and then placed supine on the OR table with all bony prominences appropriately padded. A nonsterile tourniquet was placed as proximal as possible. Surgical pause was instituted with agreement between surgeon anesthesia and nursing that the right knee was the correct operative site.  The operative lower extremity was prepped and draped in usual sterile fashion.  After multidisciplinary time out was performed, esmarch was used for exsanguination. Tourniquet was elevated to 250 mmHg. Midline incision was made, went sharply through skin and subcutaneous tissues. Electrocautery was used to obtain hemostasis. A full-thickness flap was developed down to the retinaculum., an arthrotomy was then made through the quadriceps tendon exstending along the medial border of the patella and patellar tending. A medial release was performed, revealing a large defect of the posteromedial tibial plateau.   The MCL was compentent but tenuous given the degree of eleni loss.   As such the decision was made to proceed with revision stemmed implants with the use of a varus/valgus constrained polyethylene.  The ACL was cut from its tibial attachment.  The knee was brought into flexion where the femoral alignment jeannine was placed.  After confirming it was 5 degrees of valus, the femoral cut was made with a 8 mm cut.  Attention was then turned to the tibia.  An intramedullary guide was placed, and checked to be of the appropriate level.  The proximal Tibia was then cut.  Once this was performed the extension gap was checked and confirmed to be equal, but inadequate resection was performed.  Both the femur and the tibia were recut, to obtain adequate space for implants. We returned to the femur, utilizing the posterior referencing guide to size the femur and place the cutting block.  After confirming this was correctly placed in 3 degrees of external rotation the remaining femoral cuts were made.  The remaining osteophytes were debrided off of the femor with a rongeur.  Lamina spreaders were placed and the remaining meniscus was debrided.  The femoral and tibial stems were then prepared for with their respective offsets, and trials were placed and confirmed to of appropriate size.  Stability of the knee was noted in full extension as well as varying degrees of flexion.  The knee obtained full extension as well as 120 deg of flexion with femoral drop test.   After this the patella was cut, and the patellar button placed.   After confirmation of appropriate tracking, the trial components were removed.  The large medial defect was reconstructed using autograft bone from the femoral cuts, and 2 3.5 mm partially threaded screws for stability.  A saw was then used to plane the eleni reconstruction and a roungour used to contour the bone to the existing medial tibial plateau. The knee was washed with pulse irrigation and prepped for  final implantation.  The proximal aspect of the tibia and femur were coated with cement, and malleted into place with press fit of the stemmed components. Care was taken to remove all excess cement. The final polyethylene component was placed and confirmed to be locked in the tibial tray. The touniquet was released, and meticulous hemostasis was obtained.  The wound was copiously irrigated with normal saline irrigation.  The quadricepts tendon and patellar retinaculum were closed with # 0 vicryl. The skin was closed with a 2-0 vicryl deep dermal and a 3-0 running pds for the skin layer..     Wounds were dressed in usual sterile fashion. The patient was awakened from sedation and sent to PACU in stable condition.     Modifier: please apply a modifier 22 to this case given the history of radiation with extension bone loss of the medial tibial plateau requiring revision implants and additional time and effort to perform. Surgery duration was 3 hours 3 minutes which is >50% over that normally associated with this procedure.    IMPLANTS:    Implant Name Type Inv. Item Serial No.  Lot No. LRB No. Used   BONE CEMENT SIMPLEX FULL DOSE 6191-1-001 Cement, Bone BONE CEMENT SIMPLEX FULL DOSE 6191-1-001  [employer] [id number] Right 3   IMP SCR SYN CANC 4.0X35MM PART THRD .035 Metallic Hardware/Adamant IMP SCR SYN CANC 4.0X35MM PART THRD .035  [employer]  Right 2   IMP INSERT BASEPLATE TIBIAL HOWM TRI 4 5521-B-400 Total Joint Component/Insert IMP INSERT BASEPLATE TIBIAL HOWM TRI 4 5521-B-400  [employer] [id number] Right 1   IMP COMP FEM STRK TRIATHLN TS SZ 4 RT 5512-F-402 Total Joint Component/Insert IMP COMP FEM STRK TRIATHLN TS SZ 4 RT 5512-F-402  [employer] [id number] Right 1   IMP ADAPTER FEM STRK TRIATHLN 4MM OFFSET 5570-S-040 Total Joint Component/Insert IMP ADAPTER FEM STRK TRIATHLN 4MM OFFSET 5570-S-040  [employer] [id number] Right 1   IMP STEM FEM STRK PRESS FIT TRIATHLN  [id number] 5565-S-017 Total Joint Component/Insert IMP STEM FEM STRK PRESS FIT TRIATHLN [id number] 5565-S-017  [employer] [id number] Right 1   [place] Triathlon Offset Adapter    [employer] [id number] Right 1   [place] Triathlon Fluted Stem jose manuel 11mm lnth 150mm typ TS    [employer] [id number] Right 1   IMP COMP PATELLA HOWM TRI ASYM [id number] 5551-G-320 Total Joint Component/Insert IMP COMP PATELLA HOWM TRI ASYM [id number] 5551-G-320  [employer] [id number] Right 1   IMP INSERT TIBIAL ARTICULAR STRK TRI X3 SZ 4 11MM 5537-G-411 Total Joint Component/Insert IMP INSERT TIBIAL ARTICULAR STRK TRI X3 SZ 4 11MM 5537-G-411   [employer] [id number] Right 1         ESTIMATED BLOOD LOSS: 150 mL.     TOURNIQUET TIME: 120 min    POSTOPERATIVE PLAN: is standard total knee protocol. Coumadin for dvt prophylaxis.        Signed:  Yovany Vaughn 6/12/2018 8:31 PM

## 2018-06-13 NOTE — PLAN OF CARE
Problem: Patient Care Overview  Goal: Individualization & Mutuality  Pt A&O x's 4. VSS. Afebrile. 02 sats in mid  90s on 2 LPM, 02 reduced to 1.5.  Lungs clear. Denies SOB, CP but was slightly nauseated after tylenol. Pt declined antiemetic meds. Saltine and ginger ale offered and pt tolerated well.   Bowel sound active in all quadrants. No BM but passing gas. Vera patent and draining adequately. Vera will come out this morning. Pain well managed with tylenol and ice pack. Pt declined any other pain meds. Pt educated that to call if started to have pain and not to wait too long.  CMS intact, denies N/T. Right knee dressing clean, dry and intact. Pt on CPM at 0-40. Foot pump and pcd on.  PIV patent and infusing.  Pt sleeping between care and is able to make needs known, call light with in reach. Will continue to monitor.       Vera removed this morning @ 0630. Pt stood at the side of the bed and tolerated well.

## 2018-06-13 NOTE — PROGRESS NOTES
"[facility] Internal Medicine Progress Note            Interval History:   Record reviewed.  Seen with RN.  Adequate  pain control with low dose dilaudid 1mg.  Tolerating except for issue of mild nausea earlier potentially related to mobilization.  Up to BR and in room without LH.  No CP, SOB, cough, reflux, abd pain.  BM 3 days ago.  Voiding Ok.            Medications:   All medications reviewed today          Physical Exam:   Blood pressure 132/52, pulse 73, temperature 98.4  F (36.9  C), temperature source Oral, resp. rate 16, height 1.575 m (5' 2\"), weight 54.8 kg (120 lb 13 oz), SpO2 96 %, not currently breastfeeding.    Intake/Output Summary (Last 24 hours) at 06/13/18 1459  Last data filed at 06/13/18 0624   Gross per 24 hour   Intake              350 ml   Output             3770 ml   Net            -3420 ml       General:  Alert.  Appropriate.  No distress.  1-1.5 liters O2 overnight.    Heent:      Neck:    Skin:    Chest:  clear    Cardiac:  Reg without gallop, 1/V1 BOB right 2nd ICS without radiation to carotids.   No JVD.     Abdomen:  Non distended, soft, non-tender.  BS normal.     Extremities:  Perfused.  No edema, calf, posterior thigh tenderness to suggest DVT.     Neuro:            Data:     Results for orders placed or performed during the hospital encounter of 06/12/18 (from the past 24 hour(s))   Internal Medicine Adult IP Consult for [facility]: Patient to be seen: Routine within 24 hrs; Call back #: [phone]; Perioperative medical management; Consultant may enter orders: Yes    Narrative    Yesenia Angulo APRN CNP     6/12/2018  5:53 PM    Internal Medicine Consult - Initial Visit       Jigna Allen MRN# [id number]   YOB: 1935 Age: 83 year old   Date of Admission: 6/12/2018  PCP: Elisabeth Ko  Date of Service: 6/12/2018    Referring Provider: He Gomez MD  Reason for Consult: Perioperative medical management          Assessment and " "Recommendations:   Jigna Allen is a 83 year old female with a history of   osteoarthritis, hypothyroidism, 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     # S/p R total knee replacement - POD #0.  Preop Hgb 14.3.  EBL   150cc.  Currently denies pain.    - Agree w/ scheduled Tylenol, Gabapentin, and low dose Oxycodone   PRN for pain control   - Hgb, INR in am   - Further management, AC, pain control per Ortho      # Hypothyroidism - Currently on Synthroid 75mcg. Last TSH 1.07 on   5/10/18.   - Continue PTA Synthroid     # Constipation - Bowel regimen with senna docusate and PRN   Miralax.      # Hx high grade pleomorphhic sarcoma (2009) - Follows with   Oncology (Dr. Bryan) annually, last seen on 5/15/18.  S/p   radiation and resection (2010) c/b staph infection and deformity   of RLE.     - No current issues     # Anxiety - Stable. Not currently on anxiolytics.     Recommendations reviewed with attending physician Dr. Hi.     Yesenia Angulo CNP  Hospitalist Service   Pager: [phone]       Reason for Visit:   Perioperative medical management          History of Present Illness:   History is obtained from the patient and medical record.     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     Internal Medicine service was asked to see patient for   perioperative medical management.  Jigna is resting in bed,    is at bedside.  She denies pain this afternoon.  Had been   managing her arthritis pain at home with Tylenol, though reports   not taking over the last week at least.  Typically very active   prior to worsening arthritis.  Reports that her R leg had started   to \"bow\" following tumor resection, but pain " was never   significant.  No nausea, vomiting, or abdominal pain following   surgery today.      Jigna reports a history of hypothyroidism.  She has no underlying   cardiovascular disease, including MI or HTN.  Reports having a   pessary in place.  No issues with UTI or dysuria.  She currently   denies chest pain, dyspnea, abdominal pain, fevers, nausea,   vomiting, lightheadedness, and leg pain.            Review of Systems:   A 10 point ROS was performed and negative unless otherwise noted   in HPI.           Past Medical History:   Reviewed and updated in Epic.  Past Medical History:   Diagnosis Date     Anxiety      Arthritis 1990?     Complication of anesthesia     slow to wakeup     Hypothyroid      Sarcoma (H) 2009             Past Surgical History:   Reviewed and updated in Epic.  Past Surgical History:   Procedure Laterality Date     CARPAL TUNNEL RELEASE RT/LT       excise sarcoma Right     right leg     TUBAL LIGATION               Social History:   Reviewed and updated in [place].  Social History     Social History     Marital status:      Spouse name: N/A     Number of children: N/A     Years of education: N/A     Occupational History     Not on file.     Social History Main Topics     Smoking status: Never Smoker     Smokeless tobacco: Never Used     Alcohol use Yes      Comment: rarely     Drug use: No     Sexual activity: Yes     Partners: Male     Birth control/ protection: Post-menopausal     Other Topics Concern     Not on file     Social History Narrative              Family History:   Reviewed and updated in Epic.  Family History   Problem Relation Age of Onset     CANCER Father      Prostate Cancer Father      Other Cancer Father      CANCER Brother              Allergies:     Allergies   Allergen Reactions     Colchicine      Other reaction(s): Gastrointestinal  diarrhea     Niacin      skin rash     Ferrous Sulfate Rash             Medications:     Current Facility-Administered  "Medications   Medication     [START ON 6/15/2018] acetaminophen (TYLENOL) tablet 650 mg     acetaminophen (TYLENOL) tablet 975 mg     [START ON 6/13/2018] bisacodyl (DULCOLAX) Suppository 10 mg     ceFAZolin (ANCEF) 1 g vial to attach to  ml bag for ADULT   or 50 ml bag for PEDS     gabapentin (NEURONTIN) capsule 100 mg     HYDROmorphone (PF) (DILAUDID) injection 0.2-0.4 mg     lactated ringers infusion     lactated ringers injection     levothyroxine (SYNTHROID/LEVOTHROID) tablet 75 mcg     lidocaine (LMX4) kit     lidocaine 1 % 1 mL     magnesium hydroxide (MILK OF MAGNESIA) suspension 15 mL     naloxone (NARCAN) injection 0.1-0.4 mg     naloxone (NARCAN) injection 0.1-0.4 mg     ondansetron (ZOFRAN-ODT) ODT tab 4 mg    Or     ondansetron (ZOFRAN) injection 4 mg     oxyCODONE IR (ROXICODONE) half-tab 2.5-5 mg     prochlorperazine (COMPAZINE) injection 5 mg    Or     prochlorperazine (COMPAZINE) tablet 5 mg     senna-docusate (SENOKOT-S;PERICOLACE) 8.6-50 MG per tablet 1   tablet    Or     senna-docusate (SENOKOT-S;PERICOLACE) 8.6-50 MG per tablet 2   tablet     sodium chloride (PF) 0.9% PF flush 3 mL     sodium chloride (PF) 0.9% PF flush 3 mL     Warfarin Therapy Reminder (Check START DATE - warfarin may be   starting in the FUTURE)            Physical Exam:   Blood pressure 125/57, pulse 73, temperature 97.2  F (36.2  C),   temperature source Oral, resp. rate 15, height 1.575 m (5' 2\"),   weight 54.8 kg (120 lb 13 oz), SpO2 92 %, not currently   breastfeeding.  Body mass index is 22.1 kg/(m^2).    GENERAL: Alert and oriented x 3. Well nourished, well developed.    Thin body habitus.  No acute distress.    HEENT: Normocephalic, atraumatic. Anicteric sclera. Mucous   membranes moist.   CV: RRR. S1, S2. No murmurs appreciated.   RESPIRATORY: Effort normal on 2L NC. Lungs CTAB with no wheezing,   rales, or rhonchi.   GI: Abdomen soft and non distended, bowel sounds present x all 4   quadrants. No tenderness, " rebound, or guarding.   NEUROLOGICAL: No focal deficits. Follows commands.  Strength 5/5   in upper extremities. Sensation globally intact.   MUSCULOSKELETAL: No joint swelling or tenderness. Wiggles toes on   both feet.    EXTREMITIES: No obvious peripheral edema.   SKIN: Grossly warm, dry, and intact. No jaundice. No rashes.             Data:   I personally reviewed the following studies:    ROUTINE IP LABS (Last four results)  CMP No lab results found in last 7 days.  CBC No lab results found in last 7 days.  INR No lab results found in last 7 days.            Unresulted Labs Ordered in the Past 30 Days of this Admission     No orders found from 4/13/2018 to 6/13/2018.            INR   Result Value Ref Range    INR 1.00 0.86 - 1.14   Hemoglobin   Result Value Ref Range    Hemoglobin 11.5 (L) 11.7 - 15.7 g/dL   INR   Result Value Ref Range    INR 1.09 0.86 - 1.14   Basic metabolic panel   Result Value Ref Range    Sodium 141 133 - 144 mmol/L    Potassium 5.0 3.4 - 5.3 mmol/L    Chloride 105 94 - 109 mmol/L    Carbon Dioxide 32 20 - 32 mmol/L    Anion Gap 4 3 - 14 mmol/L    Glucose 112 (H) 70 - 99 mg/dL    Urea Nitrogen 11 7 - 30 mg/dL    Creatinine 0.69 0.52 - 1.04 mg/dL    GFR Estimate 82 >60 mL/min/1.7m2    GFR Estimate If Black >90 >60 mL/min/1.7m2    Calcium 8.2 (L) 8.5 - 10.1 mg/dL   Magnesium   Result Value Ref Range    Magnesium 2.1 1.6 - 2.3 mg/dL   XR Tibia & Fibula Port Right 2 Views    Narrative    EXAM: XR TIBIA & FIBULA PORT RT 2 VW  6/13/2018 12:23 PM      HISTORY: s/p revision TKA;     COMPARISON: 5/3/2018, 6/12/2018    FINDINGS: AP and lateral views of the left leg.    No acute osseous abnormality.    Long stem total right knee arthroplasty. Femoral component  incompletely visualized. 2 screws through the posterior tibial  plateau. Postsurgical subcutaneous gas. Surgical drain partially  visualized.    Unchanged surgical clips projecting over the medial proximal right  leg. Posttraumatic changes of  the ankle.       Impression    IMPRESSION: Total right knee arthroplasty without evidence of hardware  complication. Femoral component incompletely visualized.    NINI BAIG MD (Joe)              Assessment and Plan:   1)  S/p R total knee replacement.  Adequate pain control with limited opiates.  Mobilizing slowly.  2)  Acute blood loss anemia, mild.  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  4)  Hypothyroid on replacement.  5)  Constipation.    PLAN:  1)  SL IV.  2)  IS, bowel meds/supp, mobilize, same pain regimen, coumadin. 3)  Trend labs.  Monitor clinically.  4)  Dispo - possible TCU 6/14.   Disposition Plan   Expected discharge in 1-2 days to TCU once pain, nausea controlled.      Entered: Fahad Hi 06/13/2018, 2:59 PM              Attestation:  I have reviewed today's vital signs, notes, medications, labs and imaging.     Fahad Hi MD

## 2018-06-13 NOTE — PLAN OF CARE
Problem: Patient Care Overview  Goal: Interdisciplinary Rounds/Family Conf  Outcome: Improving    VS: VSS, she is able to make her needs known   O2: She is on room air, lungs are clear   Output: She is urinating in good amounts   Last BM: 6/10/2018, was given a suppository at 1840   Activity: She has been sitting up in a chair at her bedside for most of the day and tolerating that well   Skin: Intact except for her surgical incision   Pain: minimal   CMS: Denies any numbness or tingling   Dressing: CDI   Diet: Regular tolerating it well, did have some nausea this morning, did get some zofran   LDA: JUAN R FU drain   Equipment: WalkerJUAN R drain   Plan: TCU   Additional Info:

## 2018-06-13 NOTE — PROGRESS NOTES
06/13/18 1146   Quick Adds   Type of Visit Initial Occupational Therapy Evaluation   Living Environment   Lives With spouse   Living Arrangements house   Home Accessibility stairs to enter home;stairs within home   Number of Stairs to Enter Home 1   Transportation Available car;family or friend will provide   Living Environment Comment one story house, a duplex : laundry and guest room is downstairs. No grab bars in the shower,, difficulty rising from built in bench   Self-Care   Dominant Hand right   Usual Activity Tolerance poor   Activity/Exercise/Self-Care Comment Pt reports previously independent with ADL at baseline   Functional Level Prior   Ambulation 1-->assistive equipment   Transferring 0-->independent   Toileting 0-->independent   Bathing 0-->independent   Dressing 0-->independent   Eating 0-->independent   Communication 0-->understands/communicates without difficulty   Swallowing 0-->swallows foods/liquids without difficulty   Cognition 0 - no cognition issues reported   Fall history within last six months no   Which of the above functional risks had a recent onset or change? none   Prior Functional Level Comment Furniture walked in the house, used cane outside, tooks stairs one at a time and used both railings, Rode a bike at the gym until about February   General Information   Onset of Illness/Injury or Date of Surgery - Date 06/12/18   Referring Physician He Gomez   Patient/Family Goals Statement To get more comfortable with moving around   Additional Occupational Profile Info/Pertinent History of Current Problem Jigna Allen is a 83 year old female s/p right total knee arthroplasty by Dr. Gomez on 6/12    Precautions/Limitations fall precautions   Weight-Bearing Status - LUE full weight-bearing   Weight-Bearing Status - RUE full weight-bearing   Weight-Bearing Status - LLE full weight-bearing   Weight-Bearing Status - RLE weight-bearing as tolerated   General Info Comments Activity: Ambulate  with assistance   Cognitive Status Examination   Orientation orientation to person, place and time   Level of Consciousness alert   Able to Follow Commands WNL/WFL   Personal Safety (Cognitive) WNL/WFL   Memory intact   Attention No deficits were identified   Organization/Problem Solving No deficits were identified   Executive Function No deficits were identified   Visual Perception   Visual Perception No deficits were identified   Sensory Examination   Sensory Quick Adds No deficits were identified   Pain Assessment   Patient Currently in Pain Yes, see Vital Sign flowsheet   Integumentary/Edema   Integumentary/Edema no deficits were identifed   Range of Motion (ROM)   ROM Comment B UE WFL   Strength   Strength Comments B UE WFL   Transfer Skill: Bed to Chair/Chair to Bed   Level of [place]: Bed to Chair stand-by assist   Physical Assist/Nonphysical Assist: Bed to Chair supervision   Weight-Bearing Restrictions full weight-bearing   Assistive Device - Transfer Skill Bed to Chair Chair to Bed Rehab Eval standard walker   Instrumental Activities of Daily Living (IADL)   Previous Responsibilities meal prep;housekeeping;laundry;shopping;medication management;yardwork;finances   Activities of Daily Living Analysis   Impairments Contributing to Impaired Activities of Daily Living pain;fear and anxiety   General Therapy Interventions   Planned Therapy Interventions ADL retraining;IADL retraining;home program guidelines;progressive activity/exercise   Clinical Impression   Criteria for Skilled Therapeutic Interventions Met yes, treatment indicated   OT Diagnosis decreased independence with ADL   Influenced by the following impairments pain, decreased activity tolerance   Assessment of Occupational Performance 3-5 Performance Deficits   Identified Performance Deficits dressing, grooming, bathing, toileting   Clinical Decision Making (Complexity) Low complexity   Therapy Frequency daily   Predicted Duration of Therapy  "Intervention (days/wks) 4 days   Anticipated Equipment Needs at Discharge shower chair;reacher   Anticipated Discharge Disposition Home with Assist;Transitional Care Facility   Risks and Benefits of Treatment have been explained. Yes   Patient, Family & other staff in agreement with plan of care Yes   Clinical Impression Comments Pt presents with decreased activity tolerance and post surgical pain in R LE, resulting in decreased independence with ADL.  Pt will benefit from skilled OT services to increase independence with ADL and provide education on compensatory techniques.   [facility] AM-PAC TM \"6 Clicks\"   2016, Trustees of [facility], under license to [employer].  All rights reserved.   6 Clicks Short Forms Daily Activity Inpatient Short Form   [facility] AM-PAC  \"6 Clicks\" Daily Activity Inpatient Short Form   1. Putting on and taking off regular lower body clothing? 4 - None   2. Bathing (including washing, rinsing, drying)? 2 - A Lot   3. Toileting, which includes using toilet, bedpan or urinal? 4 - None   4. Putting on and taking off regular upper body clothing? 4 - None   5. Taking care of personal grooming such as brushing teeth? 4 - None   6. Eating meals? 4 - None   Daily Activity Raw Score (Score out of 24.Lower scores equate to lower levels of function) 22   Total Evaluation Time   Total Evaluation Time (Minutes) 5     "

## 2018-06-13 NOTE — CONSULTS
"Social Work: Assessment with Discharge Plan    Patient Name:  Jigna Allen  :  1935  Age:  83 year old  MRN:  [id number]  Risk/Complexity Score:  Filed Complexity Screen Score: 3  Completed assessment with:  18  Presenting Information   Reason for Referral:  Discharge plan  Date of Intake:  2018  Referral Source:  8A IDT  Decision Maker:  Pt  Alternate Decision Maker:  Spouse Harley per policy as he is NOK    Living Situation:  House  Previous Functional Status:  Independent  Patient and family understanding of hospitalization:  Seeking TKA   Cultural/Language/Spiritual Considerations:  Buddhism, experiencing \"sandwhich generation\" as her 40 year old daughter terminated a pregnancy due to fetal issues, 42 y.o. Daughter is pregnant and expecting twins, 100 y.o. MIL is having increased need for support, refusal to move into Assisted Living  Adjustment to Illness:  Identifies family strength of experiencing resiliency, accepting of need for TCU to support health and  healing    Physical Health  Reason for Admission:  No diagnosis found.  Services Needed/Recommended:  TCU    Mental Health/Chemical Dependency  Diagnosis:  Adjustment disorder  Support/Services in Place:  Support from family, friends. Daughter is seeking therapy for bereavement support  Services Needed/Recommended:  Writer and pt discussed possible need for pt to have support from counseling, also discussed [facility] as resource for family and her 100 y.o. [facility]    Support System  Significant relationship at present time:  spouse  Family of origin is available for support:  yes  Other support available:  friends  Gaps in support system:  Active caretaker for her MIL  Patient is caregiver to:  None     Provider Information   Primary Care Physician:  Elisabeth Ko   [phone]   [address]  [address] [facility] [address]      :      Financial   Income Source:  Pension, social " security  Financial Concerns:  None noted  Insurance:    Payor/Plan Subscriber Name Rel Member # Group #   MEDICARE - MEDICARE F* FEDERICA SMITH N  [id number]       ATTN CLAIMS, [address]   [facility] FEDERICA SMITH N  [id number]      [address]       Discharge Plan   Patient and family discharge goal:  TCU-requested referrals to [facility], [facility]  Provided education on discharge plan:  YES  Patient agreeable to discharge plan:  YES  A list of Medicare Certified Facilities was provided to the patient and/or family to encourage patient choice. Patient's choices for facility are:  [facility] and [facility]  Will NH provide Skilled rehabilitation or complex medical:  YES  General information regarding anticipated insurance coverage and possible out of pocket cost was discussed. Patient and patient's family are aware patient may incur the cost of transportation to the facility, pending insurance payment: YES-transportation estimated costs from [facility]   Barriers to discharge:  Medical stability    Discharge Recommendations   Anticipated Disposition:  Facility:  TCU at [facility] or [facility]   Transportation Needs:  Family:  spouse to provide      Additional comments   Writer introduced role/reason for visit. Pt was receptive, and agrees with TCU referrals.  Her family has had previous experience with [facility] and they live close to [facility]. She will speak w/spouse about preference for facility. Writer offered to make additional referral to [facility], but pt prefers [facility] or [facility].    Pt shared with writer that she and her spouse are experiencing an additional stressful time due to the care needs of her 100 year old Mother-In-Law who lives in [place], [place]. Pts MIL needs additional care than what  care takers and pt's spouse can provide; but she is not willing to consider move to [facility] or SNF.  Additionally, pt's 40 y.o.  Daughter, who has experienced fertility issues recently became pregnant but terminated the pregnancy due to fetal health issues.  Pt's 42 y.o. Daughter became pregnant with twins at roughly same time. Pt identifies belief her daughters are resilient but that she worries about their relationship and how her 40 y.o. Daughter is experiencing grief. Pt identifies these situations are causing her emotional distress- pt has insight and verbalizes her emotional distress as situational and temporary. Pt identifies having support of friends but expresses hope her spouse will agree to move his mother into a facility soon. She also expresses finding comfort in her sidney.    Writer used Active Listening, education, supportive counseling that provided validation and reassurance for pt.     Writer called and spoke w/Admissions at [facility], referral information sent via Epic. Writer called and left voicemail message for [facility] and sent referral information via Epic. Await Assessment response. SW con't to follow    [facility] [phone] F: [phone]    [facility] [phone] F:[phone]

## 2018-06-13 NOTE — PLAN OF CARE
Problem: Patient Care Overview  Goal: Plan of Care/Patient Progress Review  Pt. admitted from PACU around 4pm . Pt. accompanied by family and arrived with personal belongings. Report was taken from PACU nurse,  pt. is A&Ox4. Capnography activated. VSS. 02 sats are 96 % on room air, lung sounds are clear bilaterally with both anterior and posterior. Bowel sounds are active in all 4 quadrants. CMS and Neuros are intact. Denies numbness and tingling in all extremities. ICE applied. Pt state pain is okay. Denies nausea, CP, SOB, lightheadedness, and dizziness. Pt is on aregular diet and tolerated well, dressing on right knee is C/D/I. Vera Catheter is patent and had clear yellow urine output. PIV is patent and infusing in LR at 75 ml/hr. PCDs are in placed, Pt. educated on use and purpose of the incentive spirometer. Pt. is oriented to the room and call light system and the call light is within reach. Continue to monitor patient as POC.

## 2018-06-13 NOTE — PROGRESS NOTES
06/13/18 0845   Quick Adds   Type of Visit Initial PT Evaluation   Living Environment   Lives With spouse   Living Arrangements house   Home Accessibility stairs to enter home;stairs within home   Number of Stairs to Enter Home 1  (1 step from the deck and 1 to enter the house, )   Number of Stairs Within Home (no need to access basement immediately, laundry)   Transportation Available car;family or friend will provide   Living Environment Comment one story house, a duplex : laundry and guest room are downstairs.   No grab bars in the shower,, difficulty rising from built in bench prior to surgery   Self-Care   Dominant Hand right   Usual Activity Tolerance poor   Current Activity Tolerance poor   Regular Exercise yes   Activity/Exercise Type strength training   Exercise Amount/Frequency daily   Equipment Currently Used at Home (uses cane if out but generally doesn't use cane in home)   Activity/Exercise/Self-Care Comment ADL's indpendent,  I asked  for help with showering and things but he didn't.    Functional Level Prior   Ambulation 1-->assistive equipment   Transferring 0-->independent   Toileting 0-->independent   Bathing 0-->independent   Dressing 0-->independent   Eating 0-->independent   Communication 0-->understands/communicates without difficulty   Swallowing 0-->swallows foods/liquids without difficulty   Cognition 0 - no cognition issues reported   Fall history within last six months no   Which of the above functional risks had a recent onset or change? none   Prior Functional Level Comment Furniture walked in the house, used cane outside, tooks stairs one at a time and used both railings,  Rode a bike at the gym until about February   General Information   Onset of Illness/Injury or Date of Surgery - Date 06/12/18   Referring Physician Dr. Gomez/ Wilner Carr PA-C   Patient/Family Goals Statement Pt wants to be able to walk for recreation   Pertinent History of Current Problem (include  personal factors and/or comorbidities that impact the POC) POD #1 rt TKA. 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  Pt states her spouse is busy with his 100 yr old mother and will not be available to assist consistently.    Cognitive Status Examination   Orientation orientation to person, place and time   Level of Consciousness alert   Follows Commands and Answers Questions 100% of the time   Personal Safety and Judgment intact   Memory intact   Cognitive Comment repeated instruction about not getting up by herself x2.    Pain Assessment   Patient Currently in Pain Yes, see Vital Sign flowsheet   Integumentary/Edema   Integumentary/Edema Comments intact dressing, no draiage   Posture    Posture Comments flexed at hip and knee in standing.    Range of Motion (ROM)   ROM Comment 0-12-70 AROM of the right knee, limited in DF to neutral,  left foot goes to approx 10   Strength   Strength Comments tested functionally:  Pt slightly weaker on the right foot compaired to left,  Pt is able to perform multiple SLR's independently on the right.  LLE is WFL,    Bed Mobility   Bed Mobility Comments supine ,<>sit with contact guard. Pt likes to try move by herself first.    Transfer Skills   Transfer Comments sit<>stand with cues for hand placement and contact guard, mildly impulsive,    Gait   Gait Comments Walked about 10 total with min A and ww.  Walks on her toe with hip and knee flexed. When cued to put heel down, she leans over.    Balance   Balance Comments decreased in standing, needs UE support due to pain and inability to move to flat foot  "position.    Sensory Examination   Sensory Perception Comments intact per pt   Modality Interventions   Planned Modality Interventions Cryotherapy   Clinical Impression   Criteria for Skilled Therapeutic Intervention yes, treatment indicated   PT Diagnosis impaired functional mobility s/p right TKA    Influenced by the following impairments pain,  decr balance,strength, ROM on RLE, pain,    Functional limitations due to impairments decr independence with gait, decr. tolerance for activity.    Clinical Presentation Stable/Uncomplicated   Clinical Presentation Rationale Pt is motivated to return to  increased activity,  h/o cancer   Clinical Decision Making (Complexity) Low complexity   Therapy Frequency` 2 times/day   Predicted Duration of Therapy Intervention (days/wks) 3 days   Anticipated Equipment Needs at Discharge (pt has a 3 wheeled walker and a cane)   Anticipated Discharge Disposition Transitional Care Facility   Risk & Benefits of therapy have been explained Yes   Patient, Family & other staff in agreement with plan of care Yes   [facility] TM \"6 Clicks\"   2016, Trustees of [facility], under license to [employer].  All rights reserved.   6 Clicks Short Forms Basic Mobility Inpatient Short Form   [facility]  \"6 Clicks\" V.2 Basic Mobility Inpatient Short Form   1. Turning from your back to your side while in a flat bed without using bedrails? 4 - None   2. Moving from lying on your back to sitting on the side of a flat bed without using bedrails? 3 - A Little   3. Moving to and from a bed to a chair (including a wheelchair)? 2 - A Lot   4. Standing up from a chair using your arms (e.g., wheelchair, or bedside chair)? 3 - A Little   5. To walk in hospital room? 2 - A Lot   6. Climbing 3-5 steps with a railing? 2 - A Lot   Basic Mobility Raw Score (Score out of 24.Lower scores equate to lower levels of function) 16     "

## 2018-06-13 NOTE — PLAN OF CARE
Problem: Patient Care Overview  Goal: Plan of Care/Patient Progress Review  Discharge Planner OT   Patient plan for discharge: Pt wanting TCU stay  Current status: Pt able to ambulate to bathroom and complete toilet transfer and ADL while standing at sink with SBA.  Therapist educating Pt on appropriate technique for ADL to increase independence  Barriers to return to prior living situation: Pain, limiting movement  Recommendations for discharge: Dual path of TCU or Home with assist pending progress and independence with ADL  Rationale for recommendations: Pt will benefit from skilled OT services to increase independence with ADL and provide education on compensatory techniques       Entered by: Jr Mckeon 06/13/2018 12:07 PM

## 2018-06-14 ENCOUNTER — APPOINTMENT (OUTPATIENT)
Dept: PHYSICAL THERAPY | Facility: CLINIC | Age: 83
DRG: 470 | End: 2018-06-14
Attending: ORTHOPAEDIC SURGERY
Payer: MEDICARE

## 2018-06-14 ENCOUNTER — APPOINTMENT (OUTPATIENT)
Dept: OCCUPATIONAL THERAPY | Facility: CLINIC | Age: 83
DRG: 470 | End: 2018-06-14
Attending: ORTHOPAEDIC SURGERY
Payer: MEDICARE

## 2018-06-14 LAB
ANION GAP SERPL CALCULATED.3IONS-SCNC: 8 MMOL/L (ref 3–14)
BUN SERPL-MCNC: 12 MG/DL (ref 7–30)
CALCIUM SERPL-MCNC: 8.1 MG/DL (ref 8.5–10.1)
CHLORIDE SERPL-SCNC: 105 MMOL/L (ref 94–109)
CO2 SERPL-SCNC: 30 MMOL/L (ref 20–32)
CREAT SERPL-MCNC: 0.67 MG/DL (ref 0.52–1.04)
GFR SERPL CREATININE-BSD FRML MDRD: 85 ML/MIN/1.7M2
GLUCOSE SERPL-MCNC: 133 MG/DL (ref 70–99)
HGB BLD-MCNC: 11 G/DL (ref 11.7–15.7)
INR PPP: 1.23 (ref 0.86–1.14)
POTASSIUM SERPL-SCNC: 4.2 MMOL/L (ref 3.4–5.3)
SODIUM SERPL-SCNC: 143 MMOL/L (ref 133–144)

## 2018-06-14 PROCEDURE — 25000132 ZZH RX MED GY IP 250 OP 250 PS 637: Mod: GY | Performed by: ORTHOPAEDIC SURGERY

## 2018-06-14 PROCEDURE — 80048 BASIC METABOLIC PNL TOTAL CA: CPT | Performed by: PHYSICIAN ASSISTANT

## 2018-06-14 PROCEDURE — 85018 HEMOGLOBIN: CPT | Performed by: PHYSICIAN ASSISTANT

## 2018-06-14 PROCEDURE — 40000133 ZZH STATISTIC OT WARD VISIT: Performed by: OCCUPATIONAL THERAPIST

## 2018-06-14 PROCEDURE — A9270 NON-COVERED ITEM OR SERVICE: HCPCS | Mod: GY | Performed by: ORTHOPAEDIC SURGERY

## 2018-06-14 PROCEDURE — 97530 THERAPEUTIC ACTIVITIES: CPT | Mod: GP | Performed by: PHYSICAL THERAPIST

## 2018-06-14 PROCEDURE — 25000132 ZZH RX MED GY IP 250 OP 250 PS 637: Mod: GY | Performed by: INTERNAL MEDICINE

## 2018-06-14 PROCEDURE — 97530 THERAPEUTIC ACTIVITIES: CPT | Mod: GO | Performed by: OCCUPATIONAL THERAPIST

## 2018-06-14 PROCEDURE — A9270 NON-COVERED ITEM OR SERVICE: HCPCS | Mod: GY | Performed by: INTERNAL MEDICINE

## 2018-06-14 PROCEDURE — 85610 PROTHROMBIN TIME: CPT | Performed by: PHYSICIAN ASSISTANT

## 2018-06-14 PROCEDURE — A9270 NON-COVERED ITEM OR SERVICE: HCPCS | Mod: GY | Performed by: PHYSICIAN ASSISTANT

## 2018-06-14 PROCEDURE — 97535 SELF CARE MNGMENT TRAINING: CPT | Mod: GO | Performed by: OCCUPATIONAL THERAPIST

## 2018-06-14 PROCEDURE — 97116 GAIT TRAINING THERAPY: CPT | Mod: GP | Performed by: PHYSICAL THERAPIST

## 2018-06-14 PROCEDURE — 99232 SBSQ HOSP IP/OBS MODERATE 35: CPT | Performed by: INTERNAL MEDICINE

## 2018-06-14 PROCEDURE — 12000001 ZZH R&B MED SURG/OB UMMC

## 2018-06-14 PROCEDURE — 25000132 ZZH RX MED GY IP 250 OP 250 PS 637: Mod: GY | Performed by: PHYSICIAN ASSISTANT

## 2018-06-14 PROCEDURE — 40000193 ZZH STATISTIC PT WARD VISIT: Performed by: PHYSICAL THERAPIST

## 2018-06-14 PROCEDURE — 36415 COLL VENOUS BLD VENIPUNCTURE: CPT | Performed by: PHYSICIAN ASSISTANT

## 2018-06-14 PROCEDURE — 97110 THERAPEUTIC EXERCISES: CPT | Mod: GP | Performed by: PHYSICAL THERAPIST

## 2018-06-14 RX ORDER — WARFARIN SODIUM 2.5 MG/1
2.5 TABLET ORAL
Status: COMPLETED | OUTPATIENT
Start: 2018-06-14 | End: 2018-06-14

## 2018-06-14 RX ORDER — BISACODYL 10 MG
10 SUPPOSITORY, RECTAL RECTAL DAILY
Qty: 30 SUPPOSITORY | DISCHARGE
Start: 2018-06-14 | End: 2018-06-15

## 2018-06-14 RX ORDER — AMOXICILLIN 250 MG
1 CAPSULE ORAL 2 TIMES DAILY
Qty: 100 TABLET | Refills: 0 | DISCHARGE
Start: 2018-06-14 | End: 2020-04-14

## 2018-06-14 RX ORDER — BISACODYL 10 MG
10 SUPPOSITORY, RECTAL RECTAL ONCE
Status: COMPLETED | OUTPATIENT
Start: 2018-06-14 | End: 2018-06-14

## 2018-06-14 RX ORDER — ACETAMINOPHEN 500 MG
1000 TABLET ORAL EVERY 6 HOURS
Status: DISCONTINUED | OUTPATIENT
Start: 2018-06-14 | End: 2018-06-15 | Stop reason: HOSPADM

## 2018-06-14 RX ORDER — POLYETHYLENE GLYCOL 3350 17 G/17G
17 POWDER, FOR SOLUTION ORAL DAILY
Status: DISCONTINUED | OUTPATIENT
Start: 2018-06-14 | End: 2018-06-15 | Stop reason: HOSPADM

## 2018-06-14 RX ORDER — ACETAMINOPHEN 500 MG
1000 TABLET ORAL EVERY 6 HOURS
DISCHARGE
Start: 2018-06-14 | End: 2018-06-15

## 2018-06-14 RX ORDER — POLYETHYLENE GLYCOL 3350 17 G/17G
17 POWDER, FOR SOLUTION ORAL DAILY
Qty: 7 PACKET | Refills: 0 | DISCHARGE
Start: 2018-06-14 | End: 2020-04-14

## 2018-06-14 RX ADMIN — SENNOSIDES AND DOCUSATE SODIUM 2 TABLET: 8.6; 5 TABLET ORAL at 21:46

## 2018-06-14 RX ADMIN — ACETAMINOPHEN 1000 MG: 500 TABLET ORAL at 16:11

## 2018-06-14 RX ADMIN — ACETAMINOPHEN 975 MG: 325 TABLET, FILM COATED ORAL at 07:56

## 2018-06-14 RX ADMIN — WARFARIN SODIUM 2.5 MG: 2.5 TABLET ORAL at 17:18

## 2018-06-14 RX ADMIN — ACETAMINOPHEN 1000 MG: 500 TABLET ORAL at 21:46

## 2018-06-14 RX ADMIN — SENNOSIDES AND DOCUSATE SODIUM 2 TABLET: 8.6; 5 TABLET ORAL at 07:55

## 2018-06-14 RX ADMIN — LEVOTHYROXINE SODIUM 75 MCG: 75 TABLET ORAL at 06:10

## 2018-06-14 RX ADMIN — Medication 1 MG: at 08:33

## 2018-06-14 RX ADMIN — POLYETHYLENE GLYCOL 3350 17 G: 17 POWDER, FOR SOLUTION ORAL at 16:11

## 2018-06-14 RX ADMIN — BISACODYL 10 MG: 10 SUPPOSITORY RECTAL at 16:11

## 2018-06-14 RX ADMIN — GABAPENTIN 100 MG: 100 CAPSULE ORAL at 21:46

## 2018-06-14 NOTE — PLAN OF CARE
Problem: Patient Care Overview  Goal: Individualization & Mutuality  Outcome: Improving  Patient A&O x4, lungs sound clear, Bowel sound active- passing gas, had small bowel movement last night after suppository, Denied CP, lightheadedness, dizziness, numbness, tingling and SOB, iv saline locked, drinking well and voiding spontaneously without difficulties, able to wiggle toes, dressing clean,dry and intact, CMS intact, pain tolerable and taking dilaudid 1 mg for pain, ice pack applied to right knee, incentive spirometer encouraged and done several times, up in chair for meal, demonstrates the ability to use call light appropriately, will continue to monitor patient.

## 2018-06-14 NOTE — PLAN OF CARE
Problem: Patient Care Overview  Goal: Plan of Care/Patient Progress Review  Outcome: Improving  Patient A/Ox4. VSS. Denies CP, SOB, dizziness/LH. LSCTA. +fl/BS, pt only had a very small BM after suppository - pt had some prune juice and will continue to try; pt not uncomfortable and said she might try an enema in the AM. Encouraged pt to try some natural ways as well. Voiding well in bathroom. CMS intact. Dressing to right knee CDI. Tolerating regular diet without NV. Activity level is good, pt up to EOB and bathroom. IV is SL. Pain rated as fairly well managed throughout shift, pt says pain only really limits her 'exercises.' Patient has demonstrated ability to call appropriately. Patient is resting with call light within reach. Will continue to monitor.

## 2018-06-14 NOTE — PROGRESS NOTES
"[facility] Internal Medicine Progress Note            Interval History:   Record reviewed. Discussed with RN.  Pain control in general adequate.    Again with mobilization notes mild LH and nausea.  No emesis.  Feels as though \"not clear\".  No CP, SOB, cough, reflux, abd pain.  Small hard BM with supp 6/13.    Voiding with urge incontinence (had PTA).          Medications:   All medications reviewed today          Physical Exam:   Blood pressure 120/53, pulse 73, temperature 98  F (36.7  C), temperature source Oral, resp. rate 16, height 1.575 m (5' 2\"), weight 54.8 kg (120 lb 13 oz), SpO2 97 %, not currently breastfeeding.    Intake/Output Summary (Last 24 hours) at 06/13/18 1459  Last data filed at 06/13/18 0624   Gross per 24 hour   Intake              350 ml   Output             3770 ml   Net            -3420 ml          General:  Alert.  Appropriate.  No distress.  Off O2.     Heent:      Neck:    Skin:    Chest:  clear    Cardiac:  Reg without gallop, 1/V1 BOB right 2nd ICS without radiation to carotids.   No JVD.     Abdomen:  Non distended, soft, non-tender.  BS normal.     Extremities:  Perfused.  No edema, calf, posterior thigh tenderness to suggest DVT.     Neuro:            Data:     Results for orders placed or performed during the hospital encounter of 06/12/18 (from the past 24 hour(s))   Hemoglobin   Result Value Ref Range    Hemoglobin 11.0 (L) 11.7 - 15.7 g/dL   INR   Result Value Ref Range    INR 1.23 (H) 0.86 - 1.14   Basic metabolic panel   Result Value Ref Range    Sodium 143 133 - 144 mmol/L    Potassium 4.2 3.4 - 5.3 mmol/L    Chloride 105 94 - 109 mmol/L    Carbon Dioxide 30 20 - 32 mmol/L    Anion Gap 8 3 - 14 mmol/L    Glucose 133 (H) 70 - 99 mg/dL    Urea Nitrogen 12 7 - 30 mg/dL    Creatinine 0.67 0.52 - 1.04 mg/dL    GFR Estimate 85 >60 mL/min/1.7m2    GFR Estimate If Black >90 >60 mL/min/1.7m2    Calcium 8.1 (L) 8.5 - 10.1 mg/dL              Assessment and Plan:   1)  S/p R " total knee replacement.  Pain control generally adequate with limited opiates (only 1 mg dilaudid this AM).   2)  Acute blood loss anemia, mild.  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  4)  Hypothyroid on replacement.  5)  Constipation.  Minimal results 6/13.   6)  Vague nausea, sense of decrease clarity potential opiate effect.     PLAN:  1)  DC Dilaudid.  ES tylenol 1000 mg q6h scheduled.    2)  IS, bowel meds/repeat supp/add miralax, mobilize, coumadin. 3)  Trend labs.  Check BP when up.  Monitor clinically.  4)  Dispo - probable TCU 6/15.   Disposition Plan   Expected discharge in 1 days to TCU.      Entered: Fahad Hi 06/14/2018, 2:34 PM              Attestation:  I have reviewed today's vital signs, notes, medications, labs and imaging.     Fahad Hi MD

## 2018-06-14 NOTE — PROGRESS NOTES
Orthopaedics Progress Note  Patient Name: Jigna Allen  Date of Service: 6/13/2018  Hospital Day: 2    Subjective:  No acute events overnight. Patient resting in bed this AM with well-controlled pain. Denies n/v/f/c.    Objective:  Vitals (last 24 hours):  Temp:  [97  F (36.1  C)-99.1  F (37.3  C)] 99.1  F (37.3  C)  Heart Rate:  [62-68] 68  Resp:  [14-16] 16  BP: (113-144)/(38-58) 114/38  SpO2:  [96 %-98 %] 96 %    Physical Exam  General: NAD, AOx3  CV: Regular rate  Pulm: Non-labored respirations  MSK:   RLE: Surgical site dressing c/d/i, no surround erythema, 2+ DP pulses b/l, SILT s/s/sp/dp/t, fires ehl/fhl/ta/gcs    Labs (last 24 hours):  Hemoglobin   Date Value Ref Range Status   06/13/2018 11.5 (L) 11.7 - 15.7 g/dL Final       WBC   Date Value Ref Range Status   06/05/2018 4.8 4.0 - 11.0 10e9/L Final       Last Basic Metabolic Panel:  Lab Results   Component Value Date     06/05/2018      Lab Results   Component Value Date    POTASSIUM 3.7 06/05/2018     Lab Results   Component Value Date    CHLORIDE 102 06/05/2018     Lab Results   Component Value Date    RAVEN 8.8 06/05/2018     Lab Results   Component Value Date    CO2 31 06/05/2018     Lab Results   Component Value Date    BUN 16 06/05/2018     Lab Results   Component Value Date    CR 0.73 06/05/2018     Lab Results   Component Value Date    GLC 81 06/05/2018       Assessment:  Jigna Allen is a 83 year old female s/p right total knee arthroplasty by Dr. Gomez on 6/12 .     Plan:    Activity: Up with assistance. Knee ROM as tolerated.    Weight bearing status: WBAT    Antibiotics: Ancef x 24 hours completed    Pain control: orals prn, IV for breakthrough    DVT Prophylaxis: Warfarin x 4 weeks and mechanical    Drains: 130 cc last shift, removed today.     Dressing: change prn    Diet: ADAT    PT/OT    Consults: IM    Follow-up with Dr. Gomez. Patient to call to arrange appointments.     Discharge pending PT/OT, pain control and medical clearance  likely to TCU .      This patient was seen in conjunction with Dr. Thanh Thorne, PGY-4.     iMchael Gaona, MS4  Medical Student      I have personally seen the patient and agree with the above note.    Thanh Thorne MD  PGY-4, Orthopaedic Surgery  [phone]

## 2018-06-14 NOTE — PROGRESS NOTES
Social Work Services Progress Note    Hospital Day: 3    Collaborated with:  Pt, admissions at [facility] [phone], Admissions at [facility] [phone]    Data:  DC plan    Intervention:  Writer met w/pt and provided information that pt has been accepted at [facility] (private room for $20/day) and [facility]- shared room. Pt likes location/convenience of [facility] but is not certain sharing room would be ideal. She will talk it over with her spouse and inform SW of her preference.     Writer provided education and information of Medicare A SNF benefits. Pt verbalized understanding.    Assessment:  pt remains agreeable to TCU placement    Plan:    Anticipated Disposition:  Home, no needs identified and [facility] vs. [facility]    Barriers to d/c plan:  Medical stability    Follow Up:  SW con't to follow    Addendum:  9529: Received vm from pt confirming that she wants the private bed at [facility]. Writer contacted facility and updated facility that pt wants the private room. Facility can accept after 1200 Friday. Writer contacted pt, via phone, and updated her that writer had confirmed private bed with facility. Pt reports  will be able to provide transport. SW to f/u w/ pt in the AM and confirm d/c time w/ pt and  and facility.    Addendum:  4799: Received msg from [facility] ([place] [phone]) that facility is willing to create a private room out of a double room at no additional charge to pt. Writer spoke to pt, via phone, and she is waiting for her  to arrive on the unit and will discuss w/ him. Writer left a msg for [facility] that pt needs to discuss with  and writer will get back to facility.    Addendum:  8574: Received call from pt that she had discussed w/ her  and they would now prefer the private room at [facility]. Writer updated [facility]  Home. [facility] will need to be updated in the AM.

## 2018-06-14 NOTE — PLAN OF CARE
Problem: Patient Care Overview  Goal: Plan of Care/Patient Progress Review  Discharge Planner PT   Patient plan for discharge:  Transitional care  Current status: Nausea with gait.  Walking with contact guard and FWW x 100'. Cues for foot flat in stance.  Transfers with min to SBA. AROM 0-9-90.  Barriers to return to prior living situation: level of assist, safety, limited endurance,    Recommendations for discharge: TCU  Rationale for recommendations: PT to address posture, ROM, strengthening, gait and maximize endurance for eventual safe return to home setting.         Entered by: Lizzeth Holland 06/14/2018 11:35 AM

## 2018-06-15 ENCOUNTER — APPOINTMENT (OUTPATIENT)
Dept: OCCUPATIONAL THERAPY | Facility: CLINIC | Age: 83
DRG: 470 | End: 2018-06-15
Attending: ORTHOPAEDIC SURGERY
Payer: MEDICARE

## 2018-06-15 ENCOUNTER — APPOINTMENT (OUTPATIENT)
Dept: PHYSICAL THERAPY | Facility: CLINIC | Age: 83
DRG: 470 | End: 2018-06-15
Attending: ORTHOPAEDIC SURGERY
Payer: MEDICARE

## 2018-06-15 VITALS
HEART RATE: 73 BPM | SYSTOLIC BLOOD PRESSURE: 120 MMHG | TEMPERATURE: 97.1 F | OXYGEN SATURATION: 99 % | WEIGHT: 120.81 LBS | RESPIRATION RATE: 16 BRPM | HEIGHT: 62 IN | DIASTOLIC BLOOD PRESSURE: 59 MMHG | BODY MASS INDEX: 22.23 KG/M2

## 2018-06-15 LAB — INR PPP: 1.27 (ref 0.86–1.14)

## 2018-06-15 PROCEDURE — 85610 PROTHROMBIN TIME: CPT | Performed by: PHYSICIAN ASSISTANT

## 2018-06-15 PROCEDURE — A9270 NON-COVERED ITEM OR SERVICE: HCPCS | Mod: GY | Performed by: PHYSICIAN ASSISTANT

## 2018-06-15 PROCEDURE — 97530 THERAPEUTIC ACTIVITIES: CPT | Mod: GO | Performed by: OCCUPATIONAL THERAPIST

## 2018-06-15 PROCEDURE — 40000133 ZZH STATISTIC OT WARD VISIT: Performed by: OCCUPATIONAL THERAPIST

## 2018-06-15 PROCEDURE — 40000193 ZZH STATISTIC PT WARD VISIT

## 2018-06-15 PROCEDURE — A9270 NON-COVERED ITEM OR SERVICE: HCPCS | Mod: GY | Performed by: INTERNAL MEDICINE

## 2018-06-15 PROCEDURE — 25000132 ZZH RX MED GY IP 250 OP 250 PS 637: Mod: GY | Performed by: INTERNAL MEDICINE

## 2018-06-15 PROCEDURE — 97530 THERAPEUTIC ACTIVITIES: CPT | Mod: GP

## 2018-06-15 PROCEDURE — 99232 SBSQ HOSP IP/OBS MODERATE 35: CPT | Performed by: INTERNAL MEDICINE

## 2018-06-15 PROCEDURE — 36415 COLL VENOUS BLD VENIPUNCTURE: CPT | Performed by: PHYSICIAN ASSISTANT

## 2018-06-15 PROCEDURE — 25000132 ZZH RX MED GY IP 250 OP 250 PS 637: Mod: GY | Performed by: PHYSICIAN ASSISTANT

## 2018-06-15 PROCEDURE — 97535 SELF CARE MNGMENT TRAINING: CPT | Mod: GO | Performed by: OCCUPATIONAL THERAPIST

## 2018-06-15 PROCEDURE — 97116 GAIT TRAINING THERAPY: CPT | Mod: GP

## 2018-06-15 RX ORDER — WARFARIN SODIUM 3 MG/1
TABLET ORAL
Qty: 30 TABLET | Refills: 0 | DISCHARGE
Start: 2018-06-15 | End: 2020-04-14

## 2018-06-15 RX ORDER — BISACODYL 10 MG
10 SUPPOSITORY, RECTAL RECTAL DAILY PRN
Qty: 30 SUPPOSITORY | DISCHARGE
Start: 2018-06-15 | End: 2018-07-09

## 2018-06-15 RX ORDER — WARFARIN SODIUM 3 MG/1
3 TABLET ORAL
Status: DISCONTINUED | OUTPATIENT
Start: 2018-06-15 | End: 2018-06-15 | Stop reason: HOSPADM

## 2018-06-15 RX ORDER — ACETAMINOPHEN 500 MG
1000 TABLET ORAL EVERY 6 HOURS PRN
Status: ON HOLD | DISCHARGE
Start: 2018-06-15 | End: 2020-04-21

## 2018-06-15 RX ADMIN — BISACODYL 10 MG: 10 SUPPOSITORY RECTAL at 09:05

## 2018-06-15 RX ADMIN — POLYETHYLENE GLYCOL 3350 17 G: 17 POWDER, FOR SOLUTION ORAL at 07:43

## 2018-06-15 RX ADMIN — SENNOSIDES AND DOCUSATE SODIUM 2 TABLET: 8.6; 5 TABLET ORAL at 07:43

## 2018-06-15 RX ADMIN — LEVOTHYROXINE SODIUM 75 MCG: 75 TABLET ORAL at 06:06

## 2018-06-15 RX ADMIN — ACETAMINOPHEN 1000 MG: 500 TABLET ORAL at 03:30

## 2018-06-15 RX ADMIN — ACETAMINOPHEN 1000 MG: 500 TABLET ORAL at 09:42

## 2018-06-15 RX ADMIN — SODIUM PHOSPHATE 1 ENEMA: 7; 19 ENEMA RECTAL at 11:23

## 2018-06-15 ASSESSMENT — PAIN DESCRIPTION - DESCRIPTORS: DESCRIPTORS: ACHING

## 2018-06-15 NOTE — PROGRESS NOTES
Social Work Services Discharge Note      Patient Name:  Jigna Allen     Anticipated Discharge Date:  6/15/18  Discharge Disposition:   TCU:  [facility] [phone] F: [phone]    Following MD:  Dr Dionicio Mcknight, TSERING Michael [phone]     Pre-Admission Screening (PAS) online form has been completed.  The Level of Care (LOC) is:  Determined  Confirmation Code is:  [id number]  Patient/caregiver informed of referral to [facility] for Pre-Admission Screening for skilled nursing facility (SNF) placement and to expect a phone call post discharge from SNF.     Additional Services/Equipment Arranged:  None noted     Patient / Family response to discharge plan:  agreeable     Persons notified of above discharge plan:  Pt, [facility], Dr Hi, 8A Charge RN Niya.    Staff Discharge Instructions:  Please fax discharge orders and signed hard scripts for any controlled substances.  Please print a packet and send with patient.     CTS Handoff completed:  YES    Medicare Notice of Rights provided to the patient/family:  YES

## 2018-06-15 NOTE — PROGRESS NOTES
Iv removed due to leakage , suppository and Miralax with no result, patient ambulated in [place] with PT, denied pain at this time.

## 2018-06-15 NOTE — DISCHARGE SUMMARY
ORTHOPAEDIC DISCHARGE SUMMARY     Date of Admission: 6/12/2018  Date of Discharge: 6/15/2018  Disposition: Rehab  Staff Physician: He Gomez MD  Primary Care Provider: Elisabeth Ko    DISCHARGE DIAGNOSIS:  Osteoarthritis Right Knee    PROCEDURES: Procedure(s):  ARTHROPLASTY KNEE on 6/12/2018    BRIEF HISTORY:  Hx of knee pain and OA    HOSPITAL COURSE:    Surgery was uncomplicated. Jigna Allen has done well post-operatively. Medicine was consulted post operatively to aid in management of medical comorbidities. See final recommendations below. The patient received routine nursing cares and is medically stable. Vital signs are stable. The patient is tolerating a regular diet without GI distress/nausea or vomiting. Voiding spontaneously. All PT/OT goals have been met for safe mobility. Pain is now controlled on oral medications which will be available on discharge. Stool softeners have been used while taking pain medications to help prevent constipation. Jigna Allen is deemed medically safe to discharge.     Antibiotics:   given periop and 24 hours postop.   DVT prophylaxis:  warfarin daily for 6 weeks  PT Progress: Has met PT/OT goals for safe mobility.    Pain Meds:  Weaned off all IV pain meds by discharge.  Inpatient Events: No significant events or complications.     Discharge orders and instructions as below.    FOLLOWUP:    Follow up with Dr. gomez at 4 weeks postoperatively.  Future Appointments  Date Time Provider Department Center   6/15/2018 10:00 AM Jr Mckeon, OT [facility] [place]   6/15/2018 10:30 AM Claribel oRmo, PT [facility] [place]   8[facility]28[facility]2018 10:40 [facility]   8/30/2018 3:30 PM Edouard Bryan MD [facility]   5/9/2019 9:00 AM [facility]   5/9/2019 11:00 AM Edouard Bryan MD [facility]       Appointments on [facility]. Call [phone] if you haven't heard regarding these appointments within 7 days of  discharge.    PLANNED DISCHARGE ORDERS:           Current Discharge Medication List      START taking these medications    Details   bisacodyl (DULCOLAX) 10 MG Suppository Place 1 suppository (10 mg) rectally daily as needed for constipation  Qty: 30 suppository    Comments: Use if 3 days and no BM  Associated Diagnoses: Status post knee surgery      polyethylene glycol (MIRALAX/GLYCOLAX) Packet Take 17 g by mouth daily  Qty: 7 packet, Refills: 0    Associated Diagnoses: Status post knee surgery      senna-docusate (SENOKOT-S;PERICOLACE) 8.6-50 MG per tablet Take 1 tablet by mouth 2 times daily  Qty: 100 tablet, Refills: 0    Associated Diagnoses: Status post knee surgery      warfarin (COUMADIN) 3 MG tablet Take 3 mg today, tomorrow. INR on Sunday. LOT 30 days. Facility MD to dose warfarin. 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 Gomez approximately four weeks after surgery.     Your follow up appointments will be at the location that you regularly see Dr. Gomez:    [facility]  [address] [place] [id number]  [phone]    Physical therapy:   A prescription for physical therapy will be provided at the time of discharge.      ACTIVITY  Weight bearing status:   You may bear weight on your operative extremity as tolerated, using assistive devices (walker, cane) as needed. As you begin to feel more comfortable ambulating, you may gradually transition from a walker to a cane. Eventually, you should wean from all assistive devices. Although we would like you to discontinue use of assistive devices as soon as possible, do not transition until you have worked with your physical therapist to achieve safe balance and comfort.     Continuous passive motion machine:   Perform CPM exercises for six to eight hours per day for the first four weeks after surgery. The CPM should be set at 0 degrees to flexion tolerance with a goal of 90 degrees. Advance the CPM settings aggressively in increments of 5 degrees every 30 minutes until goal is achieved.     Exercises:   Perform the following exercises at least three times per day for the first four weeks after surgery to prevent complications, such as blood clots in your legs:  1) Point and flex your feet  2) Move your ankle around in big circles  3) Wiggle your toes   Also, perform thigh muscle tightening exercises for 10 to 15 minutes at least three times per day for the first four weeks after surgery.    Athletic Activities:  Activities such as swimming, bicycling, jogging, running, and stop-and-go sports should be avoided until permitted by your provider.    Driving:  Driving is not permitted until directed by your provider. Typically, driving is restricted for three to four weeks after right knee surgery and three weeks after left knee surgery. Under no circumstance are you permitted to drive while using narcotic pain medications.    Return to Work:  You may return to work when directed by your provider. Typically,  patients with desk/sitting jobs can return to work within two weeks while patients with heavy labor jobs can return to work around three months after surgery.      COMFORT AND PAIN MANAGEMENT  Elevation:   During times of inactivity throughout the first two weeks after surgery, make an effort to decrease swelling by elevating your operative extremity. This is most effectively done by lying down and placing several pillows lengthwise under your thigh and calf to raise your toes above the level of your nose. To ensure that you knee remains in full extension, do not place pillows directly under your knee.     Icing:  An ice pack will be provided to control swelling and discomfort after surgery. Place a thin towel on your skin and apply the ice pack overtop. You may apply ice for 20 minutes as often as two times per hour.    Pain Medications:  You will be discharged with acetaminophen (Tylenol) and a narcotic medication for pain management after surgery. Acetaminophen can help to effectively manage pain when used as prescribed, however, do not exceed the maximum daily dose of 3000 mg. The narcotic pain medication should be reserved for severe, breakthrough pain. Take the narcotic medication as prescribed and use only as often as necessary.       ANTICOAGULATION  Take warfarin as prescribed for a total of four weeks after surgery. This medication will require weekly INR checks (INR goal 1.5-2.5).       WOUND CARE AND SHOWERING  Wound care:  Keep the dressing on, clean, and dry for the first five days after surgery. You may then remove the dressing and replace it with fresh 4x4s and tubigrip (or ACE wrap). Your surgical incision was closed with a clear knotless suture and tails were left at the margins of the incision. These tails can be left in place until your follow up appointment. The steri strips (small white tape that is directly on the incision areas) should be left in place until they fall off or are removed at  your first office visit.    Showering:  You may shower ten days after surgery provided your incision is intact and dry without drainage. If there is fluid at the incision site, cover the incision with a non-permeable plastic bag. You may allow water to run over the incision, but do not soak or submerge the incision. Do not scrub the incision.     Tub Bathing:  Tub bathing, swimming, or any other activities that cause your incision to be submerged should be avoided until allowed by your provider. Typically, patients are allowed to return to these activities four weeks after surgery.      CONTACTING YOUR PHYSICIAN:  You may experience symptoms that require follow-up before your scheduled appointment. Please contact Dr. Gomez s office if you experience:  1) Pain that persists or worsens in the first few days after surgery  2) Excessive redness or drainage of cloudy or bloody material from the wounds (clear red tinted fluid and some mild drainage should be expected) or drainage of any kind five days after surgery  3) A temperature elevation greater than 101.5    4) Pain, swelling or redness in your calf  5) Numbness or weakness in your leg or foot      Regular business hours (Monday - Friday, 8am - 5pm):  [facility]: [phone]    If you have any other concerns during normal business hours, please contact Dr. Gomez's nurse, Mere Cruz RN, at [phone] during normal business hours 8 am - 5 pm (leave message as needed). If your concern is urgent, please page Mere Cruz RN at [phone] and key in your 10 digit phone number followed by the # sign after the beep.     After hours and weekends:  [facility] on call Orthopedic resident: [phone]    If you have an emergent concern, contact the Emergency Department at the [facility] - [phone] - or [place] - [phone] - [place].     General info for SNF   Order Comments: Length of Stay  Estimate: Short Term Care: Estimated # of Days <30  Condition at Discharge: Improving  Level of care:skilled   Rehabilitation Potential: Excellent  Admission H&P remains valid and up-to-date: Yes  Recent Chemotherapy: N/A  Use Nursing Home Standing Orders: Yes     Mantoux instructions   Order Comments: Give two-step Mantoux (PPD) Per Facility Policy Yes     Reason for your hospital stay   Order Comments: Right TKA     Weight bearing status     Physical Therapy Adult Consult   Order Comments: Evaluate and treat as clinically indicated.    Reason:  Mobilization, tka protocol     Occupational Therapy Adult Consult   Order Comments: Evaluate and treat as clinically indicated.    Reason:  Mobilization tka protocol     Hip precautions     Diet   Order Comments: Return to your pre surgery diet   Order Specific Question Answer Comments   Is discharge order? Yes      Advance Diet as Tolerated   Order Comments: Follow this diet upon discharge: Regular   Order Specific Question Answer Comments   Is discharge order? Yes      Assign Questionnaire Series to Patient         Thanh Thorne MD  PGY-4 Orthopaedic Surgery    Discharge summary updated by me to reflect changes in plan/medication.

## 2018-06-15 NOTE — PLAN OF CARE
Problem: Patient Care Overview  Goal: Plan of Care/Patient Progress Review  Discharge Planner OT   Patient plan for discharge: TCU  Current status: Pt able to complete lower body dressing with SBA while seated on EOB, therapist providing cues for proper technique  Barriers to return to prior living situation: Decreased activity tolerance  Recommendations for discharge: TCU  Rationale for recommendations: Pt will benefit from skilled OT services to increase safety and independence with ADL       Entered by: Jr Mckeon 06/15/2018 10:46 AM     Occupational Therapy Discharge Summary    Reason for therapy discharge:    Discharged to transitional care facility.    Progress towards therapy goal(s). See goals on Care Plan in [facility] electronic health record for goal details.  Goals met    Therapy recommendation(s):    Continued therapy is recommended.  Rationale/Recommendations:  Pt will benefit from continued skilled OT services at TCU to increase safety and independence with ADL.

## 2018-06-15 NOTE — PROGRESS NOTES
Orthopaedics Progress Note  Patient Name: Jigna Allen  Date of Service: 6/13/2018  Hospital Day: 3    Subjective:  No acute events overnight. Patient resting in bed this AM with well-controlled pain. Denies n/v/f/c.    Objective:  Vitals (last 24 hours):  Temp:  [98  F (36.7  C)-98.3  F (36.8  C)] 98.3  F (36.8  C)  Heart Rate:  [71-80] 71  Resp:  [16] 16  BP: (120-148)/(48-53) 148/48  SpO2:  [97 %-98 %] 98 %    Physical Exam  General: NAD, AOx3  CV: Regular rate  Pulm: Non-labored respirations  MSK:   RLE: Surgical site dressing c/d/i, no surround erythema, 2+ DP pulses b/l, SILT s/s/sp/dp/t, fires ehl/fhl/ta/gcs    Labs (last 24 hours):  Hemoglobin   Date Value Ref Range Status   06/14/2018 11.0 (L) 11.7 - 15.7 g/dL Final       WBC   Date Value Ref Range Status   06/05/2018 4.8 4.0 - 11.0 10e9/L Final       Last Basic Metabolic Panel:  Lab Results   Component Value Date     06/05/2018      Lab Results   Component Value Date    POTASSIUM 3.7 06/05/2018     Lab Results   Component Value Date    CHLORIDE 102 06/05/2018     Lab Results   Component Value Date    RAVEN 8.8 06/05/2018     Lab Results   Component Value Date    CO2 31 06/05/2018     Lab Results   Component Value Date    BUN 16 06/05/2018     Lab Results   Component Value Date    CR 0.73 06/05/2018     Lab Results   Component Value Date    GLC 81 06/05/2018       Assessment:  Jigna Allen is a 83 year old female s/p right total knee arthroplasty by Dr. Gomez on 6/12 .     Plan:    Activity: Up with assistance. Knee ROM as tolerated.    Weight bearing status: WBAT    Antibiotics: Ancef x 24 hours completed    Pain control: orals prn, IV for breakthrough    DVT Prophylaxis: Warfarin x 4 weeks and mechanical    Drains: Removed 6/14     Dressing: change prn    Diet: ADAT    PT/OT    Consults: IM    Follow-up with Dr. Gomez. Patient to call to arrange appointments.     Discharge pending PT/OT, pain control and medical clearance likely to TCU  today.      This patient was seen in conjunction with Dr. Thanh Thorne, PGY-4.     Michael Gaona, MS4  Medical Student      I have personally seen the patient and agree with the above note.    Thanh Thorne MD  PGY-4, Orthopaedic Surgery  [phone]

## 2018-06-15 NOTE — PROGRESS NOTES
"[facility] Internal Medicine Progress Note            Interval History:   Record reviewed. Seen  with RN. Plan for DC to TCU today.  Transport by . Adequate pain control with ES tylenol.  Ambulatory without nausea or LH.  Improved mental clarity.    No CP, SOB, cough, reflux, abd pain, bloating.  Passing flatus.    Small hard (pea-sized) BM with supp 6/13.  Last prior BM 6/10.  No results with supp this AM.  LH if BR apparently related to straining.   Voiding with urge incontinence (had PTA).          Medications:   All medications reviewed today          Physical Exam:   Blood pressure 138/51, pulse 73, temperature 97.1  F (36.2  C), temperature source Oral, resp. rate 16, height 1.575 m (5' 2\"), weight 54.8 kg (120 lb 13 oz), SpO2 99 %, not currently breastfeeding.    Intake/Output Summary (Last 24 hours) at 06/13/18 1459  Last data filed at 06/13/18 0624   Gross per 24 hour   Intake              350 ml   Output             3770 ml   Net            -3420 ml          General:  Alert.  Appropriate.  No distress.  Off O2.     Heent:      Neck:    Skin:    Chest:  clear    Cardiac:  Reg without gallop, 1/V1 BOB right 2nd ICS without radiation to carotids.   No JVD.     Abdomen:  Non distended, soft, non-tender.  BS normal.     Extremities:  Perfused.  No edema, calf, posterior thigh tenderness to suggest DVT.     Neuro:            Data:     Results for orders placed or performed during the hospital encounter of 06/12/18 (from the past 24 hour(s))   INR   Result Value Ref Range    INR 1.27 (H) 0.86 - 1.14     Hemoglobin   Date Value Ref Range Status   06/14/2018 11.0 (L) 11.7 - 15.7 g/dL Final   06/13/2018 11.5 (L) 11.7 - 15.7 g/dL Final   ]  Last Basic Metabolic Panel:  Lab Results   Component Value Date     06/14/2018      Lab Results   Component Value Date    POTASSIUM 4.2 06/14/2018     Lab Results   Component Value Date    CHLORIDE 105 06/14/2018     Lab Results   Component Value Date    RAVEN " 8.1 06/14/2018     Lab Results   Component Value Date    CO2 30 06/14/2018     Lab Results   Component Value Date    BUN 12 06/14/2018     Lab Results   Component Value Date    CR 0.67 06/14/2018     Lab Results   Component Value Date     06/14/2018                Assessment and Plan:   1)  S/p R total knee replacement.  Adequate pain control with ES tylenol only.   2)  Acute blood loss anemia, mild.  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  4)  Hypothyroid on replacement.  5)  Constipation.  Minimal results 6/13.   6)  Vague nausea, sense of decrease clarity potential opiate effect. Resolved.     PLAN:  1)  Clinically stable for DC to TCU pending BM.  Fleet enema this AM.  TCU departure later this afternoon.  2)  Meds, orders reviewed.  Change ES tylenol to prn.  Needs coumadin dosing.  RN to check standing BP.   Disposition Plan   Expected discharge today to TCU.      Entered: Fahad Hi 06/15/2018, 10:25 AM     ADD:  Had BM with fleets.  OK for DC.  Not orthostatic.          Attestation:  I have reviewed today's vital signs, notes, medications, labs and imaging.     Fahad Hi MD

## 2018-06-15 NOTE — PLAN OF CARE
Problem: Patient Care Overview  Goal: Individualization & Mutuality  Pt A&O x's 4. VSS. Afebrile. 02 sats in the 90s on RA. Lungs clear. Denies SOB, CP and nausea. Tolerating regular diet. Bowel sound active in all quadrants. No BM  but passing gas. Pt declined suppository but  is hoping to have MB this morning.  Voiding adequately into the Pain well managed with scheduled tylenol. CMS intact, denies N/T. Right knee dressing clean, dry and intact. CPM off since mid night.  PIV patent and SL.Pt slept between care and is able to make needs known, call light with in reach. Will continue to monitor.

## 2018-06-15 NOTE — PLAN OF CARE
Problem: Patient Care Overview  Goal: Plan of Care/Patient Progress Review  Discharge Planner OT   Patient plan for discharge: TCU  Current status: Pt slower with functional mobility and is needing at least CGA.  Barriers to return to prior living situation: decreased mobility;  Recommendations for discharge: TCU  Rationale for recommendations: Pt will benefit from continued OT to increase level of independence and safety.       Entered by: Opal Yost 06/14/2018 8:41 PM

## 2018-06-15 NOTE — PLAN OF CARE
Problem: Patient Care Overview  Goal: Plan of Care/Patient Progress Review  PT -    Discharge Planner PT   Patient plan for discharge: TCU  Current status: Performing bed mobility and transfers + SBA.  Amb ~ 60' with FWW + CGA and verbal cueing for improved technique.  Performed seated/supine exercises and stretches.   Barriers to return to prior living situation: R knee ROM, gait instability  Recommendations for discharge: TCU  Rationale for recommendations: To continue progressing strength and ROM to improve safety and independence with all functional mobility.        Entered by: Claribel Romo 06/15/2018 11:06 AM     Physical Therapy Discharge Summary    Reason for therapy discharge:    Discharged to transitional care facility.    Progress towards therapy goal(s). See goals on Care Plan in [facility] electronic health record for goal details.  Goals partially met.  Barriers to achieving goals:   discharge from facility.    Therapy recommendation(s):    Continued therapy is recommended.  Rationale/Recommendations:  see above.

## 2018-06-15 NOTE — PLAN OF CARE
Problem: Knee Arthroplasty (Total, Partial) (Adult)  Goal: Signs and Symptoms of Listed Potential Problems Will be Absent, Minimized or Managed (Knee Arthroplasty)  Signs and symptoms of listed potential problems will be absent, minimized or managed by discharge/transition of care (reference Knee Arthroplasty (Total, Partial) (Adult) CPG).   Outcome: Adequate for Discharge Date Met: 06/15/18  transfer to TCU after right total knee  Pain is controlled with oral meds. Ace wrapped re wrapped it had become tight around upper thigh. Has 1 plus edema in foot. Moderate amt swelling in knee area. Pt using CPM for hour. Pt up with walker and SBA.Denies SOB/CP/numbness. Vitals taken and grafted. Pt c/o after a sup that she was light headed. BP taken when she was sitting. /59.No results from sup. Passing large amt of flatus. Fleets enema given and pt had small results. When report given to TCU requested they continue with bowel  Program with softeners and sups.  Pt  transported to TCU

## 2018-06-17 ENCOUNTER — RECORDS - HEALTHEAST (OUTPATIENT)
Dept: LAB | Facility: CLINIC | Age: 83
End: 2018-06-17

## 2018-06-17 LAB — INR PPP: 1.27 (ref 0.9–1.1)

## 2018-06-18 ENCOUNTER — RECORDS - HEALTHEAST (OUTPATIENT)
Dept: LAB | Facility: CLINIC | Age: 83
End: 2018-06-18

## 2018-06-18 LAB
ANION GAP SERPL CALCULATED.3IONS-SCNC: 14 MMOL/L (ref 5–18)
BUN SERPL-MCNC: 12 MG/DL (ref 8–28)
CALCIUM SERPL-MCNC: 9.2 MG/DL (ref 8.5–10.5)
CHLORIDE BLD-SCNC: 101 MMOL/L (ref 98–107)
CO2 SERPL-SCNC: 23 MMOL/L (ref 22–31)
CREAT SERPL-MCNC: 0.71 MG/DL (ref 0.6–1.1)
GFR SERPL CREATININE-BSD FRML MDRD: >60 ML/MIN/1.73M2
GLUCOSE BLD-MCNC: 154 MG/DL (ref 70–125)
HGB BLD-MCNC: 10.8 G/DL (ref 12–16)
INR PPP: 1.35 (ref 0.9–1.1)
POTASSIUM BLD-SCNC: 3.7 MMOL/L (ref 3.5–5)
SODIUM SERPL-SCNC: 138 MMOL/L (ref 136–145)

## 2018-06-20 ENCOUNTER — RECORDS - HEALTHEAST (OUTPATIENT)
Dept: LAB | Facility: CLINIC | Age: 83
End: 2018-06-20

## 2018-06-20 LAB — INR PPP: 1.52 (ref 0.9–1.1)

## 2018-06-22 ENCOUNTER — RECORDS - HEALTHEAST (OUTPATIENT)
Dept: LAB | Facility: CLINIC | Age: 83
End: 2018-06-22

## 2018-06-25 LAB — INR PPP: 1.45 (ref 0.9–1.1)

## 2018-07-02 ENCOUNTER — TELEPHONE (OUTPATIENT)
Dept: ORTHOPEDICS | Facility: CLINIC | Age: 83
End: 2018-07-02

## 2018-07-02 NOTE — TELEPHONE ENCOUNTER
Spoke with the INR center at Jigna's primary care clinic.  They had called earlier today asking for an INR goal range and length of treatment.  Dr. Gomez requires a range of 1.8 to 2.2,  Surgery was on 6/12/18.  Jigna will be treated with Coumadin for 4 weeks post-op

## 2018-07-03 DIAGNOSIS — Z98.890 STATUS POST KNEE SURGERY: Primary | ICD-10-CM

## 2018-07-09 ENCOUNTER — OFFICE VISIT (OUTPATIENT)
Dept: ORTHOPEDICS | Facility: CLINIC | Age: 83
End: 2018-07-09
Payer: COMMERCIAL

## 2018-07-09 ENCOUNTER — RADIANT APPOINTMENT (OUTPATIENT)
Dept: GENERAL RADIOLOGY | Facility: CLINIC | Age: 83
End: 2018-07-09
Attending: ORTHOPAEDIC SURGERY
Payer: COMMERCIAL

## 2018-07-09 DIAGNOSIS — Z98.890 STATUS POST KNEE SURGERY: ICD-10-CM

## 2018-07-09 DIAGNOSIS — Z96.651 S/P TOTAL KNEE ARTHROPLASTY, RIGHT: Primary | ICD-10-CM

## 2018-07-09 RX ORDER — FERROUS GLUCONATE 324(38)MG
324 TABLET ORAL EVERY OTHER DAY
COMMUNITY
End: 2020-04-01

## 2018-07-09 ASSESSMENT — ACTIVITIES OF DAILY LIVING (ADL): FUNCTION,_DAILY_LIVING_SCORE: 89.7

## 2018-07-09 NOTE — LETTER
7/9/2018       RE: Jigna Allen  [address] [place] [address]     Dear Colleague,    Thank you for referring your patient, Jigna Allen, [facility]. Please see a copy of my visit note below.    Progress Notes  Encounter Date: 7/9/2018          [facility], Orthopaedic Surgery Consultation  by He Gomez M.D.     Jigna Allen MRN# [id number]   Age: 83 year old YOB: 1935      Requesting physician: Bryon Ojeda       DX:    1. Right calf high grade MFH.    2. 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 R knee DJD    Treatments:   1. neoadjuvant chemotherapy, surgical resection Feb 15, 2010. Complications post-op of skin loss with draining seroma, treated with I & D and  systemic antibiotics. She had 4 cycles of doxil/ifos finishing about Feb 2010. There was <10% viable cells at surgery. She finished post op XRT  about June 2010.   2.6/12/18, R TKA    Postoperative knee replacement follow-up clinic visit      Jigna Allen is a 83 year old female 1 month status-post R TKA on 6/12/18 who presents for postoperative evaluation. Today she reports that she is doing well with improved range of motion.      Preoperative knee pain is  Partially Present  Analgesic medication: tylenol only    EXAM:  Incision healing, clean and dry.  Joint range of motion 0-100 degrees, pain free  Effusion: none  Periarticular swelling or leg edema: 1+  Peroneal and tibial nerve function: intact  Gait: Normal    Intraoperative cultures:  none     IMAGING:  Radiographs demonstrate the knee implant in satisfactory position without evidence of any complication.    IMPRESSION:  Excellent outcome to date after knee replacement.     PLAN:    Continue range of motion exercises and stretching.    Quadriceps strengthening exercises.    May be weight bearing as tolerated.    Discontinue DVT prophylaxis meds  (i.e., warfarin or ASA) if not required for any other reason.    Refill of:  (meds listed above): not needed.    Patient given instructions re: prophylactic antibiotic recommendations during dental work.    Next follow-up visit at 1 year after surgery or sooner as needed.       He Gomez M.D.  [employer] Family Professor  Oncology and Adult Reconstructive Surgery  Dept Orthopaedic Surgery, [facility]  [phone] office  [email or web address]    KOOS Knee Survey Assessment    Knee Outcome Survey ADL Scale (Radha, TERRENCE; EZRA Hodge; Hector, CEZAR; Aristides, ELEUTERIO; Marisel, CY; 1998) 7/9/2018   Do you have swelling in your knee? Often   Do you feel grinding, hear clicking or any other type of noise when your knee moves? Never   Does your knee catch or hang up when moving? Never   Can you straighten your knee fully? Always   Can you bend your knee fully? Always   How severe is your knee joint stiffness after first wakening in the morning? Mild   How severe is your knee stiffness after sitting, lying or resting LATER IN THE DAY? Mild   How often do you experience knee pain? Daily   Twisting/pivoting on your knee None   Straightening knee fully None   Bending knee fully None   Walking on flat surface None   Going up or down stairs Mild   At night while in bed None   Sitting or lying None   Standing upright None   Descending stairs Moderate   Ascending stairs Moderate   Rising from sitting None   Standing None   Bending to floor/ an object None   Walking on flat surface None   Getting in/out of car None   Going shopping Moderate   Putting on socks/stockings None   Rising from bed None   Taking off socks/stockings None   Lying in bed (turning over, maintaining knee position) None   Getting in/out of bath None   Sitting None   Getting on/off toilet None   Heavy domestic duties (moving heavy boxes, scrubbing floors, etc) Mild   Light domestic duties (cooking, dusting, etc) None   Squatting Extreme   Running Extreme    Jumping Extreme   Twisting/pivoting on your injured knee Moderate   Kneeling Moderate   How often are you aware of your knee problem? Daily   Have you modified your life style to avoid potentially damaging activities to your knee? Totally   How much are you troubled with lack of confidence in your knee? Mildly   In general, how much difficulty do you have with your knee? Mild   Pain Score 88.88   Symptoms Score 53.57   Function, Daily Living Score 89.7   Sports and Rec Score 20   Quality of Life Score 43.75              Scribe Disclosure:   I, Dg Birch, am serving as a scribe to document services personally performed by He Gomez MD at this visit, based upon the provider's statements to me. All documentation has been reviewed by the aforementioned provider prior to being entered into the official medical record.     Portions of this medical record were completed by a scribe. UPON MY REVIEW AND AUTHENTICATION BY ELECTRONIC SIGNATURE, this confirms (a) I performed the applicable clinical services, and (b) the record is accurate.    Attending MD (Dr. He Gomez) Attestation :  This patient was seen and evaluated by me including a history, exam, and interpretation of all imaging and/or lab data.  Either a training physician (resident/fellow), who also saw the patient, or scribe has documented the clinic visit in the attached note.    MD Pelon Cid Family Professor  Oncology and Adult Reconstructive Surgery  Dept Orthopaedic Surgery, [facility]  [phone] office, [phone] pager  [email or web address]

## 2018-07-09 NOTE — MR AVS SNAPSHOT
After Visit Summary   7/9/2018    Jigna Allen    MRN: [id number]           Patient Information     Date Of Birth          1935        Visit Information        Provider Department      7/9/2018 11:15 AM He Gomez MD [facility]        Today's Diagnoses     S/P total knee arthroplasty, right    -  1       Follow-ups after your visit        Your next 10 appointments already scheduled     Aug 28, 2018 10:40 AM CDT   CT CHEST W/O CONTRAST with UCCT1   [facility] CT ([facility])    [address] [address] [phone]           Please bring any scans or X-rays taken at other hospitals, if similar tests were done. Also bring a list of your medicines, including vitamins, minerals and over-the-counter drugs. It is safest to leave personal items at home.  Be sure to tell your doctor:   If you have any allergies.   If there s any chance you are pregnant.   If you are breastfeeding.  You do not need to do anything special to prepare for this exam.  Please wear loose clothing, such as a sweat suit or jogging clothes. Avoid snaps, zippers and other metal. We may ask you to undress and put on a hospital gown.            Aug 30, 2018  3:30 PM CDT   (Arrive by 3:15 PM)   Return Visit with Edouard Bryan MD   [facility] ([facility])    [address] [place] [address]  [address]  [facility] [address]   [phone]            May 09, 2019  9:00 AM CDT   XR CHEST 2 VIEWS with UCXR1   [facility] Xray ([facility])    [address] [place] [id number] [phone]           Please bring a list of your current medicines to your exam. (Include vitamins, minerals and over-thecounter medicines.) Leave your valuables at home.  Tell your doctor if there is a chance you may be pregnant.  You do not need to do anything  special for this exam.            May 09, 2019 11:00 AM CDT   (Arrive by 10:45 AM)   Return Visit with Edouard Bryan MD   [facility] ([facility])    [address] [facility]  [address]  [facility] [address] [phone]              Who to contact     Please call your clinic at [phone] to:    Ask questions about your health    Make or cancel appointments    Discuss your medicines    Learn about your test results    Speak to your doctor            Additional Information About Your Visit        [employer] Information     An[employer] gives you secure access to your electronic health record. If you see a primary care provider, you can also send messages to your care team and make appointments. If you have questions, please call your primary care clinic.  If you do not have a primary care provider, please call [phone] and they will assist you.      An[employer]a is an electronic gateway that provides easy, online access to your medical records. With [employer], you can request a clinic appointment, read your test results, renew a prescription or communicate with your care team.     To access your existing account, please contact your [facility] or call [phone] for assistance.        Care EveryWhere ID     This is your Care EveryWhere ID. This could be used by other organizations to access your [place] medical records  [id number]         Blood Pressure from Last 3 Encounters:   06/15/18 120/59   06/05/18 130/70   05/15/18 141/74    Weight from Last 3 Encounters:   06/12/18 54.8 kg (120 lb 13 oz)   06/05/18 55.6 kg (122 lb 8 oz)   05/15/18 55.2 kg (121 lb 12.8 oz)              Today, you had the following     No orders found for display         Today's Medication Changes          These changes are accurate as of 7/9/18 10:49 PM.  If you have any questions, ask your nurse or doctor.               Stop taking these medicines if you  haven't already. Please contact your care team if you have questions.     bisacodyl 10 MG Suppository   Commonly known as:  DULCOLAX   Stopped by:  He Gomez MD                    Primary Care Provider Office Phone # Fax #    Elisabeth Ko [phone]       [facility] [address]  [facility] [address]        Equal Access to Services     [facility]: Hadii aad ku hadasho Soomaali, waaxda luqadaha, qaybta kaalmada adeegyada, waxay idiin hayaan adeeg kharash la'aan ah. So [facility] [phone].    ATENCIÓN: Si yadiel ortega, tiene a jade disposición servicios gratuitos de asistencia lingüística. Llame al [phone].    We comply with applicable federal civil rights laws and [place] laws. We do not discriminate on the basis of race, color, national origin, age, disability, sex, sexual orientation, or gender identity.            Thank you!     Thank you for choosing [facility]  for your care. Our goal is always to provide you with excellent care. Hearing back from our patients is one way we can continue to improve our services. Please take a few minutes to complete the written survey that you may receive in the mail after your visit with us. Thank you!             Your Updated Medication List - Protect others around you: Learn how to safely use, store and throw away your medicines at www.disposemymeds.org.          This list is accurate as of 7/9/18 10:49 PM.  Always use your most recent med list.                   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 INR on Sunday. LOT 30 days. Facility MD to dose warfarin. If pt d/c before 30 days please arrange for outpatient warfarin    Status post knee surgery

## 2018-07-09 NOTE — PROGRESS NOTES
Progress Notes  Encounter Date: 7/9/2018          [facility], Orthopaedic Surgery Consultation  by He Gomez M.D.     Jigna Allen MRN# [id number]   Age: 83 year old YOB: 1935      Requesting physician: Bryon Ojeda       DX:    1. Right calf high grade MFH.    2. 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 R knee DJD    Treatments:   1. neoadjuvant chemotherapy, surgical resection Feb 15, 2010. Complications post-op of skin loss with draining seroma, treated with I & D and  systemic antibiotics. She had 4 cycles of doxil/ifos finishing about Feb 2010. There was <10% viable cells at surgery. She finished post op XRT  about June 2010.   2.6/12/18, R TKA    Postoperative knee replacement follow-up clinic visit      Jigna Allen is a 83 year old female 1 month status-post R TKA on 6/12/18 who presents for postoperative evaluation. Today she reports that she is doing well with improved range of motion.      Preoperative knee pain is  Partially Present  Analgesic medication: tylenol only    EXAM:  Incision healing, clean and dry.  Joint range of motion 0-100 degrees, pain free  Effusion: none  Periarticular swelling or leg edema: 1+  Peroneal and tibial nerve function: intact  Gait: Normal    Intraoperative cultures:  none     IMAGING:  Radiographs demonstrate the knee implant in satisfactory position without evidence of any complication.    IMPRESSION:  Excellent outcome to date after knee replacement.     PLAN:    Continue range of motion exercises and stretching.    Quadriceps strengthening exercises.    May be weight bearing as tolerated.    Discontinue DVT prophylaxis meds (i.e., warfarin or ASA) if not required for any other reason.    Refill of:  (meds listed above): not needed.    Patient given instructions re: prophylactic antibiotic recommendations during dental work.    Next follow-up visit at 1 year after surgery or sooner as needed.        eH Gomez M.D.  [employer] Family Professor  Oncology and Adult Reconstructive Surgery  Dept Orthopaedic Surgery, [facility]  [phone] office  [email or web address]    KOOS Knee Survey Assessment    Knee Outcome Survey ADL Scale (TERRENCE Garcia; EZRA Hodge; CEZAR Young; ELEUTERIO Irving; Marisel, CY; 1998) 7/9/2018   Do you have swelling in your knee? Often   Do you feel grinding, hear clicking or any other type of noise when your knee moves? Never   Does your knee catch or hang up when moving? Never   Can you straighten your knee fully? Always   Can you bend your knee fully? Always   How severe is your knee joint stiffness after first wakening in the morning? Mild   How severe is your knee stiffness after sitting, lying or resting LATER IN THE DAY? Mild   How often do you experience knee pain? Daily   Twisting/pivoting on your knee None   Straightening knee fully None   Bending knee fully None   Walking on flat surface None   Going up or down stairs Mild   At night while in bed None   Sitting or lying None   Standing upright None   Descending stairs Moderate   Ascending stairs Moderate   Rising from sitting None   Standing None   Bending to floor/ an object None   Walking on flat surface None   Getting in/out of car None   Going shopping Moderate   Putting on socks/stockings None   Rising from bed None   Taking off socks/stockings None   Lying in bed (turning over, maintaining knee position) None   Getting in/out of bath None   Sitting None   Getting on/off toilet None   Heavy domestic duties (moving heavy boxes, scrubbing floors, etc) Mild   Light domestic duties (cooking, dusting, etc) None   Squatting Extreme   Running Extreme   Jumping Extreme   Twisting/pivoting on your injured knee Moderate   Kneeling Moderate   How often are you aware of your knee problem? Daily   Have you modified your life style to avoid potentially damaging activities to your knee? Totally   How much are you troubled with  lack of confidence in your knee? Mildly   In general, how much difficulty do you have with your knee? Mild   Pain Score 88.88   Symptoms Score 53.57   Function, Daily Living Score 89.7   Sports and Rec Score 20   Quality of Life Score 43.75              Scribe Disclosure:   I, Dg Birch, am serving as a scribe to document services personally performed by He Gomez MD at this visit, based upon the provider's statements to me. All documentation has been reviewed by the aforementioned provider prior to being entered into the official medical record.     Portions of this medical record were completed by a scribe. UPON MY REVIEW AND AUTHENTICATION BY ELECTRONIC SIGNATURE, this confirms (a) I performed the applicable clinical services, and (b) the record is accurate.    Attending MD (Dr. He Gomez) Attestation :  This patient was seen and evaluated by me including a history, exam, and interpretation of all imaging and/or lab data.  Either a training physician (resident/fellow), who also saw the patient, or scribe has documented the clinic visit in the attached note.    MD Pelon Cid Family Professor  Oncology and Adult Reconstructive Surgery  Dept Orthopaedic Surgery, [facility]  [phone] office, [phone] pager  [email or web address]

## 2018-08-28 ENCOUNTER — RADIANT APPOINTMENT (OUTPATIENT)
Dept: CT IMAGING | Facility: CLINIC | Age: 83
End: 2018-08-28
Attending: INTERNAL MEDICINE
Payer: COMMERCIAL

## 2018-08-28 DIAGNOSIS — R53.83 FATIGUE, UNSPECIFIED TYPE: ICD-10-CM

## 2018-08-28 DIAGNOSIS — A69.20 LYME DISEASE: ICD-10-CM

## 2018-08-28 DIAGNOSIS — R91.8 PULMONARY NODULES: ICD-10-CM

## 2018-08-28 DIAGNOSIS — D75.89 MACROCYTOSIS: ICD-10-CM

## 2018-08-28 DIAGNOSIS — E03.9 HYPOTHYROIDISM, UNSPECIFIED TYPE: ICD-10-CM

## 2018-08-28 DIAGNOSIS — C49.9 SARCOMA OF SOFT TISSUE (H): ICD-10-CM

## 2018-08-30 ENCOUNTER — ONCOLOGY VISIT (OUTPATIENT)
Dept: ONCOLOGY | Facility: CLINIC | Age: 83
End: 2018-08-30
Attending: INTERNAL MEDICINE
Payer: COMMERCIAL

## 2018-08-30 VITALS
BODY MASS INDEX: 22.41 KG/M2 | OXYGEN SATURATION: 97 % | RESPIRATION RATE: 14 BRPM | DIASTOLIC BLOOD PRESSURE: 67 MMHG | HEIGHT: 62 IN | WEIGHT: 121.8 LBS | HEART RATE: 88 BPM | SYSTOLIC BLOOD PRESSURE: 122 MMHG | TEMPERATURE: 98 F

## 2018-08-30 DIAGNOSIS — A69.20 LYME DISEASE: ICD-10-CM

## 2018-08-30 DIAGNOSIS — F43.22 ADJUSTMENT DISORDER WITH ANXIOUS MOOD: ICD-10-CM

## 2018-08-30 DIAGNOSIS — R53.83 FATIGUE, UNSPECIFIED TYPE: ICD-10-CM

## 2018-08-30 DIAGNOSIS — D75.89 MACROCYTOSIS: ICD-10-CM

## 2018-08-30 DIAGNOSIS — C49.9 SARCOMA OF SOFT TISSUE (H): ICD-10-CM

## 2018-08-30 DIAGNOSIS — E03.9 HYPOTHYROIDISM, UNSPECIFIED TYPE: ICD-10-CM

## 2018-08-30 DIAGNOSIS — R91.8 PULMONARY NODULES: ICD-10-CM

## 2018-08-30 PROCEDURE — 99214 OFFICE O/P EST MOD 30 MIN: CPT | Mod: ZP | Performed by: INTERNAL MEDICINE

## 2018-08-30 PROCEDURE — G0463 HOSPITAL OUTPT CLINIC VISIT: HCPCS | Mod: ZF

## 2018-08-30 ASSESSMENT — PAIN SCALES - GENERAL: PAINLEVEL: NO PAIN (0)

## 2018-08-30 NOTE — NURSING NOTE
"Oncology Rooming Note    August 30, 2018 11:02 AM   Jigna Allen is a 83 year old female who presents for:    Chief Complaint   Patient presents with     Oncology Clinic Visit     [facility] RETURN- [facility]     Initial Vitals: /67 (BP Location: Right arm, Patient Position: Chair, Cuff Size: Adult Regular)  Pulse 88  Temp 98  F (36.7  C) (Oral)  Resp 14  Ht 1.575 m (5' 2.01\")  Wt 55.2 kg (121 lb 12.8 oz)  SpO2 97%  BMI 22.27 kg/m2 Estimated body mass index is 22.27 kg/(m^2) as calculated from the following:    Height as of this encounter: 1.575 m (5' 2.01\").    Weight as of this encounter: 55.2 kg (121 lb 12.8 oz). Body surface area is 1.55 meters squared.  No Pain (0) Comment: Data Unavailable   No LMP recorded. Patient is postmenopausal.  Allergies reviewed: Yes  Medications reviewed: Yes    Medications: Medication refills not needed today.  Pharmacy name entered into [employer]:    [facility] - [place], [place] - [address] [facility] [address] - [place], [place] - [address]    Clinical concerns: No new concerns. Randi was NOT notified.    10 minutes for nursing intake (face to face time)     Sami Linares LPN            "

## 2018-08-30 NOTE — MR AVS SNAPSHOT
After Visit Summary   8/30/2018    Jigna Allen    MRN: [id number]           Patient Information     Date Of Birth          1935        Visit Information        Provider Department      8/30/2018 3:30 PM Edouard Bryan MD [facility]        Today's Diagnoses     Sarcoma of soft tissue (H)        Macrocytosis        Fatigue, unspecified type        Hypothyroidism, unspecified type        Lyme disease        Adjustment disorder with anxious mood        Pulmonary nodules           Follow-ups after your visit        Your next 10 appointments already scheduled     Aug 30, 2018  3:30 PM CDT   (Arrive by 3:15 PM)   Return Visit with Edouard Bryan MD   [facility] ([facility])    [address] [place] [address]  [address]  [facility] [id number] [phone]            May 09, 2019  9:00 AM CDT   XR CHEST 2 VIEWS with UCXR1   [facility] Xray ([facility])    [address] [place] [id number] [phone]           Please bring a list of your current medicines to your exam. (Include vitamins, minerals and over-thecounter medicines.) Leave your valuables at home.  Tell your doctor if there is a chance you may be pregnant.  You do not need to do anything special for this exam.            May 09, 2019 11:00 AM CDT   (Arrive by 10:45 AM)   Return Visit with Edouard Bryan MD   [facility] ([facility])    [address] [place] [address]  [address]  [facility] [id number] [phone]            May 14, 2019 10:00 AM CDT   CT CHEST W/O CONTRAST with UCCT1   [facility] CT ([facility])    [address] [place] [address]  [address] [facility] [id number] [phone]           Please bring any scans or X-rays taken at other hospitals, if similar tests were done. Also bring a list of your medicines,  including vitamins, minerals and over-the-counter drugs. It is safest to leave personal items at home.  Be sure to tell your doctor:   If you have any allergies.   If there s any chance you are pregnant.   If you are breastfeeding.  You do not need to do anything special to prepare for this exam.  Please wear loose clothing, such as a sweat suit or jogging clothes. Avoid snaps, zippers and other metal. We may ask you to undress and put on a hospital gown.            May 16, 2019 11:00 AM CDT   (Arrive by 10:45 AM)   Return Visit with Edouard Bryan MD   [facility] ([facility])    [address] [facility]  [address]  [facility] [address] [phone]              Future tests that were ordered for you today     Open Future Orders        Priority Expected Expires Ordered    CT Chest w/o Contrast Routine 5/14/2019 7/28/2019 8/30/2018            Who to contact     If you have questions or need follow up information about today's clinic visit or your schedule please contact [facility] directly at [phone].  Normal or non-critical lab and imaging results will be communicated to you by MyChart, letter or phone within 4 business days after the clinic has received the results. If you do not hear from us within 7 days, please contact the clinic through Anyvitehart or phone. If you have a critical or abnormal lab result, we will notify you by phone as soon as possible.  Submit refill requests through Pledge51 or call your pharmacy and they will forward the refill request to us. Please allow 3 business days for your refill to be completed.          Additional Information About Your Visit        Anyvitehart Information     Pledge51 gives you secure access to your electronic health record. If you see a primary care provider, you can also send messages to your care team and make appointments. If you have questions, please call your primary care clinic.  If you  "do not have a primary care provider, please call [phone] and they will assist you.        Care EveryWhere ID     This is your Care EveryWhere ID. This could be used by other organizations to access your [facility] medical records  [id number]        Your Vitals Were     Pulse Temperature Respirations Height Pulse Oximetry BMI (Body Mass Index)    88 98  F (36.7  C) (Oral) 14 1.575 m (5' 2.01\") 97% 22.27 kg/m2       Blood Pressure from Last 3 Encounters:   08/30/18 122/67   06/15/18 120/59   06/05/18 130/70    Weight from Last 3 Encounters:   08/30/18 55.2 kg (121 lb 12.8 oz)   06/12/18 54.8 kg (120 lb 13 oz)   06/05/18 55.6 kg (122 lb 8 oz)               Primary Care Provider Office Phone # Fax #    Elisabeth Ko [phone]       [facility] [address]        Equal Access to Services     [facility]: Hadii lula hester hadasho Sosloan, waaxda luqadaha, qaybta kaalmada adekim, narinder coker . So [facility] [phone].    ATENCIÓN: Si habla español, tiene a jade disposición servicios gratuitos de asistencia lingüística. [facility] [phone].    We comply with applicable federal civil rights laws and [place] laws. We do not discriminate on the basis of race, color, national origin, age, disability, sex, sexual orientation, or gender identity.            Thank you!     Thank you for choosing [facility]  for your care. Our goal is always to provide you with excellent care. Hearing back from our patients is one way we can continue to improve our services. Please take a few minutes to complete the written survey that you may receive in the mail after your visit with us. Thank you!             Your Updated Medication List - Protect others around you: Learn how to safely use, store and throw away your medicines at www.disposemymeds.org.          This list is accurate as of 8/30/18 11:40 AM.  Always use your most recent med " list.                   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 INR on Sunday. LOT 30 days. Facility MD to dose warfarin. If pt d/c before 30 days please arrange for outpatient warfarin    Status post knee surgery

## 2018-08-30 NOTE — LETTER
8/30/2018     RE: Jigna Allen  [address] [place] [address]     Dear Colleague,    Thank you for referring your patient, Jigna Allen, to the [facility]. Please see a copy of my visit note below.    8-29-18      I saw Jigna Allen for f/u of a sarcoma of the right calf.   -  Background   In brief, she was doing well until August 2009, when after doing a lot of walking while on vacation, she developed some discomfort and swelling in the proximal right calf. 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   She had 4 cycles of doxil/ifos finishing about Feb 2010. There was <10% viable cells at surgery.   She finished post op XRT about June 2010. She did have a post-op staph infection and had some subsequent wound healing problems.   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  This is doing quite well w no pain.  She juan pablo walk quite a bit [place]     Her daughter is expecting twins in Nov.    She still has problems w fatigue and was treated for Lyme disease, though labs were confusing.  She had an episode of Lyme disease treated in July 2011.  She's not sleeping that well.     Her mother in law is now 100 and is living w them and they are taking care of her and that's stressful.    Not on zoloft and not 1005 being a bit grumpy now.    She had CTS surgery on her hand L but is not sure it helped much and it still tingles.    She has a rectocoele and found a pessary very effective.  Occ incontinence.    BMs doing well.     She notes occasional tinnitus that can be bothersome and has a history of hearing loss and has a hearing aid but it does not help much and she often does not use it.  Dr Ojeda felt the CT finding in T4 was a benign hemangioma.  She remains on 75 mgc/d T4.    The rest of a 10 point ROS is otherwise negative as I confirmed w/ her today.      -  "  Background PMH, FH,SH   PH notable for rectocele, diverticulosis, eczema, hyperlipidemia, hypothyroidism, one episode of documented leukopenia. She had cataracts removed.   History of patellofemoral pain syndrome   FH notable for a cancer in the father, uncle, and some cousins and also glioblastoma in a brother. Paternal grandmother had stomach cancer. Her father had testicular cancer and  at age 87.   She has two adult children. 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 She does not smoke or abuse alcohol or other drugs.   She is  and came with her .   ALLERGIES: She describes allergies to Niacin and ferrous sulfate manifested as a red rash.   -      On examination, she appeared comfortable in good spirits.  /67 (BP Location: Right arm, Patient Position: Chair, Cuff Size: Adult Regular)  Pulse 88  Temp 98  F (36.7  C) (Oral)  Resp 14  Ht 1.575 m (5' 2.01\")  Wt 55.2 kg (121 lb 12.8 oz)  SpO2 97%  BMI 22.27 kg/m2  HEENT No icterus.  Some bilateral decreased hearing.   NECK no thyromegaly.  CHEST chest is clear.  CV RRR with no significant murmur.   LYMPH No lymphadenopathy.    ABD No HSMT.   NEURO good ambulation normal Romberg, heel/toe  EXT R leg healed w/ XRT changes and bowing.  No significant edema. Nicely healed R TKR.    PSYCH Mood good      -   No labs.      -   I reviewed the new CT and it looks the same as in May and that is the formal read.  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  -     It has now been > 8 years since last treatment.    We will plan to see her about 5-16 (she is now > 8 years out) with a CT 2 days before after she gets back from [place] and her [place] trip for the new birth.    We will follow the lung nodules; symptoms dont warrant further ID w/u now.    She will call if she has other questions in " the interim.   -  Edouard Bryan M.D.   Professor, Hematology, Oncology and Transplantation   Phone: [phone]   -  cc:   Wayne Castelan MD   [address] [address]   Elisabeth Ko MD   [facility]   [address] [place] [address]   Bryon Ojeda M.D.   [place] [address]

## 2018-08-30 NOTE — PROGRESS NOTES
8-29-18      I saw Jigna Allen for f/u of a sarcoma of the right calf.   -  Background   In brief, she was doing well until August 2009, when after doing a lot of walking while on vacation, she developed some discomfort and swelling in the proximal right calf. 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   She had 4 cycles of doxil/ifos finishing about Feb 2010. There was <10% viable cells at surgery.   She finished post op XRT about June 2010. She did have a post-op staph infection and had some subsequent wound healing problems.   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  This is doing quite well w no pain.  She juan pablo walk quite a bit [place]     Her daughter is expecting twins in Nov.    She still has problems w fatigue and was treated for Lyme disease, though labs were confusing.  She had an episode of Lyme disease treated in July 2011.  She's not sleeping that well.     Her mother in law is now 100 and is living w them and they are taking care of her and that's stressful.    Not on zoloft and not 1005 being a bit grumpy now.    She had CTS surgery on her hand L but is not sure it helped much and it still tingles.    She has a rectocoele and found a pessary very effective.  Occ incontinence.    BMs doing well.     She notes occasional tinnitus that can be bothersome and has a history of hearing loss and has a hearing aid but it does not help much and she often does not use it.  Dr Ojeda felt the CT finding in T4 was a benign hemangioma.  She remains on 75 mgc/d T4.    The rest of a 10 point ROS is otherwise negative as I confirmed w/ her today.      -   Background PMH, FH,SH   PH notable for rectocele, diverticulosis, eczema, hyperlipidemia, hypothyroidism, one episode of documented leukopenia. She had cataracts removed.   History of patellofemoral pain syndrome   FH notable for a cancer  "in the father, uncle, and some cousins and also glioblastoma in a brother. Paternal grandmother had stomach cancer. Her father had testicular cancer and  at age 87.   She has two adult children. 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 She does not smoke or abuse alcohol or other drugs.   She is  and came with her .   ALLERGIES: She describes allergies to Niacin and ferrous sulfate manifested as a red rash.   -      On examination, she appeared comfortable in good spirits.  /67 (BP Location: Right arm, Patient Position: Chair, Cuff Size: Adult Regular)  Pulse 88  Temp 98  F (36.7  C) (Oral)  Resp 14  Ht 1.575 m (5' 2.01\")  Wt 55.2 kg (121 lb 12.8 oz)  SpO2 97%  BMI 22.27 kg/m2  HEENT No icterus.  Some bilateral decreased hearing.   NECK no thyromegaly.  CHEST chest is clear.  CV RRR with no significant murmur.   LYMPH No lymphadenopathy.    ABD No HSMT.   NEURO good ambulation normal Romberg, heel/toe  EXT R leg healed w/ XRT changes and bowing.  No significant edema. Nicely healed R TKR.    PSYCH Mood good      -   No labs.      -   I reviewed the new CT and it looks the same as in May and that is the formal read.  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  -     It has now been > 8 years since last treatment.    We will plan to see her about 5-16 (she is now > 8 years out) with a CT 2 days before after she gets back from [place] her [place] trip for the new birth.    We will follow the lung nodules; symptoms dont warrant further ID w/u now.    She will call if she has other questions in the interim.   -  Edouard Bryan M.D.   Professor, Hematology, Oncology and Transplantation   Phone: [phone]   -  cc:   Wayne Castelan MD   [address] [address]   Elisabeth Ko MD "   [facility]   [facility] [place] [address]   Bryon Ojeda M.D.   [facility] 244

## 2019-05-09 NOTE — PROGRESS NOTES
"Attestation:     I, Lucy Chan, [facility] staff , have reviewed and edited the following  student s note on 6/15/2018 at 1:51 PM.           SPIRITUAL HEALTH SERVICES  SPIRITUAL ASSESSMENT Progress Note  [facility] ([facility]) 8A     PRIMARY FOCUS:     Goals of care    REFERRAL SOURCE: Hospital  request made by pt.    ILLNESS CIRCUMSTANCES:   Reviewed documentation. Reflective conversation shared with Jigna which integrated elements of illness and family narratives.     Context of Serious Illness/Symptom(s) - Jigna explained that she had received knee surgery to fix bowing in her leg from previous surgery to remove a tumor.    Resources for Support - Home Jainism, , children, Physical Therapy    DISTRESS:     Emotional/Spiritual/Existential Distress - Not discussed    Episcopal Distress - Not discussed    Social/Cultural/Economic Distress - Mother-in-law who is 100 and living in [place]. Jigna discussed resentment over the 30 years of care providing for her mother-in-law following her father-in-law's death. She feels that she has not been able to properly care for herself or her children as a result. She expressed guilt over her feelings toward her mother-in-law.    SPIRITUAL/Mandaen COPING:     Anabaptist/Jenna - Strong connection with her [facility] Jainism, [facility]. Spoke that \"God is present even in the hard times.\"    Spiritual Practice(s) - Prayer, bible readings, said she loves Philippians 4:19 reminding her that God provides.    Emotional/Relational/Existential Connections - Not discussed    GOALS OF CARE:    Goals of Care - Return to physical capabilities she had prior to 6 months ago. Be able to walk again and be active.    Meaning/Sense-Making - Not discussed    PLAN: Visit 1-2x/wk for duration of stay    Lucy Vincent   Intern  Pager [phone]    " No

## 2019-05-14 ENCOUNTER — ANCILLARY PROCEDURE (OUTPATIENT)
Dept: CT IMAGING | Facility: CLINIC | Age: 84
End: 2019-05-14
Attending: INTERNAL MEDICINE
Payer: COMMERCIAL

## 2019-05-14 ENCOUNTER — ANCILLARY PROCEDURE (OUTPATIENT)
Dept: GENERAL RADIOLOGY | Facility: CLINIC | Age: 84
End: 2019-05-14
Attending: INTERNAL MEDICINE
Payer: COMMERCIAL

## 2019-05-14 DIAGNOSIS — R91.8 PULMONARY NODULES: ICD-10-CM

## 2019-05-14 DIAGNOSIS — C49.9 SARCOMA OF SOFT TISSUE (H): ICD-10-CM

## 2019-05-14 DIAGNOSIS — R53.83 FATIGUE, UNSPECIFIED TYPE: ICD-10-CM

## 2019-05-14 DIAGNOSIS — E03.9 HYPOTHYROIDISM, UNSPECIFIED TYPE: ICD-10-CM

## 2019-05-14 DIAGNOSIS — D75.89 MACROCYTOSIS: ICD-10-CM

## 2019-05-14 DIAGNOSIS — M17.2 POST-TRAUMATIC OSTEOARTHRITIS OF BOTH KNEES: ICD-10-CM

## 2019-05-14 DIAGNOSIS — F43.22 ADJUSTMENT DISORDER WITH ANXIOUS MOOD: ICD-10-CM

## 2019-05-14 DIAGNOSIS — A69.20 LYME DISEASE: ICD-10-CM

## 2019-05-16 ENCOUNTER — ONCOLOGY VISIT (OUTPATIENT)
Dept: ONCOLOGY | Facility: CLINIC | Age: 84
End: 2019-05-16
Attending: INTERNAL MEDICINE
Payer: COMMERCIAL

## 2019-05-16 VITALS
TEMPERATURE: 98.4 F | BODY MASS INDEX: 22.57 KG/M2 | HEART RATE: 96 BPM | RESPIRATION RATE: 18 BRPM | DIASTOLIC BLOOD PRESSURE: 65 MMHG | SYSTOLIC BLOOD PRESSURE: 122 MMHG | OXYGEN SATURATION: 94 % | WEIGHT: 123.46 LBS

## 2019-05-16 DIAGNOSIS — C49.9 SARCOMA OF SOFT TISSUE (H): Primary | ICD-10-CM

## 2019-05-16 DIAGNOSIS — R53.83 FATIGUE, UNSPECIFIED TYPE: ICD-10-CM

## 2019-05-16 DIAGNOSIS — M17.2 POST-TRAUMATIC OSTEOARTHRITIS OF BOTH KNEES: ICD-10-CM

## 2019-05-16 DIAGNOSIS — D75.89 MACROCYTOSIS: ICD-10-CM

## 2019-05-16 DIAGNOSIS — E03.9 HYPOTHYROIDISM, UNSPECIFIED TYPE: ICD-10-CM

## 2019-05-16 DIAGNOSIS — Z98.890 STATUS POST KNEE SURGERY: ICD-10-CM

## 2019-05-16 DIAGNOSIS — R91.8 PULMONARY NODULES: ICD-10-CM

## 2019-05-16 PROCEDURE — G0463 HOSPITAL OUTPT CLINIC VISIT: HCPCS | Mod: ZF

## 2019-05-16 PROCEDURE — 99214 OFFICE O/P EST MOD 30 MIN: CPT | Mod: ZP | Performed by: INTERNAL MEDICINE

## 2019-05-16 ASSESSMENT — PAIN SCALES - GENERAL: PAINLEVEL: NO PAIN (0)

## 2019-05-16 NOTE — LETTER
5/16/2019       RE: Jigna Allen  [address] [place] [address]     Dear Colleague,    Thank you for referring your patient, Jigna Allen, to the [facility]. Please see a copy of my visit note below.    8-29-18      I saw Jigna Allen for f/u of a sarcoma of the right calf.   -  Background   In brief, she was doing well until August 2009, when after doing a lot of walking while on vacation, she developed some discomfort and swelling in the proximal right calf. 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   She had 4 cycles of doxil/ifos finishing about Feb 2010. There was <10% viable cells at surgery.   She finished post op XRT about June 2010. She did have a post-op staph infection and had some subsequent wound healing problems.   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  This is doing quite well w no pain.  She can walk quite a bit and walks at least 30 min once a day.     Her daughter had twins.    She has a rectocoele and found a pessary very effective.  Occ incontinence. She is going to start some pelvic therapy at [facility].    She still has some problems w fatigue and was treated for Lyme disease, though labs were confusing.  She had an episode of Lyme disease treated in July 2011.  The fatigue is less of an issue than it was.    She's not sleeping that well still.     Her mother in law is now 101 and is living at an assisted facility now.    Not on zoloft and mood is OK.    She had CTS surgery on her hand L but is not sure it helped much and it still tingles.     She notes occasional tinnitus that can be bothersome and has a history of hearing loss and has a hearing aid but it does not help much and she often does not use it.  Dr Ojeda felt the CT finding in T4 was a benign hemangioma.  She remains on 75 mgc/d T4.    The rest of a 10 point ROS is otherwise negative  as I confirmed w/ her today.      -   Background PMH, FH,SH   PH notable for rectocele, diverticulosis, eczema, hyperlipidemia, hypothyroidism, one episode of documented leukopenia. She had cataracts removed.   History of patellofemoral pain syndrome   FH notable for a cancer in the father, uncle, and some cousins and also glioblastoma in a brother. Paternal grandmother had stomach cancer. Her father had testicular cancer and  at age 87.   She has two adult children. 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 She does not smoke or abuse alcohol or other drugs.   She is  and came with her .   ALLERGIES: She describes allergies to Niacin and ferrous sulfate manifested as a red rash.   -      On examination, she appeared comfortable in good spirits.  /65   Pulse 96   Temp 98.4  F (36.9  C) (Oral)   Resp 18   Wt 56 kg (123 lb 7.3 oz)   SpO2 94%   BMI 22.57 kg/m     HEENT No icterus.  Some bilateral decreased hearing.   NECK no thyromegaly.  CHEST chest is clear.  LYMPH No lymphadenopathy.    CV RRR with no significant murmur.   ABD No HSMT.   NEURO good ambulation normal Romberg, heel/toe  PSYCH Mood good  EXT R leg healed w/ XRT changes and bowing.  No significant edema. Nicely healed R TKR.      -   No labs.      -   I reviewed the new CT and it looks the same as last time and that is the formal read.  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  -     It has now been about 9 years since last treatment.    We will plan to see her about 5-16 (she is now almost 9 years out) with a CT 2 days before.    We will follow the lung nodules; symptoms dont warrant further ID w/u now.    She will call if she has other questions in the interim.   -  Edouard Bryan M.D.   Professor, Hematology, Oncology and Transplantation   Phone:  [phone]   -  cc:   Wayne Castelan MD   [address] [address]   Elisabeth Ko MD   [facility] [address]   [facility] [place] [address]   Bryon Ojeda M.D.   [place] [address]

## 2019-05-16 NOTE — NURSING NOTE
"Oncology Rooming Note    May 16, 2019 10:50 AM   Jigna Allen is a 84 year old female who presents for:    Chief Complaint   Patient presents with     Oncology Clinic Visit     Sarcoma, Scan results      Initial Vitals: /65   Pulse 96   Temp 98.4  F (36.9  C) (Oral)   Resp 18   Wt 56 kg (123 lb 7.3 oz)   SpO2 94%   BMI 22.57 kg/m   Estimated body mass index is 22.57 kg/m  as calculated from the following:    Height as of 8/30/18: 1.575 m (5' 2.01\").    Weight as of this encounter: 56 kg (123 lb 7.3 oz). Body surface area is 1.57 meters squared.  No Pain (0) Comment: Data Unavailable   No LMP recorded. Patient is postmenopausal.  Allergies reviewed: Yes  Medications reviewed: Yes    Medications: Medication refills not needed today.  Pharmacy name entered into BrightFunnel:    [facility] - [place], [place] - [address] [facility] - [place], [place] - [address]    Clinical concerns: Results  Randi was notified.      Kat Sears MA              "

## 2019-05-16 NOTE — PROGRESS NOTES
8-29-18      I saw Jigna Allen for f/u of a sarcoma of the right calf.   -  Background   In brief, she was doing well until August 2009, when after doing a lot of walking while on vacation, she developed some discomfort and swelling in the proximal right calf. 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   She had 4 cycles of doxil/ifos finishing about Feb 2010. There was <10% viable cells at surgery.   She finished post op XRT about June 2010. She did have a post-op staph infection and had some subsequent wound healing problems.   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  This is doing quite well w no pain.  She can walk quite a bit and walks at least 30 min once a day.     Her daughter had twins.    She has a rectocoele and found a pessary very effective.  Occ incontinence. She is going to start some pelvic therapy at [facility].    She still has some problems w fatigue and was treated for Lyme disease, though labs were confusing.  She had an episode of Lyme disease treated in July 2011.  The fatigue is less of an issue than it was.    She's not sleeping that well still.     Her mother in law is now 101 and is living at an assisted facility now.    Not on zoloft and mood is OK.    She had CTS surgery on her hand L but is not sure it helped much and it still tingles.     She notes occasional tinnitus that can be bothersome and has a history of hearing loss and has a hearing aid but it does not help much and she often does not use it.  Dr Ojeda felt the CT finding in T4 was a benign hemangioma.  She remains on 75 mgc/d T4.    The rest of a 10 point ROS is otherwise negative as I confirmed w/ her today.      -   Background PMH, FH,SH   PH notable for rectocele, diverticulosis, eczema, hyperlipidemia, hypothyroidism, one episode of documented leukopenia. She had cataracts removed.   History of  patellofemoral pain syndrome   FH notable for a cancer in the father, uncle, and some cousins and also glioblastoma in a brother. Paternal grandmother had stomach cancer. Her father had testicular cancer and  at age 87.   She has two adult children. 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 She does not smoke or abuse alcohol or other drugs.   She is  and came with her .   ALLERGIES: She describes allergies to Niacin and ferrous sulfate manifested as a red rash.   -      On examination, she appeared comfortable in good spirits.  /65   Pulse 96   Temp 98.4  F (36.9  C) (Oral)   Resp 18   Wt 56 kg (123 lb 7.3 oz)   SpO2 94%   BMI 22.57 kg/m    HEENT No icterus.  Some bilateral decreased hearing.   NECK no thyromegaly.  CHEST chest is clear.  LYMPH No lymphadenopathy.    CV RRR with no significant murmur.   ABD No HSMT.   NEURO good ambulation normal Romberg, heel/toe  PSYCH Mood good  EXT R leg healed w/ XRT changes and bowing.  No significant edema. Nicely healed R TKR.      -   No labs.      -   I reviewed the new CT and it looks the same as last time and that is the formal read.  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  -     It has now been about 9 years since last treatment.    We will plan to see her about 5-16 (she is now almost 9 years out) with a CT 2 days before.    We will follow the lung nodules; symptoms dont warrant further ID w/u now.    She will call if she has other questions in the interim.   -  Edouard Bryan M.D.   Professor, Hematology, Oncology and Transplantation   Phone: [phone]   -  cc:   Wayne Castelan MD   [address] [address]   Elisabeth Ko MD   [facility] [place]   [address] [place] [address]   Bryon Ojeda M.D.   [facility] 937

## 2019-10-01 ENCOUNTER — HEALTH MAINTENANCE LETTER (OUTPATIENT)
Age: 84
End: 2019-10-01

## 2019-12-13 ENCOUNTER — ANCILLARY PROCEDURE (OUTPATIENT)
Dept: GENERAL RADIOLOGY | Facility: CLINIC | Age: 84
End: 2019-12-13
Attending: ORTHOPAEDIC SURGERY
Payer: COMMERCIAL

## 2019-12-13 ENCOUNTER — OFFICE VISIT (OUTPATIENT)
Dept: ORTHOPEDICS | Facility: CLINIC | Age: 84
End: 2019-12-13
Payer: COMMERCIAL

## 2019-12-13 DIAGNOSIS — M79.671 RIGHT FOOT PAIN: ICD-10-CM

## 2019-12-13 DIAGNOSIS — M79.671 RIGHT FOOT PAIN: Primary | ICD-10-CM

## 2019-12-13 DIAGNOSIS — C49.9 UNDIFFERENTIATED PLEOMORPHIC SARCOMA (H): ICD-10-CM

## 2019-12-13 DIAGNOSIS — Z96.651 S/P TOTAL KNEE ARTHROPLASTY, RIGHT: Primary | ICD-10-CM

## 2019-12-13 DIAGNOSIS — Z96.651 STATUS POST TOTAL RIGHT KNEE REPLACEMENT: ICD-10-CM

## 2019-12-13 DIAGNOSIS — Z96.651 S/P TOTAL KNEE ARTHROPLASTY, RIGHT: ICD-10-CM

## 2019-12-13 NOTE — LETTER
12/13/2019       RE: Jigna Allen  [address] [place] [address]     Dear Colleague,    Thank you for referring your patient, Jigna Allen, to the [facility] at [facility]. Please see a copy of my visit note below.        Department of Orthopedic Surgery  Clinic Progress Note    PATIENT NAME: Jigna Allen   MRN: [id number]  AGE: 84 year old  BMI: There is no height or weight on file to calculate BMI.      REASON FOR VISIT: Surgical Followup (Pt. states that she is here today to F/u Sarcoma, right posterior calf musculature DOS: 02/15/2010 w/Right TKA done by Dr. Gomez in 2018. ) and RECHECK (Pt. states that she would like to also discuss pain of her Right Outter Lat. Foot that has been present for years.)    DX:               1. 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               2. 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 R knee DJD     Treatments:              1. neoadjuvant chemotherapy, surgical resection Feb 15, 2010. Complications post-op of skin loss with draining seroma, treated with I & D and    systemic antibiotics. She had 4 cycles of doxil/ifos finishing about Feb 2010. There was <10% viable cells at surgery. She finished post op XRT     about June 2010.              2.6/12/18, R TKA    HISTORY OF PRESENT ILLNESS:  Jigna Allen is a 84 year old female who presents for follow up of of the above concerns.  She reports that several weeks ago she noted to have an area of cracking skin in her posterior knee in the area of her prior incision in the popliteal fossa.  She reports that she cannot see this area but her  has looked at it.  They note that there is a scab over the midpoint of the incision.  Occasionally the wound has been weeping they have not noticed any surrounding erythema.  They report the drainage has been clear.  There is no  malodorous smell.  The patient has been in good health with no fevers or chills.  They report they have not been covering the scab area at present because they were having a difficult time with bandages and gauze sticking to the area or being to bullky to allow for comfortably use of clothing/knee range of motion. This has led to repeated cycles of the scab being rubbed off and serous weeping until it re-scabs over.  They have questions regarding the treatment of this today.      PHYSICAL EXAM:  On physical examination the patient appears the stated age, is in no acute distress, affect is appropriate, and breathing is non-labored.  There were no vitals taken for this visit.  Data Unavailable  There is no height or weight on file to calculate BMI.    Right leg, popliteal fossa, well-healed surgical incision with exception of the midpoint where there is a 5 mm x 8 mm brown eschar over the incision.  The eschar is poorly adherent today and the remainder was removed.  Underlying there is healthy dermal and subcutaneous tissue.  There is no significant evidence of any surrounding erythema.  There is no significant drainage.  There is no malodorous smell.  The patient is nontender in the surrounding areas     Distally she is neurovascularly intact.  Good nontender knee range of motion.     ASSESSMENT/PLAN: Jigna Allen is a 84 year old female who is status post resection of a pleomorphic sarcoma in her right distal knee//thigh in 2010 which was initially complicated by wound healing problems.  The patient also has degenerative knee arthritis and is status post a right total knee arthroplasty by Dr. Travis in 2018.  She now presents with a small area of breakdown of her incision in the popliteal fossa at the midpoint of the incision in the crease.  There are no concerning signs for deep infection, this is likely a mechanical issue, due to the scar tissue in this area and the large amount of motion and irritation this area  goes through as a result of knee range of motion.    1. Prior pleomorphic sarcoma of the distal thigh, (malignant fibrous histiocytoma) status post resection  2. Right total knee arthroplasty 2018  3. Scar irritation and breakdown 4 x 8 mm, popliteal fossa without evidence of superficial or deep infection    We reviewed the clinical and radiographic findings with the patient and her . Based on our impression this is likely a mechanical issue due to the scar being in a tough place, the popliteal fossa) and a significant amount of mechanical irritation as a result of knee range of motion this area undergoes.  Patient has been unable to find a suitable dressing with gauze or Band-Aids due to the nature of this location being a high motion area, with confined space.  Today we tried several dressings, ultimately a 4 x 4 bordered Mepilex dressing was the most suitable to the location.  There is no acceptable alternative given the above-stated factors.  We  gave the patient a prescription to obtain a months worth supply of these bandages at their pharmacy.  We will plan to see the patient back in several weeks for repeat evaluation to see how the healing is progressing.  Patient was instructed to return sooner if there is any concerning new changes such as erythema, worsening drainage, worsening pain, increasing in the size of the open area with eschar.    The patient and her  expressed good understanding and agreement of the plan as stated above.    Patient seen and discussed with Dr. Ojeda who agrees with the above assessment and plan.     Khurram Castillo MD  Orthopedic Surgery PGY-4    Again, thank you for allowing me to participate in the care of your patient.      Sincerely,    Bryon Ojeda MD

## 2019-12-13 NOTE — NURSING NOTE
Chief Complaint   Patient presents with     Surgical Followup     Pt. states that she is here today to F/u Sarcoma, right posterior calf musculature DOS: 02/15/2010 w/Right TKA done by Dr. Gomez in 2018.      RECHECK     Pt. states that she would like to also discuss pain of her Right Outter Lat. Foot that has been present for years.       84 year old  1935      [facility] - [place], [place] - [address]    Allergies   Allergen Reactions     Colchicine      Other reaction(s): Gastrointestinal  diarrhea     Niacin      skin rash     Ferrous Sulfate Rash       Current Outpatient Medications   Medication     levothyroxine (LEVOTHROID) 75 MCG tablet     acetaminophen (TYLENOL) 500 MG tablet     ferrous gluconate (FERGON) 324 (38 Fe) MG tablet     polyethylene glycol (MIRALAX/GLYCOLAX) Packet     senna-docusate (SENOKOT-S;PERICOLACE) 8.6-50 MG per tablet     warfarin (COUMADIN) 3 MG tablet     No current facility-administered medications for this visit.

## 2019-12-14 NOTE — PROGRESS NOTES
Department of Orthopedic Surgery  Clinic Progress Note    PATIENT NAME: Jigna Allen   MRN: [id number]  AGE: 84 year old  BMI: There is no height or weight on file to calculate BMI.      REASON FOR VISIT: Surgical Followup (Pt. states that she is here today to F/u Sarcoma, right posterior calf musculature DOS: 02/15/2010 w/Right TKA done by Dr. Gomez in 2018. ) and RECHECK (Pt. states that she would like to also discuss pain of her Right Outter Lat. Foot that has been present for years.)    DX:               1. 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               2. 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 R knee DJD     Treatments:              1. neoadjuvant chemotherapy, surgical resection Feb 15, 2010. Complications post-op of skin loss with draining seroma, treated with I & D and    systemic antibiotics. She had 4 cycles of doxil/ifos finishing about Feb 2010. There was <10% viable cells at surgery. She finished post op XRT     about June 2010.              2.6/12/18, R TKA    HISTORY OF PRESENT ILLNESS:  Jigna Allen is a 84 year old female who presents for follow up of of the above concerns.  She reports that several weeks ago she noted to have an area of cracking skin in her posterior knee in the area of her prior incision in the popliteal fossa.  She reports that she cannot see this area but her  has looked at it.  They note that there is a scab over the midpoint of the incision.  Occasionally the wound has been weeping they have not noticed any surrounding erythema.  They report the drainage has been clear.  There is no malodorous smell.  The patient has been in good health with no fevers or chills.  They report they have not been covering the scab area at present because they were having a difficult time with bandages and gauze sticking to the area or being to bullky to allow for comfortably use of  clothing/knee range of motion. This has led to repeated cycles of the scab being rubbed off and serous weeping until it re-scabs over.  They have questions regarding the treatment of this today.      PHYSICAL EXAM:  On physical examination the patient appears the stated age, is in no acute distress, affect is appropriate, and breathing is non-labored.  There were no vitals taken for this visit.  Data Unavailable  There is no height or weight on file to calculate BMI.    Right leg, popliteal fossa, well-healed surgical incision with exception of the midpoint where there is a 5 mm x 8 mm brown eschar over the incision.  The eschar is poorly adherent today and the remainder was removed.  Underlying there is healthy dermal and subcutaneous tissue.  There is no significant evidence of any surrounding erythema.  There is no significant drainage.  There is no malodorous smell.  The patient is nontender in the surrounding areas     Distally she is neurovascularly intact.  Good nontender knee range of motion.     ASSESSMENT/PLAN: Jigna Allen is a 84 year old female who is status post resection of a pleomorphic sarcoma in her right distal knee//thigh in 2010 which was initially complicated by wound healing problems.  The patient also has degenerative knee arthritis and is status post a right total knee arthroplasty by Dr. Travis in 2018.  She now presents with a small area of breakdown of her incision in the popliteal fossa at the midpoint of the incision in the crease.  There are no concerning signs for deep infection, this is likely a mechanical issue, due to the scar tissue in this area and the large amount of motion and irritation this area goes through as a result of knee range of motion.    1. Prior pleomorphic sarcoma of the distal thigh, (malignant fibrous histiocytoma) status post resection  2. Right total knee arthroplasty 2018  3. Scar irritation and breakdown 4 x 8 mm, popliteal fossa without evidence of  superficial or deep infection    We reviewed the clinical and radiographic findings with the patient and her . Based on our impression this is likely a mechanical issue due to the scar being in a tough place, the popliteal fossa) and a significant amount of mechanical irritation as a result of knee range of motion this area undergoes.  Patient has been unable to find a suitable dressing with gauze or Band-Aids due to the nature of this location being a high motion area, with confined space.  Today we tried several dressings, ultimately a 4 x 4 bordered Mepilex dressing was the most suitable to the location.  There is no acceptable alternative given the above-stated factors.  We  gave the patient a prescription to obtain a months worth supply of these bandages at their pharmacy.  We will plan to see the patient back in several weeks for repeat evaluation to see how the healing is progressing.  Patient was instructed to return sooner if there is any concerning new changes such as erythema, worsening drainage, worsening pain, increasing in the size of the open area with eschar.    The patient and her  expressed good understanding and agreement of the plan as stated above.      Patient seen and discussed with Dr. Ojeda who agrees with the above assessment and plan.       Khurram Castillo MD  Orthopedic Surgery PGY-4        Answers for HPI/ROS submitted by the patient on 12/13/2019   General Symptoms: No  Skin Symptoms: Yes  HENT Symptoms: No  EYE SYMPTOMS: No  HEART SYMPTOMS: No  LUNG SYMPTOMS: No  INTESTINAL SYMPTOMS: No  URINARY SYMPTOMS: No  GYNECOLOGIC SYMPTOMS: No  BREAST SYMPTOMS: No  SKELETAL SYMPTOMS: No  BLOOD SYMPTOMS: No  NERVOUS SYSTEM SYMPTOMS: No  MENTAL HEALTH SYMPTOMS: No  Rashes: Yes  Skin thickening: Yes

## 2019-12-15 ENCOUNTER — HEALTH MAINTENANCE LETTER (OUTPATIENT)
Age: 84
End: 2019-12-15

## 2019-12-20 ENCOUNTER — TELEPHONE (OUTPATIENT)
Dept: ORTHOPEDICS | Facility: CLINIC | Age: 84
End: 2019-12-20

## 2019-12-20 NOTE — TELEPHONE ENCOUNTER
[facility] Call Center    Phone Message    May a detailed message be left on voicemail: yes    Reason for Call: Other: pt is calling again about the below information, she states its very urgent as she is leaving town tomorrow, please call pt once complete asap thanks     Action Taken: Message routed to:  [facility] ortho

## 2019-12-20 NOTE — TELEPHONE ENCOUNTER
Called Jigna to let her know that we filled out the form for her dressings and faxed it back to [facility] along with Dr. Ojeda's last note.

## 2019-12-20 NOTE — TELEPHONE ENCOUNTER
[facility]    Phone Message    May a detailed message be left on voicemail: yes    Reason for Call: Other: Pt insurance is needing more supporting information for the daily bandages that pt is neding. [employer] has faxed some information over several times and would like the information returned as soon as possible. [employer] # [phone] fax#[phone]. Please follow up with pt when completed. Thank you      Action Taken: Message routed to:  [facility] ortho

## 2020-01-06 NOTE — PROGRESS NOTES
I saw the patient with .  I agree with his assessment and plan.  I was present for the history physical exam and discussion with the patient about the plan.  Answers for HPI/ROS submitted by the patient on 12/13/2019   General Symptoms: No  Skin Symptoms: Yes  HENT Symptoms: No  EYE SYMPTOMS: No  HEART SYMPTOMS: No  LUNG SYMPTOMS: No  INTESTINAL SYMPTOMS: No  URINARY SYMPTOMS: No  GYNECOLOGIC SYMPTOMS: No  BREAST SYMPTOMS: No  SKELETAL SYMPTOMS: No  BLOOD SYMPTOMS: No  NERVOUS SYSTEM SYMPTOMS: No  MENTAL HEALTH SYMPTOMS: No  Rashes: Yes  Skin thickening: Yes

## 2020-03-27 ENCOUNTER — TELEPHONE (OUTPATIENT)
Dept: ORTHOPEDICS | Facility: CLINIC | Age: 85
End: 2020-03-27

## 2020-03-28 NOTE — TELEPHONE ENCOUNTER
Attending note:    DX:   1. Right calf high grade MFH.   2. 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 R knee DJD     Treatments:  1. neoadjuvant chemotherapy, surgical resection Feb 15, 2010. Complications post-op of skin loss with draining seroma, treated with I & D and systemic antibiotics. She had 4 cycles of doxil/ifos finishing about Feb 2010. There was <10% viable cells at surgery. She finished post op XRT about June 2010  2. 6/12/18, R TKA ([facility]      I received call from Dr. Yas Hernandez  (cell [phone]), plastic surgery [facility], regarding this pt with an open popliteal wound after above treatments for which an I and D was performed today.  She noted that bone was palpable within the wound, probably the tibia and samples sent for culture.  Soft tissue samples sent for histology as well.  A VAC dressing was applied. She was not placed on any antibx and is not septic.      IMP:  1. Rule out PJI R TKA.  2. Rule out post-radiation sarcoma or SCCA in chronic wound (Marjolin's ulcer)     Recommendations:  1. Aspiration of R TKA via clean anterior route to eval for PJI.  Bacterial and fungus cultures, cell count, diff, alpha defensin synovasure testing.  Check ESR, CRP too.  2. Await pathology results from [facility]  3. Obtain current XR.  4. I asked Dr. Hernandez to keep her off all antibx therapy until a knee aspirate and culture are obtained.  5. I have sent Epic message to staff to contact pt and get her in ASAP.       MD Pelon Cid Family Professor  Oncology and Adult Reconstructive Surgery  Dept Orthopaedic Surgery, [facility]  [phone] office, [phone] pager  [email or web address]    ______________________________________    Brief Operative Note - Yas Hernandez MD - 03/27/2020 3:00 PM CDT  Formatting of this note might be different from the original.  [facility]   Brief Operative Progress Note     Surgery Date:  3/27/2020    Surgeon(s) and Role:  * Yas Hernandez MD - Primary    Pre-op Diagnosis:   * Open wound of right knee, subsequent encounter [S81.001D]     Post-op Diagnosis:   * Open wound of right knee, subsequent encounter [S81.001D]     Procedure(s) (LRB):  excision of right popliteal fossa wound with wound vac placement (Right)    EBL: 5cc    Specimens:   ID Type Source Tests Collected by Time Destination   1 : Right popliteal fossa wound, permanent, history of sarcoma Tissue Leg, right SURGICAL PATHOLOGY Yas Hernandez MD 3/27/2020 2:37 PM   A : Right popliteal fossa wound-perform gram stain Tissue Leg, right AEROBIC CULTURE, FUNGUS CULTURE, AEROBIC & ANAEROBIC CULTURE PANEL, AFB CULTURE Yas Hernandez MD 3/27/2020 2:41 PM   B : Right popliteal fossa bone- perform gram stain Bone Tissue Leg, right FUNGUS CULTURE, AEROBIC & ANAEROBIC CULTURE PANEL, AFB CULTURE Yas Hernandez MD 3/27/2020 2:44 PM     Complications / Findings: Deep wound with bone palpable    Yas Hernandez MD    Electronically signed by Yas Hernandez MD at 03/27/2020 3:00 PM CDT

## 2020-03-30 ENCOUNTER — TELEPHONE (OUTPATIENT)
Dept: ORTHOPEDICS | Facility: CLINIC | Age: 85
End: 2020-03-30

## 2020-03-30 ENCOUNTER — OFFICE VISIT (OUTPATIENT)
Dept: ORTHOPEDICS | Facility: CLINIC | Age: 85
End: 2020-03-30
Payer: COMMERCIAL

## 2020-03-30 VITALS — WEIGHT: 124.3 LBS | BODY MASS INDEX: 22.73 KG/M2

## 2020-03-30 DIAGNOSIS — Z96.651 S/P TOTAL KNEE ARTHROPLASTY, RIGHT: Primary | ICD-10-CM

## 2020-03-30 DIAGNOSIS — Z96.651 STATUS POST TOTAL RIGHT KNEE REPLACEMENT: ICD-10-CM

## 2020-03-30 LAB
APPEARANCE FLD: NORMAL
COLOR FLD: NORMAL
CRYSTALS SNV MICRO: NORMAL
EOSINOPHIL NFR FLD MANUAL: 2 %
LYMPHOCYTES NFR FLD MANUAL: 1 %
NEUTS BAND NFR FLD MANUAL: 94 %
OTHER CELLS FLD MANUAL: 3 %
SPECIMEN SOURCE FLD: NORMAL
SPECIMEN SOURCE SNV: NORMAL
WBC # FLD AUTO: NORMAL /UL

## 2020-03-30 RX ORDER — OXYCODONE HYDROCHLORIDE 5 MG/1
5 TABLET ORAL
COMMUNITY
Start: 2020-03-27 | End: 2020-04-14

## 2020-03-30 NOTE — NURSING NOTE
Reason For Visit:   Chief Complaint   Patient presents with     Consult     right knee aspriation to check for infection        Wt 56.4 kg (124 lb 4.8 oz)   BMI 22.73 kg/m           Michael Corbin, ATC

## 2020-03-30 NOTE — TELEPHONE ENCOUNTER
----- Message from He Gomez MD sent at 3/27/2020 10:21 PM CDT -----  To Clinic staff:    I received a call from Dr Yas Hernandez at [facility] this pt and we need to get her into clinic ASAP for an aspiration of her R TKA.  See my note from today.      Please contact pt and have her come in to see either the tumor or adult recon subspecialist on site in clinic ASAP next week.  Thanks

## 2020-03-30 NOTE — TELEPHONE ENCOUNTER
RN called and spoke with Jigna.  She has been asked to come into clinic today for an aspiration that will be done by Dr. Olmos.    Recommendations:  1. Aspiration of R TKA via clean anterior route to eval for PJI.  Bacterial and fungus cultures, cell count, diff, alpha defensin synovasure testing.  Check ESR, CRP too

## 2020-03-30 NOTE — LETTER
3/30/2020       RE: Jigna Allen  [address] [place] [address]     Dear Colleague,    Thank you for referring your patient, Jigna Allen, to the [facility]TH[facility] at [facility]. Please see a copy of my visit note below.    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  Ultimately she did undergo radiation treatment as well.  A couple years later, approximately 8 years ago, she underwent knee replacement surgery and this is been functioning very well.  In approximately December of this year she had the onset of a wound in the posterior aspect of her knee.  This is been initially managed by dermatology, vascular surgery as well as wound management.  It seems to is gotten larger.  She recently saw plastic surgery at [facility] who performed a surgical irrigation debridement, wound VAC placement.  Culture results were obtained at that time there was no gross involvement of the wound with the knee joint however the tibia was palpable at the base of the wound.    By report, the official culture results are not available in care everywhere and are pending at the time of this dictation, Klebsiella and E. coli growing from the wound.    The patient has not been septic has not been febrile (a low-grade temperature was potentially reported )and is otherwise feeling in her normal capacity.  In light of this arrangements were made of the patient present to clinic today for an aspiration of her knee, culture results, cell count, Gram stain, crystals, synovasure.  The patient presents to clinic today for this.    Examination today shows a wound VAC in place in the posterior aspect of her knee it measures approximately 4 x 2 cm.  It is clean it is not draining there is a good seal.  The anterior aspect of her knee shows no redness no  suggestion of infection.  No definite effusion about her knee.    Range of motion today shows approximately 90 degrees of flexion with 10 degrees short of full extension she reports to me that her flexion profile is unchanged since approximately December.  She thinks it is worse now than it was prior to that.  She does have a wound VAC in place that is over otherwise limiting her motion.    Clinical assessment:  Wound, posterior knee managed by plastic surgery with wound VAC in place  Status post sarcoma excision with radiation to right knee  Status post knee replacement surgery    Plan:  Aspiration of right knee.  Sent for cultures.    After informed consent was obtained under sterile technique 3 cc of clear appearing synovial fluid was aspirated without complication through a superior lateral approach.  This was sent for aerobic, anaerobic, fungus, Gram stain, cell count and differential, synovasure    I called the patient's plastic surgeon at outside hospital.  She will arrange for antibiotic management via their infectious disease team    We will follow lab results here and update the outside surgeon.  If these data suggest evidence of a prosthetic joint infection then she may need treatment different and if it shows no evidence of infection within the joint.      Again, thank you for allowing me to participate in the care of your patient.      Sincerely,    Cash Olmos MD

## 2020-03-30 NOTE — PROGRESS NOTES
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  Ultimately she did undergo radiation treatment as well.  A couple years later, approximately 8 years ago, she underwent knee replacement surgery and this is been functioning very well.  In approximately December of this year she had the onset of a wound in the posterior aspect of her knee.  This is been initially managed by dermatology, vascular surgery as well as wound management.  It seems to is gotten larger.  She recently saw plastic surgery at [facility] who performed a surgical irrigation debridement, wound VAC placement.  Culture results were obtained at that time there was no gross involvement of the wound with the knee joint however the tibia was palpable at the base of the wound.    By report, the official culture results are not available in care everywhere and are pending at the time of this dictation, Klebsiella and E. coli growing from the wound.    The patient has not been septic has not been febrile (a low-grade temperature was potentially reported )and is otherwise feeling in her normal capacity.  In light of this arrangements were made of the patient present to clinic today for an aspiration of her knee, culture results, cell count, Gram stain, crystals, synovasure.  The patient presents to clinic today for this.    Examination today shows a wound VAC in place in the posterior aspect of her knee it measures approximately 4 x 2 cm.  It is clean it is not draining there is a good seal.  The anterior aspect of her knee shows no redness no suggestion of infection.  No definite effusion about her knee.    Range of motion today shows approximately 90 degrees of flexion with 10 degrees short of full extension she reports to me that her flexion profile is unchanged since approximately December.  She thinks it is worse now than  it was prior to that.  She does have a wound VAC in place that is over otherwise limiting her motion.    Clinical assessment:  Wound, posterior knee managed by plastic surgery with wound VAC in place  Status post sarcoma excision with radiation to right knee  Status post knee replacement surgery    Plan:  Aspiration of right knee.  Sent for cultures.    After informed consent was obtained under sterile technique 3 cc of clear appearing synovial fluid was aspirated without complication through a superior lateral approach.  This was sent for aerobic, anaerobic, fungus, Gram stain, cell count and differential, synovasure    I called the patient's plastic surgeon at outside hospital.  She will arrange for antibiotic management via their infectious disease team    We will follow lab results here and update the outside surgeon.  If these data suggest evidence of a prosthetic joint infection then she may need treatment different and if it shows no evidence of infection within the joint.

## 2020-03-30 NOTE — TELEPHONE ENCOUNTER
RN contacted [facility] at [phone].  We are asking that they push to  PACS all imaging on the knee to also include the U/S as well.  They will push to us.

## 2020-03-30 NOTE — TELEPHONE ENCOUNTER
RN called and informed patient that she should be prepared for possible admission  Patient notified of this

## 2020-04-01 ENCOUNTER — TELEPHONE (OUTPATIENT)
Dept: ORTHOPEDICS | Facility: CLINIC | Age: 85
End: 2020-04-01

## 2020-04-01 ENCOUNTER — HOSPITAL ENCOUNTER (EMERGENCY)
Facility: CLINIC | Age: 85
Discharge: HOME | End: 2020-04-01
Attending: EMERGENCY MEDICINE | Admitting: EMERGENCY MEDICINE
Payer: COMMERCIAL

## 2020-04-01 ENCOUNTER — APPOINTMENT (OUTPATIENT)
Dept: GENERAL RADIOLOGY | Facility: CLINIC | Age: 85
End: 2020-04-01
Attending: EMERGENCY MEDICINE
Payer: COMMERCIAL

## 2020-04-01 VITALS
HEIGHT: 62 IN | WEIGHT: 125 LBS | SYSTOLIC BLOOD PRESSURE: 117 MMHG | BODY MASS INDEX: 23 KG/M2 | OXYGEN SATURATION: 98 % | DIASTOLIC BLOOD PRESSURE: 83 MMHG | RESPIRATION RATE: 16 BRPM | TEMPERATURE: 99.2 F

## 2020-04-01 DIAGNOSIS — M00.9 INFECTION OF RIGHT KNEE (H): ICD-10-CM

## 2020-04-01 LAB
ALBUMIN SERPL-MCNC: 2.5 G/DL (ref 3.4–5)
ALP SERPL-CCNC: 85 U/L (ref 40–150)
ALT SERPL W P-5'-P-CCNC: 26 U/L (ref 0–50)
ANION GAP SERPL CALCULATED.3IONS-SCNC: 6 MMOL/L (ref 3–14)
AST SERPL W P-5'-P-CCNC: 36 U/L (ref 0–45)
BASOPHILS # BLD AUTO: 0 10E9/L (ref 0–0.2)
BASOPHILS NFR BLD AUTO: 0.2 %
BILIRUB SERPL-MCNC: 0.6 MG/DL (ref 0.2–1.3)
BUN SERPL-MCNC: 17 MG/DL (ref 7–30)
CALCIUM SERPL-MCNC: 8.9 MG/DL (ref 8.5–10.1)
CHLORIDE SERPL-SCNC: 98 MMOL/L (ref 94–109)
CO2 SERPL-SCNC: 27 MMOL/L (ref 20–32)
CREAT SERPL-MCNC: 0.79 MG/DL (ref 0.52–1.04)
CRP SERPL-MCNC: 180 MG/L (ref 0–8)
DIFFERENTIAL METHOD BLD: ABNORMAL
EOSINOPHIL # BLD AUTO: 0 10E9/L (ref 0–0.7)
EOSINOPHIL NFR BLD AUTO: 0.2 %
ERYTHROCYTE [DISTWIDTH] IN BLOOD BY AUTOMATED COUNT: 13.2 % (ref 10–15)
ERYTHROCYTE [SEDIMENTATION RATE] IN BLOOD BY WESTERGREN METHOD: 111 MM/H (ref 0–30)
GFR SERPL CREATININE-BSD FRML MDRD: 69 ML/MIN/{1.73_M2}
GLUCOSE SERPL-MCNC: 104 MG/DL (ref 70–99)
HCT VFR BLD AUTO: 33.6 % (ref 35–47)
HGB BLD-MCNC: 10.5 G/DL (ref 11.7–15.7)
IMM GRANULOCYTES # BLD: 0 10E9/L (ref 0–0.4)
IMM GRANULOCYTES NFR BLD: 0.3 %
LACTATE BLD-SCNC: 1.4 MMOL/L (ref 0.7–2)
LYMPHOCYTES # BLD AUTO: 0.5 10E9/L (ref 0.8–5.3)
LYMPHOCYTES NFR BLD AUTO: 5.5 %
MCH RBC QN AUTO: 30.5 PG (ref 26.5–33)
MCHC RBC AUTO-ENTMCNC: 31.3 G/DL (ref 31.5–36.5)
MCV RBC AUTO: 98 FL (ref 78–100)
MONOCYTES # BLD AUTO: 0.7 10E9/L (ref 0–1.3)
MONOCYTES NFR BLD AUTO: 6.7 %
NEUTROPHILS # BLD AUTO: 8.6 10E9/L (ref 1.6–8.3)
NEUTROPHILS NFR BLD AUTO: 87.1 %
NRBC # BLD AUTO: 0 10*3/UL
NRBC BLD AUTO-RTO: 0 /100
PLATELET # BLD AUTO: 356 10E9/L (ref 150–450)
POTASSIUM SERPL-SCNC: 3.5 MMOL/L (ref 3.4–5.3)
PROT SERPL-MCNC: 8 G/DL (ref 6.8–8.8)
RBC # BLD AUTO: 3.44 10E12/L (ref 3.8–5.2)
SODIUM SERPL-SCNC: 132 MMOL/L (ref 133–144)
WBC # BLD AUTO: 9.8 10E9/L (ref 4–11)

## 2020-04-01 PROCEDURE — 96361 HYDRATE IV INFUSION ADD-ON: CPT | Performed by: EMERGENCY MEDICINE

## 2020-04-01 PROCEDURE — 80053 COMPREHEN METABOLIC PANEL: CPT | Performed by: EMERGENCY MEDICINE

## 2020-04-01 PROCEDURE — 25800030 ZZH RX IP 258 OP 636: Performed by: EMERGENCY MEDICINE

## 2020-04-01 PROCEDURE — 96360 HYDRATION IV INFUSION INIT: CPT | Performed by: EMERGENCY MEDICINE

## 2020-04-01 PROCEDURE — 83605 ASSAY OF LACTIC ACID: CPT | Performed by: EMERGENCY MEDICINE

## 2020-04-01 PROCEDURE — 73560 X-RAY EXAM OF KNEE 1 OR 2: CPT | Mod: RT

## 2020-04-01 PROCEDURE — 86140 C-REACTIVE PROTEIN: CPT | Performed by: EMERGENCY MEDICINE

## 2020-04-01 PROCEDURE — 87040 BLOOD CULTURE FOR BACTERIA: CPT | Performed by: EMERGENCY MEDICINE

## 2020-04-01 PROCEDURE — 85025 COMPLETE CBC W/AUTO DIFF WBC: CPT | Performed by: EMERGENCY MEDICINE

## 2020-04-01 PROCEDURE — 25000132 ZZH RX MED GY IP 250 OP 250 PS 637: Performed by: EMERGENCY MEDICINE

## 2020-04-01 PROCEDURE — 36415 COLL VENOUS BLD VENIPUNCTURE: CPT | Performed by: EMERGENCY MEDICINE

## 2020-04-01 PROCEDURE — 99285 EMERGENCY DEPT VISIT HI MDM: CPT | Mod: Z6 | Performed by: EMERGENCY MEDICINE

## 2020-04-01 PROCEDURE — 85652 RBC SED RATE AUTOMATED: CPT | Performed by: EMERGENCY MEDICINE

## 2020-04-01 PROCEDURE — 99285 EMERGENCY DEPT VISIT HI MDM: CPT | Mod: 25 | Performed by: EMERGENCY MEDICINE

## 2020-04-01 RX ORDER — OXYCODONE HYDROCHLORIDE 5 MG/1
5 TABLET ORAL ONCE
Status: COMPLETED | OUTPATIENT
Start: 2020-04-01 | End: 2020-04-01

## 2020-04-01 RX ORDER — MAGNESIUM HYDROXIDE 1200 MG/15ML
LIQUID ORAL
Status: DISCONTINUED
Start: 2020-04-01 | End: 2020-04-02 | Stop reason: HOSPADM

## 2020-04-01 RX ORDER — METRONIDAZOLE 500 MG/1
500 TABLET ORAL 2 TIMES DAILY
COMMUNITY
Start: 2020-03-31 | End: 2020-04-14

## 2020-04-01 RX ORDER — CEFPODOXIME PROXETIL 200 MG/1
200 TABLET, FILM COATED ORAL 2 TIMES DAILY
COMMUNITY
Start: 2020-03-31 | End: 2020-04-14

## 2020-04-01 RX ADMIN — SODIUM CHLORIDE 500 ML: 9 INJECTION, SOLUTION INTRAVENOUS at 16:55

## 2020-04-01 RX ADMIN — OXYCODONE HYDROCHLORIDE 5 MG: 5 TABLET ORAL at 21:40

## 2020-04-01 ASSESSMENT — MIFFLIN-ST. JEOR: SCORE: 975.25

## 2020-04-01 NOTE — TELEPHONE ENCOUNTER
Lab called with critical result which was relayed to Dr. Olmos:    Anaerobic culture shows light growth of Gram + cocci, possible aerobic organisms, aerotolerance testing in progress.

## 2020-04-01 NOTE — TELEPHONE ENCOUNTER
Attending note:    DX:   1. Right calf high grade MFH.   2. 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 R knee DJD     Treatments:  1. neoadjuvant chemotherapy, surgical resection Feb 15, 2010. Complications post-op of skin loss with draining seroma, treated with I & D and systemic antibiotics. She had 4 cycles of doxil/ifos finishing about Feb 2010. There was <10% viable cells at surgery. She finished post op XRT about June 2010  2. 6/12/18, R TKA ([facility]) [facility]  3. 1/27/2020, skin popliteal fossa punch bx :  No sarcoma  4. 3/30/2020, R TKA aspiration, WBC 17k,, PMNs 94%, Culture G + bacteria. Pathology pending        Recent Labs   Lab Test 06/14/18  0631 06/13/18  0622 06/05/18  1112 09/08/16  0930 05/10/16  1035 10/07/14  0848   HGB 11.0* 11.5* 14.3  --  13.5 13.7   CRP  --   --   --  <2.9  --   --    WBC  --   --  4.8  --  6.1 3.1*     Recent Labs   Lab Test 03/30/20  1430   FTYP Right Knee   FNEU 94   FOTH 3   FCOL [place]   FAPR Cloudy   FWBC 75333     Recent Labs   Lab Test 03/30/20  1430 06/05/18  1122 09/16/16  1535 09/16/16  1530   SDES Right Knee Fluid  Right Knee Fluid Midstream Urine Blood RARM Blood LARM   CULT Light growth  Gram positive cocci  Possible aerobic organism, aerotolerance testing in progress. Await final report.  *  Culture in progress  Critical Value/Significant Value called to and read back by  ISABELL DARDEN MD, 1034 4.1.20 MJ    Culture negative monitoring continues <10,000 colonies/mL  mixed urogenital kenisha   No growth No growth   RPT  --   --  FINAL 09/22/2016 FINAL 09/22/2016       I called and discussed the knee aspiration testing results with pt and her .  She has been placed on an oral antibx.  It is my impresison that she has a PJI and I outlined the standard 2 stage exchange arthroplasty procedures with her.  Unfortunately, her situation is more complicated due to the radiation related ulceration in her popliteal fossa.  Immediate soft  tissue coverage will be necessary if she wishes to pursue efforts at limb salvage as opposed to an amputation.  I offered to discuss this with her more and she would like to first make a decision whether to have her care continued at [facility] or [facility].    She will call our nurse by end of week and meanwhile will continue her VAC dressing and po antibx.    He Gomez MD  [facility] Family Professor  Oncology and Adult Reconstructive Surgery  Dept Orthopaedic Surgery, [facility]  [phone] office, [phone] pager  [email or web address]    Total Time = 15 min, 50% of which was spent in counseling and coordination of care as documented above.

## 2020-04-01 NOTE — ED NOTES
Bed: ED16  Expected date: 4/1/20  Expected time:   Means of arrival: Ambulance  Comments:  STP 4  84 y F  Wound to knee that's possibly infected  Yellow

## 2020-04-01 NOTE — ED AVS SNAPSHOT
[facility], [place], Emergency Department  [address] [place] [id number]  Phone:  [phone]                                    Jigna Allen   MRN: [id number]    Department:  [facility], Emergency Department   Date of Visit:  4/1/2020           After Visit Summary Signature Page    I have received my discharge instructions, and my questions have been answered. I have discussed any challenges I see with this plan with the nurse or doctor.    ..........................................................................................................................................  Patient/Patient Representative Signature      ..........................................................................................................................................  Patient Representative Print Name and Relationship to Patient    ..................................................               ................................................  Date                                   Time    ..........................................................................................................................................  Reviewed by Signature/Title    ...................................................              ..............................................  Date                                               Time          22EPIC Rev 08/18

## 2020-04-01 NOTE — ED TRIAGE NOTES
Pt. BIBA from home with complaints of increased drainage (purulent) from R posterior knee incision. Pt. Also reports foul smell and increased pain @ the site over the past few days. Pt. Reports a washout of the incision on Friday afternoon. Pt. A & O x 4, reports some difficulty walking @ home. Home care RN sent in patient for concern of infection @ site.

## 2020-04-01 NOTE — ED PROVIDER NOTES
History     Chief Complaint   Patient presents with     Wound Check     HPI  Jigna Allen is a 85 year old female with a past medical history of arthritis, hypothyroidism, sarcoma, right total knee replacement (6/12/18) who presents to the emergency department with a chief complaint of right knee pain and increased drainage (purulent in nature) from her R posterior knee incision. She also reports a foul smell from the area. Symptoms have been present over the past few days. She had a washout of the incision on Friday afternoon. She has had some assoicated difficulty walking at home. Her home care RN sent her in due to concerns about infection.    I have reviewed the Medications, Allergies, Past Medical and Surgical History, and Social History in the [employer] system.    Past Medical History:   Diagnosis Date     Anxiety      Arthritis 1990?     Complication of anesthesia     slow to wakeup     Hypothyroid      Sarcoma (H) 2009     Past Surgical History:   Procedure Laterality Date     ARTHROPLASTY KNEE Right 6/12/2018    Procedure: ARTHROPLASTY KNEE;  Right Total Knee Replacement ;  Surgeon: He Gomez MD;  Location: [facility] OR     CARPAL TUNNEL RELEASE RT/LT       excise sarcoma Right     right leg     TUBAL LIGATION       No current facility-administered medications for this encounter.      Current Outpatient Medications   Medication     acetaminophen (TYLENOL) 500 MG tablet     levothyroxine (LEVOTHROID) 75 MCG tablet     oxyCODONE (ROXICODONE) 5 MG tablet     polyethylene glycol (MIRALAX/GLYCOLAX) Packet     senna-docusate (SENOKOT-S;PERICOLACE) 8.6-50 MG per tablet     warfarin (COUMADIN) 3 MG tablet     Allergies   Allergen Reactions     Colchicine      Other reaction(s): Gastrointestinal  diarrhea     Niacin      skin rash     Ferrous Sulfate Rash     Past medical history, past surgical history, medications, and allergies were reviewed with the patient. Additional pertinent items: None    Social History      Socioeconomic History     Marital status:      Spouse name: Not on file     Number of children: Not on file     Years of education: Not on file     Highest education level: Not on file   Occupational History     Not on file   Social Needs     Financial resource strain: Not on file     Food insecurity     Worry: Not on file     Inability: Not on file     Transportation needs     Medical: Not on file     Non-medical: Not on file   Tobacco Use     Smoking status: Never Smoker     Smokeless tobacco: Never Used   Substance and Sexual Activity     Alcohol use: Yes     Comment: rarely     Drug use: No     Sexual activity: Yes     Partners: Male     Birth control/protection: Post-menopausal   Lifestyle     Physical activity     Days per week: Not on file     Minutes per session: Not on file     Stress: Not on file   Relationships     Social connections     Talks on phone: Not on file     Gets together: Not on file     Attends Synagogue service: Not on file     Active member of club or organization: Not on file     Attends meetings of clubs or organizations: Not on file     Relationship status: Not on file     Intimate partner violence     Fear of current or ex partner: Not on file     Emotionally abused: Not on file     Physically abused: Not on file     Forced sexual activity: Not on file   Other Topics Concern     Parent/sibling w/ CABG, MI or angioplasty before 65F 55M? Not Asked   Social History Narrative     Not on file     Social history was reviewed with the patient. Additional pertinent items: None    Review of Systems  General: No fevers or chills  Skin: No rash or diaphoresis  Eyes: No eye redness or discharge  Ears/Nose/Throat: No rhinorrhea or nasal congestion  Respiratory: No cough or SOB  Cardiovascular: No chest pain or palpitations  Gastrointestinal: No nausea, vomiting, or diarrhea  Genitourinary: No urinary frequency, hematuria, or dysuria  Musculoskeletal: positive for knee pain  Neurologic: No  "numbness or weakness  Psychiatric: No depression or SI  Hematologic/Lymphatic/Immunologic: No leg swelling, no easy bruising/bleeding  Endocrine: No polyuria/polydypsia    A complete review of systems was performed with pertinent positives and negatives noted in the HPI, and all other systems negative.    Physical Exam   BP: 124/57  Heart Rate: 95  Temp: 99.2  F (37.3  C)  Resp: 16  Height: 157.5 cm (5' 2\")  Weight: 56.7 kg (125 lb)  SpO2: 95 %      General: Well nourished, well developed, NAD  HEENT: EOMI, anicteric. NCAT, MMM  Neck: no jugular venous distension, supple, nl ROM  Cardiac: Regular rate and rhythm. Intact peripheral pulses  Pulm:airway patent, NLB  Skin: Warm and dry to the touch. Redness and open wound to posterior R knee with small amount of cloudy drainage, ABD pads overlying wound saturated with serous fluid  Extremities: No LE edema, no cyanosis, w/w/p, pt able to move R knee with rOM only slightly ltd 2/2 pain, distally NV intact  Neuro: A&Ox3, no gross focal deficits    ED Course        Procedures                           Labs Ordered and Resulted from Time of ED Arrival Up to the Time of Departure from the ED   CBC WITH PLATELETS DIFFERENTIAL - Abnormal; Notable for the following components:       Result Value    RBC Count 3.44 (*)     Hemoglobin 10.5 (*)     Hematocrit 33.6 (*)     MCHC 31.3 (*)     Absolute Neutrophil 8.6 (*)     Absolute Lymphocytes 0.5 (*)     All other components within normal limits   COMPREHENSIVE METABOLIC PANEL - Abnormal; Notable for the following components:    Sodium 132 (*)     Glucose 104 (*)     Albumin 2.5 (*)     All other components within normal limits   CRP INFLAMMATION - Abnormal; Notable for the following components:    CRP Inflammation 180.0 (*)     All other components within normal limits   ERYTHROCYTE SEDIMENTATION RATE AUTO - Abnormal; Notable for the following components:    Sed Rate 111 (*)     All other components within normal limits   LACTIC " ACID WHOLE BLOOD   BLOOD CULTURE   BLOOD CULTURE            Results for orders placed or performed during the hospital encounter of 04/01/20 (from the past 24 hour(s))   CBC with platelets differential   Result Value Ref Range    WBC 9.8 4.0 - 11.0 10e9/L    RBC Count 3.44 (L) 3.8 - 5.2 10e12/L    Hemoglobin 10.5 (L) 11.7 - 15.7 g/dL    Hematocrit 33.6 (L) 35.0 - 47.0 %    MCV 98 78 - 100 fl    MCH 30.5 26.5 - 33.0 pg    MCHC 31.3 (L) 31.5 - 36.5 g/dL    RDW 13.2 10.0 - 15.0 %    Platelet Count 356 150 - 450 10e9/L    Diff Method Automated Method     % Neutrophils 87.1 %    % Lymphocytes 5.5 %    % Monocytes 6.7 %    % Eosinophils 0.2 %    % Basophils 0.2 %    % Immature Granulocytes 0.3 %    Nucleated RBCs 0 0 /100    Absolute Neutrophil 8.6 (H) 1.6 - 8.3 10e9/L    Absolute Lymphocytes 0.5 (L) 0.8 - 5.3 10e9/L    Absolute Monocytes 0.7 0.0 - 1.3 10e9/L    Absolute Eosinophils 0.0 0.0 - 0.7 10e9/L    Absolute Basophils 0.0 0.0 - 0.2 10e9/L    Abs Immature Granulocytes 0.0 0 - 0.4 10e9/L    Absolute Nucleated RBC 0.0    Comprehensive metabolic panel   Result Value Ref Range    Sodium 132 (L) 133 - 144 mmol/L    Potassium 3.5 3.4 - 5.3 mmol/L    Chloride 98 94 - 109 mmol/L    Carbon Dioxide 27 20 - 32 mmol/L    Anion Gap 6 3 - 14 mmol/L    Glucose 104 (H) 70 - 99 mg/dL    Urea Nitrogen 17 7 - 30 mg/dL    Creatinine 0.79 0.52 - 1.04 mg/dL    GFR Estimate 69 >60 mL/min/[1.73_m2]    GFR Estimate If Black 80 >60 mL/min/[1.73_m2]    Calcium 8.9 8.5 - 10.1 mg/dL    Bilirubin Total 0.6 0.2 - 1.3 mg/dL    Albumin 2.5 (L) 3.4 - 5.0 g/dL    Protein Total 8.0 6.8 - 8.8 g/dL    Alkaline Phosphatase 85 40 - 150 U/L    ALT 26 0 - 50 U/L    AST 36 0 - 45 U/L   CRP inflammation   Result Value Ref Range    CRP Inflammation 180.0 (H) 0.0 - 8.0 mg/L   Erythrocyte sedimentation rate auto   Result Value Ref Range    Sed Rate 111 (H) 0 - 30 mm/h   Blood culture    Specimen: Arm, Right; Blood    VAD Collection~Unspecified Site   Result  Value Ref Range    Specimen Description Blood VAD Collection Unspecified Site     Culture Micro No growth after 2 hours    Lactic acid whole blood   Result Value Ref Range    Lactic Acid 1.4 0.7 - 2.0 mmol/L     2 views right knee radiographs 4/2/2020 9:46 AM     History: joint infection      Comparison: 12/13/2019 radiograph     Findings:     AP and lateral views of the right knee were obtained.      Right total knee arthroplasty revision with longstem femoral and  tibial components as well as 2 screws in the posterior medial tibial  plateau. No evidence of periprosthetic lucency to suggest loosening or  hardware failure. Surgical clips along the medial aspect of proximal  tibia.     No acute osseous abnormality.  No joint effusion.  Soft tissue is  unremarkable.                                                                      Impression:  1.  Stable appearance of revision total right knee arthroplasty  without evidence of hardware failure.     I have personally reviewed the examination and initial interpretation  and I agree with the findings.     JUTTA ELLERMANN, MD  Labs, vital signs, and imaging studies were reviewed by me.    Medications   sodium chloride 0.9% (bottle) 0.9 % irrigation (has no administration in time range)   0.9% sodium chloride BOLUS (0 mLs Intravenous Stopped 4/1/20 1839)   oxyCODONE (ROXICODONE) tablet 5 mg (5 mg Oral Given 4/1/20 2140)       Assessments & Plan (with Medical Decision Making)   Jigna Allen is a 85 year old female who presents with right knee infection. Concern for worsening. Has been on PO antibiotics, however still has most of her course left, likely needs more time to monitor for improvement. No fevers or other signs of severe systemic infection.     Labs remarkable for elevated CRP and ESR. WBC count wnl. XR shows no acute changes. Pt was d/w ortho who will come to evaluate the patient in the ER and d/w pt's primary ortho team if possible     2143 Ortho in the ER  "seeing patient    I have reviewed the nursing notes.    I have reviewed the findings, diagnosis, plan and need for follow up with the patient.    Pt was d/w ortho, they have seen the pt in the ER. They recommend oupatient f/u with ortho and plastics to arrange care, which may incolve placemet of antibiotic spacer, etc. Pt to continue PO antibiotics. Would like to avoid admitting pt as arranging care will likely take several days and pt would be at increased risk of being exposed to Covid-19 while in the hospital. As pt's VSS and no significant worsening of infected area, no need for admission at this time for IV antibiotics. Pt's dtr was reportedly concerned about wound care at home. Pt does have wound care RN coming every other day. Paged social work for assistance in possibly increasing this frequency. Pt's  was doing wound cares before, though pt's dtr is concerned he is not \"with it\" enough to successfully compllete this. Was not able to contact social work, no response to multiple pages. However, pt is not concerned about wound care at home and would like to be discharged home. Per her report, her  is in agreement with this plan as well and is waiting for her in the car to take her home once she is discharged. Ortho resident will contact clinic (ortho and plastics) to help arrange follow up    Patient to be discharged home. Advised to follow up with PCP as well as ortho and plastic surgery as directed. To return to ER immediately with any new/worsening symptoms. Plan of care discussed with patient who expresses understanding and agrees with plan of care. To continue prescribed antibiotics.       New Prescriptions    No medications on file       Final diagnoses:   Infection of right knee (H)       4/1/2020   [facility], [place], EMERGENCY DEPARTMENT     Kay Goetz MD  04/03/20 0038    "

## 2020-04-02 ENCOUNTER — TELEPHONE (OUTPATIENT)
Dept: ORTHOPEDICS | Facility: CLINIC | Age: 85
End: 2020-04-02

## 2020-04-02 NOTE — DISCHARGE INSTRUCTIONS
TODAY'S VISIT:  You were seen today for knee infection  -   - If you had any labs or imaging/radiology tests performed today, you should also discuss these tests with your usual provider.     FOLLOW-UP:  Please make an appointment to follow up with:  - Your Primary Care Provider. If you do not have a PCP, please call the Primary Care Center (phone: [phone] for an appointment  - Orthopedics (phone: [phone]) and plastic surgery    - Have your provider review the results from today's visit with you again to make sure no further follow-up or additional testing is needed based on those results.     PRESCRIPTIONS / MEDICATIONS:  - continue your antibiotics as prescribed    - continue with dressing changes twice daily, remove old dressing and place new dressing (wet to dry)    RETURN TO THE EMERGENCY DEPARTMENT  Return to the Emergency Department at any time for any new or worsening symptoms or any concerns.

## 2020-04-02 NOTE — TELEPHONE ENCOUNTER
[facility] Call Center    Phone Message    May a detailed message be left on voicemail: yes     Reason for Call: Other: Pt would like  a call back to discuss after emergency room care as she has a few questions and concerns. PLease call pt to discuss     Action Taken: Message routed to:  [facility] Ortho    Travel Screening: Not Applicable

## 2020-04-02 NOTE — CONSULTS
Orthopedic Surgery Consultation    Jigna Allen MRN# [id number]   Age: 85 year old YOB: 1935   Date of Admission:  4/1/2020    Reason for consult: Right knee wound   Requesting physician: No att. providers found              Impression and Recommendation (Resident / Clinician):   Jigna Allen is a 85 year old female with a history of right calf high-grade MFH status post    -- neoadjuvant chemotherapy, surgical resection 2/15/2010 complicated by draining seroma necessitating irrigation and debridements with systemic antibiotics, and completing radiation therapy June 2010  --Right total knee arthroplasty 6/12/2018 with Dr. Gomez  --Previous irrigation and debridements and wound VAC placement at outside institution for draining posterior knee wound, most recently 3/27/2020 through [facility]  --Right knee aspiration 3/30/2020 with Dr. Olmos, WBC 17 k, PMN 94%, GPC's on culture, speciation pending    Patient with known right total knee prosthetic joint infection and chronic right posterior knee deep, draining wound which likely communicates with the joint.  She has been managed in the past, most recently with plastic surgery through health [facility] as mentioned above, and recent knee aspiration 2 days ago demonstrated prosthetic joint infection w/ elevated white count and +GPC on culture.  She has overall remained stable, and does not demonstrate signs of sepsis here in the emergency department.  She does have elevated inflammatory markers, but normal white count, afebrile, and normal vital signs.  She reports that she is not felt unwell at home, and only presented to the emergency department today at the recommendation of her wound care nurses given concern for the appearance of the wound.    Had a lengthy discussion with both patient as well as her daughter over the phone.  Daughter does express some concern regarding patient's ability to manage at home, specifically self-cares and wound  cares.  While she does have health nurses and wound care nurses visiting every other day, her daughter feels that she may potentially need more substantial assistance at home or potentially even for short duration at a assisted living facility. Additionally she does not feel as though her father is capable of providing adequate cares at home. We extensively reviewed the nature of her mother's current condition, as well as the fact that she at this point does not demonstrate signs of sepsis or progression towards systemic illness from her focal right knee infection.  There is no evidence of tracking erythema or progressive cellulitis.  Her pain is well managed on oral medications.  Patient reports that she feels comfortable navigating at home from a mobility standpoint, and does state that her  has done dressing changes in the past and feels comfortable managing these for the time being.  Her daughter again mentioned the feasibility of patient managing at home and potential need for additional assistance, and therefore requested that social work be involved and potentially coordinating additional assistance.  Also discussed with the daughter that we will contact our clinic to help expedite evaluation outpatient and move her treatment course along, as well as if needed potentially providing additional resources for more healthcare support outpatient.    We discussed that based on the fact that patient was recently started on appropriate antibiotic regiment of Cefpodoxime and Flagyl, the fact that she is not septic or systemically ill, and the fact that she does not demonstrate any significant evidence of progressive cellulitis or localized spread of infection, we would not recommend inpatient admission.  We discussed that similarly, we would not recommend any emergent or urgent procedure such as irrigation, debridement, or amputation.  We reviewed that given the complexity of her knee, the process of planning  multiple procedural steps including coordination w/ our plastic surgery colleagues would take time, and would not be feasible approach to admit and wait for surgical planning especially given the current healthcare climate and ongoing concerns w/ Covid-19.     Ultimately patient expressed a good understanding and wished to discharge to home with close follow-up home in stable condition with her     Pain management: PRN p.o. management per ED  Antibiotics/Tetanus: Continue with current regimen of Cefpodoxime and  Dressings: Twice daily wet to dry dressing changes, as needed for saturation    Follow-up: Clinic with Dr. Gomez in 1 week; will sent message to clinic      Case and plan discussed with Dr. Gomez              Chief Complaint:   Right knee wound drainage.           History of Present Illness (Resident / Clinician):   Jigna Allen is a 85 year old female with a history of right calf high-grade MFH status post    -- neoadjuvant chemotherapy, surgical resection 2/15/2010 complicated by draining seroma necessitating irrigation and debridements with systemic antibiotics, and completing radiation therapy June 2010  --Right total knee arthroplasty 6/12/2018 with Dr. Gomez  --Previous irrigation and debridements and wound VAC placement at outside institution for draining posterior knee wound, most recently 3/27/2020 through [facility]  --Right knee aspiration 3/30/2020 with Dr. Olmos, WBC 17 k, PMN 94%, GPC's on culture, speciation pending    Patient presents today for evaluation of worsening posterior right knee wound.  She did most recently have a conversation over the phone with Dr. Gomez earlier today regarding her chronic posterior right knee wound as well as her right TKA PJI.  Discussion on multiple procedures of irrigation debridement and likely flap coverage with plastics along with a 2 staged right TKA revision was discussed as well as possibility of potential AKA.  Patient had additionally  expressed some consideration [facility].  The plan following this visit was for patient to consider options and reach out to our neck within the week regarding her wishes and goals of treatment.  However she was subsequently evaluated later this afternoon by her home nursing providers, and during a wound VAC change there was concern of worsening purulent appearance of her posterior knee wound, and she was encouraged to present to the emergency department for evaluation      History obtained from patient interview and chart review.        Past Medical History:     Past Medical History:   Diagnosis Date     Anxiety      Arthritis 1990?     Complication of anesthesia     slow to wakeup     Hypothyroid      Sarcoma (H) 2009            Past Surgical History:     Past Surgical History:   Procedure Laterality Date     ARTHROPLASTY KNEE Right 6/12/2018    Procedure: ARTHROPLASTY KNEE;  Right Total Knee Replacement ;  Surgeon: He Gomez MD;  Location: [facility] OR     CARPAL TUNNEL RELEASE RT/LT       excise sarcoma Right     right leg     TUBAL LIGATION              Social History:     Social History     Socioeconomic History     Marital status:      Spouse name: None     Number of children: None     Years of education: None     Highest education level: None   Occupational History     None   Social Needs     Financial resource strain: None     Food insecurity     Worry: None     Inability: None     Transportation needs     Medical: None     Non-medical: None   Tobacco Use     Smoking status: Never Smoker     Smokeless tobacco: Never Used   Substance and Sexual Activity     Alcohol use: Yes     Comment: rarely     Drug use: No     Sexual activity: Yes     Partners: Male     Birth control/protection: Post-menopausal   Lifestyle     Physical activity     Days per week: None     Minutes per session: None     Stress: None   Relationships     Social  "connections     Talks on phone: None     Gets together: None     Attends Faith service: None     Active member of club or organization: None     Attends meetings of clubs or organizations: None     Relationship status: None     Intimate partner violence     Fear of current or ex partner: None     Emotionally abused: None     Physically abused: None     Forced sexual activity: None   Other Topics Concern     Parent/sibling w/ CABG, MI or angioplasty before 65F 55M? Not Asked   Social History Narrative     None                Family History:     Family History   Problem Relation Age of Onset     Cancer Father      Prostate Cancer Father      Other Cancer Father      Cancer Brother                Allergies:     Allergies   Allergen Reactions     Colchicine      Other reaction(s): Gastrointestinal  diarrhea     Niacin      skin rash     Ferrous Sulfate Rash             Medications:     Current Facility-Administered Medications   Medication     sodium chloride 0.9% (bottle) 0.9 % irrigation     Current Outpatient Medications   Medication     cefpodoxime (VANTIN) 200 MG tablet     metroNIDAZOLE (FLAGYL) 500 MG tablet     acetaminophen (TYLENOL) 500 MG tablet     levothyroxine (LEVOTHROID) 75 MCG tablet     oxyCODONE (ROXICODONE) 5 MG tablet     polyethylene glycol (MIRALAX/GLYCOLAX) Packet     senna-docusate (SENOKOT-S;PERICOLACE) 8.6-50 MG per tablet     warfarin (COUMADIN) 3 MG tablet             Review of Systems:   A 10 point ROS was conducted and was otherwise negative except for HPI above.          Physical Exam:   /83   Temp 99.2  F (37.3  C) (Oral)   Resp 16   Ht 1.575 m (5' 2\")   Wt 56.7 kg (125 lb)   SpO2 98%   Breastfeeding No   BMI 22.86 kg/m    General: awake, alert, cooperative, no apparent distress, appears stated age  HEENT: normocephalic, atraumatic  Respiratory: breathing non-labored, no wheezing  Cardiovascular: nontachycardic  Skin: no rashes or lesions  Neurological: A&Ox3, CN II-XII " grossly intact  Pysch: appropriate affect     Musculoskeletal:  Right Lower Extremity: appearing wound bed overlying the deep gastrocnemius musculature, and while limited by patient discomfort appears to be deep tracking, and some communication tracking both medially and laterally over fascia.  There is thin purulent appearing material within the wound and that does drain from both medial and lateral aspects of the wound with range of motion of the knee.  She does have discomfort with range of motion of the knee limited from 40degrees to 80 degrees, with a significant knee flexion contracture.  She does have mild warmth and tenderness surrounding the knee and posterior wound notable again surrounding cellulitis or proximal tracking edema or streaking. Motor intact distally TA/GSC/EHL/FHL. SILT sp/dp/tibial/saph/sural nerves. Toes wwp.            Laboratory date:   CBC:  Lab Results   Component Value Date    WBC 9.8 04/01/2020    HGB 10.5 (L) 04/01/2020     04/01/2020       BMP:  Lab Results   Component Value Date     (L) 04/01/2020    POTASSIUM 3.5 04/01/2020    CHLORIDE 98 04/01/2020    CO2 27 04/01/2020    BUN 17 04/01/2020    CR 0.79 04/01/2020    ANIONGAP 6 04/01/2020    RAVEN 8.9 04/01/2020     (H) 04/01/2020       Inflammatory Markers:  Lab Results   Component Value Date    WBC 9.8 04/01/2020    WBC 4.8 06/05/2018    WBC 6.1 05/10/2016    .0 (H) 04/01/2020    CRP <2.9 09/08/2016    CRP 5.4 03/17/2010     (H) 04/01/2020     (H) 03/17/2010     (H) 03/10/2010       Lc Mae MD  Orthopedic Surgery, PGY-4  [phone]    Please page me directly with any questions/concerns during regular weekday hours before 5pm. If there is no response, if it is a weekend, or if it is during evening hours then please page the orthopaedic surgery resident on call.    Attestation:  This patient was discussed with Dr Olmos and Dr. Gomez who agrees with the above.

## 2020-04-06 NOTE — TELEPHONE ENCOUNTER
[facility] Call Center    Phone Message    May a detailed message be left on voicemail: yes     Reason for Call: Other:     Pt calling in asking to speak with Dr Gomez about follow up care for her infection in back of knee.      Pt has an [employer] Therapy System.     She is going to run out of the canisters.    *[phone] - to call with questions on the therapy system.     Please call her back to discuss ordering supplies and what her next steps should be.           Action Taken: Message routed to:  [facility] Ortho    Travel Screening: Not Applicable

## 2020-04-07 LAB
BACTERIA SPEC CULT: NO GROWTH
BACTERIA SPEC CULT: NO GROWTH
SPECIMEN SOURCE: NORMAL
SPECIMEN SOURCE: NORMAL

## 2020-04-07 NOTE — TELEPHONE ENCOUNTER
This writer attempted to contact the patient. The discharge instructions were reviewed on the message. Video or Telephone appointment offered to discuss concerns with Dr. Gomez this Thursday. Issue was concerning whether pt. Was going to continue [facility] vs. [facility].     Merline Nguyen RN on 4/7/2020

## 2020-04-08 ENCOUNTER — TELEPHONE (OUTPATIENT)
Dept: ORTHOPEDICS | Facility: CLINIC | Age: 85
End: 2020-04-08

## 2020-04-08 NOTE — TELEPHONE ENCOUNTER
Spoke with patient, answered questions re: tomorrow's appointment in clinic with Dr Gomez. Damaris Mejia RN

## 2020-04-08 NOTE — TELEPHONE ENCOUNTER
[facility] Call Center    Phone Message    May a detailed message be left on voicemail: yes     Reason for Call: Other: Pt returned call to [facility], requesting call back     Action Taken: Message routed to:  [facility] ortho    Travel Screening: Not Applicable

## 2020-04-08 NOTE — TELEPHONE ENCOUNTER
rec'd call from Dr. Hernandez regarding next steps in this patient's management.  I had not heard back from pt regarding her decision about where she wanted to continue care for the PJI.  I proposed that she come to  and we takeover management of her wound and PJI.    I will ask our nurse Merline MCCLURE to call pt today and if acceptable to pt, schedule in person clinic visit tomorrow to discuss her goals and decision re continued limb salvage vs amputation.  She should be prepared for possible hospital admission PRN.    MD Pelon Cid Family Professor  Oncology and Adult Reconstructive Surgery  Dept Orthopaedic Surgery, [facility]  [phone] office, [phone] pager  [email or web address]

## 2020-04-08 NOTE — TELEPHONE ENCOUNTER
[facility] Call Center    Phone Message    May a detailed message be left on voicemail: yes     Reason for Call: Other: Pt called to set up Video visit for Thrsday with Dr Gomez, please call back to help her set up.      Action Taken: Message routed to:  [facility] ortho    Travel Screening: Not Applicable

## 2020-04-09 ENCOUNTER — OFFICE VISIT (OUTPATIENT)
Dept: ORTHOPEDICS | Facility: CLINIC | Age: 85
End: 2020-04-09
Payer: COMMERCIAL

## 2020-04-09 ENCOUNTER — APPOINTMENT (OUTPATIENT)
Dept: PLASTIC SURGERY | Facility: CLINIC | Age: 85
End: 2020-04-09
Payer: COMMERCIAL

## 2020-04-09 DIAGNOSIS — T66.XXXS RADIATION ADVERSE EFFECT, SEQUELA: ICD-10-CM

## 2020-04-09 DIAGNOSIS — T84.50XD INFECTION OF PROSTHETIC JOINT, SUBSEQUENT ENCOUNTER: Primary | ICD-10-CM

## 2020-04-09 LAB
BACTERIA SPEC CULT: ABNORMAL
BACTERIA SPEC CULT: ABNORMAL
Lab: ABNORMAL
SPECIMEN SOURCE: ABNORMAL

## 2020-04-09 NOTE — PROGRESS NOTES
Attending note:    DX:   1. Right calf high grade MFH.   2. 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 R knee DJD     Treatments:  1. neoadjuvant chemotherapy, surgical resection Feb 15, 2010. Complications post-op of skin loss with draining seroma, treated with I & D and systemic antibiotics. She had 4 cycles of doxil/ifos finishing about Feb 2010. There was <10% viable cells at surgery. She finished post op XRT about June 2010  2. 6/12/18, R TKA ([facility]) [facility]  3. 1/27/2020, skin popliteal fossa punch bx :  No sarcoma  4. 3/30/2020, R TKA aspiration, WBC 17k,, PMNs 94%, Culture G + bacteria. Pathology pending      Jigna returns for discussion regarding limb salvage options or amputation.  Her history as noted above.  Currently is only on oral antibiotic therapy.  The VAC dressing is removed and examination of the popliteal wound is below.  When I probed the popliteal wound, the posterior aspect of the total knee implant is palpable.  He can fully extend the knee and is further flexion to 90 degrees.  He has been independently ambulatory and living with  at home.  There is been no history of fever or other illness.                          Recent Labs   Lab Test 06/14/18  0631 06/13/18  0622 06/05/18  1112 09/08/16  0930 05/10/16  1035 10/07/14  0848   HGB 11.0* 11.5* 14.3  --  13.5 13.7   CRP  --   --   --  <2.9  --   --    WBC  --   --  4.8  --  6.1 3.1*     Recent Labs   Lab Test 03/30/20  1430   FTYP Right Knee   FNEU 94   FOTH 3   FCOL Alexander   FAPR Cloudy   FWBC 94606     Recent Labs   Lab Test 03/30/20  1430 06/05/18  1122 09/16/16  1535 09/16/16  1530   SDES Right Knee Fluid  Right Knee Fluid Midstream Urine Blood RARM Blood LARM   CULT Light growth  Gram positive cocci  Possible aerobic organism, aerotolerance testing in progress. Await final report.  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  The radiated wound has opened with ulceration deep into and exposing the prosthetic implant.  There is no evidence of recurrent tumor based upon the biopsy with Dr. Hernandez performed at [facility].    Plan:  I had an extensive discussion with the patient, her  and daughter.  Reviewed the options of either limb salvage or proceeding directly with above-knee amputation.  Limb salvage would consist of a plastic surgery procedure to obtain closure of the soft tissue envelope and performing a two-stage exchange arthroplasty procedure.  I described the multiple operative procedures and the 4 to 8 months time range that this would take.  A prolonged course of antimicrobial therapy intravenously would be necessary as well to clear her infection.  It is difficult to predict the likelihood of successful limb salvage however if 1 extrapolates from the known evidence in patients without sarcomas and radiation, I would estimate the likelihood of success to be in the 50 to 60% range at best.  Furthermore, she would incur the risk of multiple operative procedures and coming into the hospital with numerous doctor visits and health care visits at a time of the ongoing coronavirus pandemic which, obviously puts her at risk for lolly the coronavirus.    I also mentioned to the family that if an amputation is performed, I do not foresee her using an above-knee amputation prostheses at her age.     Spoke with Dr. Antonio De Luna of our plastic surgical division who will arrange for a virtual consultation visit with the patient the near future to also provide additional information for the patient regarding plastic surgical  procedures and there rate of success.    Once patient and her  have made a decision with regards to pursuing either a plan for limb salvage or for above-knee amputation, they will notify us.  Meanwhile, I have discontinued use of her VAC dressing and she may perform gauze wet-to-dry dressing changes on a twice daily basis with the home nurse and her .  Gauze and an overlying Ace wrap on her foot up to the knee would be appropriate.  She may be weightbearing as tolerated.    He Gomez MD  [facility] Family Professor  Oncology and Adult Reconstructive Surgery  Dept Orthopaedic Surgery, [facility]  [phone] office, [phone] pager  [email or web address]    Total Time = 45 min, 50% of which was spent in counseling and coordination of care as documented above.

## 2020-04-09 NOTE — LETTER
4/9/2020       RE: Jigna Allen  [address] [place] [address]     Dear Colleague,    Thank you for referring your patient, Jigna Allen, to the [facility] at [facility]. Please see a copy of my visit note below.    Attending note:    DX:   1. Right calf high grade MFH.   2. 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 R knee DJD     Treatments:  1. neoadjuvant chemotherapy, surgical resection Feb 15, 2010. Complications post-op of skin loss with draining seroma, treated with I & D and systemic antibiotics. She had 4 cycles of doxil/ifos finishing about Feb 2010. There was <10% viable cells at surgery. She finished post op XRT about June 2010  2. 6/12/18, R TKA ([facility]) [facility]  3. 1/27/2020, skin popliteal fossa punch bx :  No sarcoma  4. 3/30/2020, R TKA aspiration, WBC 17k,, PMNs 94%, Culture G + bacteria. Pathology pending      Jigna returns for discussion regarding limb salvage options or amputation.  Her history as noted above.  Currently is only on oral antibiotic therapy.  The VAC dressing is removed and examination of the popliteal wound is below.  When I probed the popliteal wound, the posterior aspect of the total knee implant is palpable.  He can fully extend the knee and is further flexion to 90 degrees.  He has been independently ambulatory and living with  at home.  There is been no history of fever or other illness.                          Recent Labs   Lab Test 06/14/18  0631 06/13/18  0622 06/05/18  1112 09/08/16  0930 05/10/16  1035 10/07/14  0848   HGB 11.0* 11.5* 14.3  --  13.5 13.7   CRP  --   --   --  <2.9  --   --    WBC  --   --  4.8  --  6.1 3.1*     Recent Labs   Lab Test 03/30/20  1430   FTYP Right Knee   FNEU 94   FOTH 3   FCOL [place]   FAPR Cloudy   FWBC 53159     Recent Labs   Lab Test 03/30/20  1430 06/05/18  1122 09/16/16  1535 09/16/16  1530   SDES Right Knee Fluid  Right Knee Fluid  Midstream Urine Blood RARM Blood LARM   CULT Light growth  Gram positive cocci  Possible aerobic organism, aerotolerance testing in progress. Await final report.  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  The radiated wound has opened with ulceration deep into and exposing the prosthetic implant.  There is no evidence of recurrent tumor based upon the biopsy with Dr. Hernandez performed at [facility].    Plan:  I had an extensive discussion with the patient, her  and daughter.  Reviewed the options of either limb salvage or proceeding directly with above-knee amputation.  Limb salvage would consist of a plastic surgery procedure to obtain closure of the soft tissue envelope and performing a two-stage exchange arthroplasty procedure.  I described the multiple operative procedures and the 4 to 8 months time range that this would take.  A prolonged course of antimicrobial therapy intravenously would be necessary as well to clear her infection.  It is difficult to predict the likelihood of successful limb salvage however if 1 extrapolates from the known evidence in patients without sarcomas and radiation, I would estimate the likelihood of success to be in the 50 to 60% range at best.  Furthermore, she would incur the risk of multiple operative procedures and coming into the hospital with numerous doctor visits and health care visits at a time of the ongoing coronavirus pandemic which, obviously puts her at risk for lolly the coronavirus.    I also mentioned to the family that if an amputation is performed, I do not foresee her using  an above-knee amputation prostheses at her age.     Spoke with Dr. Antonio De Luna of our plastic surgical division who will arrange for a virtual consultation visit with the patient the near future to also provide additional information for the patient regarding plastic surgical procedures and there rate of success.    Once patient and her  have made a decision with regards to pursuing either a plan for limb salvage or for above-knee amputation, they will notify us.  Meanwhile, I have discontinued use of her VAC dressing and she may perform gauze wet-to-dry dressing changes on a twice daily basis with the home nurse and her .  Gauze and an overlying Ace wrap on her foot up to the knee would be appropriate.  She may be weightbearing as tolerated.    He Gomez MD  [facility] Family Professor  Oncology and Adult Reconstructive Surgery  Dept Orthopaedic Surgery, [facility]  [phone] office, [phone] pager  [email or web address]    Total Time = 45 min, 50% of which was spent in counseling and coordination of care as documented above.            Again, thank you for allowing me to participate in the care of your patient.      Sincerely,    He Gomez MD

## 2020-04-10 ENCOUNTER — TELEPHONE (OUTPATIENT)
Dept: ORTHOPEDICS | Facility: CLINIC | Age: 85
End: 2020-04-10

## 2020-04-10 LAB
BACTERIA SPEC CULT: ABNORMAL
Lab: ABNORMAL
SPECIMEN SOURCE: ABNORMAL

## 2020-04-10 NOTE — TELEPHONE ENCOUNTER
Patient would like to proceed with the limb salvage procedure. Messages to the team will be sent for next steps.       Merline Nguyen RN on 4/10/2020 at 3:14 PM

## 2020-04-13 ENCOUNTER — TELEPHONE (OUTPATIENT)
Dept: SURGERY | Facility: CLINIC | Age: 85
End: 2020-04-13

## 2020-04-13 ENCOUNTER — PATIENT OUTREACH (OUTPATIENT)
Dept: PLASTIC SURGERY | Facility: CLINIC | Age: 85
End: 2020-04-13

## 2020-04-13 ENCOUNTER — VIRTUAL VISIT (OUTPATIENT)
Dept: PLASTIC SURGERY | Facility: CLINIC | Age: 85
End: 2020-04-13
Payer: COMMERCIAL

## 2020-04-13 DIAGNOSIS — T81.89XD NON-HEALING SURGICAL WOUND, SUBSEQUENT ENCOUNTER: Primary | ICD-10-CM

## 2020-04-13 RX ORDER — CEFAZOLIN SODIUM 1 G/50ML
1 INJECTION, SOLUTION INTRAVENOUS SEE ADMIN INSTRUCTIONS
Status: CANCELLED | OUTPATIENT
Start: 2020-04-13

## 2020-04-13 RX ORDER — CEFAZOLIN SODIUM 2 G/50ML
2 SOLUTION INTRAVENOUS
Status: CANCELLED | OUTPATIENT
Start: 2020-04-13

## 2020-04-13 NOTE — TELEPHONE ENCOUNTER
[facility] Call Center    Phone Message    May a detailed message be left on voicemail: yes     Reason for Call: Other: pt Lawanda calling about her mom's leg. She is really worried about it and still waiting for the call back from the nurse.  Pt wants to save the leg. Please call back to discuss. She will be waiting.     Action Taken: Message routed to:  [facility] ([facility] plastic surgery    Travel Screening: Not Applicable

## 2020-04-13 NOTE — PATIENT INSTRUCTIONS
Spoke with pts daughter at length regarding plan to complete surgery next Monday. Pts daughter expresses concern that pts infection will worsen in this time and surgery will no longer be possible. Reassured pts daughter that pt has been instructed on worsening symptoms to report and that it is safe to wait until the coming week to complete surgery. Pts daughter states she is not comfortable with this plan and requests call from Dr. De Luna to discuss. Dr. De Luna contacted, he will reach out to pts daughter. Stella MILLER RNCC

## 2020-04-13 NOTE — TELEPHONE ENCOUNTER
[facility] Call Center    Phone Message    May a detailed message be left on voicemail: yes     Reason for Call: Other: Pt's daughter Lawanda needing call back asap from Stella. Lawanda stated the pt can not access MyChart and contact needs to be made through Lawanda only pt is in a lot of pain and very sick with a fever and cannot respond to Mychart. Pt is in need of emergency surgery this week or she will lose her leg, the pt has a very serious infected knee.        Action Taken: Message routed to:  [facility] ([facility] surgery    Travel Screening: Not Applicable

## 2020-04-13 NOTE — TELEPHONE ENCOUNTER
Spoke with pts daughter regarding pts leg wound. Pts daughter states pts wound has worsened. States pt has a fever today and would like to complete surgery tomorrow if possible. Explained that team would be contacted to clarify the plan for surgery. Pts daughter will be contacted back with recommendation.   Contacted pt to discuss symptoms. Pt states her temperature this morning was 99. Pt took temperature while on the phone, 98.3/ Pt denies any chills, n/v or increased pain. Pt states the wound does appear to be slightly larger and pt feels yellow drainage has increased. Explained that photos and symptoms will be reviewed with Dr. De Luna and pt will be contacted back with recommendation. Stella MILLER RNCC

## 2020-04-13 NOTE — TELEPHONE ENCOUNTER
[facility] Call Center    Phone Message    May a detailed message be left on voicemail: yes     Reason for Call: Other: Jigna is calling regarding her upcoming surgery with Dr. De Luna.  She is wondering the date and time of the surgery this week.    She can be reached at [phone].  Please call her as soon as you can.     Action Taken: Message routed to:  [facility] ([facility] Plastic Surgery    Travel Screening: Not Applicable

## 2020-04-14 ENCOUNTER — TELEPHONE (OUTPATIENT)
Dept: SURGERY | Facility: CLINIC | Age: 85
End: 2020-04-14

## 2020-04-14 ENCOUNTER — TELEPHONE (OUTPATIENT)
Dept: ORTHOPEDICS | Facility: CLINIC | Age: 85
End: 2020-04-14

## 2020-04-14 PROBLEM — T81.89XD NON-HEALING SURGICAL WOUND, SUBSEQUENT ENCOUNTER: Status: ACTIVE | Noted: 2020-04-14

## 2020-04-14 NOTE — TELEPHONE ENCOUNTER
[facility] Call Center    Phone Message    May a detailed message be left on voicemail: yes     Reason for Call: Order(s): Home Care Orders: Skilled Nursing: to stop the wound vac, and switch to wet to dry twice a day to change the dressing ( and home care when there), and nursing to Eval the wound three times a week (Mon/Weds/Fri)    Also needs Orders faxed to medical supply company for additional supplies to take care of wet to dry - [employer], [address], [place] [address], Ph # [phone], Fax # [phone] - needs supply Order faxed to them    Needed: about 200 (a loaf - package) 4x4's gauze (to pack the wound), 14 ABD pads (two week supply for now), 14 rolls Kerlix gauze (two week supply for now), 2 inch roll of paper tape, 7 Ace wraps, 4 inch please (to change it out every other day - two week supply for now)     Need all these supplies to care for wound for wet to dry - please call Fabricio with Orders and please fax supply Order to [employer] - Thanks    Fabricio (Home Health Care Nurse who does Pt's wound care) - from [facility] in [place], Ph # [phone]    Action Taken: Message routed to:  [facility] Ortho    Travel Screening: Not Applicable

## 2020-04-14 NOTE — PROGRESS NOTES
TELEPHONE NOTE       REFERRING PROVIDER:  He Gomez MD      PRESENTING COMPLAINT:  Consultation for a right knee wound.      HISTORY OF PRESENTING COMPLAINT:  This was a telephone consult virtually done due to the obvious pandemic.  The video did not work.  Ms. Allen is 85 years old.  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  She underwent radiation therapy and wide local excision with primary closure.  Over the years she has done well and fared well from it.  Two years ago she underwent a right side total knee arthroplasty through an anterior incision that also healed well.  Since December of last year, she has been having some nonspecific drainage and a wound problem over the popliteal area of the right leg.  She cannot recall exactly how it started, but it has slowly gotten worse over the last few months.  Her wound was being followed by [facility] facilities; however, the patient has subsequently been referred to Dr. Gomez for further management.  Dr. Gomez has seen her, and her underlying arthroplasty is exposed and technically infected, although the patient does not have any fevers, no cellulitis and is off of antibiotics currently.  Conservative wound management is all that has been done on the popliteal area.  Additionally, biopsies of that area done in March showed no evidence for recurrent sarcoma.  The patient has been sent to me now for my recommendations going forward.      PAST MEDICAL HISTORY:  Hypothyroidism, history of Lyme disease.      PAST SURGICAL HISTORY:  As per HPI with arthroplasty of the knee and tumor excision, carpal tunnel release bilaterally and tubal ligation.      MEDICATIONS:  Levothyroxine.      ALLERGIES:  Colchicine, niacin, iron and sulfate.      SOCIAL HISTORY:  Does not smoke, does not drink.      REVIEW OF SYSTEMS:  Denies  chest pain, shortness of breath, MI, CVA, DVT and PE.  It was very clear that she had never had a DVT or PE in the past, although she was on Coumadin after arthroplasty prophylactically.      PHYSICAL EXAMINATION:  Vital signs are stable.  She is afebrile, in no obvious distress.  I examined the pictures that were taken recently.  It shows a highly radiated popliteal area of the right knee with a lot of radiation changes.  It looks like quite woody tissues.  There is at least a 10 x 5 cm open area.  No evidence for cellulitis per se.  The knee is not very swollen.  On talking to the patient and looking at this through the chart, she has distal pulses that are palpable, and sensation is grossly intact.      ASSESSMENT AND PLAN:  Based upon the above findings, a diagnosis of history of a right knee MFH of the popliteal area treated with wide local excision and radiation therapy with ensuing total knee arthroplasty with a nonhealing chronic radiated wound in the popliteal area with exposed underlying hardware, here for recommendations regarding reconstruction.  On talking to Dr. Gomez, he believes that the knee arthroplasty needs to be removed, a temporary spacer placed, and ultimately down the road another arthroplasty can be placed if limb salvage is undertaken.  To do that, she needs good quality closure, which would mean a free flap reconstruction of the area after debriding her nonhealing radiated wound.  Therefore, limb salvage in her particular case given the situation at hand is extremely difficult, but not impossible.  It would necessitate multiple surgeries, the first surgery being wound healing, the second surgery being arthroplasty removal and spacer placement and the third surgery being removal of the spacer and placement of the permanent arthroplasty probably months later.  I had a basia, detailed discussion with the patient, her  and her daughters about this problem.  She has a very real risk of  having complications leading to an amputation.  Therefore, I think the most simple, direct way to get her healed would be an amputation, but that would necessitate an above-the-knee amputation, which has its own problems.  I have advocated that she meet with a prosthetist and occupational therapist and discuss with Dr. Gomez the implications of an AKA.  The other thing to take into consideration is the pandemic we are going through.  Doing this kind of limb salvage procedure puts the patient at a high risk for catching the virus, and if she catches it given her age and her medical comorbidities, it puts her at a very high risk of mortality.  This was clearly explained to the patient and her family.  They will think about it and let me know how they want to proceed.  I look forward to helping her out as indicated.  All questions were answered.  They were happy with the visit.      Total time spent with the patient was 45 minutes, of which more than half was counseling.       cc:   He Gomez MD   [facility]    [address] [place] [id number]

## 2020-04-14 NOTE — PROGRESS NOTES
Preoperative Assessment Center Medication History Note    Medication history completed via phone call on April 14, 2020 by this writer. See Epic admission navigator for prior to admission medications. Operating room staff will still need to confirm medications and last dose information on day of surgery.     Medication history interview sources:  patient    Changes made to PTA medication list (reason)  Added: none  Deleted: flagyl, cefpodoxime (completed course)  Miralax, senna-docusate.   Changed: acetaminophen.     Additional medication history information (including reliability of information, actions taken by pharmacist):  -- vantin/flagyl - completed   -- No recent (within 30 days) course of steroids  -- Patient declines being on any other prescription or over-the-counter medications    Prior to Admission medications    Medication Sig Last Dose Taking? Auth Provider   acetaminophen (TYLENOL) 500 MG tablet Take 2 tablets (1,000 mg) by mouth every 6 hours as needed for mild pain  Patient taking differently: Take 500-1,000 mg by mouth every 8 hours as needed for mild pain  Taking Yes Fahad Hi MD   levothyroxine (LEVOTHROID) 75 MCG tablet Take 75 mcg by mouth daily  Taking Yes Reported, Patient        Medication history completed by: Jonn Nguyen [facility]

## 2020-04-14 NOTE — TELEPHONE ENCOUNTER
Surgery is scheduled with Dr. De Luna on 4/20 at [facility].  Scheduled per MD.    H&P: to be completed by PAC or had one at [facility] on 3/24 or 3/25.  PAC: 4/16 at 11 AM  POST-OP: 5/5 at 11 AM    The RN completed the education regarding the surgery.     The surgery packet was provided that contains surgical instructions and a map.     They are aware that they will receive a call  ~2 days prior to the scheduled procedure and will be given an exact arrival/start time.    I contacted the patient and was able to confirm the scheduled dates.

## 2020-04-15 ENCOUNTER — TELEPHONE (OUTPATIENT)
Dept: ORTHOPEDICS | Facility: CLINIC | Age: 85
End: 2020-04-15

## 2020-04-15 ENCOUNTER — TELEPHONE (OUTPATIENT)
Dept: SURGERY | Facility: CLINIC | Age: 85
End: 2020-04-15

## 2020-04-15 ENCOUNTER — PATIENT OUTREACH (OUTPATIENT)
Dept: PLASTIC SURGERY | Facility: CLINIC | Age: 85
End: 2020-04-15

## 2020-04-15 ENCOUNTER — ANESTHESIA EVENT (OUTPATIENT)
Dept: SURGERY | Facility: CLINIC | Age: 85
DRG: 464 | End: 2020-04-15
Payer: COMMERCIAL

## 2020-04-15 NOTE — ANESTHESIA PREPROCEDURE EVALUATION
"Anesthesia Pre-Procedure Evaluation    Patient: Jigna Allen   MRN:     [id number] Gender:   female   Age:    85 year old :      1935        Preoperative Diagnosis: * No surgery found *        LABS:  CBC:   Lab Results   Component Value Date    WBC 9.8 2020    WBC 4.8 2018    HGB 10.5 (L) 2020    HGB 11.0 (L) 2018    HCT 33.6 (L) 2020    HCT 44.5 2018     2020     2018     BMP:   Lab Results   Component Value Date     (L) 2020     2018    POTASSIUM 3.5 2020    POTASSIUM 4.2 2018    CHLORIDE 98 2020    CHLORIDE 105 2018    CO2 27 2020    CO2 30 2018    BUN 17 2020    BUN 12 2018    CR 0.79 2020    CR 0.67 2018     (H) 2020     (H) 2018     COAGS:   Lab Results   Component Value Date    INR 1.27 (H) 06/15/2018     POC:   Lab Results   Component Value Date    BGM 94 2018     OTHER:   Lab Results   Component Value Date    LACT 1.4 2020    A1C 5.6 2018    RAVEN 8.9 2020    PHOS 3.3 2016    MAG 2.1 2018    ALBUMIN 2.5 (L) 2020    PROTTOTAL 8.0 2020    ALT 26 2020    AST 36 2020    ALKPHOS 85 2020    BILITOTAL 0.6 2020    TSH 2.47 2017    .0 (H) 2020     (H) 2020        Preop Vitals    BP Readings from Last 3 Encounters:   20 117/83   19 122/65   18 122/67    Pulse Readings from Last 3 Encounters:   19 96   18 88   18 73      Resp Readings from Last 3 Encounters:   20 16   19 18   18 14    SpO2 Readings from Last 3 Encounters:   20 98%   19 94%   18 97%      Temp Readings from Last 1 Encounters:   20 99.2  F (37.3  C) (Oral)    Ht Readings from Last 1 Encounters:   20 1.575 m (5' 2\")      Wt Readings from Last 1 Encounters:   20 56.7 kg (125 lb)    Estimated " "body mass index is 22.86 kg/m  as calculated from the following:    Height as of 4/1/20: 1.575 m (5' 2\").    Weight as of 4/1/20: 56.7 kg (125 lb).     LDA:        Past Medical History:   Diagnosis Date     Anxiety      Arthritis 1990?     Complication of anesthesia     slow to wakeup     Hypothyroid      Sarcoma (H) 2009      Past Surgical History:   Procedure Laterality Date     ARTHROPLASTY KNEE Right 6/12/2018    Procedure: ARTHROPLASTY KNEE;  Right Total Knee Replacement ;  Surgeon: He Gomez MD;  Location: [facility] OR     CARPAL TUNNEL RELEASE RT/LT       excise sarcoma Right     right leg     KNEE SURGERY       TUBAL LIGATION        Allergies   Allergen Reactions     Colchicine      Other reaction(s): Gastrointestinal  diarrhea     Niacin      skin rash     Ferrous Sulfate Rash        Anesthesia Evaluation     . Pt has had prior anesthetic. Type: General and Regional    History of anesthetic complications    Slow to wake up      ROS/MED HX    ENT/Pulmonary:  - neg pulmonary ROS    (-) tobacco use   Neurologic:  - neg neurologic ROS     Cardiovascular:     (+) ----. : . . . :. valvular problems/murmurs Mild aortic valve sclerosis, mild tricuspid regurgitation :. Previous cardiac testing Echodate:6/12/18results:date: results:ECG reviewed date:3/24/20 results:Sinus rhythm   date: results:          METS/Exercise Tolerance:  3 - Able to walk 1-2 blocks without stopping   Hematologic: Comments: Denies blood thinners or bleeding disorders  Platelets WNL    (+) Anemia, History of Transfusion (3/2/10) no previous transfusion reaction -      Musculoskeletal:   (+) arthritis,  other musculoskeletal- right leg sarcoma, excision in 2010, s/p right total knee arthroplasty       GI/Hepatic:  - neg GI/hepatic ROS       Renal/Genitourinary:  - ROS Renal section negative       Endo:     (+) thyroid problem hypothyroidism, .      Psychiatric:     (+) psychiatric history anxiety      Infectious Disease:  - neg infectious " disease ROS       Malignancy:   (+) Malignancy (sarcoma) History of Other  Other CA right leg Remission status post Radiation and Surgery         Other:    (+) No chance of pregnancy C-spine cleared: N/A, no H/O Chronic Pain,no other significant disability                        PHYSICAL EXAM:   Mental Status/Neuro: A/A/O; Age Appropriate   Airway: Facies: Feasible  Mallampati: II  Mouth/Opening: Full  TM distance: > 6 cm  Neck ROM: Full   Respiratory: Auscultation: CTAB     Resp. Rate: Normal     Resp. Effort: Normal      CV: Rhythm: Regular  Heart: Murmur  Edema: RLE  Pulses: Normal   Comments: Frail      Dental: Normal Dentition                Assessment:   ASA SCORE: 3    H&P: History and physical reviewed and following examination; no interval change.    NPO Status: NPO Appropriate     Plan:   Anes. Type:  General   Pre-Medication: None   Induction:  IV (Standard)   Airway: ETT; Oral   Access/Monitoring: PIV   Maintenance: Balanced     Postop Plan:   Postop Pain: Opioids  Postop Sedation/Airway: Not planned     PONV Management:   Adult Risk Factors: Female, Postop Opioids   Prevention: Ondansetron     CONSENT: Direct conversation   Plan and risks discussed with: Patient   Blood Products: Consented (ALL Blood Products)                PAC Discussion and Assessment    ASA Classification: 3  Case is suitable for: [place]  Anesthetic techniques and relevant risks discussed: GA  Invasive monitoring and risk discussed:   Types:   Possibility and Risk of blood transfusion discussed:   NPO instructions given:   Additional anesthetic preparation and risks discussed:   Needs early admission to pre-op area:   Other:     PAC Resident/NP Anesthesia Assessment:  Jigna is an 85 year old woman who is scheduled for Right leg reconstruction, With free back/thigh/abdominal flap, Split skin graft, vein graft on 4/20/20 by Dr. De Luna and Dr. Whitaker in treatment of Non-healing surgical wound, subsequent encounter.  PAC referral for  risk assessment and optimization for anesthesia with comorbid conditions of anemia, hypothyroidism, history of lyme's disease, anxiety and arthritis:    Pre-operative considerations:  1.  Cardiac:  Functional status- METS 3. The patient walks around her house. Prior to 3/27/20 wound excision she was walking 1-2 blocks with her . Intermediate risk surgery with 0.4% (RCRI #) risk of major adverse cardiac event.   ~ The patient has no cardiac diagnosis. She had a recent EKG on 3/24/20 with sinus rhythm. She does have known mild aortic sclerosis without stenosis and mild tricuspid regurgitation seen on 6/2018 echo. She denies any cardiac symptoms. Will check BNP today given age. If greatly elevated then may need repeat echo.     2.  Pulm:  Airway feasible.  IRA risk: Low (age)    3. Heme: Anemia - the patient's baseline appears to be 10-11. Last hgb was 10.5 on 4/1/20. Will complete type and screen today    4. Endo: hypothyroidism - continue levothyroxine.     5. GI:  Risk of PONV score = 3.  If > 2, anti-emetic intervention recommended.    6. Psych: Anxiety - the patient has some anxiety with this decision of her treatment during the COVID-19 pandemic. Not on any medications for this.     7. Musculoskeletal: History of sarcoma s/p radiation and wide local excision. The patient is s/p right TKA and now has non healing wound. Procedure as above.   ~ Arthritis - s/p carpal tunnel release. Continue PRN tylenol    VTE risk: 0.26%    Patient is optimized and is acceptable candidate for the proposed procedure.  No further diagnostic evaluation is needed.     Patient discussed with Dr Hardy.     For further details of assessment, testing, and physical exam please see H and P completed on same date.    Jeaneth Martinez PA-C          Mid-Level Provider/Resident: Jeaneth Martinez PA-C  Date: 4/16/20  Time:     Attending Anesthesiologist Anesthesia Assessment:  STAFF -  Virtual Visit.   Ms. Allen is a 85 years old woman.   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  She underwent radiation therapy and wide local excision with primary closure.  Over the years she has done well and fared well from it.  Two years ago she underwent a right side total knee arthroplasty through an anterior incision that also healed well.  Since December of last year, she has been having some nonspecific drainage and a wound problem over the popliteal area of the right leg.  Procedure # 1 = graft for wound closure by Dr. De Luna  Specific elements include:   Previous anesthesia without complications, although she notes that she is slow to wake up.  1) Cardiac: Echocardiogram in 2016 shows EF 65%. Request made for stress echo done in [place] 2/2018. She denies cardiac symptoms, but in wheelchair now.  2) Pulmonary: Never smoked.  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 Post-op was complicated by staph infection. There is obvious deformity of right leg.  4) Endo: hypothyroidism, daily synthroid    PLAN = will check BNP.  If elevated significantly, will redo her cardiac ECHO.     Reviewed and Signed by PAC Anesthesiologist  Anesthesiologist: live  Date: 4/16/2020  Time:   Pass/Fail:   Disposition:     PAC Pharmacist Assessment:        Pharmacist:   Date:   Time:    Sandoval Hardy MD     Anesthesia attending    85 year old female who  has a past medical history of Anemia, Anxiety, Arthritis (1990?), Complication of anesthesia, Hypothyroid, and Sarcoma (H) (2009). to OR for Procedure(s):  Right leg reconstruction  With free back/thigh/abdominal flap  Split skin graft, vein graft    Hemoglobin   Date Value Ref Range Status   04/01/2020 10.5 (L) 11.7 - 15.7 g/dL Final     Potassium   Date Value Ref Range Status   04/01/2020 3.5 3.4 - 5.3 mmol/L Final        ECG results from 03/01/10   EKG 12 LEAD     Value    Ventricular Rate 74    Atrial Rate 74    AK Interval 198    QRS Duration 72        QTc 430    P Axis 39    R AXIS 36    T Axis 31    Interpretation ECG      Sinus rhythm  Septal infarct , age undetermined  Abnormal ECG  Unconfirmed report - interpretation of this ECG is computer generated - see   medical record for final interpretation     No results found for this or any previous visit (from the past 8760 hour(s)).  NPO status adequate.    Plan for GA, standard monitors, possible arterial line, large bore IVs, PONV prophylaxis.  Antibiotics per primary service.    Plan discussed with the anesthesia and surgery teams.  Anesthetic risks discussed with the patient, all questions answered.  Consent in chart.    Roxanne Anaya MD

## 2020-04-15 NOTE — TELEPHONE ENCOUNTER
RN called and spoke with Fabricio at [facility] [phone]  Verbal orders given for wound care and skilled nursing to eval wound M<W<F.        Copy of plan on visit on  04-09-20  Plan:   I had an extensive discussion with the patient, her  and daughter.  Reviewed the options of either limb salvage or proceeding directly with above-knee amputation.  Limb salvage would consist of a plastic surgery procedure to obtain closure of the soft tissue envelope and performing a two-stage exchange arthroplasty procedure.  I described the multiple operative procedures and the 4 to 8 months time range that this would take.  A prolonged course of antimicrobial therapy intravenously would be necessary as well to clear her infection.  It is difficult to predict the likelihood of successful limb salvage however if 1 extrapolates from the known evidence in patients without sarcomas and radiation, I would estimate the likelihood of success to be in the 50 to 60% range at best.  Furthermore, she would incur the risk of multiple operative procedures and coming into the hospital with numerous doctor visits and health care visits at a time of the ongoing coronavirus pandemic which, obviously puts her at risk for lolly the coronavirus.     I also mentioned to the family that if an amputation is performed, I do not foresee her using an above-knee amputation prostheses at her age.      Spoke with Dr. Antonio De Luna of our plastic surgical division who will arrange for a virtual consultation visit with the patient the near future to also provide additional information for the patient regarding plastic surgical procedures and there rate of success.     Once patient and her  have made a decision with regards to pursuing either a plan for limb salvage or for above-knee amputation, they will notify us.  Meanwhile, I have discontinued use of her VAC dressing and she may perform gauze wet-to-dry dressing changes on a twice daily basis  with the home nurse and her .  Gauze and an overlying Ace wrap on her foot up to the knee would be appropriate.  She may be weightbearing as tolerated.     He Gomez MD

## 2020-04-15 NOTE — TELEPHONE ENCOUNTER
FUTURE VISIT INFORMATION      SURGERY INFORMATION:    Date: 20    Location: [facility] OR    Surgeon:  EHSAN De Luna MD     Anesthesia Type:  General    Procedure: Right leg reconstruction With free back/thigh/abdominal flap Split skin graft, vein graft     Consult: virtual visit     RECORDS REQUESTED FROM:       Primary Care Provider: Elisabeth oK MD - [facility]    Pertinent Medical History: Pulmonary nodules    Most recent EKG+ Tracin20    Most recent ECHO: 18

## 2020-04-15 NOTE — TELEPHONE ENCOUNTER
4/15/20    As directed, Pt contacted regarding appointment on April 16th and the desire to do this virtually.  A three way conversation with Jigna, myself and her daughter Lawanda occurred and Pt and family informed that Jigna would need to actually come into the clinic to have her pre-op appointment.  Pt's daughter voiced concerns over the high potential for being exposed to COVID-19.  Pt and family was walked through the screening process that occurs upon entering the building, the option to have a mask as well as the cleaning process for the entire building and the PAC clinic specifically.      Pt and daughter stated the strong desire to have daughter on the phone during Jigna's appointment and questioned how that could be done.  Pt's daughter was assured that we could make this work for them.  '    Pt and Pt's daughter agreeable to this arrangement and denied any other questions or concerns.

## 2020-04-15 NOTE — PATIENT INSTRUCTIONS
Spoke with pt regarding upcoming surgery. Explained that PAC appt is needed despite pt having recent outside H&P. Reviewed risks of surgery including 50% risk that salvage will not be successful and potential for lolly COVID-19 resulting in death. Pt states she would like to proceed as planned despite these risks. Provided pt with direct contact information should she have any questions or concerns prior to surgery. Stella MILLER RNCC

## 2020-04-15 NOTE — TELEPHONE ENCOUNTER
Spoke with patient's daughter, Lawanda, on 4/14 regarding upcoming surgery. Lawanda conference called the patient, and the patient's . Explained surgery is scheduled on Monday, 4/20. I also explained patient would need to have a H&P with PAC. Patient voiced she had a H&P on 3/24 or 3/25 at [facility] and wondered if she could use that H&P, so she does not need to come in multiple times (patient has fears of catching COVID-19).     Explained we would still schedule with PAC because Dr. De Luna put orders in for a PAC visit. If not needed, we could cancel (however on 4/15 it was confirmed she will need PAC and nurse Stella communicated with the patient that she will need to come in this week.)    Patient and her daughter understood and confirmed PAC date and surgery date. They both have my direct line and Stella's phone number to contact us with any questions or concerns.     Sent surgery packet via [employer] and mail. Scheduled post-op for 5/5.

## 2020-04-16 ENCOUNTER — PATIENT OUTREACH (OUTPATIENT)
Dept: PLASTIC SURGERY | Facility: CLINIC | Age: 85
End: 2020-04-16

## 2020-04-16 ENCOUNTER — PRE VISIT (OUTPATIENT)
Dept: SURGERY | Facility: CLINIC | Age: 85
End: 2020-04-16

## 2020-04-16 ENCOUNTER — OFFICE VISIT (OUTPATIENT)
Dept: SURGERY | Facility: CLINIC | Age: 85
End: 2020-04-16
Payer: COMMERCIAL

## 2020-04-16 ENCOUNTER — PATIENT OUTREACH (OUTPATIENT)
Dept: CARE COORDINATION | Facility: CLINIC | Age: 85
End: 2020-04-16

## 2020-04-16 VITALS
RESPIRATION RATE: 19 BRPM | HEART RATE: 97 BPM | TEMPERATURE: 98.1 F | SYSTOLIC BLOOD PRESSURE: 120 MMHG | OXYGEN SATURATION: 98 % | DIASTOLIC BLOOD PRESSURE: 68 MMHG | HEIGHT: 62 IN | WEIGHT: 122.1 LBS | BODY MASS INDEX: 22.47 KG/M2

## 2020-04-16 DIAGNOSIS — T81.89XD SURGICAL WOUND, NON HEALING, SUBSEQUENT ENCOUNTER: ICD-10-CM

## 2020-04-16 DIAGNOSIS — T81.89XD SURGICAL WOUND, NON HEALING, SUBSEQUENT ENCOUNTER: Primary | ICD-10-CM

## 2020-04-16 DIAGNOSIS — Z01.818 PREOP EXAMINATION: Primary | ICD-10-CM

## 2020-04-16 LAB — NT-PROBNP SERPL-MCNC: 277 PG/ML (ref 0–450)

## 2020-04-16 ASSESSMENT — LIFESTYLE VARIABLES: TOBACCO_USE: 0

## 2020-04-16 ASSESSMENT — PAIN SCALES - GENERAL: PAINLEVEL: NO PAIN (0)

## 2020-04-16 ASSESSMENT — MIFFLIN-ST. JEOR: SCORE: 962.09

## 2020-04-16 NOTE — H&P
Pre-Operative H & P     CC:  Preoperative exam to assess for increased cardiopulmonary risk while undergoing surgery and anesthesia.    Date of Encounter: 4/16/2020  Primary Care Physician:  Elisabeth Ko     Reason for visit: pre operative examination, Non-healing surgical wound, subsequent encounter    HPI  Jigna Allen is a 85 year old female who presents for pre-operative H & P in preparation for   Right leg reconstruction, With free back/thigh/abdominal flap, Split skin graft, vein graft with Dr. De Luna and Dr. Whitaker on 4/20/20 at [facility].     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 She then had a right total knee replacement in 6/2018 and she did well afterwards. She then noticed drainage from a wound in her right leg. She has exposed arthroplasty and has been on and off antibiotics. She had excision of the wound on 3/27/20 and this showed now return of sarcoma. Given her ongoing wound issues she was seen by Dr. Gomez and then referred to Dr. De Luna for further treatment recommendations. The patient is now scheduled for the procedure as above.     History is obtained from the patient, daughter Lawanda and chart review    Past Medical History  Past Medical History:   Diagnosis Date     Anemia      Anxiety      Arthritis 1990?     Complication of anesthesia     slow to wakeup     Hypothyroid      Sarcoma (H) 2009       Past Surgical History  Past Surgical History:   Procedure Laterality Date     ARTHROPLASTY KNEE Right 6/12/2018    Procedure: ARTHROPLASTY KNEE;  Right Total Knee Replacement ;  Surgeon: He Gomez MD;  Location: [facility] OR     CARPAL TUNNEL RELEASE RT/LT       excise sarcoma Right     right leg     exicision of wound  03/27/2020     KNEE SURGERY       TUBAL LIGATION         Hx of Blood transfusions/reactions: yes, 3/2/10 denies reactions     Hx of abnormal bleeding or  anti-platelet use: none    Menstrual history: No LMP recorded. Patient is postmenopausal.:     Steroid use in the last year: none    Personal or FH with difficulty with Anesthesia:  Slow to wake    Prior to Admission Medications  Current Outpatient Medications   Medication Sig Dispense Refill     acetaminophen (TYLENOL) 500 MG tablet Take 2 tablets (1,000 mg) by mouth every 6 hours as needed for mild pain (Patient taking differently: Take 500-1,000 mg by mouth every 8 hours as needed for mild pain )       levothyroxine (LEVOTHROID) 75 MCG tablet Take 75 mcg by mouth daily          Allergies  Allergies   Allergen Reactions     Colchicine      Other reaction(s): Gastrointestinal  diarrhea     Niacin      skin rash     Ferrous Sulfate Rash       Social History  Social History     Socioeconomic History     Marital status:      Spouse name: Not on file     Number of children: Not on file     Years of education: Not on file     Highest education level: Not on file   Occupational History     Not on file   Social Needs     Financial resource strain: Not on file     Food insecurity     Worry: Not on file     Inability: Not on file     Transportation needs     Medical: Not on file     Non-medical: Not on file   Tobacco Use     Smoking status: Never Smoker     Smokeless tobacco: Never Used   Substance and Sexual Activity     Alcohol use: Not Currently     Comment: rarely - months      Drug use: Never     Sexual activity: Yes     Partners: Male     Birth control/protection: Post-menopausal   Lifestyle     Physical activity     Days per week: Not on file     Minutes per session: Not on file     Stress: Not on file   Relationships     Social connections     Talks on phone: Not on file     Gets together: Not on file     Attends [employer] service: Not on file     Active member of club or organization: Not on file     Attends meetings of clubs or organizations: Not on file     Relationship status: Not on file     Intimate  partner violence     Fear of current or ex partner: Not on file     Emotionally abused: Not on file     Physically abused: Not on file     Forced sexual activity: Not on file   Other Topics Concern     Parent/sibling w/ CABG, MI or angioplasty before 65F 55M? Not Asked   Social History Narrative     Not on file       Family History  Family History   Problem Relation Age of Onset     Cancer Father      Prostate Cancer Father      Other Cancer Father      Cancer Brother      Other - See Comments Daughter         Slow to wake      Asthma Daughter        Review of Systems      ROS/MED HX    ENT/Pulmonary:  - neg pulmonary ROS    (-) tobacco use   Neurologic:  - neg neurologic ROS     Cardiovascular:     (+) ----. : . . . :. valvular problems/murmurs Mild aortic valve sclerosis, mild tricuspid regurgitation :. Previous cardiac testing Echodate:6/12/18results:date: results:ECG reviewed date:3/24/20 results:Sinus rhythm   date: results:          METS/Exercise Tolerance:  3 - Able to walk 1-2 blocks without stopping   Hematologic: Comments: Denies blood thinners or bleeding disorders  Platelets WNL    (+) Anemia, History of Transfusion (3/2/10) no previous transfusion reaction -      Musculoskeletal:   (+) arthritis,  other musculoskeletal- right leg sarcoma, excision in 2010, s/p right total knee arthroplasty       GI/Hepatic:  - neg GI/hepatic ROS       Renal/Genitourinary:  - ROS Renal section negative       Endo:     (+) thyroid problem hypothyroidism, .      Psychiatric:     (+) psychiatric history anxiety      Infectious Disease:  - neg infectious disease ROS       Malignancy:   (+) Malignancy (sarcoma) History of Other  Other CA right leg Remission status post Radiation and Surgery         Other:    (+) No chance of pregnancy C-spine cleared: N/A, no H/O Chronic Pain,no other significant disability          The complete review of systems is negative other than noted in the HPI or here.   Temp: 98.1  F (36.7  C) Temp  "src: Oral BP: 120/68 Pulse: 97   Resp: 19 SpO2: 98 %         122 lbs 1.6 oz  5' 2\"   Body mass index is 22.33 kg/m .       Physical Exam  Constitutional: Awake, alert, cooperative, no apparent distress, and appears stated age.Frail  Eyes: Pupils equal, round and reactive to light, extra ocular muscles intact, sclera clear, conjunctiva normal.  HENT: Normocephalic, oral pharynx with moist mucus membranes, good dentition. No goiter appreciated.   Respiratory: Clear to auscultation bilaterally, no crackles or wheezing.  Cardiovascular: Regular rate and rhythm, normal S1 and S2, and systolic murmur best heard at RSB noted.  Carotids +2, no bruits. Right lower extremity edema. Palpable pulses to radial  DP and PT arteries.   GI: Normal bowel sounds, soft, non-distended, non-tender, no masses palpated, no hepatosplenomegaly.    Lymph/Hematologic: No cervical lymphadenopathy and no supraclavicular lymphadenopathy.  Genitourinary:  defer  Skin: Warm and dry.  No rashes at anticipated surgical site.   Musculoskeletal: Full ROM of neck. There is no redness, warmth, or swelling of the joints. Gross motor strength is normal.    Neurologic: Awake, alert, oriented to name, place and time. Cranial nerves II-XII are grossly intact. Patient in wheelchair   Neuropsychiatric: Calm, cooperative. Normal affect.     Labs: (personally reviewed)    LABS:  CBC:   Lab Results   Component Value Date    WBC 9.8 04/01/2020    WBC 4.8 06/05/2018    HGB 10.5 (L) 04/01/2020    HGB 11.0 (L) 06/14/2018    HCT 33.6 (L) 04/01/2020    HCT 44.5 06/05/2018     04/01/2020     06/05/2018     BMP:   Lab Results   Component Value Date     (L) 04/01/2020     06/14/2018    POTASSIUM 3.5 04/01/2020    POTASSIUM 4.2 06/14/2018    CHLORIDE 98 04/01/2020    CHLORIDE 105 06/14/2018    CO2 27 04/01/2020    CO2 30 06/14/2018    BUN 17 04/01/2020    BUN 12 06/14/2018    CR 0.79 04/01/2020    CR 0.67 06/14/2018     (H) 04/01/2020    GLC " 133 (H) 06/14/2018     COAGS:   Lab Results   Component Value Date    INR 1.27 (H) 06/15/2018     POC:   Lab Results   Component Value Date    BGM 94 06/12/2018     OTHER:   Lab Results   Component Value Date    LACT 1.4 04/01/2020    A1C 5.6 06/05/2018    RAVEN 8.9 04/01/2020    PHOS 3.3 09/08/2016    MAG 2.1 06/13/2018    ALBUMIN 2.5 (L) 04/01/2020    PROTTOTAL 8.0 04/01/2020    ALT 26 04/01/2020    AST 36 04/01/2020    ALKPHOS 85 04/01/2020    BILITOTAL 0.6 04/01/2020    TSH 2.47 05/09/2017    .0 (H) 04/01/2020     (H) 04/01/2020      Results for JIGNA SMITH (MRN [id number]) as of 4/16/2020 13:21   Ref. Range 4/16/2020 12:32   N-Terminal Pro Bnp Latest Ref Range: 0 - 450 pg/mL 277   Within normal limit for age. No further testing indicated.     EKG: EKG 3/30/20  Sinus rhythm    Echo 6/12/18        2 views right knee radiographs 4/2/2020 9:46 AM  Impression:  1.  Stable appearance of revision total right knee arthroplasty  without evidence of hardware failure.  The patient's records and results personally reviewed by this provider.     Outside records reviewed from: care everywhere     ASSESSMENT and PLAN  Jigna is an 85 year old woman who is scheduled for Right leg reconstruction, With free back/thigh/abdominal flap, Split skin graft, vein graft on 4/20/20 by Dr. De Luna and Dr. Whitaker in treatment of Non-healing surgical wound, subsequent encounter.  PAC referral for risk assessment and optimization for anesthesia with comorbid conditions of anemia, hypothyroidism, history of lyme's disease, anxiety and arthritis:    Pre-operative considerations:  1.  Cardiac:  Functional status- METS 3. The patient walks around her house. Prior to 3/27/20 wound excision she was walking 1-2 blocks with her . Intermediate risk surgery with 0.4% (RCRI #) risk of major adverse cardiac event.   ~ The patient has no cardiac diagnosis. She had a recent EKG on 3/24/20 with sinus rhythm. She does have known mild  aortic sclerosis without stenosis and mild tricuspid regurgitation seen on 6/2018 echo. She denies any cardiac symptoms. Will check BNP today given age. If greatly elevated then may need repeat echo.     2.  Pulm:  Airway feasible.  IRA risk: Low (age)    3. Heme: Anemia - the patient's baseline appears to be 10-11. Last hgb was 10.5 on 4/1/20. Will complete type and screen today    4. Endo: hypothyroidism - continue levothyroxine.     5. GI:  Risk of PONV score = 3.  If > 2, anti-emetic intervention recommended.    6. Psych: Anxiety - the patient has some anxiety with this decision of her treatment during the COVID-19 pandemic. Not on any medications for this.     7. Musculoskeletal: History of sarcoma s/p radiation and wide local excision. The patient is s/p right TKA and now has non healing wound. Procedure as above.   ~ Arthritis - s/p carpal tunnel release. Continue PRN tylenol    VTE risk: 0.26%    Patient was discussed with Dr Hardy.    The patient is optimized for their procedure. AVS with information on surgery time/arrival time, meds and NPO status given by nursing staff.        Jeaneth Martinez PA-C  Preoperative Assessment Center  [facility]  Clinic and Surgery Center  Phone: [phone]  Fax: [phone]

## 2020-04-16 NOTE — PATIENT INSTRUCTIONS
Preparing for Your Surgery      Name:  Jigna Allen   MRN:  [id number]   :  1935   Today's Date:  2020     Arriving for surgery:  Surgery date:  20  Arrival time:  5:00 am  Due to the COVID 19 crisis, we are trying to keep our patients safe from others who might have respiratory illnesses so the hospital is implementing a no visitor policy.  Please come to:       [facility] [place] [address] [place] [address]  [id number]    - ? parking is available in front of the hospital      -    Please proceed to the Surgery Lounge on the 3rd floor. [phone]?     - ?If you are in need of directions, wheelchair or escort please stop at the Information Desk in the lobby.  Inform the information person that you are here for surgery; a wheelchair and escort will be provided to the Surgery Lounge .?     What can I eat or drink?  -  You may have solid food or milk products until 8 hours prior to your surgery (11:30 pm).  -  You may have water, apple juice or 7up/Sprite until 2 hours prior to your surgery (5:00 am).    Which medicines can I take?  Hold Aspirin, vitamins and supplements one week prior to surgery.  Hold Ibuprofen for 24 hours and/or Naproxen for 48 hours prior to surgery.     -  Please take these medications the day of surgery:  Acetaminophen (Tylenol)  Levothyroxine (Levothroid)    How do I prepare myself?  -  Take two showers: one the night before surgery; and one the morning of surgery.         Use Scrubcare or Hibiclens to wash from neck down, leave soap on your skin for up to one minute.  Do not get soap in your eyes or ears.  You may use your own shampoo and conditioner; no other hair products.   -  Do NOT use lotion, powder, deodorant, or antiperspirant the day of your surgery.  -  Do NOT wear any makeup, fingernail polish or jewelry.  -  Do not bring your own medications to the hospital.  -  Bring your ID and insurance card.    Questions or  Concerns:  -If you are scheduled on the East or West campus and have questions or concerns regarding the day of surgery, please call Preadmission Nursing at [phone].     -If you have health changes between today and your surgery please call your surgeon. For questions after surgery please call your surgeons office.     AFTER YOUR SURGERY  Breathing exercises   Breathing exercises help you recover faster. Take deep breaths and let the air out slowly. This will:     Help you wake up after surgery.    Help prevent complications like pneumonia.  Preventing complications will help you go home sooner.   We may give you a breathing device (incentive spirometer) to encourage you to breathe deeply.   Nausea and vomiting   You may feel sick to your stomach after surgery; if so, let your nurse know.    Pain control:  After surgery, you may have pain. Our goal is to help you manage your pain. Pain medicine will help you feel comfortable enough to do activities that will help you heal.  These activities may include breathing exercises, walking and physical therapy.   To help your health care team treat your pain we will ask: 1) If you have pain  2) where it is located 3) describe your pain in your words  Methods of pain control include medications given by mouth, vein or by nerve block for some surgeries.  Sequential Compression Device (SCD):  You may need to wear SCD S (also called pneumo boots)on your legs or feet. These are wraps connected to a machine that pumps in air and releases it. The repeated pumping helps prevent blood clots from forming.

## 2020-04-16 NOTE — PROGRESS NOTES
Social Work Intervention   Health Clinics and Surgery Center    Data/Intervention:    Patient Name:  Jigna Allen  /Age:  1935 (85 year old)    Visit Type: telephone  Referral Source: Care Mgmt Admin Adriana May - Call from daughter   Reason for Referral:  Pre-op planning     Collaborated With:    - Patient   - Pt's daughter-Lawanda    Patient Concerns/Issues:   Received message that pt's daughter called requesting call back due to concerns about pt's upcoming surgery given current covid crisis. Called pt first to follow up and confirm she was agreeable to SW contacting daughter. Introduced self and role of SW, reason for call. Pt stated that she knew her daughter had called and was agreeable to SW speaking with her. Pt confirmed that the plan is for her to have surgery on  and they're wondering about possible discharge options. Due to current covid outbreak, they're hoping that pt could potentially discharge home with home care instead of going to a rehab facility if possible. Pt expressed understanding that they won't know exactly what will be appropriate until after the surgery while she is at the hospital. Pt currently has home care set up through "[facility], [place]" but isn't happy with it, looking to switch agencies potentially. Discussed home care options and agreed to email pt with a link to the Medicare.gov home care agency search. Pt was about leave for an appt, so ended call and again was very agreeable to SW calling daughter.     Returned call to daughter, Lawanda. Lawanda discussed concerns about pt going to rehab facility and they're trying to prepare as best they can in the chance that pt could return home instead. Per daughter, pt lives in a duplex that has one step to enter. Daughter is thinking of ways to make their home more handicapped accessible, as they were told pt would be wheelchair-bound for ~6 months following surgery. She's looking at installing grab bars for the shower,  already has a shower chair and getting a commode. Discussed possible ramp for step into the home. Daughter reported that pt's  used to be a manzo and would probably enjoy fashioning a ramp for the step or installing a purchased one. She noted that he is able to take care of pt and is generally insightful if there is something that is too burdensome for him. Daughter is concerned about pt/ having to do laundry. Discussed laundry services, as well as private pay home health services. Agreed to email link to Medicare[employer] search for rehab facilities (as they know pt may need this still, but looking at all resources and discharge options due to covid concerns), as well as home care agency search and private pay home health services. Discussed with daughter Medicare ratings and their ability to research facilities ahead of time online. Also noted that I would email link to [place] Dept of Health covid updates for care facilities so they can reference this, as well, if TCU is needed. Explained outpt/inpt SW & RN CC roles in discharge planning process and noted that they will follow up with pt/family after pt has had her surgery to determine and finalize discharge details. Daughter thanked SW for the information and the call.     Emailed pt and daughter with links to Medicare[employer] search for rehab facilities and health care agencies, as well as information on private duty home health agencies from [facility] and link to [facility] site for covid updates.     Intervention/Education/Resources Provided:  - Education on discharge planning process & options   - Troubleshooting various support options for home     Assessment/Plan:  Pt and daughter were both pleasant and receptive to SW. Pt did not seem concerned about discharge plans and understands they will have a better idea of what is needed after she has her surgery. Daughter seemed anxious though open to support and appreciative of the  information provided by [facility]. Though out of town, she seems like a good support and advocate for pt.     Provided patient/family with contact information and availability.    Negra Mcclain MSW, [facility]  Clinical   Outpatient Speciality Clinics   [facility] [place]  Ph. [phone]    NO LETTER

## 2020-04-16 NOTE — PATIENT INSTRUCTIONS
Spoke with pt and pts daughter regarding upcoming surgery and mychart inquiring about potential treatment of wound. Pts daughter states they were given this information by a physician who is a family friend. State they wanted to inquire if this was a potential alternative to surgery or could be helpful after surgery. Explained that this would not be utilized after surgery and will not help to heal the wound before surgery. This would involve multiple visits to the hospital for many weeks which would also increase risk of lolly COVID-19. Pt and pts daughter state understanding. Inquired if pt fully understands risk of lolly COVID-19 during hospital stay resulting in death and 50% chance that this surgery will not be successful. Pt states she understands these risks. Pt states she wishes to review her options with her  and will contact the clinic this afternoon with her final decision. Stella MILLER RNCC

## 2020-04-20 ENCOUNTER — HOSPITAL ENCOUNTER (INPATIENT)
Facility: CLINIC | Age: 85
LOS: 4 days | Discharge: NURSING, REHAB OR LONG-TERM CARE | DRG: 464 | End: 2020-04-24
Attending: PLASTIC SURGERY | Admitting: PLASTIC SURGERY
Payer: COMMERCIAL

## 2020-04-20 ENCOUNTER — ANESTHESIA (OUTPATIENT)
Dept: SURGERY | Facility: CLINIC | Age: 85
DRG: 464 | End: 2020-04-20
Payer: COMMERCIAL

## 2020-04-20 DIAGNOSIS — T81.89XD NON-HEALING SURGICAL WOUND, SUBSEQUENT ENCOUNTER: ICD-10-CM

## 2020-04-20 DIAGNOSIS — Z98.890 STATUS POST KNEE SURGERY: Primary | ICD-10-CM

## 2020-04-20 LAB
ANION GAP SERPL CALCULATED.3IONS-SCNC: 4 MMOL/L (ref 3–14)
BASE EXCESS BLDA CALC-SCNC: 4.9 MMOL/L
BUN SERPL-MCNC: 14 MG/DL (ref 7–30)
CA-I BLD-MCNC: 4.4 MG/DL (ref 4.4–5.2)
CALCIUM SERPL-MCNC: 8.1 MG/DL (ref 8.5–10.1)
CHLORIDE SERPL-SCNC: 104 MMOL/L (ref 94–109)
CO2 SERPL-SCNC: 27 MMOL/L (ref 20–32)
CREAT SERPL-MCNC: 0.61 MG/DL (ref 0.52–1.04)
GFR SERPL CREATININE-BSD FRML MDRD: 84 ML/MIN/{1.73_M2}
GLUCOSE BLD-MCNC: 140 MG/DL (ref 70–99)
GLUCOSE BLDC GLUCOMTR-MCNC: 120 MG/DL (ref 70–99)
GLUCOSE SERPL-MCNC: 146 MG/DL (ref 70–99)
GRAM STN SPEC: ABNORMAL
HBV SURFACE AG SERPL QL IA: NONREACTIVE
HCO3 BLD-SCNC: 28 MMOL/L (ref 21–28)
HGB BLD-MCNC: 8.5 G/DL (ref 11.7–15.7)
HGB BLD-MCNC: 9 G/DL (ref 11.7–15.7)
HIV 1+2 AB+HIV1 P24 AG SERPL QL IA: NONREACTIVE
HIV EXPOSURE DRAW AND HOLD: NORMAL
HIV1+2 AB SPEC QL IA.RAPID: NONREACTIVE
LACTATE BLD-SCNC: 1 MMOL/L (ref 0.7–2)
MAGNESIUM SERPL-MCNC: 2.1 MG/DL (ref 1.6–2.3)
O2/TOTAL GAS SETTING VFR VENT: 40 %
PCO2 BLD: 36 MM HG (ref 35–45)
PH BLD: 7.51 PH (ref 7.35–7.45)
PLATELET # BLD AUTO: 290 10E9/L (ref 150–450)
PO2 BLD: 149 MM HG (ref 80–105)
POTASSIUM BLD-SCNC: 4.6 MMOL/L (ref 3.4–5.3)
POTASSIUM SERPL-SCNC: 4.1 MMOL/L (ref 3.4–5.3)
SODIUM BLD-SCNC: 137 MMOL/L (ref 133–144)
SODIUM SERPL-SCNC: 135 MMOL/L (ref 133–144)
SPECIMEN SOURCE: ABNORMAL

## 2020-04-20 PROCEDURE — 12000001 ZZH R&B MED SURG/OB UMMC

## 2020-04-20 PROCEDURE — 25000128 H RX IP 250 OP 636: Performed by: NURSE ANESTHETIST, CERTIFIED REGISTERED

## 2020-04-20 PROCEDURE — 84132 ASSAY OF SERUM POTASSIUM: CPT | Performed by: ANESTHESIOLOGY

## 2020-04-20 PROCEDURE — 27810169 ZZH OR IMPLANT GENERAL: Performed by: PLASTIC SURGERY

## 2020-04-20 PROCEDURE — 25000125 ZZHC RX 250: Performed by: NURSE ANESTHETIST, CERTIFIED REGISTERED

## 2020-04-20 PROCEDURE — 37000008 ZZH ANESTHESIA TECHNICAL FEE, 1ST 30 MIN: Performed by: PLASTIC SURGERY

## 2020-04-20 PROCEDURE — 36000068 ZZH SURGERY LEVEL 5 1ST 30 MIN - UMMC: Performed by: PLASTIC SURGERY

## 2020-04-20 PROCEDURE — 87102 FUNGUS ISOLATION CULTURE: CPT | Performed by: PLASTIC SURGERY

## 2020-04-20 PROCEDURE — 0JBN0ZZ EXCISION OF RIGHT LOWER LEG SUBCUTANEOUS TISSUE AND FASCIA, OPEN APPROACH: ICD-10-PCS | Performed by: PLASTIC SURGERY

## 2020-04-20 PROCEDURE — 82947 ASSAY GLUCOSE BLOOD QUANT: CPT | Performed by: ANESTHESIOLOGY

## 2020-04-20 PROCEDURE — 25800030 ZZH RX IP 258 OP 636: Performed by: PLASTIC SURGERY

## 2020-04-20 PROCEDURE — 27210794 ZZH OR GENERAL SUPPLY STERILE: Performed by: PLASTIC SURGERY

## 2020-04-20 PROCEDURE — 0JBM0ZZ EXCISION OF LEFT UPPER LEG SUBCUTANEOUS TISSUE AND FASCIA, OPEN APPROACH: ICD-10-PCS | Performed by: PLASTIC SURGERY

## 2020-04-20 PROCEDURE — 84295 ASSAY OF SERUM SODIUM: CPT | Performed by: ANESTHESIOLOGY

## 2020-04-20 PROCEDURE — 25800030 ZZH RX IP 258 OP 636: Performed by: STUDENT IN AN ORGANIZED HEALTH CARE EDUCATION/TRAINING PROGRAM

## 2020-04-20 PROCEDURE — 37000009 ZZH ANESTHESIA TECHNICAL FEE, EACH ADDTL 15 MIN: Performed by: PLASTIC SURGERY

## 2020-04-20 PROCEDURE — 4A1GXSH MONITORING OF SKIN AND BREAST VASCULAR PERFUSION USING INDOCYANINE GREEN DYE, EXTERNAL APPROACH: ICD-10-PCS | Performed by: PLASTIC SURGERY

## 2020-04-20 PROCEDURE — 83735 ASSAY OF MAGNESIUM: CPT | Performed by: STUDENT IN AN ORGANIZED HEALTH CARE EDUCATION/TRAINING PROGRAM

## 2020-04-20 PROCEDURE — 25000128 H RX IP 250 OP 636: Performed by: STUDENT IN AN ORGANIZED HEALTH CARE EDUCATION/TRAINING PROGRAM

## 2020-04-20 PROCEDURE — 25000565 ZZH ISOFLURANE, EA 15 MIN: Performed by: PLASTIC SURGERY

## 2020-04-20 PROCEDURE — 87077 CULTURE AEROBIC IDENTIFY: CPT | Performed by: PLASTIC SURGERY

## 2020-04-20 PROCEDURE — 25000128 H RX IP 250 OP 636: Performed by: PLASTIC SURGERY

## 2020-04-20 PROCEDURE — 71000015 ZZH RECOVERY PHASE 1 LEVEL 2 EA ADDTL HR: Performed by: PLASTIC SURGERY

## 2020-04-20 PROCEDURE — 82803 BLOOD GASES ANY COMBINATION: CPT | Performed by: ANESTHESIOLOGY

## 2020-04-20 PROCEDURE — 25000131 ZZH RX MED GY IP 250 OP 636 PS 637: Performed by: PLASTIC SURGERY

## 2020-04-20 PROCEDURE — 85049 AUTOMATED PLATELET COUNT: CPT | Performed by: STUDENT IN AN ORGANIZED HEALTH CARE EDUCATION/TRAINING PROGRAM

## 2020-04-20 PROCEDURE — 27211024 ZZHC OR SUPPLY OTHER OPNP: Performed by: PLASTIC SURGERY

## 2020-04-20 PROCEDURE — 87185 SC STD ENZYME DETCJ PER NZM: CPT | Performed by: PLASTIC SURGERY

## 2020-04-20 PROCEDURE — 25800030 ZZH RX IP 258 OP 636: Performed by: NURSE ANESTHETIST, CERTIFIED REGISTERED

## 2020-04-20 PROCEDURE — 87075 CULTR BACTERIA EXCEPT BLOOD: CPT | Performed by: PLASTIC SURGERY

## 2020-04-20 PROCEDURE — 83605 ASSAY OF LACTIC ACID: CPT | Performed by: ANESTHESIOLOGY

## 2020-04-20 PROCEDURE — 25000128 H RX IP 250 OP 636: Performed by: ANESTHESIOLOGY

## 2020-04-20 PROCEDURE — 25000125 ZZHC RX 250: Performed by: PLASTIC SURGERY

## 2020-04-20 PROCEDURE — 36000070 ZZH SURGERY LEVEL 5 EA 15 ADDTL MIN - UMMC: Performed by: PLASTIC SURGERY

## 2020-04-20 PROCEDURE — 71000014 ZZH RECOVERY PHASE 1 LEVEL 2 FIRST HR: Performed by: PLASTIC SURGERY

## 2020-04-20 PROCEDURE — 87205 SMEAR GRAM STAIN: CPT | Performed by: PLASTIC SURGERY

## 2020-04-20 PROCEDURE — 87076 CULTURE ANAEROBE IDENT EACH: CPT | Performed by: PLASTIC SURGERY

## 2020-04-20 PROCEDURE — 87181 SC STD AGAR DILUTION PER AGT: CPT | Performed by: PLASTIC SURGERY

## 2020-04-20 PROCEDURE — 40000170 ZZH STATISTIC PRE-PROCEDURE ASSESSMENT II: Performed by: PLASTIC SURGERY

## 2020-04-20 PROCEDURE — 88305 TISSUE EXAM BY PATHOLOGIST: CPT | Performed by: PLASTIC SURGERY

## 2020-04-20 PROCEDURE — 85018 HEMOGLOBIN: CPT | Performed by: STUDENT IN AN ORGANIZED HEALTH CARE EDUCATION/TRAINING PROGRAM

## 2020-04-20 PROCEDURE — 0JRN07Z REPLACEMENT OF RIGHT LOWER LEG SUBCUTANEOUS TISSUE AND FASCIA WITH AUTOLOGOUS TISSUE SUBSTITUTE, OPEN APPROACH: ICD-10-PCS | Performed by: PLASTIC SURGERY

## 2020-04-20 PROCEDURE — 40000014 ZZH STATISTIC ARTERIAL MONITORING DAILY

## 2020-04-20 PROCEDURE — 00000146 ZZHCL STATISTIC GLUCOSE BY METER IP

## 2020-04-20 PROCEDURE — 25000132 ZZH RX MED GY IP 250 OP 250 PS 637: Performed by: STUDENT IN AN ORGANIZED HEALTH CARE EDUCATION/TRAINING PROGRAM

## 2020-04-20 PROCEDURE — 40000275 ZZH STATISTIC RCP TIME EA 10 MIN

## 2020-04-20 PROCEDURE — 87070 CULTURE OTHR SPECIMN AEROBIC: CPT | Performed by: PLASTIC SURGERY

## 2020-04-20 PROCEDURE — 80048 BASIC METABOLIC PNL TOTAL CA: CPT | Performed by: STUDENT IN AN ORGANIZED HEALTH CARE EDUCATION/TRAINING PROGRAM

## 2020-04-20 PROCEDURE — 82330 ASSAY OF CALCIUM: CPT | Performed by: ANESTHESIOLOGY

## 2020-04-20 DEVICE — IMP DEVICE ANASTOMOTIC 3.0MM COUPLER GOLD GEM2754: Type: IMPLANTABLE DEVICE | Site: LEG | Status: FUNCTIONAL

## 2020-04-20 DEVICE — IMP DEVICE ANASTOMOTIC 2.0MM COUPLER GREEN GEM2752: Type: IMPLANTABLE DEVICE | Site: LEG | Status: FUNCTIONAL

## 2020-04-20 RX ORDER — SULFAMETHOXAZOLE/TRIMETHOPRIM 800-160 MG
1 TABLET ORAL 2 TIMES DAILY
Status: DISCONTINUED | OUTPATIENT
Start: 2020-04-20 | End: 2020-04-22

## 2020-04-20 RX ORDER — ACETAMINOPHEN 325 MG/1
650 TABLET ORAL EVERY 4 HOURS PRN
Status: DISCONTINUED | OUTPATIENT
Start: 2020-04-23 | End: 2020-04-24 | Stop reason: HOSPADM

## 2020-04-20 RX ORDER — VANCOMYCIN HYDROCHLORIDE 1 G/20ML
2 INJECTION, POWDER, LYOPHILIZED, FOR SOLUTION INTRAVENOUS ONCE
Status: COMPLETED | OUTPATIENT
Start: 2020-04-20 | End: 2020-04-20

## 2020-04-20 RX ORDER — ASPIRIN 81 MG/1
81 TABLET ORAL DAILY
Status: DISCONTINUED | OUTPATIENT
Start: 2020-04-20 | End: 2020-04-24 | Stop reason: HOSPADM

## 2020-04-20 RX ORDER — SODIUM CHLORIDE, SODIUM LACTATE, POTASSIUM CHLORIDE, CALCIUM CHLORIDE 600; 310; 30; 20 MG/100ML; MG/100ML; MG/100ML; MG/100ML
INJECTION, SOLUTION INTRAVENOUS CONTINUOUS
Status: DISCONTINUED | OUTPATIENT
Start: 2020-04-20 | End: 2020-04-21

## 2020-04-20 RX ORDER — SODIUM CHLORIDE, SODIUM LACTATE, POTASSIUM CHLORIDE, CALCIUM CHLORIDE 600; 310; 30; 20 MG/100ML; MG/100ML; MG/100ML; MG/100ML
INJECTION, SOLUTION INTRAVENOUS CONTINUOUS
Status: DISCONTINUED | OUTPATIENT
Start: 2020-04-20 | End: 2020-04-20 | Stop reason: HOSPADM

## 2020-04-20 RX ORDER — ONDANSETRON 4 MG/1
4 TABLET, ORALLY DISINTEGRATING ORAL EVERY 30 MIN PRN
Status: DISCONTINUED | OUTPATIENT
Start: 2020-04-20 | End: 2020-04-20 | Stop reason: HOSPADM

## 2020-04-20 RX ORDER — PAPAVERINE HYDROCHLORIDE 30 MG/ML
INJECTION INTRAMUSCULAR; INTRAVENOUS PRN
Status: DISCONTINUED | OUTPATIENT
Start: 2020-04-20 | End: 2020-04-20 | Stop reason: HOSPADM

## 2020-04-20 RX ORDER — FENTANYL CITRATE 50 UG/ML
25-50 INJECTION, SOLUTION INTRAMUSCULAR; INTRAVENOUS
Status: DISCONTINUED | OUTPATIENT
Start: 2020-04-20 | End: 2020-04-20 | Stop reason: HOSPADM

## 2020-04-20 RX ORDER — CEFAZOLIN SODIUM 2 G/100ML
2 INJECTION, SOLUTION INTRAVENOUS
Status: COMPLETED | OUTPATIENT
Start: 2020-04-20 | End: 2020-04-20

## 2020-04-20 RX ORDER — HYDROMORPHONE HYDROCHLORIDE 1 MG/ML
0.2 INJECTION, SOLUTION INTRAMUSCULAR; INTRAVENOUS; SUBCUTANEOUS
Status: DISCONTINUED | OUTPATIENT
Start: 2020-04-20 | End: 2020-04-21

## 2020-04-20 RX ORDER — OXYCODONE HYDROCHLORIDE 5 MG/1
5-10 TABLET ORAL EVERY 4 HOURS PRN
Status: DISCONTINUED | OUTPATIENT
Start: 2020-04-20 | End: 2020-04-24 | Stop reason: HOSPADM

## 2020-04-20 RX ORDER — ACETAMINOPHEN 325 MG/1
975 TABLET ORAL EVERY 8 HOURS
Status: COMPLETED | OUTPATIENT
Start: 2020-04-20 | End: 2020-04-23

## 2020-04-20 RX ORDER — AMOXICILLIN 250 MG
1 CAPSULE ORAL 2 TIMES DAILY PRN
Status: DISCONTINUED | OUTPATIENT
Start: 2020-04-20 | End: 2020-04-24 | Stop reason: HOSPADM

## 2020-04-20 RX ORDER — HYDROXYZINE HYDROCHLORIDE 25 MG/1
25-50 TABLET, FILM COATED ORAL EVERY 6 HOURS PRN
Status: DISCONTINUED | OUTPATIENT
Start: 2020-04-20 | End: 2020-04-24 | Stop reason: HOSPADM

## 2020-04-20 RX ORDER — LIDOCAINE 40 MG/G
CREAM TOPICAL
Status: DISCONTINUED | OUTPATIENT
Start: 2020-04-20 | End: 2020-04-20 | Stop reason: HOSPADM

## 2020-04-20 RX ORDER — FENTANYL CITRATE 50 UG/ML
INJECTION, SOLUTION INTRAMUSCULAR; INTRAVENOUS PRN
Status: DISCONTINUED | OUTPATIENT
Start: 2020-04-20 | End: 2020-04-20

## 2020-04-20 RX ORDER — INDOCYANINE GREEN AND WATER 25 MG
KIT INJECTION PRN
Status: DISCONTINUED | OUTPATIENT
Start: 2020-04-20 | End: 2020-04-20

## 2020-04-20 RX ORDER — HEPARIN SODIUM 1000 [USP'U]/ML
INJECTION, SOLUTION INTRAVENOUS; SUBCUTANEOUS PRN
Status: DISCONTINUED | OUTPATIENT
Start: 2020-04-20 | End: 2020-04-20

## 2020-04-20 RX ORDER — PROPOFOL 10 MG/ML
INJECTION, EMULSION INTRAVENOUS PRN
Status: DISCONTINUED | OUTPATIENT
Start: 2020-04-20 | End: 2020-04-20

## 2020-04-20 RX ORDER — NALOXONE HYDROCHLORIDE 0.4 MG/ML
.1-.4 INJECTION, SOLUTION INTRAMUSCULAR; INTRAVENOUS; SUBCUTANEOUS
Status: ACTIVE | OUTPATIENT
Start: 2020-04-20 | End: 2020-04-21

## 2020-04-20 RX ORDER — SODIUM CHLORIDE, SODIUM LACTATE, POTASSIUM CHLORIDE, CALCIUM CHLORIDE 600; 310; 30; 20 MG/100ML; MG/100ML; MG/100ML; MG/100ML
INJECTION, SOLUTION INTRAVENOUS CONTINUOUS PRN
Status: DISCONTINUED | OUTPATIENT
Start: 2020-04-20 | End: 2020-04-20

## 2020-04-20 RX ORDER — AMOXICILLIN 250 MG
2 CAPSULE ORAL 2 TIMES DAILY PRN
Status: DISCONTINUED | OUTPATIENT
Start: 2020-04-20 | End: 2020-04-24 | Stop reason: HOSPADM

## 2020-04-20 RX ORDER — ONDANSETRON 2 MG/ML
INJECTION INTRAMUSCULAR; INTRAVENOUS PRN
Status: DISCONTINUED | OUTPATIENT
Start: 2020-04-20 | End: 2020-04-20

## 2020-04-20 RX ORDER — ONDANSETRON 4 MG/1
4 TABLET, ORALLY DISINTEGRATING ORAL EVERY 6 HOURS PRN
Status: DISCONTINUED | OUTPATIENT
Start: 2020-04-20 | End: 2020-04-24 | Stop reason: HOSPADM

## 2020-04-20 RX ORDER — DEXTROSE MONOHYDRATE, SODIUM CHLORIDE, AND POTASSIUM CHLORIDE 50; 1.49; 4.5 G/1000ML; G/1000ML; G/1000ML
INJECTION, SOLUTION INTRAVENOUS CONTINUOUS
Status: DISCONTINUED | OUTPATIENT
Start: 2020-04-20 | End: 2020-04-20

## 2020-04-20 RX ORDER — BUPIVACAINE HYDROCHLORIDE 2.5 MG/ML
INJECTION, SOLUTION INFILTRATION; PERINEURAL PRN
Status: DISCONTINUED | OUTPATIENT
Start: 2020-04-20 | End: 2020-04-20 | Stop reason: HOSPADM

## 2020-04-20 RX ORDER — LIDOCAINE HYDROCHLORIDE 20 MG/ML
INJECTION, SOLUTION INFILTRATION; PERINEURAL PRN
Status: DISCONTINUED | OUTPATIENT
Start: 2020-04-20 | End: 2020-04-20

## 2020-04-20 RX ORDER — HYDROMORPHONE HYDROCHLORIDE 1 MG/ML
.3-.5 INJECTION, SOLUTION INTRAMUSCULAR; INTRAVENOUS; SUBCUTANEOUS EVERY 5 MIN PRN
Status: DISCONTINUED | OUTPATIENT
Start: 2020-04-20 | End: 2020-04-20 | Stop reason: HOSPADM

## 2020-04-20 RX ORDER — CEFAZOLIN SODIUM 1 G/3ML
1 INJECTION, POWDER, FOR SOLUTION INTRAMUSCULAR; INTRAVENOUS SEE ADMIN INSTRUCTIONS
Status: DISCONTINUED | OUTPATIENT
Start: 2020-04-20 | End: 2020-04-20 | Stop reason: HOSPADM

## 2020-04-20 RX ORDER — NALOXONE HYDROCHLORIDE 0.4 MG/ML
.1-.4 INJECTION, SOLUTION INTRAMUSCULAR; INTRAVENOUS; SUBCUTANEOUS
Status: DISCONTINUED | OUTPATIENT
Start: 2020-04-21 | End: 2020-04-24 | Stop reason: HOSPADM

## 2020-04-20 RX ORDER — LIDOCAINE 40 MG/G
CREAM TOPICAL
Status: DISCONTINUED | OUTPATIENT
Start: 2020-04-20 | End: 2020-04-24 | Stop reason: HOSPADM

## 2020-04-20 RX ORDER — METHOCARBAMOL 500 MG/1
500 TABLET, FILM COATED ORAL 4 TIMES DAILY PRN
Status: DISCONTINUED | OUTPATIENT
Start: 2020-04-20 | End: 2020-04-24 | Stop reason: HOSPADM

## 2020-04-20 RX ORDER — ONDANSETRON 2 MG/ML
4 INJECTION INTRAMUSCULAR; INTRAVENOUS EVERY 30 MIN PRN
Status: DISCONTINUED | OUTPATIENT
Start: 2020-04-20 | End: 2020-04-20 | Stop reason: HOSPADM

## 2020-04-20 RX ORDER — LEVOTHYROXINE SODIUM 75 UG/1
75 TABLET ORAL DAILY
Status: DISCONTINUED | OUTPATIENT
Start: 2020-04-20 | End: 2020-04-24 | Stop reason: HOSPADM

## 2020-04-20 RX ORDER — ONDANSETRON 2 MG/ML
4 INJECTION INTRAMUSCULAR; INTRAVENOUS EVERY 6 HOURS PRN
Status: DISCONTINUED | OUTPATIENT
Start: 2020-04-20 | End: 2020-04-24 | Stop reason: HOSPADM

## 2020-04-20 RX ADMIN — ROCURONIUM BROMIDE 10 MG: 10 INJECTION INTRAVENOUS at 11:18

## 2020-04-20 RX ADMIN — PROPOFOL 130 MG: 10 INJECTION, EMULSION INTRAVENOUS at 07:39

## 2020-04-20 RX ADMIN — SODIUM CHLORIDE, POTASSIUM CHLORIDE, SODIUM LACTATE AND CALCIUM CHLORIDE: 600; 310; 30; 20 INJECTION, SOLUTION INTRAVENOUS at 12:44

## 2020-04-20 RX ADMIN — SUGAMMADEX 120 MG: 100 INJECTION, SOLUTION INTRAVENOUS at 14:19

## 2020-04-20 RX ADMIN — ROCURONIUM BROMIDE 20 MG: 10 INJECTION INTRAVENOUS at 10:10

## 2020-04-20 RX ADMIN — Medication 1 G: at 12:42

## 2020-04-20 RX ADMIN — ROCURONIUM BROMIDE 10 MG: 10 INJECTION INTRAVENOUS at 12:16

## 2020-04-20 RX ADMIN — FENTANYL CITRATE 25 MCG: 50 INJECTION, SOLUTION INTRAMUSCULAR; INTRAVENOUS at 10:56

## 2020-04-20 RX ADMIN — HYDROMORPHONE HYDROCHLORIDE 0.2 MG: 1 INJECTION, SOLUTION INTRAMUSCULAR; INTRAVENOUS; SUBCUTANEOUS at 22:05

## 2020-04-20 RX ADMIN — SODIUM CHLORIDE, POTASSIUM CHLORIDE, SODIUM LACTATE AND CALCIUM CHLORIDE: 600; 310; 30; 20 INJECTION, SOLUTION INTRAVENOUS at 07:29

## 2020-04-20 RX ADMIN — FENTANYL CITRATE 25 MCG: 50 INJECTION INTRAMUSCULAR; INTRAVENOUS at 15:46

## 2020-04-20 RX ADMIN — FENTANYL CITRATE 25 MCG: 50 INJECTION, SOLUTION INTRAMUSCULAR; INTRAVENOUS at 10:54

## 2020-04-20 RX ADMIN — FENTANYL CITRATE 25 MCG: 50 INJECTION, SOLUTION INTRAMUSCULAR; INTRAVENOUS at 09:51

## 2020-04-20 RX ADMIN — FENTANYL CITRATE 50 MCG: 50 INJECTION, SOLUTION INTRAMUSCULAR; INTRAVENOUS at 08:57

## 2020-04-20 RX ADMIN — Medication 1 G: at 10:42

## 2020-04-20 RX ADMIN — Medication 2 G: at 08:38

## 2020-04-20 RX ADMIN — ROCURONIUM BROMIDE 20 MG: 10 INJECTION INTRAVENOUS at 08:55

## 2020-04-20 RX ADMIN — ONDANSETRON 4 MG: 2 INJECTION INTRAMUSCULAR; INTRAVENOUS at 14:03

## 2020-04-20 RX ADMIN — PHENYLEPHRINE HYDROCHLORIDE 100 MCG: 10 INJECTION INTRAVENOUS at 08:54

## 2020-04-20 RX ADMIN — INDOCYANINE GREEN 12.5 MG: KIT INTRAVENOUS at 10:10

## 2020-04-20 RX ADMIN — LIDOCAINE HYDROCHLORIDE 100 MG: 20 INJECTION, SOLUTION INFILTRATION; PERINEURAL at 07:39

## 2020-04-20 RX ADMIN — PHENYLEPHRINE HYDROCHLORIDE 50 MCG: 10 INJECTION INTRAVENOUS at 09:21

## 2020-04-20 RX ADMIN — FENTANYL CITRATE 25 MCG: 50 INJECTION, SOLUTION INTRAMUSCULAR; INTRAVENOUS at 10:26

## 2020-04-20 RX ADMIN — HYDROMORPHONE HYDROCHLORIDE 0.25 MG: 1 INJECTION, SOLUTION INTRAMUSCULAR; INTRAVENOUS; SUBCUTANEOUS at 13:40

## 2020-04-20 RX ADMIN — ASPIRIN 81 MG CHEWABLE TABLET 81 MG: 81 TABLET CHEWABLE at 16:27

## 2020-04-20 RX ADMIN — FENTANYL CITRATE 50 MCG: 50 INJECTION, SOLUTION INTRAMUSCULAR; INTRAVENOUS at 09:15

## 2020-04-20 RX ADMIN — POTASSIUM CHLORIDE, DEXTROSE MONOHYDRATE AND SODIUM CHLORIDE: 150; 5; 450 INJECTION, SOLUTION INTRAVENOUS at 16:27

## 2020-04-20 RX ADMIN — PHENYLEPHRINE HYDROCHLORIDE 100 MCG: 10 INJECTION INTRAVENOUS at 08:35

## 2020-04-20 RX ADMIN — LEVOTHYROXINE SODIUM 75 MCG: 0.07 TABLET ORAL at 21:33

## 2020-04-20 RX ADMIN — PHENYLEPHRINE HYDROCHLORIDE 100 MCG: 10 INJECTION INTRAVENOUS at 07:56

## 2020-04-20 RX ADMIN — SODIUM CHLORIDE, POTASSIUM CHLORIDE, SODIUM LACTATE AND CALCIUM CHLORIDE: 600; 310; 30; 20 INJECTION, SOLUTION INTRAVENOUS at 20:55

## 2020-04-20 RX ADMIN — PROPOFOL 40 MG: 10 INJECTION, EMULSION INTRAVENOUS at 14:25

## 2020-04-20 RX ADMIN — HEPARIN SODIUM 5000 UNITS: 1000 INJECTION INTRAVENOUS; SUBCUTANEOUS at 13:08

## 2020-04-20 RX ADMIN — PHENYLEPHRINE HYDROCHLORIDE 100 MCG: 10 INJECTION INTRAVENOUS at 08:44

## 2020-04-20 RX ADMIN — FENTANYL CITRATE 25 MCG: 50 INJECTION INTRAMUSCULAR; INTRAVENOUS at 17:51

## 2020-04-20 RX ADMIN — SULFAMETHOXAZOLE AND TRIMETHOPRIM 1 TABLET: 800; 160 TABLET ORAL at 21:33

## 2020-04-20 RX ADMIN — ROCURONIUM BROMIDE 60 MG: 10 INJECTION INTRAVENOUS at 07:39

## 2020-04-20 RX ADMIN — HYDROMORPHONE HYDROCHLORIDE 0.25 MG: 1 INJECTION, SOLUTION INTRAMUSCULAR; INTRAVENOUS; SUBCUTANEOUS at 14:26

## 2020-04-20 RX ADMIN — PHENYLEPHRINE HYDROCHLORIDE 100 MCG: 10 INJECTION INTRAVENOUS at 07:39

## 2020-04-20 RX ADMIN — PHENYLEPHRINE HYDROCHLORIDE 100 MCG: 10 INJECTION INTRAVENOUS at 08:05

## 2020-04-20 RX ADMIN — ENOXAPARIN SODIUM 40 MG: 40 INJECTION SUBCUTANEOUS at 06:49

## 2020-04-20 RX ADMIN — ROCURONIUM BROMIDE 10 MG: 10 INJECTION INTRAVENOUS at 13:10

## 2020-04-20 RX ADMIN — ACETAMINOPHEN 975 MG: 325 TABLET ORAL at 17:39

## 2020-04-20 RX ADMIN — FENTANYL CITRATE 50 MCG: 50 INJECTION, SOLUTION INTRAMUSCULAR; INTRAVENOUS at 07:39

## 2020-04-20 RX ADMIN — PHENYLEPHRINE HYDROCHLORIDE 100 MCG: 10 INJECTION INTRAVENOUS at 08:12

## 2020-04-20 RX ADMIN — PHENYLEPHRINE HYDROCHLORIDE 100 MCG: 10 INJECTION INTRAVENOUS at 07:53

## 2020-04-20 RX ADMIN — FENTANYL CITRATE 25 MCG: 50 INJECTION INTRAMUSCULAR; INTRAVENOUS at 17:25

## 2020-04-20 RX ADMIN — INDOCYANINE GREEN 12.5 MG: KIT INTRAVENOUS at 12:47

## 2020-04-20 ASSESSMENT — PAIN DESCRIPTION - DESCRIPTORS: DESCRIPTORS: ACHING;DISCOMFORT

## 2020-04-20 ASSESSMENT — MIFFLIN-ST. JEOR: SCORE: 948.25

## 2020-04-20 NOTE — OP NOTE
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    PROCEDURES PERFORMED:   1.  Coverage of right lower extremity popliteal wound with free left anterolateral thigh fasciocutaneous flap with 3 perforators, size 25 x 8 cm.  2.  Application of right lower extremity long leg splint.    SURGEON: Zeb Whitaker MD    CO SURGEON: Antonio De Luna MD (co-surgeon was needed in this case because of the complexity of the case, the surgical time, and technical difficulty of  based free tissue transfer in the setting of inadequate appropriate resident assistance.  Dr. De Luna mainly performed flap harvest and recipient site preparation was mainly performed by myself.  Vascular anastomosis under the microscope was performed together.)    ASSISTANT: Junior Washburn MD    ANESTHESIA: General.    ESTIMATED BLOOD LOSS: 250 mL    TOURNIQUET TIME: 0 min    FINDINGS: Flap was harvested with 3 perforators.  Flap artery end-to-end anastomosis to descending genicular artery was performed using 8-0 nylon suture.  After clamp removal, there was poor flow through the anastomosis with a dopplerable signal at the pedicle level but not at the skin paddle.  The anastomosis was revised and patient was given 5000 units of heparin IV prior to clamp removal after revision.  Flap vein to the ascending genicular vein end-to-end anastomosis was performed using 2.0 venous .  Second flap vein to saphenous vein end-to-end anastomosis was performed using 3.0 venous .  Ischemia time 49 minutes.    SPECIMENS: Right popliteal wound excision.    COMPLICATIONS: None.    DRAINS: 115 [place] JUAN R drain in left thigh flap donor site.  215 [place] JUAN R drains underneath the right lower extremity  flap.    IMPLANTS: None.    DESCRIPTION OF PROCEDURE: Informed consent was obtained in the preoperative holding area.  Patient's right popliteal wound and left thigh were then marked.  Patient was given preoperative antibiotics and anticoagulation.  She was then taken to the operating room and placed in a supine position over and under body bear hugger with bilateral lower leg SCDs placed.  All pressure points were well-padded.  Operating room temperature was raised.  General anesthesia was obtained with appropriate precautions for COVID-19.  The patient was then transitioned to a right lateral decubitus position.  Both extremities were then prepped and draped in a sterile fashion.  A timeout was performed.    The right popliteal wound was examined.  This measured 10 x 5 cm.  This was sharply excised by Dr. De Luna, tagged with silk suture for orientation purposes and sent for pathology.  The excision included skin, subcutaneous tissue, and fascia.  The wound was then thoroughly debrided and irrigated with a liter of triple antibiotic solution.  Communication to the underlying arthroplasty was identified.    Decision was made to harvest a left thigh flap for free tissue transfer coverage of the wound.  A left anterolateral thigh flap fasciocutaneous flap was harvested by performing subfascial dissection to identify a proximal subcu to use , a middle musculocutaneous , and distal septal cutaneous .  These perforators were meticulously dissected retrograde under loupe magnification.  To avoid injury to the 2 distal perforators, a cuff of vastus lateralis muscle was taken with these perforators.  This muscle was then sutured to the skin paddle subcutaneous tissue using a 2-0 Monocryl suture so that the perforators would not be tugged on.  This added at least 1 hour to the procedure.    Intraoperative chemical angiography was performed using the spy Elite system.  Patient was given 5 cc of  indocyanine green dye followed by a 10 cc saline flush.  The spy Elite system showed good perfusion of the skin paddle except for the most proximal aspect of the paddle.  This was sharply debrided down to punctate bleeding edges.  The flap skin paddle then measured 25 x 8 cm.    The flap recipient site was prepared by extending the wound debridement incision proximally to identify the vastus medialis muscle which was retracted anteriorly.  This revealed the descending genicular artery and the saphenous nerve.  The descending genicular artery and vein were then prepared for anastomosis and there was a good pulsatile flow visualized.  The saphenous vein in the nearby region was also dissected 360 degrees and distally to use it as a second vein.    The flap was then ligated from the left thigh.  Hemostasis was achieved and the left thigh donor site was closed primarily in layers over a 15 [place] JUAN R drain.  The flap artery was flushed with heparinized saline until clear egress was seen from both the flap veins.  The flap was inset over the right popliteal wound after 1 of the 15 [place] JUAN R drains in that area was placed into the arthroplasty wound and then another 15 [place] JUAN R drain was placed underneath the flap.  Vancomycin powder was placed into the arthroplasty wound.  Under the operative microscope and using microsurgical technique, the flap artery was anastomosed to the descending genicular artery in an end-to-end fashion using 8-0 nylon suture after pulsatile flow was confirmed through the descending genicular artery.  1 of the flap veins was then anastomosed to the ascending genicular vein using 2.0 venous .  Clamps were then removed and there was pulsatile flow through the arterial anastomosis and venous flow through the flap veins.  A second flap vein was anastomosed end-to-end to saphenous vein using a 3.0 venous .  Ischemia time was 49 minutes.    Following this, there was a dopplerable signal  obtainable at the flap pedicle, no signal obtainable at the skin paddle.  There was concern for poor perfusion.  The arterial anastomosis was taken down and the descending genicular artery was flushed with papaverine and heparinized saline.  There was good pulsatile flow through the recipient vessel.  The flap was flushed through with heparinized saline through the flap artery.  Flap artery appeared healthy.  Anastomosis was performed again using 8-0 nylon suture in a similar fashion as before.  Prior to clamp removal this time, 5000 units of heparin was given IV as a precaution.  Following this, there was pulsatile flow through the pedicle with good venous outflow on [place] testing.  Dopplerable signals were obtained at each  along the skin paddle.  Flap skin incisions were then closed with nylon suture in a mattress fashion.    All drains held suction at the end of the case.  A [employer] tissue oxygenation monitor was placed over the skin paddle which had good readings.  The right lower extremity was placed into an extended knee position.  The heel and all pressure points were well-padded.  The flap was well-padded.  A long-leg splint was applied and allowed to set before general anesthesia was discontinued.  All counts were correct at the end of the case.  The patient was then awakened and transferred to the postoperative area in stable condition.    DISPOSITION: Inpatient floor for frequent flap monitoring.

## 2020-04-20 NOTE — ANESTHESIA CARE TRANSFER NOTE
Patient: Jigna Allen    Procedure(s):  Right leg reconstruction and wound debridemenr  With left anterolateral thigh free flap with SPY    Diagnosis: Non-healing surgical wound, subsequent encounter [T81.89XD]  Diagnosis Additional Information: No value filed.    Anesthesia Type:   General     Note:  Airway :Face Mask  Patient transferred to:PACU  Comments:   -on 6L O2 via FM, Pt Spont. breathing, awake & alert, monitors placed, VSS, RN at bedside, no airway      Vitals: (Last set prior to Anesthesia Care Transfer)    CRNA VITALS  4/20/2020 1445 - 4/20/2020 1521      4/20/2020             Pulse:  94    ART BP:  120/45    ART Mean:  63    SpO2:  99 %                Electronically Signed By: WOOD Gonzalez CRNA  April 20, 2020  3:21 PM

## 2020-04-20 NOTE — LETTER
Transition Communication Hand-off for Care Transitions to Next Level of Care Provider    Name: Jigna Allen  : 1935  MRN #: [id number]  Primary Care Provider: Elisabeth Ko  Primary Clinic: [address] [address]   Reason for Hospitalization:  Non-healing surgical wound, subsequent encounter [T81.89XD]  Admit Date/Time: 2020  5:17 AM  Discharge Date: 2020  Payor Source: Payor: [employer] / Plan: [employer] / Product Type: HMO /   Reason for Communication Hand-off Referral: Other Continuity of Care  Discharge Plan: [facility]  Concern for non-adherence with plan of care:   Y/N N  Discharge Needs Assessment:  Needs      Most Recent Value   Equipment Currently Used at Home  other (see comments), walker, rolling [3 wheeled walker]   Interventions provided  -- [Assist with home care verification to assist SW.]      Follow-up plan:    Future Appointments   Date Time Provider Department Center   2020  9:30 AM Claribel Valdez, PT [facility] O   2020  7:00 PM Sakina Weldon, OT [facility] O   2020 10:45 AM EHSAN De Luna MD [facility]   2020 10:20 AM [facility]   2020 11:00 AM Jenny Land PA-C [facility]   2020  3:00 PM Edouard Bryan MD [facility]   Any outstanding tests or procedures:        Referrals     Future Labs/Procedures    Occupational Therapy Adult Consult     Comments:    Evaluate and treat as clinically indicated.    Reason: Deconditioned post op.    Physical Therapy Adult Consult     Comments:    Evaluate and treat as clinically indicated.    Reason:  Deconditioned post op.          JOURDAN Wong

## 2020-04-20 NOTE — PROGRESS NOTES
1615 Dr Washburn at bedside. Doppler performed.   1620 BMP & CBC labs sent.  1710 Verbal signout obtained from Dr Tom.  1745 Attempted to call report to 7C. Requested call back

## 2020-04-20 NOTE — BRIEF OP NOTE
[facility], [place]    Brief Operative Note    Pre-operative diagnosis: Non-healing surgical wound, subsequent encounter [T81.89XD]  Post-operative diagnosis Same as pre-operative diagnosis    Procedure: Procedure(s):  - Preparation of surgical wound  - Left ALT flap to right popliteal wound  - ICG angiography    Surgeon: Surgeon(s) and Role:     * EHSAN De Luna MD - Primary     * Zeb Whitaker MD - Assisting     * Junior Washburn MD - Resident - Assisting  Anesthesia: General   Estimated blood loss:  300 ml  Drains: Stevie-Izaguirre  Specimens:   ID Type Source Tests Collected by Time Destination   1 : right popliteal fossa Wound Other ANAEROBIC BACTERIAL CULTURE, FUNGUS CULTURE, GRAM STAIN, WOUND CULTURE AEROBIC BACTERIAL EHSAN De Luna MD 4/20/2020 10:54 AM    A : right popliteal fossa wound-two stitches marks distal and 1 long stitch marks medial Tissue Other SURGICAL PATHOLOGY EXAM EHSAN De Luna MD 4/20/2020 10:45 AM      Findings:   Indra pus draining from the joint. JUAN R drain placed in the opening to the joint.   Complications: None.  Implants:   Implant Name Type Inv. Item Serial No.  Lot No. LRB No. Used Action   IMP DEVICE ANASTOMOTIC 2.0MM  GREEN [id number] Other IMP DEVICE ANASTOMOTIC 2.0MM  GREEN [id number]  [email or web address] [id number] Right 1 Implanted   IMP DEVICE ANASTOMOTIC 3.0MM  GOLD [id number] Other IMP DEVICE ANASTOMOTIC 3.0MM  GOLD [id number] [email or web address] LIFE [id number] Right 1 Implanted     Plan:  - Q1hr flap checks by doppler, Vioptix, clinical exam. Q2hr on 4/21 at 4pm.   - Infectious disease consult. Intra-op cultures pending. Start on Bactrim for now.   -  consult for TCU placement.   - Bedrest and Vera.   - Jasmine Hugger on flap.   - Aspirin now and enoxaparin from POD1.    Junior Washburn MD  [phone]

## 2020-04-20 NOTE — ANESTHESIA POSTPROCEDURE EVALUATION
Anesthesia POST Procedure Evaluation    Patient: Jigna Allen   MRN:     [id number] Gender:   female   Age:    85 year old :      1935        Preoperative Diagnosis: Non-healing surgical wound, subsequent encounter [T81.89XD]   Procedure(s):  Right leg reconstruction and wound debridemenr  With left anterolateral thigh free flap with SPY   Postop Comments: No value filed.     Anesthesia Type: General       Disposition: Admission   Postop Pain Control: Uneventful            Sign Out: Well controlled pain   PONV: No   Neuro/Psych: Uneventful            Sign Out: Acceptable/Baseline neuro status   Airway/Respiratory: Uneventful            Sign Out: Acceptable/Baseline resp. status   CV/Hemodynamics: Uneventful            Sign Out: Acceptable CV status   Other NRE: NONE   DID A NON-ROUTINE EVENT OCCUR? No         Last Anesthesia Record Vitals:  CRNA VITALS  2020 1445 - 2020 1545      2020             Pulse:  94    ART BP:  120/45    ART Mean:  63    SpO2:  99 %    Resp Rate (observed):  12    EKG:  Sinus rhythm          Last PACU Vitals:  Vitals Value Taken Time   /57 2020  5:10 PM   Temp 37.5  C (99.5  F) 2020  4:00 PM   Pulse 94 2020  5:10 PM   Resp 14 2020  5:00 PM   SpO2 97 % 2020  5:11 PM   Temp src     NIBP     Pulse     SpO2     Resp     Temp     Ht Rate     Temp 2     Vitals shown include unvalidated device data.      Electronically Signed By: Clif Badillo MD, 2020, 5:12 PM

## 2020-04-21 ENCOUNTER — APPOINTMENT (OUTPATIENT)
Dept: PHYSICAL THERAPY | Facility: CLINIC | Age: 85
DRG: 464 | End: 2020-04-21
Attending: PLASTIC SURGERY
Payer: COMMERCIAL

## 2020-04-21 LAB
ANION GAP SERPL CALCULATED.3IONS-SCNC: 5 MMOL/L (ref 3–14)
BUN SERPL-MCNC: 12 MG/DL (ref 7–30)
CALCIUM SERPL-MCNC: 7.9 MG/DL (ref 8.5–10.1)
CHLORIDE SERPL-SCNC: 106 MMOL/L (ref 94–109)
CO2 SERPL-SCNC: 27 MMOL/L (ref 20–32)
CREAT SERPL-MCNC: 0.64 MG/DL (ref 0.52–1.04)
GFR SERPL CREATININE-BSD FRML MDRD: 82 ML/MIN/{1.73_M2}
GLUCOSE SERPL-MCNC: 112 MG/DL (ref 70–99)
POTASSIUM SERPL-SCNC: 4 MMOL/L (ref 3.4–5.3)
SODIUM SERPL-SCNC: 138 MMOL/L (ref 133–144)

## 2020-04-21 PROCEDURE — 80048 BASIC METABOLIC PNL TOTAL CA: CPT | Performed by: STUDENT IN AN ORGANIZED HEALTH CARE EDUCATION/TRAINING PROGRAM

## 2020-04-21 PROCEDURE — 25800030 ZZH RX IP 258 OP 636: Performed by: STUDENT IN AN ORGANIZED HEALTH CARE EDUCATION/TRAINING PROGRAM

## 2020-04-21 PROCEDURE — 25000128 H RX IP 250 OP 636: Performed by: STUDENT IN AN ORGANIZED HEALTH CARE EDUCATION/TRAINING PROGRAM

## 2020-04-21 PROCEDURE — 97162 PT EVAL MOD COMPLEX 30 MIN: CPT | Mod: GP | Performed by: REHABILITATION PRACTITIONER

## 2020-04-21 PROCEDURE — 25000132 ZZH RX MED GY IP 250 OP 250 PS 637: Performed by: STUDENT IN AN ORGANIZED HEALTH CARE EDUCATION/TRAINING PROGRAM

## 2020-04-21 PROCEDURE — 12000001 ZZH R&B MED SURG/OB UMMC

## 2020-04-21 PROCEDURE — 36415 COLL VENOUS BLD VENIPUNCTURE: CPT | Performed by: STUDENT IN AN ORGANIZED HEALTH CARE EDUCATION/TRAINING PROGRAM

## 2020-04-21 PROCEDURE — 97530 THERAPEUTIC ACTIVITIES: CPT | Mod: GP | Performed by: REHABILITATION PRACTITIONER

## 2020-04-21 RX ORDER — ACETAMINOPHEN 500 MG
500-1000 TABLET ORAL EVERY 8 HOURS PRN
Status: ON HOLD | COMMUNITY
End: 2020-06-04

## 2020-04-21 RX ADMIN — LEVOTHYROXINE SODIUM 75 MCG: 0.07 TABLET ORAL at 08:00

## 2020-04-21 RX ADMIN — OXYCODONE HYDROCHLORIDE 5 MG: 5 TABLET ORAL at 21:35

## 2020-04-21 RX ADMIN — ACETAMINOPHEN 975 MG: 325 TABLET ORAL at 00:24

## 2020-04-21 RX ADMIN — SULFAMETHOXAZOLE AND TRIMETHOPRIM 1 TABLET: 800; 160 TABLET ORAL at 08:00

## 2020-04-21 RX ADMIN — OXYCODONE HYDROCHLORIDE 5 MG: 5 TABLET ORAL at 00:17

## 2020-04-21 RX ADMIN — ACETAMINOPHEN 975 MG: 325 TABLET ORAL at 19:46

## 2020-04-21 RX ADMIN — ENOXAPARIN SODIUM 40 MG: 40 INJECTION SUBCUTANEOUS at 10:00

## 2020-04-21 RX ADMIN — METRONIDAZOLE 500 MG: 500 INJECTION, SOLUTION INTRAVENOUS at 20:30

## 2020-04-21 RX ADMIN — METRONIDAZOLE 500 MG: 500 INJECTION, SOLUTION INTRAVENOUS at 13:37

## 2020-04-21 RX ADMIN — SODIUM CHLORIDE, POTASSIUM CHLORIDE, SODIUM LACTATE AND CALCIUM CHLORIDE 500 ML: 600; 310; 30; 20 INJECTION, SOLUTION INTRAVENOUS at 00:22

## 2020-04-21 RX ADMIN — ASPIRIN 81 MG CHEWABLE TABLET 81 MG: 81 TABLET CHEWABLE at 08:00

## 2020-04-21 RX ADMIN — ACETAMINOPHEN 975 MG: 325 TABLET ORAL at 08:00

## 2020-04-21 RX ADMIN — SENNOSIDES AND DOCUSATE SODIUM 1 TABLET: 8.6; 5 TABLET ORAL at 19:46

## 2020-04-21 RX ADMIN — SULFAMETHOXAZOLE AND TRIMETHOPRIM 1 TABLET: 800; 160 TABLET ORAL at 19:46

## 2020-04-21 RX ADMIN — SODIUM CHLORIDE, POTASSIUM CHLORIDE, SODIUM LACTATE AND CALCIUM CHLORIDE 500 ML: 600; 310; 30; 20 INJECTION, SOLUTION INTRAVENOUS at 06:44

## 2020-04-21 ASSESSMENT — ACTIVITIES OF DAILY LIVING (ADL)
ADLS_ACUITY_SCORE: 14
ADLS_ACUITY_SCORE: 14
ADLS_ACUITY_SCORE: 13
ADLS_ACUITY_SCORE: 12
ADLS_ACUITY_SCORE: 16
ADLS_ACUITY_SCORE: 13

## 2020-04-21 ASSESSMENT — PAIN DESCRIPTION - DESCRIPTORS: DESCRIPTORS: SORE

## 2020-04-21 NOTE — PROGRESS NOTES
04/21/20 1237   Quick Adds   Type of Visit Initial PT Evaluation   Living Environment   Lives With spouse   Living Arrangements house   Home Accessibility stairs to enter home;stairs within home   Number of Stairs, Main Entrance 1   Number of Stairs, Within Home, Primary   (stairs down to basement for laundry)   Transportation Anticipated car, drives self;family or friend will provide   Living Environment Comment Pt reports she lives at home with her . Pt reports her  has been doing the driving and laundry because of her knee.    Self-Care   Usual Activity Tolerance good   Current Activity Tolerance fair   Regular Exercise Yes   Activity/Exercise Type walking   Exercise Amount/Frequency 3-5 times/wk   Equipment Currently Used at Home other (see comments);walker, rolling  (3 wheeled walker)   Activity/Exercise/Self-Care Comment Pt reports she uses a 3 wheeled walker.    Functional Level Prior   Ambulation 1-->assistive equipment   Transferring 1-->assistive equipment   Toileting 0-->independent   Bathing 0-->independent   Communication 0-->understands/communicates without difficulty   Swallowing 0-->swallows foods/liquids without difficulty   Cognition 0 - no cognition issues reported   Fall history within last six months no   Which of the above functional risks had a recent onset or change? ambulation;transferring;toileting;bathing;dressing   Prior Functional Level Comment Pt reports at baseline she is IND with functional mobility and transfers, however reports recently she has been using 3 wheeled walker.   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 She is s/p soft tissue reconstruction with a free left anterolateral thigh flap in preparation for replacement of prosthesis with antibiotic spacer.    Precautions/Limitations fall precautions   Weight-Bearing Status - RLE nonweight-bearing  (RLE parallel to the floor)   Cognitive Status Examination   Orientation orientation to person, place and time   Level of Consciousness alert   Follows Commands and Answers Questions 100% of the time   Personal Safety and Judgment intact   Memory intact   Pain Assessment   Patient Currently in Pain No   Integumentary/Edema   Integumentary/Edema other (describe)   Integumentary/Edema Comments LLE anterolateral thigh flap graft site dressed with ace bandage. RLE dressed with ace bandage and plaster casting underneath   Posture    Posture Forward head position   Range of Motion (ROM)   ROM Quick Adds No deficits were identified   ROM Comment LLE WFL, RLE not assessed   Strength   Strength Comments LLE 3/5 strength observed with functional mobility and exercise   Bed Mobility   Bed Mobility Comments Pt tx supine->sit with Mod A x 2. Pts RLE remained elevated on pillows and parallel to the floor at all times.    Balance   Balance Comments Pt required Min A to maintain static seated balance initially, once acclimated to upright, pt able to sit EOB with SBA.    Sensory Examination   Sensory Perception no deficits were identified   General Therapy Interventions   Planned Therapy Interventions bed mobility training;neuromuscular re-education;ROM;strengthening;transfer training;progressive activity/exercise;gait training   Clinical Impression   Criteria for Skilled Therapeutic Intervention yes, treatment indicated   PT Diagnosis Impaired Functional Mobility   Influenced by the following impairments decreased strength, activity intolerance, post-surgical precautions   Functional limitations due to impairments bed  "mob, transfers, gait   Clinical Presentation Evolving/Changing   Clinical Presentation Rationale PMHx, current presentation, clinical judgement   Clinical Decision Making (Complexity) Moderate complexity   Therapy Frequency 5x/week   Predicted Duration of Therapy Intervention (days/wks) 4/28/20   Anticipated Discharge Disposition Transitional Care Facility   Risk & Benefits of therapy have been explained Yes   Patient, Family & other staff in agreement with plan of care Yes   [facility] TM \"6 Clicks\"   2016, Trustees of [facility], under license to [employer].  All rights reserved.   6 Clicks Short Forms Basic Mobility Inpatient Short Form   [facility] AM-PAC  \"6 Clicks\" V.2 Basic Mobility Inpatient Short Form   1. Turning from your back to your side while in a flat bed without using bedrails? 2 - A Lot   2. Moving from lying on your back to sitting on the side of a flat bed without using bedrails? 2 - A Lot   3. Moving to and from a bed to a chair (including a wheelchair)? 2 - A Lot   4. Standing up from a chair using your arms (e.g., wheelchair, or bedside chair)? 1 - Total   5. To walk in hospital room? 1 - Total   6. Climbing 3-5 steps with a railing? 1 - Total   Basic Mobility Raw Score (Score out of 24.Lower scores equate to lower levels of function) 9   Total Evaluation Time   Total Evaluation Time (Minutes) 10     "

## 2020-04-21 NOTE — PLAN OF CARE
POD 1 flap to R knee. VSS ex soft BP's, MD notified. Forgetful at times. Capno on. Pain managed with IV Dilaudid x1, Oxycodone x1. Denies nausea, NPO ex ice. R popliteal flap Vioptix signal low 70s, signal quality mid 90's. Doppler checks q 1hr, pulse present, flap perfusing well. R leg in cast, L leg with ace wrap. 3 JUAN R's with bloody/bright red drainage.  Vera with low urine output, 1 500mL bolus given. Paged again this AM as output continues to be low, awaiting orders for 2nd LR bolus. PIV infusing LR @ 90 mL/hr. Will continue to monitor

## 2020-04-21 NOTE — PHARMACY-ADMISSION MEDICATION HISTORY
Admission medication history interview status for the 4/20/2020 admission is complete. See Epic admission navigator for allergy information, pharmacy, prior to admission medications and immunization status.     Medication history interview sources:  Patient, Surescripts fill history    Changes made to PTA medication list (reason)  Added: None  Deleted: None  Changed: Acetaminophen 1000mg q6h prn -> 500-1000mg q8h prn    Additional medication history information (including reliability of information, actions taken by pharmacist):  -Patient is a good historian of her medications  -She reports she has completed several antibiotic courses in the past month. From her fill history those include:   Cephalexin 500mg QID (7 day supply on 3/2)   Cefpodoxime 200mg BID (7 day supply on 3/31)   Metronidazole 500mg BID (7 day supply on 3/31)    Prior to Admission medications    Medication Sig Last Dose Taking? Auth Provider   acetaminophen (TYLENOL) 500 MG tablet Take 500-1,000 mg by mouth every 8 hours as needed for mild pain Past Week at Unknown time Yes Unknown, Entered By History   levothyroxine (LEVOTHROID) 75 MCG tablet Take 75 mcg by mouth daily  4/19/2020 at 0800 Yes Reported, Patient     Medication history completed by:   Desiree Nichols, Pharm D  April 21, 2020

## 2020-04-21 NOTE — PROVIDER NOTIFICATION
Notified Plastic Surgery resident upon patient's arrival to unit that Vioptix was not picking up O2 saturation - no waveform present. Spoke with Luke - gave instructions to clean area and reapply new sensor. Oxygenation now low 70's. Signal quality 94. Will continue to monitor.

## 2020-04-21 NOTE — PLAN OF CARE
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 She is s/p soft tissue reconstruction with a free left anterolateral thigh flap in preparation for replacement of prosthesis with antibiotic spacer(MD note)    /44 (BP Location: Left arm)   Pulse 91   Temp 98  F (36.7  C) (Oral)   Resp 15   Ht 1.575 m (5' 2\")   Wt 54 kg (119 lb 0.8 oz)   SpO2 97%   BMI 21.77 kg/m       Alert and oriented with intermittent forgetfulness. LS clear, IS done. +BS, no flatus or BM yet. Pt able to sat up n the side of the bed with PT and RN, Rt leg kept elevated, c/o's dizziness that improve alvarado time. Tolerated clear liqs in the AM, started on Regular diet this afternoon. Pain is tolerable per pt, has scheduled tylenol. Rt leg flap with intact cast, +doppler, soft, warm, pale pink in color, vioptix signal at 95%, signal at 68-70%, MD notified-no change in orders received. Left leg graft site with intact ace wraps, has strong dorsal pulse.  Rt leg JUAN R x2 and left leg JUAN R x1 with small serosnag drainage.  Left hand PIV x2 sl'd in between abx. Vera removed at 1230, no void yet at this time.  Continue to monitor vs, incisions, flap and drains and continue with POC.    "

## 2020-04-21 NOTE — OR NURSING
Unable to give report to receiving RN on 7C.  RN will call back. Dr. Washburn text paged and returned page.  He is okay with 35 ml urine output from [id number].  Hand off report given to Chela Roth RN

## 2020-04-21 NOTE — PLAN OF CARE
OT/7C: OT evaluation orders received and appreciated. Per collaboration with PT; anticipate patient will have skilled OT needs in the future to progress ADL. However, today patient quite limited for 2 discipline transfer/therapy goals due to RLE restrictions of NWB, keeping parallel to the ground, unable to dangle. PT reports cognition intact; able to complete basic ADL (g/h) in bed without difficulty. Difficulty addressing LB dressing at this time due to large cast. Anticipating will need TCU. To avoid duplication of services, OT to HOLD at this time. Will monitor peripherally and collaborate with PT for appropriate initiation of services. PT following for functional transfers (EOB, chair, commode). Plan to re-assesses OT needs 4/23.

## 2020-04-21 NOTE — PROGRESS NOTES
Admitted/transferred from: PACU    2 RN full except R leg cast, L leg wrap  skin assessment completed by Ronald Hall, DEANGELO and Madison Gutiérrez, DEANGELO.  Skin assessment finding: skin intact, no problems.  Interventions/actions: mepilex on sacrum from OR    Will continue to monitor.

## 2020-04-21 NOTE — PROGRESS NOTES
Plastic Surgery Progress Note    Subjective/Interval History:  No acute events. Flap checks unremarkable.     Objective:  Temp:  [98.3  F (36.8  C)-99.7  F (37.6  C)] 99.7  F (37.6  C)  Pulse:  [85-98] 92  Heart Rate:  [82-97] 88  Resp:  [12-21] 16  BP: ()/() 101/42  MAP:  [85 mmHg-103 mmHg] 87 mmHg  Arterial Line BP: (153-164)/(51-64) 155/51  SpO2:  [94 %-100 %] 97 %    General appearance: in NAD, resting comfortably in bed  Pulm: Non-labored breathing; saturating well on NC oxygen.   CV: Hemodynamically stable  RLE: Flap appears pink and viable. There is a 2 sec cap refill. Vioptix numbers steady with oxygenation in the 70s% range. Dopplerable arterial signal present. Bulky splint intact. JUAN R drains with serosanguinous output.    LLE: Thigh incision intact. JUAN R with serosanguinous output.     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 She is s/p soft tissue reconstruction with a free left anterolateral thigh flap in preparation for replacement of prosthesis with antibiotic spacer.     - Apprciate infectious disease recommendations for long term antibiotics until knee prosthesis can be replaced with antibiotic spacer. Vancomycin powder applied in the joint. On bactrim now.   - Continue Q1hr flap checks until 4pm. Then, transition to Q2hr flap checks.   - CLD until noon and then regular diet.   - Monitor urine output for a few more hours and remove Vera.  - Ok to move to a recliner today. RLE must be kept elevated at least parallel to the floor. No dangle please. PT/OT.   - Daily aspirin.   - DVT chemoprophylaxis.   - Will most likely need TCU placement at discharge.     Junior Washburn MD  [phone]

## 2020-04-21 NOTE — CONSULTS
[facility]OUS [facility]EASES CONSULTATION     Patient:  Jigna Allen   Date of birth 1935, Medical record number [id number]  Date of Visit:  04/21/2020  Date of Admission: 4/20/2020  Consult Requester:EHSAN De Luna MD          Assessment and Recommendations:   ASSESSMENT:  1. Right popliteal fossa wound with polymicrobial infection  - Culture from [facility] (3/27/20): ESBL E.coli, Klebsiella pneumoniae, multiple anaerobes  2. Right knee prosthetic joint infection  - Culture from [facility] (3/30/20): Strep constellatus, and Clostridium ramosum (broth), Peptoniphilus asaccharolyticus, anaerobic gram positive rods (unable to completely identify)  3. S/p right TKA (6/12/28)  4. 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 Post op had seroma that was treated with I&D and antibiotics.    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 Wound has grown ESBL E.coli, Klebsiella pneumoniae, multiple anaerobes and joint aspiration with growth of Strep constellatus, and Clostridium ramosum (broth), Peptoniphilus asaccharolyticus, anaerobic gram positive rods (unable to completely identify). Has intermittently been on PO antibiotics during course of work up. After joint infection was identified on 3/30/20 met with orthopedics and plastic surgery- recommended amputation vs attempt at salvaging limb and Jigna wishes to pursue salvage. Was admitted on 4/20 for scheduled I&D of wound with flap closure, basia pus noted during procedure and  sent for cultures. Plan for 2 stage right TKA revision to start in 2-3 months. Has felt fatigued and low-grade temps to 99F with no high grade fevers, no additional systemic symptoms. Recommend continuing Bactrim and adding Flagyl for expanded anaerobic coverage as several unidentifiable anaerobes have grown on previous cultures. Will continue to follow intra-operative cultures from 4/20 to help determine which antimicrobials to use for therapy while awaiting joint revision.      RECOMMENDATION:  1. Continue Bactrim  2. Start metronidazole 500mg PO TID  3. Check CBC w/diff  4. Antibiotics as above while awaiting final intra-op cultures from 4/20/20    Thank you for this consult. ID will continue to follow.     Patient was discussed with Dr. Calderon.     Jeaneth Agustin PA-C  Infectious Disease  Pager # [phone]  ________________________________________________________________    Consult Question: Infected knee prosthesis. Soft tissue reconstruction 4/20. Planning explant of prosthesis and placement of antibiotic spacer in 2-3 months. Joint pus cultured intra-op 4/20.  Admission Diagnosis: Non-healing surgical wound, subsequent encounter [T88.08XD]         History of Present Illness:   Jigna Allen is an 85 year old female with history of right leg sarcoma s/p chemo with doxil/ifos, surgical resection (2/15/2010), and radiation (last tx June 2010), post-op course complicated by seroma requiring I&D and treated with antibiotics, then recovered well and underwent right knee TKA (6/2018), in December 2019 developed a non-healing right popliteal wound with polymicrobial prosthetic joint and wound infection.    She first noted a lump, then a scab on popliteal fossa of right knee at previous surgical incision site from sarcoma (2010) in December 2019. This began to open up and formed a wound that has failed to heal despite cares and wound vac. She was referred to dermatology and had biopsy to evaluate for recurrent  sarcoma (negative) and culture with multiple gram positive cocci, gram negative bacilli, and gram positive bacilli on 1/27/20. Completed course of clindamycin (1/27-2/3/20) following procedure. Underwent repeat tissue biopsy (negative for sarcoma) and I&D of wound with wound vac placement on 3/27/20 at [facility]. Operative note describes deep wound with palpable bone and cultures obtained at that time grew multiple anaerobes with no predominant organism, ESBL E.coli, and Klebsiella pneumoniae. She was discharged with 7 days of cefpodoxime and metronidazole (3/31/20-4/7/20). She presented to the [facility] ED on 3/30 due to concern for knee pain following procedure, joint was aspirated at that time revealing 52090 WBC with 94% neutrophils and polymicrobial growth on culture- Peptoniphilus asaccarolyticus, anaerobic gram positive rods (unable to completely identify), Streptococcus constellatus, Clostridium ramosum. At that time was advised to continue antibiotics and was set to follow up with orthopedics and plastic surgery. She met with both groups and, plan was developed for limb salvage with debridement and flap coverage on 4/20/20 and future for 2 staged right TKA revision with antibiotic spacer     She reports foul smelling cloudy yellow drainage from the wound over the last 3 weeks. Her foot and leg distal to wound have been swollen but she has not noted swelling over her knee joint. Had a temperature to 99F at home. No chills, rigors, sweats, weight change, skin concerns (aside from wound) diarrhea, nausea, or vomiting.    Antimicrobials:  Current:  - Bactrim (start 4/20)    Previous:   - dariusz-operative bacitracin, cefazolin, gentamicin, vancomycin (4/20)  - cefpodoxime (3/31-4/7)  - metronidazole (3/31-4/7)  - clindamycin (1/27-2/3)  - amoxicillin (12/2019)           Review of Systems:   CONSTITUTIONAL:  +fatigue. No fevers or chills, sweats  EYES: negative for icterus  ENT:  negative for tinnitus and sore  throat  RESPIRATORY:  negative for cough with sputum and dyspnea  CARDIOVASCULAR:  +edema in RLE. negative for chest pain, dyspnea  GASTROINTESTINAL:  negative for abdominal pain, nausea, vomiting, diarrhea and constipation  GENITOURINARY:  negative for dysuria, flank pain, urgency  HEME:  No easy bruising  INTEGUMENT:  negative for rash and pruritus  NEURO:  Negative for headache, focal weakness         Past Medical History:     Past Medical History:   Diagnosis Date     Anemia      Anxiety      Arthritis 1990?     Complication of anesthesia     slow to wakeup     Hypothyroid      Sarcoma (H) 2009            Past Surgical History:     Past Surgical History:   Procedure Laterality Date     ARTHROPLASTY KNEE Right 6/12/2018    Procedure: ARTHROPLASTY KNEE;  Right Total Knee Replacement ;  Surgeon: He Gomez MD;  Location: [facility] OR     CARPAL TUNNEL RELEASE RT/LT       excise sarcoma Right     right leg     exicision of wound  03/27/2020     GRAFT FREE VASCULARIZED TRANSVERSE RECTUS ABDOMINIS MYOCUTANEOUS Left 4/20/2020    Procedure: With left anterolateral thigh free flap with SPY;  Surgeon: EHSAN De Luna MD;  Location: [facility] OR     KNEE SURGERY       RECONSTRUCT KNEE Right 4/20/2020    Procedure: Right leg reconstruction and wound debridemenr;  Surgeon: EHSAN De Luna MD;  Location: [facility] OR     TUBAL LIGATION              Family History:   Reviewed and non-contributory.   Family History   Problem Relation Age of Onset     Cancer Father      Prostate Cancer Father      Other Cancer Father      Cancer Brother      Other - See Comments Daughter         Slow to wake      Asthma Daughter             Social History:     Social History     Tobacco Use     Smoking status: Never Smoker     Smokeless tobacco: Never Used   Substance Use Topics     Alcohol use: Not Currently     Comment: rarely - months      History   Sexual Activity     Sexual activity: Yes     Partners: Male     Birth control/ protection:  Post-menopausal            Current Medications:       acetaminophen  975 mg Oral Q8H     aspirin  81 mg Oral Daily     enoxaparin ANTICOAGULANT  40 mg Subcutaneous Q24H     levothyroxine  75 mcg Oral Daily     sodium chloride (PF)  3 mL Intracatheter Q8H     sulfamethoxazole-trimethoprim  1 tablet Oral BID            Allergies:     Allergies   Allergen Reactions     Colchicine      Other reaction(s): Gastrointestinal  diarrhea     Niacin      skin rash     Ferrous Sulfate Rash            Physical Exam:   Vitals were reviewed  Patient Vitals for the past 24 hrs:   BP Temp Temp src Pulse Heart Rate Resp SpO2   04/21/20 0749 101/42 99.7  F (37.6  C) Oral -- 88 16 97 %   04/21/20 0417 91/41 98.3  F (36.8  C) Oral -- 86 16 96 %   04/20/20 2252 104/40 99.6  F (37.6  C) Oral 92 82 21 96 %   04/20/20 2043 113/46 99.1  F (37.3  C) Oral -- 89 15 98 %   04/20/20 1730 117/58 99.2  F (37.3  C) Oral 90 95 17 97 %   04/20/20 1600 132/59 99.5  F (37.5  C) Oral 85 87 15 99 %   04/20/20 1518 (!) 149/71 98.3  F (36.8  C) Axillary -- -- 12 100 %       Physical Examination:  GENERAL:  Awake, alert, oriented, well-developed, well-nourished, in bed in no acute distress.   HEENT:  Head is normocephalic, atraumatic   EYES:  Eyes have anicteric sclerae without conjunctival injection. EOM intact.  ENT:  Oropharynx is moist without exudates or ulcers. Tongue is midline  NECK:  Supple.   LUNGS:  Clear to anterior auscultation bilateral.   CARDIOVASCULAR:  Regular rate and rhythm. +systolic murmur (known).  ABDOMEN:  Normal bowel sounds, soft, nontender, nondistended  SKIN:  No acute rashes.  Line(s) are in place without any surrounding erythema or exudate. Unable to assess popliteal fossa- surgical dressing in place.  NEUROLOGIC:  Grossly nonfocal. Alert, oriented. Wiggles toes bilaterally.         Laboratory Data:     Microbiology  4/20/20 Anaerobic bacterial culture, right popliteal fossa: NGTD   4/20/20 Fungus culture, right popliteal fossa:  NGTD   4/20/20 Wound Culture Aerobic bacterial, right popliteal fossa: NGTD   4/20/20 Gram stain, right popliteal fossa: moderate gram positive cocci, few gram negative coccobacilli  4/1/20 Blood culture: no growth  3/30/20 Anaerobic bacterial culture, right knee fluid: light growth Peptoniphilus asaccarolyticus, light growth Anaerobic gram positive rods, unable to completely identify  3/30/20 Fluid culture Aerobic Bacterial, right knee fluid: light growth Streptococcus constellatus, Clostridium ramosum (day 2, isolated in broth only) (resistant: clindamycin)  3/27/20 Aerobic culture, tissue *[facility]- per care everywhere: Escerichia coli (ESBL) (resistant: cefazolin, ceftriaxone, cefepime, ciprofloxacin, levofloxacin, ceftazidime), Klebsiella pneumoniae  1/27/20 Aerobic culture and gram stain, swab of popliteal fossa *[facility]- per care everywhere: growth of many multiple bacterial morphotypes; gram negative bacilli, gram positive cocci, gram positive bacilli      Inflammatory Markers    Recent Labs   Lab Test 04/01/20  1556 09/08/16  0930   *  --    .0* <2.9       Hematology Studies    Recent Labs   Lab Test 04/20/20  1620 04/20/20  1031 04/01/20  1556 06/14/18  0631 06/13/18  0622 06/05/18  1112 05/10/16  1035 10/07/14  0848 05/08/14  1015 11/19/13  1119 09/03/13  0908   WBC  --   --  9.8  --   --  4.8 6.1 3.1* 3.0* 4.3 3.6*   ANEU  --   --  8.6*  --   --   --  4.1 1.6 1.8 3.1 2.5   AEOS  --   --  0.0  --   --   --  0.1 0.1 0.1 0.1 0.1   HGB 8.5* 9.0* 10.5* 11.0* 11.5* 14.3 13.5 13.7 13.3 13.2 13.0   MCV  --   --  98  --   --  99 97 100 99 101* 101*     --  356  --   --  197 229 198 188 208 183       Metabolic Studies     Recent Labs   Lab Test 04/21/20  0627 04/20/20  1620 04/20/20  1031 04/01/20  1556 06/14/18  0631 06/13/18  0622    135 137 132* 143 141   POTASSIUM 4.0 4.1 4.6 3.5 4.2 5.0   CHLORIDE 106 104  --  98 105 105   CO2 27 27  --  27 30 32   BUN 12 14  --  17 12 11   CR 0.64  0.61  --  0.79 0.67 0.69   GFRESTIMATED 82 84  --  69 85 82       Hepatic Studies    Recent Labs   Lab Test 04/01/20  1556 05/10/16  1035 10/07/14  0848 05/08/14  1015 11/19/13  1119 09/03/13  0908   BILITOTAL 0.6 0.8 0.5 0.8 0.8 0.7   ALKPHOS 85 91 85 69 77 70   ALBUMIN 2.5* 3.8 3.6 3.9 4.2 3.8   AST 36 19 13 22 19 21   ALT 26 29 21 20 29 29       Urine Studies    Recent Labs   Lab Test 06/05/18  1122   LEUKEST Large*   WBCU 5       Vancomycin Levels  No lab results found.    Invalid input(s): VANCO    Hepatitis B Testing   Recent Labs   Lab Test 04/20/20  1020   HEPBANG Nonreactive     Hepatitis C Testing   No results found for: HCVAB, HQTG, HCGENO, HCPCR, HQTRNA, HEPRNA  Respiratory Virus Testing    No results found for: RS, FLUAG           Laboratory Data:   XR Knee (4/1/20)  Impression:  1.  Stable appearance of revision total right knee arthroplasty  without evidence of hardware failure.    MRI right tibia fibula w/o IV contrast (2/10/20) *Report from [facility]  IMPRESSION:  1.  Apparent shallow soft tissue ulceration in the posterior and slightly medial popliteal fossa region. No definitive deep fluid collection.Evaluation for adjacent osteomyelitis felt to be nondiagnostic.  2.  Linear band of fibrosis present along the posteromedial proximal right leg along the superficial soleus with mild soleus muscle edema but no myositis. No enhancing masslike lesion seen within the imaged right leg (allowing for artifact and technical limitations).  3.  Postop right total knee arthroplasty with longstem hinged distal femoral and proximal tibial components with patellar resurfacing.  4.  Small right knee joint effusion.  5.  Severe medial compartment predominant, tricompartmental contralateral left knee osteoarthritis.

## 2020-04-21 NOTE — OP NOTE
Procedure Date: 04/20/2020      PREOPERATIVE DIAGNOSIS:  Right popliteal nonhealing radiated wound with exposed underlying total knee arthroplasty requiring reconstruction.      POSTOPERATIVE DIAGNOSIS:  Right popliteal nonhealing radiated wound with exposed underlying total knee arthroplasty requiring reconstruction.       PLASTIC SURGERY PROCEDURES:   1.  Left free anterolateral thigh flap (fasciocutaneous flap) to right popliteal knee wound (this was a fasciocutaneous flap based on 3 perforators).  This required extra dissection of these perforators through the muscle, thus adding at least an hour to the dissection.  This was a free flap anastomosing the arteries and the veins under a microscope.  The main pedicle flap was anastomosed to the descending genicular branch of the right knee in an end-to-end fashion using 8-0 nylon suture, one of the venae comitantes was anastomosed to the vena comitante of the descending genicular vessel using a 2.0 mm venous  and the other vena comitantes was anastomosed to the saphenous vein using a 3.5 mm venous .     2.  Excision of wound of right popliteal fossa, including skin and subcutaneous tissues, sent for permanent pathology.  Total dimensions of the wound approximately 10 x 5 cm.   3.  Intraoperative chemical angiography using the SPY Elite system (this was the supervision of intravenous injection of ICG dye and interpretation of the angiogram.  Indications of use were to make sure that the blood flow to the flap with its 3 perforators was enough to give the entire flap good vascularization and to check the flow of the flap while in place on the right knee).        SURGEON:  RANDALL De Luna MD      CO-SURGEON:  Zeb Whitaker MD (cosurgeon was needed in this case because of the complexity of the case, the surgical time as well as inappropriate and inadequate resident continuous help).  This was a cosurgeon case also because as the flap was harvested by  myself while, simultaneously, the donor site was prepared and the donor vessels were dissected out simultaneously by Dr. Whitaker.         ANESTHESIA:  General anesthesia with endotracheal intubation, resident Junior Washburn MD      COMPLICATIONS:  Nil.      ESTIMATED BLOOD LOSS:  250 mL.      COMPLICATIONS:  Two 15-Swiss JUAN R drains in the right knee area, 1 in the subflap region, one in the intraarticular region and one 15-Swiss Derrick drain in the left thigh donor site.      DESCRIPTION OF PROCEDURE:  After informed consent was taken from the patient and the proper site and procedure was ascertained with her and she was appropriately marked in the operating room.  She was placed in a supine position with her knees comfortably flexed and pillows underneath them, and a left pneumoboot placed and running prior to induction of anesthesia.  Preoperatively, 40 mg of Lovenox were given.  The patient was given appropriate antibiotics prior to beginning the case.  General anesthesia was administered without any complications.  An underbody Jasmine Hugger was placed and appropriate over the body Jasmine huggers were placed.  Strict attention was placed to keeping the patient warm.  She was prepped and draped in a standard surgical fashion.        I began by analyzing the wound.  The wound was in the posteromedial aspect of the knee in the popliteal region.  It was approximately 10 x 5 cm.  It was a radiated wound.  The surrounding tissues are very woody and swollen.  There was no obvious active infection.  Decision was made that we would ultimately excise more of the skin and to get obviously a larger wound of more healthy tissue, excise all of the ulceration as well as the granulation tissue and then clean out the knee joint which was exposed and then plan was to use soft tissue coverage to cover the wound.  Plan was to use the vessels outside the radiated field somewhere in the distal thigh.  Our anticipated recipient site would be  the descending genicular vessels on the femur.  We marked out the anticipated skin excision.  I went ahead and joined that to the longitudinal medial incision in the posterior thigh where we would harvest this vessel from.  This was then excised all the way down to the fascia off of the knee and calf region and then sent off the table as a permanent specimen.  The entire wound was excised using cautery.  Once the wound was opened up, it was clear that the wound joined the knee joint.  There was some dead necrotic material that was coming from the anterior leg, and this was all cut out, most likely from radiated dead anterior compartment muscles.  There was also a lot of fluid coming out from the knee joint.  This was all flushed out with triple antibiotic irrigation.  Once we were happy with the irrigation as well as debridement of the wound, we then turned our attention to harvesting.  Attention was then placed to recipient bed preparation as well as harvesting of the flap.  In the recipient bed, an incision was made in the medial inferior thigh.  Dissection carried out down to the underlying vastus medialis.  This was elevated and retracted anterolaterally and the descending genicular vessels were identified near the femur.  These were then dissected out all the way distally with about 5 cm of good dissection.  The artery and vein were clearly visible and there was good pulsation in the vessels.  At this point, simultaneously, the flap was also being harvested on the left thigh.  Dopplered out 3 good perforators, 1 proximal, 1 in the middle, 1 distally.  The flap was islandized based on these perforators.  A large flap was taken such that not only the wound could be closed, but we would have extra skin and more skin could be excised if necessary and primary closure could be attained on the left thigh.  We made our posterior incision first, dissected down to the fascia, cut the fascia and then elevated the flap  from posterolateral to anteromedial until the 3 perforators were identified.  All 3 of them were intramuscular in the vastus lateralis.  We dissected around all 3 of them and then made our anterior incision and elevated subfascially and up to these perforators.  Between the rectus femoris and the vastus lateralis, we identified the descending branch of the lateral circumflex vessel and this was then dissected all the way to its origin.  Large branches were clipped, ligated and divided.  We then dissected out the most proximal  from the muscle using standard microscopic techniques using loupe magnification.  Once it was completely dissected out using bipolar cautery, we then turned our attention to the inferior 2 perforators.  For this, we basically took a cuff of muscle and dissected off the flap islandizing the entire ALT flap on the native vessel.  Once the flap was dissected out and ready for harvesting, we then carried out a SPY angiogram of the flap to ensure that the entire flap lit up, which it did.  At this point, we brought the microscope in.  The flap was then clipped and divided and then brought down into the recipient site.  We temporarily stapled it into position.  Under the microscope, we prepared the vessels.  The flap vessels were identified,  and the adventitia removed.  We flushed out the flap prior to this.  At this point, we turned our attention to the recipient vessels and carried out the same procedure, distally ligated the vascular bundle and then proximally and prepared the artery and vein.  We had also identified the great saphenous vein, and that too was divided distally and prepared such that it could be brought to the area of anastomosis.  At this point, we turned our attention to anastomosis.  The arteries were anastomosed using 8-0 nylon suture in interrupted fashion and the 2 veins were anastomosed using 2-0 and 3.5 mm venous couplers.  We let the flap down.  The  flap did not seem to flow well, even though there was an arterial signal in the recipient site.  The flap did not have good flow through it.  At this point, we opened up the arterial anastomosis again.  There was no clot.  We flushed the arterial system and the heparinized saline flowed well throughout the flap.  At this point, we checked our proximal recipient artery and there was good flow through it, although it seemed to be going to spasm quite easily.  We carried out adventitial stripping very extensively, flushed it out with papaverine, kept the patient warm, soaked the area with warm saline and then carried out anastomosis again with 8-0 nylon suture in an interrupted fashion.  This time, prior to letting the flow of blood go through the anastomosis, we gave the patient 5000 units of heparin.  Again, there was poor flow.  I once again opened up the anterior 2-3 stitches and checked that there was flow in the recipient vessel, which again it seemed to be in spasm.  This was again broken by using papaverine.  Once there was good flow through the artery, we anastomosed to the anterior open area and this time there was excellent flow through the flap.  The flap pinked up, had good Doppler signals.  At this point, we placed a 15-Chinese Derrick drain in the knee joint and also some vancomycin powder and then placed another 15-Chinese Derrick drain under the flap, then inset the flap.  The donor site on the left thigh was closed using 0 PDS suture and then 2-0 Monocryl suture and then 3-0 Stratafix suture.  A 15-Chinese Derrick drain was also placed there.  In the meantime, the flap remained healthy and pink, good Doppler signals.  It was sewn into place using 3-0 nylon suture in an interrupted horizontal mattress fashion and then a ViOptix transcutaneous probe was placed which had excellent signals, then the patient was appropriately dressed with an extension splint.  The patient tolerated the procedure well.  All counts  correct at the end of the case.  The patient was extubated and sent to recovery room in stable condition.         EHSAN GARY MD             D: 2020   T: 2020   MT: CRISTO      Name:     FEDERICA SMITH   MRN:      [id number]        Account:        [id number]   :      1935           Procedure Date: 2020      Document: [id number]

## 2020-04-21 NOTE — PLAN OF CARE
Discharge Planner PT   Patient plan for discharge: Pt agreeable to TCU  Current status: Pt transferred to EOB with Mod A x 2. Pt sat EOB with RLE elevated on pillows and parallel to the ground. Pt sat EOB for several minutes with SBA.   Barriers to return to prior living situation: NWB RLE, RLE to remain parallel to the ground at all times, decreased strength, activity intolerance  Recommendations for discharge: TCU  Rationale for recommendations: Pt would benefit from additional skilled PT to address functional mobility, transfers, strengthening, balance and progress to gait when appropriate.       Entered by: Claribel Valdez 04/21/2020 3:57 PM

## 2020-04-22 ENCOUNTER — APPOINTMENT (OUTPATIENT)
Dept: PHYSICAL THERAPY | Facility: CLINIC | Age: 85
DRG: 464 | End: 2020-04-22
Attending: PLASTIC SURGERY
Payer: COMMERCIAL

## 2020-04-22 LAB
ANION GAP SERPL CALCULATED.3IONS-SCNC: 5 MMOL/L (ref 3–14)
BUN SERPL-MCNC: 12 MG/DL (ref 7–30)
CALCIUM SERPL-MCNC: 7.6 MG/DL (ref 8.5–10.1)
CHLORIDE SERPL-SCNC: 104 MMOL/L (ref 94–109)
CO2 SERPL-SCNC: 27 MMOL/L (ref 20–32)
CREAT SERPL-MCNC: 0.65 MG/DL (ref 0.52–1.04)
ERYTHROCYTE [DISTWIDTH] IN BLOOD BY AUTOMATED COUNT: 14.1 % (ref 10–15)
GFR SERPL CREATININE-BSD FRML MDRD: 82 ML/MIN/{1.73_M2}
GLUCOSE BLDC GLUCOMTR-MCNC: 79 MG/DL (ref 70–99)
GLUCOSE SERPL-MCNC: 151 MG/DL (ref 70–99)
HCT VFR BLD AUTO: 24 % (ref 35–47)
HGB BLD-MCNC: 7.3 G/DL (ref 11.7–15.7)
MCH RBC QN AUTO: 29.8 PG (ref 26.5–33)
MCHC RBC AUTO-ENTMCNC: 30.4 G/DL (ref 31.5–36.5)
MCV RBC AUTO: 98 FL (ref 78–100)
PLATELET # BLD AUTO: 279 10E9/L (ref 150–450)
POTASSIUM SERPL-SCNC: 3.5 MMOL/L (ref 3.4–5.3)
RBC # BLD AUTO: 2.45 10E12/L (ref 3.8–5.2)
SODIUM SERPL-SCNC: 136 MMOL/L (ref 133–144)
WBC # BLD AUTO: 6.2 10E9/L (ref 4–11)

## 2020-04-22 PROCEDURE — 25000128 H RX IP 250 OP 636: Performed by: STUDENT IN AN ORGANIZED HEALTH CARE EDUCATION/TRAINING PROGRAM

## 2020-04-22 PROCEDURE — 12000001 ZZH R&B MED SURG/OB UMMC

## 2020-04-22 PROCEDURE — 36415 COLL VENOUS BLD VENIPUNCTURE: CPT | Performed by: STUDENT IN AN ORGANIZED HEALTH CARE EDUCATION/TRAINING PROGRAM

## 2020-04-22 PROCEDURE — 85027 COMPLETE CBC AUTOMATED: CPT | Performed by: STUDENT IN AN ORGANIZED HEALTH CARE EDUCATION/TRAINING PROGRAM

## 2020-04-22 PROCEDURE — 25000132 ZZH RX MED GY IP 250 OP 250 PS 637: Performed by: STUDENT IN AN ORGANIZED HEALTH CARE EDUCATION/TRAINING PROGRAM

## 2020-04-22 PROCEDURE — 97530 THERAPEUTIC ACTIVITIES: CPT | Mod: GP

## 2020-04-22 PROCEDURE — 80048 BASIC METABOLIC PNL TOTAL CA: CPT | Performed by: STUDENT IN AN ORGANIZED HEALTH CARE EDUCATION/TRAINING PROGRAM

## 2020-04-22 PROCEDURE — 00000146 ZZHCL STATISTIC GLUCOSE BY METER IP

## 2020-04-22 PROCEDURE — 25800030 ZZH RX IP 258 OP 636: Performed by: STUDENT IN AN ORGANIZED HEALTH CARE EDUCATION/TRAINING PROGRAM

## 2020-04-22 RX ORDER — LIDOCAINE 40 MG/G
CREAM TOPICAL
Status: DISCONTINUED | OUTPATIENT
Start: 2020-04-22 | End: 2020-04-24 | Stop reason: HOSPADM

## 2020-04-22 RX ORDER — ERTAPENEM 1 G/1
1 INJECTION, POWDER, LYOPHILIZED, FOR SOLUTION INTRAMUSCULAR; INTRAVENOUS EVERY 24 HOURS
Status: DISCONTINUED | OUTPATIENT
Start: 2020-04-22 | End: 2020-04-24 | Stop reason: HOSPADM

## 2020-04-22 RX ORDER — HEPARIN SODIUM,PORCINE 10 UNIT/ML
2-5 VIAL (ML) INTRAVENOUS
Status: DISCONTINUED | OUTPATIENT
Start: 2020-04-22 | End: 2020-04-24 | Stop reason: HOSPADM

## 2020-04-22 RX ADMIN — ONDANSETRON 4 MG: 4 TABLET, ORALLY DISINTEGRATING ORAL at 06:05

## 2020-04-22 RX ADMIN — SODIUM CHLORIDE, POTASSIUM CHLORIDE, SODIUM LACTATE AND CALCIUM CHLORIDE 1000 ML: 600; 310; 30; 20 INJECTION, SOLUTION INTRAVENOUS at 05:52

## 2020-04-22 RX ADMIN — ENOXAPARIN SODIUM 40 MG: 40 INJECTION SUBCUTANEOUS at 10:39

## 2020-04-22 RX ADMIN — ACETAMINOPHEN 975 MG: 325 TABLET ORAL at 12:58

## 2020-04-22 RX ADMIN — LEVOTHYROXINE SODIUM 75 MCG: 0.07 TABLET ORAL at 08:44

## 2020-04-22 RX ADMIN — METRONIDAZOLE 500 MG: 500 INJECTION, SOLUTION INTRAVENOUS at 12:58

## 2020-04-22 RX ADMIN — OXYCODONE HYDROCHLORIDE 5 MG: 5 TABLET ORAL at 21:00

## 2020-04-22 RX ADMIN — SULFAMETHOXAZOLE AND TRIMETHOPRIM 1 TABLET: 800; 160 TABLET ORAL at 08:44

## 2020-04-22 RX ADMIN — ONDANSETRON 4 MG: 4 TABLET, ORALLY DISINTEGRATING ORAL at 12:12

## 2020-04-22 RX ADMIN — ACETAMINOPHEN 975 MG: 325 TABLET ORAL at 04:23

## 2020-04-22 RX ADMIN — METRONIDAZOLE 500 MG: 500 INJECTION, SOLUTION INTRAVENOUS at 04:48

## 2020-04-22 RX ADMIN — ASPIRIN 81 MG CHEWABLE TABLET 81 MG: 81 TABLET CHEWABLE at 08:44

## 2020-04-22 RX ADMIN — ERTAPENEM SODIUM 1 G: 1 INJECTION, POWDER, LYOPHILIZED, FOR SOLUTION INTRAMUSCULAR; INTRAVENOUS at 15:46

## 2020-04-22 RX ADMIN — ACETAMINOPHEN 975 MG: 325 TABLET ORAL at 21:00

## 2020-04-22 ASSESSMENT — ACTIVITIES OF DAILY LIVING (ADL)
ADLS_ACUITY_SCORE: 15

## 2020-04-22 ASSESSMENT — PAIN DESCRIPTION - DESCRIPTORS: DESCRIPTORS: ACHING;SORE

## 2020-04-22 NOTE — PLAN OF CARE
"/48 (BP Location: Left arm)   Pulse 86   Temp 97.2  F (36.2  C) (Oral)   Resp 16   Ht 1.575 m (5' 2\")   Wt 54 kg (119 lb 0.8 oz)   SpO2 94%   BMI 21.77 kg/m      VSS on RA. Denies pain. Up to chair with AO2/slide board. R leg to remain elevated at all times. Tolerating diet with no N/V but poor appetite. Voids spont, UOP unclear as pt has been missing bedpan. Wet pad changed x3 this shift. Flap checks Q2h. Vioptix and beir hugger in place. Pt resting between cares. Continue POC.   "

## 2020-04-22 NOTE — PROVIDER NOTIFICATION
Notified Resident at 0500 AM regarding low urine output.      Spoke with: N/A-awaiting response    Orders were obtained.    Comments: Pt voided 30ml over last 8 hours. Bladder scanned at 100ml. PO fluids encouraged. MD ordered LR bolus.

## 2020-04-22 NOTE — PROGRESS NOTES
[facility] ID Service: Follow Up Note      Patient:  Jigna Allen   Date of birth 1935, Medical record number [id number]  Date of Visit:  04/22/2020  Date of Admission: 4/20/2020         Assessment and Recommendations:   ID Problem List:  1. Right non-healing popliteal fossa wound with polymicrobial infection  - Culture from [facility] (3/27/20): ESBL E.coli, Klebsiella pneumoniae, multiple anaerobes  2. Right knee prosthetic joint infection  - Culture from aspiration (3/30/20): Strep constellatus, and Clostridium ramosum (broth), Peptoniphilus asaccharolyticus, anaerobic gram positive rods (unable to completely identify)  3. S/P right TKA (6/12/28)  4. 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 Post op had seroma that was treated with I&D and antibiotics.       Recommendations:  1. Stop Bactrim and metronidazole  2. Start Ertapenem 1g Q24 hrs  3. Check CBC w/diff  4. Awaiting final intra-op cultures from 4/20/20      Discussion:  Jigna Allen is an 85 year old female with history of right leg sarcoma s/p chemo with doxil/ifos, surgical resection (2/15/2010), and radiation (last tx June 2010), post-op course complicated by seroma requiring I&D and treated with antibiotics, then recovered well and underwent right knee TKA (6/2018), in December 2019 developed a non-healing right popliteal wound at site of previous surgery (2010) with polymicrobial prosthetic joint and wound infection. Wound has grown ESBL E.coli, Klebsiella pneumoniae, multiple anaerobes and joint aspiration with growth of Strep constellatus, and Clostridium ramosum (broth), Peptoniphilus asaccharolyticus, anaerobic gram positive rods (unable to completely identify). Has intermittently been on PO antibiotics during course of work up. After joint infection was identified on 3/30/20 met with orthopedics and plastic surgery- recommended amputation vs attempt at salvaging limb and Jigna  wishes to pursue salvage. Was admitted on 4/20 for scheduled I&D of wound with flap closure, basia pus noted during procedure and sent for cultures. Plan for 2 stage right TKA revision to start in 2-3 months. Has felt fatigued and low-grade temps to 99F with no high grade fevers, no additional systemic symptoms. Preliminary cultures with Streptococcus intermedius and probable Eikenella corrodens. Recommend stopping Bactrim and Flagyl and changing to Ertapenem to cover Strep, as well as bacteria from previous culturers and anaerobes.      Recs were discussed with primary team today. Don't hesitate to call with questions.     Attestation:  I have reviewed today's vital signs, medications, labs and imaging.  Jeaneth Agustin PA-C, Pager # [phone]            Interval History:       Feeling tired this AM and appetite is poor. Had nausea overnight, which resolved with Zofran x1 earlier this AM. No bowel movements yet after surgery, passing flatus. No abdominal pain. Discussed current antibiotic plan and waiting for cx before determining long term plan, Jigna expresses understanding of this. Denies fever, rigors, chills, shortness of breath, chest pain, rashes, new edema.         Review of Systems:   Full 9 pt ROS obtained, pertinent positives and negatives as above.          Current Antimicrobials   Current:  - Bactrim (start 4/20/20)  - metronidazole (start 4/21/20)    Prior:  - dariusz-operative bacitracin, cefazolin, gentamicin, vancomycin (4/20)  - cefpodoxime (3/31-4/7)  - metronidazole (3/31-4/7)  - clindamycin (1/27-2/3)  - amoxicillin (12/2019)         Physical Exam:   Ranges for vital signs:  Temp:  [98  F (36.7  C)-100  F (37.8  C)] 98  F (36.7  C)  Pulse:  [91-98] 94  Heart Rate:  [91-98] 91  Resp:  [15-18] 18  BP: (101-109)/(41-49) 107/49  SpO2:  [88 %-99 %] 98 %    Intake/Output Summary (Last 24 hours) at 4/22/2020 0907  Last data filed at 4/22/2020 0600  Gross per 24 hour   Intake 1525 ml   Output 559 ml   Net  966 ml     Exam:  GENERAL:  well-developed, well-nourished, lying in bed in no acute distress.   ENT:  Head is normocephalic, atraumatic. Oropharynx is moist without exudates or ulcers.  EYES:  Eyes have anicteric sclerae.    LUNGS:  Clear to anterior auscultation.  CARDIOVASCULAR:  Regular rate and rhythm +systolic murmur.  ABDOMEN:  Hypoactive bowel sounds, soft, nontender.  EXT: Extremities warm  SKIN:  No acute rashes.  Line is in place without any surrounding erythema. Wound and flap not visualized- dressing in place per plastic surgery. JUAN R drains x3 (2 RLE 1 LLE) with serosang output         Laboratory Data:   Reviewed.  Pertinent for:    Microbiology  4/20/20 Anaerobic bacterial culture, right popliteal fossa: NGTD   4/20/20 Fungus culture, right popliteal fossa: NGTD   4/20/20 Wound Culture Aerobic bacterial, right popliteal fossa: NGTD   4/20/20 Gram stain, right popliteal fossa: moderate gram positive cocci, few gram negative coccobacilli  4/1/20 Blood culture: no growth  3/30/20 Anaerobic bacterial culture, right knee fluid: light growth Peptoniphilus asaccarolyticus, light growth Anaerobic gram positive rods, unable to completely identify  3/30/20 Fluid culture Aerobic Bacterial, right knee fluid: light growth Streptococcus constellatus, Clostridium ramosum (day 2, isolated in broth only) (resistant: clindamycin)  3/27/20 Aerobic culture, tissue *[facility]- per care everywhere: Escerichia coli (ESBL) (resistant: cefazolin, ceftriaxone, cefepime, ciprofloxacin, levofloxacin, ceftazidime), Klebsiella pneumoniae  1/27/20 Aerobic culture and gram stain, swab of popliteal fossa *[facility]- per care everywhere: growth of many multiple bacterial morphotypes; gram negative bacilli, gram positive cocci, gram positive bacilli      Inflammatory Markers    Recent Labs   Lab Test 04/01/20  1556 09/08/16  0930   *  --    .0* <2.9       Hematology Studies    Recent Labs   Lab Test 04/20/20  1620  04/20/20  1031 04/01/20  1556 06/14/18  0631  06/05/18  1112 05/10/16  1035 10/07/14  0848   WBC  --   --  9.8  --   --  4.8 6.1 3.1*   HGB 8.5* 9.0* 10.5* 11.0*   < > 14.3 13.5 13.7   MCV  --   --  98  --   --  99 97 100     --  356  --   --  197 229 198    < > = values in this interval not displayed.     Recent Labs   Lab Test 04/01/20  1556 05/10/16  1035 10/07/14  0848 05/08/14  1015   ANEU 8.6* 4.1 1.6 1.8   AEOS 0.0 0.1 0.1 0.1       Metabolic Studies     Recent Labs   Lab Test 04/21/20  0627 04/20/20  1620 04/20/20  1031 04/01/20  1556 06/14/18  0631    135 137 132* 143   POTASSIUM 4.0 4.1 4.6 3.5 4.2   CHLORIDE 106 104  --  98 105   CO2 27 27  --  27 30   BUN 12 14  --  17 12   CR 0.64 0.61  --  0.79 0.67   GFRESTIMATED 82 84  --  69 85       Hepatic Studies    Recent Labs   Lab Test 04/01/20  1556 05/10/16  1035 10/07/14  0848   BILITOTAL 0.6 0.8 0.5   ALKPHOS 85 91 85   ALBUMIN 2.5* 3.8 3.6   AST 36 19 13   ALT 26 29 21            Imaging:   XR Knee (4/1/20)  Impression:  1.  Stable appearance of revision total right knee arthroplasty  without evidence of hardware failure.     MRI right tibia fibula w/o IV contrast (2/10/20) *Report from [facility]  IMPRESSION:  1.  Apparent shallow soft tissue ulceration in the posterior and slightly medial popliteal fossa region. No definitive deep fluid collection.Evaluation for adjacent osteomyelitis felt to be nondiagnostic.  2.  Linear band of fibrosis present along the posteromedial proximal right leg along the superficial soleus with mild soleus muscle edema but no myositis. No enhancing masslike lesion seen within the imaged right leg (allowing for artifact and technical limitations).  3.  Postop right total knee arthroplasty with longstem hinged distal femoral and proximal tibial components with patellar resurfacing.  4.  Small right knee joint effusion.  5.  Severe medial compartment predominant, tricompartmental contralateral left knee  osteoarthritis.

## 2020-04-22 NOTE — PROGRESS NOTES
Plastic Surgery Progress Note    Subjective/Interval History:  No acute events.     Objective:  Temp:  [98  F (36.7  C)-100  F (37.8  C)] 98  F (36.7  C)  Pulse:  [91-98] 94  Heart Rate:  [91-98] 91  Resp:  [15-18] 18  BP: (101-109)/(41-49) 107/49  SpO2:  [88 %-99 %] 98 %    General appearance: in NAD, resting comfortably in bed  Pulm: Non-labored breathing; saturating well on NC oxygen.   CV: Hemodynamically stable  RLE: Flap appears pink and viable. There is a 2 sec cap refill. Vioptix numbers steady with oxygenation in the 70s% range. Dopplerable arterial signal present. Bulky splint intact. JUAN R drains with serosanguinous output.    LLE: Thigh incision intact. JUAN R with serosanguinous output.     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 She is s/p soft tissue reconstruction with a free left anterolateral thigh flap 4/20 in preparation for replacement of prosthesis with antibiotic spacer.     - Apprciate infectious disease recommendations for long term antibiotics until knee prosthesis can be replaced with antibiotic spacer. Vancomycin powder applied in the joint. On bactrim and metronidazole now.   - Continue flap checks as ordered.   - Ok to move to a chair or recliner. RLE must be kept elevated or at least parallel to the floor. No dangle please. PT/OT. Please see activity order.   - Daily aspirin.   - DVT chemoprophylaxis.   - Will most likely need TCU placement. Possible discharge tomorrow.      Junior Washburn MD  [phone]

## 2020-04-22 NOTE — CONSULTS
Social Work: Assessment with Discharge Plan    Patient Name:  Federica Allen  :  1935  Age:  85 year old  MRN:  [id number]  Completed assessment with:  Patient, chart review    Presenting Information   Reason for Referral:  Discharge plan  Date of Intake:  2020  Referral Source:  Physician  Decision Maker:  Patient  Alternate Decision Maker:  Per NOK policy, pt's spouse  Health Care Directive:  None on file  Living Situation:  House  Previous Functional Status:  Independent  Patient and family understanding of hospitalization:  Not discussed  Cultural/Language/Spiritual Considerations:  None identified  Adjustment to Illness:  Appropriate; pt states she just had a procedure and is in some pain, but open to discussing discharge plan    Physical Health  Reason for Admission:    1. Non-healing surgical wound, subsequent encounter    2. Non-healing surgical wound, subsequent encounter      Services Needed/Recommended:  TCU    Mental Health/Chemical Dependency  Diagnosis:  History of anxiety, adjustment disorder per diagnosis list  Support/Services in Place:  Not discussed  Services Needed/Recommended:  Not discussed    Support System  Significant relationship at present time:  Pt's spouse  Family of origin is available for support:  Pt's daughter does not live locally, but involved for resources and support.  Other support available:  None identified  Gaps in support system:  None identified  Patient is caregiver to:  None     Provider Information   Primary Care Physician:  Elisabeth Ko   [phone]   Clinic:  [facility] [address]      :  N/A    Financial   Income Source:  Not discussed  Financial Concerns:  None identified  Insurance:    Payor/Plan Subscriber Name Rel Member # Group #   [facility] FEDERICA ALLEN [facility] [id number]       [id number]       Discharge Plan   Patient and family discharge goal:  TCU  Provided education on  discharge plan:  YES  Patient agreeable to discharge plan:  YES  Patient's choices for facility are:  Offered medicare.gov list with associated star ratings- pt declines list at this time and requests [facility] as she states it was recommended to her. Informed pt that list and ratings can be found at medicare.gov or list can be provided to her at any time if she would like to choose additional options.  Will NH provide Skilled rehabilitation or complex medical:  YES  General information regarding anticipated insurance coverage and possible out of pocket cost was discussed. Patient and patient's family are aware patient may incur the cost of transportation to the facility, pending insurance payment: Not discussed at this time  Barriers to discharge:  Medical stability    Discharge Recommendations   Anticipated Disposition:  Facility:  TCU  Transportation Needs:  Medical:  Wheelchair  Name of Transportation Company and Phone:  [facility] (ph [phone])    Additional comments   SW consult received for discharge planning as TCU is recommended for rehab and IV abx.    Pt is an 85-year-old female admitted to [facility] 4/20/20. Per chart review, clinic SW staff spoke with pt and her daughter prior to admission regarding possible discharge plans and provided resources including medicare.gov link for rehab facilities and several home health resources. Spoke with pt via phone today. Discussed recommendation for TCU placement. Pt is agreeable with TCU placement. She [facility]. Offered to give pt a list of additional facility options- pt currently declines list, requesting to start with [facility]     Faxed referral to:  1) [facility] (ph [phone])    JOURDAN Yung, [facility]    [facility]  Pager [phone]  Phone [phone]

## 2020-04-22 NOTE — PLAN OF CARE
Temp max 100F, not within notify parameters. VSS on room air while awake, and on 2L oxygen when sleeping. Using IS independently. Voiding adequate amounts on bedpan. Passing flatus, no BM. JUAN R x3 with bloody output, stripped per orders. RLE elevated on pillows. Dressings over RLE and LLE are CDI. Flap checks q 1h until 1600 on POD 2 per order - strong doppler pulse present and flap warm, soft, and pink. Vioptix readings unchanged with StO2 67-72% and signal quality 94-95. Jasmine hugger in place over flap and room temp at 75 degrees. Sacral mepilex in place. Tolerating reg diet without nausea - appetite fair. Pain managed with scheduled Tylenol and 5 mg Oxycodone PRN. PIV saline locked between IV Abx. Continue to monitor and with POC.

## 2020-04-22 NOTE — PROGRESS NOTES
"BRIEF NOTE:    SW received a call from patients jose Webber inquiring about patients care.     SW met with patient to verify patient wishes for SW to speak with daughter and patient provided very clear verbal authorization to speak with daughter and asked that SW bring patient form to sign for consent for communication with daughter(s).    SW contacted Lawanda again and was able to review SW assessment with her as well as PT recommendations.  Lawanda had many questions about TCU placement in re: to COVID and if [facility] is a \"good facility\" SW explained SW is unable to advise on if one is better than another and directed her to Medicare.gov for reviews and explained that they get a 5 star rating.     Lawanda expressed concern about patients emotional state as per Lawanda patient seems \"down\", in addition she expressed concern that the patient may be in some denial and think that the patient and  are able to care for patient at home and Lawanda has concerns about this being realistic and thinks everyone would benefit from a home health aid etc.    Lawanda had many additional questions that SW was unable to answer related to possible equipment needs etc when patient was ready to discharge from TCU setting. SW explained that TCU would work with patient and assist in setting up patients transition home.     Lawanda requested updates about patients care and plan for discharge and timeline. ZOHRA agreed to continue to collaborate with Lawanda.       JOURDAN Blanchard, [facility]   [facility]  Pager: [phone]      Addendum 4:10: ZOHRA received call from [facility] [phone] that they are reviewing and would like to touch base tomorrow as patient may be ready and have any additional updates from ZOHRA and have reviewed on their end.  SW will contact [facility] tomorrow by 10am.  "

## 2020-04-22 NOTE — PLAN OF CARE
Discharge Planner PT   Patient plan for discharge: Did not endorse  Current status: Overall doing well. Significantly increased time for set-up, line management, education on safety and sequencing within precautions. Had 2 people present for safety. Able to maintain R LE parallel throughout by bringing from bed onto chair. Sat EOB with R LE propped on chair. Good tolerance overall. Then shifted weight to place slideboard. Transferred from bed to w/c with use of slide board with 1 person maintaining R LE and 2nd person providing modA across slide board to w/c. Ended in w/c with needs in reach. Sling placed for nursing to utilize if needed.   Barriers to return to prior living situation: safety, independence, pain, precautions  Recommendations for discharge: Rehab  Rationale for recommendations: To progress the above stated prior to returning home       Entered by: Pat Hopkins 04/22/2020 11:51 AM

## 2020-04-22 NOTE — DOWNTIME EVENT NOTE
The EMR was down for two hours on 4/22/2020.    Monae was responsible for completing the paper charting during this time period.     The following information was re-entered into the system by Monae Ngo RN: MAR, Flowsheets    The following information will remain in the paper chart: JOHAN Ngo RN  4/22/2020

## 2020-04-23 ENCOUNTER — APPOINTMENT (OUTPATIENT)
Dept: PHYSICAL THERAPY | Facility: CLINIC | Age: 85
DRG: 464 | End: 2020-04-23
Attending: PLASTIC SURGERY
Payer: COMMERCIAL

## 2020-04-23 ENCOUNTER — APPOINTMENT (OUTPATIENT)
Dept: GENERAL RADIOLOGY | Facility: CLINIC | Age: 85
DRG: 464 | End: 2020-04-23
Attending: PLASTIC SURGERY
Payer: COMMERCIAL

## 2020-04-23 LAB
COPATH REPORT: NORMAL
PLATELET # BLD AUTO: 297 10E9/L (ref 150–450)

## 2020-04-23 PROCEDURE — 27210577 ZZ H INTRODUCER MICRO SET

## 2020-04-23 PROCEDURE — 27211417 ZZ H KIT, VPS RHYTHM STYLET

## 2020-04-23 PROCEDURE — 97110 THERAPEUTIC EXERCISES: CPT | Mod: GP | Performed by: PHYSICAL THERAPIST

## 2020-04-23 PROCEDURE — 40000986 XR CHEST PORT 1 VW

## 2020-04-23 PROCEDURE — 85049 AUTOMATED PLATELET COUNT: CPT | Performed by: STUDENT IN AN ORGANIZED HEALTH CARE EDUCATION/TRAINING PROGRAM

## 2020-04-23 PROCEDURE — 12000001 ZZH R&B MED SURG/OB UMMC

## 2020-04-23 PROCEDURE — 25000128 H RX IP 250 OP 636: Performed by: STUDENT IN AN ORGANIZED HEALTH CARE EDUCATION/TRAINING PROGRAM

## 2020-04-23 PROCEDURE — 36415 COLL VENOUS BLD VENIPUNCTURE: CPT | Performed by: STUDENT IN AN ORGANIZED HEALTH CARE EDUCATION/TRAINING PROGRAM

## 2020-04-23 PROCEDURE — 25000132 ZZH RX MED GY IP 250 OP 250 PS 637: Performed by: STUDENT IN AN ORGANIZED HEALTH CARE EDUCATION/TRAINING PROGRAM

## 2020-04-23 PROCEDURE — 25000125 ZZHC RX 250: Performed by: STUDENT IN AN ORGANIZED HEALTH CARE EDUCATION/TRAINING PROGRAM

## 2020-04-23 PROCEDURE — 97530 THERAPEUTIC ACTIVITIES: CPT | Mod: GP | Performed by: PHYSICAL THERAPIST

## 2020-04-23 PROCEDURE — 27211389 ZZ H KIT, 5 FR DL BIOFLO OPEN ENDED PICC

## 2020-04-23 PROCEDURE — 36569 INSJ PICC 5 YR+ W/O IMAGING: CPT

## 2020-04-23 PROCEDURE — 25800030 ZZH RX IP 258 OP 636: Performed by: STUDENT IN AN ORGANIZED HEALTH CARE EDUCATION/TRAINING PROGRAM

## 2020-04-23 RX ORDER — ERTAPENEM 1 G/1
1 INJECTION, POWDER, LYOPHILIZED, FOR SOLUTION INTRAMUSCULAR; INTRAVENOUS EVERY 24 HOURS
Qty: 830 ML | Refills: 0 | Status: ON HOLD
Start: 2020-04-23 | End: 2020-06-04

## 2020-04-23 RX ORDER — HYDROXYZINE HYDROCHLORIDE 25 MG/1
25-50 TABLET, FILM COATED ORAL EVERY 6 HOURS PRN
Start: 2020-04-23 | End: 2020-06-01

## 2020-04-23 RX ORDER — OXYCODONE HYDROCHLORIDE 5 MG/1
5 TABLET ORAL EVERY 4 HOURS PRN
Qty: 15 TABLET | Refills: 0 | Status: SHIPPED | OUTPATIENT
Start: 2020-04-23 | End: 2020-04-23

## 2020-04-23 RX ORDER — AMOXICILLIN 250 MG
1 CAPSULE ORAL 2 TIMES DAILY PRN
Status: ON HOLD
Start: 2020-04-23 | End: 2020-06-04

## 2020-04-23 RX ORDER — OXYCODONE HYDROCHLORIDE 5 MG/1
5 TABLET ORAL EVERY 4 HOURS PRN
Qty: 15 TABLET | Refills: 0 | Status: SHIPPED | OUTPATIENT
Start: 2020-04-23 | End: 2020-06-01

## 2020-04-23 RX ADMIN — ERTAPENEM SODIUM 1 G: 1 INJECTION, POWDER, LYOPHILIZED, FOR SOLUTION INTRAMUSCULAR; INTRAVENOUS at 16:35

## 2020-04-23 RX ADMIN — ACETAMINOPHEN 975 MG: 325 TABLET ORAL at 04:42

## 2020-04-23 RX ADMIN — OXYCODONE HYDROCHLORIDE 5 MG: 5 TABLET ORAL at 20:30

## 2020-04-23 RX ADMIN — ASPIRIN 81 MG CHEWABLE TABLET 81 MG: 81 TABLET CHEWABLE at 07:59

## 2020-04-23 RX ADMIN — LIDOCAINE HYDROCHLORIDE 4 ML: 10 INJECTION, SOLUTION EPIDURAL; INFILTRATION; INTRACAUDAL; PERINEURAL at 14:22

## 2020-04-23 RX ADMIN — ACETAMINOPHEN 975 MG: 325 TABLET ORAL at 12:36

## 2020-04-23 RX ADMIN — LEVOTHYROXINE SODIUM 75 MCG: 0.07 TABLET ORAL at 07:59

## 2020-04-23 RX ADMIN — ENOXAPARIN SODIUM 40 MG: 40 INJECTION SUBCUTANEOUS at 11:13

## 2020-04-23 RX ADMIN — SODIUM CHLORIDE, POTASSIUM CHLORIDE, SODIUM LACTATE AND CALCIUM CHLORIDE 500 ML: 600; 310; 30; 20 INJECTION, SOLUTION INTRAVENOUS at 05:47

## 2020-04-23 ASSESSMENT — ACTIVITIES OF DAILY LIVING (ADL)
ADLS_ACUITY_SCORE: 19
ADLS_ACUITY_SCORE: 19
ADLS_ACUITY_SCORE: 15
ADLS_ACUITY_SCORE: 15
ADLS_ACUITY_SCORE: 19
ADLS_ACUITY_SCORE: 15

## 2020-04-23 ASSESSMENT — PAIN DESCRIPTION - DESCRIPTORS
DESCRIPTORS: ACHING;SORE
DESCRIPTORS: ACHING;SORE

## 2020-04-23 NOTE — PLAN OF CARE
OT/7C: Cancel/Hold. Patient working with PT in AM; off unit in PM upon check-in. Per chart review, patient planning for TCU discharge tomorrow. While anticipate patient does have OT needs, will plan to defer to OT services at TCU with consideration of utilization of services/discharge tomorrow. Will HOLD formal OT evaluation and initiate services if discharge timeline changes significantly. Continue to support TCU recommendation.

## 2020-04-23 NOTE — PROVIDER NOTIFICATION
Notified Resident at 0500 AM regarding low urine output.      Spoke with: Resident    Orders were not obtained.    Comments: No void in 7 hours. Bladder scanned at 48ml. No urge to void. PO fluids encouraged. MD ordered 500ml LR bolus.

## 2020-04-23 NOTE — PROGRESS NOTES
Plastic Surgery Progress Note    Subjective/Interval History:  No acute events.     Objective:  Temp:  [97.2  F (36.2  C)-100.1  F (37.8  C)] 98.6  F (37  C)  Pulse:  [86-89] 89  Heart Rate:  [94] 94  Resp:  [16-18] 16  BP: (102-115)/(48-59) 115/59  SpO2:  [94 %-99 %] 99 %    General appearance: in NAD, resting comfortably in bed  Pulm: Non-labored breathing; saturating well on NC oxygen.   CV: Hemodynamically stable  RLE: Flap appears pink and viable. There is a 2 sec cap refill. Vioptix numbers steady with oxygenation in the high 60s% range. Dopplerable arterial signal present. JUAN R drains with serosanguinous output.    LLE: Thigh incision intact. JUAN R with serosanguinous output.     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 She is s/p soft tissue reconstruction with a free left anterolateral thigh flap 4/20 in preparation for replacement of prosthesis with antibiotic spacer.     - Apprciate infectious disease recommendations for long term antibiotics.  - PICC line.   - Continue flap checks q4hrs.   - Ok to move to a chair or recliner. RLE must be kept elevated or at least parallel to the floor. No dangle please. PT/OT. Please see activity order.   - Daily aspirin.   - DVT chemoprophylaxis.   - Discharge to TCU today or tomorrow.     Junior Washburn MD  [phone]

## 2020-04-23 NOTE — PROCEDURES
[facility] [place]    Double Lumen PICC Placement    Date/Time: 4/23/2020 2:26 PM  Performed by: Hue Rowan RN  Authorized by: EHSAN De Luna MD   Indications: antibiotics.    UNIVERSAL PROTOCOL   Site Marked: Yes  Prior Images Obtained and Reviewed:  Yes  Required items: Required blood products, implants, devices and special equipment available    Patient identity confirmed:  Verbally with patient and arm band  NA - No sedation, light sedation, or local anesthesia  Confirmation Checklist:  Patient's identity using two indicators, relevant allergies, procedure was appropriate and matched the consent or emergent situation and correct equipment/implants were available  Time out: Immediately prior to the procedure a time out was called    Universal Protocol: the Joint Commission Universal Protocol was followed    Preparation: Patient was prepped and draped in usual sterile fashion           ANESTHESIA    Anesthesia: See MAR for details  Local Anesthetic:  Lidocaine 1% without epinephrine  Anesthetic Total (mL):  4      SEDATION    Patient Sedated: No        Preparation: skin prepped with ChloraPrep  Skin prep agent: skin prep agent completely dried prior to procedure  Sterile barriers: maximum sterile barriers were used: cap, mask, sterile gown, sterile gloves, and large sterile sheet  Hand hygiene: hand hygiene performed prior to central venous catheter insertion  Type of line used: Power PICC  Catheter type: double lumen  Lumen type: valved  Catheter size: 5 Fr  : Bioflo.  Lot number: [id number]  Placement method: venipuncture, MST, ultrasound and tip confirmation system  Number of attempts: 1  Successful placement: yes  Orientation: right  Location: brachial vein (medial) (vein diameter- 0.55cm)  Arm circumference: adults 10 cm  Extremity circumference: 26  Visible catheter length: 1  Total catheter length: 37  Dressing and securement: blood cleaned with CHG, chlorhexidine  disc applied, site cleaned, statlock and sterile dressing applied  Post procedure assessment: blood return through all ports and placement verified by x-ray  PROCEDURE   Patient Tolerance:  Patient tolerated the procedure well with no immediate complications

## 2020-04-23 NOTE — PLAN OF CARE
A&O. Forgetful. VSS. On 2L O2 while sleeping, room air while awake. Pain controlled with scheduled tylenol. PIV x1 saline locked. JUAN R x 3 with minimal dark red output. LLE with ACE bandage. RLE with cast in place. RLE elevated on pillows. RLE flap warm, soft, pink, +doppler. Flap checks q4 hours. Vioptix readings WNL. Spontaneously voiding, low urine output, see provider notification. PO fluids encouraged. Passing gas, no BM. Assist x2 -using bedpan, no out of bed activity. Regular diet- denies nausea. Room temp >75 and door shut. Continue with plan of care.

## 2020-04-23 NOTE — PROGRESS NOTES
Social Work Services Progress Note    Hospital Day: 3  Date of Initial Social Work Evaluation:  4/22/2020  Collaborated with:  [facility] Luna (Admissions); Dr. Washburn, Patient, Dr. Agustin.     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 She is s/p soft tissue reconstruction with a free left anterolateral thigh flap in preparation for replacement of prosthesis with antibiotic spacer.     Intervention:  SW received call from [facility] Admissions Luna this morning who stated she believes things look good for patient to come to TCU.  Her questions were what IV Abx patient will need to have ordered as well as if patient is on any infection precautions (isolation needs related to ESBL or not) or and chemo/radiation plan.  SW agreed to fid out from Attending or Floor RN and update Luna with any new information.     ZOHRA met with Dr Washburn who was able to speak to that pt has no infection precautions and there will be no radiation or chemo plan for this patient. He was also able to explain that infectious disease (Dr Agustin) would be seeing patient at later time to follow up re: IV Abx plan if Ertapenem was the only medication needed or if they would be adding or changing medications.      SW met with patient to update patient that thus far things are looking good for TCU placement at preferred location [facility], patient was pleased to see this.  Additionally, SW provided patient with consent to communicate form and patient signed this providing consent for verbal communication about patients care and needs with her Daughters Lawanda,  Indu as well as her  Ganesh.     During this time Infectious Disease MD Dr. Agustin came in and was able to discuss antibiotic plan with patient  due to what came back on cultures.  They are recommending that patient remain on Ertapenem until her surgery at a later date.      SW explained that discharge to TCU would happen either today or tomorrow, partially depending on PICC placement etc. Patient acknowledged understanding.     SW contacted [facility] and spoke with Luna again who was able to make note that patient would need to continue with IV Abx Ertapenem and that there were no precaution concerns and no chemo/radiation plan.  Luna double checked medication costs etc and stated patient was good to come on their end pending any need for insurance authorization.    SW explained pt would be likely to discharge tomorrow and they suggested late morning/early afternoon at around 12:30/1pm as they will need discharge orders 3 hours prior to patient arrival.      SW to follow up with floor RN Mildred again re: patients PICC placement and type of transport patient will be needing.     SW will also follow up with patient and patients adult daughter Lawanda.    Assessment:    Significant relationship at present time:   Ganesh   Family of origin is available for support:  Yes two adult daughters can assist/help via phone only both live out of [facility], 1 in [place], 1 in [place].  Other support available:  Not identified   Gaps in support system:  Not identified  Patient is caregiver to:  None    Plan:    Anticipated Disposition:  Facility:  TCU [facility]    Barriers to d/c plan:  PICC placement, medical stability, final authorization from [facility].     Follow Up:  SW will continue to follow for discharge planning and social work needs.     JOURDAN Blanchard, LGZOHRA  [facility]   [facility]  Pager: [phone]

## 2020-04-23 NOTE — PLAN OF CARE
Patient is getting a PICC line currently. Will transfer to a TCU tomorrow. Patient has no complaints of pain, is on scheduled tylenol, occasional light headedness. Plastics physician changed leg dressings, now has an immobilizer on her right leg. Has + dopplars, Viopic WDL. Occasionally needs 1L O2 when sleeping otherwise on room air. Voids on bedpan, no BM yet, declined laxative today. JPx3. Picks at food, not eating much, started boost nutritional shakes.

## 2020-04-23 NOTE — PLAN OF CARE
VSS on 2 LPM oxygen via nasal cannula. Patient was on room air for most of shift but desaturated to the high 80s when falling asleep. Right leg flap warm, pink, good capillary refill, and doppler present. Flap checks q4h. Vioptix readings remain in the high 60s. Pain controlled with tylenol and oxycodone. Tolerating regular diet. Patient was unable to void, bladder scan showed 475 ml, then straight cathed for 475 ml. Patient was getting overheated from Jasmine Hugger with temp up to 100.1. Notified alpa PEÑALOZA to remove Jasmine Hugger but room temperature should remain at 75 degrees. JPs x3 with small amount of serosanguinous output. Right leg elevated. Social work finding placement at TCU.

## 2020-04-23 NOTE — PROGRESS NOTES
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  Patients PICC was placed today and pt medically ready to discharge.     SW contacted [facility] Luna again to confirm can accept pt in morning, explained discharge orders have been placed, SW sent discharge orders. They are requesting all hard scripts be physically faxed and sent as well as discharge summary sent in morning.    SW contacted [facility] and spoke with Kamlile [phone] and pt has stretcher transport set up for 11am on 4/24.     SW provided update to patient about plan for tomorrow, patient was pleased with this plan and has no questions at this time.     SW also updated patients daughter Lawanda and  (both were on the phone) and provided update for patients transition plan to [facility] tomorrow.    SW also updated floor RN.    Assessment:  pt agreeable to d/c plan to [facility].     Plan:    Anticipated Disposition:  Facility:  [facility]     Barriers to d/c plan:  None identified - placement found/planned.    Follow Up:  ZOHRA will continue follow for discharge planning.     JOURDAN Blanchard, [facility]   [facility]  Pager: [phone]

## 2020-04-23 NOTE — PROGRESS NOTES
[facility] ID Service: Follow Up Note      Patient:  Jigna Allen   Date of birth 1935, Medical record number [id number]  Date of Visit:  04/23/2020  Date of Admission: 4/20/2020         Assessment and Recommendations:   ID Problem List:  1. Right non-healing popliteal fossa wound with polymicrobial infection  - Culture from [facility] (3/27/20): ESBL E.coli, Klebsiella pneumoniae, multiple anaerobes  - Preliminary cultures (4/20/20): Streptococcus intermedius, Eikenella corrodens  2. Right knee prosthetic joint infection  - Culture from aspiration (3/30/20): Strep constellatus, and Clostridium ramosum (broth), Peptoniphilus asaccharolyticus, anaerobic gram positive rods (unable to completely identify)  3. S/P right TKA (6/12/28)  4. 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 Post op had seroma that was treated with I&D and antibiotics.       Recommendations:  1. Ertapenem 1g Q24 hrs, continue until knee surgery for revision of TKA  2. Weekly labs, please fax to [facility] Clinic [phone] attn Dr. Calderon: CBC, CMP, CRP  3. OK for PICC placement  4. Will continue to monitor cultures from 4/20/20  5. Will need follow up with ID clinic in 4 weeks  6. ID will sign off at this time, please don't hesitate to contact [facility] team should further questions arise.      Discussion:  Jigna Allen is an 85 year old female with history of right leg sarcoma s/p chemo with doxil/ifos, surgical resection (2/15/2010), and radiation (last tx June 2010), post-op course complicated by seroma requiring I&D and treated with antibiotics, then recovered well and underwent right knee TKA (6/2018), in December 2019 developed a non-healing right popliteal wound at site of previous surgery (2010) with polymicrobial prosthetic joint and wound infection. Wound has grown ESBL E.coli, Klebsiella pneumoniae, multiple anaerobes and joint aspiration with growth of  Strep constellatus, and Clostridium ramosum (broth), Peptoniphilus asaccharolyticus, anaerobic gram positive rods (unable to completely identify). Has intermittently been on PO antibiotics during course of work up. After joint infection was identified on 3/30/20 met with orthopedics and plastic surgery- recommended amputation vs attempt at salvaging limb and Jigna wishes to pursue salvage. Was admitted on 4/20 for scheduled I&D of wound with flap closure, basia pus noted during procedure and sent for cultures. Plan for 2 stage right TKA revision to start in 2-3 months. Has felt fatigued and low-grade temps to 99F with no high grade fevers, no additional systemic symptoms. Preliminary cultures with Streptococcus intermedius and Eikenella corrodens. Continue Ertapenem to cover Strep, as well as bacteria from previous cultures, gram negatives and anaerobes. Plan for long term antibiotics until at least next planned surgery.      This patient was discussed with Dr. Calderon. Recs were communicated to primary team today. Don't hesitate to call with questions.     Attestation:  I have reviewed today's vital signs, medications, labs and imaging.  Jeaneth Agustin PA-C, Pager # [phone]            Interval History:     Appetite is poor. Was very warm overnight- due to lloyd hugger, feeling better after discontinued. No nausea or vomiting, rigors, skin rashes, dizziness, dyspnea. No bowel movement yet but is passing flatus.          Review of Systems:   Full 9 pt ROS obtained, pertinent positives and negatives as above.          Current Antimicrobials   Current:  - Ertapenem (start 4/22)    Prior:  - Bactrim (4/20-4/22)  - metronidazole (4/20-4/22)  - dariusz-operative bacitracin, cefazolin, gentamicin, vancomycin (4/20)  - cefpodoxime (3/31-4/7)  - metronidazole (3/31-4/7)  - clindamycin (1/27-2/3)  - amoxicillin (12/2019)         Physical Exam:   Ranges for vital signs:  Temp:  [97.2  F (36.2  C)-100.1  F (37.8  C)] 98.6  F  (37  C)  Pulse:  [86-89] 89  Heart Rate:  [94] 94  Resp:  [16-18] 16  BP: (102-115)/(48-59) 115/59  SpO2:  [94 %-99 %] 99 %    Intake/Output Summary (Last 24 hours) at 4/22/2020 0907  Last data filed at 4/22/2020 0600  Gross per 24 hour   Intake 1525 ml   Output 559 ml   Net 966 ml     Exam:  GENERAL:  well-developed, well-nourished, lying in bed in no acute distress.   ENT:  Head is normocephalic, atraumatic. Oropharynx is moist without exudates or ulcers.  EYES:  Eyes have anicteric sclerae.    LUNGS:  Clear to anterior auscultation.  CARDIOVASCULAR:  Regular rate and rhythm +systolic murmur.  ABDOMEN:  Hypoactive bowel sounds, soft, nontender.  EXT: Extremities warm  SKIN:  No acute rashes.  Line is in place without any surrounding erythema. Wound and flap not visualized- dressing in place per plastic surgery. JUAN R drains x3 (2 RLE 1 LLE) with serosang output         Laboratory Data:   Reviewed.  Pertinent for:    Microbiology  4/20/20 Anaerobic bacterial culture, right popliteal fossa: NGTD   4/20/20 Fungus culture, right popliteal fossa: NGTD   4/20/20 Wound Culture Aerobic bacterial, right popliteal fossa: Streptococcus intermedius, Eikenella corrodens  4/20/20 Gram stain, right popliteal fossa: moderate gram positive cocci, few gram negative coccobacilli  4/1/20 Blood culture: no growth  3/30/20 Anaerobic bacterial culture, right knee fluid: light growth Peptoniphilus asaccarolyticus, light growth Anaerobic gram positive rods, unable to completely identify  3/30/20 Fluid culture Aerobic Bacterial, right knee fluid: light growth Streptococcus constellatus, Clostridium ramosum (day 2, isolated in broth only) (resistant: clindamycin)  3/27/20 Aerobic culture, tissue *[facility]- per care everywhere: Escerichia coli (ESBL) (resistant: cefazolin, ceftriaxone, cefepime, ciprofloxacin, levofloxacin, ceftazidime), Klebsiella pneumoniae  1/27/20 Aerobic culture and gram stain, swab of popliteal fossa *[facility]- per care  everywhere: growth of many multiple bacterial morphotypes; gram negative bacilli, gram positive cocci, gram positive bacilli      Inflammatory Markers    Recent Labs   Lab Test 04/01/20  1556 09/08/16  0930   *  --    .0* <2.9       Hematology Studies    Recent Labs   Lab Test 04/23/20  0703 04/22/20  1024 04/20/20  1620 04/20/20  1031 04/01/20  1556  06/05/18  1112 05/10/16  1035   WBC  --  6.2  --   --  9.8  --  4.8 6.1   HGB  --  7.3* 8.5* 9.0* 10.5*   < > 14.3 13.5   MCV  --  98  --   --  98  --  99 97    279 290  --  356  --  197 229    < > = values in this interval not displayed.     Recent Labs   Lab Test 04/01/20  1556 05/10/16  1035 10/07/14  0848 05/08/14  1015   ANEU 8.6* 4.1 1.6 1.8   AEOS 0.0 0.1 0.1 0.1       Metabolic Studies     Recent Labs   Lab Test 04/22/20  1024 04/21/20  0627 04/20/20  1620 04/20/20  1031 04/01/20  1556    138 135 137 132*   POTASSIUM 3.5 4.0 4.1 4.6 3.5   CHLORIDE 104 106 104  --  98   CO2 27 27 27  --  27   BUN 12 12 14  --  17   CR 0.65 0.64 0.61  --  0.79   GFRESTIMATED 82 82 84  --  69       Hepatic Studies    Recent Labs   Lab Test 04/01/20  1556 05/10/16  1035 10/07/14  0848   BILITOTAL 0.6 0.8 0.5   ALKPHOS 85 91 85   ALBUMIN 2.5* 3.8 3.6   AST 36 19 13   ALT 26 29 21            Imaging:   XR Knee (4/1/20)  Impression:  1.  Stable appearance of revision total right knee arthroplasty  without evidence of hardware failure.     MRI right tibia fibula w/o IV contrast (2/10/20) *Report from [facility]  IMPRESSION:  1.  Apparent shallow soft tissue ulceration in the posterior and slightly medial popliteal fossa region. No definitive deep fluid collection.Evaluation for adjacent osteomyelitis felt to be nondiagnostic.  2.  Linear band of fibrosis present along the posteromedial proximal right leg along the superficial soleus with mild soleus muscle edema but no myositis. No enhancing masslike lesion seen within the imaged right leg (allowing for  artifact and technical limitations).  3.  Postop right total knee arthroplasty with longstem hinged distal femoral and proximal tibial components with patellar resurfacing.  4.  Small right knee joint effusion.  5.  Severe medial compartment predominant, tricompartmental contralateral left knee osteoarthritis.

## 2020-04-24 VITALS
TEMPERATURE: 97.7 F | OXYGEN SATURATION: 93 % | BODY MASS INDEX: 21.91 KG/M2 | WEIGHT: 119.05 LBS | HEIGHT: 62 IN | SYSTOLIC BLOOD PRESSURE: 108 MMHG | HEART RATE: 90 BPM | RESPIRATION RATE: 16 BRPM | DIASTOLIC BLOOD PRESSURE: 55 MMHG

## 2020-04-24 LAB
ANION GAP SERPL CALCULATED.3IONS-SCNC: 5 MMOL/L (ref 3–14)
BACTERIA SPEC CULT: ABNORMAL
BACTERIA SPEC CULT: ABNORMAL
BUN SERPL-MCNC: 12 MG/DL (ref 7–30)
CALCIUM SERPL-MCNC: 7.9 MG/DL (ref 8.5–10.1)
CHLORIDE SERPL-SCNC: 104 MMOL/L (ref 94–109)
CO2 SERPL-SCNC: 28 MMOL/L (ref 20–32)
CREAT SERPL-MCNC: 0.58 MG/DL (ref 0.52–1.04)
GFR SERPL CREATININE-BSD FRML MDRD: 85 ML/MIN/{1.73_M2}
GLUCOSE SERPL-MCNC: 104 MG/DL (ref 70–99)
HCV RNA SERPL NAA+PROBE-ACNC: NORMAL [IU]/ML
HCV RNA SERPL NAA+PROBE-LOG IU: NORMAL LOG IU/ML
POTASSIUM SERPL-SCNC: 4.1 MMOL/L (ref 3.4–5.3)
SODIUM SERPL-SCNC: 137 MMOL/L (ref 133–144)
SPECIMEN SOURCE: ABNORMAL

## 2020-04-24 PROCEDURE — 25000128 H RX IP 250 OP 636: Performed by: STUDENT IN AN ORGANIZED HEALTH CARE EDUCATION/TRAINING PROGRAM

## 2020-04-24 PROCEDURE — 25000132 ZZH RX MED GY IP 250 OP 250 PS 637: Performed by: STUDENT IN AN ORGANIZED HEALTH CARE EDUCATION/TRAINING PROGRAM

## 2020-04-24 PROCEDURE — 80048 BASIC METABOLIC PNL TOTAL CA: CPT | Performed by: STUDENT IN AN ORGANIZED HEALTH CARE EDUCATION/TRAINING PROGRAM

## 2020-04-24 PROCEDURE — 36415 COLL VENOUS BLD VENIPUNCTURE: CPT | Performed by: STUDENT IN AN ORGANIZED HEALTH CARE EDUCATION/TRAINING PROGRAM

## 2020-04-24 RX ORDER — BISACODYL 10 MG
10 SUPPOSITORY, RECTAL RECTAL DAILY PRN
Start: 2020-04-24 | End: 2020-06-01

## 2020-04-24 RX ADMIN — ASPIRIN 81 MG CHEWABLE TABLET 81 MG: 81 TABLET CHEWABLE at 07:45

## 2020-04-24 RX ADMIN — SENNOSIDES AND DOCUSATE SODIUM 2 TABLET: 8.6; 5 TABLET ORAL at 09:52

## 2020-04-24 RX ADMIN — ACETAMINOPHEN 650 MG: 325 TABLET, FILM COATED ORAL at 09:52

## 2020-04-24 RX ADMIN — ACETAMINOPHEN 650 MG: 325 TABLET, FILM COATED ORAL at 03:20

## 2020-04-24 RX ADMIN — LEVOTHYROXINE SODIUM 75 MCG: 0.07 TABLET ORAL at 07:45

## 2020-04-24 RX ADMIN — ENOXAPARIN SODIUM 40 MG: 40 INJECTION SUBCUTANEOUS at 09:52

## 2020-04-24 RX ADMIN — OXYCODONE HYDROCHLORIDE 5 MG: 5 TABLET ORAL at 03:20

## 2020-04-24 ASSESSMENT — ACTIVITIES OF DAILY LIVING (ADL)
ADLS_ACUITY_SCORE: 18

## 2020-04-24 ASSESSMENT — PAIN DESCRIPTION - DESCRIPTORS: DESCRIPTORS: ACHING;SORE

## 2020-04-24 NOTE — PROVIDER NOTIFICATION
Notified Resident at 0340 AM regarding low urine output. Pt unable to void. Bladder scanned for 515ml. Do you want us to straight cath for the third time in 24 hours or would you like a blackburn?    Spoke with: Luke Moffett    Orders were obtained. Straight catheterize the patient. Team will reassess in the morning.     Comments: Will continue to monitor.

## 2020-04-24 NOTE — PROGRESS NOTES
Social Work Services Discharge Note      Patient Name:  Jigna Allen     Anticipated Discharge Date:  4/24/2020    Discharge Disposition:   TCU:  [facility]    Following MD:  Dr. Washburn     Pre-Admission Screening (PAS) online form has been completed.  The Level of Care (LOC) is:  Determined  Confirmation Code is:  [id number]  Patient/caregiver informed of referral to [facility] for Pre-Admission Screening for skilled nursing facility (SNF) placement and to expect a phone call post discharge from SNF.     Additional Services/Equipment Arranged:  [facility] ([phone]) Stretcher Transport set up for 11:00am.     Patient / Family response to discharge plan:  Patient and family in agreement with discharge plan.      Persons notified of above discharge plan:  Charge RN, Floor RN, NST, Patient, Patients family.    Staff Discharge Instructions:  Please fax discharge orders and signed hard scripts for any controlled substances.  Please print a packet and send with patient.     CTS Handoff completed:  YES    Medicare Notice of Rights provided to the patient/family:  YES    JOURDAN Blanchard, JOSE  [facility]   [facility]  Pager: [phone]

## 2020-04-24 NOTE — PLAN OF CARE
Physical Therapy Discharge Summary    Reason for therapy discharge:    Discharged to transitional care facility.    Progress towards therapy goal(s). See goals on Care Plan in [facility] electronic health record for goal details.  Goals partially met.  Barriers to achieving goals:   discharge from facility.    Therapy recommendation(s):    Continued therapy is recommended.  Rationale/Recommendations:  Pt would benefit from additional skilled PT to address functional mobility, transfers and progress to gait when weight bearing restrictions lifted for RLE.

## 2020-04-24 NOTE — PLAN OF CARE
Right leg dressing changed by Dr Washburn this am, JUAN R x2 intact, dressing on left leg dry/intact. Pivot to commode with three assist, weight bearing on left leg only, voided 400cc with small stool. Tolerating regular in small amounts, Tylenol for incision pain, positive doppler pulse on right leg. Nurse report given to Adolph Caba and patient transferred to TCU.

## 2020-04-24 NOTE — PLAN OF CARE
Alert and oriented, pain is managed with prn po oxycodone given once this shift. Have been using bedpan with small urine volume. Bladder scanned with 779 ml. Text page plastic surgery on call Dr. Luke Moffett. Call back received from the provider, hari to straight cath pt. Able to empty bladder with 600 ml urine. Pt verbalized feeling comfortable after. Right leg with immobilizer-elevated at all times, 2 JUAN R's. Flap check q 4 hours with vioptix, results WNL. Left graft site with ace bandage CDI. Pt denies numbness and tingling. Double PICC right upper arm-SL. OVSS, afebrile. PLAN: Continue with the care plan. To discharge to TCU tomorrow. No time yet.

## 2020-04-24 NOTE — DISCHARGE SUMMARY
[facility], [place]    Discharge Summary  Anticoagulation Therapy    Date of Admission:  4/20/2020  Date of Discharge:  4/24/2020  Discharging Provider: Junior Washburn    Discharge Diagnoses   Patient Active Problem List   Diagnosis     Sarcoma of soft tissue (H)     Macrocytosis     Sarcoma (H)     Post-traumatic osteoarthritis of both knees     Fatigue, unspecified type     Hypothyroidism, unspecified type     Anxiety     Lyme disease     Adjustment disorder with anxious mood     ACP (advance care planning)     Pulmonary nodules     Status post knee surgery     Undifferentiated pleomorphic sarcoma (H)     Non-healing surgical wound, subsequent encounter     S/P flap graft     Acute blood loss anemia    Procedure/Surgery Information   Procedure: Procedure(s):  Right leg reconstruction and wound debridemenr  With left anterolateral thigh free flap with SPY   Surgeon(s): Surgeon(s) and Role:     * [facility] De Luna MD - Primary     * Zeb Whitaker MD - Assisting     * Junior Washburn MD - Resident - Assisting   Specimens: ID Type Source Tests Collected by Time Destination   1 : right popliteal fossa Wound Other ANAEROBIC BACTERIAL CULTURE, FUNGUS CULTURE, GRAM STAIN, WOUND CULTURE AEROBIC BACTERIAL E[facility]AN De Luna MD 4/20/2020 10:54 AM    A : right popliteal fossa wound-two stitches marks distal and 1 long stitch marks medial Tissue Other SURGICAL PATHOLOGY EXAM EHSAN De Luna MD 4/20/2020 10:45 AM       Non-operative procedures PICC line placement     History of Present Illness   Jigna Allen is a 85 year old female with a 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 She presented to have the chronic popliteal wound reconstructed before her knee prosthesis can be replaced with an  "antibiotic spacer in the coming months. She was on oral antibiotics prior to admission.       Hospital Course   Jigna Allen was admitted on 4/20/2020 post op for flap monitoring, which were unremarkable. She no longer needs frequent flap monitoring at the time of discharge but will need to have a loose dressing and a loose knee immobilizer. She was initially started on Bactrim and metronidazole, and was evaluated by ID. They recommended daily ertapenem 1g until her next knee surgery. She had a PICC placed on 4/23.   She tolerated her diet post op, but had poor appetite. We started her on nutritional supplements that she should continue taking. Her Vera was removed on POD1. She had some urinary retention on 4/24 and required straight cathing x 2, however, she was able to void independently when she was transferred to a bedside commode with her RLE elevated. Her dressing was changed on 4/24, and she should get every other day dressing changes. She has been on aspirin for flap vascular protection daily. This will continue for 30 days post op. She is also on enoxaparin DVT ppx.     Physical Exam:  /55 (BP Location: Left arm)   Pulse 90   Temp 97.7  F (36.5  C) (Oral)   Resp 20   Ht 1.575 m (5' 2\")   Wt 54 kg (119 lb 0.8 oz)   SpO2 93%   BMI 21.77 kg/m    General appearance: in NAD, resting comfortably in bed  Pulm: Non-labored breathing; saturating well on NC oxygen.   CV: Hemodynamically stable  RLE: Flap appears pink and viable. There is a 2 sec cap refill. Vioptix numbers steady with oxygenation in the high 60s% range. Dopplerable arterial signal present. JUAN R drains with serosanguinous output.    LLE: Thigh incision intact. JUAN R with serosanguinous output.       Antibiotics prescribed at discharge: 1g ertapenem daily, duration: at least until knee surgery with Dr. Gomez.        Discharge Disposition   Discharged to short-term care facility   Condition at discharge: Stable    Pending Results   Final " pathology results:     SPECIMEN(S):   Right popliteal fossa wound     FINAL DIAGNOSIS:   Skin, right popliteal fossa wound, excision:   - Deep ulceration with associated fibrinopurulent exudate; consistent with    clinical history of non-healing   wound   - Ischemic and occlusive vascular changes (see comment)   - No evidence of recurrent sarcoma     Cultures to be followed up in clinic:  Unresulted Labs Ordered in the Past 30 Days of this Admission     Date and Time Order Name Status Description    4/20/2020 1055 Fungus Culture, non-blood Preliminary     4/20/2020 1055 Anaerobic bacterial culture Preliminary         Primary Care Physician   Elisabeth Ko    Consultations This Hospital Stay   INFECTIOUS DISEASE GENERAL ADULT IP CONSULT  PHYSICAL THERAPY ADULT IP CONSULT  OCCUPATIONAL THERAPY ADULT IP CONSULT  SOCIAL WORK IP CONSULT  MEDICATION HISTORY IP PHARMACY CONSULT  VASCULAR ACCESS ADULT IP CONSULT  PHYSICAL THERAPY ADULT IP CONSULT  OCCUPATIONAL THERAPY ADULT IP CONSULT    Discharge Orders      Infectious Disease Referral      Orthopedics Adult Referral      General info for SNF    Length of Stay Estimate: Short Term Care: Estimated # of Days <30  Condition at Discharge: Stable  Level of care:skilled   Rehabilitation Potential: Good  Admission H&P remains valid and up-to-date: Yes  Recent Chemotherapy: N/A  Use Nursing Home Standing Orders: Yes     Wound care    Left thigh: Change dressing every other day. ABDs on incision and wrap with 6 inch ACE. Will no longer need a dressing starting from 4/28.     Right leg: Change dressing every other day. VERY IMPORTANT NOT TO MAKE THE DRESSING TIGHT. A tight dressing can compromise the flap. Place Adaptic or Xerorofm strips on the incisions. Cover with ABD. Gently wrap with Kerlix and ACE. Put knee in the knee immobilizer but keep the straps VERY LOOSE. Tight knee immobilizer can injure the flap. Please make sure the entire length of the lower extremity is padded  before putting it in a knee immobilizer to avoid pressure injuries. Thanks.    Strip JPs every 4 hours and record the daily output from each drain separately. Send a log of recent drain outputs to plastic surgery clinic with the patient.     IV access    PICC line for antibiotics.     Weight bearing status    Right lower extremity has to be kept elevated. When transferring patient to a chair, please keep the RLE elevated or at least parallel to the ground. Place leg on pillows on an opposite chair. No dangling of the RLE at all. This is to protect the flap. Keep knee immobilizer on at all times but can take breaks a few hours a day. Must be in bed if knee immobilizer is going to come off. Always have the immobilizer on when out of bed. Please keep the dressing and the knee immobilizer VERY LOOSE at all times to prevent flap ischemia.    NWB RLE.   Ok to have full range of motion in the LLE and full rang of motion at right ankle and right hip. Limit right knee flexion to 30 degrees.     Discharge Instructions    Patient should sit in commode at least for urination while right leg is elevated on an opposite chair. Otherwise, she experiences retention.     Additional Discharge Instructions    Follow up with Dr. De Luna in plastic surgery on 4/29.  Follow up with infectious disease in 4 weeks: Dr. Calderon  Follow up with Dr. Gomez in orthopedic surgery.    Weekly labs, please fax to [facility] [phone] attn Dr. Calderon: CBC, CMP, CRP    Please offer nutritional supplements such as Boost Breeze or Boost Plus to patent 3 times a day.     Full Code    Post op     Physical Therapy Adult Consult    Evaluate and treat as clinically indicated.    Reason:  Deconditioned post op.     Occupational Therapy Adult Consult    Evaluate and treat as clinically indicated.    Reason: Deconditioned post op.     Advance Diet as Tolerated    Follow this diet upon discharge: Regular    Please offer nutritional supplements  such as Boost Breeze or Boost Plus to patent 3 times a day.     Discharge Medications   Current Discharge Medication List      START taking these medications    Details   aspirin (ASA) 81 MG EC tablet Take 1 tablet (81 mg) by mouth daily for 27 days  Qty: 27 tablet, Refills: 0    Associated Diagnoses: Non-healing surgical wound, subsequent encounter      bisacodyl (DULCOLAX) 10 MG suppository Place 1 suppository (10 mg) rectally daily as needed for constipation  Qty:      Associated Diagnoses: Non-healing surgical wound, subsequent encounter      enoxaparin ANTICOAGULANT (LOVENOX) 40 MG/0.4ML syringe Inject 0.4 mLs (40 mg) Subcutaneous every 24 hours for 14 days  Qty: 5.6 mL, Refills: 0    Associated Diagnoses: Non-healing surgical wound, subsequent encounter      ertapenem (INVANZ) 1 GM vial Inject 1 g into the vein every 24 hours  Qty: 830 mL, Refills: 0    Associated Diagnoses: Non-healing surgical wound, subsequent encounter      hydrOXYzine (ATARAX) 25 MG tablet Take 1-2 tablets (25-50 mg) by mouth every 6 hours as needed for itching or other (pain)  Qty:      Associated Diagnoses: Non-healing surgical wound, subsequent encounter      oxyCODONE (ROXICODONE) 5 MG tablet Take 1 tablet (5 mg) by mouth every 4 hours as needed for pain  Qty: 15 tablet, Refills: 0    Associated Diagnoses: Non-healing surgical wound, subsequent encounter      senna-docusate (SENOKOT-S/PERICOLACE) 8.6-50 MG tablet Take 1 tablet by mouth 2 times daily as needed for constipation  Qty:      Associated Diagnoses: Non-healing surgical wound, subsequent encounter         CONTINUE these medications which have NOT CHANGED    Details   acetaminophen (TYLENOL) 500 MG tablet Take 500-1,000 mg by mouth every 8 hours as needed for mild pain      levothyroxine (LEVOTHROID) 75 MCG tablet Take 75 mcg by mouth daily     Associated Diagnoses: 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

## 2020-04-27 ENCOUNTER — TELEPHONE (OUTPATIENT)
Dept: SURGERY | Facility: CLINIC | Age: 85
End: 2020-04-27

## 2020-04-27 PROBLEM — M21.169 ACQUIRED VARUS DEFORMITY KNEE: Status: ACTIVE | Noted: 2017-09-05

## 2020-04-27 PROBLEM — Z78.9 DEEP VEIN THROMBOSIS (DVT) PROPHYLAXIS PRESCRIBED AT DISCHARGE: Status: ACTIVE | Noted: 2018-07-02

## 2020-04-27 PROBLEM — S82.141A TIBIAL PLATEAU FRACTURE, RIGHT: Status: ACTIVE | Noted: 2017-09-05

## 2020-04-27 PROBLEM — M11.269 PSEUDOGOUT OF KNEE: Status: ACTIVE | Noted: 2017-09-05

## 2020-04-27 PROBLEM — Z96.651 HISTORY OF TOTAL KNEE ARTHROPLASTY, RIGHT: Status: ACTIVE | Noted: 2018-06-12

## 2020-04-27 PROBLEM — S81.001A OPEN WOUND OF RIGHT KNEE: Status: ACTIVE | Noted: 2020-03-23

## 2020-04-27 NOTE — TELEPHONE ENCOUNTER
[facility] Call Center    Phone Message    May a detailed message be left on voicemail: yes     Reason for Call: Other: Charlotte calling on the Physical Therapists behalf to get some clarification on three questions:  1.  Can we take the brace off and complete range of motion to 30 degrees on that leg?  2.  Can we do hip range of motion?  3.  When can we try to sit to stand transfers?  Please call Charlotte back to discuss.     Action Taken: Message routed to:  [facility] ([facility]  Plastic Surgery    Travel Screening: Not Applicable

## 2020-04-27 NOTE — TELEPHONE ENCOUNTER
Left message for PT relaying per discharge summary Ok to have full range of motion in the LLE and full range of motion at right ankle and right hip. Limit right knee flexion to 30 degrees. Pt should not complete sit to stand transfers yet. Pt will be seen back on 4/29/20 to assess if she is ready for this. Provided direct contact information should there be any additional questions or concerns. Stella MILLER RNCC

## 2020-04-28 ENCOUNTER — PATIENT OUTREACH (OUTPATIENT)
Dept: PLASTIC SURGERY | Facility: CLINIC | Age: 85
End: 2020-04-28

## 2020-04-28 ENCOUNTER — TELEPHONE (OUTPATIENT)
Dept: ORTHOPEDICS | Facility: CLINIC | Age: 85
End: 2020-04-28

## 2020-04-28 DIAGNOSIS — T81.89XD NON-HEALING SURGICAL WOUND, SUBSEQUENT ENCOUNTER: Primary | ICD-10-CM

## 2020-04-28 NOTE — PATIENT INSTRUCTIONS
PT calling to request written orders be faxed to [phone]. Request orders include knee flexion, removing brace, ROM for hip and status of sit to stand transfers. Stella MILLER RNCC

## 2020-04-29 ENCOUNTER — OFFICE VISIT (OUTPATIENT)
Dept: PLASTIC SURGERY | Facility: CLINIC | Age: 85
End: 2020-04-29
Payer: COMMERCIAL

## 2020-04-29 VITALS
HEART RATE: 91 BPM | DIASTOLIC BLOOD PRESSURE: 61 MMHG | TEMPERATURE: 98.4 F | BODY MASS INDEX: 21.77 KG/M2 | HEIGHT: 62 IN | SYSTOLIC BLOOD PRESSURE: 112 MMHG | OXYGEN SATURATION: 97 %

## 2020-04-29 DIAGNOSIS — Z98.890 S/P FLAP GRAFT: Primary | ICD-10-CM

## 2020-04-29 PROBLEM — T81.89XA SURGICAL WOUND, NON HEALING: Status: ACTIVE | Noted: 2020-04-14

## 2020-04-29 ASSESSMENT — PAIN SCALES - GENERAL: PAINLEVEL: NO PAIN (1)

## 2020-04-29 NOTE — TELEPHONE ENCOUNTER
Antonio,  Mariella.    Merline, shannan arrange for pt to see me in 2-3 weeks as I don't see a future appt on my schedule yet.        ----- Message from EHSAN De Luna MD sent at 4/28/2020  1:06 PM CDT -----  Yes we placed one drain in the joint and one drain under the flap.     The longer we wait the better in terms of injuring the flap.     ID was involved and placed a PICC line and is on appropriate abx.    Mariella Alvarez  ----- Message -----  From: Junior Washburn MD  Sent: 4/24/2020  10:33 AM CDT  To: EHSAN De Luna MD, MD Dr. Jason Rehman,   We did irrigate the joint through the existing opening posteriorly, and we placed drains in. She was seen by ID and is now on ertapenem, which should cover all her existing and past organisms. She will see you in a few weeks. I'll defer to Dr. De Luna about the timing of surgery.   Junior Wolff  ----- Message -----  From: He Gomez MD  Sent: 4/23/2020   2:54 PM CDT  To: EHSAN De Luna MD, MD Antonio Alarcon,  Was a drain placed into the knee and secured well?  If not, I'm afraid the knee infection will still drain out beneath your flap and ruin it.  She should be on the appropriate antibx too. It'd be nice to wait 3 months but probably not realistic to keep her on antibx the entire duration and then another 6 weeks after spacer implantation.  How about we proceed in a month?    Junior, Yes, have her come back to see me in 2-3 weeks.      Thank you.  ----- Message -----  From: Junior Washburn MD  Sent: 4/23/2020  10:53 AM CDT  To: He Gomez MD    Hi Dr. Gomez,     I'm one of the plastic surgery residents. We placed an ALT flap on Ms. Allen's wound on 4/20. She will discharge to a TCU today on IV antibiotics. Our recommendation would be to wait for at least 3 months before taking her back for placement of antibiotic spacer. Would you like me to put an order in to have her see you in a few weeks?    Thanks, Junior

## 2020-04-29 NOTE — PROGRESS NOTES
"Chief Complaint   Patient presents with     Surgical Followup     1wk post op R leg recon       Vitals:    04/29/20 1040   BP: 112/61   BP Location: Left arm   Patient Position: Chair   Cuff Size: Adult Regular   Pulse: 91   Temp: 98.4  F (36.9  C)   TempSrc: Oral   SpO2: 97%   Height: 1.575 m (5' 2\")       Body mass index is 21.77 kg/m .    Pierre Rangel, EMT    "

## 2020-04-29 NOTE — PROGRESS NOTES
PRESENTING COMPLAINT:  Postoperative visit, status post right popliteal nonhealing radiated wound requiring a left ALT free flap done 04/20/2020.      HISTORY OF PRESENTING COMPLAINT:  Ms. Allen is 85 years old.  She is a couple weeks out from surgery.  She has done well with no major issues.  She is at a nursing home, keeping her leg horizontal.  Pain is well controlled.  JUAN R drain in her knee and in the sub flap area are in place.  She has a PICC line and is getting her IV antibiotics per ID.      PHYSICAL EXAMINATION:  Vital signs are stable.  She is afebrile, in no obvious distress.  The flap is healing in well.  Sutures are in place.  No evidence for infection.  The donor site on the left side is healing in well.      ASSESSMENT AND PLAN:  Based upon the above findings, a diagnosis of right leg reconstruction with a free flap was made.  The plan is to keep the sutures in for 2 more weeks.  We will keep the JUAN R drains in on the right leg indefinitely per Orthopedics.  She will stay on IV antibiotics.  The next surgical management will be removal of the infected TKA and placement of a spacer by Orthopedics.  My advice is to do that as late as possible.  Three months or more would be ideal to help curtail the injury to the underlying free flap.  Obviously, as long as the knee does not get inflamed or cellulitic or the patient does not get systemically sick, that is possible.  If, however, any of the above things occur, then that may need to be done earlier with the risk of losing the flap.  She understood that.  Orthopedics knows that.  The plan now is to see her back in a week's time with my PA to make sure she is doing fine, maybe start dangling protocol at that point and then see me back in 2 weeks to see how she is doing with the dangling protocol as well as to remove the sutures.  All questions were answered.  She was happy with the visit.

## 2020-04-29 NOTE — LETTER
4/29/2020       RE: Jigna Allen  [address] [place] [address]     Dear Colleague,    Thank you for referring your patient, Jigna Allen, to the [facility] PLASTIC AND RECONSTRUCTIVE SURGERY at [facility]. Please see a copy of my visit note below.    PRESENTING COMPLAINT:  Postoperative visit, status post right popliteal nonhealing radiated wound requiring a left ALT free flap done 04/20/2020.      HISTORY OF PRESENTING COMPLAINT:  Ms. Allen is 85 years old.  She is a couple weeks out from surgery.  She has done well with no major issues.  She is at a nursing home, keeping her leg horizontal.  Pain is well controlled.  JUAN R drain in her knee and in the sub flap area are in place.  She has a PICC line and is getting her IV antibiotics per ID.      PHYSICAL EXAMINATION:  Vital signs are stable.  She is afebrile, in no obvious distress.  The flap is healing in well.  Sutures are in place.  No evidence for infection.  The donor site on the left side is healing in well.      ASSESSMENT AND PLAN:  Based upon the above findings, a diagnosis of right leg reconstruction with a free flap was made.  The plan is to keep the sutures in for 2 more weeks.  We will keep the JUAN R drains in on the right leg indefinitely per Orthopedics.  She will stay on IV antibiotics.  The next surgical management will be removal of the infected TKA and placement of a spacer by Orthopedics.  My advice is to do that as late as possible.  Three months or more would be ideal to help curtail the injury to the underlying free flap.  Obviously, as long as the knee does not get inflamed or cellulitic or the patient does not get systemically sick, that is possible.  If, however, any of the above things occur, then that may need to be done earlier with the risk of losing the flap.  She understood that.  Orthopedics knows that.  The plan now is to see her back in a week's time with my PA to make sure she is doing  fine, maybe start dangling protocol at that point and then see me back in 2 weeks to see how she is doing with the dangling protocol as well as to remove the sutures.  All questions were answered.  She was happy with the visit.     Sincerely,    EHSAN De Luna MD

## 2020-04-30 ENCOUNTER — RECORDS - HEALTHEAST (OUTPATIENT)
Dept: LAB | Facility: CLINIC | Age: 85
End: 2020-04-30

## 2020-04-30 ENCOUNTER — EXTERNAL ORDER RESULTS (OUTPATIENT)
Dept: INFECTIOUS DISEASES | Facility: CLINIC | Age: 85
End: 2020-04-30

## 2020-04-30 ENCOUNTER — TELEPHONE (OUTPATIENT)
Dept: SURGERY | Facility: CLINIC | Age: 85
End: 2020-04-30

## 2020-04-30 LAB
BACTERIA SPEC CULT: ABNORMAL
BASOPHILS # BLD AUTO: 0 THOU/UL (ref 0–0.2)
BASOPHILS # BLD AUTO: 0 THOU/UL (ref 0–0.2)
BASOPHILS NFR BLD AUTO: 1 % (ref 0–2)
BASOPHILS NFR BLD AUTO: 1 % (ref 0–2)
BNP SERPL-MCNC: 21 PG/ML (ref 0–167)
BNP SERPL-MCNC: 21 PG/ML (ref 0–167)
C REACTIVE PROTEIN LHE: 1.2 MG/DL (ref 0–0.8)
CRP INFLAMMATION (EXTERNAL): 1.2 MG/DL (ref 0–0.8)
EOSINOPHIL # BLD AUTO: 0.3 THOU/UL (ref 0–0.4)
EOSINOPHIL COUNT (ABSOLUTE): 0.3 THOU/UL (ref 0–0.4)
EOSINOPHIL NFR BLD AUTO: 6 % (ref 0–6)
EOSINOPHIL NFR BLD AUTO: 6 % (ref 0–6)
ERYTHROCYTE [DISTWIDTH] IN BLOOD BY AUTOMATED COUNT: 16 % (ref 11–14.5)
ERYTHROCYTE [DISTWIDTH] IN BLOOD BY AUTOMATED COUNT: 16 % (ref 11–14.5)
HCT VFR BLD AUTO: 27.2 % (ref 35–47)
HCT VFR BLD AUTO: 27.2 % (ref 35–47)
HEMOGLOBIN: 8.4 G/DL (ref 12–16)
HGB BLD-MCNC: 8.4 G/DL (ref 12–16)
LYMPHOCYTES # BLD AUTO: 0.8 THOU/UL (ref 0.8–4.4)
LYMPHOCYTES # BLD AUTO: 0.8 THOU/UL (ref 0.8–4.4)
LYMPHOCYTES NFR BLD AUTO: 15 % (ref 20–40)
LYMPHOCYTES NFR BLD AUTO: 15 % (ref 20–40)
Lab: ABNORMAL
MCH RBC QN AUTO: 30.4 PG (ref 27–34)
MCH RBC QN AUTO: 30.4 PG (ref 27–34)
MCHC RBC AUTO-ENTMCNC: 30.9 G/DL (ref 32–36)
MCHC RBC AUTO-ENTMCNC: 30.9 G/DL (ref 32–36)
MCV RBC AUTO: 99 FL (ref 80–100)
MCV RBC AUTO: 99 FL (ref 80–100)
MONOCYTES # BLD AUTO: 0.3 THOU/UL (ref 0–0.9)
MONOCYTES # BLD AUTO: 0.3 THOU/UL (ref 0–0.9)
MONOCYTES NFR BLD AUTO: 6 % (ref 2–10)
MONOCYTES NFR BLD AUTO: 6 % (ref 2–10)
NEUTROPHILS # BLD AUTO: 3.8 THOU/UL (ref 2–7.7)
NEUTROPHILS # BLD AUTO: 3.8 THOU/UL (ref 2–7.7)
NEUTROPHILS NFR BLD AUTO: 70 % (ref 50–70)
NEUTROPHILS NFR BLD AUTO: 70 % (ref 50–70)
PLATELET # BLD AUTO: 535 THOU/UL (ref 140–440)
PLATELET # BLD AUTO: 535 THOU/UL (ref 140–440)
PMV BLD AUTO: 9.9 FL (ref 8.5–12.5)
PMV BLD: 9.9 FL (ref 8.5–12.5)
RBC # BLD AUTO: 2.76 MIL/UL (ref 3.8–5.4)
RBC # BLD AUTO: 2.76 MILL/UL (ref 3.8–5.4)
SPECIMEN SOURCE: ABNORMAL
WBC # BLD AUTO: 5.4 THOU/UL (ref 4–11)
WBC: 5.4 THOU/UL (ref 4–11)

## 2020-04-30 NOTE — TELEPHONE ENCOUNTER
[facility] Call Center    Phone Message    May a detailed message be left on voicemail: yes     Reason for Call: Other: pt is wanting to know if she can us a pillow between her legs to sleep on Right or left side, please call gloria @ [phone] thank you     Action Taken: Message routed to:  [facility]    Travel Screening: Not Applicable

## 2020-04-30 NOTE — TELEPHONE ENCOUNTER
Left message for Lanie relaying pt should not lay on right side, okay to lean to left side carefully as tolerated so long as right leg is not bent. If a pillow is placed between the legs, precautions to avoid any pressure on the right leg which could decrease blood flow to the flap. Provided direct contact information should she have any additional questions or concerns. Stella MILLER RNCC

## 2020-05-05 ENCOUNTER — OFFICE VISIT (OUTPATIENT)
Dept: PLASTIC SURGERY | Facility: CLINIC | Age: 85
End: 2020-05-05
Payer: COMMERCIAL

## 2020-05-05 ENCOUNTER — ANCILLARY PROCEDURE (OUTPATIENT)
Dept: CT IMAGING | Facility: CLINIC | Age: 85
End: 2020-05-05
Attending: INTERNAL MEDICINE
Payer: COMMERCIAL

## 2020-05-05 VITALS
TEMPERATURE: 98.6 F | SYSTOLIC BLOOD PRESSURE: 110 MMHG | HEART RATE: 99 BPM | DIASTOLIC BLOOD PRESSURE: 57 MMHG | HEIGHT: 62 IN | OXYGEN SATURATION: 97 % | BODY MASS INDEX: 21.77 KG/M2

## 2020-05-05 DIAGNOSIS — Z98.890 S/P FLAP GRAFT: Primary | ICD-10-CM

## 2020-05-05 DIAGNOSIS — E03.9 HYPOTHYROIDISM, UNSPECIFIED TYPE: ICD-10-CM

## 2020-05-05 DIAGNOSIS — C49.9 SARCOMA OF SOFT TISSUE (H): ICD-10-CM

## 2020-05-05 DIAGNOSIS — R53.83 FATIGUE, UNSPECIFIED TYPE: ICD-10-CM

## 2020-05-05 DIAGNOSIS — Z98.890 STATUS POST KNEE SURGERY: ICD-10-CM

## 2020-05-05 DIAGNOSIS — D75.89 MACROCYTOSIS: ICD-10-CM

## 2020-05-05 DIAGNOSIS — M17.2 POST-TRAUMATIC OSTEOARTHRITIS OF BOTH KNEES: ICD-10-CM

## 2020-05-05 DIAGNOSIS — R91.8 PULMONARY NODULES: ICD-10-CM

## 2020-05-05 RX ORDER — LANOLIN ALCOHOL/MO/W.PET/CERES
3 CREAM (GRAM) TOPICAL
Status: ON HOLD | COMMUNITY
Start: 2020-05-01 | End: 2020-06-04

## 2020-05-05 ASSESSMENT — PAIN SCALES - GENERAL: PAINLEVEL: NO PAIN (0)

## 2020-05-05 NOTE — LETTER
"5/5/2020       RE: Jigna Allen  [address] [place] [address]     Dear Colleague,    Thank you for referring your patient, Jigna Allen, to the [facility] PLASTIC AND RECONSTRUCTIVE SURGERY at [facility]. Please see a copy of my visit note below.    Plastic Surgery Outpatient Visit    ID: Jigna Allen is a 85 year old female with PMH R popliteal non healing radiated wound now s/p L ALT free flap 4/20/20.     S: Doing well overall since last visit. Avoiding narcotics. Feeling somewhat constipated. Continues to receive antibiotics.     O:  /57 (BP Location: Left arm, Patient Position: Chair, Cuff Size: Adult Regular)   Pulse 99   Temp 98.6  F (37  C) (Oral)   Ht 1.575 m (5' 2\")   SpO2 97%   BMI 21.77 kg/m     General: NAD  Extremities: L thigh donor site c/d/i, prineo peeling off. No erythema or fluid wave.   R posterior knee flap warm, soft, viable. Very small amount of serous drainage to distal flap tip. Sutures intact. No erythema. Drains with serosang output.    A/P:  85 year old female with PMH R popliteal non healing radiated wound now s/p L ALT free flap 4/20/20.     -Healing as anticipated  -Ok to start dangle protocol: keep leg straight, sit on edge of bed and let straight leg hang down. Removed brace and expose flap. Watch flap for color change, if it turns purple, please elevate leg. start dangles 5min TID. If tolerating, please increase by 5 minutes each day.   -once tolerating dangles she can start to weight bear on R foot, will discuss further at next appt.   -RTC 1 week with Dr. De Luna    Total time spent with patient was 25 min of which greater than 50% was in counseling.    Jenny Land PA-C  Plastic and Reconstructive Surgery      "

## 2020-05-05 NOTE — PROGRESS NOTES
"Plastic Surgery Outpatient Visit    ID: Jigna Allen is a 85 year old female with PMH R popliteal non healing radiated wound now s/p L ALT free flap 4/20/20.     S: Doing well overall since last visit. Avoiding narcotics. Feeling somewhat constipated. Continues to receive antibiotics.     O:  /57 (BP Location: Left arm, Patient Position: Chair, Cuff Size: Adult Regular)   Pulse 99   Temp 98.6  F (37  C) (Oral)   Ht 1.575 m (5' 2\")   SpO2 97%   BMI 21.77 kg/m     General: NAD  Extremities: L thigh donor site c/d/i, prineo peeling off. No erythema or fluid wave.   R posterior knee flap warm, soft, viable. Very small amount of serous drainage to distal flap tip. Sutures intact. No erythema. Drains with serosang output.    A/P:  85 year old female with PMH R popliteal non healing radiated wound now s/p L ALT free flap 4/20/20.     -Healing as anticipated  -Ok to start dangle protocol: keep leg straight, sit on edge of bed and let straight leg hang down. Removed brace and expose flap. Watch flap for color change, if it turns purple, please elevate leg. start dangles 5min TID. If tolerating, please increase by 5 minutes each day.   -once tolerating dangles she can start to weight bear on R foot, will discuss further at next appt.   -RTC 1 week with Dr. De Luna    Total time spent with patient was 25 min of which greater than 50% was in counseling.    Jenny Land PA-C  Plastic and Reconstructive Surgery      "

## 2020-05-05 NOTE — PROGRESS NOTES
"Chief Complaint   Patient presents with     Surgical Followup     2wk post op DOS 4/20, RLE flap       Vitals:    05/05/20 1058   BP: 110/57   BP Location: Left arm   Patient Position: Chair   Cuff Size: Adult Regular   Pulse: 99   Temp: 98.6  F (37  C)   TempSrc: Oral   SpO2: 97%   Height: 1.575 m (5' 2\")       Body mass index is 21.77 kg/m .    Pierre Rangel EMT    "

## 2020-05-12 NOTE — PROGRESS NOTES
"Jigna Allen is a 85 year old female who is being evaluated via a billable telephone visit.      The patient has been notified of following:     \"This telephone visit will be conducted via a call between you and your physician/provider. We have found that certain health care needs can be provided without the need for a physical exam.  This service lets us provide the care you need with a short phone conversation.  If a prescription is necessary we can send it directly to your pharmacy.  If lab work is needed we can place an order for that and you can then stop by our lab to have the test done at a later time.    Telephone visits are billed at different rates depending on your insurance coverage. During this emergency period, for some insurers they may be billed the same as an in-person visit.  Please reach out to your insurance provider with any questions.    If during the course of the call the physician/provider feels a telephone visit is not appropriate, you will not be charged for this service.\"    Patient has given verbal consent for Telephone visit?  Yes    What phone number would you like to be contacted at? [phone]    How would you like to obtain your AVS? 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     HPI: In brief, she was doing well until August 2009. Then after doing a lot of walking while on vacation, she developed some discomfort and swelling in the proximal right calf. 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   She had 4 cycles of doxil/ifos finishing, about Feb 2010. There was <10% viable cells at the time of surgery.   She finished post op XRT about June 2010. She did have a post -op staph infection and had some subsequent " wound healing problems.  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  This is doing quite well w no pain.  She can walk quite a bit and walks at least 30 min once a day.     She has a rectocoele and found a pessary very effective.  Occasional incontinence. She is going to start some pelvic therapy through [facility].  -    Interval history: She is being contacted on the phone today given the ongoing COVID-19 pandemic. On 2020 she underwent excision and received a free flap graft and reconstruction to a right popliteal non-healing radiated purulent wound that had exposed her TKR. Her pathology showed a non-healing wound and had no evidence of recurrent sarcoma.    She is healing well from surgery, and working with PT/OT. She completed her course of antibiotics. She resides in a nursing home. She saw Dr. De Luna this morning, and plans to see ortho (Dr. Gomez) next week.   She remains on 75 mgc/d synthroid. Her mood remains good.    The rest of a 14 point ROS is otherwise negative.     Background PMH, FH,SH   PH notable for rectocele, diverticulosis, eczema, hyperlipidemia, hypothyroidism, one episode of documented leukopenia. She had cataracts removed.   History of patellofemoral pain syndrome   FH notable for a cancer in the father, uncle, and some cousins and also glioblastoma in a brother. Paternal grandmother had stomach cancer. Her father had testicular cancer and  at age 87.   She has two adult children. 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 She does not smoke or abuse alcohol or other drugs.     ALLERGIES: She describes allergies to Niacin and ferrous sulfate manifested as a red rash.      Physical Exam: A/Ox3. NAD. Pleasant sounding on the phone    Labs:  External Order Results on 2020   Component Date Value Ref Range Status     BNP 2020 21  0 - 167 pg/mL Final     CRP Inflammation (External) 2020 1.2* 0.0 - 0.8 mg/dL Final      WBC 04/30/2020 5.4  4.0 - 11.0 thou/uL Final     RBC Count 04/30/2020 2.76* 3.80 - 5.40 mil/uL Final     Hemoglobin 04/30/2020 8.4* 12.0 - 16.0 g/dL Final     Hematocrit 04/30/2020 27.2* 35.0 - 47.0 % Final     MCV 04/30/2020 99  80 - 100 fl Final     MCH 04/30/2020 30.4  27.0 - 34.0 pg Final     MCHC 04/30/2020 30.9* 32.0 - 36.0 g/dL Final     RDW 04/30/2020 16.0* 11.0 - 14.5 % Final     Platelet Count 04/30/2020 535* 140 - 440 thou/uL Final     MPV 04/30/2020 9.9  8.5 - 12.5 fL Final     % Neutrophils 04/30/2020 70  50 - 70 % Final     % Lymphocytes 04/30/2020 15* 20 - 40 % Final     % Monocytes 04/30/2020 6  2 - 10 % Final     % Eosinophils 04/30/2020 6  0 - 6 % Final     % Basophils 04/30/2020 1  0 - 2 % Final     Neutrophils (Absolute) 04/30/2020 3.8  2.0 - 7.7 thou/uL Final     Lymphs (Absolute) 04/30/2020 0.8  0.8 - 4.4 thou/uL Final     Monocytes(Absolute) 04/30/2020 0.3  0.0 - 0.9 thou/uL Final     Eosinophil Count (Absolute) 04/30/2020 0.3  0.0 - 0.4 thou/uL Final     Basophils # 04/30/2020 0.0  0.0 - 0.2 thou/uL Final     HgB increasing after surgery.  -   CT: 5/5/2020    Impression:   1. There is a new 4 mm nodular opacity in the right lower lobe.  Recommend short-term follow-up in 3 months with low-dose chest CT.  2. Scattered other tiny solid pulmonary nodules, many of which are  stable since at least 2012.  3. New linear atelectasis in the right lower lobe.  4. Stable small cluster of tree-in-bud type nodules in the right upper  lobe, likely fungal.  5. Mild stasis of fluid in the midesophagus which can be seen with  esophageal dysfunction.    New small opacity in RLL. She has some tree in bud spots and small stable nodules in RUL. 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 This is also stable    Assessment and Plan: It has now been about 10 years since last treatment. She unfortunately developed a chronic wound healing issue post adjuvant radiation, and required a  wound excision and flap in 4/2020. She is healing well from this.  We will plan to see her about 3 month with repeat chest imaging. We want to follow the lung changes; but her symptoms dont warrant further ID w/u right now.  She will call if she has other questions in the interim.     The patient was discussed with staff, Dr. Randi Akhtar MD-PhD PGY-4  Radiation Oncology Resident, [facility]    Duration of Phone call: 11 minutes    I independently examined this patient and my assessment is in agreement with the above note.  I reviewed all tests and past medical history and my evaluation agrees with the findings in the note.  I reviewed the images in detail and we will restage in late Aug.     Edouard Bryan M.D.  Professor  Hematology, Oncology and Transplantation

## 2020-05-13 ENCOUNTER — TELEPHONE (OUTPATIENT)
Dept: SURGERY | Facility: CLINIC | Age: 85
End: 2020-05-13

## 2020-05-13 ENCOUNTER — OFFICE VISIT (OUTPATIENT)
Dept: PLASTIC SURGERY | Facility: CLINIC | Age: 85
End: 2020-05-13
Payer: COMMERCIAL

## 2020-05-13 ENCOUNTER — VIRTUAL VISIT (OUTPATIENT)
Dept: ONCOLOGY | Facility: CLINIC | Age: 85
End: 2020-05-13
Attending: INTERNAL MEDICINE
Payer: COMMERCIAL

## 2020-05-13 VITALS
HEIGHT: 62 IN | WEIGHT: 120 LBS | HEART RATE: 98 BPM | RESPIRATION RATE: 18 BRPM | DIASTOLIC BLOOD PRESSURE: 57 MMHG | BODY MASS INDEX: 22.08 KG/M2 | TEMPERATURE: 97.5 F | OXYGEN SATURATION: 98 % | SYSTOLIC BLOOD PRESSURE: 110 MMHG

## 2020-05-13 DIAGNOSIS — Z98.890 S/P FLAP GRAFT: Primary | ICD-10-CM

## 2020-05-13 DIAGNOSIS — R91.8 PULMONARY NODULES: ICD-10-CM

## 2020-05-13 DIAGNOSIS — E03.9 HYPOTHYROIDISM, UNSPECIFIED TYPE: ICD-10-CM

## 2020-05-13 DIAGNOSIS — C49.9 SARCOMA OF SOFT TISSUE (H): Primary | ICD-10-CM

## 2020-05-13 PROCEDURE — 40000114 ZZH STATISTIC NO CHARGE CLINIC VISIT

## 2020-05-13 PROCEDURE — 99213 OFFICE O/P EST LOW 20 MIN: CPT | Mod: TEL | Performed by: INTERNAL MEDICINE

## 2020-05-13 ASSESSMENT — PAIN SCALES - GENERAL: PAINLEVEL: NO PAIN (0)

## 2020-05-13 ASSESSMENT — MIFFLIN-ST. JEOR: SCORE: 952.57

## 2020-05-13 NOTE — PROGRESS NOTES
POSTOPERATIVE VISIT      PRESENTING COMPLAINT:  Postoperative visit, status post right popliteal nonhealing radiated wound reconstruction requiring a left ALT free flap reconstruction on 04/20/2020.      HISTORY OF PRESENTING COMPLAINT:  Ms. Allen is 85 years old.  She is about 3-1/2 weeks out from her surgery, done well, no major issues.  She has been dangling her leg without issues.  She has not been ambulating yet.  No fevers, no major pain.  No swelling.  Feels well.      PHYSICAL EXAMINATION:  Vital signs are stable.  She is afebrile, in no obvious distress.  Flap is healing in well.  Sutures in place.  No evidence for infection.  JUAN R drainage is minimal and serous.  She has a PICC line getting her antibiotics.      ASSESSMENT AND PLAN:  Based on above findings, a diagnosis of right leg reconstruction with free flap was made.  Plan is to continue with the JUAN R drains and IV antibiotics.  Hold off on any return to the OR for as long as possible.  My advice is 3 months as long as she is not actively infected or systemically ill.  We will take out the proximal half of the sutures in the leg.  The remaining sutures will be removed in another 1-2 weeks.  I have advised aggressive moisturization.  She can start ambulating on her leg.  No restrictions.  All questions were answered.  I will see her back in 1-2 weeks.

## 2020-05-13 NOTE — TELEPHONE ENCOUNTER
[facility] Call [facility]    Phone Message   May a detailed message be left on voicemail: yes     Reason for Call: Other: DJ from the nursing home called. Needs new orders written to care for Jigna.   Questioned the care for the JUAN R drain,  need order for ambulating,  and  care for her leg per 's visit today.  Please call her back at [phone]. She is there until 4PM today.     Action Taken: Message routed to:  [facility]    Travel Screening: Not Applicable

## 2020-05-13 NOTE — NURSING NOTE
"Chief Complaint   Patient presents with     Surgical Followup     Pt here for 3 week post op       Vitals:    05/13/20 1125   BP: 110/57   BP Location: Left arm   Patient Position: Sitting   Cuff Size: Adult Regular   Pulse: 98   Resp: 18   Temp: 97.5  F (36.4  C)   TempSrc: Oral   SpO2: 98%   Weight: 54.4 kg (120 lb)   Height: 1.575 m (5' 2\")       Body mass index is 21.95 kg/m .      JOSEFINA CastrejonT                      "

## 2020-05-13 NOTE — TELEPHONE ENCOUNTER
Return call to DJ per message below. Verbal order given to discontinue Xeroform dressing changes to incision. Order also given to allow weight bearing to right leg as tolerated. All questions answered. They will call back if they have any other questions.    Talon Cohen LPN

## 2020-05-13 NOTE — LETTER
5/13/2020       RE: Jigna Allen  [address] [place] [address]     Dear Colleague,    Thank you for referring your patient, Jigna Allen, to the [facility] PLASTIC AND RECONSTRUCTIVE SURGERY at [facility]. Please see a copy of my visit note below.    POSTOPERATIVE VISIT      PRESENTING COMPLAINT:  Postoperative visit, status post right popliteal nonhealing radiated wound reconstruction requiring a left ALT free flap reconstruction on 04/20/2020.      HISTORY OF PRESENTING COMPLAINT:  Ms. Allen is 85 years old.  She is about 3-1/2 weeks out from her surgery, done well, no major issues.  She has been dangling her leg without issues.  She has not been ambulating yet.  No fevers, no major pain.  No swelling.  Feels well.      PHYSICAL EXAMINATION:  Vital signs are stable.  She is afebrile, in no obvious distress.  Flap is healing in well.  Sutures in place.  No evidence for infection.  JUAN R drainage is minimal and serous.  She has a PICC line getting her antibiotics.      ASSESSMENT AND PLAN:  Based on above findings, a diagnosis of right leg reconstruction with free flap was made.  Plan is to continue with the JUAN R drains and IV antibiotics.  Hold off on any return to the OR for as long as possible.  My advice is 3 months as long as she is not actively infected or systemically ill.  We will take out the proximal half of the sutures in the leg.  The remaining sutures will be removed in another 1-2 weeks.  I have advised aggressive moisturization.  She can start ambulating on her leg.  No restrictions.  All questions were answered.  I will see her back in 1-2 weeks.         Again, thank you for allowing me to participate in the care of your patient.      Sincerely,    EHSAN De Luna MD

## 2020-05-14 ENCOUNTER — RECORDS - HEALTHEAST (OUTPATIENT)
Dept: LAB | Facility: CLINIC | Age: 85
End: 2020-05-14

## 2020-05-14 ENCOUNTER — EXTERNAL ORDER RESULTS (OUTPATIENT)
Dept: INFECTIOUS DISEASES | Facility: CLINIC | Age: 85
End: 2020-05-14

## 2020-05-14 LAB
BNP SERPL-MCNC: 22 PG/ML (ref 0–167)
BNP SERPL-MCNC: 22 PG/ML (ref 0–167)
C REACTIVE PROTEIN LHE: 2.8 MG/DL (ref 0–0.8)
CRP SERPL-MCNC: 2.8 MG/DL (ref 0–0.8)
ERYTHROCYTE [DISTWIDTH] IN BLOOD BY AUTOMATED COUNT: 16.7 % (ref 11–14.5)
ERYTHROCYTE [DISTWIDTH] IN BLOOD BY AUTOMATED COUNT: 16.7 % (ref 11–14.5)
HCT VFR BLD AUTO: 29.2 % (ref 35–47)
HCT VFR BLD AUTO: 29.2 % (ref 35–47)
HEMOGLOBIN: 8.9 G/DL (ref 12–16)
HGB BLD-MCNC: 8.9 G/DL (ref 12–16)
MCH RBC QN AUTO: 30 PG (ref 27–34)
MCH RBC QN AUTO: 30 PG (ref 27–34)
MCHC RBC AUTO-ENTMCNC: 30.5 G/DL (ref 32–36)
MCHC RBC AUTO-ENTMCNC: 30.5 G/DL (ref 32–36)
MCV RBC AUTO: 98 FL (ref 80–100)
MCV RBC AUTO: 98 FL (ref 80–100)
PLATELET # BLD AUTO: 393 10^9/L (ref 140–440)
PLATELET # BLD AUTO: 393 THOU/UL (ref 140–440)
PMV BLD AUTO: 10.1 FL (ref 8.5–12.5)
PMV BLD: 10.1 FL (ref 8.5–12.5)
RBC # BLD AUTO: 2.97 10^12/L (ref 3.8–5.4)
RBC # BLD AUTO: 2.97 MILL/UL (ref 3.8–5.4)
WBC # BLD AUTO: 4 10^9/L (ref 4–11)
WBC: 4 THOU/UL (ref 4–11)

## 2020-05-18 LAB
FUNGUS SPEC CULT: NORMAL
SPECIMEN SOURCE: NORMAL

## 2020-05-19 ENCOUNTER — VIRTUAL VISIT (OUTPATIENT)
Dept: INFECTIOUS DISEASES | Facility: CLINIC | Age: 85
End: 2020-05-19
Attending: INTERNAL MEDICINE
Payer: COMMERCIAL

## 2020-05-19 DIAGNOSIS — T84.50XD INFECTION OF PROSTHETIC JOINT, SUBSEQUENT ENCOUNTER: Primary | ICD-10-CM

## 2020-05-19 NOTE — LETTER
"5/19/2020       RE: Jigna Allen  [address] [place] [address]     Dear Colleague,    Thank you for referring your patient, Jigna Allen, to the [facility] AND INFECTIOUS DISEASES at [facility]. Please see a copy of my visit note below.    Jigna Allen is a 85 year old female who is being evaluated via a billable telephone visit.      The patient has been notified of following:     \"This telephone visit will be conducted via a call between you and your physician/provider. We have found that certain health care needs can be provided without the need for a physical exam.  This service lets us provide the care you need with a short phone conversation.  If a prescription is necessary we can send it directly to your pharmacy.  If lab work is needed we can place an order for that and you can then stop by our lab to have the test done at a later time.    Telephone visits are billed at different rates depending on your insurance coverage. During this emergency period, for some insurers they may be billed the same as an in-person visit.  Please reach out to your insurance provider with any questions.    If during the course of the call the physician/provider feels a telephone visit is not appropriate, you will not be charged for this service.\"    Patient has given verbal consent for Telephone visit?  Yes    What phone number would you like to be contacted at?       Per her 4/23 ID consult by CHELSEA Agustin and Dr. 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 Wound has grown ESBL E.coli, Klebsiella pneumoniae, multiple anaerobes and joint aspiration with growth of Strep constellatus, and Clostridium ramosum (broth), Peptoniphilus asaccharolyticus, anaerobic gram positive rods (unable to completely identify). Has intermittently been on PO antibiotics during course of work up. After joint infection was identified on 3/30/20 met with orthopedics and plastic surgery- recommended amputation vs attempt at salvaging limb and Jigna wishes to pursue salvage. Was admitted on 4/20 for scheduled I&D of wound with flap closure, basia pus noted during procedure and sent for cultures. Plan for 2 stage right TKA revision to start in 2-3 months. Has felt fatigued and low-grade temps to 99F with no high grade fevers, no additional systemic symptoms. Preliminary cultures with Streptococcus intermedius and Eikenella corrodens. Continue Ertapenem to cover Strep, as well as bacteria from previous cultures, gram negatives and anaerobes. Plan for long term antibiotics until at least next planned surgery.\"    She saw Dr. De Luna of Plastics on 5/13 and the flap was doing well. The plan was to hold off on returning to the OR as long as possible. Hopefully for 3 months. They are following her closely.    The patient reports she is at [facility]. She is doing OT and PT. She is still on the ertapenem. The wound is still open and draining. She has a small ball drain and it is much less than it was. 3-12 mL. Serosanguinous. It drains more post therapy. She reports she is feeling much better. She is walking with a walker. Doesn't like the food. Is eating and drinking fine.    The patient is awake and alert. Her voice is steady and energetic.     CRP is down to 2.8 from >180 on 4/1.    Problem List  1. Right non-healing popliteal fossa wound with polymicrobial infection  - Culture from [facility] (3/27/20): ESBL E.coli, Klebsiella pneumoniae, multiple anaerobes  - Preliminary " cultures (4/20/20): Streptococcus intermedius, Eikenella corrodens  2. Right knee prosthetic joint infection  - Culture from aspiration (3/30/20): Strep constellatus, and Clostridium ramosum (broth), Peptoniphilus asaccharolyticus, anaerobic gram positive rods (unable to completely identify)  3. S/P right TKA (6/12/28)  4. 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 Post op had seroma that was treated with I&D and antibiotics.    Plan: Since surgery is not for a while, I would recommend she complete 6 week of IV ertapenem given her drain is still in and her CRP is not quite normal. Then it looks like her ESBL E coli, Klebsiella, and eikenella were sensitive to bactrim and her other bacteria should be covered with augmentin. I would recommend we place her on bactrim and a suppressive dose of augmentin post her IV course.     This was discussed with the patient.     Plan:   -continue ertapenem until CRP has normalized  -place on suppressive bactrim daily and augmentin twice a day until her surgery      How would you like to obtain your AVS? MyChart    Phone call duration: 14 minutes    RTC 2 weeks    Emily Jefferson MD

## 2020-05-19 NOTE — PROGRESS NOTES
"Jigna Allen is a 85 year old female who is being evaluated via a billable telephone visit.      The patient has been notified of following:     \"This telephone visit will be conducted via a call between you and your physician/provider. We have found that certain health care needs can be provided without the need for a physical exam.  This service lets us provide the care you need with a short phone conversation.  If a prescription is necessary we can send it directly to your pharmacy.  If lab work is needed we can place an order for that and you can then stop by our lab to have the test done at a later time.    Telephone visits are billed at different rates depending on your insurance coverage. During this emergency period, for some insurers they may be billed the same as an in-person visit.  Please reach out to your insurance provider with any questions.    If during the course of the call the physician/provider feels a telephone visit is not appropriate, you will not be charged for this service.\"    Patient has given verbal consent for Telephone visit?  Yes    What phone number would you like to be contacted at?       Per her 4/23 ID consult by CHELSEA Agustin and Dr. 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 Wound has grown ESBL E.coli, Klebsiella pneumoniae, multiple anaerobes and joint aspiration with growth of Strep constellatus, and Clostridium ramosum (broth), Peptoniphilus asaccharolyticus, anaerobic gram positive rods (unable to completely identify). Has intermittently been on PO " "antibiotics during course of work up. After joint infection was identified on 3/30/20 met with orthopedics and plastic surgery- recommended amputation vs attempt at salvaging limb and Jigna wishes to pursue salvage. Was admitted on 4/20 for scheduled I&D of wound with flap closure, basia pus noted during procedure and sent for cultures. Plan for 2 stage right TKA revision to start in 2-3 months. Has felt fatigued and low-grade temps to 99F with no high grade fevers, no additional systemic symptoms. Preliminary cultures with Streptococcus intermedius and Eikenella corrodens. Continue Ertapenem to cover Strep, as well as bacteria from previous cultures, gram negatives and anaerobes. Plan for long term antibiotics until at least next planned surgery.\"    She saw Dr. De Luna of Plastics on 5/13 and the flap was doing well. The plan was to hold off on returning to the OR as long as possible. Hopefully for 3 months. They are following her closely.    The patient reports she is at [facility]. She is doing OT and PT. She is still on the ertapenem. The wound is still open and draining. She has a small ball drain and it is much less than it was. 3-12 mL. Serosanguinous. It drains more post therapy. She reports she is feeling much better. She is walking with a walker. Doesn't like the food. Is eating and drinking fine.    The patient is awake and alert. Her voice is steady and energetic.     CRP is down to 2.8 from >180 on 4/1.    Problem List  1. Right non-healing popliteal fossa wound with polymicrobial infection  - Culture from [facility] (3/27/20): ESBL E.coli, Klebsiella pneumoniae, multiple anaerobes  - Preliminary cultures (4/20/20): Streptococcus intermedius, Eikenella corrodens  2. Right knee prosthetic joint infection  - Culture from [facility] (3/30/20): Strep constellatus, and Clostridium ramosum (broth), Peptoniphilus asaccharolyticus, anaerobic gram positive rods (unable to completely identify)  3. S/P " right TKA (6/12/28)  4. 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 Post op had seroma that was treated with I&D and antibiotics.    Plan: Since surgery is not for a while, I would recommend she complete 6 week of IV ertapenem given her drain is still in and her CRP is not quite normal. Then it looks like her ESBL E coli, Klebsiella, and eikenella were sensitive to bactrim and her other bacteria should be covered with augmentin. I would recommend we place her on bactrim and a suppressive dose of augmentin post her IV course.     This was discussed with the patient.     Plan:   -continue ertapenem until CRP has normalized  -place on suppressive bactrim daily and augmentin twice a day until her surgery      How would you like to obtain your AVS? MyChart    Phone call duration: 14 minutes    RTC 2 weeks    Emily Jefferson MD

## 2020-05-27 ENCOUNTER — TELEPHONE (OUTPATIENT)
Dept: INFECTIOUS DISEASES | Facility: CLINIC | Age: 85
End: 2020-05-27

## 2020-05-27 ENCOUNTER — OFFICE VISIT (OUTPATIENT)
Dept: PLASTIC SURGERY | Facility: CLINIC | Age: 85
End: 2020-05-27
Payer: COMMERCIAL

## 2020-05-27 VITALS
DIASTOLIC BLOOD PRESSURE: 60 MMHG | WEIGHT: 120 LBS | TEMPERATURE: 97.6 F | SYSTOLIC BLOOD PRESSURE: 114 MMHG | HEART RATE: 88 BPM | HEIGHT: 62 IN | BODY MASS INDEX: 22.08 KG/M2 | OXYGEN SATURATION: 97 % | RESPIRATION RATE: 18 BRPM

## 2020-05-27 DIAGNOSIS — Z98.890 S/P FLAP GRAFT: Primary | ICD-10-CM

## 2020-05-27 ASSESSMENT — MIFFLIN-ST. JEOR: SCORE: 952.57

## 2020-05-27 ASSESSMENT — PAIN SCALES - GENERAL: PAINLEVEL: NO PAIN (0)

## 2020-05-27 NOTE — LETTER
5/27/2020      RE: Jigna Allen  [address] [place] [address]     Dear Colleague,    Thank you for referring your patient, Jigna Allen, to the [facility] PLASTIC AND RECONSTRUCTIVE SURGERY at [facility]. Please see a copy of my visit note below.    PRESENTING COMPLAINT:  Postoperative visit, status post right popliteal nonhealing radiated wound reconstruction requiring a left ALT free flap reconstruction on 04/20/2020.      HISTORY OF PRESENTING COMPLAINT:  Ms. Allen is 85 years old.  She is about 4-1/2 weeks out from her surgery, done well, no major issues.  She has been dangling her leg without issues.  She has been ambulating.  No fevers, no major pain.  No swelling.  Feels well.      PHYSICAL EXAMINATION:  Vital signs are stable.  She is afebrile, in no obvious distress.  Flap is healing in well.  Sutures in place.  No evidence for infection.  JUAN R drainage is minimal and serous.  She has a PICC line getting her antibiotics.      ASSESSMENT AND PLAN:  Based on above findings, a diagnosis of right leg reconstruction with free flap was made.  Plan is to continue with the JUAN R drains and IV antibiotics.  Hold off on any return to the OR for as long as possible.  My advice is 3 months as long as she is not actively infected or systemically ill.  We took out the proximal half of the sutures in the leg today.  I have advised aggressive moisturization.  No restrictions in bending the leg, showering etc..  All questions were answered.  I will see her back in 1-2 weeks.    Again, thank you for allowing me to participate in the care of your patient.      Sincerely,    EHSAN De Luna MD

## 2020-05-27 NOTE — TELEPHONE ENCOUNTER
[facility] Call Center    Phone Message    May a detailed message be left on voicemail: yes     Reason for Call: Other: Vivek calling to find out when Jigna's last day is going to be to take her antibiotics.  Please call Vivek back to discuss     Action Taken: Message routed to:  [facility] ([facility] FAY ID    Travel Screening: Not Applicable

## 2020-05-27 NOTE — NURSING NOTE
"Chief Complaint   Patient presents with     Surgical Followup     Pt here for 2 week post op       Vitals:    05/27/20 0943   BP: 114/60   BP Location: Left arm   Patient Position: Sitting   Cuff Size: Adult Regular   Pulse: 88   Resp: 18   Temp: 97.6  F (36.4  C)   TempSrc: Oral   SpO2: 97%   Weight: 54.4 kg (120 lb)   Height: 1.575 m (5' 2\")       Body mass index is 21.95 kg/m .      JOSEFINA CastrejonT                      "

## 2020-05-27 NOTE — PROGRESS NOTES
PRESENTING COMPLAINT:  Postoperative visit, status post right popliteal nonhealing radiated wound reconstruction requiring a left ALT free flap reconstruction on 04/20/2020.      HISTORY OF PRESENTING COMPLAINT:  Ms. Allen is 85 years old.  She is about 4-1/2 weeks out from her surgery, done well, no major issues.  She has been dangling her leg without issues.  She has been ambulating.  No fevers, no major pain.  No swelling.  Feels well.      PHYSICAL EXAMINATION:  Vital signs are stable.  She is afebrile, in no obvious distress.  Flap is healing in well.  Sutures in place.  No evidence for infection.  JUAN R drainage is minimal and serous.  She has a PICC line getting her antibiotics.      ASSESSMENT AND PLAN:  Based on above findings, a diagnosis of right leg reconstruction with free flap was made.  Plan is to continue with the JUAN R drains and IV antibiotics.  Hold off on any return to the OR for as long as possible.  My advice is 3 months as long as she is not actively infected or systemically ill.  We took out the proximal half of the sutures in the leg today.  I have advised aggressive moisturization.  No restrictions in bending the leg, showering etc..  All questions were answered.  I will see her back in 1-2 weeks.

## 2020-05-28 ENCOUNTER — RECORDS - HEALTHEAST (OUTPATIENT)
Dept: LAB | Facility: CLINIC | Age: 85
End: 2020-05-28

## 2020-05-28 LAB
ANION GAP SERPL CALCULATED.3IONS-SCNC: 7 MMOL/L (ref 5–18)
BUN SERPL-MCNC: 28 MG/DL (ref 8–28)
C REACTIVE PROTEIN LHE: 1.6 MG/DL (ref 0–0.8)
CALCIUM SERPL-MCNC: 8.9 MG/DL (ref 8.5–10.5)
CHLORIDE BLD-SCNC: 101 MMOL/L (ref 98–107)
CO2 SERPL-SCNC: 30 MMOL/L (ref 22–31)
CREAT SERPL-MCNC: 0.64 MG/DL (ref 0.6–1.1)
ERYTHROCYTE [DISTWIDTH] IN BLOOD BY AUTOMATED COUNT: 16.1 % (ref 11–14.5)
GFR SERPL CREATININE-BSD FRML MDRD: >60 ML/MIN/1.73M2
GLUCOSE BLD-MCNC: 86 MG/DL (ref 70–125)
HCT VFR BLD AUTO: 32.2 % (ref 35–47)
HGB BLD-MCNC: 9.5 G/DL (ref 12–16)
MCH RBC QN AUTO: 29.7 PG (ref 27–34)
MCHC RBC AUTO-ENTMCNC: 29.5 G/DL (ref 32–36)
MCV RBC AUTO: 101 FL (ref 80–100)
PLATELET # BLD AUTO: 401 THOU/UL (ref 140–440)
PMV BLD AUTO: 10.5 FL (ref 8.5–12.5)
POTASSIUM BLD-SCNC: 4.1 MMOL/L (ref 3.5–5)
RBC # BLD AUTO: 3.2 MILL/UL (ref 3.8–5.4)
SODIUM SERPL-SCNC: 138 MMOL/L (ref 136–145)
WBC: 5 THOU/UL (ref 4–11)

## 2020-06-01 ENCOUNTER — PREP FOR PROCEDURE (OUTPATIENT)
Dept: ORTHOPEDICS | Facility: CLINIC | Age: 85
End: 2020-06-01

## 2020-06-01 ENCOUNTER — NURSE TRIAGE (OUTPATIENT)
Dept: NURSING | Facility: CLINIC | Age: 85
End: 2020-06-01

## 2020-06-01 ENCOUNTER — PRE VISIT (OUTPATIENT)
Dept: SURGERY | Facility: CLINIC | Age: 85
End: 2020-06-01

## 2020-06-01 ENCOUNTER — ANCILLARY PROCEDURE (OUTPATIENT)
Dept: GENERAL RADIOLOGY | Facility: CLINIC | Age: 85
End: 2020-06-01
Attending: ORTHOPAEDIC SURGERY
Payer: COMMERCIAL

## 2020-06-01 ENCOUNTER — ANESTHESIA EVENT (OUTPATIENT)
Dept: SURGERY | Facility: CLINIC | Age: 85
DRG: 467 | End: 2020-06-01
Payer: COMMERCIAL

## 2020-06-01 ENCOUNTER — MEDICAL CORRESPONDENCE (OUTPATIENT)
Dept: HEALTH INFORMATION MANAGEMENT | Facility: CLINIC | Age: 85
End: 2020-06-01

## 2020-06-01 ENCOUNTER — OFFICE VISIT (OUTPATIENT)
Dept: SURGERY | Facility: CLINIC | Age: 85
End: 2020-06-01
Payer: COMMERCIAL

## 2020-06-01 ENCOUNTER — AMBULATORY - HEALTHEAST (OUTPATIENT)
Dept: INTERNAL MEDICINE | Facility: CLINIC | Age: 85
End: 2020-06-01

## 2020-06-01 ENCOUNTER — OFFICE VISIT (OUTPATIENT)
Dept: ORTHOPEDICS | Facility: CLINIC | Age: 85
End: 2020-06-01
Payer: COMMERCIAL

## 2020-06-01 VITALS
BODY MASS INDEX: 22.41 KG/M2 | OXYGEN SATURATION: 99 % | TEMPERATURE: 98 F | RESPIRATION RATE: 17 BRPM | DIASTOLIC BLOOD PRESSURE: 74 MMHG | WEIGHT: 121.8 LBS | HEART RATE: 84 BPM | SYSTOLIC BLOOD PRESSURE: 121 MMHG | HEIGHT: 62 IN

## 2020-06-01 DIAGNOSIS — T84.50XD INFECTION OF PROSTHETIC JOINT, SUBSEQUENT ENCOUNTER: Primary | ICD-10-CM

## 2020-06-01 DIAGNOSIS — T84.50XD INFECTION OF PROSTHETIC JOINT, SUBSEQUENT ENCOUNTER: ICD-10-CM

## 2020-06-01 DIAGNOSIS — Z01.818 PRE-OP EXAMINATION: Primary | ICD-10-CM

## 2020-06-01 DIAGNOSIS — Z96.651 S/P TOTAL KNEE ARTHROPLASTY, RIGHT: ICD-10-CM

## 2020-06-01 DIAGNOSIS — Z11.59 ENCOUNTER FOR SCREENING FOR OTHER VIRAL DISEASES: ICD-10-CM

## 2020-06-01 LAB
SARS-COV-2 PCR COMMENT: NORMAL
SARS-COV-2 RNA SPEC QL NAA+PROBE: NEGATIVE
SARS-COV-2 RNA SPEC QL NAA+PROBE: NORMAL
SPECIMEN SOURCE: NORMAL
SPECIMEN SOURCE: NORMAL

## 2020-06-01 RX ORDER — ARGININE/GLUTAMINE/CALCIUM BMB 7G-7G-1.5G
POWDER IN PACKET (EA) ORAL 2 TIMES DAILY
Status: ON HOLD | COMMUNITY
End: 2020-06-04

## 2020-06-01 ASSESSMENT — PAIN SCALES - GENERAL: PAINLEVEL: NO PAIN (0)

## 2020-06-01 ASSESSMENT — MIFFLIN-ST. JEOR: SCORE: 960.73

## 2020-06-01 ASSESSMENT — LIFESTYLE VARIABLES: TOBACCO_USE: 0

## 2020-06-01 ASSESSMENT — ENCOUNTER SYMPTOMS: SEIZURES: 0

## 2020-06-01 NOTE — H&P
Pre-Operative H & P     CC:  Preoperative exam to assess for increased cardiopulmonary risk while undergoing surgery and anesthesia.    Date of Encounter: 6/1/2020  Primary Care Physician:  Elisabeth Ko     Reason for visit: pre operative examination, Infection of prosthetic joint, subsequent encounter    HPI  Jigna Allen is a 85 year old female who presents for pre-operative H & P in preparation for Explantation of Right total knee. manufacture and implantation of Vanco/tobra ANTIBIOTIC SPACER, KNEE with Dr. Gomez on 6/2/20 at [facility].     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 She then had a right total knee replacement in 6/2018 and she did well afterwards. She then noticed drainage from a wound in her right leg. She has exposed arthroplasty and has been on and off antibiotics. She had excision of the wound on 3/27/20 and this showed now return of sarcoma. Given her ongoing wound issues she was seen by Dr. Gomez and then referred to Dr. De Luna for further treatment recommendations.  She underwent right leg reconstruction and wound debridement (Right Leg) With left anterolateral thigh free flap with SPY on 4/20/20. She has followed up with ID and is completing a 6 week course of IV ertapenem. She met with Dr. De Luna most recently on 5/27/20. He discussed his treatment recommendations. She then met with Dr. Gomez earlier today and is now scheduled for the procedure as above.     History is obtained from the patient and chart review    Past Medical History  Past Medical History:   Diagnosis Date     Anemia      Anxiety      Aortic valve sclerosis      Arthritis 1990?     Complication of anesthesia     slow to wakeup     Hypothyroid      Mild tricuspid regurgitation      Sarcoma (H) 2009       Past Surgical History  Past Surgical History:   Procedure Laterality Date      ARTHROPLASTY KNEE Right 6/12/2018    Procedure: ARTHROPLASTY KNEE;  Right Total Knee Replacement ;  Surgeon: He Gomez MD;  Location: [facility] OR     CARPAL TUNNEL RELEASE RT/LT       excise sarcoma Right     right leg     exicision of wound  03/27/2020     GRAFT FREE VASCULARIZED TRANSVERSE RECTUS ABDOMINIS MYOCUTANEOUS Left 4/20/2020    Procedure: With left anterolateral thigh free flap with SPY;  Surgeon: EHSAN De Luna MD;  Location: [facility] OR     KNEE SURGERY       PICC DOUBLE LUMEN PLACEMENT Right 04/23/2020    5FR DL PICC inserted at brachial medial, length- 37cm (1cm out). Tip at mid SVC     RECONSTRUCT KNEE Right 4/20/2020    Procedure: Right leg reconstruction and wound debridemenr;  Surgeon: EHSAN De Luna MD;  Location: [facility] OR     TUBAL LIGATION         Hx of Blood transfusions/reactions: yes, 2010, no reactions     Hx of abnormal bleeding or anti-platelet use: none    Menstrual history: No LMP recorded. Patient is postmenopausal.:     Steroid use in the last year: none    Personal or FH with difficulty with Anesthesia:  Slow to wake     Prior to Admission Medications  Current Outpatient Medications   Medication Sig Dispense Refill     ertapenem (INVANZ) 1 GM vial Inject 1 g into the vein every 24 hours (Patient taking differently: Inject 1 g into the vein every evening ) 830 mL 0     levothyroxine (LEVOTHROID) 75 MCG tablet Take 75 mcg by mouth every morning        melatonin 3 MG tablet Take 3 mg by mouth nightly as needed (PT last dose 5.31.2020)        Nutritional Supplements (ENSURE ACTIVE PO) Take by mouth 3 times daily       Nutritional Supplements (NANETTE) PACK Take by mouth 2 times daily       senna-docusate (SENOKOT-S/PERICOLACE) 8.6-50 MG tablet Take 1 tablet by mouth 2 times daily as needed for constipation       acetaminophen (TYLENOL) 500 MG tablet Take 500-1,000 mg by mouth every 8 hours as needed for mild pain (PT last dose 5.25.2020)          Allergies  Allergies   Allergen  Reactions     Colchicine      Other reaction(s): Gastrointestinal  diarrhea     Niacin      skin rash     Ferrous Sulfate Rash       Social History  Social History     Socioeconomic History     Marital status:      Spouse name: Not on file     Number of children: Not on file     Years of education: Not on file     Highest education level: Not on file   Occupational History     Not on file   Social Needs     Financial resource strain: Not on file     Food insecurity     Worry: Not on file     Inability: Not on file     Transportation needs     Medical: Not on file     Non-medical: Not on file   Tobacco Use     Smoking status: Never Smoker     Smokeless tobacco: Never Used   Substance and Sexual Activity     Alcohol use: Not Currently     Comment: rarely - months      Drug use: Never     Sexual activity: Yes     Partners: Male     Birth control/protection: Post-menopausal   Lifestyle     Physical activity     Days per week: Not on file     Minutes per session: Not on file     Stress: Not on file   Relationships     Social connections     Talks on phone: Not on file     Gets together: Not on file     Attends Congregational service: Not on file     Active member of club or organization: Not on file     Attends meetings of clubs or organizations: Not on file     Relationship status: Not on file     Intimate partner violence     Fear of current or ex partner: Not on file     Emotionally abused: Not on file     Physically abused: Not on file     Forced sexual activity: Not on file   Other Topics Concern     Parent/sibling w/ CABG, MI or angioplasty before 65F 55M? Not Asked   Social History Narrative     Not on file       Family History  Family History   Problem Relation Age of Onset     Cancer Father      Prostate Cancer Father      Other Cancer Father      Cancer Brother      Other - See Comments Daughter         Slow to wake      Asthma Daughter        Review of Systems    ROS/MED HX    ENT/Pulmonary:  - neg pulmonary  "ROS    (-) tobacco use   Neurologic:  - neg neurologic ROS    (-) seizures, CVA, TIA and migraines   Cardiovascular:     (+) ----. : . . . :. valvular problems/murmurs Mild aortic valve sclerosis, mild tricuspid regurgitation :. Previous cardiac testing Echodate:6/12/18results:date: results:ECG reviewed date:3/24/20 results:Sinus rhythm   date: results:          METS/Exercise Tolerance:  1 - Eating, dressing   Hematologic: Comments: Denies blood thinners or bleeding disorders  Platelets WNL    (+) Anemia, History of Transfusion (3/2/10) no previous transfusion reaction -      Musculoskeletal:   (+) arthritis,  other musculoskeletal- right leg sarcoma, excision in 2010, s/p right total knee arthroplasty       GI/Hepatic:  - neg GI/hepatic ROS      (-) GERD   Renal/Genitourinary:  - ROS Renal section negative       Endo:     (+) thyroid problem hypothyroidism, .      Psychiatric:     (+) psychiatric history anxiety      Infectious Disease: Comment: Popliteal cultures growing: Streptococcus intermedius and Eikenella corrodens    Patient has PICC and getting IV ertapenem        Malignancy:   (+) Malignancy (sarcoma) History of Other  Other CA right leg Remission status post Radiation and Surgery         Other:    (+) No chance of pregnancy C-spine cleared: N/A, no H/O Chronic Pain,no other significant disability          The complete review of systems is negative other than noted in the HPI or here.   Temp: 98  F (36.7  C) Temp src: Oral BP: 121/74 Pulse: 84   Resp: 17 SpO2: 99 %         121 lbs 12.8 oz  5' 2\"   Body mass index is 22.28 kg/m .       Physical Exam  Constitutional: Awake, alert, cooperative, no apparent distress, and appears stated age.  Eyes: Pupils equal, round and reactive to light, extra ocular muscles intact, sclera clear, conjunctiva normal.  HENT: Normocephalic, oral pharynx with moist mucus membranes, good dentition. No goiter appreciated.   Respiratory: Clear to auscultation bilaterally, no " crackles or wheezing.  Cardiovascular: Regular rate and rhythm, normal S1 and S2, and systolic murmur noted.  Carotids +2, no bruits. No edema. Palpable pulses to radial  DP and PT arteries.   GI: Normal bowel sounds, soft, non-distended, non-tender, no masses palpated, no hepatosplenomegaly.    Lymph/Hematologic: No cervical lymphadenopathy and no supraclavicular lymphadenopathy.  Genitourinary:  defer  Skin: Warm and dry.  No rashes at anticipated surgical site.   Musculoskeletal: Slightly limited ROM of neck. There is no redness, warmth, or swelling of the joints. Gross motor strength is normal.    Neurologic: Awake, alert, oriented to name, place and time. Cranial nerves II-XII are grossly intact. Patient in wheelchair  Neuropsychiatric: Calm, cooperative. Normal affect.     Labs: (personally reviewed)  Results for FEDERICA SMITH (MRN [id number]) as of 6/1/2020 13:34   Ref. Range 5/14/2020 05:30   BNP Latest Ref Range: 0 - 167 pg/mL 22   CRP Inflammation Latest Ref Range: 0.0 - 0.8 mg/dL 2.8 (H)   WBC Latest Ref Range: 4.0 - 11.0 10^9/L 4.0   Hemoglobin Latest Ref Range: 12.0 - 16.0 g/dL 8.9 (L)   Hematocrit Latest Ref Range: 35.0 - 47.0 % 29.2 (L)   Platelet Count Latest Ref Range: 140 - 440 10^9/L 393   MPV Latest Ref Range: 8.5 - 12.5 fL 10.1   RBC Count Latest Ref Range: 3.80 - 5.40 10^12/L 2.97 (L)   MCV Latest Ref Range: 80 - 100 fl 98   MCH Latest Ref Range: 27.0 - 34.0 pg 30.0   MCHC Latest Ref Range: 32.0 - 36.0 g/dL 30.5 (L)   RDW Latest Ref Range: 11.0 - 14.5 % 16.7 (H)   Results for FEDERICA SMITH (MRN [id number]) as of 6/1/2020 13:34   Ref. Range 4/24/2020 07:29   Sodium Latest Ref Range: 133 - 144 mmol/L 137   Potassium Latest Ref Range: 3.4 - 5.3 mmol/L 4.1   Chloride Latest Ref Range: 94 - 109 mmol/L 104   Carbon Dioxide Latest Ref Range: 20 - 32 mmol/L 28   Urea Nitrogen Latest Ref Range: 7 - 30 mg/dL 12   Creatinine Latest Ref Range: 0.52 - 1.04 mg/dL 0.58   GFR Estimate Latest Ref Range: >60  mL/min/1.73_m2 85   GFR Estimate If Black Latest Ref Range: >60 mL/min/1.73_m2 >90   Calcium Latest Ref Range: 8.5 - 10.1 mg/dL 7.9 (L)   Anion Gap Latest Ref Range: 3 - 14 mmol/L 5     EKG: 3/30/20  Sinus rhythm     Echo 6/12/18          Exam: 4 views of the right knee dated 6/1/2020.     COMPARISON: 4/1/2020.     CLINICAL HISTORY: Infection.     FINDINGS: AP and lateral views of the right knee were obtained.  Postsurgical changes of a revision right total knee arthroplasty.  Unchanged screw along the medial aspect of the proximal tibia.  Multiple surgical clips along the posterior aspect of the distal femur  and knee. Small amount of fluid in the right knee joint.                                                                      IMPRESSION: Postsurgical changes of a revision right total knee  arthroplasty, without complication.    CT CHEST W/O CONTRAST 5/5/2020 10:53 AM  Impression:   1. There is a new 4 mm nodular opacity in the right lower lobe.  Recommend short-term follow-up in 3 months with low-dose chest CT.  2. Scattered other tiny solid pulmonary nodules, many of which are  stable since at least 2012.  3. New linear atelectasis in the right lower lobe.  4. Stable small cluster of tree-in-bud type nodules in the right upper  lobe, likely fungal.  5. Mild stasis of fluid in the midesophagus which can be seen with  esophageal dysfunction.  The patient's records and results personally reviewed by this provider.     Outside records reviewed from: care everywhere     ASSESSMENT and PLAN  Jigna is an 85 year old woman who scheduled for Explantation of Right total knee. manufacture and implantation of Vanco/tobra ANTIBIOTIC SPACER, KNEE on 6/2/20 by Dr. Gomez in treatment of Infection of prosthetic joint, subsequent encounter.  PAC referral for risk assessment and optimization for anesthesia with comorbid conditions of aortic sclerosis, tricuspid regurgitation, anemia, hypothyroidism, malnutrition, insomnia,  arthritis, sarcoma:    Pre-operative considerations:  1.  Cardiac:  Functional status- METS 1, the patient is limited secondary to healing from prior surgery. She is able to ambulate with walker to the bathroom. She denies any cardiac symptoms. Prior to 3/27/20 wound excision she was walking 1-2 blocks with her . Intermediate risk surgery with 0.4% (RCRI #) risk of major adverse cardiac event.   ~ Patient has aortic sclerosis without stenosis and mild tricuspid regurgitation seen on 6/2018 echo. She had a recent EKG on 3/24/20 with sinus rhythm. She denies any cardiac symptoms. BNP was checked at prior appointment in 4/2020 and was 21 and most recently on 5/14/20 and was 22. No further cardiac testing indicated.      2.  Pulm:  Airway feasible.  IRA risk: Low (age)     3. Heme: Anemia - the patient's baseline appears to be 10-11. Last hgb was 8.9 on 5/14/20. Will complete type and screen today     4. Endo: hypothyroidism - continue levothyroxine.      5. GI:  Risk of PONV score = 3.  If > 2, anti-emetic intervention recommended.  ~ Malnutrition - the patient is drinking Ensure three times a day along with Ashwin.      6. Psych: Insomnia - the patient is taking melatonin.      7. Musculoskeletal: History of sarcoma s/p radiation and wide local excision. The patient is s/p right TKA and now has non healing wound s/p right leg reconstruction and wound debridement (Right Leg) With left anterolateral thigh free flap with SPY in 4/2020. Procedure as above.   ~ Arthritis - s/p carpal tunnel release. Continue PRN tylenol    8. ID: The patient is being followed by infectious disease and last seen on 5/19/20. She has a PICC and getting IV ertapenem.       VTE risk: 1.8%    The patient is optimized for their procedure. AVS with information on surgery time/arrival time, meds and NPO status given by nursing staff.      Jeaneth Martinez PA-C  Preoperative Assessment Center  [facility]  Clinic and  Surgery Center  Phone: [phone]  Fax: [phone]

## 2020-06-01 NOTE — PROGRESS NOTES
Attending note:    DX:   1. Right calf high grade MFH.   2. 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 R knee DJD     Treatments:  1. neoadjuvant chemotherapy, surgical resection Feb 15, 2010. Complications post-op of skin loss with draining seroma, treated with I & D and systemic antibiotics. She had 4 cycles of doxil/ifos finishing about Feb 2010. There was <10% viable cells at surgery. She finished post op XRT about June 2010  2. 6/12/18, R TKA ([facility]) [facility]  3. 1/27/2020, skin popliteal fossa punch bx :  No sarcoma  4. 3/27/2020 Debridement right thigh wound and placement of VAC, Dr Hernandez, [facility]  5. 3/30/2020, R TKA aspiration, WBC 17k,, PMNs 94%, MMB: Peptoniphilus asaccharolyticus, Streptococcus constellatus, Clostridium ramosum    6. 4/20/2020, Coverage of right lower extremity popliteal wound with free left anterolateral thigh fasciocutaneous flap with 3 perforators, size 25 x 8 cm. Dr Agosto, [facility]. MMB: Gemella morbillorum, Parvimonas micra, Peptoniphilus asaccharolyticus, Prevotella species. ABx Ertapenem.     History of present illness:   Jigna returns for discussion regarding limb salvage options right TKA.  Her history as noted above.   He has been independently ambulatory and living with  at home.  There is been no history of fever or other illness.  She was seen recently by Dr. De Luna on 5/27/2020 during which the discussed plan included to continue with the JUAN R drains and IV antibiotics (ertapenem).  Hold off on any return to the OR for as long as possible.  Dr. De Luna's advice is 3 months as long as she is not actively infected or systemically ill.     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  The radiated wound has opened with ulceration deep into and exposing the prosthetic implant.  There is no evidence of recurrent tumor based  upon the biopsy with Dr. Hernandez performed at [facility]. On 4/20/2020 coverage of right lower extremity popliteal wound with free left anterolateral thigh fasciocutaneous flap. MMB: Multiple organisms.     Plan:  Today to 2 JUAN R drains were removed.  Given the risk of further postponing the explantation of her right total knee arthroplasty and the well-healing fasciocutaneous flap it is deemed reasonable to proceed with the explantation of the right total knee arthroplasty.  I had an extensive discussion with the patient and daughters.  Reviewed the option of two stage revision.   I described the multiple operative procedures and the 4 to 8 months time range that this would take.  A prolonged course of antimicrobial therapy intravenously would be necessary as well to clear her infection.  It is difficult to predict the likelihood of successful limb salvage however if 1 extrapolates from the known evidence in patients without sarcomas and radiation, I would estimate the likelihood of success to be in the 50 to 60% range at best.  Furthermore, she would incur the risk of multiple operative procedures and coming into the hospital with numerous doctor visits and health care visits at a time of the ongoing coronavirus pandemic which, obviously puts her at risk for lolly the coronavirus.  Patient understands and agrees to the treatment plan as set forth.  We will schedule her for her explantation as soon as possible.      Rafael Montelongo MD  [employer] Arthroplasty Fellow      MD Pelon Cid Family Professor  Oncology and Adult Reconstructive Surgery  Dept Orthopaedic Surgery, [facility]  [phone] office, [phone] pager  [email or web address]    Total Time = 45 min, 50% of which was spent in counseling and coordination of care as documented above.

## 2020-06-01 NOTE — TELEPHONE ENCOUNTER
Dr He Gomez called/ this patient needs a covid 19 test before tomorrow/ she is scheduled for surgery /called scheduling and they can get her in 3:10-3:50 spot today  at [place]/  will call Merline at [phone]/ he was also give the daughter's number/Lawanda [phone].Arnulfo Mortensen RN -[phone]

## 2020-06-01 NOTE — NURSING NOTE
Chief Complaint   Patient presents with     Surgical Followup     Wound Check/Same day XR S/p Right leg reconstruction and wound debridement DOS: 0420/2020, Right Total Knee Replacement - Right DOS: 06/12/2018       85 year old  1935      [id number] - [place], [place] - [address] AT [facility] [address]    Allergies   Allergen Reactions     Colchicine      Other reaction(s): Gastrointestinal  diarrhea     Niacin      skin rash     Ferrous Sulfate Rash       Current Outpatient Medications   Medication     acetaminophen (TYLENOL) 500 MG tablet     ertapenem (INVANZ) 1 GM vial     levothyroxine (LEVOTHROID) 75 MCG tablet     melatonin 3 MG tablet     Nutritional Supplements (ENSURE ACTIVE PO)     Nutritional Supplements (NANETTE) PACK     bisacodyl (DULCOLAX) 10 MG suppository     hydrOXYzine (ATARAX) 25 MG tablet     oxyCODONE (ROXICODONE) 5 MG tablet     senna-docusate (SENOKOT-S/PERICOLACE) 8.6-50 MG tablet     No current facility-administered medications for this visit.

## 2020-06-01 NOTE — ANESTHESIA PREPROCEDURE EVALUATION
Anesthesia Pre-Procedure Evaluation    Patient: Jigna Allen   MRN:     [id number] Gender:   female   Age:    85 year old :      1935        Preoperative Diagnosis: Infection of prosthetic joint, subsequent encounter [T84.50XD]   Procedure(s):  Explantation of Right total knee  manufacture and implantation of Vanco/tobra ANTIBIOTIC SPACER, KNEE     LABS:  CBC:   Lab Results   Component Value Date    WBC 4.0 2020    WBC 5.4 2020    HGB 8.9 (L) 2020    HGB 8.4 (L) 2020    HCT 29.2 (L) 2020    HCT 27.2 (L) 2020     2020     (H) 2020     BMP:   Lab Results   Component Value Date     2020     2020    POTASSIUM 4.1 2020    POTASSIUM 3.5 2020    CHLORIDE 104 2020    CHLORIDE 104 2020    CO2 28 2020    CO2 27 2020    BUN 12 2020    BUN 12 2020    CR 0.58 2020    CR 0.65 2020     (H) 2020     (H) 2020     COAGS:   Lab Results   Component Value Date    INR 1.27 (H) 06/15/2018     POC:   Lab Results   Component Value Date    BGM 79 2020     OTHER:   Lab Results   Component Value Date    PH 7.51 (H) 2020    LACT 1.0 2020    A1C 5.6 2018    RAVEN 7.9 (L) 2020    PHOS 3.3 2016    MAG 2.1 2020    ALBUMIN 2.5 (L) 2020    PROTTOTAL 8.0 2020    ALT 26 2020    AST 36 2020    ALKPHOS 85 2020    BILITOTAL 0.6 2020    TSH 2.47 2017    CRP 2.8 (H) 2020     (H) 2020        Preop Vitals    BP Readings from Last 3 Encounters:   20 114/60   20 110/57   20 110/57    Pulse Readings from Last 3 Encounters:   20 88   20 98   20 99      Resp Readings from Last 3 Encounters:   20 18   20 18   20 16    SpO2 Readings from Last 3 Encounters:   20 97%   20 98%   20 97%      Temp Readings from Last 1  "Encounters:   05/27/20 97.6  F (36.4  C) (Oral)    Ht Readings from Last 1 Encounters:   05/27/20 1.575 m (5' 2\")      Wt Readings from Last 1 Encounters:   05/27/20 54.4 kg (120 lb)    Estimated body mass index is 21.95 kg/m  as calculated from the following:    Height as of 5/27/20: 1.575 m (5' 2\").    Weight as of 5/27/20: 54.4 kg (120 lb).     LDA:  PICC Double Lumen 04/23/20 Right Brachial vein medial (Active)   Number of days: 39       Closed/Suction Drain 1 Left Thigh Bulb 15 Arabic (Active)   Number of days: 42       Closed/Suction Drain 2 Right;Superior Thigh Bulb 15 Arabic (Active)   Number of days: 42       Closed/Suction Drain 3 Right;Inferior Thigh Bulb 15 Arabic (Active)   Number of days: 42        Past Medical History:   Diagnosis Date     Anemia      Anxiety      Arthritis 1990?     Complication of anesthesia     slow to wakeup     Hypothyroid      Sarcoma (H) 2009      Past Surgical History:   Procedure Laterality Date     ARTHROPLASTY KNEE Right 6/12/2018    Procedure: ARTHROPLASTY KNEE;  Right Total Knee Replacement ;  Surgeon: He Gomez MD;  Location: [facility]     CARPAL TUNNEL RELEASE RT/LT       excise sarcoma Right     right leg     exicision of wound  03/27/2020     GRAFT FREE VASCULARIZED TRANSVERSE RECTUS ABDOMINIS MYOCUTANEOUS Left 4/20/2020    Procedure: With left anterolateral thigh free flap with SPY;  Surgeon: EHSAN De Luna MD;  Location: [facility] OR     KNEE SURGERY       PICC DOUBLE LUMEN PLACEMENT Right 04/23/2020    5FR DL PICC inserted at brachial medial, length- 37cm (1cm out). Tip at mid SVC     RECONSTRUCT KNEE Right 4/20/2020    Procedure: Right leg reconstruction and wound debridemenr;  Surgeon: EHSAN De Luna MD;  Location: [facility] OR     TUBAL LIGATION        Allergies   Allergen Reactions     Colchicine      Other reaction(s): Gastrointestinal  diarrhea     Niacin      skin rash     Ferrous Sulfate Rash        Anesthesia Evaluation     . Pt has had prior " anesthetic. Type: General and Regional    History of anesthetic complications    Slow to wake up      ROS/MED HX    ENT/Pulmonary:  - neg pulmonary ROS    (-) tobacco use   Neurologic:  - neg neurologic ROS    (-) seizures, CVA, TIA and migraines   Cardiovascular:     (+) ----. : . . . :. valvular problems/murmurs Mild aortic valve sclerosis, mild tricuspid regurgitation :. Previous cardiac testing Echodate:6/12/18results:date: results:ECG reviewed date:3/24/20 results:Sinus rhythm   date: results:          METS/Exercise Tolerance:  1 - Eating, dressing   Hematologic: Comments: Denies blood thinners or bleeding disorders  Platelets WNL    (+) Anemia, History of Transfusion (3/2/10) no previous transfusion reaction -      Musculoskeletal:   (+) arthritis,  other musculoskeletal- right leg sarcoma, excision in 2010, s/p right total knee arthroplasty       GI/Hepatic:  - neg GI/hepatic ROS      (-) GERD   Renal/Genitourinary:  - ROS Renal section negative       Endo:     (+) thyroid problem hypothyroidism, .      Psychiatric:     (+) psychiatric history anxiety      Infectious Disease: Comment: Popliteal cultures growing: Streptococcus intermedius and Eikenella corrodens    Patient has PICC and getting IV ertapenem        Malignancy:   (+) Malignancy (sarcoma) History of Other  Other CA right leg Remission status post Radiation and Surgery         Other:    (+) No chance of pregnancy C-spine cleared: N/A, no H/O Chronic Pain,no other significant disability                        PHYSICAL EXAM:   Mental Status/Neuro: A/A/O; Age Appropriate   Airway: Facies: Feasible  Mallampati: I  Mouth/Opening: Full  TM distance: > 6 cm  Neck ROM: Limited   Respiratory: Auscultation: CTAB     Resp. Rate: Normal     Resp. Effort: Normal      CV: Rhythm: Regular  Heart: Murmur (Systolic)  Edema: None  Pulses: Normal  Neck: Normal   Comments:      Dental: Normal Dentition                Assessment:   ASA SCORE: 3    H&P: History and  physical reviewed and following examination; no interval change.         Plan:   Anes. Type:  General   Pre-Medication: None   Induction:  IV (Standard)   Airway: ETT; Oral   Access/Monitoring: PIV   Maintenance: Balanced     Postop Plan:   Postop Pain: Opioids  Postop Sedation/Airway: Not planned     PONV Management:   Adult Risk Factors: Female, Postop Opioids   Prevention: Ondansetron, Dexamethasone                PAC Discussion and Assessment    ASA Classification: 3  Case is suitable [place]: [facility] [place]  Anesthetic techniques and relevant risks discussed: GA  Invasive monitoring and risk discussed:   Types:   Possibility and Risk of blood transfusion discussed:   NPO instructions given:   Additional anesthetic preparation and risks discussed:   Needs early admission to pre-op area:   Other:     PAC Resident/NP Anesthesia Assessment:  Jigna is an 85 year old woman who scheduled for Explantation of Right total knee. manufacture and implantation of Vanco/tobra ANTIBIOTIC SPACER, KNEE on 6/2/20 by Dr. Gomez in treatment of Infection of prosthetic joint, subsequent encounter.  PAC referral for risk assessment and optimization for anesthesia with comorbid conditions of aortic sclerosis, tricuspid regurgitation, anemia, hypothyroidism, malnutrition, insomnia, arthritis, sarcoma:    Pre-operative considerations:  1.  Cardiac:  Functional status- METS 1, the patient is limited secondary to healing from prior surgery. She is able to ambulate with walker to the bathroom. She denies any cardiac symptoms. Prior to 3/27/20 wound excision she was walking 1-2 blocks with her . Intermediate risk surgery with 0.4% (RCRI #) risk of major adverse cardiac event.   ~ Patient has aortic sclerosis without stenosis and mild tricuspid regurgitation seen on 6/2018 echo. She had a recent EKG on 3/24/20 with sinus rhythm. She denies any cardiac symptoms. BNP was checked at prior appointment in 4/2020 and was 21 and most recently on  5/14/20 and was 22. No further cardiac testing indicated.      2.  Pulm:  Airway feasible.  IRA risk: Low (age)     3. Heme: Anemia - the patient's baseline appears to be 10-11. Last hgb was 8.9 on 5/14/20. Will complete type and screen today     4. Endo: hypothyroidism - continue levothyroxine.      5. GI:  Risk of PONV score = 3.  If > 2, anti-emetic intervention recommended.  ~ Malnutrition - the patient is drinking Ensure three times a day along with Ashwin.      6. Psych: Insomnia - the patient is taking melatonin.      7. Musculoskeletal: History of sarcoma s/p radiation and wide local excision. The patient is s/p right TKA and now has non healing wound s/p right leg reconstruction and wound debridement (Right Leg) With left anterolateral thigh free flap with SPY in 4/2020. Procedure as above.   ~ Arthritis - s/p carpal tunnel release. Continue PRN tylenol    8. ID: The patient is being followed by infectious disease and last seen on 5/19/20. She has a PICC and getting IV ertapenem.       VTE risk: 1.8%    Patient is optimized and is acceptable candidate for the proposed procedure.  No further diagnostic evaluation is needed.     For further details of assessment, testing, and physical exam please see H and P completed on same date.    Jeaneth Martinez PA-C          Mid-Level Provider/Resident: Jeaneth Martinez PA-C  Date: 6/1/20  Time:     Attending Anesthesiologist Anesthesia Assessment:        Anesthesiologist:   Date:   Time:   Pass/Fail:   Disposition:     PAC Pharmacist Assessment:        Pharmacist:   Date:   Time:    Jeaneth Martinez PA-C

## 2020-06-01 NOTE — LETTER
6/1/2020         RE: Jigna Allen  [address] [place] [address]        Dear Colleague,    Thank you for referring your patient, Jigna Allen, to the [facility]. Please see a copy of my visit note below.    Attending note:    DX:   1. Right calf high grade MFH.   2. 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 R knee DJD     Treatments:  1. neoadjuvant chemotherapy, surgical resection Feb 15, 2010. Complications post-op of skin loss with draining seroma, treated with I & D and systemic antibiotics. She had 4 cycles of doxil/ifos finishing about Feb 2010. There was <10% viable cells at surgery. She finished post op XRT about June 2010  2. 6/12/18, R TKA ([facility]) [facility]  3. 1/27/2020, skin popliteal fossa punch bx :  No sarcoma  4. 3/27/2020 Debridement right thigh wound and placement of VAC, Dr Hernandez, [facility]  5. 3/30/2020, R TKA aspiration, WBC 17k,, PMNs 94%, MMB: Peptoniphilus asaccharolyticus, Streptococcus constellatus, Clostridium ramosum    6. 4/20/2020, Coverage of right lower extremity popliteal wound with free left anterolateral thigh fasciocutaneous flap with 3 perforators, size 25 x 8 cm. Dr Agosto, [facility]. MMB: Gemella morbillorum, Parvimonas micra, Peptoniphilus asaccharolyticus, Prevotella species. ABx Ertapenem.     History of present illness:   Jigna returns for discussion regarding limb salvage options right TKA.  Her history as noted above.   He has been independently ambulatory and living with  at home.  There is been no history of fever or other illness.  She was seen recently by Dr. De Luna on 5/27/2020 during which the discussed plan included to continue with the JUAN R drains and IV antibiotics (ertapenem).  Hold off on any return to the OR for as long as possible.  Dr. De Luna's advice is 3 months as long as she is not actively infected or systemically ill.     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  The radiated wound has opened with ulceration deep into and exposing the prosthetic implant.  There is no evidence of recurrent tumor based upon the biopsy with Dr. Hernandez performed at [facility]. On 4/20/2020 coverage of right lower extremity popliteal wound with free left anterolateral thigh fasciocutaneous flap. MMB: Multiple organisms.     Plan:  Today to 2 JUAN R drains were removed.  Given the risk of further postponing the explantation of her right total knee arthroplasty and the well-healing fasciocutaneous flap it is deemed reasonable to proceed with the explantation of the right total knee arthroplasty.  I had an extensive discussion with the patient and daughters.  Reviewed the option of two stage revision.   I described the multiple operative procedures and the 4 to 8 months time range that this would take.  A prolonged course of antimicrobial therapy intravenously would be necessary as well to clear her infection.  It is difficult to predict the likelihood of successful limb salvage however if 1 extrapolates from the known evidence in patients without sarcomas and radiation, I would estimate the likelihood of success to be in the 50 to 60% range at best.  Furthermore, she would incur the risk of multiple operative procedures and coming into the hospital with numerous doctor visits and health care visits at a time of the ongoing coronavirus pandemic which, obviously puts her at risk for lolly the coronavirus.  Patient understands and agrees to the treatment plan as set forth.  We will schedule her for her explantation as soon as possible.      Rafael Montelongo MD  [facility] Arthroplasty Fellow      MD Pelon Cid Family Professor  Oncology and Adult Reconstructive Surgery  Dept Orthopaedic Surgery, [facility]  [phone] office, [phone] pager  [email or web address]    Total  Time = 45 min, 50% of which was spent in counseling and coordination of care as documented above.            Again, thank you for allowing me to participate in the care of your patient.        Sincerely,        He Gomez MD

## 2020-06-01 NOTE — PATIENT INSTRUCTIONS
Preparing for Your Surgery      Name:  Jigna Allen   MRN:  [id number]   :  1935   Today's Date:  2020     Arriving for surgery:  Surgery date:  20  Arrival time:  7:30 am  Due to the COVID 19 crisis, we are trying to keep our patients safe from others who might have respiratory illnesses so the hospital is implementing a no visitor policy.  Also, at this time  parking is not available.  Please come to:     [facility] [address]  [address] [place], [place] [address]. [place]  [address]    - parking is available in front of [facility] from 5:15AM to 8:00PM. If you prefer, park your car in the Green Lot.    -Proceed to the 3rd floor, check in at the Adult Surgery Waiting Lounge. [phone]    If an escort is needed stop at the Information Desk in the lobby. Inform the information person that you are here for surgery. An escort to the Adult Surgery Waiting Lounge will be provided.        What can I eat or drink?  -  You may have solid food or milk products until 8 hours prior to your surgery (2:00 am).  -  You may have water, apple juice or 7up/Sprite until 2 hours prior to your surgery (7:30 am).    Which medicines can I take?  Hold Aspirin, vitamins and supplements one week prior to surgery.  Hold Ibuprofen for 24 hours and/or Naproxen for 48 hours prior to surgery.     -  Please take these medications the day of surgery:  Acetaminophen (Tylenol)  Levothryroxine (Levothroid)    How do I prepare myself?  -  Take two showers: one the night before surgery; and one the morning of surgery.         Use Scrubcare or Hibiclens to wash from neck down, leave soap on your skin for up to one minute.  Do not get soap in your eyes or ears.  You may use your own shampoo and conditioner; no other hair products.   -  Do NOT use lotion, powder, deodorant, or antiperspirant the day of your surgery.  -  Do NOT wear any makeup, fingernail polish or jewelry.  -  Do not bring your  own medications to the hospital.  -  Bring your ID and insurance card.    Questions or Concerns:  -If you are scheduled on the East or West campus and have questions or concerns regarding the day of surgery, please call Preadmission Nursing at [phone].     -If you have health changes between today and your surgery please call your surgeon. For questions after surgery please call your surgeons office.       AFTER YOUR SURGERY  Breathing exercises   Breathing exercises help you recover faster. Take deep breaths and let the air out slowly. This will:     Help you wake up after surgery.    Help prevent complications like pneumonia.  Preventing complications will help you go home sooner.   We may give you a breathing device (incentive spirometer) to encourage you to breathe deeply.   Nausea and vomiting   You may feel sick to your stomach after surgery; if so, let your nurse know.    Pain control:  After surgery, you may have pain. Our goal is to help you manage your pain. Pain medicine will help you feel comfortable enough to do activities that will help you heal.  These activities may include breathing exercises, walking and physical therapy.   To help your health care team treat your pain we will ask: 1) If you have pain  2) where it is located 3) describe your pain in your words  Methods of pain control include medications given by mouth, vein or by nerve block for some surgeries.  Sequential Compression Device (SCD):  You may need to wear SCD S (also called pneumo boots)on your legs or feet. These are wraps connected to a machine that pumps in air and releases it. The repeated pumping helps prevent blood clots from forming.

## 2020-06-01 NOTE — PROGRESS NOTES
TKA explantation and PMMA antibiotic spacer implantation (2 set-ups)    Patient Position (indicated by x):  x  Supine with bump under buttock   x  Split drape with top bar   x  tourniquet   x  Extremity drape   x  Vera catheter           Part 1: Extraction setup (indicated by x):        x Pulse lavage: - shower spray tip                          - Long intramedullary tip   x CamVac disposable plastic suction tip (debris)    Intramedullary flexible reamers   x Power drill, oscillating saw   x Reciprocating saw (double edged serrated saw blade)    Tibia implant extractor (Innomed)   x Biomet offset bone impactor (white handle)    IM canal cement removal tools (Karlee set)   x Angled curettes (Gomez set)    Flexible osteotomes   x Single prong PCL retractor   x Large hammer   x Triple antibiotic irrigation x 3 liters   x Metal ruler (6 inch)   x Betadine prep solution, 1 bottle, full strength, sterile   x  90 degree Angled Ama retractors     Kiefer intramedullary long handle cement removal tools - for stemmed implants       Specimens and cultures (indicated by x):   5x  Tissue cultures, aerobic and anaerobic without gram stain     Frozen section     pathology specimens - fresh     pathology specimens - formalin           Part 2:  - Separate clean sterile back table and surgical field  - 2nd surgeon will make the cement mold on back table while 1st surgeon debrides the knee   - Then change over to new sterile field by re-draping, new long white stockinette and large Ioban drape over wound  x Rush rods and jeannine cutter   x Big metal specimen cup   x Vancomycin powder 6 vials (1g/vial powder)   x Palacos LV+G, Low viscosity gentamycin PMMA x 6 boxes   x Biomet spacer 1 articulating spacer molds   xxx Biomet TKA femoral trials   x Metal ruler   x Cement mixing bowl ([employer] green non-vacuum)    Cement mixing tower and injector gun (for stemmed implants)   x New drapes/hip stockinette (large white cotton)/large Ioban  "drape   x New Gowns/Gloves for surgical team   x suction   x Irrigation bulb    x Bovie   x Coban wrap   x Major Ortho instrument pan   x Single prong PCL retractor   x Angled 90 degree Ama reactors   x Reciprocating saw blade (to cut out PCL box). Surgeon can hold the \"dirty\" saw using gloved hand with new sterile saw blade attached   x Scalpel #10 blade   x JUAN R #15 drain, small bulb reservoir   x Sutures: 0 PDS x 2 (taper needle), 2-0 PDS x 2, 3-0 PDS skin closure, 2-0 silk for anchoring drain.          Cement spacer tools (indicated by x):     x  Depuy / ODOBX BoatworksM  Articulating knee spacer molds with trials   x  Palacos low viscosity PMMA w/ tobramycin and Vanco 1 g vial/package PMMA     Jarrell Rods, large + jeannine cutter     Summary:   84 yo female with PJI in setting of prior sarcoma excised and treated with adjuvant radiation treatment in 2010.   On 4/20/2020 coverage of right lower extremity popliteal wound with free left anterolateral thigh fasciocutaneous flap. MMB: Multiple organisms.     Implants:   Cyndy Triathlon Tibia SZ 4 with 100m fluted stem and 11mm insert. Patellar component 32mm. Cyndy Triathlon femur SZ 4 with 4mm offset and 150mm fluted stem.    2 x 4.0 Synthes cannulated 35mm screws    Plan:   Explantation right stemmed TKA as first stage of 2 -stage revision.    Consultation Dr Leonardo:   Add Vanco 3 grams + Cefotaxim 2 grams per batch of Tobramycin PMMA. Cave liver and kidney failure.      Rafael Montelongo MD  N Arthroplasty Fellow        "

## 2020-06-01 NOTE — TELEPHONE ENCOUNTER
FUTURE VISIT INFORMATION      SURGERY INFORMATION:    Date: 20    [facility]    Surgeon:  He Gomez MD     Anesthesia Type:  general    Procedure: Explantation of Right total knee manufacture and implantation of Vanco/tobra ANTIBIOTIC SPACER, KNEE     Consult: ov     RECORDS REQUESTED FROM:       Primary Care Provider: Elisabeth Ko MD - [facility]    Pertinent Medical History: pulmonary nodules    Most recent EKG+ Tracin20    Most recent ECHO: 18

## 2020-06-02 ENCOUNTER — ANESTHESIA (OUTPATIENT)
Dept: SURGERY | Facility: CLINIC | Age: 85
DRG: 467 | End: 2020-06-02
Payer: COMMERCIAL

## 2020-06-02 ENCOUNTER — APPOINTMENT (OUTPATIENT)
Dept: GENERAL RADIOLOGY | Facility: CLINIC | Age: 85
DRG: 467 | End: 2020-06-02
Attending: STUDENT IN AN ORGANIZED HEALTH CARE EDUCATION/TRAINING PROGRAM
Payer: COMMERCIAL

## 2020-06-02 ENCOUNTER — APPOINTMENT (OUTPATIENT)
Dept: GENERAL RADIOLOGY | Facility: CLINIC | Age: 85
DRG: 467 | End: 2020-06-02
Attending: ORTHOPAEDIC SURGERY
Payer: COMMERCIAL

## 2020-06-02 ENCOUNTER — HOSPITAL ENCOUNTER (INPATIENT)
Facility: CLINIC | Age: 85
LOS: 3 days | Discharge: NURSING, REHAB OR LONG-TERM CARE | DRG: 467 | End: 2020-06-05
Attending: ORTHOPAEDIC SURGERY | Admitting: ORTHOPAEDIC SURGERY
Payer: COMMERCIAL

## 2020-06-02 DIAGNOSIS — T84.50XD INFECTION OF PROSTHETIC JOINT, SUBSEQUENT ENCOUNTER: ICD-10-CM

## 2020-06-02 DIAGNOSIS — Z98.890 STATUS POST KNEE SURGERY: Primary | ICD-10-CM

## 2020-06-02 LAB — GLUCOSE BLDC GLUCOMTR-MCNC: 87 MG/DL (ref 70–99)

## 2020-06-02 PROCEDURE — 71000014 ZZH RECOVERY PHASE 1 LEVEL 2 FIRST HR: Performed by: ORTHOPAEDIC SURGERY

## 2020-06-02 PROCEDURE — 25000128 H RX IP 250 OP 636: Performed by: ORTHOPAEDIC SURGERY

## 2020-06-02 PROCEDURE — 40000170 ZZH STATISTIC PRE-PROCEDURE ASSESSMENT II: Performed by: ORTHOPAEDIC SURGERY

## 2020-06-02 PROCEDURE — 71000015 ZZH RECOVERY PHASE 1 LEVEL 2 EA ADDTL HR: Performed by: ORTHOPAEDIC SURGERY

## 2020-06-02 PROCEDURE — 25000132 ZZH RX MED GY IP 250 OP 250 PS 637: Performed by: STUDENT IN AN ORGANIZED HEALTH CARE EDUCATION/TRAINING PROGRAM

## 2020-06-02 PROCEDURE — 37000008 ZZH ANESTHESIA TECHNICAL FEE, 1ST 30 MIN: Performed by: ORTHOPAEDIC SURGERY

## 2020-06-02 PROCEDURE — 87176 TISSUE HOMOGENIZATION CULTR: CPT | Performed by: ORTHOPAEDIC SURGERY

## 2020-06-02 PROCEDURE — 25000128 H RX IP 250 OP 636: Performed by: NURSE ANESTHETIST, CERTIFIED REGISTERED

## 2020-06-02 PROCEDURE — 25000566 ZZH SEVOFLURANE, EA 15 MIN: Performed by: ORTHOPAEDIC SURGERY

## 2020-06-02 PROCEDURE — 25800030 ZZH RX IP 258 OP 636: Performed by: PHYSICIAN ASSISTANT

## 2020-06-02 PROCEDURE — 87070 CULTURE OTHR SPECIMN AEROBIC: CPT | Performed by: ORTHOPAEDIC SURGERY

## 2020-06-02 PROCEDURE — 25000128 H RX IP 250 OP 636: Performed by: PHYSICIAN ASSISTANT

## 2020-06-02 PROCEDURE — 25000132 ZZH RX MED GY IP 250 OP 250 PS 637: Performed by: PHYSICIAN ASSISTANT

## 2020-06-02 PROCEDURE — 0SPC0JZ REMOVAL OF SYNTHETIC SUBSTITUTE FROM RIGHT KNEE JOINT, OPEN APPROACH: ICD-10-PCS | Performed by: ORTHOPAEDIC SURGERY

## 2020-06-02 PROCEDURE — 27210794 ZZH OR GENERAL SUPPLY STERILE: Performed by: ORTHOPAEDIC SURGERY

## 2020-06-02 PROCEDURE — 99207 ZZC CONSULT E&M CHANGED TO INITIAL LEVEL: CPT | Performed by: INTERNAL MEDICINE

## 2020-06-02 PROCEDURE — 00000146 ZZHCL STATISTIC GLUCOSE BY METER IP

## 2020-06-02 PROCEDURE — 25000125 ZZHC RX 250: Performed by: NURSE ANESTHETIST, CERTIFIED REGISTERED

## 2020-06-02 PROCEDURE — 0SRC0EZ REPLACEMENT OF RIGHT KNEE JOINT WITH ARTICULATING SPACER, OPEN APPROACH: ICD-10-PCS | Performed by: ORTHOPAEDIC SURGERY

## 2020-06-02 PROCEDURE — 25800025 ZZH RX 258: Performed by: ORTHOPAEDIC SURGERY

## 2020-06-02 PROCEDURE — 25800030 ZZH RX IP 258 OP 636: Performed by: NURSE ANESTHETIST, CERTIFIED REGISTERED

## 2020-06-02 PROCEDURE — 25000125 ZZHC RX 250: Performed by: ORTHOPAEDIC SURGERY

## 2020-06-02 PROCEDURE — 73560 X-RAY EXAM OF KNEE 1 OR 2: CPT | Mod: RT

## 2020-06-02 PROCEDURE — 25000128 H RX IP 250 OP 636: Performed by: INTERNAL MEDICINE

## 2020-06-02 PROCEDURE — 87075 CULTR BACTERIA EXCEPT BLOOD: CPT | Performed by: ORTHOPAEDIC SURGERY

## 2020-06-02 PROCEDURE — 37000009 ZZH ANESTHESIA TECHNICAL FEE, EACH ADDTL 15 MIN: Performed by: ORTHOPAEDIC SURGERY

## 2020-06-02 PROCEDURE — 36000064 ZZH SURGERY LEVEL 4 EA 15 ADDTL MIN - UMMC: Performed by: ORTHOPAEDIC SURGERY

## 2020-06-02 PROCEDURE — 99222 1ST HOSP IP/OBS MODERATE 55: CPT | Performed by: INTERNAL MEDICINE

## 2020-06-02 PROCEDURE — 25000125 ZZHC RX 250: Performed by: PHYSICIAN ASSISTANT

## 2020-06-02 PROCEDURE — 12000001 ZZH R&B MED SURG/OB UMMC

## 2020-06-02 PROCEDURE — 25000128 H RX IP 250 OP 636: Performed by: ANESTHESIOLOGY

## 2020-06-02 PROCEDURE — 36000062 ZZH SURGERY LEVEL 4 1ST 30 MIN - UMMC: Performed by: ORTHOPAEDIC SURGERY

## 2020-06-02 PROCEDURE — C1713 ANCHOR/SCREW BN/BN,TIS/BN: HCPCS | Performed by: ORTHOPAEDIC SURGERY

## 2020-06-02 PROCEDURE — 40000986 XR KNEE PORT RT 1/2 VW: Mod: RT

## 2020-06-02 DEVICE — IMPLANTABLE DEVICE
Type: IMPLANTABLE DEVICE | Site: KNEE | Status: NON-FUNCTIONAL
Removed: 2021-03-09

## 2020-06-02 RX ORDER — PROPOFOL 10 MG/ML
INJECTION, EMULSION INTRAVENOUS PRN
Status: DISCONTINUED | OUTPATIENT
Start: 2020-06-02 | End: 2020-06-02

## 2020-06-02 RX ORDER — SODIUM CHLORIDE, SODIUM LACTATE, POTASSIUM CHLORIDE, CALCIUM CHLORIDE 600; 310; 30; 20 MG/100ML; MG/100ML; MG/100ML; MG/100ML
INJECTION, SOLUTION INTRAVENOUS CONTINUOUS
Status: DISCONTINUED | OUTPATIENT
Start: 2020-06-02 | End: 2020-06-02 | Stop reason: HOSPADM

## 2020-06-02 RX ORDER — NALOXONE HYDROCHLORIDE 0.4 MG/ML
.1-.4 INJECTION, SOLUTION INTRAMUSCULAR; INTRAVENOUS; SUBCUTANEOUS
Status: DISCONTINUED | OUTPATIENT
Start: 2020-06-02 | End: 2020-06-03

## 2020-06-02 RX ORDER — HYDROMORPHONE HYDROCHLORIDE 1 MG/ML
.3-.5 INJECTION, SOLUTION INTRAMUSCULAR; INTRAVENOUS; SUBCUTANEOUS EVERY 5 MIN PRN
Status: DISCONTINUED | OUTPATIENT
Start: 2020-06-02 | End: 2020-06-02 | Stop reason: HOSPADM

## 2020-06-02 RX ORDER — DEXAMETHASONE SODIUM PHOSPHATE 4 MG/ML
INJECTION, SOLUTION INTRA-ARTICULAR; INTRALESIONAL; INTRAMUSCULAR; INTRAVENOUS; SOFT TISSUE PRN
Status: DISCONTINUED | OUTPATIENT
Start: 2020-06-02 | End: 2020-06-02

## 2020-06-02 RX ORDER — ONDANSETRON 4 MG/1
4 TABLET, ORALLY DISINTEGRATING ORAL EVERY 30 MIN PRN
Status: DISCONTINUED | OUTPATIENT
Start: 2020-06-02 | End: 2020-06-02 | Stop reason: HOSPADM

## 2020-06-02 RX ORDER — ERTAPENEM 1 G/1
1 INJECTION, POWDER, LYOPHILIZED, FOR SOLUTION INTRAMUSCULAR; INTRAVENOUS EVERY EVENING
Status: DISCONTINUED | OUTPATIENT
Start: 2020-06-02 | End: 2020-06-05 | Stop reason: HOSPADM

## 2020-06-02 RX ORDER — VANCOMYCIN HYDROCHLORIDE 1 G/20ML
INJECTION, POWDER, LYOPHILIZED, FOR SOLUTION INTRAVENOUS PRN
Status: DISCONTINUED | OUTPATIENT
Start: 2020-06-02 | End: 2020-06-02 | Stop reason: HOSPADM

## 2020-06-02 RX ORDER — SODIUM CHLORIDE, SODIUM LACTATE, POTASSIUM CHLORIDE, CALCIUM CHLORIDE 600; 310; 30; 20 MG/100ML; MG/100ML; MG/100ML; MG/100ML
INJECTION, SOLUTION INTRAVENOUS CONTINUOUS PRN
Status: DISCONTINUED | OUTPATIENT
Start: 2020-06-02 | End: 2020-06-02

## 2020-06-02 RX ORDER — AMOXICILLIN 250 MG
2 CAPSULE ORAL 2 TIMES DAILY
Status: DISCONTINUED | OUTPATIENT
Start: 2020-06-02 | End: 2020-06-05 | Stop reason: HOSPADM

## 2020-06-02 RX ORDER — POLYETHYLENE GLYCOL 3350 17 G/17G
17 POWDER, FOR SOLUTION ORAL DAILY
Status: DISCONTINUED | OUTPATIENT
Start: 2020-06-02 | End: 2020-06-05 | Stop reason: HOSPADM

## 2020-06-02 RX ORDER — FENTANYL CITRATE 50 UG/ML
25-50 INJECTION, SOLUTION INTRAMUSCULAR; INTRAVENOUS
Status: DISCONTINUED | OUTPATIENT
Start: 2020-06-02 | End: 2020-06-02 | Stop reason: HOSPADM

## 2020-06-02 RX ORDER — BISACODYL 10 MG
10 SUPPOSITORY, RECTAL RECTAL DAILY PRN
Status: DISCONTINUED | OUTPATIENT
Start: 2020-06-02 | End: 2020-06-05 | Stop reason: HOSPADM

## 2020-06-02 RX ORDER — ACETAMINOPHEN 325 MG/1
975 TABLET ORAL EVERY 8 HOURS
Status: DISCONTINUED | OUTPATIENT
Start: 2020-06-02 | End: 2020-06-05 | Stop reason: HOSPADM

## 2020-06-02 RX ORDER — SODIUM CHLORIDE, SODIUM LACTATE, POTASSIUM CHLORIDE, CALCIUM CHLORIDE 600; 310; 30; 20 MG/100ML; MG/100ML; MG/100ML; MG/100ML
INJECTION, SOLUTION INTRAVENOUS CONTINUOUS
Status: DISCONTINUED | OUTPATIENT
Start: 2020-06-02 | End: 2020-06-03

## 2020-06-02 RX ORDER — LIDOCAINE HYDROCHLORIDE 20 MG/ML
INJECTION, SOLUTION INFILTRATION; PERINEURAL PRN
Status: DISCONTINUED | OUTPATIENT
Start: 2020-06-02 | End: 2020-06-02

## 2020-06-02 RX ORDER — HYDROMORPHONE HCL/0.9% NACL/PF 0.2MG/0.2
.2-.4 SYRINGE (ML) INTRAVENOUS
Status: DISCONTINUED | OUTPATIENT
Start: 2020-06-02 | End: 2020-06-05 | Stop reason: HOSPADM

## 2020-06-02 RX ORDER — OXYCODONE HYDROCHLORIDE 5 MG/1
5-10 TABLET ORAL
Status: DISCONTINUED | OUTPATIENT
Start: 2020-06-02 | End: 2020-06-04

## 2020-06-02 RX ORDER — LIDOCAINE 40 MG/G
CREAM TOPICAL
Status: DISCONTINUED | OUTPATIENT
Start: 2020-06-02 | End: 2020-06-05 | Stop reason: HOSPADM

## 2020-06-02 RX ORDER — ACETAMINOPHEN 325 MG/1
975 TABLET ORAL ONCE
Status: COMPLETED | OUTPATIENT
Start: 2020-06-02 | End: 2020-06-02

## 2020-06-02 RX ORDER — ONDANSETRON 2 MG/ML
4 INJECTION INTRAMUSCULAR; INTRAVENOUS EVERY 6 HOURS PRN
Status: DISCONTINUED | OUTPATIENT
Start: 2020-06-02 | End: 2020-06-05 | Stop reason: HOSPADM

## 2020-06-02 RX ORDER — ONDANSETRON 4 MG/1
4 TABLET, ORALLY DISINTEGRATING ORAL EVERY 6 HOURS PRN
Status: DISCONTINUED | OUTPATIENT
Start: 2020-06-02 | End: 2020-06-05 | Stop reason: HOSPADM

## 2020-06-02 RX ORDER — LEVOTHYROXINE SODIUM 75 UG/1
75 TABLET ORAL
Status: DISCONTINUED | OUTPATIENT
Start: 2020-06-03 | End: 2020-06-05 | Stop reason: HOSPADM

## 2020-06-02 RX ORDER — ONDANSETRON 2 MG/ML
4 INJECTION INTRAMUSCULAR; INTRAVENOUS EVERY 30 MIN PRN
Status: DISCONTINUED | OUTPATIENT
Start: 2020-06-02 | End: 2020-06-02 | Stop reason: HOSPADM

## 2020-06-02 RX ORDER — NALOXONE HYDROCHLORIDE 0.4 MG/ML
.1-.4 INJECTION, SOLUTION INTRAMUSCULAR; INTRAVENOUS; SUBCUTANEOUS
Status: DISCONTINUED | OUTPATIENT
Start: 2020-06-02 | End: 2020-06-05 | Stop reason: HOSPADM

## 2020-06-02 RX ORDER — ONDANSETRON 2 MG/ML
INJECTION INTRAMUSCULAR; INTRAVENOUS PRN
Status: DISCONTINUED | OUTPATIENT
Start: 2020-06-02 | End: 2020-06-02

## 2020-06-02 RX ORDER — ACETAMINOPHEN 325 MG/1
650 TABLET ORAL EVERY 4 HOURS PRN
Status: DISCONTINUED | OUTPATIENT
Start: 2020-06-05 | End: 2020-06-05 | Stop reason: HOSPADM

## 2020-06-02 RX ORDER — FENTANYL CITRATE 50 UG/ML
INJECTION, SOLUTION INTRAMUSCULAR; INTRAVENOUS PRN
Status: DISCONTINUED | OUTPATIENT
Start: 2020-06-02 | End: 2020-06-02

## 2020-06-02 RX ADMIN — PHENYLEPHRINE HYDROCHLORIDE 50 MCG: 10 INJECTION INTRAVENOUS at 15:03

## 2020-06-02 RX ADMIN — ACETAMINOPHEN 975 MG: 325 TABLET, FILM COATED ORAL at 07:08

## 2020-06-02 RX ADMIN — POLYETHYLENE GLYCOL 3350 17 G: 17 POWDER, FOR SOLUTION ORAL at 18:51

## 2020-06-02 RX ADMIN — FENTANYL CITRATE 50 MCG: 50 INJECTION, SOLUTION INTRAMUSCULAR; INTRAVENOUS at 11:37

## 2020-06-02 RX ADMIN — HYDROMORPHONE HYDROCHLORIDE 0.25 MG: 1 INJECTION, SOLUTION INTRAMUSCULAR; INTRAVENOUS; SUBCUTANEOUS at 13:24

## 2020-06-02 RX ADMIN — TRANEXAMIC ACID 1 G: 100 INJECTION, SOLUTION INTRAVENOUS at 11:47

## 2020-06-02 RX ADMIN — ROCURONIUM BROMIDE 10 MG: 10 INJECTION INTRAVENOUS at 12:29

## 2020-06-02 RX ADMIN — PHENYLEPHRINE HYDROCHLORIDE 100 MCG: 10 INJECTION INTRAVENOUS at 11:44

## 2020-06-02 RX ADMIN — HYDROMORPHONE HYDROCHLORIDE 0.25 MG: 1 INJECTION, SOLUTION INTRAMUSCULAR; INTRAVENOUS; SUBCUTANEOUS at 13:01

## 2020-06-02 RX ADMIN — ONDANSETRON 4 MG: 2 INJECTION INTRAMUSCULAR; INTRAVENOUS at 16:51

## 2020-06-02 RX ADMIN — SUGAMMADEX 120 MG: 100 INJECTION, SOLUTION INTRAVENOUS at 15:50

## 2020-06-02 RX ADMIN — ROCURONIUM BROMIDE 40 MG: 10 INJECTION INTRAVENOUS at 11:39

## 2020-06-02 RX ADMIN — DOCUSATE SODIUM AND SENNOSIDES 2 TABLET: 8.6; 5 TABLET, FILM COATED ORAL at 21:46

## 2020-06-02 RX ADMIN — FENTANYL CITRATE 25 MCG: 50 INJECTION, SOLUTION INTRAMUSCULAR; INTRAVENOUS at 12:37

## 2020-06-02 RX ADMIN — DEXAMETHASONE SODIUM PHOSPHATE 4 MG: 4 INJECTION, SOLUTION INTRAMUSCULAR; INTRAVENOUS at 11:54

## 2020-06-02 RX ADMIN — PROPOFOL 90 MG: 10 INJECTION, EMULSION INTRAVENOUS at 11:38

## 2020-06-02 RX ADMIN — PHENYLEPHRINE HYDROCHLORIDE 100 MCG: 10 INJECTION INTRAVENOUS at 11:51

## 2020-06-02 RX ADMIN — ERTAPENEM SODIUM 1 G: 1 INJECTION, POWDER, LYOPHILIZED, FOR SOLUTION INTRAMUSCULAR; INTRAVENOUS at 21:46

## 2020-06-02 RX ADMIN — SODIUM CHLORIDE, POTASSIUM CHLORIDE, SODIUM LACTATE AND CALCIUM CHLORIDE: 600; 310; 30; 20 INJECTION, SOLUTION INTRAVENOUS at 11:30

## 2020-06-02 RX ADMIN — ACETAMINOPHEN 975 MG: 325 TABLET, FILM COATED ORAL at 18:51

## 2020-06-02 RX ADMIN — SODIUM CHLORIDE, POTASSIUM CHLORIDE, SODIUM LACTATE AND CALCIUM CHLORIDE: 600; 310; 30; 20 INJECTION, SOLUTION INTRAVENOUS at 15:36

## 2020-06-02 RX ADMIN — PHENYLEPHRINE HYDROCHLORIDE 100 MCG: 10 INJECTION INTRAVENOUS at 14:00

## 2020-06-02 RX ADMIN — LIDOCAINE HYDROCHLORIDE 60 MG: 20 INJECTION, SOLUTION INFILTRATION; PERINEURAL at 11:38

## 2020-06-02 RX ADMIN — PHENYLEPHRINE HYDROCHLORIDE 0.4 MCG/KG/MIN: 10 INJECTION INTRAVENOUS at 11:54

## 2020-06-02 RX ADMIN — PHENYLEPHRINE HYDROCHLORIDE 50 MCG: 10 INJECTION INTRAVENOUS at 11:39

## 2020-06-02 RX ADMIN — PROPOFOL 50 MG: 10 INJECTION, EMULSION INTRAVENOUS at 15:41

## 2020-06-02 RX ADMIN — ONDANSETRON 4 MG: 2 INJECTION INTRAMUSCULAR; INTRAVENOUS at 15:12

## 2020-06-02 RX ADMIN — FENTANYL CITRATE 25 MCG: 50 INJECTION, SOLUTION INTRAMUSCULAR; INTRAVENOUS at 12:15

## 2020-06-02 ASSESSMENT — ACTIVITIES OF DAILY LIVING (ADL)
DRESS: 0-->INDEPENDENT
RETIRED_COMMUNICATION: 0-->UNDERSTANDS/COMMUNICATES WITHOUT DIFFICULTY
SWALLOWING: 0-->SWALLOWS FOODS/LIQUIDS WITHOUT DIFFICULTY
AMBULATION: 1-->ASSISTIVE EQUIPMENT
RETIRED_EATING: 0-->INDEPENDENT
BATHING: 0-->INDEPENDENT
TRANSFERRING: 0-->INDEPENDENT
TOILETING: 0-->INDEPENDENT
COGNITION: 0 - NO COGNITION ISSUES REPORTED
PRIOR_FUNCTIONAL_LEVEL_COMMENT: USES W/C
FALL_HISTORY_WITHIN_LAST_SIX_MONTHS: NO

## 2020-06-02 ASSESSMENT — MIFFLIN-ST. JEOR
SCORE: 957.25
SCORE: 957.38

## 2020-06-02 NOTE — OP NOTE
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   On 4/20/2020 coverage of right lower extremity popliteal wound with free left anterolateral thigh fasciocutaneous flap. MMB: Multiple organisms.    Post-operative diagnosis Same as pre-operative diagnosis    Procedure(s):  1. Explantation of Right total knee  2. Manufacture and implantation of Vanco 3 g/Gentamycin 1 g/ceftazidime 2 g (per package PMMA) antibiotic spacer knee right  Surgeon: Surgeon(s) and Role:  Panel 1:     * He Gomez MD - Primary     * Rafael Montelongo MD - Fellow - Assisting     * Randell Talbot PA-C  Panel 2:     * He Gomez MD - Primary  Anesthesia: General   Estimated blood loss: 300 ml  Drains: Stevie-Izaguirre  Specimens:   ID Type Source Tests Collected by Time Destination   1 :  Fluid Leg Lower, Right ANAEROBIC BACTERIAL CULTURE, FLUID CULTURE AEROBIC BACTERIAL He Gomez MD 6/2/2020 12:46 PM    2 :  Tissue Leg, Right ANAEROBIC BACTERIAL CULTURE, TISSUE CULTURE AEROBIC BACTERIAL He Gomez MD 6/2/2020 12:48 PM    3 :  Tissue Leg, Right ANAEROBIC BACTERIAL CULTURE, TISSUE CULTURE AEROBIC BACTERIAL He Gomez MD 6/2/2020 12:48 PM    4 :  Tissue Leg, Right ANAEROBIC BACTERIAL CULTURE, TISSUE CULTURE AEROBIC BACTERIAL He Gomez MD 6/2/2020  1:32 PM      Findings:   Benign appearance of right total knee arthroplasty.  Removal of components without some bone loss mostly on the femoral side.  Fabrication and implantation of a custom antibiotic eluding articulating spacer knee right.  Complications: None.  Implants:   Implant Name Type Inv. Item Serial No.  Lot No. LRB No. Used Action   palacos cement    KRISTIN [id number] Right 4 Implanted   Femoral 53mm A/P 75 mm M/L SureSpace    Depuy [id number] Right 1 Wasted   Tibial 45mm A/P 70mm M/L  Medium Sure Space    Depuy [id number] Right 1 Used as a Supply   Femoral 44mm A/P 67mm M/L Medium SureSpace    Depuy [id number] Right 1 Used as a Supply       Description of procedure: Patient was seen in the preoperative holding where procedure, risks and complications, expectations and rehabilitation were discussed once more.  Patient understands and agrees to the treatment plan as set forth.  Informed consent was obtained and the right lower extremity was marked.  Patient was then taken back to the operating room where general anesthesia was induced.  Patient was positioned supine with a bump under the ipsilateral buttock.  The right lower extremity was prepped and draped in usual sterile fashion.  Preoperative ertapenem according to normal scheduling was given.  After the completion of a timeout procedure the tourniquet was inflated to 250 mmHg.  Exsanguination was only performed of the cath in order not to compromise the anastomosis of the fasciocutaneous flap.  Now the midline incision was opened using a #15 blade.  A medial parapatellar arthrotomy was performed.  The tibial tuberosity was very fragile and reinforced with a #1 Ethibond suture.  Hoffa's fat pad was partially resected.  A synovectomy of the suprapatellar pouch, medial and lateral gutter was performed.  3 tissue samples were taken and sent for aerobic and anaerobic cultures.  First the polyethylene liner was removed.  The post of the liner was removed from the base with osteotomes.  Now the metal pin inside the post was visualized and removed.  The remainder of the polyethylene liner could now be taken out.  Now using a combination of reciprocating saw, osteotomes and offset drift the femoral component was removed with some central bone loss.  Tissue samples from the femoral intramedullary canal  were taken and sent for aerobic and anaerobic cultures.  Now the patellar component was removed in combination of reciprocating saw and osteotomes..  The  cemented pegs were removed using the high-speed bur.  All cement was removed.  Our attention was turned to the tibial component.  Using a combination of reciprocating saw and white offset drift the tibial component was removed with minimal bone loss.  Specimens were taken from the tibial intramedullary canal and sent for aerobic and anaerobic cultures.  Once this was completed the knee was copiously irrigated with 3 L of saline and iodine soaked sponges were left in place.      In the meantime Dr. Gomez has moved to the clean side and started fabricating the custom antibiotic eluding spacers.  For each batch of cement 3 g of vancomycin, 1 g of tobramycin and 2 g of ceftazidime was used.  Ceftazidime was chosen over the cefotaxime because the latter was not available.  Now the field was again draped sterilely.  The sponges were removed and again the knee was irrigated with 2 L of saline.  Now using the [employer] cement molds first the femoral component size medium was molded over the femur.  Cement was cured and excess cement was removed.  Now the tibial component was cemented into place with an additional batch of cement.  Cement was cured and excess cement was removed.  The knee was irrigated once more.  A 15 JUAN R drain was placed.  Local anesthetic block was injected.  The medial parapatellar arthrotomy was closed using interrupted figure of 8 0 PDS sutures.  The subcutaneous tissue was closed using 2-0 PDS sutures.  The skin was closed using a running 3-0 PDS.  Skin was then draped with Steri-Strips and alginate and Tegaderm.  An Ace wrap was put in place.  Patient was awakened and returned to the recovery in good condition.      The -22 modifier is appended to the CPT coding for this surgery due to the following reasons:  1) The was a revision operation and the surgical bed was scarred.  2) The patient had previous radiation to the site which resulted in severe fibrosis and increased the level of difficulty of the  dissection.  3) The patient had prior chemotherapy recently with subsequent neutropenia, thrombocytopenia and anemia.  This increased her risk for a wound healing complication and the timing of her surgery.      The -22 modifier is appended to the CPT coding for this surgery due to the following reasons:  1) The extra time and effort involved in removing a fully stemmed femoral and tibial implant.  Risks and complexity are greater than usual.  In addition time in OR was 4 hf 31 min which is 50% greater than usual.    Postoperative plan:  50% weightbearing  Pain medication PRN  Drain for 2 weeks  Continue ertapenem IV and consult infectious disease  Clinic visit Dr. Gomez 4 weeks  X-ray in PACU      Rafael Montelongo MD  [facility] Arthroplasty Fellow    Attending MD (Dr. He Gomez) Attestation:  I was present during the key portions of the procedure and I was immediately available for the entire procedure between opening and closing.    MD Pelon Cid Family Professor  Oncology and Adult Reconstructive Surgery  Dept Orthopaedic Surgery, [facility]

## 2020-06-02 NOTE — LETTER
Health Information Management Services               Recipient: [facility]          Sender: JOSE Fernandez [phone]          Date: June 5, 2020  Patient Name:  Jigna Allen  Routing Message:  Discharge Orders  [id number]          The documents accompanying this notice contain confidential information belonging to the sender.  This information is intended only for the use of the individual or entity named above.  The authorized recipient of this information is prohibited from disclosing this information to any other party and is required to destroy the information after its stated need has been fulfilled, unless otherwise required by state law.      If you are not the intended recipient, you are hereby notified that any disclosure, copy, distribution or action taken in reliance on the contents of these documents is strictly prohibited.  If you have received this document in error, please return it by fax to [phone] with a note on the cover sheet explaining why you are returning it (e.g. not your patient, not your provider, etc.).  If you need further assistance, please call [place] Centralized Transcription at [phone].  Documents may also be returned by mail to [employer], , [address], [place], [place] [address].

## 2020-06-02 NOTE — ANESTHESIA CARE TRANSFER NOTE
Patient: Jigna Allen    Procedure(s):  Explantation of Right total knee  manufacture and implantation of Vanco/tobra ANTIBIOTIC SPACER, KNEE    Diagnosis: Infection of prosthetic joint, subsequent encounter [T84.50XD]  Diagnosis Additional Information: No value filed.    Anesthesia Type:   General     Note:  Airway :Face Mask  Patient transferred to:PACU  Comments: 128/49, HR 99, sat 100%, RR 12, T 36.4Handoff Report: Identifed the Patient, Identified the Reponsible Provider, Reviewed the pertinent medical history, Discussed the surgical course, Reviewed Intra-OP anesthesia mangement and issues during anesthesia, Set expectations for post-procedure period and Allowed opportunity for questions and acknowledgement of understanding      Vitals: (Last set prior to Anesthesia Care Transfer)    CRNA VITALS  6/2/2020 1529 - 6/2/2020 1611      6/2/2020             Pulse:  101    SpO2:  99 %                Electronically Signed By: WOOD Calderón CRNA  June 2, 2020  4:11 PM

## 2020-06-02 NOTE — LETTER
Transition Communication Hand-off for Care Transitions to Next Level of Care Provider    Name: Jigna Allen  : 1935  MRN #: [id number]  Primary Care Provider: Elisabeth Ko     Primary Clinic: [facility] [address]  [facility] [address]     Reason for Hospitalization:  Infection of prosthetic joint, subsequent encounter [T84.50XD]  Admit Date/Time: 2020  6:25 AM  Discharge Date: 2020 2:30PM  Payor Source: Payor: [employer] / Plan: [employer]AGE / Product Type: HMO /          Reason for Communication Hand-off Referral: Other continuity of care    Discharge Plan: [facility]       Concern for non-adherence with plan of care:   No  Discharge Needs Assessment:  Needs      Most Recent Value   Equipment Currently Used at Home  none        Future Appointments   Date Time Provider Department Center   2020  2:30 PM Miguel Jin, PT [facility]   2020  9:30 AM Darren Handley, OT [facility]   2020 11:30 AM Mary Grubbs, PT [facility]   2020  2:00 PM Mary Grubbs, PT [facility]   2020  8:00 AM [facility]   2020  5:00 PM Edouard Bryan MD [facility]              JOSE Colby  5A/10A Ortho/Med/Surg [facility]  Phone: [phone] Pager: [phone]               AVS/Discharge Summary is the source of truth; this is a helpful guide for improved communication of patient story

## 2020-06-02 NOTE — LETTER
Health Information Management Services               Recipient: [facility]          Sender: JOSE Fernandez [phone]          Date: Maria Del Carmen 3, 2020  Patient Name:  Jigna Allen  Routing Message:  TCU referral. Likely ready Friday/saturday. Right knee procedure. Involved and supportive family          The documents accompanying this notice contain confidential information belonging to the sender.  This information is intended only for the use of the individual or entity named above.  The authorized recipient of this information is prohibited from disclosing this information to any other party and is required to destroy the information after its stated need has been fulfilled, unless otherwise required by state law.      If you are not the intended recipient, you are hereby notified that any disclosure, copy, distribution or action taken in reliance on the contents of these documents is strictly prohibited.  If you have received this document in error, please return it by fax to [phone] with a note on the cover sheet explaining why you are returning it (e.g. not your patient, not your provider, etc.).  If you need further assistance, please call [facility] Centralized Transcription at [phone].  Documents may also be returned by mail to [employer], , [facility] [address], [place], [place] [address].

## 2020-06-02 NOTE — OR NURSING
PACU to Inpatient Nursing Handoff    Patient Jigna Allen is a 85 year old female who speaks English.   Procedure Procedure(s):  Explantation of Right total knee  manufacture and implantation of Vanco/tobra ANTIBIOTIC SPACER, KNEE   Surgeon(s) Primary: He Gomez MD  Fellow - Assisting: Rafael Montelongo MD     Allergies   Allergen Reactions     Colchicine      Other reaction(s): Gastrointestinal  diarrhea     Niacin      skin rash     Ferrous Sulfate Rash       Isolation  [unfilled]     Past Medical History   has a past medical history of Anemia, Anxiety, Aortic valve sclerosis, Arthritis (1990?), Complication of anesthesia, Hypothyroid, Mild tricuspid regurgitation, and Sarcoma (H) (2009).    Anesthesia General   Dermatome Level     Preop Meds acetaminophen (Tylenol) - time given: 0708   Nerve block Not applicable   Intraop Meds dexamethasone (Decadron)  fentanyl (Sublimaze): 100 mcg total  hydromorphone (Dilaudid): .5 mg total  ondansetron (Zofran): last given at 1512  TXA   Local Meds Yes - Local Cocktail (morphine, ropivacaine, epinephrine, Toradol)   Antibiotics Not applicable     Pain Patient Currently in Pain: denies  Comfort: comfortably manageable  Pain Control: partially effective   PACU meds  ondansetron (Zofran): 4 mg (total dose) last given at 1651    PCA / epidural No   Capnography Respiratory Monitoring (EtCO2): 39 mmHg  Integrated Pulmonary Index (IPI): 10   Telemetry     Inpatient Telemetry Monitor Ordered? No        Labs Glucose Lab Results   Component Value Date     04/24/2020       Hgb Lab Results   Component Value Date    HGB 8.9 05/14/2020       INR Lab Results   Component Value Date    INR 1.27 06/15/2018      PACU Imaging Completed     Wound/Incision Incision/Surgical Site 06/12/18 Right Knee (Active)   Number of days: 721       Incision/Surgical Site 04/20/20 Left Leg (Active)   Number of days: 43       Incision/Surgical Site 04/20/20 Right Leg (Active)   Number of days: 43        Incision/Surgical Site 06/02/20 Right Knee (Active)   Incision Assessment UTV 06/02/20 1700   Closure SARA;Adhesive strip(s) 06/02/20 1700   Incision Drainage Amount UTV 06/02/20 1700   Dressing Intervention Clean, dry, intact 06/02/20 1700   Number of days: 0      CMS        Equipment ice pack   Other LDA Cast 04/20/20 1500 leg cast, long RLE (Active)   Number of days: 43        IV Access Peripheral IV 06/02/20 Left Lower forearm (Active)   Site Assessment [facility] 06/02/20 1700   Line Status Infusing 06/02/20 1700   Phlebitis Scale 0-->no symptoms 06/02/20 1700   Infiltration Scale 0 06/02/20 1700   Number of days: 0       PICC Double Lumen 04/23/20 Right Brachial vein medial (Active)   Site Assessment [facility] 06/02/20 1700   Number of days: 40      Blood Products Not applicable  mL   Intake/Output Date 06/02/20 0700 - 06/03/20 0659   Shift 6924-2340 8688-7088 6325-9380 24 Hour Total   INTAKE   P.O.  30  30   I.V. 500 900  1400   Shift Total(mL/kg) 500(9.11) 930(16.94)  1430(26.05)   OUTPUT   Urine 75   75   Drains  50  50   Blood  300  300   Shift Total(mL/kg) 75(1.37) 350(6.38)  425(7.74)   Weight (kg) 54.9 54.9 54.9 54.9      Drains / Vera Closed/Suction Drain 1 Left Thigh Bulb 15 [place] (Active)   Number of days: 43       Closed/Suction Drain 2 Right;Superior Thigh Bulb 15 [place] (Active)   Number of days: 43       Closed/Suction Drain 3 Right;Inferior Thigh Bulb 15 [place] (Active)   Number of days: 43       Closed/Suction Drain Right Knee Bulb 15 [place] (Active)   Site Description UTV 06/02/20 1700   Dressing Status Normal: Clean, Dry & Intact 06/02/20 1700   Drainage Appearance Bloody/Bright Red 06/02/20 1700   Status To bulb suction 06/02/20 1700   Output (ml) 50 ml 06/02/20 1602   Number of days: 0       Urethral Catheter Latex 16 fr (Active)   Collection Container Standard 06/02/20 1602   Securement Method Securing device (Describe) 06/02/20 1602   Rationale for Continued Use Anesthesia 06/02/20 1602    Number of days: 0       Cast 04/20/20 1500 leg cast, long RLE (Active)   Number of days: 43      Time of void PreOp Void Prior to Procedure: 0859 (06/02/20 0859)    PostOp      Diapered? No   Bladder Scan     PO 30 mL(ginger ale, ice chips) (06/02/20 1700)  tolerating sips and ice chips     Vitals    B/P: 121/52  T: 98.6  F (37  C)    Temp src: Axillary  P:  Pulse: 85 (06/02/20 1700)    Heart Rate: 84 (06/02/20 1700)     R: 26  O2:  SpO2: 97 %    O2 Device: None (Room air) (06/02/20 1645)    Oxygen Delivery: 4 LPM (06/02/20 1602)         Family/support present attempted to updated daughter, Lawanda, by phone.  LM   Patient belongings     Patient transported on bed and air mat   DC meds/scripts (obs/outpt) Not applicable   Inpatient Pain Meds Released? Yes       Special needs/considerations None   Tasks needing completion None       Elvia Wei RN  ASCOM *[phone]

## 2020-06-03 ENCOUNTER — APPOINTMENT (OUTPATIENT)
Dept: OCCUPATIONAL THERAPY | Facility: CLINIC | Age: 85
DRG: 467 | End: 2020-06-03
Attending: STUDENT IN AN ORGANIZED HEALTH CARE EDUCATION/TRAINING PROGRAM
Payer: COMMERCIAL

## 2020-06-03 ENCOUNTER — APPOINTMENT (OUTPATIENT)
Dept: PHYSICAL THERAPY | Facility: CLINIC | Age: 85
DRG: 467 | End: 2020-06-03
Attending: STUDENT IN AN ORGANIZED HEALTH CARE EDUCATION/TRAINING PROGRAM
Payer: COMMERCIAL

## 2020-06-03 ENCOUNTER — APPOINTMENT (OUTPATIENT)
Dept: PHYSICAL THERAPY | Facility: CLINIC | Age: 85
DRG: 467 | End: 2020-06-03
Attending: ORTHOPAEDIC SURGERY
Payer: COMMERCIAL

## 2020-06-03 LAB
ANION GAP SERPL CALCULATED.3IONS-SCNC: 5 MMOL/L (ref 3–14)
BUN SERPL-MCNC: 23 MG/DL (ref 7–30)
CALCIUM SERPL-MCNC: 7.5 MG/DL (ref 8.5–10.1)
CHLORIDE SERPL-SCNC: 104 MMOL/L (ref 94–109)
CO2 SERPL-SCNC: 28 MMOL/L (ref 20–32)
CREAT SERPL-MCNC: 0.68 MG/DL (ref 0.52–1.04)
ERYTHROCYTE [DISTWIDTH] IN BLOOD BY AUTOMATED COUNT: 16 % (ref 10–15)
GFR SERPL CREATININE-BSD FRML MDRD: 81 ML/MIN/{1.73_M2}
GLUCOSE SERPL-MCNC: 167 MG/DL (ref 70–99)
HCT VFR BLD AUTO: 23.3 % (ref 35–47)
HGB BLD-MCNC: 7.3 G/DL (ref 11.7–15.7)
MCH RBC QN AUTO: 29.8 PG (ref 26.5–33)
MCHC RBC AUTO-ENTMCNC: 31.3 G/DL (ref 31.5–36.5)
MCV RBC AUTO: 95 FL (ref 78–100)
PLATELET # BLD AUTO: 291 10E9/L (ref 150–450)
POTASSIUM SERPL-SCNC: 4 MMOL/L (ref 3.4–5.3)
RBC # BLD AUTO: 2.45 10E12/L (ref 3.8–5.2)
SODIUM SERPL-SCNC: 137 MMOL/L (ref 133–144)
WBC # BLD AUTO: 5.9 10E9/L (ref 4–11)

## 2020-06-03 PROCEDURE — 25000132 ZZH RX MED GY IP 250 OP 250 PS 637: Performed by: INTERNAL MEDICINE

## 2020-06-03 PROCEDURE — 97165 OT EVAL LOW COMPLEX 30 MIN: CPT | Mod: GO | Performed by: OCCUPATIONAL THERAPIST

## 2020-06-03 PROCEDURE — 97110 THERAPEUTIC EXERCISES: CPT | Mod: GP

## 2020-06-03 PROCEDURE — 80048 BASIC METABOLIC PNL TOTAL CA: CPT | Performed by: INTERNAL MEDICINE

## 2020-06-03 PROCEDURE — 85018 HEMOGLOBIN: CPT | Performed by: INTERNAL MEDICINE

## 2020-06-03 PROCEDURE — 25800030 ZZH RX IP 258 OP 636

## 2020-06-03 PROCEDURE — 25000128 H RX IP 250 OP 636: Performed by: INTERNAL MEDICINE

## 2020-06-03 PROCEDURE — 85027 COMPLETE CBC AUTOMATED: CPT | Performed by: INTERNAL MEDICINE

## 2020-06-03 PROCEDURE — 25000128 H RX IP 250 OP 636: Performed by: CLINICAL NURSE SPECIALIST

## 2020-06-03 PROCEDURE — 97161 PT EVAL LOW COMPLEX 20 MIN: CPT | Mod: GP

## 2020-06-03 PROCEDURE — 99232 SBSQ HOSP IP/OBS MODERATE 35: CPT | Performed by: INTERNAL MEDICINE

## 2020-06-03 PROCEDURE — 97530 THERAPEUTIC ACTIVITIES: CPT | Mod: GP

## 2020-06-03 PROCEDURE — 25000132 ZZH RX MED GY IP 250 OP 250 PS 637: Performed by: STUDENT IN AN ORGANIZED HEALTH CARE EDUCATION/TRAINING PROGRAM

## 2020-06-03 PROCEDURE — 97530 THERAPEUTIC ACTIVITIES: CPT | Mod: GO | Performed by: OCCUPATIONAL THERAPIST

## 2020-06-03 PROCEDURE — 12000001 ZZH R&B MED SURG/OB UMMC

## 2020-06-03 PROCEDURE — 25800030 ZZH RX IP 258 OP 636: Performed by: STUDENT IN AN ORGANIZED HEALTH CARE EDUCATION/TRAINING PROGRAM

## 2020-06-03 PROCEDURE — 97535 SELF CARE MNGMENT TRAINING: CPT | Mod: GO | Performed by: OCCUPATIONAL THERAPIST

## 2020-06-03 PROCEDURE — 36592 COLLECT BLOOD FROM PICC: CPT | Performed by: INTERNAL MEDICINE

## 2020-06-03 PROCEDURE — 97116 GAIT TRAINING THERAPY: CPT | Mod: GP

## 2020-06-03 RX ORDER — SODIUM CHLORIDE 9 MG/ML
INJECTION, SOLUTION INTRAVENOUS
Status: COMPLETED
Start: 2020-06-03 | End: 2020-06-03

## 2020-06-03 RX ADMIN — DOCUSATE SODIUM AND SENNOSIDES 2 TABLET: 8.6; 5 TABLET, FILM COATED ORAL at 21:03

## 2020-06-03 RX ADMIN — SODIUM CHLORIDE, POTASSIUM CHLORIDE, SODIUM LACTATE AND CALCIUM CHLORIDE: 600; 310; 30; 20 INJECTION, SOLUTION INTRAVENOUS at 00:17

## 2020-06-03 RX ADMIN — DOCUSATE SODIUM AND SENNOSIDES 2 TABLET: 8.6; 5 TABLET, FILM COATED ORAL at 08:08

## 2020-06-03 RX ADMIN — SODIUM CHLORIDE 500 ML: 9 INJECTION, SOLUTION INTRAVENOUS at 08:11

## 2020-06-03 RX ADMIN — ACETAMINOPHEN 975 MG: 325 TABLET, FILM COATED ORAL at 09:43

## 2020-06-03 RX ADMIN — LEVOTHYROXINE SODIUM 75 MCG: 0.07 TABLET ORAL at 08:08

## 2020-06-03 RX ADMIN — ASPIRIN 325 MG: 325 TABLET ORAL at 08:08

## 2020-06-03 RX ADMIN — POLYETHYLENE GLYCOL 3350 17 G: 17 POWDER, FOR SOLUTION ORAL at 08:08

## 2020-06-03 RX ADMIN — ERTAPENEM SODIUM 1 G: 1 INJECTION, POWDER, LYOPHILIZED, FOR SOLUTION INTRAMUSCULAR; INTRAVENOUS at 21:03

## 2020-06-03 RX ADMIN — Medication 500 ML: at 08:11

## 2020-06-03 RX ADMIN — ACETAMINOPHEN 975 MG: 325 TABLET, FILM COATED ORAL at 18:03

## 2020-06-03 RX ADMIN — ACETAMINOPHEN 975 MG: 325 TABLET, FILM COATED ORAL at 02:49

## 2020-06-03 RX ADMIN — ASPIRIN 325 MG: 325 TABLET ORAL at 21:03

## 2020-06-03 RX ADMIN — ALTEPLASE 2 MG: 2.2 INJECTION, POWDER, LYOPHILIZED, FOR SOLUTION INTRAVENOUS at 11:24

## 2020-06-03 RX ADMIN — OXYCODONE HYDROCHLORIDE 5 MG: 5 TABLET ORAL at 09:43

## 2020-06-03 NOTE — PLAN OF CARE
Discharge Planner PT   Patient plan for discharge: TCU  Current status: Evaluation complete, treatment initiated. Pt performs bed mobility with assist at RLE and HOB elevated. Pt performs STS from EOB to walker with elevated surface and [place]. Pt attempts some steps however has difficulty advancing LLE due to partial WB status. Pt has some difficulty with lightheadedness and pain this session.   Barriers to return to prior living situation: Pain, assist needed with mobility.  Recommendations for discharge: TCU  Rationale for recommendations: Pt continues to require skilled therapy intervention in order to progress to PLOF.        Entered by: Mary Grubbs 06/03/2020 9:53 AM

## 2020-06-03 NOTE — PROGRESS NOTES
06/03/20 0851   Quick Adds   Type of Visit Initial PT Evaluation       Present no   Language English   Living Environment   Lives With spouse   Living Arrangements house   Home Accessibility stairs to enter home   Number of Stairs, Main Entrance 1   Stair Railings, Main Entrance none   Transportation Anticipated health plan transportation   Living Environment Comment At baseline, pt lives with  in a duplex with 1 stair to enter, no railings. Laundry is in the basement but her  has been doing that. Prior to admission pt was in a TCU rehabing from TKA. Will likely discharge back to TCU, would like to get home eventually.    Self-Care   Usual Activity Tolerance moderate   Current Activity Tolerance fair   Regular Exercise No   Equipment Currently Used at Home walker, standard   Activity/Exercise/Self-Care Comment Limited activity prior to surgery due to previous TKA. Was at TCU   Functional Level Prior   Ambulation 1-->assistive equipment   Transferring 1-->assistive equipment   Toileting 0-->independent   Bathing 0-->independent   Communication 0-->understands/communicates without difficulty   Swallowing 0-->swallows foods/liquids without difficulty   Cognition 0 - no cognition issues reported   Fall history within last six months no   Which of the above functional risks had a recent onset or change? 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 On 4/20/2020 coverage of right lower extremity popliteal wound with free left anterolateral thigh fasciocutaneous flap. MMB: Multiple organisms.  Admitted on 6/2/2020 s/p following procedure:1. Explantation of Right total knee2. Manufacture and implantation of Vanco/tobramycin/ceftazidime antibiotic spacer knee right.   Precautions/Limitations   (50% WB)   Weight-Bearing Status - LUE weight-bearing as tolerated   Weight-Bearing Status - RUE weight-bearing as tolerated   Weight-Bearing Status - LLE weight-bearing as tolerated   Weight-Bearing Status - RLE partial weight-bearing (% in comments)  (50%)   General Observations Pt supine upon arrival, agreeable to PT.    General Info Comments IV and Oxymetry   Cognitive Status Examination   Orientation orientation to person, place and time   Level of Consciousness alert   Follows Commands and Answers Questions 100% of the time   Personal Safety and Judgment intact   Memory intact   Pain Assessment   Patient Currently in Pain Yes, see Vital Sign flowsheet   Integumentary/Edema   Integumentary/Edema Comments Noted edema around knee and in lower leg as is consistent with postop. Also noted incision with dressings. No redness or irritation noted.   Posture    Posture Forward head position;Protracted shoulders;Kyphosis   Posture Comments Demonstrates in supine and sitting. Able to correct some with cues   Range of Motion (ROM)   ROM Comment ROM impairement noted on RLE as is consistent with explant. No measurement taken this morning however approx 50 degrees. WFL ROM throughout other limbs.    Strength   Strength Comments Noted strength impairement on RLE as is consistent with explant surgery. Mild strength impairement noted throughout LLE and BUEs during functional mobiltiy as pt struggles to place weight through her UEs on walker and also requires elevated bed to stand.    Bed Mobility   Bed Mobility Comments PT: Pt performs bed mobility with assist at RLE and HOB elevated.   Transfer Skills   Transfer Comments PT: Pt performs STS at edge of bed with bed elevated to walkere and [place].   Gait   Gait Comments PT: Pt able  "to complete some marching at edge of bed but unable to take steps   Balance   Balance Comments PT: Mild balance impairement noted during marching   Sensory Examination   Sensory Perception Comments None noted   Modality Interventions   Planned Modality Interventions Cryotherapy   General Therapy Interventions   Planned Therapy Interventions balance training;gait training;ROM;strengthening;stretching;home program guidelines   Clinical Impression   Criteria for Skilled Therapeutic Intervention yes, treatment indicated   PT Diagnosis Pt presents with impaired functional mobility   Influenced by the following impairments Pain, WB status, impaired strength, impaired ROM, impaired balance   Functional limitations due to impairments Impaired bed mobility, transfers and gait   Clinical Presentation Stable/Uncomplicated   Clinical Presentation Rationale Jigna Allen is a 85 year 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 On 4/20/2020 coverage of right lower extremity popliteal wound with free left anterolateral thigh fasciocutaneous flap. MMB: Multiple organisms. Admitted on 6/2/2020 s/p following procedure:1. Explantation of Right total knee2. Manufacture and implantation of Vanco/tobramycin/ceftazidime antibiotic spacer knee right. Mobilizing well   Clinical Decision Making (Complexity) Low complexity   Therapy Frequency 2x/day   Predicted Duration of Therapy Intervention (days/wks) 4 days   Anticipated Discharge Disposition Transitional Care Facility   Risk & Benefits of therapy have been explained Yes   Patient, Family & other staff in agreement with plan of care Yes   [facility] [place] TM \"6 Clicks\"   2016, Trustees of [facility], under license to [employer].  All rights reserved.   6 Clicks Short Forms Basic Mobility Inpatient Short Form   [facility]  \"6 Clicks\" V.2 Basic Mobility Inpatient Short Form   1. Turning from " your back to your side while in a flat bed without using bedrails? 3 - A Little   2. Moving from lying on your back to sitting on the side of a flat bed without using bedrails? 3 - A Little   3. Moving to and from a bed to a chair (including a wheelchair)? 2 - A Lot   4. Standing up from a chair using your arms (e.g., wheelchair, or bedside chair)? 2 - A Lot   5. To walk in hospital room? 2 - A Lot   6. Climbing 3-5 steps with a railing? 2 - A Lot   Basic Mobility Raw Score (Score out of 24.Lower scores equate to lower levels of function) 14   Total Evaluation Time   Total Evaluation Time (Minutes) 10

## 2020-06-03 NOTE — CONSULTS
GENERAL INFECTIOUS DISEASES CONSULTATION     Patient:  Jigna Allen   Date of birth 1935, Medical record number [id number]  Date of Visit:  06/03/2020  Date of Admission: 6/2/2020  Consult Requested by:He Gomez MD  Reason for Consult:  Infected Right totak knee arthroplasty          Assessment and Recommendations:   ASSESSMENT:    Jigna Allen is an 85 year old woman who has a history of  sarcoma, anemia, arthritis, hypothyroidism, mitral valve regurgitation. For her sarcoma, she is s/p chemo with doxil/ifos, surgical resection (2/15/2010), and radiation (last tx June 2010), post-op course complicated by seroma requiring I&D and treated with antibiotics, then recovered well and underwent right knee TKA (6/2018).  In December 2019 developed a non-healing right popliteal wound at site of previous surgery (2010) with polymicrobial prosthetic joint and wound infection.     1. Right knee prosthetic joint infection  - Culture from aspiration (3/30/20): Strep constellatus, and Clostridium ramosum (broth), Peptoniphilus asaccharolyticus, anaerobic gram positive rods (unable to completely identify)  - Right non-healing popliteal fossa wound with polymicrobial infection (related to #1)   - Culture from [facility] (3/27/20): ESBL E.coli, Klebsiella pneumoniae, multiple anaerobes  - Preliminary cultures (4/20/20): Streptococcus intermedius, Eikenella corrodens    2. S/P right TKA (6/12/18)  3. 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 Post op had seroma that was treated with I&D and antibiotics.    RECOMMENDATION:  - agree with Ertapenem   - follow-up intra-op cultures     ID will follow     Thank you for allowing us to participate in the care of this patient    Harry Cavazos MD, M.Med.Sc  Staff, Infectious Diseases   Pager : [phone]         History of Present Illness:     Jigna Allen is an 85 year old woman who has a history of  sarcoma,  anemia, arthritis, hypothyroidism, mitral valve regurgitation. For her sarcoma, she is s/p chemo with doxil/ifos, surgical resection (2/15/2010), and radiation (last tx June 2010), post-op course complicated by seroma requiring I&D and treated with antibiotics, then recovered well and underwent right knee TKA (6/2018).  In December 2019 developed a non-healing right popliteal wound at site of previous surgery (2010) with polymicrobial prosthetic joint and wound infection. Wound cultures grew  ESBL E.coli, Klebsiella pneumoniae, multiple anaerobes and joint aspiration with growth of Strep constellatus, and Clostridium ramosum (broth), Peptoniphilus asaccharolyticus, anaerobic gram positive rods (unable to completely identify). Had intermittently been on PO antibiotics during course of work up. After joint infection was identified on 3/30/20 met with orthopedics and plastic surgery- recommended amputation vs attempt at salvaging limb and Jigna wished to pursue salvage. Was admitted on 4/20/20 for scheduled I&D of wound with flap closure, basia pus noted during procedure and sent for cultures. Plan for 2 stage right TKA revision to start in 2-3 months. Has felt fatigued and low-grade temps to 99F with no high grade fevers, no additional systemic symptoms. Cultures grew Streptococcus intermedius and Eikenella corrodens. Had about 6 weeks of Ertapenem. She was last seen/ followed by ID staff Dr Jefferson. She has persistent drainage but denies pain, fever, chills, nightsweats. admitted on 6/1/20     On 6/2/20, she had explantation of right total knee hardware and implantation of Vanco/Genta/Ceftaz spacer. Intraop cultures are negative so far.  was on Ertapenem prior to surgery     Antibiotics   Ertapenem     Recent culture results include:  Culture Micro   Date Value Ref Range Status   06/02/2020 PENDING  Preliminary   06/02/2020 PENDING  Preliminary   04/20/2020 (A)  Final    Moderate growth  Gemella  morbillorum  Susceptibility testing not routinely done     04/20/2020 (A)  Final    Moderate growth  Parvimonas micra  Susceptibility testing not routinely done     04/20/2020 (A)  Final    Moderate growth  Peptoniphilus asaccharolyticus  Susceptibility testing not routinely done     04/20/2020 (A)  Final    Light growth  Prevotella species  Identification obtained by MALDI-TOF mass spectrometry research use only database. Test   characteristics determined and verified by the Infectious Diseases Diagnostic Laboratory   ([facility]) [place], [place].  Beta lactamase positive  Susceptibility testing not routinely done     04/20/2020 Culture negative after 4 weeks  Final   04/20/2020 (A)  Final    Light growth  Streptococcus intermedius  Susceptibility testing not routinely done     04/20/2020 Light growth  Eikenella corrodens   (A)  Final   04/01/2020 No growth  Final          Review of Systems:   CONSTITUTIONAL:  No fevers or chills  EYES: negative for icterus  ENT:  negative for hearing loss, tinnitus and sore throat  RESPIRATORY:  negative for cough with sputum and dyspnea  CARDIOVASCULAR:  negative for chest pain, dyspnea  GASTROINTESTINAL:  negative for nausea, vomiting, diarrhea and constipation  GENITOURINARY:  negative for dysuria  HEME:  No easy bruising  INTEGUMENT: see HPI   NEURO:  Negative for headache         Past Medical History:     Past Medical History:   Diagnosis Date     Anemia      Anxiety      Aortic valve sclerosis      Arthritis 1990?     Complication of anesthesia     slow to wakeup     Hypothyroid      Mild tricuspid regurgitation      Sarcoma (H) 2009            Past Surgical History:     Past Surgical History:   Procedure Laterality Date     ARTHROPLASTY KNEE Right 6/12/2018    Procedure: ARTHROPLASTY KNEE;  Right Total Knee Replacement ;  Surgeon: He Gomez MD;  Location: [facility] OR     CARPAL TUNNEL RELEASE RT/LT       excise sarcoma Right     right leg     exicision of wound  03/27/2020      GRAFT FREE VASCULARIZED TRANSVERSE RECTUS ABDOMINIS MYOCUTANEOUS Left 4/20/2020    Procedure: With left anterolateral thigh free flap with SPY;  Surgeon: EHSAN De Luna MD;  Location: [facility] OR     KNEE SURGERY       PICC DOUBLE LUMEN PLACEMENT Right 04/23/2020    5FR DL PICC inserted at brachial medial, length- 37cm (1cm out). Tip at mid SVC     RECONSTRUCT KNEE Right 4/20/2020    Procedure: Right leg reconstruction and wound debridemenr;  Surgeon: EHSAN De Luna MD;  Location: [facility] OR     TUBAL LIGATION              Family History:     Family History   Problem Relation Age of Onset     Cancer Father      Prostate Cancer Father      Other Cancer Father      Cancer Brother      Other - See Comments Daughter         Slow to wake      Asthma Daughter             Social History:     Social History     Tobacco Use     Smoking status: Never Smoker     Smokeless tobacco: Never Used   Substance Use Topics     Alcohol use: Not Currently     Comment: rarely - months      History   Sexual Activity     Sexual activity: Yes     Partners: Male     Birth control/ protection: Post-menopausal          Current Medications (antimicrobials listed in bold):       sodium chloride 0.9%  500 mL Intravenous Once     acetaminophen  975 mg Oral Q8H     aspirin  325 mg Oral BID     ertapenem  1 g Intravenous QPM     levothyroxine  75 mcg Oral QAM AC     polyethylene glycol  17 g Oral Daily     senna-docusate  2 tablet Oral BID     sodium chloride (PF)  3 mL Intracatheter Q8H          Allergies:     Allergies   Allergen Reactions     Colchicine      Other reaction(s): Gastrointestinal  diarrhea     Niacin      skin rash     Ferrous Sulfate Rash            Physical Exam:   Vitals were reviewed  Patient Vitals for the past 24 hrs:   BP Temp Temp src Pulse Heart Rate Resp SpO2   06/03/20 0637 (!) 92/38 -- -- -- -- -- --   06/03/20 0633 (!) 92/36 -- -- -- 72 -- --   06/03/20 0304 92/43 -- -- 68 71 -- 96 %   06/02/20 2254 (!) 102/36 96.9   F (36.1  C) Oral 67 -- 14 94 %   06/02/20 2050 112/46 -- -- 66 65 -- 96 %   06/02/20 1950 109/56 -- -- 66 68 -- 97 %   06/02/20 1900 -- -- -- -- 71 14 98 %   06/02/20 1850 123/47 -- -- 81 78 10 98 %   06/02/20 1845 -- -- -- 67 -- 9 97 %   06/02/20 1820 116/48 -- -- 72 65 -- 97 %   06/02/20 1815 -- -- -- -- 86 16 96 %   06/02/20 1800 -- -- -- -- 72 11 94 %   06/02/20 1745 119/45 97.4  F (36.3  C) Oral 79 -- 13 95 %   06/02/20 1730 112/54 98.1  F (36.7  C) Axillary 83 81 12 94 %   06/02/20 1715 126/50 -- -- 94 92 18 98 %   06/02/20 1700 121/52 -- -- 85 84 26 97 %   06/02/20 1645 119/54 -- -- 89 87 17 100 %   06/02/20 1630 122/55 98.6  F (37  C) Axillary 87 88 18 100 %   06/02/20 1615 127/55 -- -- 90 92 (!) 7 100 %   06/02/20 1602 128/49 97.5  F (36.4  C) Axillary 99 -- 12 100 %     Ranges for his vital signs:  Temp:  [96.9  F (36.1  C)-98.6  F (37  C)] 96.9  F (36.1  C)  Pulse:  [66-99] 68  Heart Rate:  [65-92] 72  Resp:  [7-26] 14  BP: ()/(36-56) 92/38  SpO2:  [94 %-100 %] 96 %    Physical Examination:  GENERAL:  well-developed, well-nourished, in bed in no acute distress.   HEENT:  Head is normocephalic, atraumatic   EYES:  Eyes have anicteric sclerae without conjunctival injection  ENT:  Oropharynx is moist without exudates or ulcers. Tongue is midline  NECK:  Supple. No  Cervical lymphadenopathy  LUNGS:  Clear to auscultation bilateral.   CARDIOVASCULAR:  Regular rate and rhythm with no murmurs  ABDOMEN:  Normal bowel sounds, soft, nontender. No appreciable hepatosplenomegaly  Extremities : right leg - trace edema , left leg - no edema. surgical site - left leg healed, right knee - drain in place   SKIN:  No acute rashes.  Line(s) are in place without any surrounding erythema or exudate.  NEUROLOGIC:  Grossly nonfocal. Active x4 extremities, except for right leg non weight bearing s/p surgery          Laboratory Data:     Inflammatory Markers    Recent Labs   Lab Test 05/14/20  0530 04/01/20  2751  09/08/16  0930   SED  --  111*  --    CRP 2.8* 180.0* <2.9     Hematology Studies    Recent Labs   Lab Test 06/03/20  0544 05/14/20  0530 04/30/20  0557 04/23/20  0703 04/22/20  1024 04/20/20  1620 04/20/20  1031 04/01/20  1556  06/05/18  1112 05/10/16  1035 10/07/14  0848 05/08/14  1015 11/19/13  1119 09/03/13  0908   WBC 5.9 4.0 5.4  --  6.2  --   --  9.8  --  4.8 6.1 3.1* 3.0* 4.3 3.6*   ANEU  --   --   --   --   --   --   --  8.6*  --   --  4.1 1.6 1.8 3.1 2.5   AEOS  --   --   --   --   --   --   --  0.0  --   --  0.1 0.1 0.1 0.1 0.1   HGB 7.3* 8.9* 8.4*  --  7.3* 8.5* 9.0* 10.5*   < > 14.3 13.5 13.7 13.3 13.2 13.0   MCV 95 98 99  --  98  --   --  98  --  99 97 100 99 101* 101*    393 535* 297 279 290  --  356  --  197 229 198 188 208 183    < > = values in this interval not displayed.     Metabolic Studies     Recent Labs   Lab Test 06/03/20  0544 04/24/20  0729 04/22/20  1024 04/21/20  0627 04/20/20  1620    137 136 138 135   POTASSIUM 4.0 4.1 3.5 4.0 4.1   CHLORIDE 104 104 104 106 104   CO2 28 28 27 27 27   BUN 23 12 12 12 14   CR 0.68 0.58 0.65 0.64 0.61   GFRESTIMATED 81 85 82 82 84     Hepatic Studies    Recent Labs   Lab Test 04/01/20  1556 05/10/16  1035 10/07/14  0848 05/08/14  1015 11/19/13  1119 09/03/13  0908   BILITOTAL 0.6 0.8 0.5 0.8 0.8 0.7   ALKPHOS 85 91 85 69 77 70   ALBUMIN 2.5* 3.8 3.6 3.9 4.2 3.8   AST 36 19 13 22 19 21   ALT 26 29 21 20 29 29     Thyroid Studies    Recent Labs   Lab Test 05/09/17  1041 05/10/16  1035   TSH 2.47 2.28     Microbiology:  Culture Micro   Date Value Ref Range Status   06/02/2020 PENDING  Preliminary   06/02/2020 PENDING  Preliminary   04/20/2020 (A)  Final    Moderate growth  Gemella morbillorum  Susceptibility testing not routinely done     04/20/2020 (A)  Final    Moderate growth  Parvimonas micra  Susceptibility testing not routinely done     04/20/2020 (A)  Final    Moderate growth  Peptoniphilus asaccharolyticus  Susceptibility testing not  routinely done     04/20/2020 (A)  Final    Light growth  Prevotella species  Identification obtained by MALDI-TOF mass spectrometry research use only database. Test   characteristics determined and verified by the Infectious Diseases Diagnostic Laboratory   ([facility]) [place], [place].  Beta lactamase positive  Susceptibility testing not routinely done     04/20/2020 Culture negative after 4 weeks  Final   04/20/2020 (A)  Final    Light growth  Streptococcus intermedius  Susceptibility testing not routinely done     04/20/2020 Light growth  Eikenella corrodens   (A)  Final   04/01/2020 No growth  Final   04/01/2020 No growth  Final   03/30/2020 Light growth  Peptoniphilus asaccharolyticus   (A)  Final   03/30/2020 (A)  Final    Light growth  Anaerobic gram positive rods  Unable to completely identify     03/30/2020   Final    Critical Value/Significant Value called to and read back by  ISABELL DARDEN MD, 1034 4.1.20 MJ     03/30/2020 Light growth  Streptococcus constellatus   (A)  Final   03/30/2020 (A)  Final    On day 2, isolated in broth only:  Clostridium ramosum     06/05/2018 <10,000 colonies/mL  mixed urogenital kenisha    Final   09/16/2016 No growth  Final   09/16/2016 No growth  Final   03/02/2010 Heavy growth Staphylococcus aureus  Final   03/02/2010 No anaerobes isolated  Final   03/02/2010   Final    Culture received and in progress. Assayed at [facility]     Comment:      [place] [address]  Positive AFB results are called as soon as detected.  Final report to follow in 7 to 8 weeks.  Culture negative for acid fast bacilli   03/01/2010 Heavy growth Staphylococcus aureus  Final   03/01/2010 Culture negative after 4 weeks  Final   03/01/2010 Not received in an anaerobic transport container.  Final     Comment:     Canceled, Test credited  Notified EDDIE Hernandez   03/01/2010 No growth  Final   03/01/2010 No growth  Final   12/23/2009 No growth  Final   10/30/2009 <10,000 colonies/mL Mixed gram  positive kenisha  Final     Comment:     Multiple species present, probable perineal contamination.     Urine Studies    Recent Labs   Lab Test 06/05/18  1122   LEUKEST Large*   WBCU 5     Hepatitis B Testing   Recent Labs   Lab Test 04/20/20  1020   HEPBANG Nonreactive

## 2020-06-03 NOTE — PLAN OF CARE
VS: Received 500mL bolus this AM for BP of 92/38 and sl LH. Improved to 100-39 at 1641, now asymptomatic. SL per MD. Pt states that her baseline BP is 125/68.   O2: >90% on RA   Output: Vera out at 0600. Voiding adequate amounts; PVR <50ml   Last BM: 6/1; +fl   Activity: TTWB RLE. Up with A1-2 with FWW and gait belt   Up for meals? Declined   Skin: Incision/drain RLE; bruising   Pain: 5mg oxycodone administered x1 on day shift. Pt reluctant to take oxycodone; would like to try and manage with scheduled tylenol. Educated on risks, side effects and benefits.   CMS: Numbness present R knee   Dressing: CDI; ACE wrap removed   Diet: Regular; tolerating well   LDA: Double lumen PICC successfully TPA'd this shift; now SL. Receiving Invanz q24h. Caps changed today 6/3   Equipment: PCDs, IV pole, FWW, gait belt, BSC   Plan: Likely return to [facility] when stable.    Additional Info: Hgb 7.3; continue to monitor.

## 2020-06-03 NOTE — CONSULTS
HOSPITALIST CONSULTATION     REQUESTING PHYSICIAN: He Gomez MD    REASON FOR CONSULTATION: Evaluation, Recommendations and Co-management of Medical Comorbidities.     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   On 4/20/2020 coverage of right lower extremity popliteal wound with free left anterolateral thigh fasciocutaneous flap. MMB: Multiple organisms.     Admitted on 6/2/2020 s/p following procedure:   1. Explantation of Right total knee  2. Manufacture and implantation of Vanco/tobramycin/ceftazidime antibiotic spacer knee right.    By Dr Gomez. EBL: 300ml. Complications: None    PMH, Pre Op eval reviewed. Currently doing well. Pain controlled. No fever. Denies cp or sob. No LH. No NV.     Comorbid conditions of aortic sclerosis, tricuspid regurgitation, anemia, hypothyroidism, malnutrition, insomnia, arthritis, sarcoma.      # Orthopedic Surgery:     Post Op Management per Primary team.   Hemodynamics: stable. Continue on gentle IV fluids, until adequate PO. Monitor I/o.   Post op capnography per protocol.   Pain control- per Primary team     Minimize use of narcotics as able. Consider bowel regimen with narcotics.   Encourage Incentive spirometry to prevent atelectasis.  DVT prophylaxis- per Primary team  Activity, wound and/or drain care - per Primary team.     # ID. The patient is being followed by infectious disease outpatient and last seen on 5/19/20. She has a PICC and getting IV ertapenem.   - Continue iv Ertapenem  - ID consult- pending.   - Follow-up outstanding cultures.     # CVS: She denies any cardiac symptoms.  Patient has aortic sclerosis without stenosis and mild tricuspid regurgitation seen on 6/2018 echo. She had a recent EKG on 3/24/20 with sinus rhythm. BNP was checked at prior appointment in 4/2020 and was 21 and most recently on  5/14/20 and was 22.  - Monitor vitals.     # Pulmonary: Asymptomatic. Doing well. Encourage IS.     # Heme: Anemia - the patient's baseline appears to be 10-11. Last hgb was 8.9 on 5/14/20. Monitor hgb for anemia of acute blood loss. Transfuse for hgb <7.0    # Endo: hypothyroidism - Continue PTA levothyroxine 75 mcg daily.   # GI: Risk of PONV score = 3.  Antiemetics prn.   # Nutrition: Malnutrition - the patient is drinking Ensure three times a day along with Ashwin.  # Psych: Insomnia - the patient is taking melatonin. Continue.     Rest per primary team.     Thank you for the consultation. Please page with any question. Hospitalist team to follow.      Arcadio Parker MD   Hospitalist, Internal Medicine  Pager: [phone].     CHIEF COMPLAINT: Post Op. Doing well.     HISTORY OF PRESENT ILLNESS: Obtained from the patient and chart review including Pre op evaluation, procedure note.    85 year old year old female  with above discussed medical problems s/p above procedure admitted on 6/2/2020  for post op care and monitoring  (for further details for indication of surgery and operative note, please refer to He Gomez MD note). Medicine consulted to evaluate, recommend and/or co manage medical co morbidities.   No documented hypotension, hypoxemia or other significant complications intra or post operative.   Currently: Incisional Pain controlled. Denies any chest pain, shortness of breath or LH or palpitations. Denies any nausea, vomiting or pain abdomen. No fever or chills. Denies any dysuria.  Medical issues as discussed above.   Denies any other medical concern.     PAST MEDICAL HISTORY:   Past Medical History:   Diagnosis Date     Anemia      Anxiety      Aortic valve sclerosis      Arthritis 1990?     Complication of anesthesia     slow to wakeup     Hypothyroid      Mild tricuspid regurgitation      Sarcoma (H) 2009       PAST SURGICAL HISTORY:   Past Surgical History:   Procedure Laterality Date      ARTHROPLASTY KNEE Right 6/12/2018    Procedure: ARTHROPLASTY KNEE;  Right Total Knee Replacement ;  Surgeon: He Gomez MD;  Location: [facility] OR     CARPAL TUNNEL RELEASE RT/LT       excise sarcoma Right     right leg     exicision of wound  03/27/2020     GRAFT FREE VASCULARIZED TRANSVERSE RECTUS ABDOMINIS MYOCUTANEOUS Left 4/20/2020    Procedure: With left anterolateral thigh free flap with SPY;  Surgeon: EHSAN De Luna MD;  Location: [facility] OR     KNEE SURGERY       PICC DOUBLE LUMEN PLACEMENT Right 04/23/2020    5FR DL PICC inserted at brachial medial, length- 37cm (1cm out). Tip at mid SVC     RECONSTRUCT KNEE Right 4/20/2020    Procedure: Right leg reconstruction and wound debridemenr;  Surgeon: EHSAN De Luna MD;  Location: [facility] OR     TUBAL LIGATION         FH: reviewed.     Family History   Problem Relation Age of Onset     Cancer Father      Prostate Cancer Father      Other Cancer Father      Cancer Brother      Other - See Comments Daughter         Slow to wake      Asthma Daughter         SH: reviewed.     Social History     Socioeconomic History     Marital status:      Spouse name: None     Number of children: None     Years of education: None     Highest education level: None   Occupational History     None   Social Needs     Financial resource strain: None     Food insecurity     Worry: None     Inability: None     Transportation needs     Medical: None     Non-medical: None   Tobacco Use     Smoking status: Never Smoker     Smokeless tobacco: Never Used   Substance and Sexual Activity     Alcohol use: Not Currently     Comment: rarely - months      Drug use: Never     Sexual activity: Yes     Partners: Male     Birth control/protection: Post-menopausal   Lifestyle     Physical activity     Days per week: None     Minutes per session: None     Stress: None   Relationships     Social connections     Talks on phone: None     Gets together: None     Attends [employer] service: None      Active member of club or organization: None     Attends meetings of clubs or organizations: None     Relationship status: None     Intimate partner violence     Fear of current or ex partner: None     Emotionally abused: None     Physically abused: None     Forced sexual activity: None   Other Topics Concern     Parent/sibling w/ CABG, MI or angioplasty before 65F 55M? Not Asked   Social History Narrative     None       ALLERGIES:   Allergies   Allergen Reactions     Colchicine      Other reaction(s): Gastrointestinal  diarrhea     Niacin      skin rash     Ferrous Sulfate Rash         HOME MEDICATIONS:     Prior to Admission medications    Medication Sig Start Date End Date Taking? Authorizing Provider   levothyroxine (LEVOTHROID) 75 MCG tablet Take 75 mcg by mouth every morning    Yes Reported, Patient   acetaminophen (TYLENOL) 500 MG tablet Take 500-1,000 mg by mouth every 8 hours as needed for mild pain (PT last dose 5.25.2020)     Unknown, Entered By History   ertapenem (INVANZ) 1 GM vial Inject 1 g into the vein every 24 hours  Patient taking differently: Inject 1 g into the vein every evening  4/23/20 7/15/20  Junior Washburn MD   melatonin 3 MG tablet Take 3 mg by mouth nightly as needed (PT last dose 5.31.2020)  5/1/20   Reported, Patient   Nutritional Supplements (ENSURE ACTIVE PO) Take by mouth 3 times daily    Reported, Patient   Nutritional Supplements (NANETTE) PACK Take by mouth 2 times daily    Reported, Patient   senna-docusate (SENOKOT-S/PERICOLACE) 8.6-50 MG tablet Take 1 tablet by mouth 2 times daily as needed for constipation 4/23/20   Junior Washburn MD       CURRENT MEDICATIONS:    Current Facility-Administered Medications   Medication     [START ON 6/5/2020] acetaminophen (TYLENOL) tablet 650 mg     acetaminophen (TYLENOL) tablet 975 mg     [START ON 6/3/2020] aspirin (ASA) EC tablet 325 mg     bisacodyl (DULCOLAX) Suppository 10 mg     HYDROmorphone (DILAUDID) injection 0.2-0.4 mg      "lactated ringers infusion     lidocaine (LMX4) cream     lidocaine 1 % 0.1-1 mL     naloxone (NARCAN) injection 0.1-0.4 mg     naloxone (NARCAN) injection 0.1-0.4 mg     ondansetron (ZOFRAN-ODT) ODT tab 4 mg    Or     ondansetron (ZOFRAN) injection 4 mg     oxyCODONE (ROXICODONE) tablet 5-10 mg     polyethylene glycol (MIRALAX) Packet 17 g     senna-docusate (SENOKOT-S/PERICOLACE) 8.6-50 MG per tablet 2 tablet     sodium chloride (PF) 0.9% PF flush 3 mL     sodium chloride (PF) 0.9% PF flush 3 mL         ROS: 10 point ROS neg other than the symptoms noted above in the HPI.    PHYSICAL EXAMINATION:     /45 (BP Location: Left arm)   Pulse 79   Temp 97.4  F (36.3  C) (Oral)   Resp 13   Ht 1.575 m (5' 2.01\")   Wt 54.9 kg (121 lb 0.5 oz)   SpO2 95%   BMI 22.13 kg/m    Temp (24hrs), Av.8  F (36.6  C), Min:97.4  F (36.3  C), Max:98.6  F (37  C)      BMI= Body mass index is 22.13 kg/m .      Intake/Output Summary (Last 24 hours) at 2020 191  Last data filed at 2020 1730  Gross per 24 hour   Intake 1613 ml   Output 615 ml   Net 998 ml       General: Alert, interactive, NAD.   HEENT: AT/NC. Anicteric.Moist MM.    Neck: Supple.   Heart/CVS: Normal S1 and S2. Regular.   Chest/Respi: Non labored breathing. CTA BL.   Abdomen/GI: Soft, non distended, non tender. No g/r.  Extremities/MSK: Distal pulses 2+, well perfused. Rest per ortho.   Neuro: Alert and oriented x4.     Skin:  No new rash over exposed areas.       LABORATORY DATA: reviewed.     Recent Results (from the past 24 hour(s))   Glucose by meter    Collection Time: 20  6:53 AM   Result Value Ref Range    Glucose 87 70 - 99 mg/dL   Anaerobic bacterial culture    Collection Time: 20 12:46 PM    Specimen: Leg Lower, Right; Fluid    Fluid~1   Result Value Ref Range    Specimen Description Right Leg Fluid 1     Special Requests Received in anaerobic tubes.     Culture Micro PENDING    Fluid Culture Aerobic Bacterial    Collection Time: " 06/02/20 12:46 PM    Specimen: Leg Lower, Right; Fluid    Right~1   Result Value Ref Range    Specimen Description Left Leg Right 1     Culture Micro PENDING        Recent Results (from the past 24 hour(s))   XR Knee Port Right 1/2 Views    Narrative    Exam: 2 views of the right knee dated 6/2/2020.    COMPARISON: 6/1/2020.    CLINICAL HISTORY: Status post removal of total knee arthroplasty and  placement of an antibiotic spacer.    FINDINGS: AP and lateral views of the right knee were obtained. New  postsurgical changes status post removal of a right total knee  arthroplasty and placement of antibiotic spacer device. Postoperative  air is noted. Surgical drain in place. Surgical clips noted.      Impression    IMPRESSION: New post surgical changes of removal of a right total knee  arthroplasty with interval placement of antibiotic spacer device.    MD Arcadio VITAL MD

## 2020-06-03 NOTE — PROGRESS NOTES
06/03/20 1150   Quick Adds   Type of Visit Initial Occupational Therapy Evaluation   Living Environment   Lives With spouse   Living Arrangements house   Number of Stairs, Main Entrance 1   Living Environment Comment One story; walk in shower/tub, built in shower chair, higher toilet   Self-Care   Usual Activity Tolerance moderate   Current Activity Tolerance fair   Equipment Currently Used at Home none   Activity/Exercise/Self-Care Comment Patient independent in ADLs/mobility without AE   Functional Level   Ambulation 0-->independent   Transferring 0-->independent   Toileting 0-->independent   Bathing 0-->independent   Dressing 0-->independent   Eating 0-->independent   Communication 0-->understands/communicates without difficulty   Swallowing 0-->swallows foods/liquids without difficulty   Cognition 0 - no cognition issues reported   Fall history within last six months no   Prior Functional Level Comment Patient independent in ADLs/mobility without AE   General Information   Onset of Illness/Injury or Date of Surgery - Date 06/02/20   Referring Physician Dr. 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 On 4/20/2020 coverage of right lower extremity popliteal wound with free left anterolateral thigh fasciocutaneous flap. MMB: Multiple organisms. Admitted on 6/2/2020 s/p following procedure:1. Explantation of Right total knee2. Manufacture and implantation of Vanco/tobramycin/ceftazidime antibiotic spacer knee right   Precautions/Limitations other (see comments)  (no brace needed)   Weight-Bearing Status - RLE toe touch weight-bearing;touch down weight-bearing   General Observations On RA, alert   Cognitive Status Examination   Cognitive Comment No cognitive concerns   Sensory  Examination   Sensory Comments No N/T noted   Pain Assessment   Patient Currently in Pain Yes, see Vital Sign flowsheet   Range of Motion (ROM)   ROM Comment B UE AROM WFL   Mobility   Bed Mobility Bed mobility skill: Supine to sit   Bed Mobility Skill: Supine to Sit   Level of [place]: Supine/Sit minimum assist (75% patients effort)   Transfer Skill: Bed to Chair/Chair to Bed   Level of [place]: Bed to Chair minimum assist (75% patients effort)   Weight-Bearing Restrictions partial weight-bearing   Assistive Device - Transfer Skill Bed to Chair Chair to Bed Rehab Eval standard walker   Transfer Skill: Sit to Stand   Level of [place]: Sit/Stand minimum assist (75% patients effort)   Assistive Device for Transfer: Sit/Stand standard walker   Transfer Skill: Toilet Transfer   Toilet Transfer Skill Comments Not assessed   Upper Body Dressing   Level of [place]: Dress Upper Body independent   Lower Body Dressing   Level of [place]: Dress Lower Body moderate assist (50% patients effort)   Grooming   Level of [place]: Grooming independent   Eating/Self Feeding   Level of [place]: Eating independent   Activities of Daily Living Analysis   Impairments Contributing to Impaired Activities of Daily Living pain;post surgical precautions;ROM decreased   General Therapy Interventions   Planned Therapy Interventions ADL retraining   Clinical Impression   Criteria for Skilled Therapeutic Interventions Met yes, treatment indicated   OT Diagnosis impaired self-cares/mobility   Influenced by the following impairments pain; decreased ROM   Assessment of Occupational Performance 1-3 Performance Deficits   Identified Performance Deficits dressing, bathing, toileting   Clinical Decision Making (Complexity) Low complexity   Therapy Frequency Daily   Predicted Duration of Therapy Intervention (days/wks) 2-3 days   Anticipated Discharge Disposition Transitional Care Facility   Risks and Benefits of  "Treatment have been explained. Yes   Patient, Family & other staff in agreement with plan of care Yes   [facility] TM \"6 Clicks\"   2016, Trustees of [facility], under license to [employer].  All rights reserved.   6 Clicks Short Forms Daily Activity Inpatient Short Form   [facility]  \"6 Clicks\" Daily Activity Inpatient Short Form   1. Putting on and taking off regular lower body clothing? 2 - A Lot   2. Bathing (including washing, rinsing, drying)? 2 - A Lot   3. Toileting, which includes using toilet, bedpan or urinal? 3 - A Little   4. Putting on and taking off regular upper body clothing? 4 - None   5. Taking care of personal grooming such as brushing teeth? 4 - None   6. Eating meals? 4 - None   Daily Activity Raw Score (Score out of 24.Lower scores equate to lower levels of function) 19   Total Evaluation Time   Total Evaluation Time (Minutes) 8     "

## 2020-06-03 NOTE — PLAN OF CARE
"  VS: /46   Pulse 66   Temp 97.4  F (36.3  C) (Oral)   Resp 14   Ht 1.575 m (5' 2.01\")   Wt 54.9 kg (121 lb 0.5 oz)   SpO2 96%   BMI 22.13 kg/m     O2: >1LPM   Output: Vera in place to be pulled out pOD1   Last BM: 6/1 per pt report.   Activity: Pt didn't get OOB.   Skin: Intact incision on Right knee.   Pain: Denies at this time.   CMS: Slight numbness on right leg getting better.   Dressing: CDI   Diet: Regular diet. Had sup and crackers.    LDA: PICC double lumen on right arm, flashes fine no blood return noted. PIV on L- hand infusing.  Vera , and JUAN R drain in place.   Equipment: IV pole, IS PCD, personal wheelchair, and personal belonging.   Plan: TBD.   Additional Info: Pt came from PACU at 5:45pm. Report was taken from Elvia PACU RN.  Alert and oriented, call light with in reach will continue to monitor.     "

## 2020-06-03 NOTE — PLAN OF CARE
VS: Received 500mL bolus this AM for BP of 92/38 and sl LH. Improved to 98/36 this afternoon, now asymptomatic. SL per MD. Pt states that her baseline BP is 125/68.   O2: >90% on RA   Output: Vera out at 0600. Voiding adequate amounts; first PVR 36   Last BM: 6/1; +fl   Activity: TTWB RLE. Up with 1A with FWW and gait belt   Up for meals? Declined   Skin: Incision/drain RLE; bruising   Pain: 5mg oxycodone administered x1. Pt reluctant to take oxycodone; would like to try and manage with scheduled tylenol. Educated on risks, side effects and benefits.   CMS: Numbness present R knee   Dressing: CDI; ACE wrap removed   Diet: Regular; tolerating well   LDA: Double lumen PICC successfully TPA'd this shift; now SL. Receiving Invanz q24h.   Equipment: PCDs, IV pole, FWW, gait belt, BSC   Plan: Likely return to [facility]   Additional Info: Hgb 7.3; continue to monitor.

## 2020-06-03 NOTE — PLAN OF CARE
VS: BP: 92/38mmHg this AM. Moonlight notified and bolus ordered.  Other vitals stable.    O2: Room air, maintaining sats >90 this shift  Continuous pulse ox   Output: Vera taken out at 0600, draining adequate amounts overnight. DTV.   Last BM: 6/2/2020   Activity: Assist x1, walker, gait belt. WBAT.  Dangled and stood at bedside this shift.    Skin: Incision, scars  Unwrapped ACE this shift. Skin is intact.   Pain: Denies pain this shift.   Gave scheduled tylenol and ice packs to incision   CMS: Some numbness around RLE knee,  no tingling   Dressing: ACE CDI, unwrapped this shift   Diet: Regular diet as tolerated by pt. Denies nausea.    LDA: Left PIV Infusing, Right PICC, JUAN R Drain (to bulb suction, 90ml output)   Equipment: Walker, gait belt, IV pole, pillows, PCD   Plan: Continue plan of care   Additional Info: Pt. Is A&O x4

## 2020-06-03 NOTE — PLAN OF CARE
"Discharge Planner OT   Patient plan for discharge: I have been told it is \"undetermined\".  Reports at baseline lives with spouse in one level home; has been at TCU for last month.  Current status: Patient alert and oriented; very pleasant and motivated.  Patient completed supine to sit with Min A, Min A to stand and transfer bed to chair.  G/h tasks completed with S/U while seated.  Barriers to return to prior living situation: Limited weightbearing, pain, decreased ROM  Recommendations for discharge: Anticipate TCU, would like to help patient to get home if possible but does not have enough support at home.  Spouse is unable to drive, children do not live in the area.    Rationale for recommendations: Continued daily OT for maximum independence in ADLs/mobility.       Entered by: Patience Mendez 06/03/2020 1:13 PM       "

## 2020-06-03 NOTE — ANESTHESIA POSTPROCEDURE EVALUATION
Anesthesia POST Procedure Evaluation    Patient: Jigna Allen   MRN:     [id number] Gender:   female   Age:    85 year old :      1935        Preoperative Diagnosis: Infection of prosthetic joint, subsequent encounter [T84.50XD]   Procedure(s):  Explantation of Right total knee  manufacture and implantation of Vanco/tobra ANTIBIOTIC SPACER, KNEE   Postop Comments: No value filed.     Anesthesia Type: General          Postop Pain Control: Uneventful            Sign Out: Well controlled pain   PONV: No   Neuro/Psych: Uneventful            Sign Out: Acceptable/Baseline neuro status   Airway/Respiratory: Uneventful            Sign Out: Acceptable/Baseline resp. status   CV/Hemodynamics: Uneventful            Sign Out: Acceptable CV status   Other NRE: NONE   DID A NON-ROUTINE EVENT OCCUR? No         Last Anesthesia Record Vitals:  CRNA VITALS  2020 1529 - 2020 1629      2020             Pulse:  101    SpO2:  99 %          Last PACU Vitals:  Vitals Value Taken Time   /45 2020  5:45 PM   Temp 36.3  C (97.4  F) 2020  5:45 PM   Pulse 79 2020  5:45 PM   Resp 13 2020  5:45 PM   SpO2 95 % 2020  5:45 PM   Temp src     NIBP     Pulse     SpO2     Resp     Temp     Ht Rate     Temp 2           Electronically Signed By: Miguel Graham MD, 2020, 9:12 PM

## 2020-06-03 NOTE — PROGRESS NOTES
Social Work Services Progress Note    Hospital Day: 2  Date of Initial Social Work Evaluation:  Recently completed on   Collaborated with:  Medical team, pt, pt's daughter Lawanda    Data:  Pt is a 85 year old female who presented to the hospital for a right total knee procedure. Pt was at [facility] prior to her procedure. TCU recommended upon discharge.    Per medical team, pt and family reporting that they do not want to return to [facility] due to positive COVID pts. Pt likely ready for discharge Friday/weekend.    SW attempted to meet with pt to provide introductions and discuss discharge planning. Pt asleep, did not wake to knock or name call.     SW called and spoke to pt's daughter, Lawanda, who is involved with pt's care per chart review. Lawanda provided additional preferences that she and her mother had discussed prior to her surgery. She confirmed that they did not want to return to [facility] and risk her lolly COVID 19-despite a positive experience with cares/staff.    Lawanda reports that her 's mother  this week from COVID-19 in a care setting and they are all very upset. Lawanda voicing concerns about losing her mom to COVID-19 too. SW provided support, empathetic listening.     SW initiated referrals:   -[facility] - [phone](possible positive COVID-19 cases per [facility] website-will verify with admissions)  -[facility] - [phone] (fax: [phone])  -[facility] - [phone] - no beds available at this time.  -[facility] - [phone]    E-mail sent to Lawanda with the list of TCU referrals that were initiated, per her request. [email or web address]    Intervention:  Discharge planning    Assessment:  Pt and family agreeable to TCU. Pt's family very concerned about risk of pt catching COVID in a care center.    Plan:    Anticipated Disposition:  Facility:  TBD    Barriers to  d/c plan:  Bed availability, medical stability    Follow Up:  SW to continue to follow    JOSE Colby  5A/10A Ortho/Med/Surg & [facility] Adult ED  Phone: [phone] Pager: [phone]

## 2020-06-03 NOTE — PROGRESS NOTES
"Attending note:    Pt doing well this AM and discussed surgical details.    EXAM:  Vital signs:  Temp: 96.9  F (36.1  C) Temp src: Oral BP: (!) 92/38(recheck) Pulse: 68 Heart Rate: 72 Resp: 14 SpO2: 96 % O2 Device: Nasal cannula Oxygen Delivery: 1 LPM Height: 157.5 cm (5' 2.01\") Weight: 54.9 kg (121 lb 0.5 oz)  Estimated body mass index is 22.13 kg/m  as calculated from the following:    Height as of this encounter: 1.575 m (5' 2.01\").    Weight as of this encounter: 54.9 kg (121 lb 0.5 oz).      ALT free flap 100% viable  Incision dry.  NV status intact                Recent Labs   Lab Test 06/03/20  0544 05/14/20  0530 04/30/20  0557  04/01/20  1556  09/08/16  0930   HGB 7.3* 8.9* 8.4*   < > 10.5*   < >  --    SED  --   --   --   --  111*  --   --    CRP  --  2.8*  --   --  180.0*  --  <2.9   WBC 5.9 4.0 5.4   < > 9.8   < >  --     < > = values in this interval not displayed.     IMP/PLAN:  1. WB status: 50%  2. Free ROM as tolerated of R knee  3. OK to discontinue ace wrap  4. Fluid bolus for low BP  5. Continue ertapenem IV x 4-6 weeks.  Vanco/Ceftazidime/Gent in PMMA  6. OK to return to [facility] when stable.    MD Pelon Cid Family Professor  Oncology and Adult Reconstructive Surgery  Dept Orthopaedic Surgery, [facility]  [phone] office, [phone] pager  [email or web address]      "

## 2020-06-04 ENCOUNTER — APPOINTMENT (OUTPATIENT)
Dept: OCCUPATIONAL THERAPY | Facility: CLINIC | Age: 85
DRG: 467 | End: 2020-06-04
Attending: ORTHOPAEDIC SURGERY
Payer: COMMERCIAL

## 2020-06-04 ENCOUNTER — APPOINTMENT (OUTPATIENT)
Dept: PHYSICAL THERAPY | Facility: CLINIC | Age: 85
DRG: 467 | End: 2020-06-04
Attending: ORTHOPAEDIC SURGERY
Payer: COMMERCIAL

## 2020-06-04 ENCOUNTER — APPOINTMENT (OUTPATIENT)
Dept: GENERAL RADIOLOGY | Facility: CLINIC | Age: 85
DRG: 467 | End: 2020-06-04
Attending: ORTHOPAEDIC SURGERY
Payer: COMMERCIAL

## 2020-06-04 LAB
ABO + RH BLD: NORMAL
ABO + RH BLD: NORMAL
ANION GAP SERPL CALCULATED.3IONS-SCNC: 1 MMOL/L (ref 3–14)
BLD GP AB SCN SERPL QL: NORMAL
BLD PROD TYP BPU: NORMAL
BLD PROD TYP BPU: NORMAL
BLD UNIT ID BPU: 0
BLOOD BANK CMNT PATIENT-IMP: NORMAL
BLOOD PRODUCT CODE: NORMAL
BPU ID: NORMAL
BUN SERPL-MCNC: 17 MG/DL (ref 7–30)
CALCIUM SERPL-MCNC: 7.6 MG/DL (ref 8.5–10.1)
CHLORIDE SERPL-SCNC: 108 MMOL/L (ref 94–109)
CO2 SERPL-SCNC: 31 MMOL/L (ref 20–32)
CREAT SERPL-MCNC: 0.6 MG/DL (ref 0.52–1.04)
GFR SERPL CREATININE-BSD FRML MDRD: 84 ML/MIN/{1.73_M2}
GLUCOSE SERPL-MCNC: 90 MG/DL (ref 70–99)
HGB BLD-MCNC: 7 G/DL (ref 11.7–15.7)
HGB BLD-MCNC: 7.7 G/DL (ref 11.7–15.7)
NUM BPU REQUESTED: 1
POTASSIUM SERPL-SCNC: 4.3 MMOL/L (ref 3.4–5.3)
SODIUM SERPL-SCNC: 140 MMOL/L (ref 133–144)
SPECIMEN EXP DATE BLD: NORMAL
TRANSFUSION STATUS PATIENT QL: NORMAL
TRANSFUSION STATUS PATIENT QL: NORMAL

## 2020-06-04 PROCEDURE — 85018 HEMOGLOBIN: CPT | Performed by: STUDENT IN AN ORGANIZED HEALTH CARE EDUCATION/TRAINING PROGRAM

## 2020-06-04 PROCEDURE — 97535 SELF CARE MNGMENT TRAINING: CPT | Mod: GO | Performed by: OCCUPATIONAL THERAPIST

## 2020-06-04 PROCEDURE — 97110 THERAPEUTIC EXERCISES: CPT | Mod: GP

## 2020-06-04 PROCEDURE — 97530 THERAPEUTIC ACTIVITIES: CPT | Mod: GO | Performed by: OCCUPATIONAL THERAPIST

## 2020-06-04 PROCEDURE — P9016 RBC LEUKOCYTES REDUCED: HCPCS | Performed by: PHYSICIAN ASSISTANT

## 2020-06-04 PROCEDURE — 25000128 H RX IP 250 OP 636: Performed by: INTERNAL MEDICINE

## 2020-06-04 PROCEDURE — 25000132 ZZH RX MED GY IP 250 OP 250 PS 637: Performed by: STUDENT IN AN ORGANIZED HEALTH CARE EDUCATION/TRAINING PROGRAM

## 2020-06-04 PROCEDURE — 36592 COLLECT BLOOD FROM PICC: CPT | Performed by: STUDENT IN AN ORGANIZED HEALTH CARE EDUCATION/TRAINING PROGRAM

## 2020-06-04 PROCEDURE — 99232 SBSQ HOSP IP/OBS MODERATE 35: CPT | Performed by: INTERNAL MEDICINE

## 2020-06-04 PROCEDURE — 73560 X-RAY EXAM OF KNEE 1 OR 2: CPT | Mod: RT

## 2020-06-04 PROCEDURE — 36415 COLL VENOUS BLD VENIPUNCTURE: CPT | Performed by: STUDENT IN AN ORGANIZED HEALTH CARE EDUCATION/TRAINING PROGRAM

## 2020-06-04 PROCEDURE — 25000132 ZZH RX MED GY IP 250 OP 250 PS 637: Performed by: INTERNAL MEDICINE

## 2020-06-04 PROCEDURE — 12000001 ZZH R&B MED SURG/OB UMMC

## 2020-06-04 PROCEDURE — 99207 ZZC CDG-MDM COMPONENT: MEETS MODERATE - UP CODED: CPT | Performed by: INTERNAL MEDICINE

## 2020-06-04 PROCEDURE — 80048 BASIC METABOLIC PNL TOTAL CA: CPT | Performed by: STUDENT IN AN ORGANIZED HEALTH CARE EDUCATION/TRAINING PROGRAM

## 2020-06-04 RX ORDER — OXYCODONE HYDROCHLORIDE 5 MG/1
5-10 TABLET ORAL
Status: DISCONTINUED | OUTPATIENT
Start: 2020-06-04 | End: 2020-06-05 | Stop reason: HOSPADM

## 2020-06-04 RX ORDER — ASPIRIN 325 MG
325 TABLET, DELAYED RELEASE (ENTERIC COATED) ORAL 2 TIMES DAILY
Qty: 56 TABLET | Refills: 0 | DISCHARGE
Start: 2020-06-04 | End: 2021-02-01

## 2020-06-04 RX ORDER — ERTAPENEM 1 G/1
1 INJECTION, POWDER, LYOPHILIZED, FOR SOLUTION INTRAMUSCULAR; INTRAVENOUS EVERY EVENING
Qty: 42 EACH | DISCHARGE
Start: 2020-06-04 | End: 2020-06-16

## 2020-06-04 RX ORDER — POLYETHYLENE GLYCOL 3350 17 G/17G
17 POWDER, FOR SOLUTION ORAL DAILY
Qty: 7 PACKET | Refills: 0 | DISCHARGE
Start: 2020-06-04 | End: 2020-06-11

## 2020-06-04 RX ORDER — AMOXICILLIN 250 MG
2 CAPSULE ORAL 2 TIMES DAILY
Qty: 30 TABLET | Refills: 0 | DISCHARGE
Start: 2020-06-04 | End: 2021-02-01

## 2020-06-04 RX ORDER — SODIUM CHLORIDE 9 MG/ML
INJECTION, SOLUTION INTRAVENOUS
Status: DISPENSED
Start: 2020-06-04 | End: 2020-06-04

## 2020-06-04 RX ORDER — HYDROMORPHONE HYDROCHLORIDE 2 MG/1
2 TABLET ORAL
Status: DISCONTINUED | OUTPATIENT
Start: 2020-06-04 | End: 2020-06-04

## 2020-06-04 RX ORDER — ACETAMINOPHEN 325 MG/1
650 TABLET ORAL EVERY 4 HOURS PRN
Qty: 1 BOTTLE | DISCHARGE
Start: 2020-06-05 | End: 2021-02-01

## 2020-06-04 RX ORDER — DIPHENHYDRAMINE HCL 25 MG
25 CAPSULE ORAL EVERY 6 HOURS PRN
Status: DISCONTINUED | OUTPATIENT
Start: 2020-06-04 | End: 2020-06-04 | Stop reason: CLARIF

## 2020-06-04 RX ORDER — OXYCODONE HYDROCHLORIDE 5 MG/1
5-10 TABLET ORAL
Qty: 30 TABLET | Refills: 0 | Status: SHIPPED | DISCHARGE
Start: 2020-06-04 | End: 2021-02-01

## 2020-06-04 RX ORDER — ACETAMINOPHEN 500 MG
500 TABLET ORAL EVERY 6 HOURS PRN
Status: ON HOLD | COMMUNITY
End: 2020-06-05

## 2020-06-04 RX ADMIN — ERTAPENEM SODIUM 1 G: 1 INJECTION, POWDER, LYOPHILIZED, FOR SOLUTION INTRAMUSCULAR; INTRAVENOUS at 20:30

## 2020-06-04 RX ADMIN — ACETAMINOPHEN 975 MG: 325 TABLET, FILM COATED ORAL at 03:32

## 2020-06-04 RX ADMIN — OXYCODONE HYDROCHLORIDE 5 MG: 5 TABLET ORAL at 00:16

## 2020-06-04 RX ADMIN — ACETAMINOPHEN 975 MG: 325 TABLET, FILM COATED ORAL at 10:07

## 2020-06-04 RX ADMIN — DOCUSATE SODIUM AND SENNOSIDES 2 TABLET: 8.6; 5 TABLET, FILM COATED ORAL at 08:55

## 2020-06-04 RX ADMIN — ASPIRIN 325 MG: 325 TABLET ORAL at 08:55

## 2020-06-04 RX ADMIN — LEVOTHYROXINE SODIUM 75 MCG: 0.07 TABLET ORAL at 06:56

## 2020-06-04 RX ADMIN — ACETAMINOPHEN 975 MG: 325 TABLET, FILM COATED ORAL at 18:33

## 2020-06-04 RX ADMIN — POLYETHYLENE GLYCOL 3350 17 G: 17 POWDER, FOR SOLUTION ORAL at 08:55

## 2020-06-04 RX ADMIN — DOCUSATE SODIUM AND SENNOSIDES 2 TABLET: 8.6; 5 TABLET, FILM COATED ORAL at 20:29

## 2020-06-04 RX ADMIN — ASPIRIN 325 MG: 325 TABLET ORAL at 20:29

## 2020-06-04 NOTE — PLAN OF CARE
Discharge Planner OT   Patient plan for discharge: TCU  Current status: Patient having a harder day, reports feeling tired but agreeable to therapy.  R LE draining with any mobility.  SBA for supine to sit, donning/doffing R sock.  Min A to stand and transfers bed to commode and commode to chair with CGA with walker and PWB.  Barriers to return to prior living situation: PWB, weakness, lack of family support  Recommendations for discharge: TCU  Rationale for recommendations: Continued daily OT for maximum independence in ADLs/mobility       Entered by: Patience Mendez 06/04/2020 2:14 PM

## 2020-06-04 NOTE — PLAN OF CARE
Pt. A&Ox4. VSS. Afebrile. Lung sounds CTA. Maintaining sats on RA. IS encouraged, tolerating well. Bowel sounds active, LBM 6/3. PP+ DP+. CMS and neuro's are intact. Reports numbness in RLE. Denies nausea, shortness of breath, and chest pain. RLE pain managed w/ scheduled Tylenol, prn Oxycodone, and ice applied. Voids spontaneously without difficulty in the BSC. Tolerating regular diet. RLE incisional dressing is CDI. JUAN R in place. Pt up with Ax1-2. PICC is patent and SL. PCD's on BLE's. Bilateral heels are elevated off the bed. Call light is within reach, pt able to make needs known and is resting comfortably between cares. Will continue to monitor.

## 2020-06-04 NOTE — PROGRESS NOTES
Pt was seen, course reviewed with team.    Pt feels well overall, though does note some weakness and dizziness No chest pain, cough, SOB  + BM 6/3/20    VSS    Afebrile    Alert, fully oriented, pale appearing  Lungs clear  Cv rrr  Abd soft  No R calf tenderness      Results for FEDERICA SMITH (MRN [id number]) as of 6/4/2020 10:57   Ref. Range 6/4/2020 05:33   Sodium Latest Ref Range: 133 - 144 mmol/L 140   Potassium Latest Ref Range: 3.4 - 5.3 mmol/L 4.3   Chloride Latest Ref Range: 94 - 109 mmol/L 108   Carbon Dioxide Latest Ref Range: 20 - 32 mmol/L 31   Urea Nitrogen Latest Ref Range: 7 - 30 mg/dL 17   Creatinine Latest Ref Range: 0.52 - 1.04 mg/dL 0.60   GFR Estimate Latest Ref Range: >60 mL/min/1.73_m2 84   GFR Estimate If Black Latest Ref Range: >60 mL/min/1.73_m2 >90   Calcium Latest Ref Range: 8.5 - 10.1 mg/dL 7.6 (L)   Anion Gap Latest Ref Range: 3 - 14 mmol/L 1 (L)   Glucose Latest Ref Range: 70 - 99 mg/dL 90       Assessment    S/p Explantation of Right total knee,  manufacture and implantation of Vanco/tobramycin/ceftazidime antibiotic spacer knee right. 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 Dr Agosto, [facility]. MMB: Gemella morbillorum, Parvimonas micra, Peptoniphilus asaccharolyticus, Prevotella species.   Pt remains on Ertapenem     Acute BL anemia, on chronic anemia (hgb 8.9 on 5/14/20). This am with therapy, is symptomatic     Asymptomatic hypotension, likely secondary to anesthesia, pain medications, anemia, improved     History of Aortic sclerosis, without stenosis    Plan  Transfuse 1 unit of PRBC  Monitor BP  Continue Ertapenem per ID  Discharge to TCU, possibly tomorrow

## 2020-06-04 NOTE — PROGRESS NOTES
"Orthopaedic Surgery Progress Note   06/04/2020    Subjective: No acute interim events. Pain well controlled on current regimen. Tolerating diet. Voiding spontaneously. Patient worked with PT yesterday, she was up to chair with assistance. Denies fever or chills, CP, SOB, numbness or tingling, motor dysfunction or weakness.     Objective: /41 (BP Location: Left arm)   Pulse 74   Temp 97.9  F (36.6  C) (Oral)   Resp 16   Ht 1.575 m (5' 2.01\")   Wt 54.9 kg (121 lb 0.5 oz)   SpO2 95%   BMI 22.13 kg/m      General: NAD, alert and oriented, cooperative with exam.   Cardio: RRR, extremities wwp.   Respiratory: Non-labored breathing.  MSK: Focused examination of     RLE: Toes wwp, bcr in all toes. Compartments soft and tolerates passive stretch of toes. +EHL/FHL/GSC/TA with 4+/5 strength. SILT SP/DP/Sa/Martines/T. Dressing c/d/i right thigh. Drain with serosanguinous output.    Drain: 50 mL last 24 hours    Labs:     Lab Results   Component Value Date    WBC 5.9 06/03/2020    HGB 7.0 (L) 06/04/2020     06/03/2020       BMP:  Lab Results   Component Value Date     06/04/2020    POTASSIUM 4.3 06/04/2020    CHLORIDE 108 06/04/2020    CO2 31 06/04/2020    BUN 17 06/04/2020    CR 0.60 06/04/2020    ANIONGAP 1 (L) 06/04/2020    RAVEN 7.6 (L) 06/04/2020    GLC 90 06/04/2020       Inflammatory Markers:  Lab Results   Component Value Date    WBC 5.9 06/03/2020    WBC 4.0 05/14/2020    WBC 5.4 04/30/2020    CRP 2.8 (H) 05/14/2020    .0 (H) 04/01/2020    CRP <2.9 09/08/2016     (H) 04/01/2020     (H) 03/17/2010     (H) 03/10/2010         Assessment and Plan: Jigna Allen is a 85 year old female with complex Right knee history including:    DX:   1. Right calf high grade MFH 2010  2. 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 R knee DJD     Treatments:  1. neoadjuvant chemotherapy, surgical resection Feb 15, 2010. Complications post-op of skin loss with " draining seroma, treated with I & D and systemic antibiotics. She had 4 cycles of doxil/ifos finishing about Feb 2010. There was <10% viable cells at surgery. She finished post op XRT about June 2010  2. 6/12/18, R TKA ([facility]) [facility]  3. 1/27/2020, skin popliteal fossa punch bx :  No sarcoma  4. 3/27/2020 Debridement right thigh wound and placement of VAC, Dr Hernandez, [facility]  5. 3/30/2020, R TKA aspiration, WBC 17k,, PMNs 94%, MMB: Peptoniphilus asaccharolyticus, Streptococcus constellatus, Clostridium ramosum    6. 4/20/2020, Coverage of right lower extremity popliteal wound with free left anterolateral thigh fasciocutaneous flap with 3 perforators, size 25 x 8 cm. Dr Agosto, [facility]. MMB: Gemella morbillorum, Parvimonas micra, Peptoniphilus asaccharolyticus, Prevotella species. ABx Ertapenem.       Who is now S/p Explantation of the right total knee arthroplasty and placement of a articulating Right knee antibiotic spacer on 6/2/2020 with Dr. Gomez.    Hgb 7.0 this am.  Transfuse 1 unit PRBC    Appropriate post operative progression.    Orthopaedics Primary  Activity: Up with assist until independent.  Weight bearing status: 50% Weight bearing RLE  Pain management: Transition from IV to PO as tolerated. Judicious use of narcotics  Antibiotics: Ertapenem per ID recs  Diet: Begin with clear fluids and progress diet as tolerated.   DVT prophylaxis: Aspirin 325 daily x 4 weeks post operatively.  Bracing/Splinting: N/A  Dressings: Keep clean, dry and intact x 5 days  Elevation:  Elevate RLEon pillows to keep above the level of the heart as much as possible.   Drains: Document output per shift, discontinue at ortho discretion  Physical Therapy/Occupational Therapy: Eval and treat  Cultures:Pending, follow culture results closely.    Disposition: Pending progress with therapies, pain control on orals, and medical stability, anticipate discharge to TCU in 1-2 days pending culture results, hgb, blood  pressure.    Khurram Castillo MD  Orthopedic Surgery PGY-4  [phone]      For questions regarding this patient during Weekday business hours (7-4 pm), please attempt to contact me at my pager () prior to contacting the Orthopaedic Surgery Provider on call. If it is a night, weekend, or there is no response please page the Orthopaedic Surgery Provider on call. Thank you!

## 2020-06-04 NOTE — PLAN OF CARE
Discharge Planner PT   Patient plan for discharge: TCU  Current status: Only supine exercises completed this AM due to low HGB and dizziness felt in bed. Will plan to do more activity this PM pending decreased symptoms.   Barriers to return to prior living situation: Assist needed with activity, pain  Recommendations for discharge: TCU  Rationale for recommendations: Pt continues to require skilled therapy intervention in order to progress to PLOF.        Entered by: Mary Grubbs 06/04/2020 12:33 PM

## 2020-06-04 NOTE — PLAN OF CARE
PT: Per OT patient with significant drainage this afternoon. Got up to commode and knee draining onto floor. Plan to bring down for imaging. Will hold at this time.

## 2020-06-04 NOTE — PROVIDER NOTIFICATION
Paged ortho on call regarding increased swelling around incision site and she advised me to page resident that saw patient this morning (Dr. Castillo). Paged and text paged with no response. Will attempt to page again now.

## 2020-06-04 NOTE — PROGRESS NOTES
GENERAL INFECTIOUS DISEASES PROGRESS NOTE     Patient:  Jigna Allen   Date of birth 1935, Medical record number [id number]  Date of Visit:  06/04/2020  Date of Admission: 6/2/2020  Consult Requested by:He Gomez MD  Reason for Consult:  Infected Right totak knee arthroplasty          Assessment and Recommendations:   ASSESSMENT:    Jigna Allen is an 85 year old woman who has a history of  sarcoma, anemia, arthritis, hypothyroidism, mitral valve regurgitation. For her sarcoma, she is s/p chemo with doxil/ifos, surgical resection (2/15/2010), and radiation (last tx June 2010), post-op course complicated by seroma requiring I&D and treated with antibiotics, then recovered well and underwent right knee TKA (6/2018).  In December 2019 developed a non-healing right popliteal wound at site of previous surgery (2010) with polymicrobial prosthetic joint and wound infection.     1. Right knee prosthetic joint infection  - Culture from aspiration (3/30/20): Strep constellatus, and Clostridium ramosum (broth), Peptoniphilus asaccharolyticus, anaerobic gram positive rods (unable to completely identify)  - Right non-healing popliteal fossa wound with polymicrobial infection (related to #1)   - Culture from [facility] (3/27/20): ESBL E.coli, Klebsiella pneumoniae, multiple anaerobes  - Preliminary cultures (4/20/20): Streptococcus intermedius, Eikenella corrodens    2. S/P right TKA (6/12/18)  3. 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 Post op had seroma that was treated with I&D and antibiotics.    RECOMMENDATION:  - continue Ertapenem   - follow-up intra-op cultures , neg so far. Cultures may take a few days to be finalized    - probiotic daily     ID will follow     Thank you for allowing us to participate in the care of this patient    Harry Cavazos MD, M.Med.Sc  Staff, Infectious Diseases   Pager : [phone]    Interval history   feels  better. was feeling lightheaded this morning. getting PRBC transfusion. bloody drainage from surgical site   no diarrhea.          History of Present Illness:     Jigna Allen is an 85 year old woman who has a history of  sarcoma, anemia, arthritis, hypothyroidism, mitral valve regurgitation. For her sarcoma, she is s/p chemo with doxil/ifos, surgical resection (2/15/2010), and radiation (last tx June 2010), post-op course complicated by seroma requiring I&D and treated with antibiotics, then recovered well and underwent right knee TKA (6/2018).  In December 2019 developed a non-healing right popliteal wound at site of previous surgery (2010) with polymicrobial prosthetic joint and wound infection. Wound cultures grew  ESBL E.coli, Klebsiella pneumoniae, multiple anaerobes and joint aspiration with growth of Strep constellatus, and Clostridium ramosum (broth), Peptoniphilus asaccharolyticus, anaerobic gram positive rods (unable to completely identify). Had intermittently been on PO antibiotics during course of work up. After joint infection was identified on 3/30/20 met with orthopedics and plastic surgery- recommended amputation vs attempt at salvaging limb and Jigna wished to pursue salvage. Was admitted on 4/20/20 for scheduled I&D of wound with flap closure, basia pus noted during procedure and sent for cultures. Plan for 2 stage right TKA revision to start in 2-3 months. Has felt fatigued and low-grade temps to 99F with no high grade fevers, no additional systemic symptoms. Cultures grew Streptococcus intermedius and Eikenella corrodens. Had about 6 weeks of Ertapenem. She was last seen/ followed by ID staff Dr Jefferson. She has persistent drainage but denies pain, fever, chills, nightsweats. admitted on 6/1/20     On 6/2/20, she had explantation of right total knee hardware and implantation of Vanco/Genta/Ceftaz spacer. Intraop cultures are negative so far.  was on Ertapenem prior to surgery     Antibiotics    Ertapenem     Recent culture results include:  Culture Micro   Date Value Ref Range Status   06/02/2020 Culture negative monitoring continues  Preliminary   06/02/2020 Culture negative monitoring continues  Preliminary   06/02/2020 Culture negative monitoring continues  Preliminary   06/02/2020 Culture negative monitoring continues  Preliminary   06/02/2020 Culture negative monitoring continues  Preliminary   06/02/2020 Culture negative monitoring continues  Preliminary   06/02/2020 Culture negative monitoring continues  Preliminary   06/02/2020 Culture negative monitoring continues  Preliminary   04/20/2020 (A)  Final    Moderate growth  Gemella morbillorum  Susceptibility testing not routinely done     04/20/2020 (A)  Final    Moderate growth  Parvimonas micra  Susceptibility testing not routinely done     04/20/2020 (A)  Final    Moderate growth  Peptoniphilus asaccharolyticus  Susceptibility testing not routinely done     04/20/2020 (A)  Final    Light growth  Prevotella species  Identification obtained by MALDI-TOF mass spectrometry research use only database. Test   characteristics determined and verified by the Infectious Diseases Diagnostic Laboratory   ([facility]) [place], [place].  Beta lactamase positive  Susceptibility testing not routinely done     04/20/2020 Culture negative after 4 weeks  Final   04/20/2020 (A)  Final    Light growth  Streptococcus intermedius  Susceptibility testing not routinely done     04/20/2020 Light growth  Eikenella corrodens   (A)  Final          Review of Systems:   CONSTITUTIONAL:  No fevers or chills  EYES: negative for icterus  ENT:  negative for hearing loss, tinnitus and sore throat  RESPIRATORY:  negative for cough with sputum and dyspnea  CARDIOVASCULAR:  negative for chest pain, dyspnea  GASTROINTESTINAL:  negative for nausea, vomiting, diarrhea and constipation  GENITOURINARY:  negative for dysuria  HEME:  No easy bruising  INTEGUMENT: see HPI   NEURO:  Negative for  headache         Past Medical History:     Past Medical History:   Diagnosis Date     Anemia      Anxiety      Aortic valve sclerosis      Arthritis 1990?     Complication of anesthesia     slow to wakeup     Hypothyroid      Mild tricuspid regurgitation      Sarcoma (H) 2009            Past Surgical History:     Past Surgical History:   Procedure Laterality Date     ARTHROPLASTY KNEE Right 6/12/2018    Procedure: ARTHROPLASTY KNEE;  Right Total Knee Replacement ;  Surgeon: He Gomez MD;  Location: [facility] OR     CARPAL TUNNEL RELEASE RT/LT       excise sarcoma Right     right leg     exicision of wound  03/27/2020     GRAFT FREE VASCULARIZED TRANSVERSE RECTUS ABDOMINIS MYOCUTANEOUS Left 4/20/2020    Procedure: With left anterolateral thigh free flap with SPY;  Surgeon: EHSAN De Luna MD;  [facility]: [facility] OR     IRRIGATION AND DEBRIDEMENT KNEE, PLACE ANTIBIOTIC CEMENT BEADS / SPACE Right 6/2/2020    Procedure: manufacture and implantation of Vanco/tobra ANTIBIOTIC SPACER, KNEE;  Surgeon: He Gomez MD;  Location: [facility] OR     KNEE SURGERY       PICC DOUBLE LUMEN PLACEMENT Right 04/23/2020    5FR DL PICC inserted at brachial medial, length- 37cm (1cm out). Tip at mid SVC     RECONSTRUCT KNEE Right 4/20/2020    Procedure: Right leg reconstruction and wound debridemenr;  Surgeon: EHSAN De Luna MD;  Location: [facility] OR     REMOVE HARDWARE ARTHROPLASTY KNEE Right 6/2/2020    Procedure: Explantation of Right total knee;  Surgeon: He Gomez MD;  Location: [facility] OR     TUBAL LIGATION              Family History:     Family History   Problem Relation Age of Onset     Cancer Father      Prostate Cancer Father      Other Cancer Father      Cancer Brother      Other - See Comments Daughter         Slow to wake      Asthma Daughter             Social History:     Social History     Tobacco Use     Smoking status: Never Smoker     Smokeless tobacco: Never Used   Substance Use Topics     Alcohol use: Not  Currently     Comment: rarely - months      History   Sexual Activity     Sexual activity: Yes     Partners: Male     Birth control/ protection: Post-menopausal          Current Medications (antimicrobials listed in bold):       acetaminophen  975 mg Oral Q8H     aspirin  325 mg Oral BID     ertapenem  1 g Intravenous QPM     levothyroxine  75 mcg Oral QAM AC     polyethylene glycol  17 g Oral Daily     senna-docusate  2 tablet Oral BID     sodium chloride (PF)  3 mL Intracatheter Q8H     sodium chloride              Allergies:     Allergies   Allergen Reactions     Colchicine      Other reaction(s): Gastrointestinal  diarrhea     Niacin      skin rash     Ferrous Sulfate Rash            Physical Exam:   Vitals were reviewed  Patient Vitals for the past 24 hrs:   BP Temp Temp src Pulse Heart Rate Resp SpO2   06/04/20 1226 128/48 97.3  F (36.3  C) Oral 87 -- 16 95 %   06/04/20 1155 124/43 97.4  F (36.3  C) Oral -- 73 16 96 %   06/04/20 1055 110/46 96.8  F (36  C) Oral -- 77 16 95 %   06/04/20 0955 114/42 96.7  F (35.9  C) -- 81 -- 16 --   06/04/20 0945 (!) 108/39 97.3  F (36.3  C) -- 80 -- 16 --   06/04/20 0742 106/41 95.7  F (35.4  C) Oral -- 71 16 95 %   06/03/20 2240 105/41 97.9  F (36.6  C) Oral -- 73 16 95 %   06/03/20 2128 102/45 -- -- -- -- -- --   06/03/20 1641 (!) 100/39 97  F (36.1  C) Oral 74 -- 16 97 %   06/03/20 1435 (!) 98/36 -- -- -- 81 -- --     Ranges for his vital signs:  Temp:  [95.7  F (35.4  C)-97.9  F (36.6  C)] 97.3  F (36.3  C)  Pulse:  [74-87] 87  Heart Rate:  [71-81] 73  Resp:  [16] 16  BP: ()/(36-48) 128/48  SpO2:  [95 %-97 %] 95 %    Physical Examination:  GENERAL:  well-developed, well-nourished, in bed in no acute distress.   HEENT:  Head is normocephalic, atraumatic   EYES:  Eyes have anicteric sclerae without conjunctival injection  ENT:  Oropharynx is moist without exudates or ulcers. Tongue is midline  NECK:  Supple. No  Cervical lymphadenopathy  LUNGS:  Clear to auscultation  bilateral.   CARDIOVASCULAR:  Regular rate and rhythm with no murmurs  ABDOMEN:  Normal bowel sounds, soft, nontender. No appreciable hepatosplenomegaly  Extremities : right leg - trace edema , left leg - no edema. surgical site - left leg healed, right knee - drain in place   SKIN:  No acute rashes.  Line(s) are in place without any surrounding erythema or exudate.  NEUROLOGIC:  Grossly nonfocal. Active x4 extremities, except for right leg non weight bearing s/p surgery          Laboratory Data:     Inflammatory Markers    Recent Labs   Lab Test 05/14/20  0530 04/01/20  1556 09/08/16  0930   SED  --  111*  --    CRP 2.8* 180.0* <2.9     Hematology Studies    Recent Labs   Lab Test 06/04/20  0533 06/03/20  0544 05/14/20  0530 04/30/20  0557 04/23/20  0703 04/22/20  1024 04/20/20  1620  04/01/20  1556  06/05/18  1112 05/10/16  1035 10/07/14  0848 05/08/14  1015 11/19/13  1119 09/03/13  0908   WBC  --  5.9 4.0 5.4  --  6.2  --   --  9.8  --  4.8 6.1 3.1* 3.0* 4.3 3.6*   ANEU  --   --   --   --   --   --   --   --  8.6*  --   --  4.1 1.6 1.8 3.1 2.5   AEOS  --   --   --   --   --   --   --   --  0.0  --   --  0.1 0.1 0.1 0.1 0.1   HGB 7.0* 7.3* 8.9* 8.4*  --  7.3* 8.5*   < > 10.5*   < > 14.3 13.5 13.7 13.3 13.2 13.0   MCV  --  95 98 99  --  98  --   --  98  --  99 97 100 99 101* 101*   PLT  --  291 393 535* 297 279 290  --  356  --  197 229 198 188 208 183    < > = values in this interval not displayed.     Metabolic Studies     Recent Labs   Lab Test 06/04/20  0533 06/03/20  0544 04/24/20  0729 04/22/20  1024 04/21/20  0627    137 137 136 138   POTASSIUM 4.3 4.0 4.1 3.5 4.0   CHLORIDE 108 104 104 104 106   CO2 31 28 28 27 27   BUN 17 23 12 12 12   CR 0.60 0.68 0.58 0.65 0.64   GFRESTIMATED 84 81 85 82 82     Hepatic Studies    Recent Labs   Lab Test 04/01/20  1556 05/10/16  1035 10/07/14  0848 05/08/14  1015 11/19/13  1119 09/03/13  0908   BILITOTAL 0.6 0.8 0.5 0.8 0.8 0.7   ALKPHOS 85 91 85 69 77 70   ALBUMIN  2.5* 3.8 3.6 3.9 4.2 3.8   AST 36 19 13 22 19 21   ALT 26 29 21 20 29 29     Thyroid Studies    Recent Labs   Lab Test 05/09/17  1041 05/10/16  1035   TSH 2.47 2.28     Microbiology:  Culture Micro   Date Value Ref Range Status   06/02/2020 Culture negative monitoring continues  Preliminary   06/02/2020 Culture negative monitoring continues  Preliminary   06/02/2020 Culture negative monitoring continues  Preliminary   06/02/2020 Culture negative monitoring continues  Preliminary   06/02/2020 Culture negative monitoring continues  Preliminary   06/02/2020 Culture negative monitoring continues  Preliminary   06/02/2020 Culture negative monitoring continues  Preliminary   06/02/2020 Culture negative monitoring continues  Preliminary   04/20/2020 (A)  Final    Moderate growth  Gemella morbillorum  Susceptibility testing not routinely done     04/20/2020 (A)  Final    Moderate growth  Parvimonas micra  Susceptibility testing not routinely done     04/20/2020 (A)  Final    Moderate growth  Peptoniphilus asaccharolyticus  Susceptibility testing not routinely done     04/20/2020 (A)  Final    Light growth  Prevotella species  Identification obtained by MALDI-TOF mass spectrometry research use only database. Test   characteristics determined and verified by the Infectious Diseases Diagnostic Laboratory   ([facility]) [place], [place].  Beta lactamase positive  Susceptibility testing not routinely done     04/20/2020 Culture negative after 4 weeks  Final   04/20/2020 (A)  Final    Light growth  Streptococcus intermedius  Susceptibility testing not routinely done     04/20/2020 Light growth  Eikenella corrodens   (A)  Final   04/01/2020 No growth  Final   04/01/2020 No growth  Final   03/30/2020 Light growth  Peptoniphilus asaccharolyticus   (A)  Final   03/30/2020 (A)  Final    Light growth  Anaerobic gram positive rods  Unable to completely identify     03/30/2020   Final    Critical Value/Significant Value called to and read back  by  ISABELL DARDEN MD, 1034 4.1.20 MJ     03/30/2020 Light growth  Streptococcus constellatus   (A)  Final   03/30/2020 (A)  Final    On day 2, isolated in broth only:  Clostridium ramosum     06/05/2018 <10,000 colonies/mL  mixed urogenital kenisha    Final   09/16/2016 No growth  Final   09/16/2016 No growth  Final   03/02/2010 Heavy growth Staphylococcus aureus  Final   03/02/2010 No anaerobes isolated  Final   03/02/2010   Final    Culture received and in progress. Assayed at [facility]     Comment:      [place] [address]  Positive AFB results are called as soon as detected.  Final report to follow in 7 to 8 weeks.  Culture negative for acid fast bacilli   03/01/2010 Heavy growth Staphylococcus aureus  Final   03/01/2010 Culture negative after 4 weeks  Final   03/01/2010 Not received in an anaerobic transport container.  Final     Comment:     Canceled, Test credited  Notified EDDIE Hernandez   03/01/2010 No growth  Final   03/01/2010 No growth  Final   12/23/2009 No growth  Final   10/30/2009 <10,000 colonies/mL Mixed gram positive kenisha  Final     Comment:     Multiple species present, probable perineal contamination.     Urine Studies    Recent Labs   Lab Test 06/05/18  1122   LEUKEST Large*   WBCU 5     Hepatitis B Testing   Recent Labs   Lab Test 04/20/20  1020   HEPBANG Nonreactive

## 2020-06-05 ENCOUNTER — APPOINTMENT (OUTPATIENT)
Dept: OCCUPATIONAL THERAPY | Facility: CLINIC | Age: 85
DRG: 467 | End: 2020-06-05
Attending: ORTHOPAEDIC SURGERY
Payer: COMMERCIAL

## 2020-06-05 ENCOUNTER — APPOINTMENT (OUTPATIENT)
Dept: PHYSICAL THERAPY | Facility: CLINIC | Age: 85
DRG: 467 | End: 2020-06-05
Attending: ORTHOPAEDIC SURGERY
Payer: COMMERCIAL

## 2020-06-05 VITALS
HEIGHT: 62 IN | OXYGEN SATURATION: 97 % | WEIGHT: 121.03 LBS | BODY MASS INDEX: 22.27 KG/M2 | SYSTOLIC BLOOD PRESSURE: 125 MMHG | TEMPERATURE: 96.7 F | RESPIRATION RATE: 16 BRPM | DIASTOLIC BLOOD PRESSURE: 64 MMHG | HEART RATE: 79 BPM

## 2020-06-05 LAB — HGB BLD-MCNC: 8.2 G/DL (ref 11.7–15.7)

## 2020-06-05 PROCEDURE — 85018 HEMOGLOBIN: CPT | Performed by: INTERNAL MEDICINE

## 2020-06-05 PROCEDURE — 36592 COLLECT BLOOD FROM PICC: CPT | Performed by: INTERNAL MEDICINE

## 2020-06-05 PROCEDURE — 25000132 ZZH RX MED GY IP 250 OP 250 PS 637: Performed by: STUDENT IN AN ORGANIZED HEALTH CARE EDUCATION/TRAINING PROGRAM

## 2020-06-05 PROCEDURE — 99231 SBSQ HOSP IP/OBS SF/LOW 25: CPT | Performed by: INTERNAL MEDICINE

## 2020-06-05 PROCEDURE — 97535 SELF CARE MNGMENT TRAINING: CPT | Mod: GO | Performed by: OCCUPATIONAL THERAPIST

## 2020-06-05 PROCEDURE — 25000132 ZZH RX MED GY IP 250 OP 250 PS 637: Performed by: INTERNAL MEDICINE

## 2020-06-05 PROCEDURE — 97530 THERAPEUTIC ACTIVITIES: CPT | Mod: GP | Performed by: PHYSICAL THERAPIST

## 2020-06-05 PROCEDURE — 97116 GAIT TRAINING THERAPY: CPT | Mod: GP | Performed by: PHYSICAL THERAPIST

## 2020-06-05 PROCEDURE — 97110 THERAPEUTIC EXERCISES: CPT | Mod: GP | Performed by: PHYSICAL THERAPIST

## 2020-06-05 RX ADMIN — POLYETHYLENE GLYCOL 3350 17 G: 17 POWDER, FOR SOLUTION ORAL at 08:09

## 2020-06-05 RX ADMIN — ACETAMINOPHEN 975 MG: 325 TABLET, FILM COATED ORAL at 08:18

## 2020-06-05 RX ADMIN — DOCUSATE SODIUM AND SENNOSIDES 2 TABLET: 8.6; 5 TABLET, FILM COATED ORAL at 08:11

## 2020-06-05 RX ADMIN — LEVOTHYROXINE SODIUM 75 MCG: 0.07 TABLET ORAL at 05:57

## 2020-06-05 RX ADMIN — ASPIRIN 325 MG: 325 TABLET ORAL at 08:11

## 2020-06-05 NOTE — PROGRESS NOTES
VS: Stable   O2: >90% on RA    Output: Voiding adequate    Last BM: 6/4   Activity: Ambulating in room with A1 and FWW   Skin: WDL with exception to surgical incision    Pain: Well controlled on PRN oxycodone 5mg, Tylenol and ice. Declined pain at HS   CMS: Intact     Dressing: Right knee dressing with drainage, moderate   Diet: Tolerating regular diet    LDA: PICC SL, PJ patent   Equipment: FWW   Plan: Hgb recheck 7.7   Additional Info:  Continue the plan of care.

## 2020-06-05 NOTE — PLAN OF CARE
Discharge Planner OT   Patient plan for discharge: rehab  Current status: pt. CGA with amb. throughout room/bathroom with FWW. CGA standing at sink for g/h tasks. Pt. maintaining 50% wt. bearing throughout session.   Barriers to return to prior living situation: below baseline with ADLs/mobility, post op wt. Bearing prec.  Recommendations for discharge: TCU  Rationale for recommendations: to max. Safety with ADLs/mobility       Entered by: Florecita Goodwin 06/05/2020 9:45 AM

## 2020-06-05 NOTE — PLAN OF CARE
VS: Stable and improved    O2: >90% on RA    Output: Voiding adequate amts in bathroom.    Last BM: 6/4   Activity: Ambulating in room and hester with A1 and FWW   Skin: WDL with exception to surgical incision    Pain: Well controlled on oxycodone 5mg. Patient uses conservatively and prefers Tylenol and ice    CMS: Intact with exception to baseline numbness in and around knee.    Dressing: Drainage form drain insertion site. Knee swelling increased significantly and drain dressing removed, drain stripped x 3, dressing replaced clot cleared  along with 60ml of blood in 3 hrs. Drainage at insertion site has stopped.    Diet: Tolerating regular diet    LDA: PICC SL    Equipment: FWW   Plan: Continue to trend hemoglobin as well as swelling and progression with therapies.     Additional Info:

## 2020-06-05 NOTE — PLAN OF CARE
Physical Therapy Discharge Summary    Reason for therapy discharge:    Discharged to transitional care facility.    Progress towards therapy goal(s). See goals on Care Plan in [facility] electronic health record for goal details.  Goals partially met.  Barriers to achieving goals:   discharge from facility.    Therapy recommendation(s):    Continued therapy is recommended.  Rationale/Recommendations:  further PT at next level of care to progress strenth and IND with mobility.

## 2020-06-05 NOTE — PLAN OF CARE
VS: VSS   O2: >90% on room air    Output: Voiding spontaneously and hydrating well    Last BM: 6/5/2020    Activity: Up with assist of one    Up for meals? In chair    Skin: CDI ex R knee incision. Edema noted    Pain: Well controlled with scheduled tylenol. Patient denying pain this am. Has oxycodone 5-10 PRNmg if needed. Frequent ice packs utilized for pain and localized edema.     CMS: Intact per pt report    Dressing: CDI - no new drainage   Diet: Regular    LDA: PICC in R arm    Equipment: IV pump and pole, wedge ordered for Right leg elevation    Plan: Patient to [facility] this afternoon. Ride set up for 2:30pm. Pt updated with plan and AVS/paper work sent with pt. And scripts faxed.    Additional Info: Gave report to sonya at [facility].

## 2020-06-05 NOTE — PROGRESS NOTES
Patient was seen, course reviewed with nursing staff and with orthopedics.    Patient states she feels well, states right knee pain is controlled.  She believes she feels a little stronger after receiving a unit of blood yesterday.    She anticipates discharging to a TCU later today.    Afebrile  Vital signs stable  Blood pressure 120s/    Alert, fully oriented, in good spirits  Lungs clear  CV regular rhythm  Abdomen soft, nontender  Right knee is swollen warm.  Trace right calf edema without calf tenderness      Hemoglobin 8.2    Intraoperative cultures no growth thus far.      Assessment    S/p Explantation of Right total knee,  manufacture and implantation of Vanco/tobramycin/ceftazidime antibiotic spacer knee right. 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 Dr Agosto, [facility]. MMB: Gemella morbillorum, Parvimonas micra, Peptoniphilus asaccharolyticus, Prevotella species.   Pt remains on Ertapenem, pending final culture results, per infectious disease     Acute BL anemia, on chronic anemia (hgb 8.9 on 5/14/20).   Hemoglobin stable following receipt of 1 unit packed red blood cells yesterday.    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  DVT prophylaxis is aspirin, per orthopedics.

## 2020-06-05 NOTE — PROGRESS NOTES
"SPIRITUAL HEALTH SERVICES  SPIRITUAL ASSESSMENT Progress Note  [facility] ([facility]) 5A     REFERRAL SOURCE: Length of Stay    I called and briefly visited with Jigna via phone today. Pt shared, \"I am being discharged and will be going to [facility] in [place]. Pt expressed \"feeling well \" stated, \"Thank you for checking on me. That is nice.\"  I listened to Pt spiritual/emotional narratives as Pt expressed, \"We are in such a time, that no one can visit me.\" I validated Pt and let her know she will be in my thoughts and prayers.  We together engaged a reflective conversation on coping skills to support healing process in Rehab. I provided listening presence and spiritual support.    PLAN: No follow up, Pt expressed leaving/discharging today.    Milka Ryan  Chaplain Resident  Pager [phone]  "

## 2020-06-05 NOTE — PROGRESS NOTES
Social Work Services Discharge Note      Patient Name:  Jigna Allen     Anticipated Discharge Date:  6/5/2020 @ 1430    Discharge Disposition:   TCU:  [place] [address]  Phone: [phone]  Fax: [phone]    Following MD:  Wayne Weems MD      Pre-Admission Screening (PAS) online form has been completed.  The Level of Care (LOC) is:  Determined  Confirmation Code is:  [id number]     Additional Services/Equipment Arranged:  [employer] per pt preference [phone]     Patient / Family response to discharge plan:  Agreeable     Persons notified of above discharge plan:  Pt, RN, pt's daughter Lawanda, provider    Staff Discharge Instructions:  Please fax discharge orders and signed hard scripts for any controlled substances.  Please print a packet and send with patient.     CTS Handoff completed:  YES    Medicare Notice of Rights provided to the patient/family:  YES    JOSE Colby  5A/10A Ortho/Med/Surg & [facility] Adult [facility]  Phone: [phone] Pager: [phone]

## 2020-06-05 NOTE — PHARMACY-ADMISSION MEDICATION HISTORY
Admission medication history interview status for the 6/2/2020 admission is complete. See Epic admission navigator for allergy information, pharmacy, prior to admission medications and immunization status.     Medication history interview sources:  patient, electronic fill history    Changes made to PTA medication list (reason)  Added: tylenol prn (per patient)  Deleted: melatonin prn (per patient only took at rehab facility)  Changed: none    Additional medication history information (including reliability of information, actions taken by pharmacist):  -Patient reports she only takes levothyroxine at home and Tylenol PRN.     Prior to Admission medications    Medication Sig Last Dose Taking? Auth Provider          acetaminophen (TYLENOL) 500 MG tablet Take 500 mg by mouth every 6 hours as needed for mild pain Past Month at Unknown time Yes Unknown, Entered By History                 levothyroxine (LEVOTHROID) 75 MCG tablet Take 75 mcg by mouth every morning  6/2/2020 at 0500 Yes Reported, Patient                          Medication history completed by: Erika Weems, PharmD, BCPS

## 2020-06-05 NOTE — PROGRESS NOTES
Social Work Services Progress Note    Hospital Day: 4  Collaborated with:  Chart review, TCUs    Data:  Per notes, pt likely ready for discharge today. SW following up with preferences for placement.    -[facility] -   left  P [phone]   F [phone]    -[facility] -  left  P [phone]    11:36: [facility] can accept and are starting insurance authorization. They are hoping they can take pt tomorrow, are unable to admit today.     Intervention:  Discharge planning    Assessment:  Pt and family agreeable to TCU    Plan:    Anticipated Disposition:  Facility:  TBD    Barriers to d/c plan:  Bed availability     Follow Up:  SW to continue to follow    JOSE Colby  5A/10A Ortho/Med/Surg & [facility] Adult [facility]  Phone: [phone] Pager: [phone]

## 2020-06-05 NOTE — DISCHARGE SUMMARY
ORTHOPAEDIC DISCHARGE SUMMARY    Date of Admission: 6/2/2020  Date of Discharge: 6/5/2020  2:47 PM  Disposition: Rehab  Staff Physician: He Gomez MD    DISCHARGE DIAGNOSIS:   Infection of Right Total Knee Arthroplasty     PROCEDURES:    6/2/2020  C REMOVAL OF KNEE PROSTHESIS [67233] (Explantation of Right total knee )  HC DEBRIDE SKIN/SUBQ TISSUE [25915] (manufacture and implantation of Vanco/tobra ANTIBIOTIC SPACER, KNEE)  HC DEBRIDE SKIN/SUQ/MUSCLE [09227]  HC DEBRIDE SKIN/SUQ/MUSCLE/BONE [32392]  MANUAL PREP AND INSERTION DEEP DRUG DELIVERY DEV [62114]  MANUAL PREP&INSJ I-ARTIC DRUG DELIVERY DEVICE [88560]  HC KNEE SCOPE,SHAVE ARTICULAR CART [41112]   -Dr. He Gomez    HISTORY AND HOSPITAL COURSE:   Jigna Allen is a 85 year old female with complex Right knee history including:     DX:   1. Right calf high grade MFH 2010  2. 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 R knee DJD     Treatments:  1. neoadjuvant chemotherapy, surgical resection Feb 15, 2010. Complications post-op of skin loss with draining seroma, treated with I & D and systemic antibiotics. She had 4 cycles of doxil/ifos finishing about Feb 2010. There was <10% viable cells at surgery. She finished post op XRT about June 2010  2. 6/12/18, R TKA ([facility]) [facility]  3. 1/27/2020, skin popliteal fossa punch bx :  No sarcoma  4. 3/27/2020 Debridement right thigh wound and placement of VAC, Dr Hernandez, [facility]  5. 3/30/2020, R TKA aspiration, WBC 17k,, PMNs 94%, MMB: Peptoniphilus asaccharolyticus, Streptococcus constellatus, Clostridium ramosum    6. 4/20/2020, Coverage of right lower extremity popliteal wound with free left anterolateral thigh fasciocutaneous flap with 3 perforators, size 25 x 8 cm. Dr Agosto, [facility]. MMB: Gemella morbillorum, Parvimonas micra, Peptoniphilus asaccharolyticus, Prevotella species. ABx Ertapenem.      Patient elected to undergo the procedures listed above on 6/2/2020 after having  "treatment options, alternatives, risks, and benefits explained.       Cultures were taken intra-operatively. At the time of discharge there was no growth noted.  Infectious diseases was consulted for recommendation regarding further antibiotic treatment.  They recommended continuing antibiotics through the patient's previously placed picc and would follow the patient cultures and continue care in outpatient setting  Patient was placed on Aspirin 325 mg daily DVT prophylaxis post-operatively.  Hospital course without complication.   At time of discharge, patient was tolerating PO pain control, voiding, and eating a pre-operative diet.  Patient was discharged to Transitional care after considering the recommendations from physical and occupational therapy..    FOLLOW UP:      Follow up with Dr. He Gomez MD    Future Appointments   Date Time Provider Department Center   6/6/2020  9:30 AM Darren Handley OT UROT [place]   6/6/2020  9:45 AM Mary Grubbs, PT URPT [place]   6/6/2020  2:00 PM Mary Grubbs, PT URPT [place]   8/24/2020  8:00 AM [facility]   8/25/2020  5:00 PM Edouard Bryan MD [facility] ORTHOPEDICS - Located 4th Floor (4D ) in the Clinics and Surgery Center at [address] [place] [address].  parking is very convenient and highly recommended.  is a $7 flat rate fee; no tips accepted. Both  and self parkers should enter the main arrival plaza from [facility]; parking attendants will direct you based on your parking preference.     Department Address: [address] [place] [address] 4th Floor [facility] [address]     [facility] Orthopedic Scheduling contact number: [phone]    Call if you haven't heard regarding these appointments within 7 days of discharge.      PHYSICAL EXAMINATION:    Vital Signs: /64 (BP Location: Left arm)   Pulse 79   Temp 96.7  F (35.9  C) (Oral)   Resp 16   Ht 1.575 m (5' 2.01\")   Wt 54.9 kg (121 " lb 0.5 oz)   SpO2 97%   BMI 22.13 kg/m    General: NAD, alert and oriented, cooperative with exam.   Cardio: RRR, extremities wwp.   Respiratory: Non-labored breathing.  MSK: Focused examination of      RLE: Toes wwp, bcr in all toes. Compartments soft and tolerates passive stretch of toes. +EHL/FHL/GSC/TA with 4+/5 strength. SILT SP/DP/Sa/Martines/T. Dressing c/d/i right thigh. Drain with serosanguinous output.       PLANNED DISCHARGE ORDERS:     DVT Prophylaxis: Aspirin 325 mg daily x 4 weeks post operatively       Weight Bearing Status: 50% weight bearing right lower extremity.       Review of your medicines      START taking      Dose / Directions   aspirin 325 MG EC tablet  Commonly known as:  ASA  Indication:  VTE Prophylaxis  Used for:  Status post knee surgery      Dose:  325 mg  Take 1 tablet (325 mg) by mouth 2 times daily  Quantity:  56 tablet  Refills:  0     oxyCODONE 5 MG tablet  Commonly known as:  ROXICODONE  Used for:  Status post knee surgery      Dose:  5-10 mg  Take 1-2 tablets (5-10 mg) by mouth every 3 hours as needed  Quantity:  30 tablet  Refills:  0     polyethylene glycol 17 g packet  Commonly known as:  MIRALAX  Used for:  Status post knee surgery      Dose:  17 g  Take 17 g by mouth daily for 7 days  Quantity:  7 packet  Refills:  0        CONTINUE these medicines which may have CHANGED, or have new prescriptions. If we are uncertain of the size of tablets/capsules you have at home, strength may be listed as something that might have changed.      Dose / Directions   acetaminophen 325 MG tablet  Commonly known as:  TYLENOL  This may have changed:      medication strength    how much to take    when to take this    reasons to take this    Another medication with the same name was removed. Continue taking this medication, and follow the directions you see here.  Used for:  Status post knee surgery      Dose:  650 mg  Take 2 tablets (650 mg) by mouth every 4 hours as needed for other  Quantity:   1 Bottle  Refills:  0     senna-docusate 8.6-50 MG tablet  Commonly known as:  SENOKOT-S/PERICOLACE  This may have changed:      how much to take    when to take this    reasons to take this  Used for:  Status post knee surgery      Dose:  2 tablet  Take 2 tablets by mouth 2 times daily  Quantity:  30 tablet  Refills:  0        CONTINUE these medicines which have NOT CHANGED      Dose / Directions   ertapenem 1 GM vial  Commonly known as:  INVanz  Indication:  Right knee prosthesis infection  Used for:  Status post knee surgery      Dose:  1 g  Inject 1 g into the vein every evening  Quantity:  42 each  Refills:  0     Levothroid 75 MCG tablet  Used for:  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, including hip  Generic drug:  levothyroxine      Dose:  75 mcg  Take 75 mcg by mouth every morning  Refills:  0        STOP taking    ENSURE ACTIVE PO        Ashwin Pack        melatonin 3 MG tablet              Where to get your medicines      Information about where to get these medications is not yet available    Ask your nurse or doctor about these medications    oxyCODONE 5 MG tablet         Discharge Procedure Orders   Reason for your hospital stay   Order Comments: Explantation of Right total knee     Reason for your hospital stay   Order Comments: You were admitted following your total knee arthroplasty     When to contact your care team   Order Comments: Call Dr Gomez office  if you have any of the following: temperature greater than 101.5  increased shortness of breath, increased drainage, increased swelling or increased pain.     Wound care and dressings   Order Comments: Instructions to care for your wound at home: keep wound clean and dry    .  Dressings: Keep clean, dry and intact x 5 days  Elevation:  Elevate RLEon pillows to keep above the level of the heart as much as possible.     General info for SNF   Order Comments: Length of Stay Estimate: Short Term Care: Estimated # of Days  <30  Condition at Discharge: Improving  Level of care:skilled   Rehabilitation Potential: Excellent  Admission H&P remains valid and up-to-date: Yes  Recent Chemotherapy: N/A  Use Nursing Home Standing Orders: N/A     Mantoux instructions   Order Comments: Give two-step Mantoux (PPD) Per Facility Policy Yes     Activity   Order Comments: Your activity upon discharge: activity as tolerated    Activity: Up with assist until independent.  Weight bearing status: 50% Weight bearing RLE    Elevate right lower extremity with limb elevator     Order Specific Question Answer Comments   Is discharge order? Yes      Tubes and drains   Order Comments: You are going home with the following tubes or drains: JUAN R.  Follow these instructions  to care for your tube    Empty drain every shift.    Record output.    Drain will be removed at follow up visit.     Adult [facility] Follow-up and recommended labs and tests   Order Comments: Follow up with Dr. Gomez , at (location with clinic name or city) [facility] in two weeks for incision check  Appointments on [place] and/or [facility] (with [facility] or [facility] provider or service). Call [phone] if you haven't heard regarding these appointments within 7 days of discharge.    Weekly CBC with diff, CMP, CRP     Physical Therapy Adult Consult   Order Comments: Evaluate and treat as clinically indicated.    Reason:  S/p R knee ORIF.. 50 % wt bearing R LE     Occupational Therapy Adult Consult   Order Comments: Evaluate and treat as clinically indicated.    Reason:  S/p ORIF infected R TKA.  50% wt bearing R LE     Diet   Order Comments: Return to your pre surgery diet     Order Specific Question Answer Comments   Is discharge order? Yes      Assign Questionnaire Series to Patient       Khurram Castillo MD  Orthopaedic Surgery PGY4

## 2020-06-05 NOTE — PROGRESS NOTES
"Orthopaedic Surgery Progress Note   06/05/2020    Subjective: No acute interim events. Pain well controlled on current regimen. Tolerating diet. Voiding spontaneously. Patient worked with PT yesterday, she was up to chair and commode with walker and assistance. Denies CP, SOB, numbness or tingling, motor dysfunction or weakness. Reviewed plan with patient, anticipate discharge to TCU when bed secured.    Objective: /64 (BP Location: Left arm)   Pulse 79   Temp 96.7  F (35.9  C) (Oral)   Resp 16   Ht 1.575 m (5' 2.01\")   Wt 54.9 kg (121 lb 0.5 oz)   SpO2 97%   BMI 22.13 kg/m      General: NAD, alert and oriented, cooperative with exam.   Cardio: RRR, extremities wwp.   Respiratory: Non-labored breathing.  MSK: Focused examination of     RLE: Toes wwp, bcr in all toes. Compartments soft and tolerates passive stretch of toes. +EHL/FHL/GSC/TA with 4+/5 strength. SILT SP/DP/Sa/Martines/T. Dressing c/d/i right thigh. Drain with serosanguinous output.      Labs:     Lab Results   Component Value Date    WBC 5.9 06/03/2020    HGB 8.2 (L) 06/05/2020     06/03/2020       BMP:  Lab Results   Component Value Date     06/04/2020    POTASSIUM 4.3 06/04/2020    CHLORIDE 108 06/04/2020    CO2 31 06/04/2020    BUN 17 06/04/2020    CR 0.60 06/04/2020    ANIONGAP 1 (L) 06/04/2020    RAVEN 7.6 (L) 06/04/2020    GLC 90 06/04/2020       Inflammatory Markers:  Lab Results   Component Value Date    WBC 5.9 06/03/2020    WBC 4.0 05/14/2020    WBC 5.4 04/30/2020    CRP 2.8 (H) 05/14/2020    .0 (H) 04/01/2020    CRP <2.9 09/08/2016     (H) 04/01/2020     (H) 03/17/2010     (H) 03/10/2010         Assessment and Plan: Jigna Allen is a 85 year old female with complex Right knee history including:    DX:   1. Right calf high grade MFH 2010  2. 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 R knee DJD     Treatments:  1. neoadjuvant chemotherapy, surgical resection Feb 15, " 2010. Complications post-op of skin loss with draining seroma, treated with I & D and systemic antibiotics. She had 4 cycles of doxil/ifos finishing about Feb 2010. There was <10% viable cells at surgery. She finished post op XRT about June 2010  2. 6/12/18, R TKA ([facility]) [facility]  3. 1/27/2020, skin popliteal fossa punch bx :  No sarcoma  4. 3/27/2020 Debridement right thigh wound and placement of VAC, Dr Hernandez, [facility]  5. 3/30/2020, R TKA aspiration, WBC 17k,, PMNs 94%, MMB: Peptoniphilus asaccharolyticus, Streptococcus constellatus, Clostridium ramosum    6. 4/20/2020, Coverage of right lower extremity popliteal wound with free left anterolateral thigh fasciocutaneous flap with 3 perforators, size 25 x 8 cm. Dr Agosto, [facility]. MMB: Gemella morbillorum, Parvimonas micra, Peptoniphilus asaccharolyticus, Prevotella species. ABx Ertapenem.       Who is now S/p Explantation of the right total knee arthroplasty and placement of a articulating Right knee antibiotic spacer on 6/2/2020 with Dr. Gomez.    Hgb 8.2 this am.  S/p 1 unit PRBC on 6/4/2020  Appropriate post operative progression.    Orthopaedics Primary  Activity: Up with assist until independent.  Weight bearing status: 50% Weight bearing RLE  Pain management: Transition from IV to PO as tolerated. Judicious use of narcotics  Antibiotics: Ertapenem per ID recs  Diet: Begin with clear fluids and progress diet as tolerated.   DVT prophylaxis: Aspirin 325 daily x 4 weeks post operatively.  Bracing/Splinting: N/A  Dressings: Keep clean, dry and intact x 5 days  Elevation:  Elevate RLE on pillows to keep above the level of the heart as much as possible.   Drains: Document output per shift, discontinue at ortho discretion  Physical Therapy/Occupational Therapy: Eval and treat  Cultures:Pending, follow culture results closely.    Disposition: Discharge to TCU when bed secured, continue to follow cultures, ID and Surgical follow up    Khurram Castillo MD  Orthopedic  Surgery PGY-4  [phone]      For questions regarding this patient during Weekday business hours (7-4 pm), please attempt to contact me at my pager () prior to contacting the Orthopaedic Surgery Provider on call. If it is a night, weekend, or there is no response please page the Orthopaedic Surgery Provider on call. Thank you!

## 2020-06-05 NOTE — PLAN OF CARE
Discharge Planner PT   Patient plan for discharge: Home versus TCU  Current status: Pt is 50% WB. Able to perform bed mobility and transfers at bedside SBA. Therex   Barriers to return to prior living situation: WB status and gait tolerance  Recommendations for discharge: TCU  Rationale for recommendations: Pt needs skilled therapy and does not have the support for independence at her current WB status.       Entered by: Miguel Jin 06/05/2020 2:08 PM

## 2020-06-06 ENCOUNTER — HOSPITAL ENCOUNTER (EMERGENCY)
Facility: CLINIC | Age: 85
Discharge: HOME | End: 2020-06-06
Attending: EMERGENCY MEDICINE | Admitting: EMERGENCY MEDICINE
Payer: COMMERCIAL

## 2020-06-06 VITALS
RESPIRATION RATE: 18 BRPM | DIASTOLIC BLOOD PRESSURE: 73 MMHG | TEMPERATURE: 97.5 F | HEART RATE: 86 BPM | OXYGEN SATURATION: 100 % | SYSTOLIC BLOOD PRESSURE: 135 MMHG

## 2020-06-06 DIAGNOSIS — M96.89 POSTOPERATIVE SURGICAL COMPLICATION INVOLVING MUSCULOSKELETAL SYSTEM ASSOCIATED WITH MUSCULOSKELETAL PROCEDURE, UNSPECIFIED COMPLICATION: ICD-10-CM

## 2020-06-06 PROCEDURE — 99283 EMERGENCY DEPT VISIT LOW MDM: CPT | Performed by: EMERGENCY MEDICINE

## 2020-06-06 PROCEDURE — 99282 EMERGENCY DEPT VISIT SF MDM: CPT | Mod: Z6 | Performed by: EMERGENCY MEDICINE

## 2020-06-06 ASSESSMENT — ENCOUNTER SYMPTOMS
BACK PAIN: 0
SHORTNESS OF BREATH: 0
ABDOMINAL PAIN: 0
FEVER: 0
MYALGIAS: 0
CHILLS: 0
APPETITE CHANGE: 0
ARTHRALGIAS: 0

## 2020-06-06 NOTE — ED NOTES
Bed: ED08  Expected date: 6/6/20  Expected time: 9:00 AM  Means of arrival: Ambulance  Comments:  Chelle 631 84yo female, JUAN R drain fell out

## 2020-06-06 NOTE — DISCHARGE INSTRUCTIONS
Orthopedics will not be able to replace the drain at this time.  Please follow-up with the orthopedic clinic next week.

## 2020-06-06 NOTE — ED TRIAGE NOTES
Pt had knee replacement July of 2018, infection began in knee joint, replacement was removed on Tuesday 6/2/20.  JUAN R drain was unintentionally removed while patient slept.  No complaints of pain, VS.

## 2020-06-06 NOTE — ED AVS SNAPSHOT
[facility], [place], Emergency Department  [address]  [facility] [address]  Phone:  [phone]  Fax:  [phone]                                    Jigna Allen   MRN: [id number]    Department:  [facility], Emergency Department   Date of Visit:  6/6/2020           After Visit Summary Signature Page    I have received my discharge instructions, and my questions have been answered. I have discussed any challenges I see with this plan with the nurse or doctor.    ..........................................................................................................................................  Patient/Patient Representative Signature      ..........................................................................................................................................  Patient Representative Print Name and Relationship to Patient    ..................................................               ................................................  Date                                   Time    ..........................................................................................................................................  Reviewed by Signature/Title    ...................................................              ..............................................  Date                                               Time          22EPIC Rev 08/18

## 2020-06-06 NOTE — ED PROVIDER NOTES
[facility] EMERGENCY DEPARTMENT ([facility])    6/06/20       History     Chief Complaint   Patient presents with     Post-op Problem     julian drain pulled out, r knee replacement taken out tuesday     The history is provided by the patient and medical records.     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 He Kearney, [facility], 6/2018) presents to the Emergency Department with a dislodged surgical drain tube.  Patient underwent removal of right knee prosthesis with debridement and antibiotic impregnated spacer placement on 6/2/2020 with Dr. He Travis.  Patient reports that she had a surgical drain placed in her right knee following the surgery to drain fluid.  She reports that she woke up this morning and found the tube dislodged.  She states that it had been fastened with tape and is not entirely sure how it came out.  She reports that she has follow-up with Dr. Travis in approximately a week and believes that during likely would have been removed by then.  She also states there was a small scab that opened up on the back of her calf . She denies any worsening pain, chills or fever.    I have reviewed the Medications, Allergies, Past Medical and Surgical History, and Social History in the [employer] system.  Past Medical History:   Diagnosis Date     Anemia      Anxiety      Aortic valve sclerosis      Arthritis 1990?     Complication of anesthesia     slow to wakeup     Hypothyroid      Mild tricuspid regurgitation      Sarcoma (H) 2009       Past Surgical History:   Procedure Laterality Date     ARTHROPLASTY KNEE Right 6/12/2018    Procedure: ARTHROPLASTY KNEE;  Right Total Knee Replacement ;  Surgeon: He Gomez MD;  Location: [facility] OR     CARPAL TUNNEL RELEASE RT/LT       excise sarcoma Right     right leg      exicision of wound  03/27/2020     GRAFT FREE VASCULARIZED TRANSVERSE RECTUS ABDOMINIS MYOCUTANEOUS Left 4/20/2020    Procedure: With left anterolateral thigh free flap with SPY;  Surgeon: EHSAN De Luna MD;  [facility]     IRRIGATION AND DEBRIDEMENT KNEE, PLACE ANTIBIOTIC CEMENT BEADS / SPACE Right 6/2/2020    Procedure: manufacture and implantation of Vanco/tobra ANTIBIOTIC SPACER, KNEE;  Surgeon: He Gomez MD;  Location: [facility] OR     KNEE SURGERY       PICC DOUBLE LUMEN PLACEMENT Right 04/23/2020    5FR DL PICC inserted at brachial medial, length- 37cm (1cm out). Tip at mid SVC     RECONSTRUCT KNEE Right 4/20/2020    Procedure: Right leg reconstruction and wound debridemenr;  Surgeon: EHSAN De Luna MD;  [facility]     REMOVE HARDWARE ARTHROPLASTY KNEE Right 6/2/2020    Procedure: Explantation of Right total knee;  Surgeon: He Gomez MD;  [facility]: [facility] OR     TUBAL LIGATION         Past medical history, past surgi the patient. Additional pertinent items: None    Family History   Problem Relation Age of Onset     Cancer Father      Prostate Cancer Father      Other Cancer Father      Cancer Brother      Other - See Comments Daughter         Slow to wake      Asthma Daughter        Social History     Tobacco Use     Smoking status: Never Smoker     Smokeless tobacco: Never Used   Substance Use Topics     Alcohol use: Not Currently     Comment: rarely - months       Social history was reviewed with the patient. Additional pertinent items:none    Review of Systems   Constitutional: Negative for appetite change, chills and fever.   Respiratory: Negative for shortness of breath.    Cardiovascular: Negative for chest pain.   Gastrointestinal: Negative for abdominal pain.   Musculoskeletal: Negative for arthralgias, back pain and myalgias.   All other systems reviewed and are negative.        Physical Exam   BP: 127/75  Pulse: 105  Temp: 97.5  F (36.4  C)  Resp: 18  SpO2: 100  %    Physical Exam      Physical Exam   Constitutional:   Elderly female, resting comfortably   HENT:   Head: Normocephalic and atraumatic.   Cardiovascular: tachycardia without murmurs or gallops  Pulmonary/Chest: Clear to auscultation bilaterally. Normal work of breathing  Musculoskeletal:  RLE: Patient has surgical dressings to the right lower extremity.  JUAN R drain (right lateral thigh location) is absent.  There is significant swelling of the right knee.  She also has some distal lower extremity swelling.  There is no appreciable erythema-- see photographs below  Skin: Skin is warm and dry. No rash noted.   Neurological: alert and oriented to person, place, and time. Nonfocal exam  Psychiatric:  normal mood and affect.             ED Course        Procedures                           No results found for this or any previous visit (from the past 24 hour(s)).  Medications - No data to display           Assessments & Plan (with Medical Decision Making)       I have reviewed the nursing notes.  EMERGENCY DEPARTMENT COURSE: Patient was initially interviewed via iPad at 9:20 am. Patient then seen and evaluated in person at 9:25 am.      I consulted orthopedics, who evaluated the patient in the ED.    Briefly, they will not be able to replace the JUAN R drain.  The the small lesion on her right calf is just a scab.  Orthopedics states that her surgical site otherwise looks okay and she is safe to be discharged back to her rehab center.  Follow-up with the orthopedic clinic next week for reevaluation    I have reviewed the findings, diagnosis, plan and need for follow up with the patient.    New Prescriptions    No medications on file       Final diagnoses:   Postoperative surgical complication involving musculoskeletal system associated with musculoskeletal procedure, unspecified complication - drain fell out     This note was created in part by the use of Dragon voice recognition dictation system. Inadvertent grammatical  errors and typographical errors may still exist.  Clara Maya MD    6/6/2020   [facility], [place], EMERGENCY DEPARTMENT  I, Per Lopez, am serving as a trained medical scribe to document services personally performed by Clara Maya MD based on the provider's statements to me on June 6, 2020.  This document has been checked and approved by the attending provider.    IClara MD, was physically present and have reviewed and verified the accuracy of this note documented by Per Lopez, medical scribe.     This note was created in part by the use of Dragon voice recognition dictation system. Inadvertent grammatical errors and typographical errors may still exist.  MD Zulma Mena Alda L, MD  06/06/20 [id number]

## 2020-06-07 LAB
BACTERIA SPEC CULT: NO GROWTH
SPECIMEN SOURCE: NORMAL

## 2020-06-08 ENCOUNTER — TELEPHONE (OUTPATIENT)
Dept: INFECTIOUS DISEASES | Facility: CLINIC | Age: 85
End: 2020-06-08

## 2020-06-08 ENCOUNTER — PATIENT OUTREACH (OUTPATIENT)
Dept: ONCOLOGY | Facility: CLINIC | Age: 85
End: 2020-06-08

## 2020-06-08 ENCOUNTER — RECORDS - HEALTHEAST (OUTPATIENT)
Dept: LAB | Facility: CLINIC | Age: 85
End: 2020-06-08

## 2020-06-08 ENCOUNTER — EXTERNAL ORDER RESULTS (OUTPATIENT)
Dept: INFECTIOUS DISEASES | Facility: CLINIC | Age: 85
End: 2020-06-08

## 2020-06-08 ENCOUNTER — TELEPHONE (OUTPATIENT)
Dept: ORTHOPEDICS | Facility: CLINIC | Age: 85
End: 2020-06-08

## 2020-06-08 ENCOUNTER — TELEPHONE (OUTPATIENT)
Dept: SURGERY | Facility: CLINIC | Age: 85
End: 2020-06-08

## 2020-06-08 LAB
ALBUMIN SERPL-MCNC: 2 G/DL (ref 3.5–5)
ALBUMIN SERPL-MCNC: 2 G/DL (ref 3.5–5)
ALP SERPL-CCNC: 83 U/L (ref 45–120)
ALP SERPL-CCNC: 83 U/L (ref 45–120)
ALT SERPL W P-5'-P-CCNC: 14 U/L (ref 0–45)
ALT SERPL-CCNC: 14 U/L (ref 0–45)
ANION GAP SERPL CALCULATED.3IONS-SCNC: 9 MMOL/L (ref 5–18)
ANION GAP SERPL CALCULATED.3IONS-SCNC: 9 MMOL/L (ref 5–18)
AST SERPL W P-5'-P-CCNC: 11 U/L (ref 0–40)
AST SERPL-CCNC: 11 U/L (ref 0–40)
BASOPHILS # BLD AUTO: 0 THOU/UL (ref 0–0.2)
BASOPHILS - ABS (DIFF) - HISTORICAL: 0 X10E3/UL (ref 0–0.2)
BASOPHILS NFR BLD AUTO: 1 % (ref 0–2)
BASOPHILS NFR BLD AUTO: 1 % (ref 0–2)
BILIRUB SERPL-MCNC: 0.3 MG/DL (ref 0–1)
BILIRUB SERPL-MCNC: 0.3 MG/DL (ref 0–1)
BUN SERPL-MCNC: 20 MG/DL (ref 8–28)
BUN SERPL-MCNC: 20 MG/DL (ref 8–28)
C REACTIVE PROTEIN LHE: 1.3 MG/DL (ref 0–0.8)
CALCIUM SERPL-MCNC: 8.2 MG/DL (ref 8.5–10.5)
CALCIUM SERPL-MCNC: 8.2 MG/DL (ref 8.5–10.5)
CHLORIDE BLD-SCNC: 105 MMOL/L (ref 98–107)
CHLORIDE SERPLBLD-SCNC: 105 MMOL/L (ref 98–107)
CO2 SERPL-SCNC: 26 MMOL/L (ref 22–31)
CO2 SERPL-SCNC: 26 MMOL/L (ref 22–31)
CREAT SERPL-MCNC: 0.64 MG/DL (ref 0.6–1.1)
CREAT SERPL-MCNC: 0.64 MG/DL (ref 0.6–1.1)
CRP SERPL-MCNC: 1.3 MG/DL (ref 0–0.8)
EOSINOPHIL # BLD AUTO: 0.4 THOU/UL (ref 0–0.4)
EOSINOPHIL COUNT (ABSOLUTE): 0.4 X10E3/UL (ref 0–0.4)
EOSINOPHIL NFR BLD AUTO: 10 % (ref 0–5)
EOSINOPHIL NFR BLD AUTO: 10 % (ref 0–6)
ERYTHROCYTE [DISTWIDTH] IN BLOOD BY AUTOMATED COUNT: 18.9 % (ref 11–14.5)
ERYTHROCYTE [DISTWIDTH] IN BLOOD BY AUTOMATED COUNT: 18.9 % (ref 11–14.5)
GFR SERPL CREATININE-BSD FRML MDRD: >60 ML/MIN/1.73M2
GFR SERPL CREATININE-BSD FRML MDRD: >60 ML/MIN/1.73M2
GLUCOSE BLD-MCNC: 83 MG/DL (ref 70–125)
GLUCOSE SERPL-MCNC: 83 MG/DL (ref 70–125)
HCT VFR BLD AUTO: 28.8 % (ref 35–47)
HCT VFR BLD AUTO: 28.8 % (ref 35–47)
HEMOGLOBIN: 8.8 G/DL (ref 12–16)
HGB BLD-MCNC: 8.8 G/DL (ref 12–16)
LYMPHOCYTES # BLD AUTO: 0.8 THOU/UL (ref 0.8–4.4)
LYMPHOCYTES # BLD AUTO: 0.8 X10E3/UL (ref 0.8–4.4)
LYMPHOCYTES NFR BLD AUTO: 24 % (ref 20–40)
LYMPHOCYTES NFR BLD AUTO: 24 % (ref 20–40)
MCH RBC QN AUTO: 28.8 PG (ref 27–34)
MCH RBC QN AUTO: 28.8 PG (ref 27–34)
MCHC RBC AUTO-ENTMCNC: 30.6 G/DL (ref 32–36)
MCHC RBC AUTO-ENTMCNC: 30.6 G/DL (ref 32–36)
MCV RBC AUTO: 94 FL (ref 80–100)
MCV RBC AUTO: 94 FL (ref 80–100)
MONOCYTES # BLD AUTO: 0.4 THOU/UL (ref 0–0.9)
MONOCYTES # BLD AUTO: 0.4 X10E3/UL (ref 0–0.9)
MONOCYTES NFR BLD AUTO: 11 % (ref 2–10)
MONOCYTES NFR BLD AUTO: 11 % (ref 2–10)
NEUTROPHILS # BLD AUTO: 1.9 THOU/UL (ref 2–7.7)
NEUTROPHILS # BLD AUTO: 1.9 X10E3/UL (ref 2–7.7)
NEUTROPHILS NFR BLD AUTO: 54 % (ref 50–70)
NEUTROPHILS NFR BLD AUTO: 54 % (ref 50–70)
PLATELET # BLD AUTO: 316 10^9/L (ref 140–440)
PLATELET # BLD AUTO: 316 THOU/UL (ref 140–440)
PMV BLD AUTO: 10.1 FL (ref 8.5–12.5)
PMV BLD: 10.1 FL (ref 8.5–12.5)
POTASSIUM BLD-SCNC: 3.9 MMOL/L (ref 3.5–5)
POTASSIUM SERPL-SCNC: 3.9 MMOL/L (ref 3.5–5)
PROT SERPL-MCNC: 6.1 G/DL (ref 5–8)
PROT SERPL-MCNC: 6.1 G/DL (ref 6–8)
RBC # BLD AUTO: 3.06 10^12/L (ref 3.8–5.4)
RBC # BLD AUTO: 3.06 MILL/UL (ref 3.8–5.4)
SODIUM SERPL-SCNC: 140 MMOL/L (ref 136–145)
SODIUM SERPL-SCNC: 140 MMOL/L (ref 136–145)
WBC # BLD AUTO: 3.4 10^9/L (ref 4–11)
WBC: 3.4 THOU/UL (ref 4–11)

## 2020-06-08 NOTE — TELEPHONE ENCOUNTER
[facility] Call Center    Phone Message    May a detailed message be left on voicemail: yes     Reason for Call: Other: Pt is calling stating she is coming in to see Dr. Gomez on 06/18/20 from [facility] and wants an in person appt with Dr. De Luna same day.  Per covid protocol it's all telephone visits but pt would like in person since [place] [facility] [place], pt would like a call back on this please     Action Taken: Message routed to:  [facility] Plastics    Travel Screening: Not Applicable

## 2020-06-08 NOTE — TELEPHONE ENCOUNTER
Call to schedule f/u appt with Dr. De Luna. Patient said she want to see Dr. Gomez same day as Dr. De Luna. Gave patient Dr. De Luna's availability. She will contact Dr. Gomez's office and call me back to confirm appointment with Dr. De Luna.    Talon Cohen LPN

## 2020-06-08 NOTE — TELEPHONE ENCOUNTER
S:  This writer called patient's nursing facility per their request.   B: JUAN R drain came out and pt. had visited the ED on 6/6. Dr. Gomez aware. Advised to continue with dressing changes.      A: RN  Onsite also noted open area 2 cm x 1 cm on right calf where the graft site was located.   Noted that patient had surgical graft done by Dr. De Luna. Expected for follow up with plastics 1-2 weeks.  R: Recommended follow up with plastic surgery, Dr. De Luna for further evaluation.       Merline Nguyen RN on 6/8/2020 at 11:30 AM

## 2020-06-08 NOTE — TELEPHONE ENCOUNTER
[facility] Call Center    Phone Message    May a detailed message be left on voicemail: yes     Reason for Call: Other: Please call Yumiko from [facility]. She wants to know if Jigna needs to be seen by ID or if it is okay to just fax over lab results.     Action Taken: Other: Infectious Disease    Travel Screening: Not Applicable

## 2020-06-08 NOTE — PROGRESS NOTES
Patient discharged 6/5/20. Please follow up with patient per protocol. Any questions please contact Chandana Marquez CMA        If you do not work with this patient please forward to the correct individual and let me know so I can update the care team.

## 2020-06-08 NOTE — TELEPHONE ENCOUNTER
[facility] Call Center    Phone Message    May a detailed message be left on voicemail: yes     Reason for Call: Other: Yumiko calling from [facility] on behalf of Jigna to schedule a follow up appointment with Dr. De Luna. Jigna needs to be seen for the skin denis on her right calf. Yumiko says that their care facility has said not to schedule anymore virtual visit appointments, so Yumiko would like to know if it is possible to schedule for in clinic. Please give Yumiko a call back at your earliest convenience to discuss.    Action Taken: Message routed to:  [facility]  Plastic Surg    Travel Screening: Not Applicable

## 2020-06-08 NOTE — TELEPHONE ENCOUNTER
Call and spoke to patient, inform patient, Dr. De Luna is not available on that date. Patient said she will see Dr. Gomez first, if Dr. Gomez wants her to see Dr. De Luna for the wound, we will set up another appt afterwards. Pt agrees with plan.    Talon Cohen LPN

## 2020-06-09 LAB
BACTERIA SPEC CULT: NORMAL
Lab: NORMAL
SPECIMEN SOURCE: NORMAL

## 2020-06-10 ENCOUNTER — TELEPHONE (OUTPATIENT)
Dept: ORTHOPEDICS | Facility: CLINIC | Age: 85
End: 2020-06-10

## 2020-06-10 NOTE — TELEPHONE ENCOUNTER
I called Billie back to let her know that dressings should stay clean dry and intact until day 5 which would have been Sunday 6/7 as her procedure was done 6/2. Steri strips should be left in place until they fall off.    Sera Reynoso, ATC

## 2020-06-10 NOTE — TELEPHONE ENCOUNTER
Patient scheduled for a telephone visit  with Dr. Jefferson on 06/16/2020.     Jasmyn Chong RN

## 2020-06-11 NOTE — TELEPHONE ENCOUNTER
Spoke with nurse at pts care facility regarding change to flap. Nurse states there is small superficial opening on flap. Scant serous drainage from opening. No swelling, warmth, redness at site. No fevers or chills. Recommended dress with 4 x 4 to collect drainage. Can pack opening gently with dry gauze or nugauze and change once daily. If drainage in increasing, or pt develops fevers or chills clinic to be contacted. Offered follow up with Dr. De Luna on 6/17/20. Pt would like visits same day to avoid additional transportation fees. Pt scheduled with [facility] on 6/18/20 at 11:30am. Stella MILLER RNCC

## 2020-06-11 NOTE — TELEPHONE ENCOUNTER
Health Call Center    Phone Message    May a detailed message be left on voicemail: yes     Reason for Call: Other: Michael from [facility] would like to speak to someone from the clinic to see if patient should come in to get her incision check. There's drainage coming out where the graft is. Please call Michael and not the patient. [phone]. Thank you.      Action Taken: Message routed to:  [facility] Plastic Surgery    Travel Screening: Not Applicable

## 2020-06-15 ENCOUNTER — RECORDS - HEALTHEAST (OUTPATIENT)
Dept: LAB | Facility: CLINIC | Age: 85
End: 2020-06-15

## 2020-06-15 LAB
ALBUMIN SERPL-MCNC: 2.4 G/DL (ref 3.5–5)
ALP SERPL-CCNC: 131 U/L (ref 45–120)
ALT SERPL W P-5'-P-CCNC: <9 U/L (ref 0–45)
ANION GAP SERPL CALCULATED.3IONS-SCNC: 8 MMOL/L (ref 5–18)
AST SERPL W P-5'-P-CCNC: 13 U/L (ref 0–40)
BASOPHILS # BLD AUTO: 0 THOU/UL (ref 0–0.2)
BASOPHILS NFR BLD AUTO: 1 % (ref 0–2)
BILIRUB SERPL-MCNC: 0.3 MG/DL (ref 0–1)
BUN SERPL-MCNC: 23 MG/DL (ref 8–28)
C REACTIVE PROTEIN LHE: 0.6 MG/DL (ref 0–0.8)
CALCIUM SERPL-MCNC: 8.5 MG/DL (ref 8.5–10.5)
CHLORIDE BLD-SCNC: 103 MMOL/L (ref 98–107)
CO2 SERPL-SCNC: 27 MMOL/L (ref 22–31)
CREAT SERPL-MCNC: 0.69 MG/DL (ref 0.6–1.1)
EOSINOPHIL # BLD AUTO: 0.3 THOU/UL (ref 0–0.4)
EOSINOPHIL NFR BLD AUTO: 7 % (ref 0–6)
ERYTHROCYTE [DISTWIDTH] IN BLOOD BY AUTOMATED COUNT: 18.2 % (ref 11–14.5)
GFR SERPL CREATININE-BSD FRML MDRD: >60 ML/MIN/1.73M2
GLUCOSE BLD-MCNC: 83 MG/DL (ref 70–125)
HCT VFR BLD AUTO: 34 % (ref 35–47)
HGB BLD-MCNC: 10.2 G/DL (ref 12–16)
LYMPHOCYTES # BLD AUTO: 1.2 THOU/UL (ref 0.8–4.4)
LYMPHOCYTES NFR BLD AUTO: 28 % (ref 20–40)
MCH RBC QN AUTO: 29.1 PG (ref 27–34)
MCHC RBC AUTO-ENTMCNC: 30 G/DL (ref 32–36)
MCV RBC AUTO: 97 FL (ref 80–100)
MONOCYTES # BLD AUTO: 0.4 THOU/UL (ref 0–0.9)
MONOCYTES NFR BLD AUTO: 8 % (ref 2–10)
NEUTROPHILS # BLD AUTO: 2.5 THOU/UL (ref 2–7.7)
NEUTROPHILS NFR BLD AUTO: 56 % (ref 50–70)
PLATELET # BLD AUTO: 397 THOU/UL (ref 140–440)
PMV BLD AUTO: 10.2 FL (ref 8.5–12.5)
POTASSIUM BLD-SCNC: 4 MMOL/L (ref 3.5–5)
PROT SERPL-MCNC: 6.8 G/DL (ref 6–8)
RBC # BLD AUTO: 3.51 MILL/UL (ref 3.8–5.4)
SODIUM SERPL-SCNC: 138 MMOL/L (ref 136–145)
WBC: 4.4 THOU/UL (ref 4–11)

## 2020-06-16 ENCOUNTER — VIRTUAL VISIT (OUTPATIENT)
Dept: INFECTIOUS DISEASES | Facility: CLINIC | Age: 85
End: 2020-06-16
Attending: INTERNAL MEDICINE
Payer: COMMERCIAL

## 2020-06-16 ENCOUNTER — EXTERNAL ORDER RESULTS (OUTPATIENT)
Dept: INFECTIOUS DISEASES | Facility: CLINIC | Age: 85
End: 2020-06-16

## 2020-06-16 DIAGNOSIS — T84.50XD INFECTION OF PROSTHETIC JOINT, SUBSEQUENT ENCOUNTER: Primary | ICD-10-CM

## 2020-06-16 LAB
ALBUMIN SERPL-MCNC: 2.4 G/DL (ref 3.5–5)
ALP SERPL-CCNC: 131 U/L (ref 45–120)
ALT SERPL-CCNC: <9 U/L (ref 0–45)
ANION GAP SERPL CALCULATED.3IONS-SCNC: 8 MMOL/L (ref 5–18)
AST SERPL-CCNC: 13 U/L (ref 0–40)
BASOPHILS # BLD AUTO: 0 THOU/UL (ref 0–0.2)
BASOPHILS NFR BLD AUTO: 1 % (ref 0–2)
BILIRUB SERPL-MCNC: 0.3 MG/DL (ref 0–1)
BUN SERPL-MCNC: 23 MG/DL (ref 8–28)
CALCIUM SERPL-MCNC: 8.5 MG/DL (ref 8.5–10.5)
CHLORIDE SERPLBLD-SCNC: 103 MMOL/L (ref 98–107)
CO2 SERPL-SCNC: 27 MMOL/L (ref 22–31)
CREAT SERPL-MCNC: 0.69 MG/DL (ref 0.6–1.1)
CRP INFLAMMATION (EXTERNAL): 0.6 MG/DL (ref 0–0.8)
EOSINOPHIL COUNT (ABSOLUTE): 0.3 THOU/UL (ref 0–0.4)
EOSINOPHIL NFR BLD AUTO: 7 % (ref 0–6)
ERYTHROCYTE [DISTWIDTH] IN BLOOD BY AUTOMATED COUNT: 18.2 % (ref 11–14.5)
GFR SERPL CREATININE-BSD FRML MDRD: >60 ML/MIN/1.73M2
GLUCOSE SERPL-MCNC: 83 MG/DL (ref 70–125)
HCT VFR BLD AUTO: 34 % (ref 35–47)
HEMOGLOBIN: 10.2 G/DL (ref 12–16)
LYMPHOCYTES # BLD AUTO: 1.2 THOU/UL (ref 0.8–4.4)
LYMPHOCYTES NFR BLD AUTO: 28 % (ref 20–40)
MCH RBC QN AUTO: 29.1 PG (ref 27–34)
MCHC RBC AUTO-ENTMCNC: 30 G/DL (ref 32–36)
MCV RBC AUTO: 97 FL (ref 80–100)
MONOCYTES # BLD AUTO: 0.4 THOU/UL (ref 0–0.9)
MONOCYTES NFR BLD AUTO: 8 % (ref 2–10)
NEUTROPHILS # BLD AUTO: 2.5 THOU/UL (ref 2–7.7)
NEUTROPHILS NFR BLD AUTO: 56 % (ref 50–70)
PLATELET # BLD AUTO: 397 THOU/UL (ref 140–440)
PMV BLD: 10.2 FL (ref 8.5–12.5)
POTASSIUM SERPL-SCNC: 4 MMOL/L (ref 3.5–5)
PROT SERPL-MCNC: 6.8 G/DL (ref 6–8)
RBC # BLD AUTO: 3.51 MILL/UL (ref 3.8–5.4)
SODIUM SERPL-SCNC: 138 MMOL/L (ref 136–145)
WBC # BLD AUTO: 4.4 THOU/UL (ref 4–11)

## 2020-06-16 RX ORDER — AMOXICILLIN AND CLAVULANATE POTASSIUM 500; 125 MG/1; MG/1
1 TABLET, FILM COATED ORAL 2 TIMES DAILY
Qty: 60 TABLET | Refills: 1
Start: 2020-06-16 | End: 2020-08-11

## 2020-06-16 RX ORDER — SULFAMETHOXAZOLE/TRIMETHOPRIM 800-160 MG
1 TABLET ORAL 2 TIMES DAILY
Qty: 30 TABLET | Refills: 1 | Status: SHIPPED | OUTPATIENT
Start: 2020-06-16 | End: 2020-08-11

## 2020-06-16 ASSESSMENT — PAIN SCALES - GENERAL: PAINLEVEL: NO PAIN (1)

## 2020-06-16 NOTE — PROGRESS NOTES
"Jigna Allen is a 85 year old female who is being evaluated via a billable telephone visit.      The patient has been notified of following:     \"This telephone visit will be conducted via a call between you and your physician/provider. We have found that certain health care needs can be provided without the need for a physical exam.  This service lets us provide the care you need with a short phone conversation.  If a prescription is necessary we can send it directly to your pharmacy.  If lab work is needed we can place an order for that and you can then stop by our lab to have the test done at a later time.    Telephone visits are billed at different rates depending on your insurance coverage. During this emergency period, for some insurers they may be billed the same as an in-person visit.  Please reach out to your insurance provider with any questions.    If during the course of the call the physician/provider feels a telephone visit is not appropriate, you will not be charged for this service.\"    Patient has given verbal consent for Telephone visit?  Yes    What phone number would you like to be contacted at? [phone]    How would you like to obtain your AVS? Mail a copy    Phone call duration: 7:55 minutes    Ms. 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 She has had the joint removed and has a spacer in. She is in part of a 2 step operation. She was admitted 4/20 for a scheduled I&D with flap closure. Her wound was infected at that time and she grew strep intermedius and eikenella. She was placed on ertapenem with plan for at least 6 weeks.     She has done well since April. She has been at [facility] getting rehab. She and her nurse report her flap wound has nearly closed. There is just a small open area which has some drainage. It remains swollen. However, her nurse reports it looks much " better than when she was admitted and she is doing well.      ROS: 10 point ROS neg other than the symptoms noted above in the HPI.    She sounds well over the phone.     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 She has had the joint removed and has a spacer and flap in place. Her wound is not quite healed but nearly and her CRP is wnl. I think since she has had more than 6 week of antibiotics she can switch from IV ertapenem to PO augmentin 500 BID and daily bactrim for suppression.     I will then watch to see her follow up plan. She is to see Dr. Gomez on Thursday 6/18. If she has another surgery the antibiotics would depend on if the hardware was removed and what was placed.     Plan  -stop ertapenem after today, picc can be pulled after today  -augmentin 500 BID as suppression  -bactrim DS daily as suppression    RTC- I will put in for 3 months but will watch to see what her operative plan is.     Emily Jefferson MD

## 2020-06-16 NOTE — LETTER
"6/16/2020       RE: Jigna Allen  [address] [place] [address]     Dear Colleague,    Thank you for referring your patient, Jigna Allen, to the [facility] AND INFECTIOUS DISEASES at [facility]. Please see a copy of my visit note below.    Jigna Allen is a 85 year old female who is being evaluated via a billable telephone visit.      The patient has been notified of following:     \"This telephone visit will be conducted via a call between you and your physician/provider. We have found that certain health care needs can be provided without the need for a physical exam.  This service lets us provide the care you need with a short phone conversation.  If a prescription is necessary we can send it directly to your pharmacy.  If lab work is needed we can place an order for that and you can then stop by our lab to have the test done at a later time.    Telephone visits are billed at different rates depending on your insurance coverage. During this emergency period, for some insurers they may be billed the same as an in-person visit.  Please reach out to your insurance provider with any questions.    If during the course of the call the physician/provider feels a telephone visit is not appropriate, you will not be charged for this service.\"    Patient has given verbal consent for Telephone visit?  Yes    What phone number would you like to be contacted at? [phone]    How would you like to obtain your AVS? Mail a copy    Phone call duration: 7:55 minutes    Ms. 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 She has had the joint removed and has a spacer in. She is in part of a 2 step operation. She was admitted 4/20 for a scheduled I&D with flap closure. Her wound was infected at that time and she grew strep intermedius and eikenella. She was placed on " ertapenem with plan for at least 6 weeks.     She has done well since April. She has been at [facility] getting rehab. She and her nurse report her flap wound has nearly closed. There is just a small open area which has some drainage. It remains swollen. However, her nurse reports it looks much better than when she was admitted and she is doing well.      ROS: 10 point ROS neg other than the symptoms noted above in the HPI.    She sounds well over the phone.     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 She has had the joint removed and has a spacer and flap in place. Her wound is not quite healed but nearly and her CRP is wnl. I think since she has had more than 6 week of antibiotics she can switch from IV ertapenem to PO augmentin 500 BID and daily bactrim for suppression.     I will then watch to see her follow up plan. She is to see Dr. Gomez on Thursday 6/18. If she has another surgery the antibiotics would depend on if the hardware was removed and what was placed.     Plan  -stop ertapenem after today, picc can be pulled after today  -augmentin 500 BID as suppression  -bactrim DS daily as suppression    RTC- I will put in for 3 months but will watch to see what her operative plan is.     Emily Jefferson MD        Again, thank you for allowing me to participate in the care of your patient.      Sincerely,    Emily Jefferson MD

## 2020-06-17 ENCOUNTER — TELEPHONE (OUTPATIENT)
Dept: INFECTIOUS DISEASES | Facility: CLINIC | Age: 85
End: 2020-06-17

## 2020-06-17 NOTE — TELEPHONE ENCOUNTER
----- Message from Emily Jefferson MD sent at 6/16/2020  1:22 PM CDT -----  Regarding: orders to [facility],    Ms. Allen is in [facility]. I want to stop her IV ertapenem after her PM dose today. Her picc can then be pulled. She then should be on augmentin and bactrim for suppression. I put the orders in but her nurse says they need to be faxed to her. Can you all help me with this?    Thanks,    Emily

## 2020-06-17 NOTE — TELEPHONE ENCOUNTER
Faxed orders to stop IV ertapenem and remove PICC line, plus start Augmentin and Bactrim to [facility], [facility], at fax # [phone].  Pat Handley RN

## 2020-06-18 ENCOUNTER — OFFICE VISIT (OUTPATIENT)
Dept: ORTHOPEDICS | Facility: CLINIC | Age: 85
End: 2020-06-18
Payer: COMMERCIAL

## 2020-06-18 ENCOUNTER — OFFICE VISIT (OUTPATIENT)
Dept: WOUND CARE | Facility: CLINIC | Age: 85
End: 2020-06-18
Payer: COMMERCIAL

## 2020-06-18 ENCOUNTER — TELEPHONE (OUTPATIENT)
Dept: INFECTIOUS DISEASES | Facility: CLINIC | Age: 85
End: 2020-06-18

## 2020-06-18 DIAGNOSIS — S81.801A OPEN WOUND OF RIGHT KNEE, LEG, AND ANKLE, INITIAL ENCOUNTER: Primary | ICD-10-CM

## 2020-06-18 DIAGNOSIS — T84.50XD INFECTION OF PROSTHETIC JOINT, SUBSEQUENT ENCOUNTER: Primary | ICD-10-CM

## 2020-06-18 DIAGNOSIS — S91.001A OPEN WOUND OF RIGHT KNEE, LEG, AND ANKLE, INITIAL ENCOUNTER: Primary | ICD-10-CM

## 2020-06-18 DIAGNOSIS — Z98.890 S/P FLAP GRAFT: ICD-10-CM

## 2020-06-18 DIAGNOSIS — S81.001A OPEN WOUND OF RIGHT KNEE, LEG, AND ANKLE, INITIAL ENCOUNTER: Primary | ICD-10-CM

## 2020-06-18 NOTE — PROGRESS NOTES
Attending note:    DX:   1. Right calf high grade MFH.   2. 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 R knee DJD  4. Infection right total knee arthroplasty     Treatments:  1. neoadjuvant chemotherapy, surgical resection Feb 15, 2010. Complications post-op of skin loss with draining seroma, treated with I & D and systemic antibiotics. She had 4 cycles of doxil/ifos finishing about Feb 2010. There was <10% viable cells at surgery. She finished post op XRT about June 2010  2. 6/12/18, R TKA (Jason) [facility]  3. 1/27/2020, skin popliteal fossa punch bx :  No sarcoma  4. 3/27/2020 Debridement right thigh wound and placement of VAC, Dr Hernandez, [facility]  5. 3/30/2020, R TKA aspiration, WBC 17k,, PMNs 94%, MMB: Peptoniphilus asaccharolyticus, Streptococcus constellatus, Clostridium ramosum    6. 4/20/2020, Coverage of right lower extremity popliteal wound with free left anterolateral thigh fasciocutaneous flap with 3 perforators, size 25 x 8 cm. Dr Agosto, [facility]. MMB: Gemella morbillorum, Parvimonas micra, Peptoniphilus asaccharolyticus, Prevotella species. ABx Ertapenem.   7. 6/2/2020, explantation of right total knee arthroplasty with placement of Vanco/gentamicin/ceftazidime PMMA antibiotic spacer knee right, Dr. Gomez, [facility]. MMB: NGTD    History of present illness:     Patient returns to our clinic 2 weeks status post explantation of right total knee arthroplasty with placement of antibiotic eluding PMMA spacer.  All cultures show no growth to date.  Patient is doing well.  She is currently residing at the rehab center.  She would like to go home.  She is ambulating 50% weightbearing with a walker.    On 6/16/2020 patient has seen Dr. Jefferson (infectious disease) who decided to discontinue ertapenem and remove the PICC line and continue with Augmentin 500 twice daily and Bactrim as suppression therapy.  Her PICC was pulled last night.    Physical Examination:   Incision is clean dry  and healing well.  No signs of active infection or drainage drainage.  Range of motion 0 to 90 degrees.        Impression:    85-year-old female status post explantation of right total knee arthroplasty with placement of antibiotic eluding PMMA spacer in the setting of a previous soft tissue coverage procedure and infection with multiple organisms who is recovering well.      Plan:  We discussed the findings with the patient and her daughter.  We will discuss the antibiotic treatment for the next 4 weeks or the rationale behind suppresive Abx with Dr Jefferson.  We discussed the antibiotic holiday, aspiration afterwards, possible reimplantation of total knee arthroplasty once the infection seems to be eradicated.  We also discussed the 50% weightbearing status and pros and cons of staying at the TCU or going home with home care.    Rafael Montelongo MD  [facility] Arthroplasty Fellow    Attending MD (Dr. He Gomez) Attestation :  This patient was seen and evaluated by me including a history, exam, and interpretation of all imaging and/or lab data.  Either a training physician (resident/fellow), who also saw the patient, or scribe has documented the visit in the attached note.    MD Pelon Cid Family Professor  Oncology and Adult Reconstructive Surgery  Dept Orthopaedic Surgery, [facility]  [phone] office, [phone] pager  [email or web address]

## 2020-06-18 NOTE — PATIENT INSTRUCTIONS
Cleansing with technicare solution  Irrigation with Saline solution  Application of topical medication Medihoney  Application of sterile dressing

## 2020-06-18 NOTE — PROGRESS NOTES
Patient comes to wound clinic for wound assessment per request of dr. Whitaker. She has history of a Open surgical wound due to : PERFORMED:   1.  Coverage of right lower extremity popliteal wound with free left anterolateral thigh fasciocutaneous flap with 3 perforators, size 25 x 8 cm.     Application of right lower extremity long leg splint.    Clean gloves are donned and current dressing removed.     Objective findings:    Open wound of right knee, leg, and ankle, initial encounter  S/P flap graft    Number of wounds: 1    Location: right medial leg bottom tip[ of graft       Type of wound:   Post-operative wound: Yes,     Wound measured: Length 1.5 cm x Width 0.5 cm x Depth 0.1 cm ,     Stage: Stage 3: Full thickness skin loss with subcutaneous involvement     Thickness: Full    Drainage: small, serosanguinous   Wound specifics:    Surgical site assessment: removed dressing    Wound Base:  no sign of infection,      Devitalized Tissue or Slough appearance:    100%    Eschar appearance:  none    Tenderness: Fully effective control    Odor: none    Periwound Skin: intact      Lab assessment Not Applicable  Albumin, normal range is 3.5-5.5 g/dL  outside of normal limits on   Lab Results   Component Value Date    ALBUMIN 2.4 06/16/2020     Subjective finding:     Pt states: just worried about non healing area of the graft    Smoking hx: none     Patient is assessed for discomfort which is: absent    Today's status of the wound: initial assessment    Treatment: Chemical debridement Removal of existing dressing  Visual inspection  Cleansing with technicare solution  Irrigation with Saline solution  Application of topical medication Medihoney  Application of sterile dressing   Plan of care:   Reason for dressing use debriding dressing applied   Cleaning: PCMX Soap  Primary: Medihoney ,  Secondary: 2x2,  Frequency of change 2 per day  Verbally approved by     Pt received the following instructions:  Called pt  regarding low albumin results encouraged meats or protein supplement drinks     Signs and symptoms of infection taught.  Patient needs to be seen 2 weeks. Treated and follow-up appointment made. Dr. Watt was available for supervision of care if needed or if questions should arise and regarding plan of care.  Ruby Prince RN, CWON

## 2020-06-18 NOTE — LETTER
6/18/2020     RE: Jigna Allen  [address] [place] [address]    Dear Colleague,    Thank you for referring your patient, Jigna Allen, to the [facility]. Please see a copy of my visit note below.    Attending note:    DX:   1. Right calf high grade MFH.   2. 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 R knee DJD  4. Infection right total knee arthroplasty     Treatments:  1. neoadjuvant chemotherapy, surgical resection Feb 15, 2010. Complications post-op of skin loss with draining seroma, treated with I & D and systemic antibiotics. She had 4 cycles of doxil/ifos finishing about Feb 2010. There was <10% viable cells at surgery. She finished post op XRT about June 2010  2. 6/12/18, R TKA (Jason) [facility]  3. 1/27/2020, skin popliteal fossa punch bx :  No sarcoma  4. 3/27[facility]2020 [facility]  5. 3/30/2020, R TKA aspiration, WBC 17k,, PMNs 94%, MMB: Peptoniphilus asaccharolyticus, Streptococcus constellatus, Clostridium ramosum    6. 4/20/2020, Coverage of right lower extremity popliteal wound with free left anterolateral thigh fasciocutaneous flap with 3 perforators, size 25 x 8 cm. Dr Agosto, [facility]. MMB: Gemella morbillorum, Parvimonas micra, Peptoniphilus asaccharolyticus, Prevotella species. ABx Ertapenem.   7. 6/2/2020, explantation of right total knee arthroplasty with placement of Vanco/gentamicin/ceftazidime PMMA antibiotic spacer knee right, Dr. Gomez, [facility]. MMB: NGTD    History of present illness:     Patient returns to our clinic 2 weeks status post explantation of right total knee arthroplasty with placement of antibiotic eluding PMMA spacer.  All cultures show no growth to date.  Patient is doing well.  She is currently residing at the rehab center.  She would like to go home.  She is ambulating 50% weightbearing with a walker.    On 6/16/2020 patient has seen Dr. Jefferson (infectious  disease) who decided to discontinue ertapenem and remove the PICC line and continue with Augmentin 500 twice daily and Bactrim as suppression therapy.  Her PICC was pulled last night.    Physical Examination:   Incision is clean dry and healing well.  No signs of active infection or drainage drainage.  Range of motion 0 to 90 degrees.        Impression:    85-year-old female status post explantation of right total knee arthroplasty with placement of antibiotic eluding PMMA spacer in the setting of a previous soft tissue coverage procedure and infection with multiple organisms who is recovering well.      Plan:  We discussed the findings with the patient and her daughter.  We will discuss the antibiotic treatment for the next 4 weeks or the rationale behind suppresive Abx with Dr Jefferson.  We discussed the antibiotic holiday, aspiration afterwards, possible reimplantation of total knee arthroplasty once the infection seems to be eradicated.  We also discussed the 50% weightbearing status and pros and cons of staying at the TCU or going home with home care.    Rafael Montelongo MD  [facility] Arthroplasty Fellow    Attending MD (Dr. He Gomez) Attestation :  This patient was seen and evaluated by me including a history, exam, and interpretation of all imaging and/or lab data.  Either a training physician (resident/fellow), who also saw the patient, or scribe has documented the visit in the attached note.    MD Pelon Cid Family Professor  Oncology and Adult Reconstructive Surgery  Dept Orthopaedic Surgery, [facility]  [phone] office, [phone] pager  [email or web address]

## 2020-06-18 NOTE — TELEPHONE ENCOUNTER
[facility] Call Center    Phone Message    May a detailed message be left on voicemail: yes     Reason for Call: Order(s): Other:   Reason for requested: Requesting faxed orders for weekly labs  Date needed: Whenever possible  Provider name: Dr. Jefferson    Patient was recently put on oral antibiotic verses through PICC Line and Natanael Kee is wondering if Dr. Jefferson still wants to do the weekly labs, or not? Please fax orders to [phone]. If you have any questions, feel free to give Billie patel a call back at [phone].      Action Taken: Message routed to:  [facility] Infectious Disease    Travel Screening: Not Applicable

## 2020-06-19 ENCOUNTER — TELEPHONE (OUTPATIENT)
Dept: ORTHOPEDICS | Facility: CLINIC | Age: 85
End: 2020-06-19

## 2020-06-19 ENCOUNTER — TELEPHONE (OUTPATIENT)
Dept: INFECTIOUS DISEASES | Facility: CLINIC | Age: 85
End: 2020-06-19

## 2020-06-19 DIAGNOSIS — T84.50XD INFECTION OF PROSTHETIC JOINT, SUBSEQUENT ENCOUNTER: Primary | ICD-10-CM

## 2020-06-19 NOTE — TELEPHONE ENCOUNTER
[facility] Call Center    Phone Message    May a detailed message be left on voicemail: yes     Reason for Call: Other: Pt saw Li 6/16/20. Pt's daughter Lawanda stated that per Li the Pt's picc line was removed, and that after Pt and family updated Dr. Gmoez on 6/18/20 about this he stated he wants the picc line to be replaced to Pt can stay on antibiotics to keep infection out of her knee. Per Pt's daughter Jason was supposed to reach out to [facility] because they need her to sign off on replacing the picc line, but daughter hasn't received any update from Jason or Li team. Also the picc has not been replaced yet. Writer unable to get through to clinic priority line. Please call Pt's daughter back ASAP. She is very concerned being as today is Friday, and not sure what could be done over the weekend.     Action Taken: Message routed to:  [facility] iD    Travel Screening: Not Applicable

## 2020-06-19 NOTE — TELEPHONE ENCOUNTER
[facility] Call [facility]    Phone Message    May a detailed message be left on voicemail: yes     Reason for Call: Other: Nam with [facility] is wants to know when the end date should be for the Pt's antibiotics, and also the weekly labs orders they were placed by Dr. Jefferson. Please call back to advise [phone]      Action Taken: Message routed to:  [facility] ID    Travel Screening: Not Applicable

## 2020-06-19 NOTE — TELEPHONE ENCOUNTER
After receiving message from patient's daughter regarding Antibiotics post pt's visit yesterday I reached out to Lawanda to further discuss. I explained that Dr. Gomez had reached out to Dr. Jefferson yesterday to discuss Antibiotic Treatment, but to the best of my knowledge they did not connect. I confirmed that I had sent Dr. Gomez an Urgent Text with the Info/concerns that she has, but that he is currently in the OR and have not heard back yet. I informed her that I had also sent the Inbasket/Telephone Enc. From her reaching out earlier directly to him set as High Priority. I am hopeful that Dr. Gomez will reach out to myself with danay, or KEELY Allen whom is covering for his Nurse Merline while she is out today. I will await to hear from Dr. Gomez and informed her that I would contact her today regardless. She agreed with this plan and thanked me for my help. She also confirmed that Dr. Jefferson's Nurse is also keeping a close eye on this and has reached out to Dr. Jefferson to encourage that both Doctors Connect to further discuss. Pt's. Daughter expressed concerns being that it is now the weekend and just wants to make sure she has an answer by the end of the day. She reports that her mother is experiencing pain after PT/OT and would like Dr. Gomez to know that info. As well. I will await Dr. Gomez's response.

## 2020-06-19 NOTE — TELEPHONE ENCOUNTER
[facility] IV Therapy Orders    -Ordering provider: DR AMADOR RICKS    -Diagnosis/indication for therapy: RIGHT KNEE INFECTION    -PICC will be placed on: 6/20/20 AT [facility] [phone]    - Infusion to start on: 6/21/20 AT [facility]    - Medication orders: IV ERTAPENEM 1 g EVERY 24 HOURS    -Anticipated LOT: 4 WEEKS (START 6/20 - END 7/17)    -Lab orders: WEEKLY CBC W/DIFF/PLTS, BMP, CRP    - Lab results to be faxed to Dr. RICKS at [phone].    - Please access and flush PICC per facility protocol.  _________________________________________________    Pt has [employer] Advantage plan, which [facility] is out-of-network with.   Ertapenem 1g daily+per ambika=$47.50 (daily)   Nursing if NOT homebound=$90 (per visit)   [place] [facility] is an in-network provider.

## 2020-06-19 NOTE — TELEPHONE ENCOUNTER
Per Dr Jefferson via telephone conversation, OK to put in order for new PICC insertion and restart of IV Ertapenem 1 g every 24 hours. Orders entered and faxed to [facility] [phone]. Updated pt's daughter.  Luna Castillo RN

## 2020-06-19 NOTE — TELEPHONE ENCOUNTER
Called pt who reports Dr Gomez is interested in keeping pt on both IV antibiotics for one more month. PICC has already been removed however Dr Gomez would like to discuss with Dr Jefferson. Dr Jefferson updated.  Luna Castillo RN

## 2020-06-19 NOTE — TELEPHONE ENCOUNTER
[facility] Call Center    Phone Message    May a detailed message be left on voicemail: yes     Reason for Call: Order(s): Home Care Orders: Other: Nisha calling to request orders to discontinue Jigna's oral medications.  She states that they cannot start her on the IV medication until they receive the DC order    Action Taken: Message routed to:  [facility] FAY ID    Travel Screening: Not Applicable

## 2020-06-20 NOTE — TELEPHONE ENCOUNTER
I reached out to patient's daughter as promised earlier today. I explained that unfortunately I was unable to connect with Dr. Gomez, but explained that I would further discuss her/her mother's concerns with him on Monday, 06/22/2020 and ask that he reach out to her via phone or I will follow up with her after with recommendations. I was happy to see that Dr. Jefferson's office had placed new orders to have the PICC put back in and for her to resume IV Antibiotics. She was very thankful that I followed up with her and said she looks forward to hearing back from Dr. Gmoez's Team on Monday.     Questions to address:     1. Pain post PT and OT?  2. Can she go home while on IV Antibiotics or must she remain in her current facility?  3. Next steps after antibiotic completion?     I did read Dr. Gomez's Plan from Progress note on 06/18/2020, which explained Antibiotic holiday, Re-aspiration, Reimplantation. So I explained that we along with ID will have to see how her body responds to things over the next couple weeks and go from there as far as a treatment plan. She agreed and looks forward to hearing from Dr. Gomez.

## 2020-06-22 ENCOUNTER — TELEPHONE (OUTPATIENT)
Dept: ORTHOPEDICS | Facility: CLINIC | Age: 85
End: 2020-06-22

## 2020-06-22 ENCOUNTER — TELEPHONE (OUTPATIENT)
Dept: INFECTIOUS DISEASES | Facility: CLINIC | Age: 85
End: 2020-06-22

## 2020-06-22 ENCOUNTER — RECORDS - HEALTHEAST (OUTPATIENT)
Dept: LAB | Facility: CLINIC | Age: 85
End: 2020-06-22

## 2020-06-22 LAB
ALBUMIN SERPL-MCNC: 2.9 G/DL (ref 3.5–5)
ALBUMIN SERPL-MCNC: 2.9 G/DL (ref 3.5–5)
ALP SERPL-CCNC: 157 U/L (ref 45–120)
ALP SERPL-CCNC: 157 U/L (ref 45–120)
ALT SERPL W P-5'-P-CCNC: 11 U/L (ref 0–45)
ALT SERPL-CCNC: 11 U/L (ref 0–45)
ANION GAP SERPL CALCULATED.3IONS-SCNC: 8 MMOL/L (ref 5–18)
ANION GAP SERPL CALCULATED.3IONS-SCNC: 8 MMOL/L (ref 5–18)
AST SERPL W P-5'-P-CCNC: 15 U/L (ref 0–40)
AST SERPL-CCNC: 15 U/L (ref 0–40)
BASOPHILS # BLD AUTO: 0 THOU/UL (ref 0–0.2)
BASOPHILS # BLD AUTO: 0 THOU/UL (ref 0–0.2)
BASOPHILS NFR BLD AUTO: 1 % (ref 0–2)
BASOPHILS NFR BLD AUTO: 1 % (ref 0–2)
BILIRUB SERPL-MCNC: 0.4 MG/DL (ref 0–1)
BILIRUB SERPL-MCNC: 0.4 MG/DL (ref 0–1)
BUN SERPL-MCNC: 24 MG/DL (ref 8–28)
BUN SERPL-MCNC: 24 MG/DL (ref 8–28)
C REACTIVE PROTEIN LHE: 0.3 MG/DL (ref 0–0.8)
CALCIUM SERPL-MCNC: 9 MG/DL (ref 8.5–10.5)
CALCIUM SERPL-MCNC: 9 MG/DL (ref 8.5–10.5)
CHLORIDE BLD-SCNC: 102 MMOL/L (ref 98–107)
CHLORIDE SERPLBLD-SCNC: 102 MMOL/L (ref 98–107)
CO2 SERPL-SCNC: 28 MMOL/L (ref 22–31)
CO2 SERPL-SCNC: 28 MMOL/L (ref 22–31)
CREAT SERPL-MCNC: 0.73 MG/DL (ref 0.6–1.1)
CREAT SERPL-MCNC: 0.73 MG/DL (ref 0.6–1.1)
CRP INFLAMMATION (EXTERNAL): 0.3 MG/DL (ref 0–15)
EOSINOPHIL # BLD AUTO: 0.3 THOU/UL (ref 0–0.4)
EOSINOPHIL COUNT (ABSOLUTE): 0.3 THOU/UL (ref 0–0.4)
EOSINOPHIL NFR BLD AUTO: 8 % (ref 0–6)
EOSINOPHIL NFR BLD AUTO: 8 % (ref 0–6)
ERYTHROCYTE [DISTWIDTH] IN BLOOD BY AUTOMATED COUNT: 17.6 % (ref 11–14.5)
ERYTHROCYTE [DISTWIDTH] IN BLOOD BY AUTOMATED COUNT: 17.6 % (ref 11–14.5)
GFR SERPL CREATININE-BSD FRML MDRD: >60 ML/MIN/1.73M2
GFR SERPL CREATININE-BSD FRML MDRD: >60 ML/MIN/1.73M2
GLUCOSE BLD-MCNC: 82 MG/DL (ref 70–125)
GLUCOSE SERPL-MCNC: 82 MG/DL (ref 70–125)
HCT VFR BLD AUTO: 37.4 % (ref 35–47)
HCT VFR BLD AUTO: 37.4 % (ref 35–47)
HEMOGLOBIN: 11.2 G/DL (ref 12–16)
HGB BLD-MCNC: 11.1 G/DL (ref 12–16)
LYMPHOCYTES # BLD AUTO: 1.1 THOU/UL (ref 0.8–4.4)
LYMPHOCYTES # BLD AUTO: 1.1 THOU/UL (ref 0.8–4.4)
LYMPHOCYTES NFR BLD AUTO: 30 % (ref 20–40)
LYMPHOCYTES NFR BLD AUTO: 30 % (ref 20–40)
MCH RBC QN AUTO: 28.2 PG (ref 27–34)
MCH RBC QN AUTO: 28.2 PG (ref 27–34)
MCHC RBC AUTO-ENTMCNC: 29.7 G/DL (ref 32–36)
MCHC RBC AUTO-ENTMCNC: 29.7 G/DL (ref 32–36)
MCV RBC AUTO: 95 FL (ref 80–100)
MCV RBC AUTO: 95 FL (ref 80–100)
MONOCYTES # BLD AUTO: 0.4 THOU/UL (ref 0–0.9)
MONOCYTES # BLD AUTO: 0.4 THOU/UL (ref 0–0.9)
MONOCYTES NFR BLD AUTO: 12 % (ref 2–10)
MONOCYTES NFR BLD AUTO: 12 % (ref 2–10)
NEUTROPHILS # BLD AUTO: 1.9 THOU/UL (ref 2–7.7)
NEUTROPHILS # BLD AUTO: 1.9 THOU/UL (ref 2–7.7)
NEUTROPHILS NFR BLD AUTO: 50 % (ref 50–70)
NEUTROPHILS NFR BLD AUTO: 50 % (ref 50–70)
PLATELET # BLD AUTO: 356 THOU/UL (ref 140–440)
PLATELET # BLD AUTO: 356 THOU/UL (ref 140–440)
PMV BLD AUTO: 10.6 FL (ref 8.5–12.5)
PMV BLD: 10.6 FL (ref 8.5–12.5)
POTASSIUM BLD-SCNC: 4 MMOL/L (ref 3.5–5)
POTASSIUM SERPL-SCNC: 4 MMOL/L (ref 3.5–5)
PROT SERPL-MCNC: 7.4 G/DL (ref 5–8)
PROT SERPL-MCNC: 7.4 G/DL (ref 6–8)
RBC # BLD AUTO: 3.94 MIL/UL (ref 3.8–5.4)
RBC # BLD AUTO: 3.94 MILL/UL (ref 3.8–5.4)
SODIUM SERPL-SCNC: 138 MMOL/L (ref 136–145)
SODIUM SERPL-SCNC: 138 MMOL/L (ref 136–145)
WBC # BLD AUTO: 3.7 THOU/UL (ref 4–11)
WBC: 3.7 THOU/UL (ref 4–11)

## 2020-06-22 NOTE — TELEPHONE ENCOUNTER
[facility] Call Center    Phone Message    May a detailed message be left on voicemail: No    Reason for Call: Other: [facility] would like clarification re starting oral medication and IV medication. They are not sure which medications, dosage, start and end times. You can fax the information to [phone]. If you have any questions you can call [facility] at [phone].     Action Taken: Other: Infectious Disease    Travel Screening: Not Applicable

## 2020-06-23 NOTE — TELEPHONE ENCOUNTER
Information about new PICC placement and re-starting IV Ertapenem 1 g daily for 4-6 weeks along with HOLD placed on 2 oral antibiotics(Augmentin, Bactrim) faxed to [facility] [phone].  Luna Castillo RN

## 2020-06-23 NOTE — TELEPHONE ENCOUNTER
Order to hold 2 oral antibiotics meant to be prophylactic and not to be started until IV Ertapenem has completed has been faxed to [facility] [phone].  Luna Castillo RN

## 2020-06-23 NOTE — TELEPHONE ENCOUNTER
Orders were faxed to [facility] letting them know the LOT for pt's IV Ertapenem will be ~ 7/18.  Luna Castillo RN

## 2020-06-23 NOTE — TELEPHONE ENCOUNTER
I reached out to patient's daughter who had some questions I told her I would address w/Dr. Gomez. She asked that she add her mother, and other sister to our call. I explained to all that Dr. Gomez had indicated that pain after PT/OT is normal due to the antibiotic spacer not being her real joint. I explained that she should take her pain medication prior to Therapy, to maintain the 50% WB status at all time, to elevate her leg above her heart, rest, and ice after Therapy to help with her pain and swelling. She verbalized that she is feeling pretty good overall and even walked 170 steps today with the assistance of a walker and staff member at her facility. She is eager to return home, and I informed her that Dr. Gomez indicated that he is ok with her returning home as long as Dr. Jefferson clears her, she is receiving the continued care, meets PT/OT goals and receives her IV antibiotics.All wanted to know what the course of treatment would be after her IV antibiotics were finished.  I explained that Dr. Gomez would like another 4wks of IV antibiotics, a 2 month antibiotic holiday, reaspiration of the Knee, along with blood work, and as long is there is no sign of infection they will then proceed with reimplantation of the Joint. All thanked me for my help and verbalized understanding of plan in place. No other questions or concerns at this time.

## 2020-06-24 ENCOUNTER — EXTERNAL ORDER RESULTS (OUTPATIENT)
Dept: INFECTIOUS DISEASES | Facility: CLINIC | Age: 85
End: 2020-06-24

## 2020-06-25 ENCOUNTER — RECORDS - HEALTHEAST (OUTPATIENT)
Dept: LAB | Facility: CLINIC | Age: 85
End: 2020-06-25

## 2020-06-26 LAB
ANION GAP SERPL CALCULATED.3IONS-SCNC: 8 MMOL/L (ref 5–18)
ANION GAP SERPL CALCULATED.3IONS-SCNC: 8 MMOL/L (ref 5–18)
BASOPHILS # BLD AUTO: 0 THOU/UL (ref 0–0.2)
BASOPHILS # BLD AUTO: 0 THOU/UL (ref 0–0.2)
BASOPHILS NFR BLD AUTO: 1 % (ref 0–2)
BASOPHILS NFR BLD AUTO: 1 % (ref 0–2)
BUN SERPL-MCNC: 22 MG/DL (ref 8–28)
BUN SERPL-MCNC: 22 MG/DL (ref 8–28)
C REACTIVE PROTEIN LHE: 0.2 MG/DL (ref 0–0.8)
CALCIUM SERPL-MCNC: 9 MG/DL (ref 8.5–10.5)
CALCIUM SERPL-MCNC: 9 MG/DL (ref 8.5–10.5)
CHLORIDE BLD-SCNC: 102 MMOL/L (ref 98–107)
CHLORIDE SERPLBLD-SCNC: 102 MMOL/L (ref 98–107)
CO2 SERPL-SCNC: 29 MMOL/L (ref 22–31)
CO2 SERPL-SCNC: 29 MMOL/L (ref 22–31)
CREAT SERPL-MCNC: 0.69 MG/DL (ref 0.6–1.1)
CREAT SERPL-MCNC: 0.69 MG/DL (ref 0.6–1.1)
EOSINOPHIL # BLD AUTO: 0.2 THOU/UL (ref 0–0.4)
EOSINOPHIL COUNT (ABSOLUTE): 0.2 THOU/UL (ref 0–0.4)
EOSINOPHIL NFR BLD AUTO: 5 % (ref 0–6)
EOSINOPHIL NFR BLD AUTO: 5 % (ref 0–6)
ERYTHROCYTE [DISTWIDTH] IN BLOOD BY AUTOMATED COUNT: 17.2 % (ref 11–14.5)
ERYTHROCYTE [DISTWIDTH] IN BLOOD BY AUTOMATED COUNT: 17.2 % (ref 11–14.5)
GFR SERPL CREATININE-BSD FRML MDRD: >60 ML/MIN/1.73M2
GFR SERPL CREATININE-BSD FRML MDRD: >60 ML/MIN/1.73M2
GLUCOSE BLD-MCNC: 80 MG/DL (ref 70–125)
GLUCOSE SERPL-MCNC: 80 MG/DL (ref 70–125)
HCT VFR BLD AUTO: 36.8 % (ref 35–47)
HCT VFR BLD AUTO: 36.8 % (ref 35–47)
HEMOGLOBIN: 11 G/DL (ref 12–16)
HGB BLD-MCNC: 11 G/DL (ref 12–16)
LYMPHOCYTES # BLD AUTO: 1 THOU/UL (ref 0.8–4.4)
LYMPHOCYTES # BLD AUTO: 1 THOU/UL (ref 0.8–4.4)
LYMPHOCYTES NFR BLD AUTO: 24 % (ref 20–40)
LYMPHOCYTES NFR BLD AUTO: 24 % (ref 20–40)
MCH RBC QN AUTO: 28.1 PG (ref 27–34)
MCH RBC QN AUTO: 28.1 PG (ref 27–34)
MCHC RBC AUTO-ENTMCNC: 29.9 G/DL (ref 32–36)
MCHC RBC AUTO-ENTMCNC: 29.9 G/DL (ref 32–36)
MCV RBC AUTO: 94 FL (ref 80–100)
MCV RBC AUTO: 94 FL (ref 80–100)
MONOCYTES # BLD AUTO: 0.3 THOU/UL (ref 0–0.9)
MONOCYTES # BLD AUTO: 0.3 THOU/UL (ref 0–0.9)
MONOCYTES NFR BLD AUTO: 8 % (ref 2–10)
MONOCYTES NFR BLD AUTO: 8 % (ref 2–10)
NEUTROPHILS # BLD AUTO: 2.5 THOU/UL (ref 2–7.7)
NEUTROPHILS # BLD AUTO: 2.5 THOU/UL (ref 2–7.7)
NEUTROPHILS NFR BLD AUTO: 62 % (ref 50–70)
NEUTROPHILS NFR BLD AUTO: 62 % (ref 50–70)
PLATELET # BLD AUTO: 318 THOU/UL (ref 140–440)
PLATELET # BLD AUTO: 318 THOU/UL (ref 140–440)
PMV BLD AUTO: 10.7 FL (ref 8.5–12.5)
PMV BLD: 10.7 FL (ref 8.5–12.5)
POTASSIUM BLD-SCNC: 3.9 MMOL/L (ref 3.5–5)
POTASSIUM SERPL-SCNC: 3.9 MMOL/L (ref 3.5–5)
RBC # BLD AUTO: 3.92 MIL/UL (ref 3.8–5.4)
RBC # BLD AUTO: 3.92 MILL/UL (ref 3.8–5.4)
SODIUM SERPL-SCNC: 139 MMOL/L (ref 136–145)
SODIUM SERPL-SCNC: 139 MMOL/L (ref 136–145)
WBC # BLD AUTO: 4.1 THOU/UL (ref 4–11)
WBC: 4.1 THOU/UL (ref 4–11)

## 2020-06-29 ENCOUNTER — TELEPHONE (OUTPATIENT)
Dept: WOUND CARE | Facility: CLINIC | Age: 85
End: 2020-06-29

## 2020-06-29 ENCOUNTER — RECORDS - HEALTHEAST (OUTPATIENT)
Dept: LAB | Facility: CLINIC | Age: 85
End: 2020-06-29

## 2020-06-29 LAB
ALBUMIN SERPL-MCNC: 2.7 G/DL (ref 3.5–5)
ALBUMIN SERPL-MCNC: 2.7 G/DL (ref 3.5–5)
ALP SERPL-CCNC: 127 U/L (ref 45–120)
ALP SERPL-CCNC: 127 U/L (ref 45–120)
ALT SERPL W P-5'-P-CCNC: <9 U/L (ref 0–45)
ALT SERPL-CCNC: <9 U/L (ref 0–45)
ANION GAP SERPL CALCULATED.3IONS-SCNC: 8 MMOL/L (ref 5–18)
ANION GAP SERPL CALCULATED.3IONS-SCNC: 8 MMOL/L (ref 5–18)
AST SERPL W P-5'-P-CCNC: 12 U/L (ref 0–40)
AST SERPL-CCNC: 12 U/L (ref 0–40)
BASOPHILS # BLD AUTO: 0 THOU/UL (ref 0–0.2)
BASOPHILS # BLD AUTO: 0 THOU/UL (ref 0–0.2)
BASOPHILS NFR BLD AUTO: 1 % (ref 0–2)
BASOPHILS NFR BLD AUTO: 1 % (ref 0–2)
BILIRUB SERPL-MCNC: 0.4 MG/DL (ref 0–1)
BILIRUB SERPL-MCNC: 0.4 MG/DL (ref 0–1)
BUN SERPL-MCNC: 21 MG/DL (ref 8–25)
BUN SERPL-MCNC: 21 MG/DL (ref 8–28)
C REACTIVE PROTEIN LHE: 0.2 MG/DL (ref 0–0.8)
CALCIUM SERPL-MCNC: 8.9 MG/DL (ref 8.5–10.5)
CALCIUM SERPL-MCNC: 8.9 MG/DL (ref 8.5–10.5)
CHLORIDE BLD-SCNC: 105 MMOL/L (ref 98–107)
CHLORIDE SERPLBLD-SCNC: 105 MMOL/L (ref 98–107)
CO2 SERPL-SCNC: 26 MMOL/L (ref 22–31)
CO2 SERPL-SCNC: 26 MMOL/L (ref 22–31)
CREAT SERPL-MCNC: 0.65 MG/DL (ref 0.6–1.1)
CREAT SERPL-MCNC: 0.65 MG/DL (ref 0.6–1.1)
CRP INFLAMMATION (EXTERNAL): 0.2 MG/DL (ref 0–0.8)
EOSINOPHIL # BLD AUTO: 0.4 THOU/UL (ref 0–0.4)
EOSINOPHIL # BLD AUTO: 0.4 THOU/UL (ref 0–0.4)
EOSINOPHIL NFR BLD AUTO: 8 % (ref 0–5)
EOSINOPHIL NFR BLD AUTO: 8 % (ref 0–6)
ERYTHROCYTE [DISTWIDTH] IN BLOOD BY AUTOMATED COUNT: 17.2 % (ref 11–14.5)
ERYTHROCYTE [DISTWIDTH] IN BLOOD BY AUTOMATED COUNT: 17.2 % (ref 11–14.5)
GFR SERPL CREATININE-BSD FRML MDRD: >60 ML/MIN/1.73M2
GFR SERPL CREATININE-BSD FRML MDRD: >60 ML/MIN/1.73M2
GLUCOSE BLD-MCNC: 80 MG/DL (ref 70–125)
GLUCOSE SERPL-MCNC: 80 MG/DL (ref 70–125)
HCT VFR BLD AUTO: 33 % (ref 35–47)
HCT VFR BLD AUTO: 33 % (ref 35–47)
HEMOGLOBIN: 10 G/DL (ref 12–18)
HGB BLD-MCNC: 10 G/DL (ref 12–16)
LYMPHOCYTES # BLD AUTO: 1.1 THOU/UL (ref 0.8–4.4)
LYMPHOCYTES # BLD AUTO: 1.1 THOU/UL (ref 0.8–4.4)
LYMPHOCYTES NFR BLD AUTO: 26 % (ref 20–40)
LYMPHOCYTES NFR BLD AUTO: 26 % (ref 20–40)
MCH RBC QN AUTO: 28.2 PG (ref 27–34)
MCH RBC QN AUTO: 28.2 PG (ref 27–34)
MCHC RBC AUTO-ENTMCNC: 30.3 G/DL (ref 32–36)
MCHC RBC AUTO-ENTMCNC: 30.3 G/DL (ref 32–36)
MCV RBC AUTO: 93 FL (ref 80–100)
MCV RBC AUTO: 93 FL (ref 80–100)
MONOCYTES # BLD AUTO: 0.5 THOU/UL (ref 0–0.9)
MONOCYTES # BLD AUTO: 0.5 THOU/UL (ref 0–0.9)
MONOCYTES NFR BLD AUTO: 11 % (ref 2–10)
MONOCYTES NFR BLD AUTO: 11 % (ref 2–10)
NEUTROPHILS # BLD AUTO: 2.3 THOU/UL (ref 2–7.7)
NEUTROPHILS # BLD AUTO: 2.3 THOU/UL (ref 2–7.7)
NEUTROPHILS NFR BLD AUTO: 54 % (ref 50–70)
NEUTROPHILS NFR BLD AUTO: 54 % (ref 50–70)
PLATELET # BLD AUTO: 264 THOU/UL (ref 140–440)
PLATELET # BLD AUTO: 264 THOU/UL (ref 140–440)
PMV BLD AUTO: 11 FL (ref 8.5–12.5)
PMV BLD: 11 FL (ref 8.5–12.5)
POTASSIUM BLD-SCNC: 4.3 MMOL/L (ref 3.5–5)
POTASSIUM SERPL-SCNC: 4.3 MMOL/L (ref 3.5–5)
PROT SERPL-MCNC: 6.6 G/DL (ref 6–8)
PROT SERPL-MCNC: 6.6 G/DL (ref 6–8)
RBC # BLD AUTO: 3.55 MILL/UL (ref 3.8–5.4)
RBC # BLD AUTO: 3.55 MILL/UL (ref 3.8–5.4)
SODIUM SERPL-SCNC: 139 MMOL/L (ref 136–145)
SODIUM SERPL-SCNC: 139 MMOL/L (ref 136–145)
WBC # BLD AUTO: 4.2 THOU/UL (ref 4–11)
WBC: 4.2 THOU/UL (ref 4–11)

## 2020-06-29 NOTE — TELEPHONE ENCOUNTER
LM with [place] that daily dressing changes are ok. She returned my call with wound measurements which are 1.7x 0.85 cm    .Since this is not much better then before I recommend pt adds a gentle compression to this leg and elevates it by putting a pillow underneath her matrass to elevate at night. LM with team regarding this      Ruby Prince RN  ----- Message from Ruby Prince RN sent at 6/29/2020  1:46 PM CDT -----  [place]  daily [phone]

## 2020-06-30 ENCOUNTER — EXTERNAL ORDER RESULTS (OUTPATIENT)
Dept: INFECTIOUS DISEASES | Facility: CLINIC | Age: 85
End: 2020-06-30

## 2020-07-03 ENCOUNTER — RECORDS - HEALTHEAST (OUTPATIENT)
Dept: LAB | Facility: CLINIC | Age: 85
End: 2020-07-03

## 2020-07-06 ENCOUNTER — EXTERNAL ORDER RESULTS (OUTPATIENT)
Dept: INFECTIOUS DISEASES | Facility: CLINIC | Age: 85
End: 2020-07-06

## 2020-07-06 LAB
ANION GAP SERPL CALCULATED.3IONS-SCNC: 12 MMOL/L (ref 5–18)
ANION GAP SERPL CALCULATED.3IONS-SCNC: 12 MMOL/L (ref 5–18)
BASOPHILS # BLD AUTO: 0 THOU/UL (ref 0–0.2)
BASOPHILS - ABS (DIFF) - HISTORICAL: 0 THOU/UL (ref 0–0.2)
BASOPHILS NFR BLD AUTO: 0 % (ref 0–2)
BASOPHILS NFR BLD AUTO: 0 % (ref 0–2)
BUN SERPL-MCNC: 20 MG/DL (ref 8–28)
BUN SERPL-MCNC: 20 MG/DL (ref 8–28)
C REACTIVE PROTEIN LHE: 0.3 MG/DL (ref 0–0.8)
CALCIUM SERPL-MCNC: 9.1 MG/DL (ref 8.5–10.5)
CALCIUM SERPL-MCNC: 9.1 MG/DL (ref 8.5–10.5)
CHLORIDE BLD-SCNC: 102 MMOL/L (ref 98–107)
CHLORIDE SERPLBLD-SCNC: 102 MMOL/L (ref 98–107)
CO2 SERPL-SCNC: 26 MMOL/L (ref 22–31)
CO2 SERPL-SCNC: 26 MMOL/L (ref 22–31)
CREAT SERPL-MCNC: 0.74 MG/DL (ref 0.6–1.1)
CREAT SERPL-MCNC: 0.74 MG/DL (ref 0.6–1.1)
CRP SERPL-MCNC: 0.3 MG/DL (ref 0–0.8)
EOSINOPHIL # BLD AUTO: 0.2 THOU/UL (ref 0–0.4)
EOSINOPHIL COUNT (ABSOLUTE): 0.2 THOU/UL (ref 0–0.4)
EOSINOPHIL NFR BLD AUTO: 5 % (ref 0–6)
EOSINOPHIL NFR BLD AUTO: 5 % (ref 0–6)
ERYTHROCYTE [DISTWIDTH] IN BLOOD BY AUTOMATED COUNT: 16.6 % (ref 11–14.5)
ERYTHROCYTE [DISTWIDTH] IN BLOOD BY AUTOMATED COUNT: 16.6 % (ref 11–14.5)
GFR SERPL CREATININE-BSD FRML MDRD: >60 ML/MIN/1.73M2
GFR SERPL CREATININE-BSD FRML MDRD: >60 ML/MIN/1.73M2
GLUCOSE BLD-MCNC: 117 MG/DL (ref 70–125)
GLUCOSE SERPL-MCNC: 117 MG/DL (ref 70–125)
HCT VFR BLD AUTO: 37.7 % (ref 35–47)
HCT VFR BLD AUTO: 37.7 % (ref 35–47)
HEMOGLOBIN: 11.2 G/DL (ref 12–16)
HGB BLD-MCNC: 11.2 G/DL (ref 12–16)
LYMPHOCYTES # BLD AUTO: 0.9 THOU/UL (ref 0.8–4.4)
LYMPHOCYTES # BLD AUTO: 0.9 THOU/UL (ref 0.8–4.4)
LYMPHOCYTES NFR BLD AUTO: 20 % (ref 20–40)
LYMPHOCYTES NFR BLD AUTO: 20 % (ref 20–40)
MCH RBC QN AUTO: 27.9 PG (ref 27–34)
MCH RBC QN AUTO: 27.9 PG (ref 27–34)
MCHC RBC AUTO-ENTMCNC: 29.7 G/DL (ref 32–35)
MCHC RBC AUTO-ENTMCNC: 29.7 G/DL (ref 32–36)
MCV RBC AUTO: 94 FL (ref 80–100)
MCV RBC AUTO: 94 FL (ref 80–100)
MONOCYTES # BLD AUTO: 0.4 THOU/UL (ref 0–0.9)
MONOCYTES # BLD AUTO: 0.4 THOU/UL (ref 0–0.9)
MONOCYTES NFR BLD AUTO: 7 % (ref 2–10)
MONOCYTES NFR BLD AUTO: 7 % (ref 2–10)
NEUTROPHILS # BLD AUTO: 3.2 THOU/UL (ref 2–7.7)
NEUTROPHILS # BLD AUTO: 3.2 THOU/UL (ref 2–7.7)
NEUTROPHILS NFR BLD AUTO: 68 % (ref 50–70)
NEUTROPHILS NFR BLD AUTO: 68 % (ref 50–70)
PLATELET # BLD AUTO: 295 10^9/L (ref 140–440)
PLATELET # BLD AUTO: 295 THOU/UL (ref 140–440)
PMV BLD AUTO: 11.1 FL (ref 8.5–12.5)
PMV BLD: 11.1 FL (ref 8.5–12.5)
POTASSIUM BLD-SCNC: 3.7 MMOL/L (ref 3.5–5)
POTASSIUM SERPL-SCNC: 3.7 MMOL/L (ref 3.5–5)
RBC # BLD AUTO: 4.02 10^12/L (ref 3.8–5.4)
RBC # BLD AUTO: 4.02 MILL/UL (ref 3.8–5.4)
SODIUM SERPL-SCNC: 140 MMOL/L (ref 136–145)
SODIUM SERPL-SCNC: 140 MMOL/L (ref 136–145)
WBC # BLD AUTO: 4.8 10^9/L (ref 4–11)
WBC: 4.8 THOU/UL (ref 4–11)

## 2020-07-10 ENCOUNTER — RECORDS - HEALTHEAST (OUTPATIENT)
Dept: LAB | Facility: CLINIC | Age: 85
End: 2020-07-10

## 2020-07-10 LAB
ANION GAP SERPL CALCULATED.3IONS-SCNC: 6 MMOL/L (ref 5–18)
ANION GAP SERPL CALCULATED.3IONS-SCNC: 6 MMOL/L (ref 5–18)
BASOPHILS # BLD AUTO: 0 THOU/UL (ref 0–0.2)
BASOPHILS - ABS (DIFF) - HISTORICAL: 0 THOU/UL (ref 0–0.2)
BASOPHILS NFR BLD AUTO: 1 % (ref 0–2)
BASOPHILS NFR BLD AUTO: 1 % (ref 0–2)
BUN SERPL-MCNC: 22 MG/DL (ref 8–28)
BUN SERPL-MCNC: 22 MG/DL (ref 8–28)
C REACTIVE PROTEIN LHE: 0.2 MG/DL (ref 0–0.8)
CALCIUM SERPL-MCNC: 8.7 MG/DL (ref 8.5–10.5)
CALCIUM SERPL-MCNC: 8.7 MG/DL (ref 8.5–10.5)
CHLORIDE BLD-SCNC: 105 MMOL/L (ref 98–107)
CHLORIDE SERPLBLD-SCNC: 105 MMOL/L (ref 98–107)
CO2 SERPL-SCNC: 28 MMOL/L (ref 22–31)
CO2 SERPL-SCNC: 28 MMOL/L (ref 22–31)
CREAT SERPL-MCNC: 0.68 MG/DL (ref 0.6–1.1)
CREAT SERPL-MCNC: 0.68 MG/DL (ref 0.6–1.1)
CRP SERPL-MCNC: 0.2 MG/DL (ref 0–0.8)
EOSINOPHIL # BLD AUTO: 0.2 THOU/UL (ref 0–0.4)
EOSINOPHIL COUNT (ABSOLUTE): 0.2 THOU/UL (ref 0–0.4)
EOSINOPHIL NFR BLD AUTO: 7 % (ref 0–5)
EOSINOPHIL NFR BLD AUTO: 7 % (ref 0–6)
ERYTHROCYTE [DISTWIDTH] IN BLOOD BY AUTOMATED COUNT: 16.2 % (ref 11–14.5)
ERYTHROCYTE [DISTWIDTH] IN BLOOD BY AUTOMATED COUNT: 16.2 % (ref 11–14.5)
GFR SERPL CREATININE-BSD FRML MDRD: >60 ML/MIN/1.73M2
GFR SERPL CREATININE-BSD FRML MDRD: >60 ML/MIN/1.73M2
GLUCOSE BLD-MCNC: 82 MG/DL (ref 70–125)
GLUCOSE SERPL-MCNC: 82 MG/DL (ref 70–125)
HCT VFR BLD AUTO: 32.3 % (ref 35–47)
HCT VFR BLD AUTO: 32.3 % (ref 35–47)
HEMOGLOBIN: 10.1 G/DL (ref 12–16)
HGB BLD-MCNC: 10.1 G/DL (ref 12–16)
LYMPHOCYTES # BLD AUTO: 1.1 THOU/UL (ref 0.8–4.4)
LYMPHOCYTES # BLD AUTO: 1.1 THOU/UL (ref 0.8–4.4)
LYMPHOCYTES NFR BLD AUTO: 32 % (ref 20–40)
LYMPHOCYTES NFR BLD AUTO: 32 % (ref 20–40)
MCH RBC QN AUTO: 28.4 PG (ref 27–34)
MCH RBC QN AUTO: 28.4 PG (ref 27–34)
MCHC RBC AUTO-ENTMCNC: 31.3 G/DL (ref 32–36)
MCHC RBC AUTO-ENTMCNC: 31.3 G/DL (ref 32–36)
MCV RBC AUTO: 91 FL (ref 80–100)
MCV RBC AUTO: 91 FL (ref 80–100)
MONOCYTES # BLD AUTO: 0.4 THOU/UL (ref 0–0.9)
MONOCYTES # BLD AUTO: 0.4 THOU/UL (ref 0–0.9)
MONOCYTES NFR BLD AUTO: 11 % (ref 2–10)
MONOCYTES NFR BLD AUTO: 11 % (ref 2–10)
NEUTROPHILS # BLD AUTO: 1.6 THOU/UL (ref 2–7.7)
NEUTROPHILS # BLD AUTO: 1.6 THOU/UL (ref 2–7.7)
NEUTROPHILS NFR BLD AUTO: 50 % (ref 50–70)
NEUTROPHILS NFR BLD AUTO: 50 % (ref 50–70)
PLATELET # BLD AUTO: 242 10^9/L (ref 140–440)
PLATELET # BLD AUTO: 242 THOU/UL (ref 140–440)
PMV BLD AUTO: 10.9 FL (ref 8.5–12.5)
PMV BLD: 10.9 FL (ref 8.5–12.5)
POTASSIUM BLD-SCNC: 4.2 MMOL/L (ref 3.5–5)
POTASSIUM SERPL-SCNC: 4.2 MMOL/L (ref 3.5–5)
RBC # BLD AUTO: 3.56 10^12/L (ref 3.8–5.4)
RBC # BLD AUTO: 3.56 MILL/UL (ref 3.8–5.4)
SODIUM SERPL-SCNC: 139 MMOL/L (ref 136–145)
SODIUM SERPL-SCNC: 139 MMOL/L (ref 136–145)
WBC # BLD AUTO: 3.3 10^9/L (ref 4–11)
WBC: 3.3 THOU/UL (ref 4–11)

## 2020-07-13 ENCOUNTER — EXTERNAL ORDER RESULTS (OUTPATIENT)
Dept: INFECTIOUS DISEASES | Facility: CLINIC | Age: 85
End: 2020-07-13

## 2020-07-13 ENCOUNTER — RECORDS - HEALTHEAST (OUTPATIENT)
Dept: LAB | Facility: CLINIC | Age: 85
End: 2020-07-13

## 2020-07-13 LAB
ALBUMIN SERPL-MCNC: 2.8 G/DL (ref 3.5–5)
ALBUMIN SERPL-MCNC: 2.8 G/DL (ref 3.5–5)
ALP SERPL-CCNC: 110 U/L (ref 45–120)
ALP SERPL-CCNC: 110 U/L (ref 45–120)
ALT SERPL W P-5'-P-CCNC: <9 U/L (ref 0–45)
ALT SERPL-CCNC: <9 U/L (ref 0–45)
ANION GAP SERPL CALCULATED.3IONS-SCNC: 5 MMOL/L (ref 5–18)
ANION GAP SERPL CALCULATED.3IONS-SCNC: 5 MMOL/L (ref 5–18)
AST SERPL W P-5'-P-CCNC: 14 U/L (ref 0–40)
AST SERPL-CCNC: 14 U/L (ref 0–40)
BASOPHILS # BLD AUTO: 0 THOU/UL (ref 0–0.2)
BASOPHILS - ABS (DIFF) - HISTORICAL: 0 THOU/UL (ref 0–0.2)
BASOPHILS NFR BLD AUTO: 1 % (ref 0–2)
BASOPHILS NFR BLD AUTO: 1 % (ref 0–2)
BILIRUB SERPL-MCNC: 0.4 MG/DL (ref 0–1)
BILIRUB SERPL-MCNC: 0.4 MG/DL (ref 0–1)
BUN SERPL-MCNC: 18 MG/DL (ref 8–28)
BUN SERPL-MCNC: 18 MG/DL (ref 8–28)
C REACTIVE PROTEIN LHE: 0.2 MG/DL (ref 0–0.8)
CALCIUM SERPL-MCNC: 9 MG/DL (ref 8.5–10.5)
CALCIUM SERPL-MCNC: 9 MG/DL (ref 8.5–10.5)
CHLORIDE BLD-SCNC: 104 MMOL/L (ref 98–107)
CHLORIDE SERPLBLD-SCNC: 104 MMOL/L (ref 98–107)
CO2 SERPL-SCNC: 32 MMOL/L (ref 22–31)
CO2 SERPL-SCNC: 32 MMOL/L (ref 22–31)
CREAT SERPL-MCNC: 0.66 MG/DL (ref 0.6–1.1)
CREAT SERPL-MCNC: 0.66 MG/DL (ref 0.6–1.1)
CRP SERPL-MCNC: 0.2 MG/DL (ref 0–0.8)
EOSINOPHIL # BLD AUTO: 0.2 THOU/UL (ref 0–0.4)
EOSINOPHIL COUNT (ABSOLUTE): 0.2 THOU/UL (ref 0–0.4)
EOSINOPHIL NFR BLD AUTO: 6 % (ref 0–6)
EOSINOPHIL NFR BLD AUTO: 6 % (ref 0–6)
ERYTHROCYTE [DISTWIDTH] IN BLOOD BY AUTOMATED COUNT: 16.2 % (ref 11–14.5)
ERYTHROCYTE [DISTWIDTH] IN BLOOD BY AUTOMATED COUNT: 16.2 % (ref 11–14.5)
GFR SERPL CREATININE-BSD FRML MDRD: >60 ML/MIN/1.73M2
GFR SERPL CREATININE-BSD FRML MDRD: >60 ML/MIN/1.73M2
GLUCOSE BLD-MCNC: 78 MG/DL (ref 70–125)
GLUCOSE SERPL-MCNC: 78 MG/DL (ref 70–125)
HCT VFR BLD AUTO: 34.1 % (ref 35–47)
HCT VFR BLD AUTO: 34.1 % (ref 35–47)
HEMOGLOBIN: 10.4 G/DL (ref 12–16)
HGB BLD-MCNC: 10.4 G/DL (ref 12–16)
LYMPHOCYTES # BLD AUTO: 1 THOU/UL (ref 0.8–4.4)
LYMPHOCYTES # BLD AUTO: 1 THOU/UL (ref 0.8–4.4)
LYMPHOCYTES NFR BLD AUTO: 28 % (ref 20–40)
LYMPHOCYTES NFR BLD AUTO: 28 % (ref 20–40)
MCH RBC QN AUTO: 28.3 PG (ref 27–34)
MCH RBC QN AUTO: 28.3 PG (ref 27–34)
MCHC RBC AUTO-ENTMCNC: 30.5 G/DL (ref 32–36)
MCHC RBC AUTO-ENTMCNC: 30.5 G/DL (ref 32–36)
MCV RBC AUTO: 93 FL (ref 80–100)
MCV RBC AUTO: 93 FL (ref 80–100)
MONOCYTES # BLD AUTO: 0.4 THOU/UL (ref 0–0.9)
MONOCYTES # BLD AUTO: 0.4 THOU/UL (ref 0–0.9)
MONOCYTES NFR BLD AUTO: 10 % (ref 2–10)
MONOCYTES NFR BLD AUTO: 10 % (ref 2–10)
NEUTROPHILS # BLD AUTO: 1.9 THOU/UL (ref 2–7.7)
NEUTROPHILS # BLD AUTO: 1.9 THOU/UL (ref 2–7.7)
NEUTROPHILS NFR BLD AUTO: 55 % (ref 50–70)
NEUTROPHILS NFR BLD AUTO: 55 % (ref 50–70)
PLATELET # BLD AUTO: 262 10^9/L (ref 140–440)
PLATELET # BLD AUTO: 262 THOU/UL (ref 140–440)
PMV BLD AUTO: 11.1 FL (ref 8.5–12.5)
PMV BLD: 11.1 FL (ref 8.5–12.5)
POTASSIUM BLD-SCNC: 4.4 MMOL/L (ref 3.5–5)
POTASSIUM SERPL-SCNC: 4.4 MMOL/L (ref 3.5–5)
PROT SERPL-MCNC: 6.6 G/DL (ref 6–8)
PROT SERPL-MCNC: 6.6 G/DL (ref 6–8)
RBC # BLD AUTO: 3.67 10^12/L (ref 3.8–5.4)
RBC # BLD AUTO: 3.67 MILL/UL (ref 3.8–5.4)
SODIUM SERPL-SCNC: 141 MMOL/L (ref 136–145)
SODIUM SERPL-SCNC: 141 MMOL/L (ref 136–145)
WBC # BLD AUTO: 3.5 10^9/L (ref 4–11)
WBC: 3.5 THOU/UL (ref 4–11)

## 2020-07-16 ENCOUNTER — TELEPHONE (OUTPATIENT)
Dept: INFECTIOUS DISEASES | Facility: CLINIC | Age: 85
End: 2020-07-16

## 2020-07-16 ENCOUNTER — RECORDS - HEALTHEAST (OUTPATIENT)
Dept: LAB | Facility: CLINIC | Age: 85
End: 2020-07-16

## 2020-07-16 NOTE — TELEPHONE ENCOUNTER
Per Dr Jefferson via telephone conversation, OK to give verbal order to [facility] for pt to stop IV Ertapenem for R prosthetic joint infection (pt has had 6 weeks), and pull PICC. Called Sarina at [facility] [phone] to let her know this, and to also let her know Dr Jefferson wants pt to start oral Augmentin 500 mg BID as suppression & Bactrim DS daily as suppression. Both meds e-sent to [facility] in [facility].  Luna Castillo RN

## 2020-07-16 NOTE — TELEPHONE ENCOUNTER
Ms. Allen has completed 6 weeks of IV antibiotics from her 6/2/2020 surgery. Her CRP is normal. I will go back to orals for suppression. Plan augmentin 500 mg BID and bactrim 1 ds tab daily.    Emily Jefferson MD

## 2020-07-17 ENCOUNTER — EXTERNAL ORDER RESULTS (OUTPATIENT)
Dept: INFECTIOUS DISEASES | Facility: CLINIC | Age: 85
End: 2020-07-17

## 2020-07-17 LAB
ABSOLUTE BASOPHILS - HISTORICAL: 0 THOU/UL (ref 0–0.2)
ANION GAP SERPL CALCULATED.3IONS-SCNC: 6 MMOL/L (ref 5–18)
ANION GAP SERPL CALCULATED.3IONS-SCNC: 6 MMOL/L (ref 5–18)
BASOPHILS # BLD AUTO: 0 THOU/UL (ref 0–0.2)
BASOPHILS NFR BLD AUTO: 1 % (ref 0–2)
BASOPHILS NFR BLD AUTO: 1 % (ref 0–2)
BUN SERPL-MCNC: 22 MG/DL (ref 8–28)
BUN SERPL-MCNC: 22 MG/DL (ref 8–28)
C REACTIVE PROTEIN LHE: 0.1 MG/DL (ref 0–0.8)
CALCIUM SERPL-MCNC: 8.8 MG/DL (ref 8.5–10.5)
CALCIUM SERPL-MCNC: 8.8 MG/DL (ref 8.5–10.5)
CHLORIDE BLD-SCNC: 106 MMOL/L (ref 98–107)
CHLORIDE SERPLBLD-SCNC: 106 MMOL/L (ref 99–107)
CO2 SERPL-SCNC: 28 MMOL/L (ref 22–31)
CO2 SERPL-SCNC: 28 MMOL/L (ref 22–31)
CREAT SERPL-MCNC: 0.66 MG/DL (ref 0.6–1.1)
CREAT SERPL-MCNC: 0.66 MG/DL (ref 0.6–1.1)
CRP INFLAMMATION (EXTERNAL): 0.1 MG/DL (ref 0–0.8)
EOSINOPHIL # BLD AUTO: 0.3 THOU/UL (ref 0–0.4)
EOSINOPHIL # BLD AUTO: 0.3 THOU/UL (ref 0–0.4)
EOSINOPHIL NFR BLD AUTO: 7 % (ref 0–6)
EOSINOPHIL NFR BLD AUTO: 7 % (ref 0–6)
ERYTHROCYTE [DISTWIDTH] IN BLOOD BY AUTOMATED COUNT: 16.1 % (ref 11–14.5)
ERYTHROCYTE [DISTWIDTH] IN BLOOD BY AUTOMATED COUNT: 16.1 % (ref 11–14.5)
GFR SERPL CREATININE-BSD FRML MDRD: >60 ML/MIN/1.73M2
GFR SERPL CREATININE-BSD FRML MDRD: >60 ML/MIN/1.73M2
GLUCOSE BLD-MCNC: 75 MG/DL (ref 70–125)
GLUCOSE SERPL-MCNC: 75 MG/DL (ref 70–125)
HCT VFR BLD AUTO: 34.2 % (ref 35–47)
HCT VFR BLD AUTO: ABNORMAL % (ref 35–47)
HEMOGLOBIN: 10.4 G/DL (ref 12–16)
HGB BLD-MCNC: 10.4 G/DL (ref 12–16)
LYMPHOCYTES # BLD AUTO: 1.1 THOU/UL (ref 0.8–4.4)
LYMPHOCYTES # BLD AUTO: 1.1 THOU/UL (ref 0.8–4.4)
LYMPHOCYTES NFR BLD AUTO: 30 % (ref 20–40)
LYMPHOCYTES NFR BLD AUTO: 30 % (ref 20–40)
MCH RBC QN AUTO: 28.2 PG (ref 27–34)
MCH RBC QN AUTO: 28.2 PG (ref 27–34)
MCHC RBC AUTO-ENTMCNC: 30.4 G/DL (ref 32–36)
MCHC RBC AUTO-ENTMCNC: 30.4 G/DL (ref 32–36)
MCV RBC AUTO: 93 FL (ref 80–100)
MCV RBC AUTO: 93 FL (ref 80–100)
MONOCYTES # BLD AUTO: 0.4 THOU/UL (ref 0–0.9)
MONOCYTES # BLD AUTO: 0.4 THOU/UL (ref 0–0.9)
MONOCYTES NFR BLD AUTO: 11 % (ref 2–10)
MONOCYTES NFR BLD AUTO: 11 % (ref 2–10)
NEUTROPHILS # BLD AUTO: 1.9 THOU/UL (ref 2–7.7)
NEUTROPHILS # BLD AUTO: 1.9 THOU/UL (ref 2–7.7)
NEUTROPHILS NFR BLD AUTO: 51 % (ref 50–70)
NEUTROPHILS NFR BLD AUTO: 51 % (ref 50–70)
PLATELET # BLD AUTO: 254 THOU/UL (ref 140–440)
PLATELET # BLD AUTO: 254 THOU/UL (ref 140–440)
PMV BLD AUTO: 11.2 FL (ref 8.5–12.5)
PMV BLD: 11.2 FL (ref 8.5–12.5)
POTASSIUM BLD-SCNC: 4.3 MMOL/L (ref 3.5–5)
POTASSIUM SERPL-SCNC: 4.3 MMOL/L (ref 3.5–5)
RBC # BLD AUTO: 3.69 MIL/UL (ref 3.8–5.4)
RBC # BLD AUTO: 3.69 MILL/UL (ref 3.8–5.4)
SODIUM SERPL-SCNC: 140 MMOL/L (ref 136–145)
SODIUM SERPL-SCNC: 140 MMOL/L (ref 136–145)
WBC # BLD AUTO: 3.7 THOU/UL (ref 4–11)
WBC: 3.7 THOU/UL (ref 4–11)

## 2020-08-04 ENCOUNTER — TELEPHONE (OUTPATIENT)
Dept: ORTHOPEDICS | Facility: CLINIC | Age: 85
End: 2020-08-04

## 2020-08-04 ENCOUNTER — TELEPHONE (OUTPATIENT)
Dept: INFECTIOUS DISEASES | Facility: CLINIC | Age: 85
End: 2020-08-04

## 2020-08-04 NOTE — TELEPHONE ENCOUNTER
This writer contacted the patient. We reviewed that last clinic visit with Dr. Gomez over the phone. Pt. has been using her walking and following instructions regarding the 50% WB status. No reports of pain to the lower extremity or hip. She states that she has been having difficulty with some knee extension stretching in PT    Pt. reported that she will be done with her oral ID meds on August 17th. This writer recommended following up to make sure she is not requiring more refills as her doctor visit with ID is not until Sept.  Pt. Reports that he IV ABX ended on 7/17/2020.    Will touch base with Dr. Gomez regarding when her next step - likely follow up in clinic, and aspirate after her ABX holiday.     Merline Nguyen RN on 8/4/2020 at 10:28 AM

## 2020-08-04 NOTE — TELEPHONE ENCOUNTER
[facility] Call Center    Phone Message    May a detailed message be left on voicemail: yes     Reason for Call: Other: Pt called looking to touch base with someone in regards to next steps in care. pts antibiotics are due to be renewed on 8/17/20. Please follow up with pt     Action Taken: Message routed to:  [facility] ID    Travel Screening: Not Applicable

## 2020-08-04 NOTE — TELEPHONE ENCOUNTER
MARYM with pt and let her know that she should continue current PO abxs until appt with Dr. Jefferson on 9/8. Asked her to call the clinic if she has questions/concerns or needs refills.  Pat Handley RN

## 2020-08-11 DIAGNOSIS — T84.50XD INFECTION OF PROSTHETIC JOINT, SUBSEQUENT ENCOUNTER: ICD-10-CM

## 2020-08-11 RX ORDER — SULFAMETHOXAZOLE/TRIMETHOPRIM 800-160 MG
1 TABLET ORAL 2 TIMES DAILY
Qty: 60 TABLET | Refills: 0 | Status: SHIPPED | OUTPATIENT
Start: 2020-08-11 | End: 2021-02-01

## 2020-08-11 RX ORDER — AMOXICILLIN AND CLAVULANATE POTASSIUM 500; 125 MG/1; MG/1
1 TABLET, FILM COATED ORAL 2 TIMES DAILY
Qty: 60 TABLET | Refills: 0 | Status: SHIPPED | OUTPATIENT
Start: 2020-08-11 | End: 2020-09-03

## 2020-08-11 NOTE — TELEPHONE ENCOUNTER
Pt called to say she would be out of Bactrim and Augmentin on Aug 17th and needed enough medicine to make it to her apt with Dr Jefferson on Sept 8th. Gave pt refill for one more month of both medications.  Luna Castillo RN

## 2020-08-17 ENCOUNTER — TELEPHONE (OUTPATIENT)
Dept: INFECTIOUS DISEASES | Facility: CLINIC | Age: 85
End: 2020-08-17

## 2020-08-17 NOTE — TELEPHONE ENCOUNTER
[facility] Call Center    Phone Message    May a detailed message be left on voicemail: yes     Reason for Call: Other: Pt called stating that her amoxicillin-clavulanate (AUGMENTIN) 500-125 MG tablet  was supposed to be delivered lat thursday 8/13/20. pt wants to know if its ok to skip a couple days until it gets here or should she be getting a temp refill from [facility] until that package arrives.       Action Taken: Message routed to:  [facility] ID    Travel Screening: Not Applicable

## 2020-08-18 NOTE — TELEPHONE ENCOUNTER
Pt called and stated she got her package in the mail so there is no need for a call back. Thank you

## 2020-08-24 ENCOUNTER — ANCILLARY PROCEDURE (OUTPATIENT)
Dept: CT IMAGING | Facility: CLINIC | Age: 85
End: 2020-08-24
Attending: INTERNAL MEDICINE
Payer: COMMERCIAL

## 2020-08-24 DIAGNOSIS — E03.9 HYPOTHYROIDISM, UNSPECIFIED TYPE: ICD-10-CM

## 2020-08-24 DIAGNOSIS — C49.9 SARCOMA OF SOFT TISSUE (H): ICD-10-CM

## 2020-08-24 DIAGNOSIS — R91.8 PULMONARY NODULES: ICD-10-CM

## 2020-08-25 ENCOUNTER — ONCOLOGY VISIT (OUTPATIENT)
Dept: ONCOLOGY | Facility: CLINIC | Age: 85
End: 2020-08-25
Attending: INTERNAL MEDICINE
Payer: COMMERCIAL

## 2020-08-25 VITALS
WEIGHT: 124.3 LBS | OXYGEN SATURATION: 99 % | HEART RATE: 81 BPM | RESPIRATION RATE: 16 BRPM | SYSTOLIC BLOOD PRESSURE: 122 MMHG | TEMPERATURE: 98 F | DIASTOLIC BLOOD PRESSURE: 70 MMHG | BODY MASS INDEX: 22.73 KG/M2

## 2020-08-25 DIAGNOSIS — R91.8 PULMONARY NODULES: ICD-10-CM

## 2020-08-25 DIAGNOSIS — E03.9 HYPOTHYROIDISM, UNSPECIFIED TYPE: ICD-10-CM

## 2020-08-25 DIAGNOSIS — C49.9 SARCOMA OF SOFT TISSUE (H): ICD-10-CM

## 2020-08-25 DIAGNOSIS — Z98.890 STATUS POST KNEE SURGERY: Primary | ICD-10-CM

## 2020-08-25 PROCEDURE — 99213 OFFICE O/P EST LOW 20 MIN: CPT | Mod: ZP | Performed by: INTERNAL MEDICINE

## 2020-08-25 PROCEDURE — G0463 HOSPITAL OUTPT CLINIC VISIT: HCPCS | Mod: ZF

## 2020-08-25 ASSESSMENT — PAIN SCALES - GENERAL: PAINLEVEL: NO PAIN (0)

## 2020-08-25 NOTE — PROGRESS NOTES
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      Background: In brief, she was doing well until 2009. Then after doing a lot of walking while on vacation, she developed some discomfort and swelling in the proximal right calf. 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   She had 4 cycles of doxil/ifos finishing, about 2010. There was <10% viable cells at the time of surgery.   She finished post op XRT about 2010. She did have a post -op staph infection and had some subsequent wound healing problems.  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  This is doing quite well w no pain.  She can walk quite a bit and walks at least 30 min once a day.      She has a rectocoele and found a pessary very effective.  Occasional incontinence. She is going to start some pelvic therapy through [facility].  -       Interval history:     She had R knee surgery in  due to infection.  She is using a walker w 50% wt bearing.  She has ID f/u in Sept.TKR is planned after the infection is resolved.    Ganesh's mom  of covid in May.  Someone is cooking 3 times a week.    She is covid restricting.    She remains on 75 mgc/d synthroid.   Her mood remains good.     Not wearing hearing aids today.    Sleeping is not great but not bad.    The rest of a 14 point ROS is otherwise negative.     -  Background PMH, FH,SH   PH notable for rectocele, diverticulosis, eczema, hyperlipidemia, hypothyroidism, one episode of documented leukopenia. She had cataracts removed.   History of patellofemoral pain syndrome   FH notable for a cancer in the father, uncle, and some cousins and also glioblastoma in a brother. Paternal grandmother had stomach cancer. Her father had testicular cancer and  at age 87.   She has two adult children. 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 She does not  smoke or abuse alcohol or other drugs.      ALLERGIES: She describes allergies to Niacin and ferrous sulfate manifested as a red rash.   --     Physical Exam: A/Ox3. NAD. Pleasant sounding on the phone      On exam she appeared comfortable w normal affect in a wheelchair  /70 (BP Location: Right arm, Patient Position: Sitting, Cuff Size: Adult Regular)   Pulse 81   Temp 98  F (36.7  C) (Oral)   Resp 16   Wt 56.4 kg (124 lb 4.8 oz)   SpO2 99%   BMI 22.73 kg/m    HEENT full EOMs  LUNG no resp distress  NEURO normal mentation and speech  EARS decreased hearing  PSYCH mood good  MSK surgical scars noted on knee      -  I reviewed the new CT images and report and there is no concern    Formal read:  1. A 4 mm nodule within the peripheral right lower lobe is not  significantly changed when compared to prior exam and also unchanged  from 5/17/2018. This has a slightly angular shape and may represent  benign intrapulmonary lymph node or other benign parenchymal nodule  and may be related to the tree-in-bud opacities in the peripheral  right upper lobe which are also unchanged from prior exam and thought  to represent inflammatory/infectious etiology.  2. Scattered tiny pulmonary nodules are stable from prior exams.  3. Slightly patulous esophagus with some debris in the midesophagus,  similar to prior exam.    -      Assessment and Plan: It has now been about 10 years since last treatment. She unfortunately developed a chronic wound healing issue post adjuvant radiation, and required a wound excision and flap in 4/2020 and then a spacer w antibiotics and needs TKR later.     We want to follow the lung changes, but there is nothing acute or concerning right now.  We will plan to see her in a year if nothing else turns up.  She will call if she has other questions in the interim.      Edouard Bryan M.D.  Professor  Hematology, Oncology and Transplantation

## 2020-08-25 NOTE — NURSING NOTE
"Oncology Rooming Note    August 25, 2020 4:49 PM   Jigna Allen is a 85 year old female who presents for:    Chief Complaint   Patient presents with     Oncology Clinic Visit     Return: Sarcoma of soft tissue     Initial Vitals: /70 (BP Location: Right arm, Patient Position: Sitting, Cuff Size: Adult Regular)   Pulse 81   Temp 98  F (36.7  C) (Oral)   Resp 16   Wt 56.4 kg (124 lb 4.8 oz)   SpO2 99%   BMI 22.73 kg/m   Estimated body mass index is 22.73 kg/m  as calculated from the following:    Height as of 6/2/20: 1.575 m (5' 2.01\").    Weight as of this encounter: 56.4 kg (124 lb 4.8 oz). Body surface area is 1.57 meters squared.  No Pain (0) Comment: Data Unavailable   No LMP recorded. Patient is postmenopausal.  Allergies reviewed: Yes  Medications reviewed: Yes    Medications: Medication refills not needed today.  Pharmacy name entered into EPIC:    [facility] - [place], [place] - [address] [id number] - [place], [place] - [address] [facility] [address]  [facility] - [place], [place] - [address]    Clinical concerns: N/A      Julisa Maria CMA              "

## 2020-08-25 NOTE — LETTER
2020         RE: Jigna Allen  [address] [place] [address]        Dear Colleague,    Thank you for referring your patient, Jigna Allen, to the [facility]. Please see a copy of my visit note below.            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      Background: In brief, she was doing well until 2009. Then after doing a lot of walking while on vacation, she developed some discomfort and swelling in the proximal right calf. 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   She had 4 cycles of doxil/ifos finishing, about 2010. There was <10% viable cells at the time of surgery.   She finished post op XRT about 2010. She did have a post -op staph infection and had some subsequent wound healing problems.  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  This is doing quite well w no pain.  She can walk quite a bit and walks at least 30 min once a day.      She has a rectocoele and found a pessary very effective.  Occasional incontinence. She is going to start some pelvic therapy through [facility].  -       Interval history:     She had R knee surgery in  due to infection.  She is using a walker w 50% wt bearing.  She has ID f/u in Sept.TKR is planned after the infection is resolved.    Ganesh's mom  of covid in May.  Someone is cooking 3 times a week.    She is covid restricting.    She remains on 75 mgc/d synthroid.   Her mood remains good.     Not wearing hearing aids today.    Sleeping is not great but not bad.    The rest of a 14 point ROS is otherwise negative.     -  Background PMH, FH,SH   PH notable for rectocele, diverticulosis, eczema, hyperlipidemia, hypothyroidism, one episode of documented leukopenia. She had cataracts removed.   History of patellofemoral pain syndrome   FH notable for a cancer in the father, uncle, and some  cousins and also glioblastoma in a brother. Paternal grandmother had stomach cancer. Her father had testicular cancer and  at age 87.   She has two adult children. 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 She does not smoke or abuse alcohol or other drugs.      ALLERGIES: She describes allergies to Niacin and ferrous sulfate manifested as a red rash.   --     Physical Exam: A/Ox3. NAD. Pleasant sounding on the phone      On exam she appeared comfortable w normal affect in a wheelchair  /70 (BP Location: Right arm, Patient Position: Sitting, Cuff Size: Adult Regular)   Pulse 81   Temp 98  F (36.7  C) (Oral)   Resp 16   Wt 56.4 kg (124 lb 4.8 oz)   SpO2 99%   BMI 22.73 kg/m    HEENT full EOMs  LUNG no resp distress  NEURO normal mentation and speech  EARS decreased hearing  PSYCH mood good  MSK surgical scars noted on knee      -  I reviewed the new CT images and report and there is no concern    Formal read:  1. A 4 mm nodule within the peripheral right lower lobe is not  significantly changed when compared to prior exam and also unchanged  from 2018. This has a slightly angular shape and may represent  benign intrapulmonary lymph node or other benign parenchymal nodule  and may be related to the tree-in-bud opacities in the peripheral  right upper lobe which are also unchanged from prior exam and thought  to represent inflammatory/infectious etiology.  2. Scattered tiny pulmonary nodules are stable from prior exams.  3. Slightly patulous esophagus with some debris in the midesophagus,  similar to prior exam.    -      Assessment and Plan: It has now been about 10 years since last treatment. She unfortunately developed a chronic wound healing issue post adjuvant radiation, and required a wound excision and flap in 2020 and then a spacer w antibiotics and needs TKR later.     We want to follow the lung changes, but there is nothing acute or concerning right now.  We will  plan to see her in a year if nothing else turns up.  She will call if she has other questions in the interim.      Edouard Bryan M.D.  Professor  Hematology, Oncology and Transplantation

## 2020-09-03 DIAGNOSIS — T84.50XD INFECTION OF PROSTHETIC JOINT, SUBSEQUENT ENCOUNTER: ICD-10-CM

## 2020-09-03 RX ORDER — AMOXICILLIN AND CLAVULANATE POTASSIUM 500; 125 MG/1; MG/1
1 TABLET, FILM COATED ORAL 2 TIMES DAILY
Qty: 14 TABLET | Refills: 0 | Status: SHIPPED | OUTPATIENT
Start: 2020-09-03 | End: 2020-09-08

## 2020-09-04 ENCOUNTER — TELEPHONE (OUTPATIENT)
Dept: ORTHOPEDICS | Facility: CLINIC | Age: 85
End: 2020-09-04

## 2020-09-04 NOTE — TELEPHONE ENCOUNTER
RN called and spoke with patient. Told patient of the next plan.  Patient expressed understanding.  Patient will see Dr. Li MOSCOSO for follow ups.    Bob Mcmillan RN

## 2020-09-08 ENCOUNTER — TELEPHONE (OUTPATIENT)
Dept: ORTHOPEDICS | Facility: CLINIC | Age: 85
End: 2020-09-08

## 2020-09-08 ENCOUNTER — VIRTUAL VISIT (OUTPATIENT)
Dept: INFECTIOUS DISEASES | Facility: CLINIC | Age: 85
End: 2020-09-08
Attending: INTERNAL MEDICINE
Payer: COMMERCIAL

## 2020-09-08 DIAGNOSIS — T84.50XD INFECTION OF PROSTHETIC JOINT, SUBSEQUENT ENCOUNTER: Primary | ICD-10-CM

## 2020-09-08 DIAGNOSIS — T84.50XD INFECTION OF PROSTHETIC JOINT, SUBSEQUENT ENCOUNTER: ICD-10-CM

## 2020-09-08 LAB
CRP SERPL-MCNC: <2.9 MG/L (ref 0–8)
ERYTHROCYTE [SEDIMENTATION RATE] IN BLOOD BY WESTERGREN METHOD: 52 MM/H (ref 0–30)

## 2020-09-08 PROCEDURE — 86140 C-REACTIVE PROTEIN: CPT | Performed by: INTERNAL MEDICINE

## 2020-09-08 PROCEDURE — 85652 RBC SED RATE AUTOMATED: CPT | Performed by: INTERNAL MEDICINE

## 2020-09-08 PROCEDURE — 36415 COLL VENOUS BLD VENIPUNCTURE: CPT | Performed by: INTERNAL MEDICINE

## 2020-09-08 RX ORDER — AMOXICILLIN AND CLAVULANATE POTASSIUM 500; 125 MG/1; MG/1
1 TABLET, FILM COATED ORAL 2 TIMES DAILY
Qty: 14 TABLET | Refills: 0 | Status: SHIPPED | OUTPATIENT
Start: 2020-09-08 | End: 2021-02-01

## 2020-09-08 ASSESSMENT — PAIN SCALES - GENERAL: PAINLEVEL: NO PAIN (0)

## 2020-09-08 NOTE — PROGRESS NOTES
"Jigna Allen is a 85 year old female who is being evaluated via a billable telephone visit.      The patient has been notified of following:     \"This telephone visit will be conducted via a call between you and your physician/provider. We have found that certain health care needs can be provided without the need for a physical exam.  This service lets us provide the care you need with a short phone conversation.  If a prescription is necessary we can send it directly to your pharmacy.  If lab work is needed we can place an order for that and you can then stop by our lab to have the test done at a later time.    Telephone visits are billed at different rates depending on your insurance coverage. During this emergency period, for some insurers they may be billed the same as an in-person visit.  Please reach out to your insurance provider with any questions.    If during the course of the call the physician/provider feels a telephone visit is not appropriate, you will not be charged for this service.\"    Patient has given verbal consent for Telephone visit?  Yes    What phone number would you like to be contacted at? [phone]    How would you like to obtain your AVS? [email or web address]    Phone call duration: 12 minutes      Patient doing well. All wounds are healed over. Feels well. No fevers or chills. On suppressive antibiotics.     CRP Inflammation   Date Value Ref Range Status   09/08/2020 <2.9 0.0 - 8.0 mg/L Final   7/13 CRP 0.2    Will check CRP  Plan for stopping antibiotics today and getting joint replaced as soon as Ok with Dr. Gomez. Fine per ID. He usually does a 2 week antibiotics hold and then replace the joint. The patient is fine with this and I wish her well.     Does not need to return to clinic unless new issues arise.     Emily Jefferson MD      "

## 2020-09-08 NOTE — LETTER
"9/8/2020       RE: Jigna Allen  [address] [place] [address]     Dear Colleague,    Thank you for referring your patient, Jigna Allen, to the [facility] AND INFECTIOUS DISEASES at [facility]. Please see a copy of my visit note below.    Jigna Allen is a 85 year old female who is being evaluated via a billable telephone visit.      The patient has been notified of following:     \"This telephone visit will be conducted via a call between you and your physician/provider. We have found that certain health care needs can be provided without the need for a physical exam.  This service lets us provide the care you need with a short phone conversation.  If a prescription is necessary we can send it directly to your pharmacy.  If lab work is needed we can place an order for that and you can then stop by our lab to have the test done at a later time.    Telephone visits are billed at different rates depending on your insurance coverage. During this emergency period, for some insurers they may be billed the same as an in-person visit.  Please reach out to your insurance provider with any questions.    If during the course of the call the physician/provider feels a telephone visit is not appropriate, you will not be charged for this service.\"    Patient has given verbal consent for Telephone visit?  Yes    What phone number would you like to be contacted at? [phone]    How would you like to obtain your AVS? Predect    Phone call duration: 12 minutes      Patient doing well. All wounds are healed over. Feels well. No fevers or chills. On suppressive antibiotics.     CRP Inflammation   Date Value Ref Range Status   09/08/2020 <2.9 0.0 - 8.0 mg/L Final   7/13 CRP 0.2    Will check CRP  Plan for stopping antibiotics today and getting joint replaced as soon as Ok with Dr. Gomez. Yessi per ID. He usually does a 2 week antibiotics hold and then replace the joint. " The patient is fine with this and I wish her well.     Does not need to return to clinic unless new issues arise.     Emily Jefferson MD

## 2020-09-08 NOTE — TELEPHONE ENCOUNTER
Received fax from [facility] order pharm. They request that Augmentin script be sent to pt's local pharmacy as it is a short-term, 14 day script. Will send to pt's Jayy.  Pat Handley RN

## 2020-09-08 NOTE — TELEPHONE ENCOUNTER
RN called patient. Per patient, Dr. Jefferson asked patient to stop taking ABX and have patient got lab work. But patient felt like she is not confident that is infection free and was wondering if Dr. Gomez would want to continue with ABX. RN told patient Dr. Jefferson is managing her infection and she will get back to her in regards to the lab work. RN was able to see that patient's Sed Rate is still high. Patient expressed understanding and will wait for Dr. Jefferson's final answer in regards to the lab work.     Bob Mcmillan RN      [facility] Call Center    Phone Message    May a detailed message be left on voicemail: yes     Reason for Call: Other: clarification      Action Taken: Message routed to:  [facility] He Gomez     Travel Screening: Not Applicable    Patient would like a call back to clarify if she can be taken off of the antibiotics she's been taking. She would like to hear from Jason or his care team directly.    Today she had a blood test and she would also like him to review that blood test with her as well and if she should she have knee aspiration now and does that have to be 60 days out ?      Please reach out to patient to clarify questions

## 2020-09-09 ENCOUNTER — TELEPHONE (OUTPATIENT)
Dept: INFECTIOUS DISEASES | Facility: CLINIC | Age: 85
End: 2020-09-09

## 2020-09-09 ENCOUNTER — MYC MEDICAL ADVICE (OUTPATIENT)
Dept: INFECTIOUS DISEASES | Facility: CLINIC | Age: 85
End: 2020-09-09

## 2020-09-09 ENCOUNTER — TEAM CONFERENCE (OUTPATIENT)
Dept: ORTHOPEDICS | Facility: CLINIC | Age: 85
End: 2020-09-09

## 2020-09-09 NOTE — TELEPHONE ENCOUNTER
[facility] Call Center    Phone Message    May a detailed message be left on voicemail: yes     Reason for Call: Other: The pt is asking for a call with yesterday's test results and what the next steps are. Please call to discuss. Thanks.     Action Taken: Message routed to:  [facility] monisha inf dis    Travel Screening: Not Applicable

## 2020-09-09 NOTE — TELEPHONE ENCOUNTER
Called patient about CRP being <2.9 she should be ok to stop antibiotics and can start her time off antibiotics and get her knee replaced.

## 2020-09-10 DIAGNOSIS — T84.50XD INFECTION OF PROSTHETIC JOINT, SUBSEQUENT ENCOUNTER: Primary | ICD-10-CM

## 2020-09-10 NOTE — TELEPHONE ENCOUNTER
===View-only below this line===  ----- Message -----  From: He Gomez MD  Sent: 9/9/2020  11:25 PM CDT  To: Emily Jefferson MD, Bob Mcmillan RN  Subject: RE: Infection Clear                              Emily,  OK, thanks.  I usually give pts a antibx free holiday (empirically 8 weeks) before retesting joint fluid and serum for an evidence of a residual infection before making a decision to do a 2nd stage reimplantation revision arthroplasty.    Roque,  Please call pt and ask her to come back in at the end of her 8 wk antibx holiday for knee aspiration and blood ESR, CRP.  Reserve tentative target surgery date 2 weeks after the clinic visit.    Thanks.    ----- Message -----  From: Emily Jefferson MD  Sent: 9/9/2020   3:29 PM CDT  To: He Gomez MD  Subject: Infection Clear                                  Emperatriz,    I saw Jigna yesterday. Her CRP is <2.9. She has had 6 weeks of IV antibiotics and doing well. I think she is good to move forward. I told her to stop all antibiotics both IV and oral.     Thanks,    Emily

## 2020-09-14 DIAGNOSIS — T84.50XD INFECTION OF PROSTHETIC JOINT, SUBSEQUENT ENCOUNTER: Primary | ICD-10-CM

## 2020-10-16 ENCOUNTER — TELEPHONE (OUTPATIENT)
Dept: ORTHOPEDICS | Facility: CLINIC | Age: 85
End: 2020-10-16

## 2020-10-16 NOTE — TELEPHONE ENCOUNTER
Called and spoke to pt. She stated that she is doing fine but that she still has swelling in her knee. I advised icing and using some sort of compression (neoprene sleeve or even just an ACE wrap). She said she hasn't tried either yet but that she will. I also instructed her on how to do a milk/lymph massage to assist the lymph system with removing fluid.

## 2020-10-16 NOTE — TELEPHONE ENCOUNTER
[facility] Call Center    Phone Message    May a detailed message be left on voicemail: yes     Reason for Call: Other: Pt is calling to inform Dr. Gomez that R knee continues to be swollen, no change but there is no drainage, redness or warmth. Pt is doing PT exercises, keeping the knee elevated when sitting and using the walker. IF any other methods need to be used, please call patient to inform.     Action Taken: Message routed to:  [facility] ortho    Travel Screening: Not Applicable

## 2020-10-21 ENCOUNTER — TELEPHONE (OUTPATIENT)
Dept: ORTHOPEDICS | Facility: CLINIC | Age: 85
End: 2020-10-21

## 2020-10-21 DIAGNOSIS — T84.50XD INFECTION OF PROSTHETIC JOINT, SUBSEQUENT ENCOUNTER: ICD-10-CM

## 2020-10-21 RX ORDER — AMOXICILLIN 500 MG/1
TABLET, FILM COATED ORAL
Qty: 4 TABLET | Refills: 0 | Status: SHIPPED | OUTPATIENT
Start: 2020-10-21 | End: 2021-02-01

## 2020-10-21 NOTE — TELEPHONE ENCOUNTER
See previous encounter same day.    Bob Mcmillan RN      [facility] Call Center    Phone Message    May a detailed message be left on voicemail: yes     Reason for Call: Medication Refill Request    Has the patient contacted the pharmacy for the refill? Yes   Name of medication being requested: Amoxicillan  Provider who prescribed the medication: Dr. Gomez  Pharmacy: [facility] on [address]  Date medication is needed: Today     Pt is at the dental office right now for her routine cleaning and the dental office called to see if pt needs an antibiotic, pt states she only has a spacer so didn't think she needed it.  Dental clinic would like Dr. Gomez to order pt an antibiotic today please.        Action Taken: Message routed to:  [facility] Ortho    Travel Screening: Not Applicable

## 2020-11-09 ENCOUNTER — OFFICE VISIT (OUTPATIENT)
Dept: ORTHOPEDICS | Facility: CLINIC | Age: 85
End: 2020-11-09
Payer: COMMERCIAL

## 2020-11-09 ENCOUNTER — ANCILLARY PROCEDURE (OUTPATIENT)
Dept: GENERAL RADIOLOGY | Facility: CLINIC | Age: 85
End: 2020-11-09
Attending: STUDENT IN AN ORGANIZED HEALTH CARE EDUCATION/TRAINING PROGRAM
Payer: COMMERCIAL

## 2020-11-09 DIAGNOSIS — T84.50XD INFECTION OF PROSTHETIC JOINT, SUBSEQUENT ENCOUNTER: ICD-10-CM

## 2020-11-09 DIAGNOSIS — T84.50XD INFECTION OF PROSTHETIC JOINT, SUBSEQUENT ENCOUNTER: Primary | ICD-10-CM

## 2020-11-09 LAB
APPEARANCE FLD: NORMAL
COLOR FLD: YELLOW
CRP SERPL-MCNC: <2.9 MG/L (ref 0–8)
ERYTHROCYTE [SEDIMENTATION RATE] IN BLOOD BY WESTERGREN METHOD: 25 MM/H (ref 0–30)
LYMPHOCYTES NFR FLD MANUAL: 9 %
NEUTS BAND NFR FLD MANUAL: 44 %
OTHER CELLS FLD MANUAL: 47 %
SPECIMEN SOURCE FLD: NORMAL
WBC # FLD AUTO: 878 /UL

## 2020-11-09 PROCEDURE — 73560 X-RAY EXAM OF KNEE 1 OR 2: CPT | Mod: RT | Performed by: RADIOLOGY

## 2020-11-09 PROCEDURE — 85652 RBC SED RATE AUTOMATED: CPT | Performed by: PATHOLOGY

## 2020-11-09 PROCEDURE — 99207 PR NO CHARGE INJECTABLE MED/DRUG: CPT | Performed by: ORTHOPAEDIC SURGERY

## 2020-11-09 PROCEDURE — 20610 DRAIN/INJ JOINT/BURSA W/O US: CPT | Mod: RT | Performed by: ORTHOPAEDIC SURGERY

## 2020-11-09 PROCEDURE — 86140 C-REACTIVE PROTEIN: CPT | Performed by: PATHOLOGY

## 2020-11-09 PROCEDURE — 36415 COLL VENOUS BLD VENIPUNCTURE: CPT | Performed by: PATHOLOGY

## 2020-11-09 NOTE — LETTER
11/9/2020         RE: Jigna Allen  [address] [place] [address]        Dear Colleague,    Thank you for referring your patient, Jigna Allen, to the [facility] [place]. Please see a copy of my visit note below.    Attending note:    DX:   1. Right calf high grade MFH.   2. 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 R knee DJD  4. Infection right total knee arthroplasty     Treatments:  1. neoadjuvant chemotherapy, surgical resection Feb 15, 2010. Complications post-op of skin loss with draining seroma, treated with I & D and systemic antibiotics. She had 4 cycles of doxil/ifos finishing about Feb 2010. There was <10% viable cells at surgery. She finished post op XRT about June 2010  2. 6/12/18, R TKA ([facility]) [facility]  3. 1/27/2020, skin popliteal fossa punch bx :  No sarcoma  4. 3/27/2020 [facility]  5. 3/30/2020, R TKA aspiration, WBC 17k,, PMNs 94%, MMB: Peptoniphilus asaccharolyticus, Streptococcus constellatus, Clostridium ramosum    6. 4/20/2020, Coverage of right lower extremity popliteal wound with free left anterolateral thigh fasciocutaneous flap with 3 perforators, size 25 x 8 cm. Dr Agosto, [facility]. MMB: Gemella morbillorum, Parvimonas micra, Peptoniphilus asaccharolyticus, Prevotella species. ABx Ertapenem.   7. 6/2/2020, explantation of right total knee arthroplasty with placement of Vanco/gentamicin/ceftazidime PMMA antibiotic spacer knee right, Dr. Gomez, [facility]. MMB: NGTD     History of present illness:     Patient returns to our clinic status post explantation of right total knee arthroplasty with placement of antibiotic eluding PMMA spacer.  Antibiotics has been held on 9/8/2020.  All cultures show no growth to date. She is ambulating 50% weightbearing with a walker. ESR and CRP obtained today; both of which are within normal limits.     Physical Examination:  Incision is clean dry and healing well.  No signs of active infection or drainage drainage.  Range of motion 0 to 90 degrees. Effusion palpable.  Patient has crepitus with range of motion however it is pain-free.    Right Lower Extremity Demonstrates:       5/5 Iliopsoas, quadricep, tibialis anterior, extensor houses longus, gastrocsoleus.     Sensation to light touch intact in the L2-S1 dermatome distribution.    Left dorsalis pedis pulse palpated to be 2/4.    Tolerates range of motion in all major joints     No significant pain or limitation of range of motion.     Impression:    85-year-old female status post explantation of right total knee arthroplasty with placement of antibiotic eluding PMMA spacer in the setting of a previous soft tissue coverage procedure and infection with multiple organisms who is recovering well.     Plan:       Aspiration of right knee performed in office today.  We will follow-up cultures and cell count    Plan for reimplantation at surgical date to follow, pending laboratory analysis of aspiration    Repeat imaging of the right knee ordered today for preoperative planning    Discussion had with patient, , daughter via telephone for her treatment plan    ASPIRATION: Using a 22 gauge needle and 10 cc syringe, 10 cc of yellow fluid was aspirated without complications.     Invasive Procedure Safety Checklist completed by nurse? Yes     CRP Inflammation 0.0 - 8.0 mg/L <2.9      Sed Rate 0 - 30 mm/h 25      Body Fluid Analysis Source  Right Knee  Right Knee     Comment: Synovial fluid    Color Fluid  Yellow  Pink     Appearance Fluid  Cloudy  Cloudy     WBC Fluid /uL 878  84712 CM        % Neutrophils Fluid % 44  94     % Lymphocytes Fluid % 9  1     % Other Cells Fluid % 47  3 CM          Max Ziyad,   Adult Joint Reconstruction Fellow  Dept Orthopaedic Surgery, [facility]  [phone] Pager [phone] Office    Attending MD (Dr. He Gomez) Attestation :  This  patient was seen and evaluated by me including a history, exam, and interpretation of all imaging and/or lab data.  Either a training physician (resident/fellow), who also saw the patient, or scribe has documented the visit in the attached note.    Total Time = 25f min, 50% of which was spent in counseling and coordination of care as documented aboMD Pelon Chance Family Professor  Oncology and Adult Reconstructive Surgery  Dept Orthopaedic Surgery, [facility]  [phone] office, [phone] pager  [email or web address]

## 2020-11-09 NOTE — PROGRESS NOTES
Attending note:    DX:   1. Right calf high grade MFH.   2. 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 R knee DJD  4. Infection right total knee arthroplasty     Treatments:  1. neoadjuvant chemotherapy, surgical resection Feb 15, 2010. Complications post-op of skin loss with draining seroma, treated with I & D and systemic antibiotics. She had 4 cycles of doxil/ifos finishing about Feb 2010. There was <10% viable cells at surgery. She finished post op XRT about June 2010  2. 6/12/18, R TKA (Jason) [facility]  3. 1/27/2020, skin popliteal fossa punch bx :  No sarcoma  4. 3/27/2020 Debridement right thigh wound and placement of VAC, Dr Hernandez, Counts include 234 beds at the [facility]  5. 3/30/2020, R TKA aspiration, WBC 17k,, PMNs 94%, MMB: Peptoniphilus asaccharolyticus, Streptococcus constellatus, Clostridium ramosum    6. 4/20/2020, Coverage of right lower extremity popliteal wound with free left anterolateral thigh fasciocutaneous flap with 3 perforators, size 25 x 8 cm. Dr Agosto, [facility]. MMB: Gemella morbillorum, Parvimonas micra, Peptoniphilus asaccharolyticus, Prevotella species. ABx Ertapenem.   7. 6/2/2020, explantation of right total knee arthroplasty with placement of Vanco/gentamicin/ceftazidime PMMA antibiotic spacer knee right, Dr. Gomez, [facility]. MMB: NGTD     History of present illness:     Patient returns to our clinic status post explantation of right total knee arthroplasty with placement of antibiotic eluding PMMA spacer.  Antibiotics has been held on 9/8/2020.  All cultures show no growth to date. She is ambulating 50% weightbearing with a walker. ESR and CRP obtained today; both of which are within normal limits.     Physical Examination: Incision is clean dry and healing well.  No signs of active infection or drainage drainage.  Range of motion 0 to 90 degrees. Effusion palpable.  Patient has crepitus with range of motion however it is pain-free.    Right Lower Extremity Demonstrates:       5/5  Iliopsoas, quadricep, tibialis anterior, extensor houses longus, gastrocsoleus.     Sensation to light touch intact in the L2-S1 dermatome distribution.    Left dorsalis pedis pulse palpated to be 2/4.    Tolerates range of motion in all major joints     No significant pain or limitation of range of motion.     Impression:    85-year-old female status post explantation of right total knee arthroplasty with placement of antibiotic eluding PMMA spacer in the setting of a previous soft tissue coverage procedure and infection with multiple organisms who is recovering well.     Plan:       Aspiration of right knee performed in office today.  We will follow-up cultures and cell count    Plan for reimplantation at surgical date to follow, pending laboratory analysis of aspiration    Repeat imaging of the right knee ordered today for preoperative planning    Discussion had with patient, , daughter via telephone for her treatment plan    ASPIRATION: Using a 22 gauge needle and 10 cc syringe, 10 cc of yellow fluid was aspirated without complications.     Invasive Procedure Safety Checklist completed by nurse? Yes     CRP Inflammation 0.0 - 8.0 mg/L <2.9      Sed Rate 0 - 30 mm/h 25      Body Fluid Analysis Source  Right Knee  Right Knee     Comment: Synovial fluid    Color Fluid  Yellow  Pink     Appearance Fluid  Cloudy  Cloudy     WBC Fluid /uL 878  39667 CM        % Neutrophils Fluid % 44  94     % Lymphocytes Fluid % 9  1     % Other Cells Fluid % 47  3 CM          Pierre Lackey DO  Adult Joint Reconstruction Fellow  Dept Orthopaedic Surgery, [facility]  [phone] Pager [phone] Office    Attending MD (Dr. He Gomez) Attestation :  This patient was seen and evaluated by me including a history, exam, and interpretation of all imaging and/or lab data.  Either a training physician (resident/fellow), who also saw the patient, or scribe has documented the visit in the attached note.    Total Time =  25f min, 50% of which was spent in counseling and coordination of care as documented jeremy Gomez MD [facility] Professor  Oncology and Adult Reconstructive Surgery  Dept Orthopaedic Surgery, [facility]  [phone] office, [phone] pager  [email or web address]

## 2020-11-10 ENCOUNTER — PREP FOR PROCEDURE (OUTPATIENT)
Dept: ORTHOPEDICS | Facility: CLINIC | Age: 85
End: 2020-11-10

## 2020-11-10 ENCOUNTER — HOSPITAL ENCOUNTER (INPATIENT)
Facility: CLINIC | Age: 85
Setting detail: SURGERY ADMIT
End: 2020-11-10
Attending: ORTHOPAEDIC SURGERY | Admitting: ORTHOPAEDIC SURGERY
Payer: COMMERCIAL

## 2020-11-10 DIAGNOSIS — T84.50XD INFECTION OF PROSTHETIC JOINT, SUBSEQUENT ENCOUNTER: Primary | ICD-10-CM

## 2020-11-10 DIAGNOSIS — Z11.59 ENCOUNTER FOR SCREENING FOR OTHER VIRAL DISEASES: Primary | ICD-10-CM

## 2020-11-10 NOTE — TELEPHONE ENCOUNTER
FUTURE VISIT INFORMATION      SURGERY INFORMATION:    Reimplantation Right TKA, Dr Jason DILLON 20    Consult: ov 20    RECORDS REQUESTED FROM:       Primary Care Provider: Elisabeth Ko MD- [facility]    Pertinent Medical History: Pulmonary nodules    Most recent EKG+ Tracin20    Most recent ECHO: 18

## 2020-11-12 RX ORDER — ACETAMINOPHEN 325 MG/1
975 TABLET ORAL ONCE
Status: CANCELLED | OUTPATIENT
Start: 2020-11-24

## 2020-11-12 RX ORDER — CELECOXIB 200 MG/1
400 CAPSULE ORAL ONCE
Status: CANCELLED | OUTPATIENT
Start: 2020-11-24

## 2020-11-12 RX ORDER — TRANEXAMIC ACID 650 MG/1
1950 TABLET ORAL ONCE
Status: CANCELLED | OUTPATIENT
Start: 2020-11-24

## 2020-11-12 RX ORDER — CEFAZOLIN SODIUM 1 G/3ML
1 INJECTION, POWDER, FOR SOLUTION INTRAMUSCULAR; INTRAVENOUS SEE ADMIN INSTRUCTIONS
Status: CANCELLED | OUTPATIENT
Start: 2020-11-24

## 2020-11-12 RX ORDER — CEFAZOLIN SODIUM 2 G/100ML
2 INJECTION, SOLUTION INTRAVENOUS
Status: CANCELLED | OUTPATIENT
Start: 2020-11-24

## 2020-11-13 LAB — SYNOVASURE PERIPROSTHETIC JOINT INFECTION DECTION: NORMAL

## 2020-11-14 LAB
BACTERIA SPEC CULT: NO GROWTH
Lab: NORMAL
SPECIMEN SOURCE: NORMAL

## 2020-11-16 ENCOUNTER — TELEPHONE (OUTPATIENT)
Dept: ORTHOPEDICS | Facility: CLINIC | Age: 85
End: 2020-11-16

## 2020-11-16 NOTE — TELEPHONE ENCOUNTER
RN called and spoke with patient. Discussed briefly the potential length of stay in the rehab center. Also talked about covid 19 and how best to stay safe pre and post surgery. Answered patient's other questions.    Bob Mcmillan RN

## 2020-11-17 NOTE — PROGRESS NOTES
SURGERY PLAN (PRE-OP PLAN)    Brief: Right TKA Revision (Spacer Removal and Re-implantation)  Ancef // TXA // Cocktail // Supine    Plan: Explant Spacer, Replant SSK R-TKA    Background:     DX:   1. Right calf high grade MFH.   2. 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 R knee DJD  4. Infection right total knee arthroplasty     Treatments:  1. neoadjuvant chemotherapy, surgical resection Feb 15, 2010. Complications post-op of skin loss with draining seroma, treated with I & D and systemic antibiotics. She had 4 cycles of doxil/ifos finishing about Feb 2010. There was <10% viable cells at surgery. She finished post op XRT about June 2010  2. 6/12/18, R TKA (Jason) [facility]: Triathlon Size 4 Femur and Tibia  3. 1/27/2020, skin popliteal fossa punch bx :  No sarcoma  4. 3/27/2020 Debridement right thigh wound and placement of VAC, Dr Hernandez, [facility]  5. 3/30/2020, R TKA aspiration, WBC 17k,, PMNs 94%, MMB: Peptoniphilus asaccharolyticus, Streptococcus constellatus, Clostridium ramosum    6. 4/20/2020, Coverage of right lower extremity popliteal wound with free left anterolateral thigh fasciocutaneous flap with 3 perforators, size 25 x 8 cm. Dr Agosto, [facility]. MMB: Gemella morbillorum, Parvimonas micra, Peptoniphilus asaccharolyticus, Prevotella species. ABx Ertapenem.   7. 6/2/2020, explantation of right total knee arthroplasty with placement of Vanco/gentamicin/ceftazidime PMMA antibiotic spacer knee right, Dr. Gomez, [facility]. MMB: NGTD     Last aspiration 11/9/2020     CRP Inflammation 0.0 - 8.0 mg/L <2.9     Sed Rate 0 - 30 mm/h 25      Specimen Description Right Knee Synovial fluid    Special Requests Received in anaerobic tubes.    Culture Micro No growth      Body Fluid Analysis Source  Right Knee  Right Knee     Comment: Synovial fluid    Color Fluid  Yellow  Pink     Appearance Fluid  Cloudy  Cloudy     WBC Fluid /uL 878  29629 CM    Comment: No reference ranges have been  established.  This result should be interpreted   in the context of the patient's clinical condition and compared to   simultaneous measurement in the patient's blood.  Refer to Lab Guide for   specific interpretive guidelines.     % Neutrophils Fluid % 44  94     % Lymphocytes Fluid % 9  1     % Other Cells Fluid % 47  3 CM      Specimen Description Right Knee Synovial fluid    Special Requests Received in anaerobic tubes. P    Culture Micro Culture negative monitoring continues P      SYNOVASURE PJI   SYNOVASURE ALPHA DEFENSIN PJI                       NEGATIVE                     ALPHA-DEFENSINS-SF  NEGATIVE   CRP-SF              <=0.4     mg/L              >=3                   Patient Position (indicated by x):  X  Supine with torso rolled up on a bump   x  Regular OR table   X  Vera catheter   X  Blue U drape   Blue and White stockinet   Extremity drape   Coban   Ioban             TKA Requests (indicated by x):     Biomet Vanguard TKA (+/- PCL retention)   x   [place] Revision TKA      Specimens and cultures (indicated by x):     Tissue cultures, aerobic and anaerobic without gram stain      Frozen section      pathology specimens - fresh      pathology specimens - formalin        Pierre Lackey DO  Adult Joint Reconstruction Fellow  Dept Orthopaedic Surgery, [facility]  [phone] Pager [phone]

## 2020-11-19 ENCOUNTER — TELEPHONE (OUTPATIENT)
Dept: ORTHOPEDICS | Facility: CLINIC | Age: 85
End: 2020-11-19

## 2020-11-19 NOTE — TELEPHONE ENCOUNTER
Returned call to patient to confirm her desire to cancel surgery with Dr Gomez on 11/24/20. Patient stated she does not feel comfortable having surgery at this time with the increase in pandemic numbers. Patient reports to be in no pain but is disappointed. Patient was offered the chance to choose a future surgery date but has elected to wait and will call the clinic when she is ready to reschedule.

## 2020-11-19 NOTE — TELEPHONE ENCOUNTER
Received call back from patient, stating she is afraid she made a mistake in canceling surgery. Patient is requesting a call from Dr Gomez to discuss.     In the meantime, patient's pre-op H&P and COVID tests have been rescheduled for 11/20/20 at 3:00pm in case she decides to move forward with surgery.

## 2020-11-19 NOTE — TELEPHONE ENCOUNTER
[facility] Call Center    Phone Message    May a detailed message be left on voicemail: yes     Reason for Call: Other: Patient called and wants to cancel her Surgery, set for 11/24/2020 per the Covid issues and would like to schedule further out.      Action Taken: Message routed to:  [facility] ([facility] Ortho    Travel Screening: Not Applicable

## 2020-11-20 ENCOUNTER — PRE VISIT (OUTPATIENT)
Dept: SURGERY | Facility: CLINIC | Age: 85
End: 2020-11-20

## 2020-11-20 ENCOUNTER — TELEPHONE (OUTPATIENT)
Dept: ORTHOPEDICS | Facility: CLINIC | Age: 85
End: 2020-11-20

## 2020-11-20 NOTE — TELEPHONE ENCOUNTER
I called and spoke with Jigna and her  regarding surgical risks , COVID risks, etc.   They will defer surgery until Feb 2021, or later if necessary.  She is getting along functionally with her walker and has minimal pain.      ----- Message from Bessy Millard MA sent at 11/20/2020  9:40 AM CST -----  Regarding: Call back  Dr Gomez - this is the patient that would really like to speak with you regarding her surgery. She was scheduled for 11/24.

## 2020-11-23 LAB
BACTERIA SPEC CULT: NORMAL
Lab: NORMAL
SPECIMEN SOURCE: NORMAL

## 2020-12-06 NOTE — PROGRESS NOTES
Attending MD (Dr. He Gomez) Attestation :  This patient was seen and evaluated by me including a history, exam, and interpretation of all imaging and/or lab data.  Either a training physician (resident/fellow), who also saw the patient, or scribe has documented the visit in the attached note.    MD Pelon Cid Family Professor  Oncology and Adult Reconstructive Surgery  Dept Orthopaedic Surgery, [facility]  [phone] office, [phone] pager  [email or web address]

## 2021-01-15 ENCOUNTER — HEALTH MAINTENANCE LETTER (OUTPATIENT)
Age: 86
End: 2021-01-15

## 2021-01-27 ENCOUNTER — TELEPHONE (OUTPATIENT)
Dept: ORTHOPEDICS | Facility: CLINIC | Age: 86
End: 2021-01-27

## 2021-01-27 NOTE — TELEPHONE ENCOUNTER
Phoned patient to let her know that Dr Gomez will no longer be available to do surgery on 2/23/21. Patient stated she plans to receive her first COVID vaccine shot this week, and pushing the surgery date out will give her more time to receive the second shot. Patient has decided to reschedule her reimplantation Right total knee arthroplasty surgery to 3/9/21. Patient requested a new surgery packet be mailed to her today. She would like to see her PCP for the H&P. She will wait for a call with her specific surgery time.

## 2021-02-01 ENCOUNTER — OFFICE VISIT (OUTPATIENT)
Dept: UROLOGY | Facility: CLINIC | Age: 86
End: 2021-02-01
Attending: OBSTETRICS & GYNECOLOGY
Payer: COMMERCIAL

## 2021-02-01 VITALS
HEART RATE: 85 BPM | WEIGHT: 129.4 LBS | BODY MASS INDEX: 23.81 KG/M2 | DIASTOLIC BLOOD PRESSURE: 84 MMHG | SYSTOLIC BLOOD PRESSURE: 136 MMHG | HEIGHT: 62 IN

## 2021-02-01 DIAGNOSIS — N39.41 URGE INCONTINENCE: ICD-10-CM

## 2021-02-01 DIAGNOSIS — N81.4 UTEROVAGINAL PROLAPSE: Primary | ICD-10-CM

## 2021-02-01 DIAGNOSIS — N95.2 VAGINAL ATROPHY: ICD-10-CM

## 2021-02-01 PROCEDURE — 99204 OFFICE O/P NEW MOD 45 MIN: CPT | Performed by: OBSTETRICS & GYNECOLOGY

## 2021-02-01 PROCEDURE — G0463 HOSPITAL OUTPT CLINIC VISIT: HCPCS

## 2021-02-01 RX ORDER — ESTRADIOL 0.1 MG/G
1 CREAM VAGINAL
Qty: 42.5 G | Refills: 3 | Status: ON HOLD | OUTPATIENT
Start: 2021-02-01 | End: 2021-03-12

## 2021-02-01 ASSESSMENT — PAIN SCALES - GENERAL: PAINLEVEL: NO PAIN (0)

## 2021-02-01 ASSESSMENT — ANXIETY QUESTIONNAIRES
7. FEELING AFRAID AS IF SOMETHING AWFUL MIGHT HAPPEN: NOT AT ALL
1. FEELING NERVOUS, ANXIOUS, OR ON EDGE: SEVERAL DAYS
GAD7 TOTAL SCORE: 1
5. BEING SO RESTLESS THAT IT IS HARD TO SIT STILL: NOT AT ALL
IF YOU CHECKED OFF ANY PROBLEMS ON THIS QUESTIONNAIRE, HOW DIFFICULT HAVE THESE PROBLEMS MADE IT FOR YOU TO DO YOUR WORK, TAKE CARE OF THINGS AT HOME, OR GET ALONG WITH OTHER PEOPLE: NOT DIFFICULT AT ALL
3. WORRYING TOO MUCH ABOUT DIFFERENT THINGS: NOT AT ALL
2. NOT BEING ABLE TO STOP OR CONTROL WORRYING: NOT AT ALL
6. BECOMING EASILY ANNOYED OR IRRITABLE: NOT AT ALL

## 2021-02-01 ASSESSMENT — MIFFLIN-ST. JEOR: SCORE: 987.2

## 2021-02-01 ASSESSMENT — PATIENT HEALTH QUESTIONNAIRE - PHQ9: 5. POOR APPETITE OR OVEREATING: NOT AT ALL

## 2021-02-01 NOTE — LETTER
2/1/2021       RE: Jigna Allen  [address] [place] [address]     Dear Colleague,    Thank you for referring your patient, Jigna Allen, to the [facility] [place] at [facility]. Please see a copy of my visit note below.    February 1, 2021    Referring Provider: No referring provider defined for this encounter.    Primary Care Provider: Elisabeth Ko    CC: pelvic organ prolapse    HPI:  Jigna Allen is a 86 year old female who presents for evaluation of a known uterovaginal prolapse.        Pelvic Organ Prolapse Symptoms  Has had bulge/protrusion vaginally for years and in fact was using a pessary for about 3 years through [facility] but stopped using it last March after she was placed in rehab for an infected knee ( requiring removal of knee replacement hardware and placement of skin graft). She says when she used her pessary, it did help her prolapse. She no longer has the pessary (someone threw it out). She is bothered by this bulge and also worried. She is planning to have another knee replacement surgery in march.     Urinary Symptoms  Has some urinary dribbling although doesn't notice clear triggers for this. She also has occasional urgency leakage but mostly at night. She denies stress leakage with coughing, laughing etc. Empties her bladder about 3 times during the day and once at night. Denies urgency/frequency/dysuria/Kristine.    Voiding function:   Has started needing to push her bulge in to void. But feels empty once she voids.     Gastrointestinal Symptoms:  Denies chronic diarrhea, constipation. Denies bothersome fecal or flatal incontinence.       Sexual function/Pelvic floor pain/GYN:   Not sexually active for  reasons. Doesn't think she will be.       Relevant Medical History:    Diabetes? No  High Blood pressure? No     Recurrent UTIs? No  Sleep Apnea? No  Obesity? No  History of Blood clots? No  Other  medical problems: Hypothyroidism    Surgical History:      Past Surgical History:   Procedure Laterality Date     ARTHROPLASTY KNEE Right 6/12/2018    Procedure: ARTHROPLASTY KNEE;  Right Total Knee Replacement ;  Surgeon: He Gomez MD;  Location: [facility] OR     CARPAL TUNNEL RELEASE RT/LT       excise sarcoma Right     right leg     exicision of wound  03/27/2020     GRAFT FREE VASCULARIZED TRANSVERSE RECTUS ABDOMINIS MYOCUTANEOUS Left 4/20/2020    Procedure: With left anterolateral thigh free flap with SPY;  Surgeon: EHSAN De Luna MD;  [facility] OR     IRRIGATION AND DEBRIDEMENT KNEE, PLACE ANTIBIOTIC CEMENT BEADS / SPACE Right 6/2/2020    Procedure: manufacture and implantation of Vanco/tobra ANTIBIOTIC SPACER, KNEE;  Surgeon: He Gomez MD;  Location: [facility] OR     KNEE SURGERY       PICC DOUBLE LUMEN PLACEMENT Right 04/23/2020    5FR DL PICC inserted at brachial medial, length- 37cm (1cm out). Tip at mid SVC     RECONSTRUCT KNEE Right 4/20/2020    Procedure: Right leg reconstruction and wound debridemenr;  Surgeon: EHSAN De Luna MD;  [facility]: [facility] OR     REMOVE HARDWARE ARTHROPLASTY KNEE Right 6/2/2020    Procedure: Explantation of Right total knee;  Surgeon: He Gomez MD;  Location: [facility] OR     TUBAL LIGATION         OB/Gyn History:  OB History   No obstetric history on file.       Medications/Vitamins/Supplements:     Current Outpatient Medications   Medication     levothyroxine (LEVOTHROID) 75 MCG tablet     No current facility-administered medications for this visit.          Medical History:      Past Medical History:   Diagnosis Date     Anemia      Anxiety      Aortic valve sclerosis      Arthritis 1990?     Complication of anesthesia     slow to wakeup     Hypothyroid      Infection of prosthetic joint, subsequent encounter      Mild tricuspid regurgitation      Primary osteoarthritis of right knee      Sarcoma (H) 2009     ROS  Social History    Social History      Socioeconomic History     Marital status:      Spouse name: Not on file     Number of children: Not on file     Years of education: Not on file     Highest education level: Not on file   Occupational History     Not on file   Social Needs     Financial resource strain: Not on file     Food insecurity     Worry: Not on file     Inability: Not on file     Transportation needs     Medical: Not on file     Non-medical: Not on file   Tobacco Use     Smoking status: Never Smoker     Smokeless tobacco: Never Used   Substance and Sexual Activity     Alcohol use: Not Currently     Comment: rarely - months      Drug use: Never     Sexual activity: Yes     Partners: Male     Birth control/protection: Post-menopausal   Lifestyle     Physical activity     Days per week: Not on file     Minutes per session: Not on file     Stress: Not on file   Relationships     Social connections     Talks on phone: Not on file     Gets together: Not on file     Attends Rastafari service: Not on file     Active member of club or organization: Not on file     Attends meetings of clubs or organizations: Not on file     Relationship status: Not on file     Intimate partner violence     Fear of current or ex partner: Not on file     Emotionally abused: Not on file     Physically abused: Not on file     Forced sexual activity: Not on file   Other Topics Concern     Parent/sibling w/ CABG, MI or angioplasty before 65F 55M? Not Asked   Social History Narrative     Not on file       Family History  Family History   Problem Relation Age of Onset     Cancer Father      Prostate Cancer Father      Other Cancer Father      Cancer Brother      Other - See Comments Daughter         Slow to wake      Asthma Daughter        Allergy    Allergies   Allergen Reactions     Colchicine      Other reaction(s): Gastrointestinal  diarrhea     Niacin      skin rash     Ferrous Sulfate Rash       Current Outpatient Medications   Medication     acetaminophen  "(TYLENOL) 325 MG tablet     amoxicillin (AMOXIL) 500 MG tablet     amoxicillin-clavulanate (AUGMENTIN) 500-125 MG tablet     aspirin (ASA) 325 MG EC tablet     levothyroxine (LEVOTHROID) 75 MCG tablet     oxyCODONE (ROXICODONE) 5 MG tablet     OXYCODONE HCL PO     senna-docusate (SENOKOT-S/PERICOLACE) 8.6-50 MG tablet     sulfamethoxazole-trimethoprim (BACTRIM DS) 800-160 MG tablet     No current facility-administered medications for this visit.        Physical Exam: /84   Pulse 85   Ht 1.57 m (5' 1.81\")   Wt 58.7 kg (129 lb 6.4 oz)   BMI 23.81 kg/m    Gen:  is alert, comfortable in no acute distress,   Abdomen: Abdomen is soft, non-tender, non-distended,   Lungs: non-labored breathing.       Pelvic Exam:   Normal external female genitalia. The urethra was normal.    Vagina: Stage 3 uterovaginal prolapse, no abnormal discharge   Uterus: Normal size, no lesion on cervix  Ovaries: non palpable  Vulva: no lesions  Rectal: deferred    Pelvic floor strength: 3/5 kegels.    Pelvic floor muscles: No levator myalgia    POPQ EXAM FOR PROLAPSE SEVERITY  (Aa):   1 (Ba):   +4 (C ):    0   (GH):   4.5 (PB):  2 (TVL):  8   (Ap):   +2 (Bp):  +5 (D):   -1     ICS Stage (1-4):  3    Voiding trial:    VOID 200 ml  PVR 0 mL by Bladder ultrasound  Leak with Cough stress test : No, Prolapse reduced Yes with proctoswab    Labs:   Color Urine (no units)   Date Value   06/05/2018 Yellow     Appearance Urine (no units)   Date Value   06/05/2018 Clear     Glucose Urine (mg/dL)   Date Value   06/05/2018 Negative     Bilirubin Urine (no units)   Date Value   06/05/2018 Negative     Ketones Urine (mg/dL)   Date Value   06/05/2018 Negative     Specific Gravity Urine (no units)   Date Value   06/05/2018 1.020     pH Urine (pH)   Date Value   06/05/2018 5.0     Protein Albumin Urine (mg/dL)   Date Value   06/05/2018 Negative     Nitrite Urine (no units)   Date Value   06/05/2018 Negative     Leukocyte Esterase Urine (no units)   Date " Value   06/05/2018 Large (A)     CBC RESULTS:   Recent Labs   Lab Test 07/17/20  0513   WBC 3.7*   RBC 3.69*   HGB 10.4*   HCT 34..2*   MCV 93   MCH 28.2   MCHC 30.4*   RDW 16.1*            A/P: Jigna Allen is a 86 year old F with stage 3 uterovaginal prolapse, symptomatic    Today we discussed the following options      1.  Pessary: We can try another pessary fitting. Given your advanced prolapse, we may or may not be able to find a good fit. Please make a follow up appointment with me for pessary fitting and teaching in the next couple of weeks. We routinely use Vaginal estrogen cream in post menopausal women to help with vaginal dryness,irritation, irritative bladder symptoms and will restore a healthier vaginal tissue. This will also make it more comfortable to try Pessary.  If you have vaginal bleeding while using estrogen cream, please give us a call right away so we can evaluate you. I have prescribed vaginal estrogen cream for you. I would like you to follow up with me once every 1-2 years if you use pessary or sooner if you have bleeding or discomfort from it. I have prescribed the estrogen cream for you.     3. Surgery : In your case, given your age and the fact that you are not sexually active, if pessary does not work, we can consider a vaginal approach surgery called colpocleisis which narrows and shortens the vagina. This will be using your own tissue to repair and will not use mesh. It generally has good durability and patients tend to have good recovery.      Remember, with surgery, there is risk of bleeding, infection, injury to surrounding organs, vascular, bowel, cardiac or respiratory complications secondary to surgery or anaesthesia, persistent pain that may be chronic as well as unanticipated neurologic complications and even death. Even with surgery, there is a risk of recurrence of prolapse (or failure of surgery) that may necessitate repeat surgery.    After surgery, there will be  restriction to lifting to 10 lbs or less for 2 months and vaginal rest with nothing in the vagina for 2 months.     If you decide to pursue surgery, please call and make either a phone or in person appointment with me so we can discuss further.      I spent a total of 45 minutes face-to-face with Jigna Allen during today's office visit assessing patient's relevant history, evaluating patient, discussing treatment options and coordinating care    Nola Santoro MD, [facility]  , Department of OBGYN  Female Pelvic Medicine and Reconstructive Surgery ( Urogynecology)  CC  Patient Care Team:  Elisabeth Ko MD as PCP - General (Internal Medicine)  Bryon Ojeda MD as MD (Orthopedics)  Edouard Bryan MD as MD (Hematology)  Tai Adams MD as Hospitalist (Internal Medicine)  Fabian Babb, RN as Nurse Coordinator (Oncology)  Emily Jefferson MD as MD (Internal Medicine)  EHSAN De Luna MD as MD (Plastic Surgery)  He Gomez MD as MD (Orthopedics)  He Gomez MD as Assigned Musculoskeletal Provider  EHSAN De Luna MD as Assigned Surgical Provider  Edouard Bryan MD as Assigned Cancer Care Provider  Emily Jefferson MD as Assigned Infectious Disease Provider

## 2021-02-01 NOTE — PROGRESS NOTES
February 1, 2021    Referring Provider: No referring provider defined for this encounter.    Primary Care Provider: Elisabeth Ko    CC: pelvic organ prolapse    HPI:  Jigna Allen is a 86 year old female who presents for evaluation of a known uterovaginal prolapse.        Pelvic Organ Prolapse Symptoms  Has had bulge/protrusion vaginally for years and in fact was using a pessary for about 3 years through [facility] but stopped using it last March after she was placed in rehab for an infected knee ( requiring removal of knee replacement hardware and placement of skin graft). She says when she used her pessary, it did help her prolapse. She no longer has the pessary (someone threw it out). She is bothered by this bulge and also worried. She is planning to have another knee replacement surgery in march.     Urinary Symptoms  Has some urinary dribbling although doesn't notice clear triggers for this. She also has occasional urgency leakage but mostly at night. She denies stress leakage with coughing, laughing etc. Empties her bladder about 3 times during the day and once at night. Denies urgency/frequency/dysuria/Kristine.    Voiding function:   Has started needing to push her bulge in to void. But feels empty once she voids.     Gastrointestinal Symptoms:  Denies chronic diarrhea, constipation. Denies bothersome fecal or flatal incontinence.       Sexual function/Pelvic floor pain/GYN:   Not sexually active for  reasons. Doesn't think she will be.       Relevant Medical History:    Diabetes? No  High Blood pressure? No     Recurrent UTIs? No  Sleep Apnea? No  Obesity? No  History of Blood clots? No  Other medical problems: Hypothyroidism    Surgical History:      Past Surgical History:   Procedure Laterality Date     ARTHROPLASTY KNEE Right 6/12/2018    Procedure: ARTHROPLASTY KNEE;  Right Total Knee Replacement ;  Surgeon: He Gomez MD;  Location: [facility]     CARPAL TUNNEL RELEASE RT/LT       excise  sarcoma Right     right leg     exicision of wound  03/27/2020     GRAFT FREE VASCULARIZED TRANSVERSE RECTUS ABDOMINIS MYOCUTANEOUS Left 4/20/2020    Procedure: With left anterolateral thigh free flap with SPY;  Surgeon: EHSAN De Luna MD;  Location: [facility]     IRRIGATION AND DEBRIDEMENT KNEE, PLACE ANTIBIOTIC CEMENT BEADS / SPACE Right 6/2/2020    Procedure: manufacture and implantation of Vanco/tobra ANTIBIOTIC SPACER, KNEE;  Surgeon: He Gomez MD;  Location: [facility] OR     KNEE SURGERY       PICC DOUBLE LUMEN PLACEMENT Right 04/23/2020    5FR DL PICC inserted at brachial medial, length- 37cm (1cm out). Tip at mid SVC     RECONSTRUCT KNEE Right 4/20/2020    Procedure: Right leg reconstruction and wound debridemenr;  Surgeon: EHSAN De Luna MD;  Location: [facility]     REMOVE HARDWARE ARTHROPLASTY KNEE Right 6/2/2020    Procedure: Explantation of Right total knee;  Surgeon: He Gomez MD;  Location: [facility] OR     TUBAL LIGATION         OB/Gyn History:  OB History   No obstetric history on file.       Medications/Vitamins/Supplements:     Current Outpatient Medications   Medication     levothyroxine (LEVOTHROID) 75 MCG tablet     No current facility-administered medications for this visit.          Medical History:      Past Medical History:   Diagnosis Date     Anemia      Anxiety      Aortic valve sclerosis      Arthritis 1990?     Complication of anesthesia     slow to wakeup     Hypothyroid      Infection of prosthetic joint, subsequent encounter      Mild tricuspid regurgitation      Primary osteoarthritis of right knee      Sarcoma (H) 2009     ROS  Social History    Social History     Socioeconomic History     Marital status:      Spouse name: Not on file     Number of children: Not on file     Years of education: Not on file     Highest education level: Not on file   Occupational History     Not on file   Social Needs     Financial resource strain: Not on file     Food insecurity      Worry: Not on file     Inability: Not on file     Transportation needs     Medical: Not on file     Non-medical: Not on file   Tobacco Use     Smoking status: Never Smoker     Smokeless tobacco: Never Used   Substance and Sexual Activity     Alcohol use: Not Currently     Comment: rarely - months      Drug use: Never     Sexual activity: Yes     Partners: Male     Birth control/protection: Post-menopausal   Lifestyle     Physical activity     Days per week: Not on file     Minutes per session: Not on file     Stress: Not on file   Relationships     Social connections     Talks on phone: Not on file     Gets together: Not on file     Attends [employer] service: Not on file     Active member of club or organization: Not on file     Attends meetings of clubs or organizations: Not on file     Relationship status: Not on file     Intimate partner violence     Fear of current or ex partner: Not on file     Emotionally abused: Not on file     Physically abused: Not on file     Forced sexual activity: Not on file   Other Topics Concern     Parent/sibling w/ CABG, MI or angioplasty before 65F 55M? Not Asked   Social History Narrative     Not on file       Family History  Family History   Problem Relation Age of Onset     Cancer Father      Prostate Cancer Father      Other Cancer Father      Cancer Brother      Other - See Comments Daughter         Slow to wake      Asthma Daughter        Allergy    Allergies   Allergen Reactions     Colchicine      Other reaction(s): Gastrointestinal  diarrhea     Niacin      skin rash     Ferrous Sulfate Rash       Current Outpatient Medications   Medication     acetaminophen (TYLENOL) 325 MG tablet     amoxicillin (AMOXIL) 500 MG tablet     amoxicillin-clavulanate (AUGMENTIN) 500-125 MG tablet     aspirin (ASA) 325 MG EC tablet     levothyroxine (LEVOTHROID) 75 MCG tablet     oxyCODONE (ROXICODONE) 5 MG tablet     OXYCODONE HCL PO     senna-docusate (SENOKOT-S/PERICOLACE) 8.6-50 MG tablet  "    sulfamethoxazole-trimethoprim (BACTRIM DS) 800-160 MG tablet     No current facility-administered medications for this visit.        Physical Exam: /84   Pulse 85   Ht 1.57 m (5' 1.81\")   Wt 58.7 kg (129 lb 6.4 oz)   BMI 23.81 kg/m    Gen:  is alert, comfortable in no acute distress,   Abdomen: Abdomen is soft, non-tender, non-distended,   Lungs: non-labored breathing.       Pelvic Exam:   Normal external female genitalia. The urethra was normal.    Vagina: Stage 3 uterovaginal prolapse, no abnormal discharge   Uterus: Normal size, no lesion on cervix  Ovaries: non palpable  Vulva: no lesions  Rectal: deferred    Pelvic floor strength: 3/5 kegels.    Pelvic floor muscles: No levator myalgia    POPQ EXAM FOR PROLAPSE SEVERITY  (Aa):   1 (Ba):   +4 (C ):    0   (GH):   4.5 (PB):  2 (TVL):  8   (Ap):   +2 (Bp):  +5 (D):   -1     ICS Stage (1-4):  3    Voiding trial:    VOID 200 ml  PVR 0 mL by Bladder ultrasound  Leak with Cough stress test : No, Prolapse reduced Yes with proctoswab    Labs:   Color Urine (no units)   Date Value   06/05/2018 Yellow     Appearance Urine (no units)   Date Value   06/05/2018 Clear     Glucose Urine (mg/dL)   Date Value   06/05/2018 Negative     Bilirubin Urine (no units)   Date Value   06/05/2018 Negative     Ketones Urine (mg/dL)   Date Value   06/05/2018 Negative     Specific Gravity Urine (no units)   Date Value   06/05/2018 1.020     pH Urine (pH)   Date Value   06/05/2018 5.0     Protein Albumin Urine (mg/dL)   Date Value   06/05/2018 Negative     Nitrite Urine (no units)   Date Value   06/05/2018 Negative     Leukocyte Esterase Urine (no units)   Date Value   06/05/2018 Large (A)     CBC RESULTS:   Recent Labs   Lab Test 07/17/20  0513   WBC 3.7*   RBC 3.69*   HGB 10.4*   HCT 34..2*   MCV 93   MCH 28.2   MCHC 30.4*   RDW 16.1*            A/P: Jigna Allen is a 86 year old F with stage 3 uterovaginal prolapse, symptomatic    Today we discussed the following " options      1.  Pessary: We can try another pessary fitting. Given your advanced prolapse, we may or may not be able to find a good fit. Please make a follow up appointment with me for pessary fitting and teaching in the next couple of weeks. We routinely use Vaginal estrogen cream in post menopausal women to help with vaginal dryness,irritation, irritative bladder symptoms and will restore a healthier vaginal tissue. This will also make it more comfortable to try Pessary.  If you have vaginal bleeding while using estrogen cream, please give us a call right away so we can evaluate you. I have prescribed vaginal estrogen cream for you. I would like you to follow up with me once every 1-2 years if you use pessary or sooner if you have bleeding or discomfort from it. I have prescribed the estrogen cream for you.     3. Surgery : In your case, given your age and the fact that you are not sexually active, if pessary does not work, we can consider a vaginal approach surgery called colpocleisis which narrows and shortens the vagina. This will be using your own tissue to repair and will not use mesh. It generally has good durability and patients tend to have good recovery.      Remember, with surgery, there is risk of bleeding, infection, injury to surrounding organs, vascular, bowel, cardiac or respiratory complications secondary to surgery or anaesthesia, persistent pain that may be chronic as well as unanticipated neurologic complications and even death. Even with surgery, there is a risk of recurrence of prolapse (or failure of surgery) that may necessitate repeat surgery.    After surgery, there will be restriction to lifting to 10 lbs or less for 2 months and vaginal rest with nothing in the vagina for 2 months.     If you decide to pursue surgery, please call and make either a phone or in person appointment with me so we can discuss further.      I spent a total of 45 minutes face-to-face with Jigna Allen during  today's office visit assessing patient's relevant history, evaluating patient, discussing treatment options and coordinating care    Nola Santoro MD, [facility]  , Department of OBGYN  Female Pelvic Medicine and Reconstructive Surgery ( Urogynecology)  CC  Patient Care Team:  Elisabeth Ko MD as PCP - General (Internal Medicine)  Bryon Ojeda MD as MD (Orthopedics)  Edouard Bryan MD as MD (Hematology)  Tai Adams MD as Hospitalist (Internal Medicine)  Fabian Babb RN as Nurse Coordinator (Oncology)  Emily Jefferson MD as MD (Internal Medicine)  EHSAN De Luna MD as MD (Plastic Surgery)  He Gomez MD as MD (Orthopedics)  He Gomez MD as Assigned Musculoskeletal Provider  EHSAN De Luna MD as Assigned Surgical Provider  Edouard Bryan MD as Assigned Cancer Care Provider  Emily Jefferson MD as Assigned Infectious Disease Provider

## 2021-02-01 NOTE — PATIENT INSTRUCTIONS
Dear Ms Tiffany,  It was very nice to meet you today.  As we discussed, you have a stage 3 out of 4 uterovaginal prolapse.     Today we discussed the following options      1.  Pessary: We can try another pessary fitting. Given your advanced prolapse, we may or may not be able to find a good fit. Please make a follow up appointment with me for pessary fitting and teaching in the next couple of weeks. We routinely use Vaginal estrogen cream in post menopausal women to help with vaginal dryness,irritation, irritative bladder symptoms and will restore a healthier vaginal tissue. This will also make it more comfortable to try Pessary.  If you have vaginal bleeding while using estrogen cream, please give us a call right away so we can evaluate you. I have prescribed vaginal estrogen cream for you. I would like you to follow up with me once every 1-2 years if you use pessary or sooner if you have bleeding or discomfort from it. I have prescribed the estrogen cream for you.     3. Surgery : In your case, given your age and the fact that you are not sexually active, if pessary does not work, we can consider a vaginal approach surgery called colpocleisis which narrows and shortens the vagina. This will be using your own tissue to repair and will not use mesh. It generally has good durability and patients tend to have good recovery.      Remember, with surgery, there is risk of bleeding, infection, injury to surrounding organs, vascular, bowel, cardiac or respiratory complications secondary to surgery or anaesthesia, persistent pain that may be chronic as well as unanticipated neurologic complications and even death. Even with surgery, there is a risk of recurrence of prolapse (or failure of surgery) that may necessitate repeat surgery.    After surgery, there will be restriction to lifting to 10 lbs or less for 2 months and vaginal rest with nothing in the vagina for 2 months.     If you decide to pursue surgery, please call  and make either a phone or in person appointment with me so we can discuss further.        Provider:   Nola Santoro MD, MCR     What is Pelvic Organ Prolapse?    Pelvic organ prolapse is a medical condition that occurs when the normal support of the vagina is lost, resulting in  sagging  or dropping of the bladder, urethra, cervix and rectum. As the prolapse of the vagina and uterus progresses, women can feel bulging tissue protruding through the opening of the vagina.     Causes and Risk Factors  By studying large numbers of women with and without prolapse, researchers and urogynecologists have identified certain risk factors that predispose, cause, promote or worsen pelvic organ prolapse. The strength of our bones, muscles and connective tissue are influenced by our genes and our race. Some women are born with weaker tissues and are therefore at risk to develop prolapse.  women are more likely than  women to develop pelvic organ prolapse. Loss of pelvic support can occur when any part of the pelvic floor is injured during vaginal delivery, surgery, pelvic radiation or back and pelvic fractures during falls or motor vehicle accidents. Hysterectomy and other procedures done to treat pelvic organ prolapse also are associated with future development of prolapse. Some other conditions that promote prolapse include: constipation and chronic straining, smoking, chronic coughing and heavy lifting. Obesity, like smoking, is one of the few modifiable risk factors. Women who are obese have a 40 to 75% increased risk of pelvic organ prolapse. Aging, menopause, debilitating nerve and muscle diseases contribute to the deterioration of pelvic floor strength and the development of prolapse.   Incidence  We do not know exactly how common pelvic organ prolapse is because research is limited to women who seek health care. It is estimated that nearly 50% of all women between the ages of 50 and 79 have some  form of prolapse. The lifetime risk that a woman will have surgery for the correction of prolapse or urinary incontinence in the [place] is about 11%. We also know that only one-third of these women will undergo repeat corrective surgery for these conditions. Approximately 300,000 procedures for correction of pelvic organ prolapse are performed each year in the [place]. We believe that is just the tip of the iceberg as many women manage their prolapse without surgery.     Symptoms  Some loss of support is a very common finding upon physical exam in women, many of whom do not have bothersome symptoms. Those women who are uncomfortable often describe the very first signs as subtle--such as an inability to keep a tampon inside the vagina, dampness in underwear or discomfort due to dryness during intercourse.   As the prolapse gets worse, some women complain of:     A bulging, pressure or heavy sensation in the vagina that worsens by the end of the day or during bowel movements     The feeling that they are  sitting on a ball      Needing to push stool out of the rectum by placing their fingers into the vagina during bowel movement     Difficulty starting to urinate, a weak or spraying stream of urine     Urinary frequency or the sensation that they are not emptying their bladder well     The need to lift up the bulging vagina or uterus to start urination     Urine leakage with intercourse     Types of Prolapse   Anterior Vaginal Prolapse (also known as cystocele)  This type of prolapse occurs when the wall between the vagina and the bladder stretches or detaches from its attachment on the pelvic bones. This loss of support allows the bladder to prolapse or fall down into the vagina.   Most women do not have symptoms when the anterior vaginal prolapse is mild. As it progresses outside the opening of the vagina, the prolapsed bladder may not empty well which can lead to urinary frequency, night time voiding,  loss of bladder control and recurrent bladder infections. Strengthening pelvic muscles may improve the support to the bladder and neighboring organs and reduce symptoms. In addition, women can get temporary support by wearing a device called a vaginal pessary. It works much like a knee or ankle brace would support a weak joint. When these efforts are inadequate surgery is available to elevate the bladder and other internal organs to their proper position.     Posterior Vaginal Prolapse (also known as rectocele)  Weakening and stretching of the back wall of the vagina allows the rectum to bulge into and out of the vagina. Most often, the damage to the back wall of the vagina occurs during vaginal childbirth, although not everyone who has delivered a child vaginally will develop a rectocele. Mild rectoceles rarely cause symptoms. However, straining with constipation puts significant pressure on the weak vaginal wall and can further thin it out. Avoiding constipation may prevent progression and also reduce symptoms from the rectocele. Some women may find benefit from pelvic floor muscle strengthening and vaginal pessaries. When these low risk interventions are insufficient to relieve symptoms, surgery is performed to reinforce the posterior vaginal wall. This picture shows what a rectocele looks like from the outside.     Uterine Prolapse  When the supporting ligaments and muscles of the pelvic floor that keep the uterus in the pelvis are damaged, the cervix and uterus descend into and eventually out of the vagina. Often, uterine prolapse is associated with loss of vaginal wall support (cystocele, rectocele). When the cervix protrudes outside the vagina, it can develop ulcers from rubbing on underwear or protective pads. There is a risk that these ulcers will bleed and become infected. This picture shows what uterine prolapse looks like from the outside. As with other forms of prolapse, it is not until the uterine  descent is bothersome that treatment is necessary. Women who have uterine prolapse often report pelvic pressure, the need to sit or lay down to relieve the discomfort, a sensation that their insides are falling out, difficulty emptying their bladder and urine leakage. Strengthening the pelvic muscles with Kegel exercises, avoiding heavy lifting, constipation, and weight gain may reduce the risk of progression of uterine descent. Additional treatment options include pessary devices which provide support when worn or surgery.     Vaginal Prolapse after Hysterectomy  If a woman has already had a hysterectomy, the very top of the vagina (where the uterus used to be) can become detached from its supporting ligaments. This can results in the tube of the vagina turning inside out. This condition is also known as vaginal  vault  prolapse. Depending upon how extensively the top of the vagina is turning inside out, one or several pelvic organs (such as the bladder, small and large bowel) will prolapse into the protruding bulge. Symptoms depend on which organs prolapse. When the bladder is involved, women report difficulty in starting to urinate, and emptying their bladder well. If it is the bowel then many report the need to push up the vaginal bulge and strain to have a bowel movement. Skin sores may develop if the bulge is rubbing on pads or underwear. A pessary may provide support for the bulge otherwise surgery is recommended. This is a picture of a vaginal vault prolapse.     Rectal Prolapse  The rectum is the name given to the last 6 inches of the colon. Like the vagina and uterus, the rectum is normally securely attached to the bony pelvis by ligaments and muscles. Infrequently, the supporting structures stretch or detach from the rectal wall which results in the rectum relapsing through the anus. This looks like red, often donut shaped soft tissue coming through the anus. Early on, it is most often noticed on the  toilet after a bowel movement, and can be confused with a large hemorrhoid. Conditions associated with straining such as chronic constipation or diarrhea, nerve and muscle weakness (paralysis or multiple sclerosis) and advancing age are risk factors for rectal prolapse. Women with rectal prolapse often report the following symptoms: pain during bowel movements, mucus or blood discharge from the protruding tissue, loss of control of bowel movements, and soft, red tissue protruding from the anus. It is very important to be clear in describing where the bulging tissue is coming from (opening of the anus or the vagina) when you seek help as both conditions may be present simultaneously. Treatment for a rectocele and rectal prolapse are different.     Source:  American [employer]ro[employer] Society  Original publication date: May, 2008;  Website:  http://www.voicesforpfd.org/p/ankush/natalia/fid=6

## 2021-02-02 ENCOUNTER — TELEPHONE (OUTPATIENT)
Dept: OBGYN | Facility: CLINIC | Age: 86
End: 2021-02-02

## 2021-02-02 ASSESSMENT — ANXIETY QUESTIONNAIRES: GAD7 TOTAL SCORE: 1

## 2021-02-02 NOTE — TELEPHONE ENCOUNTER
----- Message from Sarika Gonzalez RN sent at 2/2/2021  4:17 PM CST -----  Regarding: Question about recommended surgery - Dr. Santoro  Patient had appointment with Maude yesterday and surgery was recommended. Patient has knee surgery scheduled in the near future. Should the vaginal surgery be scheduled prior to knee surgery?

## 2021-02-02 NOTE — TELEPHONE ENCOUNTER
Jigna is scheduled for surgery in March for her knee.   She is concerned about doing vaginal surgery after her knee, because last knee replacement, she had an infection.  She is worried that having the  vaginal surgery 2nd would increase her risk for infection in the joint.     I did reinforce that visit notes say that she can try pessary before exploring surgical option.  She feels this is not likely to work.    She would like the two surgeons to discuss timing and priorites.  Routed to Dr Santoro

## 2021-02-08 NOTE — TELEPHONE ENCOUNTER
Called Jigna and discussed Dr Santoro' response.  She states that she thinks she will do the knee surgery first.  Plans to see Dr Santoro next Monday for  Pessary fitting and will discuss further at that time    Nola Santoro MD Faragher, Cheri, RN   Caller: Unspecified (6 days ago,  4:30 PM)               Gerber Dodd, dallin.  I think it is completely up to the patient and her orthopedic surgeon because the prolapse surgery is elective and we can temporize her symptoms with pessary as you already indicated. If she and her orthopedic surgeon are ok with postponing her knee surgery ( depending on how urgent this is), then we can proceed with her prolapse surgery. I am also really not sure how soon they can get her in for a knee surgery if she was to reschedule. Right now, it will take probably about 4-6 weeks to get her on my schedule for prolapse surgery. She will likely need 6-8 weeks of recovery after vaginal surgery before she can attempt knee surgery.  So this is a conversation she needs to have with her orthopedic surgeon. There will not be increased risk of knee infection because of prolapse but if she chooses not to use pessary, it could be uncomfortable during her knee surgery recovery for sure.     Nola

## 2021-02-11 ENCOUNTER — TELEPHONE (OUTPATIENT)
Dept: ORTHOPEDICS | Facility: CLINIC | Age: 86
End: 2021-02-11

## 2021-02-11 NOTE — TELEPHONE ENCOUNTER
Jigna is planning Reimplantation right total knee replacement with Dr Gomez on 3/9/21.  She was called by RN to review preop preparations.  Last November the surgery was postponed.  Pt asked that a preop pkt be mailed to her home, and it was along with total joint boot and additional TKA information including prophylactic antibiotics, GetWell Loop handout, and joint class handout.    We reviewed pt having preop with her PCP then following up with PAC virtual visit.  We reviewed need for COVID swab 2-4 days prior to surgery, preop showers, and NPO.  We also reviewed postponing routine dental work for 6 months following TKA. Pt reports having had the COVID vaccination and will have the second vaccine injection next week.  Pt states she feels ready for the surgery and will await the preop packet in the mail.  Gissel Escamilla RN

## 2021-02-12 ENCOUNTER — PREP FOR PROCEDURE (OUTPATIENT)
Dept: ORTHOPEDICS | Facility: CLINIC | Age: 86
End: 2021-02-12

## 2021-02-12 DIAGNOSIS — T84.50XD INFECTION OF PROSTHETIC JOINT, SUBSEQUENT ENCOUNTER: Primary | ICD-10-CM

## 2021-02-14 PROBLEM — T84.50XD INFECTION OF PROSTHETIC JOINT, SUBSEQUENT ENCOUNTER: Status: ACTIVE | Noted: 2020-06-01

## 2021-02-15 ENCOUNTER — OFFICE VISIT (OUTPATIENT)
Dept: UROLOGY | Facility: CLINIC | Age: 86
End: 2021-02-15
Attending: OBSTETRICS & GYNECOLOGY
Payer: COMMERCIAL

## 2021-02-15 ENCOUNTER — TELEPHONE (OUTPATIENT)
Dept: ORTHOPEDICS | Facility: CLINIC | Age: 86
End: 2021-02-15

## 2021-02-15 VITALS
WEIGHT: 130 LBS | BODY MASS INDEX: 23.92 KG/M2 | SYSTOLIC BLOOD PRESSURE: 123 MMHG | DIASTOLIC BLOOD PRESSURE: 82 MMHG | HEART RATE: 91 BPM

## 2021-02-15 DIAGNOSIS — N81.4 UTEROVAGINAL PROLAPSE: Primary | ICD-10-CM

## 2021-02-15 PROCEDURE — 57160 INSERT PESSARY/OTHER DEVICE: CPT | Performed by: OBSTETRICS & GYNECOLOGY

## 2021-02-15 PROCEDURE — G0463 HOSPITAL OUTPT CLINIC VISIT: HCPCS | Mod: 25

## 2021-02-15 PROCEDURE — 99213 OFFICE O/P EST LOW 20 MIN: CPT | Mod: 25 | Performed by: OBSTETRICS & GYNECOLOGY

## 2021-02-15 NOTE — PATIENT INSTRUCTIONS
Dear Jigna, great to see you today.     We have fitted you with size #4 ring pessary with support. Hope it will work for you. If you have any discomfort/pain/ongoing bleeding or difficulty with keeping it in, please give us a call so we can reevaluate. Please start using estrogen vaginal cream 2-3 times a week to keep the vagina health and minimize abrasion from the pessary.     Nola Santoro MD, MCR    Division of Female Pelvic Medicine and Reconstructive Surgery

## 2021-02-15 NOTE — LETTER
2/15/2021       RE: Jigna Allen  [address] [place] [address]     Dear Colleague,    Thank you for referring your patient, Jigna Allen, to the [facility] [place] at [facility]. Please see a copy of my visit note below.    Ms Allen is here today for a scheduled pessary fitting for her stage 3 uterovaginal prolapse. She had previously used a pessary successfully but has lost her pessary.  I first evaluated her on 2/1/21 ( see prior notes) and discussed management options including another trial of pessary, and if that didn't work, surgical options. She is willing to try pessary again and is here for fitting.     Pessary fitting:   Size #4 ring with support fitted and stayed in well. Did not dislodge with valsalva and voiding. Able to void well without difficulties.       Assessment and plan  Jigna was seen today for pessary check/fit/insert.    Diagnoses and all orders for this visit:    Uterovaginal prolapse  -     PESSARY, NON RUBBER,ANY TYPE  -     FIT/INSERT INTRAVAG SUPPORT DEVICE/PESSARY      Successful fitting today  Patient instructed on how to remove and clean pessary.   Start using your vaginal estrogen cream as prescribed to keep vagina healthy and minimize abrasion from pessary  Follow up with me annually for pelvic exam        I spent a total of 20 minutes face-to-face with Jigna Allen on the date of the encounter in chart review, patient visit, review of tests, documentation and/or discussion with other providers about the issues documented above.     Again, thank you for allowing me to participate in the care of your patient.      Sincerely,    Nola Santoro MD

## 2021-02-15 NOTE — PROGRESS NOTES
Ms Allen is here today for a scheduled pessary fitting for her stage 3 uterovaginal prolapse. She had previously used a pessary successfully but has lost her pessary.  I first evaluated her on 2/1/21 ( see prior notes) and discussed management options including another trial of pessary, and if that didn't work, surgical options. She is willing to try pessary again and is here for fitting.     Pessary fitting:   Size #4 ring with support fitted and stayed in well. Did not dislodge with valsalva and voiding. Able to void well without difficulties.       Assessment and plan  Jigna was seen today for pessary check/fit/insert.    Diagnoses and all orders for this visit:    Uterovaginal prolapse  -     PESSARY, NON RUBBER,ANY TYPE  -     FIT/INSERT INTRAVAG SUPPORT DEVICE/PESSARY      Successful fitting today  Patient instructed on how to remove and clean pessary.   Start using your vaginal estrogen cream as prescribed to keep vagina healthy and minimize abrasion from pessary  Follow up with me annually for pelvic exam        I spent a total of 20 minutes face-to-face with Jigna Allen on the date of the encounter in chart review, patient visit, review of tests, documentation and/or discussion with other providers about the issues documented above.

## 2021-02-15 NOTE — NURSING NOTE
Size four pessary ring with support for patient-  She tolerated removal and insertion great with no issues .     Was able to urinate with pessary and 40 ml was obtained,

## 2021-02-16 ENCOUNTER — TELEPHONE (OUTPATIENT)
Dept: ORTHOPEDICS | Facility: CLINIC | Age: 86
End: 2021-02-16

## 2021-02-16 NOTE — TELEPHONE ENCOUNTER
"This RN returned call to Jigna.    Jigna shared that she will have her preop this Thurs at her [facility]. This RN discussed that the COVID team will reach out to her about scheduling her COVID test within [facility], to be completed 2-4 days prior to her surgery on 3/9.    Jigna had not received her total joint surgical packet yet.  This RN shared that it would have been mailed  On Friday, and with no mail yesterday, that may be the delay. This RN asked Jigna to reach out to me should she not receive the packet by the end of the week.    Jigna expressed concerns about managing her care following surgery. Jigna stated her  had Parkinson's and cannot care for her. Jigna said with her previous surgeries, she went to a TCU and she will need to do that again due to lack of caregiver support.    Jigna shared that she does not drive, her  does \"with limitations\".   Jigna said last time her therapy was at the TCU and then in- home PT.    Jigna stated she has been to multiple TCU's. This RN encouraged her to call her insurance and assess what her coverage is and what is in network. Jigna said she would do that.  Jigna asked if she could potentially go to the TCU that is across the street from the hospital.    This RN shared that I will alert the NP and  that will be following her inpatient and will also alert our clinic . Jigna appreciated this.    This RN encouraged Jigna to reach out to me with further questions.   "

## 2021-02-17 ENCOUNTER — PATIENT OUTREACH (OUTPATIENT)
Dept: CARE COORDINATION | Facility: CLINIC | Age: 86
End: 2021-02-17

## 2021-02-17 NOTE — PROGRESS NOTES
Social Work Intervention  [facility]    Data/Intervention:    Patient Name:  Jigna Allen  /Age:  1935 (86 year old)    Visit Type: telephone  Referral Source: [facility]   Reason for Referral: Post discharge planning and TCU options     Collaborated With:    - Jigna Tiffany, Patient     Patient Concerns/Issues:   Patient was seen in Ortho clinic and expressed concerns about aftercare for her upcoming surgery and possible TCU placement. Spoke with patient to assess discharge needs and offer resources. Patient shared that her  has Parkinson and will be unable to assist patient if she returns home following surgery. Patient stated that she would like to go to a TCU, as she has done previously.     Patient utilizes a walker, and has steps going down to her basement. She stated that her laundry is in the basement, but her  can assist her with this. Patient stated that she has no family that can help out because her daughters live in [place].     Patient stated that her TCU choices in order are:   [facility]  [facility] on [address].  [facility]    Discussed transportation to and from hospital. Patient stated that her  is able to transport her and stated that he is safe to drive that distance.     Intervention/Education/Resources Provided:  Provided general insurance requirements to meet criteria for a TCU, and discussed private pay if skilled need was not determined after surgery. Discussed patient's TCU choices along with Medicare.Gov ratings. General discussion of roles between inpatient and outpatient teams. Discussed home health care options and resources provided. Discussed transportation to and from scheduled surgery.     Assessment/Plan:  Patient had some confusion regarding insurance criteria for TCU.   Will send patient private pay home health care resources. No  further follow up is planned.    Provided patient with contact information and availability.    JOURDAN Astorga, JOSE  [facility] [place]   Phone: [phone]

## 2021-03-02 ENCOUNTER — AMBULATORY - HEALTHEAST (OUTPATIENT)
Dept: LAB | Facility: CLINIC | Age: 86
End: 2021-03-02

## 2021-03-02 DIAGNOSIS — Z11.59 ENCOUNTER FOR SCREENING FOR OTHER VIRAL DISEASES: ICD-10-CM

## 2021-03-02 NOTE — PROGRESS NOTES
Brief: Ancef // TXA // Supine // Cocktail    Plan: Remove ABX Spacer & Replant TKA    Background:     DX:   1. Right calf high grade MFH.   2. 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 R knee DJD  4. Infection right total knee arthroplasty     Treatments:  1. neoadjuvant chemotherapy, surgical resection Feb 15, 2010. Complications post-op of skin loss with draining seroma, treated with I & D and systemic antibiotics. She had 4 cycles of doxil/ifos finishing about Feb 2010. There was <10% viable cells at surgery. She finished post op XRT about June 2010  2. 6/12/18, R TKA ([facility]) [facility]  3. 1/27/2020, skin popliteal fossa punch bx :  No sarcoma  4. 3/27/2020 Debridement right thigh wound and placement of VAC, Dr Hernandez, [facility]  5. 3/30/2020, R TKA aspiration, WBC 17k,, PMNs 94%, MMB: Peptoniphilus asaccharolyticus, Streptococcus constellatus, Clostridium ramosum    6. 4/20/2020, Coverage of right lower extremity popliteal wound with free left anterolateral thigh fasciocutaneous flap with 3 perforators, size 25 x 8 cm. Dr Agosto, [facility]. MMB: Gemella morbillorum, Parvimonas micra, Peptoniphilus asaccharolyticus, Prevotella species. ABx Ertapenem.   7. 6/2/2020, explantation of right total knee arthroplasty with placement of Vanco/gentamicin/ceftazidime PMMA antibiotic spacer knee right, Dr. Gomez, [facility]. MMB: NGTD      Patient Position (indicated by x):  X  Supine with torso rolled up on a bump   x  Regular OR table   X  Vera catheter   X  Blue U drape   Blue and White stockinet   Extremity drape   Coban   Ioban   x  Camvac suction tip   x  Offset tamp   x  High speed blaine [backup]       x    Lambotte osteotomes      TKA Requests (indicated by x):   x  Cyndy revision TKA (Cones) & Hinge backup      Biomet SSK revision TKA + stems      Specimens and cultures (indicated by x):   5x   Tissue cultures, aerobic and anaerobic without gram stain      Frozen section      pathology  specimens - fresh      pathology specimens - formalin        Pierre Lackey DO  Adult Joint Reconstruction Fellow  Dept Orthopaedic Surgery, [facility]  [phone] Pager [phone] Office

## 2021-03-05 ENCOUNTER — AMBULATORY - HEALTHEAST (OUTPATIENT)
Dept: LAB | Facility: CLINIC | Age: 86
End: 2021-03-05

## 2021-03-05 ENCOUNTER — TELEPHONE (OUTPATIENT)
Dept: ORTHOPEDICS | Facility: CLINIC | Age: 86
End: 2021-03-05

## 2021-03-05 DIAGNOSIS — Z11.59 ENCOUNTER FOR SCREENING FOR OTHER VIRAL DISEASES: ICD-10-CM

## 2021-03-05 LAB
SARS-COV-2 PCR COMMENT: NORMAL
SARS-COV-2 RNA SPEC QL NAA+PROBE: NEGATIVE
SARS-COV-2 VIRUS SPECIMEN SOURCE: NORMAL

## 2021-03-05 NOTE — TELEPHONE ENCOUNTER
This RN returned call to Jigna, apologized that unable yesterday due to busy clinic.    Jigna stated she had spoken with the hospital yesterday and they answered all her questions.    This RN again reiterated that this is an outpatient procedure, with an observation overnight stay based on her status.   This RN again discussed that her  needs to be up at the hospital the following morning, assisting with her care, learning his discharge needs and ready to take her home.    Jigna did say that her  is her , however with his Parkinson's, she does have some concerns.    Jigna stated she and her  will be prepared and ready to go home the next day, however Jigna feels strongly that she should be discharged to a TCU the next day due to her 's Parkinson's. Jigna did check with her insurance and they told her a TCU was covered.    Jigna will have her COVID test today.    Jigna states she is ready for the procedure, all questions answered.    ARSALAN Greenwood, RN  RN Care Coordinator, Dr. Jason [facility]

## 2021-03-06 ENCOUNTER — COMMUNICATION - HEALTHEAST (OUTPATIENT)
Dept: SCHEDULING | Facility: CLINIC | Age: 86
End: 2021-03-06

## 2021-03-08 ENCOUNTER — ANESTHESIA EVENT (OUTPATIENT)
Dept: SURGERY | Facility: CLINIC | Age: 86
DRG: 468 | End: 2021-03-08
Payer: COMMERCIAL

## 2021-03-09 ENCOUNTER — HOSPITAL ENCOUNTER (INPATIENT)
Facility: CLINIC | Age: 86
LOS: 3 days | Discharge: HOME HEALTH | DRG: 468 | End: 2021-03-12
Attending: ORTHOPAEDIC SURGERY | Admitting: ORTHOPAEDIC SURGERY
Payer: COMMERCIAL

## 2021-03-09 ENCOUNTER — APPOINTMENT (OUTPATIENT)
Dept: GENERAL RADIOLOGY | Facility: CLINIC | Age: 86
DRG: 468 | End: 2021-03-09
Attending: ORTHOPAEDIC SURGERY
Payer: COMMERCIAL

## 2021-03-09 ENCOUNTER — ANESTHESIA (OUTPATIENT)
Dept: SURGERY | Facility: CLINIC | Age: 86
DRG: 468 | End: 2021-03-09
Payer: COMMERCIAL

## 2021-03-09 DIAGNOSIS — N95.2 VAGINAL ATROPHY: ICD-10-CM

## 2021-03-09 DIAGNOSIS — Z98.890 STATUS POST KNEE SURGERY: ICD-10-CM

## 2021-03-09 DIAGNOSIS — T84.50XD INFECTION OF PROSTHETIC JOINT, SUBSEQUENT ENCOUNTER: Primary | ICD-10-CM

## 2021-03-09 LAB
ABO + RH BLD: NORMAL
ABO + RH BLD: NORMAL
BLD GP AB SCN SERPL QL: NORMAL
BLOOD BANK CMNT PATIENT-IMP: NORMAL
GLUCOSE BLDC GLUCOMTR-MCNC: 94 MG/DL (ref 70–99)
SPECIMEN EXP DATE BLD: NORMAL

## 2021-03-09 PROCEDURE — 258N000003 HC RX IP 258 OP 636: Performed by: ORTHOPAEDIC SURGERY

## 2021-03-09 PROCEDURE — 258N000003 HC RX IP 258 OP 636: Performed by: NURSE ANESTHETIST, CERTIFIED REGISTERED

## 2021-03-09 PROCEDURE — 87176 TISSUE HOMOGENIZATION CULTR: CPT | Performed by: ORTHOPAEDIC SURGERY

## 2021-03-09 PROCEDURE — 87075 CULTR BACTERIA EXCEPT BLOOD: CPT | Performed by: ORTHOPAEDIC SURGERY

## 2021-03-09 PROCEDURE — 250N000011 HC RX IP 250 OP 636: Performed by: ORTHOPAEDIC SURGERY

## 2021-03-09 PROCEDURE — 250N000011 HC RX IP 250 OP 636: Performed by: NURSE ANESTHETIST, CERTIFIED REGISTERED

## 2021-03-09 PROCEDURE — 99207 PR CONSULT E&M CHANGED TO SUBSEQUENT LEVEL: CPT | Performed by: INTERNAL MEDICINE

## 2021-03-09 PROCEDURE — 250N000011 HC RX IP 250 OP 636: Performed by: STUDENT IN AN ORGANIZED HEALTH CARE EDUCATION/TRAINING PROGRAM

## 2021-03-09 PROCEDURE — 999N000063 XR KNEE PORT RT 1/2 VW: Mod: RT

## 2021-03-09 PROCEDURE — 278N000051 HC OR IMPLANT GENERAL: Performed by: ORTHOPAEDIC SURGERY

## 2021-03-09 PROCEDURE — 250N000025 HC SEVOFLURANE, PER MIN: Performed by: ORTHOPAEDIC SURGERY

## 2021-03-09 PROCEDURE — 250N000013 HC RX MED GY IP 250 OP 250 PS 637: Performed by: STUDENT IN AN ORGANIZED HEALTH CARE EDUCATION/TRAINING PROGRAM

## 2021-03-09 PROCEDURE — 73560 X-RAY EXAM OF KNEE 1 OR 2: CPT | Mod: 26 | Performed by: RADIOLOGY

## 2021-03-09 PROCEDURE — 258N000001 HC RX 258: Performed by: ORTHOPAEDIC SURGERY

## 2021-03-09 PROCEDURE — C1776 JOINT DEVICE (IMPLANTABLE): HCPCS | Performed by: ORTHOPAEDIC SURGERY

## 2021-03-09 PROCEDURE — 250N000013 HC RX MED GY IP 250 OP 250 PS 637: Performed by: ORTHOPAEDIC SURGERY

## 2021-03-09 PROCEDURE — 86900 BLOOD TYPING SEROLOGIC ABO: CPT | Performed by: ORTHOPAEDIC SURGERY

## 2021-03-09 PROCEDURE — 87070 CULTURE OTHR SPECIMN AEROBIC: CPT | Performed by: ORTHOPAEDIC SURGERY

## 2021-03-09 PROCEDURE — 120N000002 HC R&B MED SURG/OB UMMC

## 2021-03-09 PROCEDURE — 999N000141 HC STATISTIC PRE-PROCEDURE NURSING ASSESSMENT: Performed by: ORTHOPAEDIC SURGERY

## 2021-03-09 PROCEDURE — 36415 COLL VENOUS BLD VENIPUNCTURE: CPT | Performed by: ORTHOPAEDIC SURGERY

## 2021-03-09 PROCEDURE — 999N001017 HC STATISTIC GLUCOSE BY METER IP

## 2021-03-09 PROCEDURE — 86850 RBC ANTIBODY SCREEN: CPT | Performed by: ORTHOPAEDIC SURGERY

## 2021-03-09 PROCEDURE — 250N000011 HC RX IP 250 OP 636: Performed by: ANESTHESIOLOGY

## 2021-03-09 PROCEDURE — 710N000010 HC RECOVERY PHASE 1, LEVEL 2, PER MIN: Performed by: ORTHOPAEDIC SURGERY

## 2021-03-09 PROCEDURE — 86901 BLOOD TYPING SEROLOGIC RH(D): CPT | Performed by: ORTHOPAEDIC SURGERY

## 2021-03-09 PROCEDURE — 272N000001 HC OR GENERAL SUPPLY STERILE: Performed by: ORTHOPAEDIC SURGERY

## 2021-03-09 PROCEDURE — 99232 SBSQ HOSP IP/OBS MODERATE 35: CPT | Performed by: INTERNAL MEDICINE

## 2021-03-09 PROCEDURE — 0SPC08Z REMOVAL OF SPACER FROM RIGHT KNEE JOINT, OPEN APPROACH: ICD-10-PCS | Performed by: ORTHOPAEDIC SURGERY

## 2021-03-09 PROCEDURE — 250N000009 HC RX 250: Performed by: NURSE ANESTHETIST, CERTIFIED REGISTERED

## 2021-03-09 PROCEDURE — 370N000017 HC ANESTHESIA TECHNICAL FEE, PER MIN: Performed by: ORTHOPAEDIC SURGERY

## 2021-03-09 PROCEDURE — 999N000065 XR KNEE PORT RT 1/2 VW: Mod: RT

## 2021-03-09 PROCEDURE — 0SRC0J9 REPLACEMENT OF RIGHT KNEE JOINT WITH SYNTHETIC SUBSTITUTE, CEMENTED, OPEN APPROACH: ICD-10-PCS | Performed by: ORTHOPAEDIC SURGERY

## 2021-03-09 PROCEDURE — 360N000078 HC SURGERY LEVEL 5, PER MIN: Performed by: ORTHOPAEDIC SURGERY

## 2021-03-09 DEVICE — MODULAR ROTATING HINGE KNEE-AXLE
Type: IMPLANTABLE DEVICE | Site: KNEE | Status: FUNCTIONAL
Brand: MRH

## 2021-03-09 DEVICE — TITANIUM FLUTED STEM
Type: IMPLANTABLE DEVICE | Site: KNEE | Status: FUNCTIONAL
Brand: DURACON

## 2021-03-09 DEVICE — BONE CEMENT SIMPLEX W/TOBRAMYCIN 6197-9-001: Type: IMPLANTABLE DEVICE | Site: KNEE | Status: FUNCTIONAL

## 2021-03-09 DEVICE — MRH KNEE TIBIAL INSERT
Type: IMPLANTABLE DEVICE | Site: KNEE | Status: FUNCTIONAL
Brand: MRH

## 2021-03-09 DEVICE — MRH KNEE FEMORAL COMPONENT
Type: IMPLANTABLE DEVICE | Site: KNEE | Status: FUNCTIONAL
Brand: MRH

## 2021-03-09 DEVICE — ASYMMETRIC PATELLA
Type: IMPLANTABLE DEVICE | Site: KNEE | Status: FUNCTIONAL
Brand: TRIATHLON

## 2021-03-09 DEVICE — MRH KNEE BUMPER INSERT
Type: IMPLANTABLE DEVICE | Site: KNEE | Status: FUNCTIONAL
Brand: MRH

## 2021-03-09 DEVICE — MRH KNEE FEMORAL BUSHING
Type: IMPLANTABLE DEVICE | Site: KNEE | Status: FUNCTIONAL
Brand: MRH

## 2021-03-09 DEVICE — FLUTED STEM
Type: IMPLANTABLE DEVICE | Site: KNEE | Status: FUNCTIONAL
Brand: TRIATHLON

## 2021-03-09 DEVICE — MRH TIBIAL ROTATING COMPONENT
Type: IMPLANTABLE DEVICE | Site: KNEE | Status: FUNCTIONAL
Brand: MRH

## 2021-03-09 DEVICE — MRH KNEE TIBIAL BASEPLATE
Type: IMPLANTABLE DEVICE | Site: KNEE | Status: FUNCTIONAL
Brand: MRH

## 2021-03-09 DEVICE — MRH KNEE TIBIAL SLEEVE
Type: IMPLANTABLE DEVICE | Site: KNEE | Status: FUNCTIONAL
Brand: MRH

## 2021-03-09 RX ORDER — FENTANYL CITRATE 50 UG/ML
INJECTION, SOLUTION INTRAMUSCULAR; INTRAVENOUS PRN
Status: DISCONTINUED | OUTPATIENT
Start: 2021-03-09 | End: 2021-03-09

## 2021-03-09 RX ORDER — ACETAMINOPHEN 325 MG/1
650 TABLET ORAL EVERY 4 HOURS PRN
Status: DISCONTINUED | OUTPATIENT
Start: 2021-03-12 | End: 2021-03-12 | Stop reason: HOSPADM

## 2021-03-09 RX ORDER — OXYCODONE HYDROCHLORIDE 5 MG/1
5 TABLET ORAL EVERY 4 HOURS PRN
Status: DISCONTINUED | OUTPATIENT
Start: 2021-03-09 | End: 2021-03-12 | Stop reason: HOSPADM

## 2021-03-09 RX ORDER — NALOXONE HYDROCHLORIDE 0.4 MG/ML
0.2 INJECTION, SOLUTION INTRAMUSCULAR; INTRAVENOUS; SUBCUTANEOUS
Status: ACTIVE | OUTPATIENT
Start: 2021-03-09 | End: 2021-03-10

## 2021-03-09 RX ORDER — ONDANSETRON 4 MG/1
4 TABLET, ORALLY DISINTEGRATING ORAL EVERY 6 HOURS PRN
Status: DISCONTINUED | OUTPATIENT
Start: 2021-03-09 | End: 2021-03-12 | Stop reason: HOSPADM

## 2021-03-09 RX ORDER — ACETAMINOPHEN 325 MG/1
975 TABLET ORAL EVERY 8 HOURS
Status: DISCONTINUED | OUTPATIENT
Start: 2021-03-09 | End: 2021-03-12 | Stop reason: HOSPADM

## 2021-03-09 RX ORDER — LIDOCAINE HYDROCHLORIDE 20 MG/ML
INJECTION, SOLUTION INFILTRATION; PERINEURAL PRN
Status: DISCONTINUED | OUTPATIENT
Start: 2021-03-09 | End: 2021-03-09

## 2021-03-09 RX ORDER — DEXAMETHASONE SODIUM PHOSPHATE 4 MG/ML
INJECTION, SOLUTION INTRA-ARTICULAR; INTRALESIONAL; INTRAMUSCULAR; INTRAVENOUS; SOFT TISSUE PRN
Status: DISCONTINUED | OUTPATIENT
Start: 2021-03-09 | End: 2021-03-09

## 2021-03-09 RX ORDER — ACETAMINOPHEN 325 MG/1
975 TABLET ORAL ONCE
Status: COMPLETED | OUTPATIENT
Start: 2021-03-09 | End: 2021-03-09

## 2021-03-09 RX ORDER — OXYCODONE HYDROCHLORIDE 10 MG/1
10 TABLET ORAL EVERY 4 HOURS PRN
Status: DISCONTINUED | OUTPATIENT
Start: 2021-03-09 | End: 2021-03-12 | Stop reason: HOSPADM

## 2021-03-09 RX ORDER — SODIUM CHLORIDE, SODIUM LACTATE, POTASSIUM CHLORIDE, CALCIUM CHLORIDE 600; 310; 30; 20 MG/100ML; MG/100ML; MG/100ML; MG/100ML
INJECTION, SOLUTION INTRAVENOUS CONTINUOUS
Status: DISCONTINUED | OUTPATIENT
Start: 2021-03-09 | End: 2021-03-12 | Stop reason: HOSPADM

## 2021-03-09 RX ORDER — SODIUM CHLORIDE, SODIUM LACTATE, POTASSIUM CHLORIDE, CALCIUM CHLORIDE 600; 310; 30; 20 MG/100ML; MG/100ML; MG/100ML; MG/100ML
INJECTION, SOLUTION INTRAVENOUS CONTINUOUS
Status: DISCONTINUED | OUTPATIENT
Start: 2021-03-09 | End: 2021-03-09 | Stop reason: HOSPADM

## 2021-03-09 RX ORDER — SODIUM CHLORIDE, SODIUM LACTATE, POTASSIUM CHLORIDE, CALCIUM CHLORIDE 600; 310; 30; 20 MG/100ML; MG/100ML; MG/100ML; MG/100ML
INJECTION, SOLUTION INTRAVENOUS CONTINUOUS PRN
Status: DISCONTINUED | OUTPATIENT
Start: 2021-03-09 | End: 2021-03-09

## 2021-03-09 RX ORDER — NALOXONE HYDROCHLORIDE 0.4 MG/ML
0.4 INJECTION, SOLUTION INTRAMUSCULAR; INTRAVENOUS; SUBCUTANEOUS
Status: ACTIVE | OUTPATIENT
Start: 2021-03-09 | End: 2021-03-10

## 2021-03-09 RX ORDER — HYDROMORPHONE HYDROCHLORIDE 1 MG/ML
.3-.5 INJECTION, SOLUTION INTRAMUSCULAR; INTRAVENOUS; SUBCUTANEOUS EVERY 5 MIN PRN
Status: DISCONTINUED | OUTPATIENT
Start: 2021-03-09 | End: 2021-03-09 | Stop reason: HOSPADM

## 2021-03-09 RX ORDER — HYDROMORPHONE HYDROCHLORIDE 1 MG/ML
0.4 INJECTION, SOLUTION INTRAMUSCULAR; INTRAVENOUS; SUBCUTANEOUS
Status: DISCONTINUED | OUTPATIENT
Start: 2021-03-09 | End: 2021-03-12 | Stop reason: HOSPADM

## 2021-03-09 RX ORDER — ONDANSETRON 2 MG/ML
4 INJECTION INTRAMUSCULAR; INTRAVENOUS EVERY 30 MIN PRN
Status: DISCONTINUED | OUTPATIENT
Start: 2021-03-09 | End: 2021-03-09 | Stop reason: HOSPADM

## 2021-03-09 RX ORDER — HYDROMORPHONE HYDROCHLORIDE 1 MG/ML
0.2 INJECTION, SOLUTION INTRAMUSCULAR; INTRAVENOUS; SUBCUTANEOUS
Status: DISCONTINUED | OUTPATIENT
Start: 2021-03-09 | End: 2021-03-12 | Stop reason: HOSPADM

## 2021-03-09 RX ORDER — ONDANSETRON 4 MG/1
4 TABLET, ORALLY DISINTEGRATING ORAL EVERY 30 MIN PRN
Status: DISCONTINUED | OUTPATIENT
Start: 2021-03-09 | End: 2021-03-09 | Stop reason: HOSPADM

## 2021-03-09 RX ORDER — LIDOCAINE 40 MG/G
CREAM TOPICAL
Status: DISCONTINUED | OUTPATIENT
Start: 2021-03-09 | End: 2021-03-12 | Stop reason: HOSPADM

## 2021-03-09 RX ORDER — POLYETHYLENE GLYCOL 3350 17 G/17G
17 POWDER, FOR SOLUTION ORAL DAILY
Status: DISCONTINUED | OUTPATIENT
Start: 2021-03-10 | End: 2021-03-12 | Stop reason: HOSPADM

## 2021-03-09 RX ORDER — PROPOFOL 10 MG/ML
INJECTION, EMULSION INTRAVENOUS PRN
Status: DISCONTINUED | OUTPATIENT
Start: 2021-03-09 | End: 2021-03-09

## 2021-03-09 RX ORDER — PROCHLORPERAZINE MALEATE 5 MG
5 TABLET ORAL EVERY 6 HOURS PRN
Status: DISCONTINUED | OUTPATIENT
Start: 2021-03-09 | End: 2021-03-12 | Stop reason: HOSPADM

## 2021-03-09 RX ORDER — DOCUSATE SODIUM 100 MG/1
100 CAPSULE, LIQUID FILLED ORAL 2 TIMES DAILY
Status: DISCONTINUED | OUTPATIENT
Start: 2021-03-09 | End: 2021-03-12 | Stop reason: HOSPADM

## 2021-03-09 RX ORDER — FENTANYL CITRATE 50 UG/ML
25-50 INJECTION, SOLUTION INTRAMUSCULAR; INTRAVENOUS
Status: DISCONTINUED | OUTPATIENT
Start: 2021-03-09 | End: 2021-03-09 | Stop reason: HOSPADM

## 2021-03-09 RX ORDER — ONDANSETRON 2 MG/ML
4 INJECTION INTRAMUSCULAR; INTRAVENOUS EVERY 6 HOURS PRN
Status: DISCONTINUED | OUTPATIENT
Start: 2021-03-09 | End: 2021-03-12 | Stop reason: HOSPADM

## 2021-03-09 RX ORDER — AMOXICILLIN 250 MG
1 CAPSULE ORAL 2 TIMES DAILY
Status: DISCONTINUED | OUTPATIENT
Start: 2021-03-09 | End: 2021-03-12 | Stop reason: HOSPADM

## 2021-03-09 RX ORDER — CALCIUM CARBONATE 500 MG/1
500 TABLET, CHEWABLE ORAL 4 TIMES DAILY PRN
Status: DISCONTINUED | OUTPATIENT
Start: 2021-03-09 | End: 2021-03-12 | Stop reason: HOSPADM

## 2021-03-09 RX ORDER — LABETALOL HYDROCHLORIDE 5 MG/ML
INJECTION, SOLUTION INTRAVENOUS PRN
Status: DISCONTINUED | OUTPATIENT
Start: 2021-03-09 | End: 2021-03-09

## 2021-03-09 RX ORDER — CEFAZOLIN SODIUM 1 G/3ML
1 INJECTION, POWDER, FOR SOLUTION INTRAMUSCULAR; INTRAVENOUS EVERY 8 HOURS
Status: DISCONTINUED | OUTPATIENT
Start: 2021-03-09 | End: 2021-03-12 | Stop reason: HOSPADM

## 2021-03-09 RX ORDER — ASPIRIN 81 MG/1
162 TABLET ORAL DAILY
Status: DISCONTINUED | OUTPATIENT
Start: 2021-03-10 | End: 2021-03-12 | Stop reason: HOSPADM

## 2021-03-09 RX ORDER — CELECOXIB 200 MG/1
400 CAPSULE ORAL ONCE
Status: COMPLETED | OUTPATIENT
Start: 2021-03-09 | End: 2021-03-09

## 2021-03-09 RX ORDER — BISACODYL 10 MG
10 SUPPOSITORY, RECTAL RECTAL DAILY PRN
Status: DISCONTINUED | OUTPATIENT
Start: 2021-03-09 | End: 2021-03-12 | Stop reason: HOSPADM

## 2021-03-09 RX ORDER — ONDANSETRON 2 MG/ML
INJECTION INTRAMUSCULAR; INTRAVENOUS PRN
Status: DISCONTINUED | OUTPATIENT
Start: 2021-03-09 | End: 2021-03-09

## 2021-03-09 RX ORDER — CEFAZOLIN SODIUM 2 G/100ML
2 INJECTION, SOLUTION INTRAVENOUS
Status: DISCONTINUED | OUTPATIENT
Start: 2021-03-09 | End: 2021-03-09 | Stop reason: HOSPADM

## 2021-03-09 RX ORDER — TRANEXAMIC ACID 650 MG/1
1950 TABLET ORAL ONCE
Status: COMPLETED | OUTPATIENT
Start: 2021-03-09 | End: 2021-03-09

## 2021-03-09 RX ORDER — CEFAZOLIN SODIUM 2 G/100ML
2 INJECTION, SOLUTION INTRAVENOUS SEE ADMIN INSTRUCTIONS
Status: DISCONTINUED | OUTPATIENT
Start: 2021-03-09 | End: 2021-03-09 | Stop reason: HOSPADM

## 2021-03-09 RX ADMIN — LABETALOL HYDROCHLORIDE 5 MG: 5 INJECTION INTRAVENOUS at 14:35

## 2021-03-09 RX ADMIN — FENTANYL CITRATE 50 MCG: 50 INJECTION, SOLUTION INTRAMUSCULAR; INTRAVENOUS at 13:32

## 2021-03-09 RX ADMIN — ONDANSETRON 4 MG: 2 INJECTION INTRAMUSCULAR; INTRAVENOUS at 16:00

## 2021-03-09 RX ADMIN — Medication 2 G: at 13:44

## 2021-03-09 RX ADMIN — SODIUM CHLORIDE, POTASSIUM CHLORIDE, SODIUM LACTATE AND CALCIUM CHLORIDE: 600; 310; 30; 20 INJECTION, SOLUTION INTRAVENOUS at 15:23

## 2021-03-09 RX ADMIN — ROCURONIUM BROMIDE 50 MG: 10 INJECTION INTRAVENOUS at 12:57

## 2021-03-09 RX ADMIN — ONDANSETRON 4 MG: 2 INJECTION INTRAMUSCULAR; INTRAVENOUS at 17:29

## 2021-03-09 RX ADMIN — PHENYLEPHRINE HYDROCHLORIDE 0.2 MCG/KG/MIN: 10 INJECTION INTRAVENOUS at 13:16

## 2021-03-09 RX ADMIN — FENTANYL CITRATE 50 MCG: 50 INJECTION, SOLUTION INTRAMUSCULAR; INTRAVENOUS at 13:05

## 2021-03-09 RX ADMIN — LABETALOL HYDROCHLORIDE 2.5 MG: 5 INJECTION INTRAVENOUS at 15:31

## 2021-03-09 RX ADMIN — SODIUM CHLORIDE, POTASSIUM CHLORIDE, SODIUM LACTATE AND CALCIUM CHLORIDE: 600; 310; 30; 20 INJECTION, SOLUTION INTRAVENOUS at 12:52

## 2021-03-09 RX ADMIN — LABETALOL HYDROCHLORIDE 5 MG: 5 INJECTION INTRAVENOUS at 13:48

## 2021-03-09 RX ADMIN — LABETALOL HYDROCHLORIDE 2.5 MG: 5 INJECTION INTRAVENOUS at 15:10

## 2021-03-09 RX ADMIN — DEXAMETHASONE SODIUM PHOSPHATE 4 MG: 4 INJECTION, SOLUTION INTRAMUSCULAR; INTRAVENOUS at 13:21

## 2021-03-09 RX ADMIN — TRANEXAMIC ACID 1950 MG: 650 TABLET ORAL at 11:27

## 2021-03-09 RX ADMIN — FENTANYL CITRATE 50 MCG: 50 INJECTION, SOLUTION INTRAMUSCULAR; INTRAVENOUS at 13:41

## 2021-03-09 RX ADMIN — HYDROMORPHONE HYDROCHLORIDE 0.3 MG: 1 INJECTION, SOLUTION INTRAMUSCULAR; INTRAVENOUS; SUBCUTANEOUS at 17:28

## 2021-03-09 RX ADMIN — SUGAMMADEX 120 MG: 100 INJECTION, SOLUTION INTRAVENOUS at 16:27

## 2021-03-09 RX ADMIN — PROPOFOL 20 MG: 10 INJECTION, EMULSION INTRAVENOUS at 13:37

## 2021-03-09 RX ADMIN — ACETAMINOPHEN 975 MG: 325 TABLET, FILM COATED ORAL at 19:51

## 2021-03-09 RX ADMIN — FENTANYL CITRATE 50 MCG: 50 INJECTION, SOLUTION INTRAMUSCULAR; INTRAVENOUS at 16:38

## 2021-03-09 RX ADMIN — Medication 1 G: at 15:44

## 2021-03-09 RX ADMIN — ONDANSETRON 4 MG: 2 INJECTION INTRAMUSCULAR; INTRAVENOUS at 20:32

## 2021-03-09 RX ADMIN — CELECOXIB 400 MG: 200 CAPSULE ORAL at 11:45

## 2021-03-09 RX ADMIN — PROPOFOL 20 MG: 10 INJECTION, EMULSION INTRAVENOUS at 16:10

## 2021-03-09 RX ADMIN — ACETAMINOPHEN 975 MG: 325 TABLET, FILM COATED ORAL at 11:44

## 2021-03-09 RX ADMIN — PHENYLEPHRINE HYDROCHLORIDE 50 MCG: 10 INJECTION INTRAVENOUS at 13:13

## 2021-03-09 RX ADMIN — LIDOCAINE HYDROCHLORIDE 50 MG: 20 INJECTION, SOLUTION INFILTRATION; PERINEURAL at 12:52

## 2021-03-09 RX ADMIN — OXYCODONE HYDROCHLORIDE 5 MG: 5 TABLET ORAL at 17:10

## 2021-03-09 RX ADMIN — PROPOFOL 120 MG: 10 INJECTION, EMULSION INTRAVENOUS at 12:57

## 2021-03-09 RX ADMIN — DOCUSATE SODIUM 50 MG AND SENNOSIDES 8.6 MG 1 TABLET: 8.6; 5 TABLET, FILM COATED ORAL at 19:52

## 2021-03-09 RX ADMIN — PROPOFOL 20 MG: 10 INJECTION, EMULSION INTRAVENOUS at 16:20

## 2021-03-09 RX ADMIN — CEFAZOLIN 1 G: 1 INJECTION, POWDER, FOR SOLUTION INTRAMUSCULAR; INTRAVENOUS at 22:54

## 2021-03-09 ASSESSMENT — MIFFLIN-ST. JEOR: SCORE: 990.25

## 2021-03-09 NOTE — BRIEF OP NOTE
Orthopaedic Surgery Brief Op-Note      Patient: Jigna Allen  : 1935  Date of Service: 3/9/2021 4:20 PM    Pre-operative Diagnosis: Infection of prosthetic joint, subsequent encounter [T84.50XD]  Post-operative Diagnosis: same    Procedure(s) Performed: Procedure(s):  Revision Right total hinge Knee arthroplasty    Staff: Dr. Gomez  Assistants: Jon Simmons MD    Anesthesia: General  EBL: 100 cc  UOP: see anesthesia record  Tourniquet Time: 134 minutes at 250 mmHg    Implants:   Implant Name Type Inv. Item Serial No.  Lot No. LRB No. Used Action   BONE CEMENT SIMPLEX W/TOBRAMYCIN 6197-9-001 Cement, Bone BONE CEMENT SIMPLEX W/TOBRAMYCIN 6197-9-001  [employer] [id number] Right 4 Implanted   Femoral 44mm A/P 67mm M/L Medium SureSpace    Depuy [id number] Right 1 Explanted   Tibial 45mm A/P 70mm M/L Medium Sure Space    Depuy [id number] Right 1 Explanted   BONE CEMENT SIMPLEX FULL DOSE 6191-1-001 Cement, Bone BONE CEMENT SIMPLEX FULL DOSE 6191-1-001  [employer] [id number] Right 3 Explanted   IMP ADAPTER FEM STRK TRIATHLN 4MM OFFSET 5570-S-040 Total Joint Component/Insert IMP ADAPTER FEM STRK TRIATHLN 4MM OFFSET 5570-S-040  [employer] [id number] Right 1 Explanted   IMP COMP FEM STRK TRIATHLN TS SZ 4 RT 5512-F-402 Total Joint Component/Insert IMP COMP FEM STRK TRIATHLN TS SZ 4 RT 5512-F-402  [employer] [id number] Right 1 Explanted   IMP COMP PATELLA HOWM TRI ASYM [id number] 5551-G-320 Total Joint Component/Insert IMP COMP PATELLA HOWM TRI ASYM [id number] 5551-G-320  [employer] [id number] Right 1 Explanted   IMP INSERT BASEPLATE TIBIAL HOWM TRI 4 5521-B-400 Total Joint Component/Insert IMP INSERT BASEPLATE TIBIAL HOWM TRI 4 5521-B-400  [employer] [id number] Right 1 Explanted   IMP INSERT TIBIAL ARTICULAR STRK TRI X3 SZ 4 11MM 5537-G-411 Total Joint Component/Insert IMP INSERT TIBIAL ARTICULAR STRK TRI X3 SZ 4 11MM 5537-G-411  [employer] [id number] Right 1 Explanted   IMP SCR  SYN CANC 4.0X35MM PART THRD .035 Metallic Hardware/[place] IMP SCR SYN CANC 4.0X35MM PART THRD .035  SYNTHES-STRATEC  Right 2 Explanted   IMP STEM FEM STRK PRESS FIT TRIATHLN [id number] 5565-S-017 Total Joint Component/Insert IMP STEM FEM STRK PRESS FIT TRIATHLN [id number] 5565-S-017  [employer] [id number] Right 1 Explanted   palacos cement    KRISTIN [id number] Right 4 Explanted   Cookie Triathlon Fluted Stem jose manuel 11mm lnth 150mm typ TS    [employer] [id number] Right 1 Explanted   Cookie Triathlon Offset Adapter    [employer] [id number] Right 1 Explanted   IMP BASEPLATE TIBIAL STRK MRH KEEL S2 6481-3-111 Total Joint Component/Insert IMP BASEPLATE TIBIAL STRK MRH KEEL S2 6481-3-111  [employer] [id number] Right 1 Implanted   IMP STEM FEM STRK FLUTED EXT [id number] TI 6478-6-710 Total Joint Component/Insert IMP STEM FEM STRK FLUTED EXT [id number] TI 6478-6-710  [employer] [id number] Right 1 Implanted   IMP COMP PATELLA HOWM TRI ASYM [id number] 5551-G-299 Total Joint Component/Insert IMP COMP PATELLA HOWM TRI ASYM [id number] 5551-G-299  [employer] [id number] Right 1 Implanted   IMP STEM STRK TRI PRESS-FIT [id number] 5565-S-014 Total Joint Component/Insert IMP STEM STRK TRI PRESS-FIT [id number] 5565-S-014  [employer] [id number] Right 1 Implanted   IMP COMP FEMORAL STRK MRH [id number] SM RT 6481-1-111 Total Joint Component/Insert IMP COMP FEMORAL STRK MRH [id number] SM RT 6481-1-111  [employer] [id number] Right 1 Implanted   IMP INSERT STRK KN BUMPER NEUTAL 6481-2-130 Total Joint Component/Insert IMP INSERT STRK KN BUMPER NEUTAL 6481-2-130  [employer] [id number] Right 1 Implanted   IMP INSERT TIBIAL SLEEVE STRK 6481-2-140 Total Joint Component/Insert IMP INSERT TIBIAL SLEEVE STRK 6481-2-140  [employer] [id number] Right 1 Implanted   IMP INSERT STRK KN [facility] AXLE STD 6481-2-120 Total Joint Component/Insert IMP INSERT STRK KN [facility] AXLE [facility] 6481-2-120  [employer] [id number] Right 1  Implanted   IMP INSERT STRK KN MRH BUSHING STD 6481-2-110 Total Joint Component/Insert IMP INSERT STRK KN MRH BUSHING STD 6481-2-110  [employer] [id number] Right 1 Implanted   IMP COMP TIBIAL STRK MRH 6481-2-100 Total Joint Component/Insert IMP COMP TIBIAL STRK MRH 6481-2-100  [employer] [id number] Right 1 Implanted   IMP INSERT TIBIAL STRK MRH S1/S2 13MM 6481-3-213 Total Joint Component/Insert IMP INSERT TIBIAL STRK MRH S1/S2 13MM 6481-3-213  [employer] [id number] Right 1 Implanted   IMP INSERT STRK KN MR BUSHING STD 6481-2-110 Total Joint Component/Insert IMP INSERT STRK KN MR BUSHING STD 6481-2-110  [employer] [id number] Right 1 Implanted     Drains: JUAN R drain  Intra-op Labs/Cxs/Specimens:   ID Type Source Tests Collected by Time Destination   1 : RIGHT KNEE ONE Tissue Knee, Right ANAEROBIC BACTERIAL CULTURE, TISSUE CULTURE AEROBIC BACTERIAL He Gomez MD 3/9/2021  1:34 PM    2 : RIGHT KNEE TWO  Tissue Knee, Right ANAEROBIC BACTERIAL CULTURE, TISSUE CULTURE AEROBIC BACTERIAL He Gomez MD 3/9/2021  1:35 PM    3 : RIGHT KNEE THREE Tissue Knee, Right ANAEROBIC BACTERIAL CULTURE, TISSUE CULTURE AEROBIC BACTERIAL He Gomez MD 3/9/2021  1:35 PM    4 : RIGHT KNEE FOUR Tissue Knee, Right ANAEROBIC BACTERIAL CULTURE, TISSUE CULTURE AEROBIC BACTERIAL He Gomez MD 3/9/2021  1:35 PM    5 : RIGHT KNEE FIVE Tissue Knee, Right ANAEROBIC BACTERIAL CULTURE, TISSUE CULTURE AEROBIC BACTERIAL He Gomez MD 3/9/2021  1:35 PM      Complications: No apparent complications during procedure  Findings: Please see dictated operative note for details    Disposition: Stable to PACU, then admit to Orthopaedics.      Assessment: Jigna Allen is a 86 year old female s/p 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 Gomez.    Plan:  Activity: Up with assist and assistive devices as needed until independent. Knee ROM as tolerated.  Weight bearing status: WBAT     Antibiotics: Cefazolin x 3 days postop while awaiting culture results  Diet: Regular    DVT prophylaxis: Aspirin 162mg daily x 4 weeks, starting morning of POD 1  Elevation: Elevate heels off of bed on pillows, no pillows behind the knee at any time    Wound Care: keep dressing CDI x 7 days postop  Drains: Document output per shift, removal per ortho  Pain management: Orals PRN, IV for breakthrough only  X-rays: AP/Lat knee XR in PACU  Physical Therapy: Mobilization, ROM, ADL's  Occupational Therapy: ADL's  Labs: Trend Hgb on PODs #1 & 2  Cultures: None  Consults: PT, OT, Hospitalist, SW - appreciate assistance in caring for this patient throughout the perioperative period  Disposition: Pending progress with therapies, pain control on orals, and medical stability, anticipate discharge to home vs TCU pending therapy and culture results    Future Appointments   Date Time Provider Department Center   3/10/2021  8:45 AM Pat Hopkins, PT [facility] [place]   3/10/2021 10:00 AM Patience Mendez OT [facility] [place]   3/10/2021  1:00 PM Pat Hopkins, PT [facility] [place]   8/20/2021 10:00 AM  LAB [facility]   8[facility]20[facility]2021 11[facility]00 [email or web address]   8/24/2021  5:00 PM Edouard Bryan MD [facility]         Jon Simmons MD  PGY-4, Orthopaedic Surgery  Pager: [phone]

## 2021-03-09 NOTE — OP NOTE
OPERATION REPORT     DATE OF OPERATION: 3/9/2021     Patient: Jigna Allen  MRN: [id number]  : 1935    SURGEON: He Gomez MD.     ASSISTANT: Pierre Lackey DO [Adult Reconstruction Fellow]    Iva Payton MD [Orthopedic surgery resident]    ANESTHESIOLOGIST: Anesthesiologist: Leslie Soto MD  CRNA: Kay Grijalva APRN CRNA; Radha Lizama APRN CRNA; Lucy Escalera APRN CRNA    ANESTHESIA: General    PREOPERATIVE DIAGNOSIS: Right knee, status post antibiotic spacer for prosthetic joint infection    POSTOPERATIVE DIAGNOSIS: Same as preoperative diagnosis    OPERATION(S) PERFORMED:   1.  Explantation of antibiotic spacer R knee  2.  2nd stage, revision reimplantation R rotating hinge knee arthroplasty ([address])    ESTIMATED BLOOD LOSS: 250 mL.     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  Patient is status post an IV antibiotic regimen.  As well as an antibiotic spacer placement.  Our laboratory analysis has concluded that the infection is cleared in the right knee.  Following a discussion of the risks and benefits the patient has elected to move forward with a revision right total knee.    OPERATIVE FINDINGS: No evidence or findings suggestive of recurrent infection.  There was laxity involving the MCL.    OPERATIVE PROCEDURE:   The patient was seen in the pre-operative area; informed consent was obtained.  The surgical site was appropriately marked by the patient and the operating surgeon.  The patient was brought to the operating room and transferred to the OR bed. The patient was positioned with appropriate padding and a safety strap. The patient's operative extremity was prepped and draped in the standard sterile fashion.  At this time a surgical safety timeout was performed involving the operating room nurse, anesthesia team, scrub tech, and operating surgeon. The extremity was then exsanguinated and the  tourniquet inflated.  The prior incision was utilized.  A medial parapatellar approach was performed.  The femoral antibiotic spacer could be disimpacted using an offset broach from the superior direction quite easily.  No bone was taken off with the antibiotic spacer.  The knee was brought to extension.  The offset broach again was used to disimpact the tibial spacer.  No bone was taken off with the tibial spacer.  Curved curettes were utilized to clear the bony ends of the femur and tibia.  These curved curettes were also used intramedullary to remove soft tissue.      The 2nd stage revision total knee arthroplasty was undertaken. Reaming was begun on the tibia and carried up to the 14 mm diameter.  It was noted that this provided adequate fixation distally.  We then sized the tibia to make sure an appropriate baseplate would be selected.  No offset was required.  Attention was then directed at the femur.  Again reaming was conducted up to a 15 mm diameter reamer.  This provided adequate fixation within the canal.  The only surface that needed to be resected was for the box.  The box cutting guide was placed and this resection was made.  A trial femur and trial tibia were placed.  It was noted that there was excess laxity on the medial side with valgus stress testing.  It was then decided to move forward with a hinged construct.  The chamfer was utilized on the distal femur.  Again a trial tibia was inserted as well as a trial femur and insert.  13 mm insert demonstrated appropriate soft tissue tension.  Intraoperative fluoroscopy demonstrated appropriate position of all hardware.  All trials were then removed.  A high-speed bur was utilized on the surface of the distal femur and proximal tibia to allow adequate cement interdigitation.  The patella was freshened with a cut removing all fibrous material.  It was sized and drilled.  All bony surfaces were irrigated.  Long tip Pulsavac  was utilized to  irrigate the canals of the femur and tibia.  The cement was mixed.  The final components were assembled on the back table.  The tibia and stem were placed and the most proximal 3 to 4 cm was cemented into place.  This was malleted to ensure adequate fixation.  Then all excess cement was removed.  Attention directed at femur.  Final femoral component and stem were placed, the most distal 3 to 4 cm were cemented into place with tobramycin impregnated simplex P cement.  4 packs were used.  The tibial component and femoral component were cemented separately.  All excess cement was removed.  The patella was everted, irrigated, dried.  The patellar button was then placed and secured with a clamp.  The knee was brought to extension and all cement was allowed to harden.  Again a 13 mm spacer was trialed and determined to provide adequate soft tissue tension.  There was little to no pistoning of the tibial component with axial traction perhaps 1 to 2 mm.  There was adequate stability with varus and valgus stress testing at 0 degrees flexion and 90 degrees flexion.  The patella was noted to track appropriately.  The trial liner was removed and the final liner was placed.  The hinge component was assembled with the bushings and central metallic pin as well as the plastic locking clip anteriorly.  The knee was thoroughly irrigated with 3 L Pulsavac irrigation.  All excess cement was removed with quarter inch osteotome and Felipa clamp.  Wound closure was done in layers beginning with Ethibond #1 sutures in figure of eight.  Closure continued with 2-0 vicryl buried interrupted sutures for the subcutaneous tissue and a running subcuticular 3-0 PDS. Sterile dressing were applied. The patient was transferred to the hospital bed. The patient had a palpable dorsalis pedis pulse prior to leaving the operating room.    POSTOPERATIVE PLAN:      Plan:  Activity: Up with assist and assistive devices as needed until independent. Knee ROM as  tolerated.  Weight bearing status: WBAT    Antibiotics: Cefazolin x 3 days postop while awaiting culture results  Diet: Regular    DVT prophylaxis: Aspirin 162mg daily x 4 weeks, starting morning of POD 1  Elevation: Elevate heels off of bed on pillows, no pillows behind the knee at any time    Wound Care: keep dressing CDI x 7 days postop  Drains: Document output per shift, removal per ortho  Pain management: Orals PRN, IV for breakthrough only  X-rays: AP/Lat knee XR in PACU  Physical Therapy: Mobilization, ROM, ADL's  Occupational Therapy: ADL's  Labs: Trend Hgb on PODs #1 & 2  Cultures: None  Consults: PT, OT, Hospitalist, SW - appreciate assistance in caring for this patient throughout the perioperative period  Disposition: Pending progress with therapies, pain control on orals, and medical stability, anticipate discharge to home vs TCU pending therapy and culture results       FINAL IMPLANTS: Cookie Hinge    MRH Small Femur: 15x155 Stem  MRH Small 2 Tibia: 14x100 Stem  13mm MRH Insert    Implant Name Type Inv. Item Serial No.  Lot No. LRB No. Used Action   BONE CEMENT SIMPLEX W/TOBRAMYCIN 6197-9-001 Cement, Bone BONE CEMENT SIMPLEX W/TOBRAMYCIN 6197-9-001  [employer] [id number] Right 4 Implanted   Femoral 44mm A/P 67mm M/L Medium SureSpace    Depuy [id number] Right 1 Explanted   Tibial 45mm A/P 70mm M/L Medium Sure Space    Depuy [id number] Right 1 Explanted   BONE CEMENT SIMPLEX FULL DOSE 6191-1-001 Cement, Bone BONE CEMENT SIMPLEX FULL DOSE 6191-1-001  [employer] [id number] Right 3 Explanted   IMP ADAPTER FEM STRK TRIATHLN 4MM OFFSET 5570-S-040 Total Joint Component/Insert IMP ADAPTER FEM STRK TRIATHLN 4MM OFFSET 5570-S-040  [employer] [id number] Right 1 Explanted   IMP COMP FEM STRK TRIATHLN TS SZ 4 RT 5512-F-402 Total Joint Component/Insert IMP COMP FEM STRK TRIATHLN TS SZ 4 RT 5512-F-402  [employer] [id number] Right 1 Explanted   IMP COMP PATELLA HOWM TRI ASYM [id number]  5551-G-320 Total Joint Component/Insert IMP COMP PATELLA HOWM TRI ASYM [id number] 5551-G-320  [employer] [id number] Right 1 Explanted   IMP INSERT BASEPLATE TIBIAL HOWM TRI 4 5521-B-400 Total Joint Component/Insert IMP INSERT BASEPLATE TIBIAL HOWM TRI 4 5521-B-400  [id number] Right 1 Explanted   IMP INSERT TIBIAL ARTICULAR STRK TRI X3 SZ 4 11MM 5537-G-411 Total Joint Component/Insert IMP INSERT TIBIAL ARTICULAR STRK TRI X3 SZ 4 11MM 5537-G-411  [employer] [id number] Right 1 Explanted   IMP SCR SYN CANC 4.0X35MM PART THRD .035 Metallic Hardware/Sutter IMP SCR SYN CANC 4.0X35MM PART THRD .035  SYNTHES-STRATEC  Right 2 Explanted   IMP STEM FEM STRK PRESS FIT TRIATHLN [id number] 5565-S-017 Total Joint Component/Insert IMP STEM FEM STRK PRESS FIT TRIATHLN [id number] 5565-S-017  [employer] [id number] Right 1 Explanted   palacos cement    KRISTIN [id number] Right 4 Explanted   [place] Triathlon Fluted Stem jose manuel 11mm lnth 150mm typ TS    [employer] [id number] Right 1 Explanted   [place] Triathlon Offset Adapter    [employer] [id number] Right 1 Explanted   IMP BASEPLATE TIBIAL STRK MR KEEL S2 6481-3-111 Total Joint Component/Insert IMP BASEPLATE TIBIAL STRK MR KE S2 6481-3-111  [employer] [id number] Right 1 Implanted   IMP STEM FEM STRK FLUTED EXT [id number] TI 8908-6-710 Total Joint Component/Insert IMP STEM FEM STRK FLUTED EXT [id number] TI 6478-6-710  [id number] [id number] Right 1 Implanted   IMP COMP PATELLA HOWM TRI ASYM [id number] 5551-G-299 Total Joint Component/Insert IMP COMP PATELLA HOWM TRI ASYM 54A39QD 5551-G-299  [employer] [id number] Right 1 Implanted   IMP STEM STRK TRI PRESS-FIT [id number] 5565-S-014 Total Joint Component/Insert IMP STEM STRK TRI PRESS-FIT [id number] 5565-S-014  [employer] [id number] Right 1 Implanted   IMP COMP FEMORAL STRK MRH [id number]  RT 6481-1-111 Total Joint Component/Insert IMP COMP FEMORAL STRK MRH [id number]  RT 6481-1-111   [employer] [id number] Right 1 Implanted   IMP INSERT STRK KN BUMPER NEUTAL 6481-2-130 Total Joint Component/Insert IMP INSERT STRK KN BUMPER NEUTAL 6481-2-130  [employer] [id number] Right 1 Implanted   IMP INSERT TIBIAL SLEEVE STRK 6481-2-140 Total Joint Component/Insert IMP INSERT TIBIAL SLEEVE STRK 6481-2-140  [employer] [id number] Right 1 Implanted   IMP INSERT STRK KN MRH AXLE STD 6481-2-120 Total Joint Component/Insert IMP INSERT STRK KN MRH AXLE STD 6481-2-120  [employer] [id number] Right 1 Implanted   IMP INSERT STRK KN MRH BUSHING STD 6481-2-110 Total Joint Component/Insert IMP INSERT STRK KN MRH BUSHING STD 6481-2-110  [employer] [id number] Right 1 Implanted   IMP COMP TIBIAL STRK MRH 6481-2-100 Total Joint Component/Insert IMP COMP TIBIAL STRK MRH 6481-2-100  [employer] [id number] Right 1 Implanted   IMP INSERT TIBIAL STRK MRH S1/S2 13MM 6481-3-213 Total Joint Component/Insert IMP INSERT TIBIAL STRK MRH S1/S2 13MM 6481-3-213  [employer] [id number] Right 1 Implanted   IMP INSERT STRK KN MRH BUSHING STD 6481-2-110 Total Joint Component/Insert IMP INSERT STRK KN MRH BUSHING STD 6481-2-110  [employer] [id number] Right 1 Implanted       ATTESTATION: Dr. Gomez was present at all critical portions of this case and immediately available at all times during the duration of the case.     Pierre Lackey DO  Adult Joint Reconstruction Fellow  Dept Orthopaedic Surgery, [facility]  [phone] Pager [phone] Office    Attending MD (Dr. He Gomez) Attestation:  I was present during the key portions of the procedure and I was immediately available for the entire procedure between opening and closing.    MD Pelon Cid Family Professor  Oncology and Adult Reconstructive Surgery  Dept Orthopaedic Surgery, [facility]

## 2021-03-09 NOTE — ANESTHESIA PROCEDURE NOTES
Airway   Date/Time: 3/9/2021 1:00 PM   Patient location during procedure: OR  Staff -   CRNA: Lucy Escalera APRN CRNA  Performed By: CRNA    Consent for Airway   Urgency: elective    Indications and Patient Condition  Indications for airway management: dariusz-procedural  Induction type:intravenousMask difficulty assessment: 1 - vent by mask    Final Airway Details  Final airway type: endotracheal airway  Successful airway:ETT - single and Oral  Endotracheal Airway Details   ETT size (mm): 7.0  Cuffed: yes  Successful intubation technique: direct laryngoscopy  Grade View of Cords: 1  Adjucts: stylet  Measured from: gums/teeth  Secured at (cm): 23  Secured with: silk tape    Post intubation assessment   Placement verified by: capnometry, equal breath sounds and chest rise   Number of attempts at approach: 1  Secured with:silk tape  Ease of procedure: easy  Dentition: Intact and Unchanged

## 2021-03-09 NOTE — OR NURSING
PACU to Inpatient Nursing Handoff    Patient Jigna Allen is a 86 year old female who speaks English.   Procedure Procedure(s):  Revision Right total hinge Knee arthroplasty   Surgeon(s) Primary: He Gomez MD  Fellow - Assisting: Pierre Lackey MD     Allergies   Allergen Reactions     Colchicine      Other reaction(s): Gastrointestinal  diarrhea     Niacin      skin rash     Ferrous Sulfate Rash       Isolation  [unfilled]     Past Medical History   has a past medical history of Anemia, Anxiety, Aortic valve sclerosis, Arthritis (1990?), Complication of anesthesia, Hypothyroid, Infection of prosthetic joint, subsequent encounter, Mild tricuspid regurgitation, Mumps (?1945), Primary osteoarthritis of right knee, and Sarcoma (H) (2009).    Anesthesia General   Dermatome Level     Preop Meds acetaminophen (Tylenol) - time given: 1145  celecoxib (Celebrex) - time given: 1145  TXA - time given: 1145   Nerve block Not applicable   Intraop Meds dexamethasone (Decadron)  fentanyl (Sublimaze): 200 mcg total  ondansetron (Zofran): last given at 1600  15mg Labetalol    Local Meds Local Cocktail with no morphine   Antibiotics cefazolin (Ancef) - last given at 1544     Pain Patient Currently in Pain: yes   PACU meds  hydromorphone (Dilaudid): 0.3 mg (total dose) last given at 1729   ondansetron (Zofran): 4 mg (total dose) last given at 1728   oxycodone (Roxicodone): 5 mg (total dose) last given at 1710    PCA / epidural No   Capnography Respiratory Monitoring (EtCO2): 46 mmHg  Integrated Pulmonary Index (IPI): 10   Telemetry ECG Rhythm: Sinus rhythm   Inpatient Telemetry Monitor Ordered? No        Labs Glucose Lab Results   Component Value Date    GLC 75 07/17/2020       Hgb Lab Results   Component Value Date    HGB 10.4 07/17/2020       INR Lab Results   Component Value Date    INR 1.27 06/15/2018      PACU Imaging Completed     Wound/Incision Incision/Surgical Site 06/12/18 Right Knee (Active)   Number of days:  1001       Incision/Surgical Site 04/20/20 Left Leg (Active)   Number of days: 323       Incision/Surgical Site 04/20/20 Right Leg (Active)   Number of days: 323       Incision/Surgical Site 06/02/20 Right Knee (Active)   Number of days: 280       Incision/Surgical Site 03/09/21 Anterior;Right Knee (Active)   Incision Assessment [facility] 03/09/21 1638   Closure Sutures;Adhesive strip(s);Liquid bandage 03/09/21 1553   Dressing Intervention Clean, dry, intact 03/09/21 1638   Number of days: 0      CMS        Equipment ice pack   Other LDA Cast 04/20/20 1500 leg cast, long RLE (Active)   Number of days: 323        IV Access Peripheral IV 03/09/21 Left Lower forearm (Active)   Site Assessment [facility] 03/09/21 1638   Line Status Infusing 03/09/21 1638   Phlebitis Scale 0-->no symptoms 03/09/21 1638   Infiltration Scale 0 03/09/21 1638   Number of days: 0       Peripheral IV 03/09/21 Right Lower forearm (Active)   Site Assessment [facility] 03/09/21 1638   Line Status Saline locked 03/09/21 1638   Phlebitis Scale 0-->no symptoms 03/09/21 1638   Infiltration Scale 0 03/09/21 1638   Number of days: 0       PICC Double Lumen 04/23/20 Right Brachial vein medial (Active)   Number of days: 320      Blood Products Not applicable  mL   Intake/Output Date 03/09/21 0700 - 03/10/21 0659   Shift 7073-0891 6184-9391 4613-9070 24 Hour Total   INTAKE   I.V.  1300  1300   Shift Total(mL/kg)  1300(22.15)  1300(22.15)   OUTPUT   Blood  100  100   Shift Total(mL/kg)  100(1.7)  100(1.7)   Weight (kg) 58.7 58.7 58.7 58.7      Drains / Vera Closed/Suction Drain 1 Left Thigh Bulb 15 [place] (Active)   Number of days: 323       Closed/Suction Drain 2 Right;Superior Thigh Bulb 15 [place] (Active)   Number of days: 323       Closed/Suction Drain 3 Right;Inferior Thigh Bulb 15 [place] (Active)   Number of days: 323       Closed/Suction Drain Right Knee Bulb 15 [place] (Active)   Number of days: 280       Closed/Suction Drain 1 Right Knee Bulb 15 [place]  (Active)   Site Description UTV 03/09/21 1638   Dressing Status Normal: Clean, Dry & Intact 03/09/21 1638   Drainage Appearance Bloody/Bright Red 03/09/21 1638   Status To bulb suction 03/09/21 1638   Number of days: 0       Cast 04/20/20 1500 leg cast, long RLE (Active)   Number of days: 323      Time of void PreOp Void Prior to Procedure: 1130 (03/09/21 1136)    PostOp      Diapered? No   Bladder Scan Bladder Scan Volume (mL): 116 ml(scanned twice) (03/09/21 1744)   PO   ginger ale  nausea, tolerating sips and ice chips     Vitals    B/P: (!) 150/66  T: 98  F (36.7  C)    Temp src: Oral  P:  Pulse: 64 (03/09/21 1745)          R: 17  O2:  SpO2: 97 %    O2 Device: None (Room air) (03/09/21 1745)    Oxygen Delivery: 1 LPM (03/09/21 1715)         Family/support present none present   Patient belongings     Patient transported on bed   DC meds/scripts (obs/outpt) Not applicable   Inpatient Pain Meds Released? Yes       Special needs/considerations None   Tasks needing completion None       Anurag Thompson  ASCOM [id number]

## 2021-03-09 NOTE — ANESTHESIA CARE TRANSFER NOTE
Patient: Jigna Allen    Procedure(s):  Revision Right total hinge Knee arthroplasty    Diagnosis: Infection of prosthetic joint, subsequent encounter [T84.50XD]  Diagnosis Additional Information: No value filed.    Anesthesia Type:   General     Note:    Oropharynx: oropharynx clear of all foreign objects and spontaneously breathing  Level of Consciousness: awake  Oxygen Supplementation: face mask  Level of Supplemental Oxygen (L/min / FiO2): 6  Independent Airway: airway patency satisfactory and stable  Dentition: dentition unchanged  Vital Signs Stable: post-procedure vital signs reviewed and stable  Report to RN Given: handoff report given  Patient transferred to: PACU    Handoff Report: Identifed the Patient, Identified the Reponsible Provider, Reviewed the pertinent medical history, Discussed the surgical course, Reviewed Intra-OP anesthesia mangement and issues during anesthesia, Set expectations for post-procedure period and Allowed opportunity for questions and acknowledgement of understanding      Vitals: (Last set prior to Anesthesia Care Transfer)  CRNA VITALS  3/9/2021 1605 - 3/9/2021 1644      3/9/2021             Resp Rate (observed):  (!) 1        Electronically Signed By: BRIANA ROTHMAN APRN CRNA  March 9, 2021  4:44 PM

## 2021-03-09 NOTE — ANESTHESIA PREPROCEDURE EVALUATION
Anesthesia Pre-Procedure Evaluation    Patient: Jigna Allen   MRN: [id number] : 1935        Preoperative Diagnosis: Infection of prosthetic joint, subsequent encounter [T84.50XD]   Procedure : Procedure(s):  Reimplantation Right total knee arthroplasty     Past Medical History:   Diagnosis Date     Anemia      Anxiety      Aortic valve sclerosis      Arthritis ?     Complication of anesthesia     slow to wakeup     Hypothyroid      Infection of prosthetic joint, subsequent encounter      Mild tricuspid regurgitation      Mumps ?194     Primary osteoarthritis of right knee      Sarcoma (H)       Past Surgical History:   Procedure Laterality Date     ARTHROPLASTY KNEE Right 2018    Procedure: ARTHROPLASTY KNEE;  Right Total Knee Replacement ;  Surgeon: He Gomez MD;  Location: [facility] OR     CARPAL TUNNEL RELEASE RT/LT       excise sarcoma Right     right leg     exicision of wound  2020     GRAFT FREE VASCULARIZED TRANSVERSE RECTUS ABDOMINIS MYOCUTANEOUS Left 2020    Procedure: With left anterolateral thigh free flap with SPY;  Surgeon: EHSAN De Luna MD;  Location: [facility] OR     IRRIGATION AND DEBRIDEMENT KNEE, PLACE ANTIBIOTIC CEMENT BEADS / SPACE Right 2020    Procedure: manufacture and implantation of Vanco/tobra ANTIBIOTIC SPACER, KNEE;  Surgeon: He Gomez MD;  Location: [facility] OR     KNEE SURGERY       PICC DOUBLE LUMEN PLACEMENT Right 2020    5FR DL PICC inserted at brachial medial, length- 37cm (1cm out). Tip at mid SVC     RECONSTRUCT KNEE Right 2020    Procedure: Right leg reconstruction and wound debridemenr;  Surgeon: EHSAN De Luna MD;  Location: [facility] OR     REMOVE HARDWARE ARTHROPLASTY KNEE Right 2020    Procedure: Explantation of Right total knee;  Surgeon: He Gomez MD;  Location: [facility] OR     TUBAL LIGATION        Allergies   Allergen Reactions     Colchicine      Other reaction(s): Gastrointestinal  diarrhea     Niacin      skin  rash     Ferrous Sulfate Rash      Social History     Tobacco Use     Smoking status: Never Smoker     Smokeless tobacco: Never Used   Substance Use Topics     Alcohol use: Not Currently     Comment: rarely - months       Wt Readings from Last 1 Encounters:   02/15/21 59 kg (130 lb)        Anesthesia Evaluation            ROS/MED HX  ENT/Pulmonary:       Neurologic:       Cardiovascular:       METS/Exercise Tolerance:     Hematologic:       Musculoskeletal:       GI/Hepatic:       Renal/Genitourinary:       Endo:     (+) thyroid problem,     Psychiatric/Substance Use:       Infectious Disease:       Malignancy:       Other:               OUTSIDE LABS:  CBC:   Lab Results   Component Value Date    WBC 3.7 (L) 07/17/2020    WBC 3.5 (L) 07/13/2020    HGB 10.4 (L) 07/17/2020    HGB 10.4 (L) 07/13/2020    HCT 34..2 (L) 07/17/2020    HCT 34.1 (L) 07/13/2020     07/17/2020     07/13/2020     BMP:   Lab Results   Component Value Date     07/17/2020     07/13/2020    POTASSIUM 4.3 07/17/2020    POTASSIUM 4.4 07/13/2020    CHLORIDE 106 07/17/2020    CHLORIDE 104 07/13/2020    CO2 28 07/17/2020    CO2 32 (H) 07/13/2020    BUN 22 07/17/2020    BUN 18 07/13/2020    CR 0.66 07/17/2020    CR 0.66 07/13/2020    GLC 75 07/17/2020    GLC 78 07/13/2020     COAGS:   Lab Results   Component Value Date    INR 1.27 (H) 06/15/2018     POC:   Lab Results   Component Value Date    BGM 87 06/02/2020     HEPATIC:   Lab Results   Component Value Date    ALBUMIN 2.8 (L) 07/13/2020    PROTTOTAL 6.6 07/13/2020    ALT <9 07/13/2020    AST 14 07/13/2020    ALKPHOS 110 07/13/2020    BILITOTAL 0.4 07/13/2020     OTHER:   Lab Results   Component Value Date    PH 7.51 (H) 04/20/2020    LACT 1.0 04/20/2020    A1C 5.6 06/05/2018    RAVEN 8.8 07/17/2020    PHOS 3.3 09/08/2016    MAG 2.1 04/20/2020    TSH 2.47 05/09/2017    CRP <2.9 11/09/2020    SED 25 11/09/2020       Anesthesia Plan    ASA Status:  3      Anesthesia Type:  General.     - Airway: ETT   Induction: Intravenous.   Maintenance: Balanced.        Consents    Anesthesia Plan(s) and associated risks, benefits, and realistic alternatives discussed. Questions answered and patient/representative(s) expressed understanding.     - Discussed with:  Patient      - Extended Intubation/Ventilatory Support Discussed: No.      - Patient is DNR/DNI Status: No    Use of blood products discussed: No .     Postoperative Care    Pain management: IV analgesics.   PONV prophylaxis: Ondansetron (or other 5HT-3)     Comments:    Planning on ROSELIA Tolentino MD

## 2021-03-10 ENCOUNTER — APPOINTMENT (OUTPATIENT)
Dept: PHYSICAL THERAPY | Facility: CLINIC | Age: 86
DRG: 468 | End: 2021-03-10
Attending: ORTHOPAEDIC SURGERY
Payer: COMMERCIAL

## 2021-03-10 ENCOUNTER — APPOINTMENT (OUTPATIENT)
Dept: OCCUPATIONAL THERAPY | Facility: CLINIC | Age: 86
DRG: 468 | End: 2021-03-10
Attending: ORTHOPAEDIC SURGERY
Payer: COMMERCIAL

## 2021-03-10 LAB
CREAT SERPL-MCNC: 0.71 MG/DL (ref 0.52–1.04)
GFR SERPL CREATININE-BSD FRML MDRD: 78 ML/MIN/{1.73_M2}
HGB BLD-MCNC: 11.4 G/DL (ref 11.7–15.7)
POTASSIUM SERPL-SCNC: 4 MMOL/L (ref 3.4–5.3)

## 2021-03-10 PROCEDURE — 82565 ASSAY OF CREATININE: CPT | Performed by: STUDENT IN AN ORGANIZED HEALTH CARE EDUCATION/TRAINING PROGRAM

## 2021-03-10 PROCEDURE — 36415 COLL VENOUS BLD VENIPUNCTURE: CPT | Performed by: STUDENT IN AN ORGANIZED HEALTH CARE EDUCATION/TRAINING PROGRAM

## 2021-03-10 PROCEDURE — 250N000013 HC RX MED GY IP 250 OP 250 PS 637: Performed by: STUDENT IN AN ORGANIZED HEALTH CARE EDUCATION/TRAINING PROGRAM

## 2021-03-10 PROCEDURE — 250N000011 HC RX IP 250 OP 636: Performed by: STUDENT IN AN ORGANIZED HEALTH CARE EDUCATION/TRAINING PROGRAM

## 2021-03-10 PROCEDURE — 84132 ASSAY OF SERUM POTASSIUM: CPT | Performed by: INTERNAL MEDICINE

## 2021-03-10 PROCEDURE — 97535 SELF CARE MNGMENT TRAINING: CPT | Mod: GO | Performed by: OCCUPATIONAL THERAPIST

## 2021-03-10 PROCEDURE — 97530 THERAPEUTIC ACTIVITIES: CPT | Mod: GP

## 2021-03-10 PROCEDURE — 97165 OT EVAL LOW COMPLEX 30 MIN: CPT | Mod: GO | Performed by: OCCUPATIONAL THERAPIST

## 2021-03-10 PROCEDURE — 120N000002 HC R&B MED SURG/OB UMMC

## 2021-03-10 PROCEDURE — 97116 GAIT TRAINING THERAPY: CPT | Mod: GP

## 2021-03-10 PROCEDURE — 97110 THERAPEUTIC EXERCISES: CPT | Mod: GP

## 2021-03-10 PROCEDURE — 99231 SBSQ HOSP IP/OBS SF/LOW 25: CPT | Performed by: INTERNAL MEDICINE

## 2021-03-10 PROCEDURE — 250N000013 HC RX MED GY IP 250 OP 250 PS 637: Performed by: INTERNAL MEDICINE

## 2021-03-10 PROCEDURE — 97161 PT EVAL LOW COMPLEX 20 MIN: CPT | Mod: GP

## 2021-03-10 PROCEDURE — 84132 ASSAY OF SERUM POTASSIUM: CPT | Performed by: STUDENT IN AN ORGANIZED HEALTH CARE EDUCATION/TRAINING PROGRAM

## 2021-03-10 PROCEDURE — 85018 HEMOGLOBIN: CPT | Performed by: STUDENT IN AN ORGANIZED HEALTH CARE EDUCATION/TRAINING PROGRAM

## 2021-03-10 PROCEDURE — 97530 THERAPEUTIC ACTIVITIES: CPT | Mod: GO | Performed by: OCCUPATIONAL THERAPIST

## 2021-03-10 RX ADMIN — DOCUSATE SODIUM 50 MG AND SENNOSIDES 8.6 MG 1 TABLET: 8.6; 5 TABLET, FILM COATED ORAL at 08:23

## 2021-03-10 RX ADMIN — DOCUSATE SODIUM 50 MG AND SENNOSIDES 8.6 MG 1 TABLET: 8.6; 5 TABLET, FILM COATED ORAL at 21:05

## 2021-03-10 RX ADMIN — CEFAZOLIN 1 G: 1 INJECTION, POWDER, FOR SOLUTION INTRAMUSCULAR; INTRAVENOUS at 22:41

## 2021-03-10 RX ADMIN — ACETAMINOPHEN 975 MG: 325 TABLET, FILM COATED ORAL at 22:40

## 2021-03-10 RX ADMIN — ACETAMINOPHEN 975 MG: 325 TABLET, FILM COATED ORAL at 15:00

## 2021-03-10 RX ADMIN — CEFAZOLIN 1 G: 1 INJECTION, POWDER, FOR SOLUTION INTRAMUSCULAR; INTRAVENOUS at 06:02

## 2021-03-10 RX ADMIN — DOCUSATE SODIUM 100 MG: 100 CAPSULE, LIQUID FILLED ORAL at 08:23

## 2021-03-10 RX ADMIN — OXYCODONE HYDROCHLORIDE 5 MG: 5 TABLET ORAL at 05:55

## 2021-03-10 RX ADMIN — CEFAZOLIN 1 G: 1 INJECTION, POWDER, FOR SOLUTION INTRAMUSCULAR; INTRAVENOUS at 15:01

## 2021-03-10 RX ADMIN — ASPIRIN 162 MG: 81 TABLET, DELAYED RELEASE ORAL at 08:22

## 2021-03-10 RX ADMIN — ACETAMINOPHEN 975 MG: 325 TABLET, FILM COATED ORAL at 05:54

## 2021-03-10 RX ADMIN — LEVOTHYROXINE SODIUM 75 MCG: 25 TABLET ORAL at 17:20

## 2021-03-10 RX ADMIN — POLYETHYLENE GLYCOL 3350 17 G: 17 POWDER, FOR SOLUTION ORAL at 08:23

## 2021-03-10 RX ADMIN — DOCUSATE SODIUM 100 MG: 100 CAPSULE, LIQUID FILLED ORAL at 21:04

## 2021-03-10 NOTE — ANESTHESIA POSTPROCEDURE EVALUATION
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    Anesthesia Type:  General    Note:  Disposition: Inpatient   Postop Pain Control: Uneventful            Sign Out: Well controlled pain   PONV: No   Neuro/Psych: Uneventful            Sign Out: Acceptable/Baseline neuro status   Airway/Respiratory: Uneventful            Sign Out: Acceptable/Baseline resp. status   CV/Hemodynamics: Uneventful            Sign Out: Acceptable CV status   Other NRE:    DID A NON-ROUTINE EVENT OCCUR?          Last vitals:  Vitals:    03/09/21 2041 03/09/21 2046 03/09/21 2208   BP: 111/64  110/48   Pulse: 64 61 68   Resp: 10 13 14   Temp:   35.4  C (95.7  F)   SpO2: 96% 95% 98%       Last vitals prior to Anesthesia Care Transfer:  CRNA VITALS  3/9/2021 1605 - 3/9/2021 1705      3/9/2021             Resp Rate (observed):  (!) 1          Electronically Signed By: Leslie Tolentino MD  March 10, 2021  7:10 AM

## 2021-03-10 NOTE — OR NURSING
Dr. Villanueva of anesthesia notified that patient is ready for phase 1 discharge. Patient okay to proceed to floor and Dr. Villanueva will place sign out.

## 2021-03-10 NOTE — PROGRESS NOTES
Orthopaedic Surgery Progress Note 03/10/2021    E: No acute events overnight.    S: Pain well controlled. Denies new numbness or tingling, chest pain, SOB. Tolerating PO intake. Voiding. Plan of care reviewed and questions answered    O:  Temp: 95.7  F (35.4  C) Temp src: Oral BP: 110/48 Pulse: 68   Resp: 14 SpO2: 98 % O2 Device: Nasal cannula Oxygen Delivery: 2 LPM    Exam:  Gen: No acute distress, resting comfortably in bed.  Resp: Non-labored breathing    RLE  - Dressing CDI  - 5/5 quad, TA, gastroc/soleus, EHL, FHL, wiggles toes  - SILT to superficial peroneal, deep peroneal, saphenous, sural, and tibial nerve distributions  - 2+ DP and PT, foot warm and well perfused    Drain output: 120mL recorded.    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 Gomez.     Plan:  Activity: Up with assist and assistive devices as needed until independent. Knee ROM as tolerated.  Weight bearing status: WBAT    Antibiotics: Cefazolin x 3 days postop while awaiting culture results  Diet: Regular  DVT prophylaxis: Aspirin 162mg daily x 4 weeks  Elevation: Elevate heels off of bed on pillows, no pillows behind the knee at any time  Wound Care: keep dressing CDI x 7 days postop  Drains: Document output per shift, removal per ortho  Pain management: Orals PRN, IV for breakthrough only  Physical Therapy: Mobilization, ROM, ADL's  Occupational Therapy: ADL's  Labs: Trend Hgb on PODs #1 & 2  Cultures: follow intraop cultures  Consults: PT, OT, Hospitalist, SW - appreciate assistance in caring for this patient throughout the perioperative period  Disposition: anticipate discharge to TCU when placed      Jon Simmons MD 03/10/2021  PGY-4, Orthopaedic Surgery  Pager: [phone]

## 2021-03-10 NOTE — CONSULTS
Care Management Initial Consult    General Information  Assessment completed with: Patient, VM-chart review,    Type of CM/SW Visit: Initial Assessment    Primary Care Provider verified and updated as needed: Yes   Readmission within the last 30 days: no previous admission in last 30 days         Advance Care Planning: Advance Care Planning Reviewed: education/resources on health care directives provided        Communication Assessment  Patient's communication style: spoken language (English or Bilingual)    Hearing Difficulty or Deaf: yes   Wear Glasses or Blind: yes    Cognitive  Cognitive/Neuro/Behavioral: WDL  Level of Consciousness: alert  Arousal Level: opens eyes spontaneously  Orientation: oriented x 4  Mood/Behavior: calm, behavior appropriate to situation  Best Language: 0 - No aphasia  Speech: logical, spontaneous, clear    Living Environment:   People in home: spouse     Current living Arrangements: house      Able to return to prior arrangements: yes     Family/Social Support:  Care provided by: self  Provides care for: significant other - pt's  lives with Parkinson's disease  Marital Status:             Description of Support System: Supportive, Involved       Current Resources:   Patient receiving home care services: No.  Pt has rec'd home care in the past from [facility] and this would be her first choice should she discharge with home care.   Community Resources: None  Equipment currently used at home: walker, rolling  Supplies currently used at home: None    Employment/Financial:  Employment Status: retired        Financial Concerns: No concerns identified       Lifestyle & Psychosocial Needs:        Socioeconomic History     Marital status:      Spouse name: Not on file     Number of children: Not on file     Years of education: Not on file     Highest education level: Not on file     Tobacco Use     Smoking status: Never Smoker     Smokeless tobacco: Never  Used   Substance and Sexual Activity     Alcohol use: Not Currently     Comment: rarely - months      Drug use: Never     Sexual activity: Not Currently     Partners: Male     Birth control/protection: Post-menopausal       Functional Status:  Prior to admission patient needed assistance:  Pt was ind with ADLs/IADLs PTA       Mental Health Status:  Mental Health Status: No Current Concerns       Chemical Dependency Status:  Chemical Dependency Status: No Current Concerns       Values/Beliefs:  Spiritual, Cultural Beliefs, Mormon Practices, Values that affect care: no             Additional Information:  SW met with pt at bedside to complete initial assessment. SW reviewed the role of SW in discharge planning.  Pt currently has dual recs - her preference would be to go home with home care because she has a  who is living with Parkinson that she helps to take care of - she relayed that her  is mostly ind but she likes being around to make sure he's taking his medications.  Pt was willing to have SW make some TCU referrals while she is still inpatient in the event that she will need them.  Pt relayed that if she goes home, she would like a referral to [facility] - she has worked with them before.     Pt asked for referral to  TCU, [facility] and [facility] - pt has been to the last two before. SW sent referrals to TCU via [employer]    PENDING REFERRALS  [id number]    [facility]  Phone: [phone]  Fax: [phone]    [facility]  Phone: [phone]  Fax: [phone]    JOSE De [address]az  [place] [address] [address]   Ph: [phone]  Pager: [phone]

## 2021-03-10 NOTE — PLAN OF CARE
Patient vital signs are at baseline: Yes  Patient able to ambulate as they were prior to admission or with assist devices provided by therapies during their stay:  No,  Reason:  Just arrived to floor  Patient MUST void prior to discharge:  No,  Reason:  Scanned for 116 in PACU  Patient able to tolerate oral intake:  Yes  Pain has adequate pain control using Oral analgesics:  Yes    Pt arrived to unit at 1815 and was oriented to room and call light  VS: VSS   O2: >90% on RA   Output: Scanned for 116 at 1740 in PACU   Last BM: 3/8   Activity: WBAT; has not been OOB yet   Up for meals? NA   Skin: Incision R knee   Pain: 5mg oxycodone in PACU   CMS: Intermittent NT in hands   Dressing: CDI   Diet: Regular   LDA: L PIV infusing, R PIV SL, JUAN R patent   Equipment: PCDs, CPM, IV pole, capnography   Plan: TBD   Additional Info: Pt received general anesthesia + cocktail. .

## 2021-03-10 NOTE — PLAN OF CARE
Pt is A&Ox4. VSS. LS clear, on RA. BS active, on LBM 3/8/2021. Voided x1, last PVR <100ml. Pain managed with 10mg oxycodone . R knee surgical dressing is c/d/I. JUAN R patent and draining well. CPM at 0-35. Pt up with 1 assist. L PIV is patent and infusing TKO. Continue to monitor.

## 2021-03-10 NOTE — PHARMACY-ADMISSION MEDICATION HISTORY
Admission Medication History Completed by Pharmacy    See [facility] Admission Navigator for allergy information, preferred outpatient pharmacy, prior to admission medications and immunization status.     Medication History Sources:     Patient    Pharmacy fill history via Spreadsave    Changes made to PTA medication list (reason):    Added: None    Deleted: None    Changed: None    Additional Information:    Patient was not able to obtain estrogen cream through her mail-order pharmacy due to formulary/insurance issues, she is wondering if we can fill this through our discharge pharmacy for her to take home. Her  has Parkinson's so it's difficult for her to travel to the pharmacy to pick things up. Will contact internal medicine to see if they are comfortable prescribing this at discharge, otherwise she could have prescription transferred to the discharge pharmacy.    Patient reported her surgeon advised her to start taking vitamin D after her surgery and was wondering if this could also be prescribed at discharge or she could be provided with this at discharge so she doesn't have to travel to a pharmacy to get it. I contacted the orthopedic team to discuss this with the patient.    Prior to Admission medications    Medication Sig Last Dose Taking? Auth Provider   levothyroxine (LEVOTHROID) 75 MCG tablet Take 75 mcg by mouth every morning  3/9/2021 at 0800 Yes Reported, Patient   estradiol (ESTRACE) 0.1 MG/GM vaginal cream Place 1 g vaginally three times a week not started  Nola Santoro MD       Date completed: 03/10/21    Medication history completed by: Evie Rose [facility]

## 2021-03-10 NOTE — PROGRESS NOTES
03/10/21 0943   Quick Adds   Type of Visit Initial Occupational Therapy Evaluation   Living Environment   People in home spouse   Current Living Arrangements house   Home Accessibility stairs to enter home;stairs within home   Number of Stairs, Main Entrance 1   Transportation Anticipated family or friend will provide   Living Environment Comments higher toilet, has commode; walk-in shower with shower chair. Lives with spouse with parkinsons   Self-Care   Usual Activity Tolerance good   Current Activity Tolerance moderate   Equipment Currently Used at Home cane, straight;walker, rolling   Activity/Exercise/Self-Care Comment independent with self-cares/mobility   Disability/Function   Hearing Difficulty or Deaf yes   Patient's preferred means of communication verbal   Describe hearing loss bilateral hearing loss   Use of hearing assistive devices none   Wear Glasses or Blind yes   Vision Management glasses   Concentrating, Remembering or Making Decisions Difficulty other (see comments)   Difficulty Communicating other (see comments)   Difficulty Eating/Swallowing other (see comments)   Walking or Climbing Stairs Difficulty no   Mobility Management Has been using walker since last knee surgery   Dressing/Bathing Difficulty no   Toileting issues no   Doing Errands Independently Difficulty (such as shopping) yes   Errands Management spouse drives   Fall history within last six months no   General Information   Onset of Illness/Injury or Date of Surgery 03/09/21   Referring Physician Dr. Gomez   Patient/Family Therapy Goal Statement (OT) Return home to baseline   Additional Occupational Profile Info/Pertinent History of Current Problem Jigna Allen is a 86 year old female s/p 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 Gomez.   Existing Precautions/Restrictions no known precautions/restrictions   Left Lower Extremity (Weight-bearing Status) weight-bearing as tolerated (WBAT)   Cognitive  Status Examination   Orientation Status orientation to person, place and time   Sensory   Sensory Quick Adds No deficits were identified   Pain Assessment   Patient Currently in Pain No   Range of Motion Comprehensive   General Range of Motion no range of motion deficits identified   Coordination   Upper Extremity Coordination No deficits were identified   Bed Mobility   Bed Mobility supine-sit;sit-supine   Supine-Sit [place] (Bed Mobility) supervision   Sit-Supine [place] (Bed Mobility) supervision   Transfers   Transfers sit-stand transfer;toilet transfer   Sit-Stand Transfer   Sit-Stand [place] (Transfers) supervision   Assistive Device (Sit-Stand Transfers) walker, standard   Toilet Transfer   Type (Toilet Transfer) stand-sit;sit-stand   [place] Level (Toilet Transfer) supervision   Activities of Daily Living   BADL Assessment/Intervention lower body dressing;toileting   Lower Body Dressing Assessment/Training   [place] Level (Lower Body Dressing) supervision   Toileting   [place] Level (Toileting) supervision   Clinical Impression   Criteria for Skilled Therapeutic Interventions Met (OT) yes   OT Diagnosis Impaired self-cares/mobility   OT Problem List-Impairments impacting ADL pain;range of motion (ROM)   Assessment of Occupational Performance 1-3 Performance Deficits   Identified Performance Deficits dressing, bathing, toileting   Planned Therapy Interventions (OT) ADL retraining   Clinical Decision Making Complexity (OT) low complexity   Therapy Frequency (OT) Daily   Predicted Duration of Therapy 2 day   Risk & Benefits of therapy have been explained care plan/treatment goals reviewed;patient   OT Discharge Planning    OT Discharge Recommendation (DC Rec) Home with assist   OT Rationale for DC Rec Patient near baseline and would benefit from assist for IADLs at home   OT Brief overview of current status  Patient SBA for toileting, transfers, LB dressing.   Total Evaluation Time  (Minutes)   Total Evaluation Time (Minutes) 8

## 2021-03-10 NOTE — CONSULTS
[facility]  Consult Note - Hospitalist Service     Date of Admission:  3/9/2021  Consult Requested by: Ortho   Reason for Consult: Medical co-management     Assessment & Plan   Jigna Allen is a 86 year old female admitted on 3/9/2021. POD # 0 s/p Revision Right total hinge Knee arthroplasty. Internal medicine consulted for medical co-management. She has a PMHx significant for hypothyroidism.   I saw the patient after surgery,  ml, no complications during surgery.     #Revision Right total hinge Knee arthroplasty  -Ortho following the patient.   -She was hemodynamically stable.   -Activity: Up with assist and assistive devices as needed until independent. Knee ROM as tolerated.  -Weight bearing status: WBAT    -Antibiotics: Cefazolin x 3 days postop while awaiting culture results  -Diet: Regular    -DVT prophylaxis: Aspirin 162mg daily x 4 weeks, starting morning of POD 1  -Drains: Document output per shift, removal per ortho  -Pain management: Orals PRN, IV for breakthrough only  -Continue monitoring HGB  -Gentle hydration  -PT / OT per protocol   -Capnography per protocol   -Monitor renal function and lytes   -Ankle pumps   -Continue following the patient     #Hypothyroidism    -Continue on PTA Synthroid         The patient's care was discussed with the Patient.    Goldy Mcclendon MD  [facility]  Contact information available via [facility] Paging/Directory    ______________________________________________________________________    Chief Complaint   Knee pain     History is obtained from the patient    History of Present Illness   Jigna Allen is a 86 year old female admitted on 3/9/2021. POD # 0 s/p Revision Right total hinge Knee arthroplasty. Internal medicine consulted for medical co-management. She has a PMHx significant for hypothyroidism.   I saw the patient after surgery,  ml, no complications  during surgery. She was pleasant.   No chest pain, palpitations, shortness of breath, cough, abdominal pain, nausea, vomit, fever, chills or dizziness.     Review of Systems   The 10 point Review of Systems is negative other than noted in the HPI or here. Other ROS negative.     Past Medical History    I have reviewed this patient's medical history and updated it with pertinent information if needed.   Past Medical History:   Diagnosis Date     Anemia      Anxiety      Aortic valve sclerosis      Arthritis 1990?     Complication of anesthesia     slow to wakeup     Hypothyroid      Infection of prosthetic joint, subsequent encounter      Mild tricuspid regurgitation      Mumps ?1945     Primary osteoarthritis of right knee      Sarcoma (H) 2009       Past Surgical History   I have reviewed this patient's surgical history and updated it with pertinent information if needed.  Past Surgical History:   Procedure Laterality Date     ARTHROPLASTY KNEE Right 6/12/2018    Procedure: ARTHROPLASTY KNEE;  Right Total Knee Replacement ;  Surgeon: He Goemz MD;  Location: [facility] OR     CARPAL TUNNEL RELEASE RT/LT       excise sarcoma Right     right leg     exicision of wound  03/27/2020     GRAFT FREE VASCULARIZED TRANSVERSE RECTUS ABDOMINIS MYOCUTANEOUS Left 4/20/2020    Procedure: With left anterolateral thigh free flap with SPY;  Surgeon: EHSAN De Luna MD;  [facility]     IRRIGATION AND DEBRIDEMENT KNEE, PLACE ANTIBIOTIC CEMENT BEADS / SPACE Right 6/2/2020    Procedure: manufacture and implantation of Vanco/tobra ANTIBIOTIC SPACER, KNEE;  Surgeon: He Gomez MD;  Location: [facility] OR     KNEE SURGERY       PICC DOUBLE LUMEN PLACEMENT Right 04/23/2020    5FR DL PICC inserted at brachial medial, length- 37cm (1cm out). Tip at mid SVC     RECONSTRUCT KNEE Right 4/20/2020    Procedure: Right leg reconstruction and wound debridemenr;  Surgeon: EHSAN De Luna MD;  [facility]     REMOVE HARDWARE  ARTHROPLASTY KNEE Right 2020    Procedure: Explantation of Right total knee;  Surgeon: He Gomez MD;  Location: [facility] OR     TUBAL LIGATION         Social History   I have reviewed this patient's social history and updated it with pertinent information if needed.  Social History     Tobacco Use     Smoking status: Never Smoker     Smokeless tobacco: Never Used   Substance Use Topics     Alcohol use: Not Currently     Comment: rarely - months      Drug use: Never       Family History   I have reviewed this patient's family history and updated it with pertinent information if needed.  Family History   Problem Relation Age of Onset     Cancer Father         age 85-87 prostate     Prostate Cancer Father      Other Cancer Father      Hearing Loss Father      Cancer Brother         stemcell;  50 yrs. [place] vet     Other - See Comments Daughter         Slow to wake      Asthma Daughter      Hearing Loss Brother         hearing aid age 77       Medications   Current Facility-Administered Medications   Medication     [START ON 3/12/2021] acetaminophen (TYLENOL) tablet 650 mg     acetaminophen (TYLENOL) tablet 975 mg     [START ON 3/10/2021] aspirin EC tablet 162 mg     benzocaine-menthol (CEPACOL) 15-3.6 MG lozenge 1 lozenge     bisacodyl (DULCOLAX) Suppository 10 mg     calcium carbonate (TUMS) chewable tablet 500 mg     ceFAZolin (ANCEF) 1 g vial to attach to  ml bag for ADULT or 50 ml bag for PEDS     docusate sodium (COLACE) capsule 100 mg     HYDROmorphone (PF) (DILAUDID) injection 0.2 mg    Or     HYDROmorphone (PF) (DILAUDID) injection 0.4 mg     lactated ringers infusion     lidocaine (LMX4) cream     lidocaine 1 % 0.1-1 mL     magnesium hydroxide (MILK OF MAGNESIA) suspension 30 mL     naloxone (NARCAN) injection 0.2 mg     naloxone (NARCAN) injection 0.2 mg     naloxone (NARCAN) injection 0.4 mg     naloxone (NARCAN) injection 0.4 mg     ondansetron (ZOFRAN-ODT) ODT tab 4 mg    Or      ondansetron (ZOFRAN) injection 4 mg     oxyCODONE (ROXICODONE) tablet 5 mg    Or     oxyCODONE IR (ROXICODONE) tablet 10 mg     [START ON 3/10/2021] polyethylene glycol (MIRALAX) Packet 17 g     prochlorperazine (COMPAZINE) injection 5 mg    Or     prochlorperazine (COMPAZINE) tablet 5 mg     senna-docusate (SENOKOT-S/PERICOLACE) 8.6-50 MG per tablet 1 tablet     sodium chloride (PF) 0.9% PF flush 3 mL     sodium chloride (PF) 0.9% PF flush 3 mL     sodium phosphate (FLEET ENEMA) 1 enema       Allergies   Allergies   Allergen Reactions     Colchicine      Other reaction(s): Gastrointestinal  diarrhea     Niacin      skin rash     Ferrous Sulfate Rash       Physical Exam   Vital Signs: Temp: 97.1  F (36.2  C) Temp src: Oral BP: 119/48 Pulse: 64   Resp: 10 SpO2: 96 % O2 Device: Nasal cannula Oxygen Delivery: 1 LPM  Weight: 129 lbs 6.56 oz    General Appearance: Alert, on O 2 NC, no acute distress, pleasant.   Eyes: Pupils equal and reactive to light.   HEENT: Oral mucosa moist.   Respiratory: Normal respiratory effort, clear lungs, no crackles or wheezing.   Cardiovascular: RRR, no murmur.   GI: Non-distended, soft, + BS, no tenderness to palpation, no rebound or guarding.   Lymph/Hematologic: No lymphadenopathy.   Genitourinary: Deferred.  Skin: No rash.   Musculoskeletal: Right knee with dressing. + dressing   Neurologic: Alert, oriented x 3, no focal deficits.   Psychiatric: Mood stable.     Data   Results for orders placed or performed during the hospital encounter of 03/09/21 (from the past 24 hour(s))   Glucose by meter   Result Value Ref Range    Glucose 94 70 - 99 mg/dL   ABO/Rh Type and Screen   Result Value Ref Range    ABO B     RH(D) Neg     Antibody Screen Neg     Test Valid Only At          [facility]    Specimen Expires 03/12/2021    Anaerobic bacterial culture    Specimen: Knee, Right; Tissue    Right Knee   Result Value Ref Range    Specimen Description  Tissue Right Knee     Special Requests Received in anaerobic tubes.  CONTAINER 1       Culture Micro PENDING    Anaerobic bacterial culture    Specimen: Knee, Right; Tissue    Right Knee   Result Value Ref Range    Specimen Description Tissue Right Knee     Special Requests Received in anaerobic tubes.  CONTAINER 3       Culture Micro PENDING    Tissue Culture Aerobic Bacterial    Specimen: Knee, Right; Tissue    Right Knee   Result Value Ref Range    Specimen Description Tissue Right Knee     Special Requests CONTAINER 1     Culture Micro PENDING    Tissue Culture Aerobic Bacterial    Specimen: Knee, Right; Tissue    Right Knee   Result Value Ref Range    Specimen Description Tissue Right Knee     Special Requests CONTAINER 3     Culture Micro PENDING    Anaerobic bacterial culture    Specimen: Knee, Right; Tissue    Right Knee   Result Value Ref Range    Specimen Description Tissue Right Knee     Special Requests Received in anaerobic tubes.  CONTAINER 2       Culture Micro PENDING    Anaerobic bacterial culture    Specimen: Knee, Right; Tissue    Right Knee   Result Value Ref Range    Specimen Description Tissue Right Knee     Special Requests Received in anaerobic tubes.  CONTAINER 4       Culture Micro PENDING    Anaerobic bacterial culture    Specimen: Knee, Right; Tissue    Right Knee   Result Value Ref Range    Specimen Description Tissue Right Knee     Special Requests Received in anaerobic tubes.  CONTAINER 5       Culture Micro PENDING    Tissue Culture Aerobic Bacterial    Specimen: Knee, Right; Tissue    Right Knee   Result Value Ref Range    Specimen Description Tissue Right Knee     Special Requests CONTAINER 2     Culture Micro PENDING    Tissue Culture Aerobic Bacterial    Specimen: Knee, Right; Tissue    Right Knee   Result Value Ref Range    Specimen Description Tissue Right Knee     Special Requests CONTAINER 4     Culture Micro PENDING    Tissue Culture Aerobic Bacterial    Specimen: Knee,  Right; Tissue    Right Knee   Result Value Ref Range    Specimen Description Tissue Right Knee     Special Requests CONTAINER 5     Culture Micro PENDING    XR Knee Port Right 1/2 Views    Narrative    2 views right knee radiographs 3/9/2021 3:43 PM    History: Intra Op. Reimplantation Right TKA    Comparison: 11/9/2020    Findings:    AP and lateral views of the right knee were obtained.     There are new postsurgical changes of the longstem tibial component  total knee arthroplasty implantation with removal of the antibiotic  spacer.     Also new femoral component and removal of antibiotic spacer.    Multiple clips within the soft tissues.      Impression    Impression:  1. Status post removal of antibiotic spacers.  2. New revision of total knee arthroplasty.  2. No substantial degenerative change.    JUTTA ELLERMANN, MD

## 2021-03-10 NOTE — PROGRESS NOTES
"   03/10/21 0924   Quick Adds   Type of Visit Initial PT Evaluation       Present no   Language English   Living Environment   People in home spouse   Current Living Arrangements house   Home Accessibility stairs to enter home;stairs within home   Number of Stairs, Main Entrance 1   Stair Railings, Main Entrance none   Number of Stairs, Within Home, Primary   (\"Flight\" to basement)   Stair Railings, Within Home, Primary railings safe and in good condition   Transportation Anticipated family or friend will provide   Living Environment Comments Patient lives with spouse who has Parkinsons, unable to assist patient at home   Self-Care   Usual Activity Tolerance moderate   Current Activity Tolerance fair   Regular Exercise No   Equipment Currently Used at Home walker, rolling   Disability/Function   Hearing Difficulty or Deaf yes   Patient's preferred means of communication verbal   Describe hearing loss bilateral hearing loss   Use of hearing assistive devices none   Wear Glasses or Blind yes   Concentrating, Remembering or Making Decisions Difficulty no   Difficulty Communicating no   Difficulty Eating/Swallowing no   Walking or Climbing Stairs Difficulty yes   Walking or Climbing Stairs ambulation difficulty, requires equipment   Dressing/Bathing Difficulty no   Toileting issues no   Doing Errands Independently Difficulty (such as shopping) yes   Fall history within last six months no   General Information   Onset of Illness/Injury or Date of Surgery 03/09/21   Referring Physician Jon Simmons MD   Patient/Family Therapy Goals Statement (PT) Did not endorse   Pertinent History of Current Problem (include personal factors and/or comorbidities that impact the POC) 86 year old female s/p 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 Gomez.   Existing Precautions/Restrictions fall   Weight-Bearing Status - LUE full weight-bearing   Weight-Bearing Status - RUE full " weight-bearing   Weight-Bearing Status - LLE full weight-bearing   Weight-Bearing Status - RLE weight-bearing as tolerated   General Observations Supine in bed upon arrival, agreeable to PT   Cognition   Orientation Status (Cognition) oriented x 3   Affect/Mental Status (Cognition) WNL   Follows Commands (Cognition) WNL   Pain Assessment   Patient Currently in Pain Yes, see Vital Sign flowsheet   Integumentary/Edema   Integumentary/Edema Comments Patient with normal post-surgical swelling present   Posture    Posture Forward head position;Protracted shoulders;Kyphosis   Posture Comments Mild sitting EOB and standing   Range of Motion (ROM)   ROM Comment Did not formally assess, demonstrates functional ROM with mobility   Strength   Strength Comments Did not formally assess, demonstrates functional strength with mobility   Bed Mobility   Comment (Bed Mobility) PT: Completes supine to sit transfer with SBA   Transfers   Transfer Safety Comments PT: Completes sit<>stand transfer with CGA and use of walker   Gait/Stairs (Locomotion)   Comment (Gait/Stairs) PT: Ambulates into [place]ster with CGA progressing to SBA with use of walker   Balance   Balance Comments Independent sitting balance, SBA for standing balance with UE support from walker   Sensory Examination   Sensory Perception WNL   Clinical Impression   Criteria for Skilled Therapeutic Intervention yes, treatment indicated   PT Diagnosis (PT) impaired functional mobility   Influenced by the following impairments increased post-op pain, decreased R LE strength and ROM   Functional limitations due to impairments impaired bed mobility, transfers and ambulation   Clinical Presentation Stable/Uncomplicated   Clinical Presentation Rationale 86 year old female s/p 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 Gomez. Mobilizing well   Clinical Decision Making (Complexity) low complexity   Therapy Frequency (PT) 2x/day   Predicted Duration of Therapy  Intervention (days/wks) 4 days   Planned Therapy Interventions (PT) balance training;bed mobility training;gait training;ROM (range of motion);stair training;strengthening;transfer training   Anticipated Equipment Needs at Discharge (PT)   (has equipment)   Risk & Benefits of therapy have been explained evaluation/treatment results reviewed;care plan/treatment goals reviewed;risks/benefits reviewed;current/potential barriers reviewed   PT Discharge Planning    PT Discharge Recommendation (DC Rec) home with assist;home with home care physical therapy;Transitional Care Facility   PT Rationale for DC Rec PT: Patient verbalizes preference for rehab, will need to be completely independent upon return home as spouse unable to assist however doing quite well this morning. Dual plan pending LOS   PT Brief overview of current status  PT: SBA for bed mobility, CGA for transfers with walker, CGA progressing to SBA with walker for ambulation   Total Evaluation Time   Total Evaluation Time (Minutes) 9

## 2021-03-10 NOTE — PLAN OF CARE
VS: VSS   O2: >90% on RA   Output: Voiding well. JUAN R with 75 ml output.    Last BM: 3/8   Activity: WBAT; up with SBA and FWW    Up for meals? NA   Skin: Incision R knee drsg CDI    Pain: 5mg oxycodone as needed. Pt prefers not to take narcotics if possible.    CMS: Intact    Dressing: CDI   Diet: Regular   LDA: L PIV SL   Equipment: PCDs, CPM, IV pole, capnography   Plan: TBD   Additional Info:

## 2021-03-10 NOTE — PROGRESS NOTES
Pt was seen, course reviewed with team    IM consult from last pm reviewed      Pt states pain is well controlled  She denies cough, chest pain, SOB, abd pain    Afebrile  VSS  02 sats upper 90s on low flow 02  Alert, fully oriented, in good spirits  Lungs clear  CV rrr  Abd soft  No LE edema    Results for FEDERICA SMITH (MRN [id number]) as of 3/10/2021 11:12   Ref. Range 3/10/2021 05:32   Potassium Latest Ref Range: 3.4 - 5.3 mmol/L 4.0   Creatinine Latest Ref Range: 0.52 - 1.04 mg/dL 0.71   GFR Estimate Latest Ref Range: >60 mL/min/1.73_m2 78   GFR Estimate If Black Latest Ref Range: >60 mL/min/1.73_m2 >90   Hemoglobin Latest Ref Range: 11.7 - 15.7 g/dL 11.4 (L)     Results for FEDERICA SMITH (MRN [id number]) as of 3/10/2021 11:12   Ref. 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 Gomez. Pt is doing well.  Intra op cultures neg.  Plan mobilize. Await cultures. Pt remains on Ancef pending cultures.  ASA for DVT proph    Hypothyroidism, on replacement  Plan continue levothyroxine

## 2021-03-11 ENCOUNTER — APPOINTMENT (OUTPATIENT)
Dept: PHYSICAL THERAPY | Facility: CLINIC | Age: 86
DRG: 468 | End: 2021-03-11
Attending: ORTHOPAEDIC SURGERY
Payer: COMMERCIAL

## 2021-03-11 ENCOUNTER — APPOINTMENT (OUTPATIENT)
Dept: OCCUPATIONAL THERAPY | Facility: CLINIC | Age: 86
DRG: 468 | End: 2021-03-11
Attending: ORTHOPAEDIC SURGERY
Payer: COMMERCIAL

## 2021-03-11 LAB
ANION GAP SERPL CALCULATED.3IONS-SCNC: 5 MMOL/L (ref 3–14)
BUN SERPL-MCNC: 20 MG/DL (ref 7–30)
CALCIUM SERPL-MCNC: 8.4 MG/DL (ref 8.5–10.1)
CHLORIDE SERPL-SCNC: 108 MMOL/L (ref 94–109)
CO2 SERPL-SCNC: 28 MMOL/L (ref 20–32)
CREAT SERPL-MCNC: 0.81 MG/DL (ref 0.52–1.04)
GFR SERPL CREATININE-BSD FRML MDRD: 67 ML/MIN/{1.73_M2}
GLUCOSE SERPL-MCNC: 99 MG/DL (ref 70–99)
HGB BLD-MCNC: 11.1 G/DL (ref 11.7–15.7)
POTASSIUM SERPL-SCNC: 3.8 MMOL/L (ref 3.4–5.3)
SODIUM SERPL-SCNC: 141 MMOL/L (ref 133–144)

## 2021-03-11 PROCEDURE — 120N000002 HC R&B MED SURG/OB UMMC

## 2021-03-11 PROCEDURE — 97161 PT EVAL LOW COMPLEX 20 MIN: CPT | Mod: GP

## 2021-03-11 PROCEDURE — 250N000011 HC RX IP 250 OP 636: Performed by: STUDENT IN AN ORGANIZED HEALTH CARE EDUCATION/TRAINING PROGRAM

## 2021-03-11 PROCEDURE — 85018 HEMOGLOBIN: CPT | Performed by: STUDENT IN AN ORGANIZED HEALTH CARE EDUCATION/TRAINING PROGRAM

## 2021-03-11 PROCEDURE — 97535 SELF CARE MNGMENT TRAINING: CPT | Mod: GO

## 2021-03-11 PROCEDURE — 97116 GAIT TRAINING THERAPY: CPT | Mod: GP | Performed by: CLINIC/CENTER

## 2021-03-11 PROCEDURE — 250N000013 HC RX MED GY IP 250 OP 250 PS 637: Performed by: INTERNAL MEDICINE

## 2021-03-11 PROCEDURE — 97530 THERAPEUTIC ACTIVITIES: CPT | Mod: GO

## 2021-03-11 PROCEDURE — 36415 COLL VENOUS BLD VENIPUNCTURE: CPT | Performed by: STUDENT IN AN ORGANIZED HEALTH CARE EDUCATION/TRAINING PROGRAM

## 2021-03-11 PROCEDURE — 97110 THERAPEUTIC EXERCISES: CPT | Mod: GP | Performed by: CLINIC/CENTER

## 2021-03-11 PROCEDURE — 97530 THERAPEUTIC ACTIVITIES: CPT | Mod: GP | Performed by: CLINIC/CENTER

## 2021-03-11 PROCEDURE — 250N000013 HC RX MED GY IP 250 OP 250 PS 637: Performed by: STUDENT IN AN ORGANIZED HEALTH CARE EDUCATION/TRAINING PROGRAM

## 2021-03-11 PROCEDURE — 97530 THERAPEUTIC ACTIVITIES: CPT | Mod: GP

## 2021-03-11 PROCEDURE — 80048 BASIC METABOLIC PNL TOTAL CA: CPT | Performed by: STUDENT IN AN ORGANIZED HEALTH CARE EDUCATION/TRAINING PROGRAM

## 2021-03-11 PROCEDURE — 97110 THERAPEUTIC EXERCISES: CPT | Mod: GP

## 2021-03-11 PROCEDURE — 97116 GAIT TRAINING THERAPY: CPT | Mod: GP

## 2021-03-11 RX ORDER — POLYETHYLENE GLYCOL 3350 17 G/17G
17 POWDER, FOR SOLUTION ORAL DAILY
Qty: 7 PACKET | Refills: 0 | Status: SHIPPED | OUTPATIENT
Start: 2021-03-12 | End: 2021-10-13

## 2021-03-11 RX ORDER — OXYCODONE HYDROCHLORIDE 5 MG/1
5 TABLET ORAL EVERY 4 HOURS PRN
Qty: 40 TABLET | Refills: 0 | Status: SHIPPED | OUTPATIENT
Start: 2021-03-11 | End: 2021-10-13

## 2021-03-11 RX ORDER — NALOXONE HYDROCHLORIDE 0.4 MG/ML
0.2 INJECTION, SOLUTION INTRAMUSCULAR; INTRAVENOUS; SUBCUTANEOUS
Status: DISCONTINUED | OUTPATIENT
Start: 2021-03-11 | End: 2021-03-12 | Stop reason: HOSPADM

## 2021-03-11 RX ORDER — ACETAMINOPHEN 325 MG/1
650 TABLET ORAL EVERY 4 HOURS PRN
Qty: 40 TABLET | Refills: 0 | Status: SHIPPED | OUTPATIENT
Start: 2021-03-12 | End: 2021-10-13

## 2021-03-11 RX ORDER — AMOXICILLIN 250 MG
1 CAPSULE ORAL 2 TIMES DAILY
Qty: 40 TABLET | Refills: 0 | Status: SHIPPED | OUTPATIENT
Start: 2021-03-11 | End: 2021-10-13

## 2021-03-11 RX ORDER — NALOXONE HYDROCHLORIDE 0.4 MG/ML
0.4 INJECTION, SOLUTION INTRAMUSCULAR; INTRAVENOUS; SUBCUTANEOUS
Status: DISCONTINUED | OUTPATIENT
Start: 2021-03-11 | End: 2021-03-12 | Stop reason: HOSPADM

## 2021-03-11 RX ADMIN — POLYETHYLENE GLYCOL 3350 17 G: 17 POWDER, FOR SOLUTION ORAL at 07:56

## 2021-03-11 RX ADMIN — DOCUSATE SODIUM 50 MG AND SENNOSIDES 8.6 MG 1 TABLET: 8.6; 5 TABLET, FILM COATED ORAL at 20:59

## 2021-03-11 RX ADMIN — ACETAMINOPHEN 975 MG: 325 TABLET, FILM COATED ORAL at 14:58

## 2021-03-11 RX ADMIN — CEFAZOLIN 1 G: 1 INJECTION, POWDER, FOR SOLUTION INTRAMUSCULAR; INTRAVENOUS at 07:59

## 2021-03-11 RX ADMIN — CEFAZOLIN 1 G: 1 INJECTION, POWDER, FOR SOLUTION INTRAMUSCULAR; INTRAVENOUS at 14:58

## 2021-03-11 RX ADMIN — LEVOTHYROXINE SODIUM 75 MCG: 25 TABLET ORAL at 07:56

## 2021-03-11 RX ADMIN — CEFAZOLIN 1 G: 1 INJECTION, POWDER, FOR SOLUTION INTRAMUSCULAR; INTRAVENOUS at 22:06

## 2021-03-11 RX ADMIN — ACETAMINOPHEN 975 MG: 325 TABLET, FILM COATED ORAL at 05:38

## 2021-03-11 RX ADMIN — OXYCODONE HYDROCHLORIDE 5 MG: 5 TABLET ORAL at 05:38

## 2021-03-11 RX ADMIN — DOCUSATE SODIUM 50 MG AND SENNOSIDES 8.6 MG 1 TABLET: 8.6; 5 TABLET, FILM COATED ORAL at 07:56

## 2021-03-11 RX ADMIN — ASPIRIN 162 MG: 81 TABLET, DELAYED RELEASE ORAL at 07:56

## 2021-03-11 RX ADMIN — DOCUSATE SODIUM 100 MG: 100 CAPSULE, LIQUID FILLED ORAL at 20:59

## 2021-03-11 RX ADMIN — DOCUSATE SODIUM 100 MG: 100 CAPSULE, LIQUID FILLED ORAL at 07:56

## 2021-03-11 RX ADMIN — ACETAMINOPHEN 975 MG: 325 TABLET, FILM COATED ORAL at 22:06

## 2021-03-11 RX ADMIN — OXYCODONE HYDROCHLORIDE 5 MG: 5 TABLET ORAL at 12:54

## 2021-03-11 NOTE — PROGRESS NOTES
[facility]  Due to a high volume of referrals, [facility] has requested this patient's home care episode be transferred to their deferral partner [facility] [phone]. Care team and patient notified. [facility] has sent all referral information to [facility] for SN SOC planned for Monday 3/15/21.    Patience Ayala RN   [facility] Care Liaison   [phone]

## 2021-03-11 NOTE — PLAN OF CARE
"VS: /48 (BP Location: Right arm)   Pulse 68   Temp 97.8  F (36.6  C) (Oral)   Resp 18   Ht 1.575 m (5' 2\")   Wt 58.7 kg (129 lb 6.6 oz)   SpO2 95%   BMI 23.67 kg/m      O2: 95% on room air. Pt denies shortness of breath. Lung sounds clear all filed.   Output: Voids without difficulities, uses toilet x 3. JUAN R in place but no drainage.    Last BM: 3/8   Activity: Assist of 1 with gait belt and walker, WBAT   Up for meals?    Skin: Clean, dry and intact except R knee incision.     Pain: Pain managed with scheduled tylenol 975 mg and prn oxycodone.    CMS: Intact    Dressing: Clean dry and intact    Diet: Regular   LDA: L PIV SL, JUAN R   Equipment: PCDs, CPM, IV pole, capnography , call light    Plan: TBD   Additional Info:                "

## 2021-03-11 NOTE — PROGRESS NOTES
"  VS: VSS. BP soft. Encouraging fluids    O2: On room air >90%   Output: Voiding well    Last BM: 3/9 prior to surgery    Activity: Up SBA with GB and walker    Up for meals? yes   Skin: Incision on R knee. Dressing CDI   Pain: Pt denies pain and prefers to stay away from narcotic. Scheduled tylenol is effective    CMS: intact   Dressing: CDI   Diet: Regular    LDA: PIV is infusing TKO between antibiotics.     JUAN R drain output this shift is 60 mL     Pts IV in R lower forearm infiltrated this shift. Pt reports no discomfort but says the IV may have \"slipped when she got back into bed\". IV was removed, skin was marked. Edema and discoloration improving by end of shift at 2330   Equipment: PCDs CPM IV pole, Anila    Plan: TBD   Additional Info:          "

## 2021-03-11 NOTE — PROGRESS NOTES
Pt was seen, course reviewed with team.    Pt feels well, states pain control is good    No c/o chest pain, SOB or dizziness  Last BM was 3/8    VSS  Alert, in good spirits, fully oriented  Lungs clear  CV rrr  Abd soft  No R calf edema or tenderness      Intra op cultures neg  Results for FEDERICA SMITH (MRN [id number]) as of 3/11/2021 12:54   Ref. Range 3/11/2021 07:55   Sodium Latest Ref Range: 133 - 144 mmol/L 141   Potassium Latest Ref Range: 3.4 - 5.3 mmol/L 3.8   Chloride Latest Ref Range: 94 - 109 mmol/L 108   Carbon Dioxide Latest Ref Range: 20 - 32 mmol/L 28   Urea Nitrogen Latest Ref Range: 7 - 30 mg/dL 20   Creatinine Latest Ref Range: 0.52 - 1.04 mg/dL 0.81   GFR Estimate Latest Ref Range: >60 mL/min/1.73_m2 67   GFR Estimate If Black Latest Ref Range: >60 mL/min/1.73_m2 77   Calcium Latest Ref Range: 8.5 - 10.1 mg/dL 8.4 (L)   Anion Gap Latest Ref Range: 3 - 14 mmol/L 5   Glucose Latest Ref Range: 70 - 99 mg/dL 99   Hemoglobin Latest Ref Range: 11.7 - 15.7 g/dL 11.1 (L)       Assessment/plan    s/p 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 Gomez. Pt is doing well.  Intra op cultures neg.  Plan  Await cultures. Pt remains on Ancef pending cultures.  ASA for DVT proph     Hypothyroidism, on replacement  Plan continue levothyroxine    Disposition TCU vs Home

## 2021-03-11 NOTE — PROGRESS NOTES
Care Management Discharge Note    Discharge Date: 03/12/21       Discharge Disposition: Transitional Care, Home Care, Home    Discharge Services:  [facility] [phone]  Discharge DME: None    Discharge Transportation: family or friend will provide    Private pay costs discussed: Not applicable    Education Provided on the Discharge Plan:  yes  Persons Notified of Discharge Plans: patient  Patient/Family in Agreement with the Plan: yes    Handoff Referral Completed: Yes    Additional Information:  Spoke with patient regarding discharge planning. Patient has been cleared by therapies to discharge to home with home care services. It is patient's wish to discharge to home. A referral was sent to [facility] for skilled nursing, physical therapy and a HHA. Patient has transportation and will arrange for transport home prior to 11am. No further discharge concerns at this time. RNCC available as needed.    Update: Due to high demand [facility] unable to accept patient. [facility] [phone], will see patient starting 2/15/2021.          Bisi Medina RN

## 2021-03-11 NOTE — PROGRESS NOTES
Care Management Follow Up    Length of Stay (days): 2    Expected Discharge Date: 03/12/21     Concerns to be Addressed: discharge planning     Patient plan of care discussed at interdisciplinary rounds: Yes    Anticipated Discharge Disposition: Transitional Care, Home Care, Home     Anticipated Discharge Services:  TCU vs Home with HC  Anticipated Discharge DME: None    Patient/family educated on Medicare website which has current facility and service quality ratings: yes  Education Provided on the Discharge Plan:  yes  Patient/Family in Agreement with the Plan: yes    Referrals Placed by CM/SW:  See below  Private pay costs discussed: Not applicable    Additional Information:  SW followed up on TCU referrals. Pt could potentially discharge home with HC if she makes enough progress in the hospital.     ADDEND 1100: ZOHRA spoke with pt, pt dtr Indu Adler (ph: [phone]) and WOOD Wagoner regarding pt's discharge plan.  After much discussion, the pt has decided to go home with [facility]. The family will be looking into privately hiring an aide to stay with Jigna at discharge - they initially requested a list from ZOHRA of home care agencies but then stated that they had found someone already.     [facility] called [facility] to let them know to disregard referral.     PENDING REFERRALS  [facility]  Phone: [phone]  Fax: [phone]  ZOHRA rec'd a message from [facility] in admissions stating that they can accept the pt.      [facility]  Phone: [phone]  Fax: [phone]    DECLINED  [facility]  [id number]  ZOHRA spoke with Miguel MCCLURE ([id number]) in rehab admissions - they are declining based on bed availability - they have a long wait list right now.    Magdalena Patricio ZOHRA  [facility]  5 Ortho & 8A   Ph: [phone]  Pager: [phone]

## 2021-03-11 NOTE — PLAN OF CARE
Occupational Therapy Discharge Summary    Reason for therapy discharge:    All goals and outcomes met, no further needs identified.    Progress towards therapy goal(s). See goals on Care Plan in [facility] electronic health record for goal details.  OT goals adequate for discharge. SBA for functional transfers, SBA- mod IND with ADL's with fWW.     Therapy recommendation(s):    Continued therapy is recommended.  Rationale/Recommendations:  HHOT for home safety eval as pt is homebound and requires AE and effort to leave home environement.       Update: spoke with daughter on bathroom set-up. Pt stating walker will fit in her bathroom however daughter reporting walker will not fit. PT planning to assess safety with cane vs no AE for bathroom transfers for safety.

## 2021-03-11 NOTE — PROGRESS NOTES
Orthopaedic Surgery Progress Note 03/11/2021    E: No acute events overnight.    S: Pain well controlled. Worked with therapy yesterday; ambulating. Denies new numbness or tingling, chest pain, SOB. Tolerating PO intake. Voiding. Plan of care reviewed and questions answered    O:  Temp: 96.7  F (35.9  C) Temp src: Oral BP: 105/45 Pulse: 63   Resp: 16 SpO2: 94 % O2 Device: None (Room air) Oxygen Delivery: 2 LPM    Exam:  Gen: No acute distress, resting comfortably in bed.  Resp: Non-labored breathing    RLE  - Dressing CDI  - 5/5 quad, TA, gastroc/soleus, EHL, FHL, wiggles toes  - SILT to superficial peroneal, deep peroneal, saphenous, sural, and tibial nerve distributions  - 2+ DP and PT, foot warm and well perfused    Drain output: 65/60/-mL recorded last 3 shifts.    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 Gomez.     Plan:  Activity: Up with assist and assistive devices as needed until independent. Knee ROM as tolerated.  Weight bearing status: WBAT    Antibiotics: Cefazolin x 3 days postop while awaiting culture results  Diet: Regular  DVT prophylaxis: Aspirin 162mg daily x 4 weeks  Elevation: Elevate heels off of bed on pillows, no pillows behind the knee at any time  Wound Care: keep dressing CDI x 7 days postop  Drains: Document output per shift, removal per ortho  Pain management: Orals PRN, IV for breakthrough only  Physical Therapy: Mobilization, ROM, ADL's  Occupational Therapy: ADL's  Labs: Trend Hgb on PODs #1 & 2  Cultures: follow intraop cultures  Consults: PT, OT, Hospitalist, SW - appreciate assistance in caring for this patient throughout the perioperative period  Disposition: anticipate discharge to home with home therapy vs TCU pending final therapy ferdinand Simmons MD 03/11/2021  PGY-4, Orthopaedic Surgery  Pager: [phone]

## 2021-03-12 ENCOUNTER — APPOINTMENT (OUTPATIENT)
Dept: PHYSICAL THERAPY | Facility: CLINIC | Age: 86
DRG: 468 | End: 2021-03-12
Attending: ORTHOPAEDIC SURGERY
Payer: COMMERCIAL

## 2021-03-12 ENCOUNTER — PATIENT OUTREACH (OUTPATIENT)
Dept: CARE COORDINATION | Facility: CLINIC | Age: 86
End: 2021-03-12

## 2021-03-12 VITALS
HEIGHT: 62 IN | WEIGHT: 129.41 LBS | DIASTOLIC BLOOD PRESSURE: 66 MMHG | TEMPERATURE: 96.9 F | RESPIRATION RATE: 16 BRPM | SYSTOLIC BLOOD PRESSURE: 160 MMHG | OXYGEN SATURATION: 97 % | BODY MASS INDEX: 23.81 KG/M2 | HEART RATE: 70 BPM

## 2021-03-12 PROCEDURE — 97110 THERAPEUTIC EXERCISES: CPT | Mod: GP | Performed by: CLINIC/CENTER

## 2021-03-12 PROCEDURE — 99231 SBSQ HOSP IP/OBS SF/LOW 25: CPT | Performed by: INTERNAL MEDICINE

## 2021-03-12 PROCEDURE — 250N000013 HC RX MED GY IP 250 OP 250 PS 637: Performed by: STUDENT IN AN ORGANIZED HEALTH CARE EDUCATION/TRAINING PROGRAM

## 2021-03-12 PROCEDURE — 250N000011 HC RX IP 250 OP 636: Performed by: STUDENT IN AN ORGANIZED HEALTH CARE EDUCATION/TRAINING PROGRAM

## 2021-03-12 PROCEDURE — 250N000013 HC RX MED GY IP 250 OP 250 PS 637: Performed by: INTERNAL MEDICINE

## 2021-03-12 PROCEDURE — 97116 GAIT TRAINING THERAPY: CPT | Mod: GP | Performed by: CLINIC/CENTER

## 2021-03-12 PROCEDURE — 97530 THERAPEUTIC ACTIVITIES: CPT | Mod: GP | Performed by: CLINIC/CENTER

## 2021-03-12 RX ORDER — ESTRADIOL 0.1 MG/G
1 CREAM VAGINAL
Qty: 42.5 G | Refills: 3 | Status: SHIPPED | OUTPATIENT
Start: 2021-03-12 | End: 2024-05-27

## 2021-03-12 RX ADMIN — DOCUSATE SODIUM 50 MG AND SENNOSIDES 8.6 MG 1 TABLET: 8.6; 5 TABLET, FILM COATED ORAL at 08:28

## 2021-03-12 RX ADMIN — DOCUSATE SODIUM 100 MG: 100 CAPSULE, LIQUID FILLED ORAL at 08:29

## 2021-03-12 RX ADMIN — LEVOTHYROXINE SODIUM 75 MCG: 25 TABLET ORAL at 08:28

## 2021-03-12 RX ADMIN — ONDANSETRON 4 MG: 4 TABLET, ORALLY DISINTEGRATING ORAL at 08:23

## 2021-03-12 RX ADMIN — ACETAMINOPHEN 975 MG: 325 TABLET, FILM COATED ORAL at 06:30

## 2021-03-12 RX ADMIN — POLYETHYLENE GLYCOL 3350 17 G: 17 POWDER, FOR SOLUTION ORAL at 08:27

## 2021-03-12 RX ADMIN — ASPIRIN 162 MG: 81 TABLET, DELAYED RELEASE ORAL at 08:29

## 2021-03-12 RX ADMIN — CEFAZOLIN 1 G: 1 INJECTION, POWDER, FOR SOLUTION INTRAMUSCULAR; INTRAVENOUS at 06:45

## 2021-03-12 NOTE — PROGRESS NOTES
Orthopaedic Surgery Progress Note 03/12/2021    E: No acute events overnight.    S: Pain well controlled. Ambulating well. Denies numbness or tingling, chest pain, SOB. Tolerating PO intake. Voiding. Plan of care reviewed and questions answered    O:  Temp: 96.9  F (36.1  C) Temp src: Oral BP: (!) 160/66 Pulse: 70   Resp: 16 SpO2: 97 % O2 Device: None (Room air)      Exam:  Gen: No acute distress, resting comfortably in bed.  Resp: Non-labored breathing    RLE  - Dressing CDI  - 5/5 quad, TA, gastroc/soleus, EHL, FHL, wiggles toes  - SILT to superficial peroneal, deep peroneal, saphenous, sural, and tibial nerve distributions  - 2+ DP and PT, foot warm and well perfused    Drain output: 65/40/-mL recorded last 3 shifts.    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 Gomez.     Plan:  Activity: Up with assist and assistive devices as needed until independent. Knee ROM as tolerated.  Weight bearing status: WBAT    Antibiotics: Cefazolin x 3 days postop while awaiting culture results  Diet: Regular  DVT prophylaxis: Aspirin 162mg daily x 4 weeks  Elevation: Elevate heels off of bed on pillows, no pillows behind the knee at any time  Wound Care: keep dressing CDI x 7 days postop  Drains: Document output per shift, removal per ortho  Pain management: Orals PRN, IV for breakthrough only  Physical Therapy: Mobilization, ROM, ADL's  Occupational Therapy: ADL's  Labs: Trend Hgb on PODs #1 & 2  Cultures: follow intraop cultures  Consults: PT, OT, Hospitalist, SW - appreciate assistance in caring for this patient throughout the perioperative period  Disposition: anticipate discharge to home with home therapy      Jon Simmons MD 03/12/2021  PGY-4, Orthopaedic Surgery  Pager: [phone]

## 2021-03-12 NOTE — PLAN OF CARE
Physical Therapy Discharge Summary    Reason for therapy discharge:    All goals and outcomes met, no further needs identified.    Progress towards therapy goal(s). See goals on Care Plan in [facility] electronic health record for goal details.  Goals met    Therapy recommendation(s):    Continued therapy is recommended.  Rationale/Recommendations:  Pt will benefit from HHPT for home safety evaluation and progression of LE ROM and strength.

## 2021-03-12 NOTE — PLAN OF CARE
"Family would like to pass on to care team: Please have Dr. Gomez sign off personally on any decision to remove drain or change abx course. This has been done against his advice in the past and they would like to ensure his approval. Thank you.      VS: Blood pressure 123/53, pulse 76, temperature 97.5  F (36.4  C), temperature source Oral, resp. rate 16, height 1.575 m (5' 2\"), weight 58.7 kg (129 lb 6.6 oz), SpO2 97 %, not currently breastfeeding.  Denies SOB, CP, N/T, nausea   O2: RA. LS CTA.   Output: Voids w/o difficulty in BR   Last BM: 3/11   Activity: Assist x1/SBA with walker, GB   Skin: Intact ex incision. Removed ACE for skin check, UTV under cast padding   Pain: Managed with scheduled tylenol, 5 mg oxycodone used sparingly   CMS: Intact. AxO   Dressing: CDI   Diet: Regular   LDA: L PIV S/L. R PIV S/L in between abx  JUAN R drain 30 ml out   Equipment: PCDs, CPM, IV pole   Plan: Potential discharge to home with home care tomorrow   Additional Info: Daughter involved with care, passed on above message to care team. Pt's drain was removed too early and pt was taken off of abx too early in past, resulting in recurring infection per family.    Pt family requested at 1845 that pt not be discharged with [facility], they are not comfortable with reviews. They would prefer: [facility] or [facility]. Please pass onto SW tomorrow.         "

## 2021-03-12 NOTE — DISCHARGE SUMMARY
Orthopaedic Surgery Discharge Summary          Name:  Jigna Allen  MRN:  [id number]  YOB: 1935      Date of Admission:  3/9/2021  Date of Discharge::  3/12/2021  Discharge Physician:  Dr. Gomez               Admission Diagnoses:   Infection of prosthetic joint, subsequent encounter [T84.50XD]          Discharge Diagnosis:     Patient Active Problem List    Diagnosis     Status post knee surgery     Infection of prosthetic joint, subsequent encounter     S/P flap graft     Surgical wound, non healing     Open wound of right knee     Undifferentiated pleomorphic sarcoma (H)     Deep vein thrombosis (DVT) prophylaxis prescribed at discharge     History of total knee arthroplasty, right     Pulmonary nodules     Tibial plateau fracture, right     Pseudogout of knee     Acquired varus deformity knee     ACP (advance care planning)     Adjustment disorder with anxious mood     Chronic insomnia     Adjustment disorder with mixed anxiety and depressed mood     Fatigue     Anxiety     Carpal tunnel syndrome of left wrist     Post-traumatic osteoarthritis of both knees     Physical deconditioning     Acquired tight Achilles tendon     Glaucoma suspect     PVD (posterior vitreous detachment)     Pseudophakia, both eyes     Presbyopia     Macrocytosis     Vitamin D deficiency     Lyme disease     Sarcoma of soft tissue (H)     Open wound of knee, leg (except thigh), and ankle, complicated     Osteoarthritis of right knee     Pelvic relaxation     Sarcoma (H)     Rectocele     Patellofemoral pain syndrome     Impaired fasting glucose     Diverticulosis     Contact dermatitis and eczema     Hypothyroidism     Mixed hyperlipidemia             Procedures:   Surgery: revision (second stage reimplantation) right TKA  Date: 3/9/2021          Discharge Medications:     Discharge Medication List as of 3/12/2021 11:19 AM      START taking these medications    Details   acetaminophen (TYLENOL) 325 MG tablet Take 2 tablets  (650 mg) by mouth every 4 hours as needed for other, Disp-40 tablet, R-0, E-Prescribe      aspirin (ASA) 81 MG EC tablet Take 2 tablets (162 mg) by mouth daily, Disp-60 tablet, R-0, E-Prescribe      oxyCODONE (ROXICODONE) 5 MG tablet Take 1 tablet (5 mg) by mouth every 4 hours as needed, Disp-40 tablet, R-0, E-Prescribe      polyethylene glycol (MIRALAX) 17 g packet Take 17 g by mouth daily, Disp-7 packet, R-0, E-Prescribe      senna-docusate (SENOKOT-S/PERICOLACE) 8.6-50 MG tablet Take 1 tablet by mouth 2 times daily, Disp-40 tablet, R-0, E-Prescribe         CONTINUE these medications which have NOT CHANGED    Details   levothyroxine (LEVOTHROID) 75 MCG tablet Take 75 mcg by mouth every morning , Historical      estradiol (ESTRACE) 0.1 MG/GM vaginal cream Place 1 g vaginally three times a weekDisp-42.5 g, [id number]                   Consultations:   Consultation during this admission received from medicine, PT, OT          Brief History of Illness:   87yo with with a history of right knee prosthetic joint infection who previously underwent explantation of the infected right total knee arthroplasty components.  She was treated with a prolonged course of IV antibiotics as directed by the infectious disease team, followed by an antibiotic holiday, followed by repeat cultures and biopsies of the right knee which were negative for persistent infection.  After discussing the risks, benefits, and alternatives of surgery the patient elected to proceed with planned second stage reimplantation of revision right total knee arthroplasty components.       Hospital Course:   The patient was admitted to the hospital after the above listed procedure.  Hospital course was uneventful.  Medicine was consulted for medical comanagement. Patient worked with PT and OT beginning POD#1.  Given her history of prosthetic joint infection, antibiotics were continued for 3 days postoperatively while awaiting the results of intraoperative  cultures, which were ultimately negative.  A drain was left in place postoperatively until the day of discharge.  On 3/12/2021, patient was tolerating a regular diet, voiding on own, had pain well controlled on oral pain medications and was felt to be medically stable for d/c to home.    Exam at time of Discharge:   Gen: No acute distress, resting comfortably in bed.  Resp: Non-labored breathing     RLE  - Dressing CDI  - 5/5 quad, TA, gastroc/soleus, EHL, FHL, wiggles toes  - SILT to superficial peroneal, deep peroneal, saphenous, sural, and tibial nerve distributions  - 2+ DP and PT, foot warm and well perfused    DVT prophylaxis: Aspirin 162 mg daily x 4 weeks  Blood Transfusion: none          Discharge Instructions and Follow-Up:     Discharge Procedure Orders   Home care nursing referral   Referral Priority: Routine Referral Type: Home Health Therapies & Aides   Number of Visits Requested: 1     Reason for your hospital stay   Order Comments: s/p 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 Gomez.     Activity   Order Comments: Your activity upon discharge: activity as tolerated    Activity: Up with assist and assistive devices as needed until independent. Knee ROM as tolerated.  Weight bearing status: WBAT      Elevation: Elevate heels off of bed on pillows, no pillows behind the knee at any time     Order Specific Question Answer Comments   Is discharge order? Yes      When to contact your care team   Order Comments: Call Dr Gomez  if you have any of the following: temperature greater than 101. 5  or less than 96.5 ,  increased shortness of breath, increased drainage, increased swelling or increased pain.     Wound care and dressings   Order Comments: Instructions to care for your wound at home: ice to area for comfort, keep wound clean and dry, may get incision wet in shower but do not soak or scrub, reinforce dressing as needed and remove dressing in 7 days.    Remove dressing in  7 days and leave open to air.     Adult [facility] Follow-up and recommended labs and tests   Order Comments: Follow up with Dr Gomez in 4 weeks for incision check. Clinic phone number is     Appointments on [place] and/or [facility] (with [facility] or [facility] provider or service). Call [phone] if you haven't heard regarding these appointments within 7 days of discharge.     MD face to face encounter   Order Comments: Documentation of Face to Face and Certification for Home Health Services    I certify that patient: Jigna Allen is under my care and that I, or a nurse practitioner or physician's assistant working with me, had a face-to-face encounter that meets the physician face-to-face encounter requirements with this patient on: March 11, 2021.    This encounter with the patient was in whole, or in part, for the following medical condition, which is the primary reason for home health care: Total Knee Arthroplasty.    I certify that, based on my findings, the following services are medically necessary home health services: Nursing and Physical Therapy.    My clinical findings support the need for the above services because: Nurse is needed: For complex aftercare of surgical procedures because the patient needs instruction and cannot perform care on their own due to: Total Knee Arthroplasty and Physical Therapy Services are needed to assess and treat the following functional impairments: balance, mobility and strengthening.    Further, I certify that my clinical findings support that this patient is homebound (i.e. absences from home require considerable and taxing effort and are for medical reasons or [employer] services or infrequently or of short duration when for other reasons) because: Requires assistance of another person or specialized equipment to access medical services because patient: Is unable to operate assistive equipment on their own., Range of motion limitations prevents ability to  exit home safely. and Requires supervision of another for safe transfer...    Based on the above findings. I certify that this patient is confined to the home and needs intermittent skilled nursing care, physical therapy and/or speech therapy.  The patient is under my care, and I have initiated the establishment of the plan of care.  This patient will be followed by a physician who will periodically review the plan of care.  Physician/Provider to provide follow up care: Elisabeth Ko    Attending hospital physician (the Medicare certified [facility] provider): He Gomez MD  Physician Signature: See electronic signature associated with these discharge orders.  Date: 3/11/2021     ABO/Rh Type and Screen     Crutches DME   Order Comments: DME Documentation: Describe the reason for need to support medical necessity: Impaired gait status post knee surgery. I, the undersigned, certify that the above prescribed supplies are medically necessary for this patient and is both reasonable and necessary in reference to accepted standards of medical practice in the treatment of this patient's condition and is not prescribed as a convenience.     Order Specific Question Answer Comments   DME Provider: [facility]    Crutch Type: Standard    Crutches Add On: NA    Length of Need: Lifetime      Cane DME   Order Comments: DME Documentation: Describe the reason for need to support medical necessity: Impaired gait status post knee surgery. I, the undersigned, certify that the above prescribed supplies are medically necessary for this patient and is both reasonable and necessary in reference to accepted standards of medical practice in the treatment of this patient's condition and is not prescribed as a convenience.     Order Specific Question Answer Comments   DME Provider: [facility]    Cane Type: Single Tip    Reminder: Patient can typically get 1 every 5 years      Walker DME   Order Comments: DME Documentation: Describe  the reason for need to support medical necessity: Impaired gait status post knee surgery. I, the undersigned, certify that the above prescribed supplies are medically necessary for this patient and is both reasonable and necessary in reference to accepted standards of medical practice in the treatment of this patient's condition and is not prescribed as a convenience.     Order Specific Question Answer Comments   DME Provider: [facility]    Walker Type: Standard (2 Wheel)    Accessories: N/A      Diet   Order Comments: Follow this diet upon discharge: Orders Placed This Encounter      Advance Diet as Tolerated: Regular Diet Adult     Order Specific Question Answer Comments   Is discharge order? Yes               Discharge Disposition:     Discharged to home      Patient was discussed with Dr. Gomez on day of discharge.    Signed,  Jon Simmons MD  PGY-4, Orthopaedic Surgery

## 2021-03-12 NOTE — PLAN OF CARE
"VS: /60 (BP Location: Right arm)   Pulse 69   Temp 96.1  F (35.6  C) (Oral)   Resp 16   Ht 1.575 m (5' 2\")   Wt 58.7 kg (129 lb 6.6 oz)   SpO2 96%   BMI 23.67 kg/m      O2: 96% on room air. Pt denies Shortness of breath, lung sound clear all field    Output: Voids without  difficulty in BR x 2.   Last BM: 3/11/21   Activity: Assist x1 , SBA with gait belt and walker    Skin: Intact except  Incision on right knee . Removed ACE for skin check, UTV under cast padding   Pain: Pt denies pain. Order in place for scheduled tylenol, 5 mg oxycodone for pain management.   CMS: Intact. Pt alert and oriented x 4.   Dressing: CDI   Diet: Regular   LDA: L PIV S/L. R PIV S/L in between abx  JUAN R drain 15 ml.   Equipment: PCDs, CPM, IV pole and call light    Plan: Possible discharge today    Additional Info:        "

## 2021-03-12 NOTE — PROGRESS NOTES
VS: Stable   O2: Room air saturations 97%.    Output: Up to bathroom to void.    Last BM: Today 3/12/2021   Activity: Up with standby assist and use of walker   Skin: Intact.Dr Gomez changed dressing to right knee before patient went home. Marysol came to unit after checking results of cultures and discontinued the Hemovac per Dr Mancini recommendation.    Pain: Using only tylenol for pain.    CMS: Intact   Dressing: Right knee dressing was changed per Dr Gomez.   Diet: Regular.    LDA: IV SL was discontinued before patient discharged to home.    Equipment: CPM was left here at the hospital prior to discharge.    Plan: Patient to discharge to home per Drs orders.    Additional Info: Pt was taught about discharge medications, numbers to call for questions or concerns and follow up cares. Patient had scripts filled here at the hospital prior to discharge. Patient will discharge to home with . All patient belongings were returned to patient prior to discharge. Pt was escorted to front door in wheelchair to be discharged to home.

## 2021-03-12 NOTE — PROGRESS NOTES
Care Management Discharge Note    Discharge Date: 03/12/21       Discharge Disposition: Transitional Care, Home Care, Home    Discharge Services:      Discharge DME: None    Discharge Transportation: family or friend will provide    Private pay costs discussed: Not applicable    PAS Confirmation Code:    Patient/family educated on Medicare website which has current facility and service quality ratings: yes    Education Provided on the Discharge Plan:  yes  Persons Notified of Discharge Plans: paiernt  Patient/Family in Agreement with the Plan: yes    Handoff Referral Completed: Yes    Additional Information:  This RNCC was on conference call with daughter and patient and daughter regarding home care agency set up for this patient, daughter requesting a different HC agency as she did not like the star rating on current [facility], RNCC agreed to try and locate a another HC Agency, [facility] being one of the agency. Call to [facility] [phone], do not have PT available at this time only nursing. This RNCC was notified that patient discharging with spouse at 1 pm, spoke with patient about current home care provider, ([facility]) and if she still would like me to try and locate another provider before discharge at 1 pm, patient states she is perfectly fine with using [facility], presented IMM for patient signature. Patient signed and is agreeable to her discharge.      Melvi WILLIAMSONN RN CCM  RN Care Coordinator [address] [place] [id number]  [email or web address]   Office: [phone]   Pager: [phone]    To contact Weekend RNCC, dial [phone] and enter job code [phone] at prompt. This pager can not be contacted by text page or outside line.

## 2021-03-12 NOTE — PLAN OF CARE
VSS.A/O x4,very pleasant. Pain manageable with scheduled tylenol and PRN Oxycodone as needed,pt needing sparingly. Ambulates to the bathroom w/ SBA and walker. Steady gait. Able to get in and OOB per self. Dressing CDI,JUAN R still in place. Denies other acute complaints. Plan is discharge home Friday with home care. Family is concerned about HOME care agency that referral was sent,left a note to Care Coordinator so they can address in the am.Follow POC.

## 2021-03-12 NOTE — PROGRESS NOTES
Pt was seen, course reviewed    Pt feels well, states pain controlled, feels comfortable discharging to home today    No c/o chest pain, cough, SOB, abd pain  Last BM was 3/11  Voiding without difficulty    VSS  Alert, fully oriented, in good spirits  Lungs clear  CV rrr  Abd soft  No calf edema    Intra op cultures remain neg      Assessment      s/p 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 Gomez. Pt is doing well.  Intra op cultures neg. No acute medical issues    Plan discharge per ortho

## 2021-03-14 LAB
BACTERIA SPEC CULT: NO GROWTH
Lab: NORMAL
SPECIMEN SOURCE: NORMAL

## 2021-03-15 NOTE — PROGRESS NOTES
[facility]: Post-Discharge Note  SITUATION                                                      Admission:    Admission Date: 03/09/21   Reason for Admission: Infection of prosthetic joint, subsequent encounter  Discharge:   Discharge Date: 03/13/21  Discharge Diagnosis: Infection of prosthetic joint, subsequent encounter    BACKGROUND                                                      85yo with with a history of right knee prosthetic joint infection who previously underwent explantation of the infected right total knee arthroplasty components.  She was treated with a prolonged course of IV antibiotics as directed by the infectious disease team, followed by an antibiotic holiday, followed by repeat cultures and biopsies of the right knee which were negative for persistent infection.  After discussing the risks, benefits, and alternatives of surgery the patient elected to proceed with planned second stage reimplantation of revision right total knee arthroplasty components.    ASSESSMENT      Discharge Assessment  Patient reports symptoms are: Improved  Does the patient have all of their medications?: Yes  Does patient know what their new medications are for?: Yes  Does patient have a follow-up appointment scheduled?: Yes  Does patient have any other questions or concerns?: No    Post-op  Did the patient have surgery or a procedure: Yes  Incision: healing  Drainage: No  Bleeding: none  Fever: No  Chills: No  Redness: No  Warmth: No  Swelling: No  Incision site pain: No  Eating & Drinking: eating and drinking without complaints/concerns  PO Intake: regular diet  Bowel Function: normal  Urinary Status: voiding without complaint/concerns        PLAN                                                      Outpatient Plan:  Follow up with Dr Gomez in 4 weeks for incision check. Clinic phone number is        Future Appointments   Date Time Provider Department Center   8/20/2021 10:00 AM [facility]    8/20/2021 11:00 AM [id number]   8/24/2021  5:00 PM Edouard Bryan MD [facility]           Lizzeth Chawla [facility]

## 2021-03-16 LAB
BACTERIA SPEC CULT: NORMAL
Lab: NORMAL
SPECIMEN SOURCE: NORMAL

## 2021-03-30 ENCOUNTER — TELEPHONE (OUTPATIENT)
Dept: ORTHOPEDICS | Facility: CLINIC | Age: 86
End: 2021-03-30

## 2021-03-30 NOTE — TELEPHONE ENCOUNTER
Called and spoke to pt offer 4/12/21 at Noon with , pt accepted. She has been having swelling in her ankle but no pain or warmth, I advised RICE and keeping an eye on it. She will call back if she has further issues or concerns.

## 2021-03-30 NOTE — TELEPHONE ENCOUNTER
[facility] Call Center    Phone Message    May a detailed message be left on voicemail: yes     Reason for Call: Other: Patient needs post op (DOS 03/09/2021). would like to be seen 04/02/21 04/05/21 or 04/06/21 if possible. Scheduling first avail is in May. Please call pt back at [phone]     Action Taken: Message routed to:  [facility] ([facility]): Ortho    Travel Screening: Not Applicable

## 2021-04-12 ENCOUNTER — ANCILLARY PROCEDURE (OUTPATIENT)
Dept: GENERAL RADIOLOGY | Facility: CLINIC | Age: 86
End: 2021-04-12
Attending: ORTHOPAEDIC SURGERY
Payer: COMMERCIAL

## 2021-04-12 ENCOUNTER — OFFICE VISIT (OUTPATIENT)
Dept: ORTHOPEDICS | Facility: CLINIC | Age: 86
End: 2021-04-12
Payer: COMMERCIAL

## 2021-04-12 DIAGNOSIS — Z96.651 S/P TOTAL KNEE ARTHROPLASTY, RIGHT: ICD-10-CM

## 2021-04-12 DIAGNOSIS — T84.50XD INFECTION OF PROSTHETIC JOINT, SUBSEQUENT ENCOUNTER: ICD-10-CM

## 2021-04-12 DIAGNOSIS — Z96.651 S/P TOTAL KNEE ARTHROPLASTY, RIGHT: Primary | ICD-10-CM

## 2021-04-12 DIAGNOSIS — C49.9 SARCOMA OF SOFT TISSUE (H): Primary | ICD-10-CM

## 2021-04-12 PROCEDURE — 73560 X-RAY EXAM OF KNEE 1 OR 2: CPT | Mod: RT | Performed by: RADIOLOGY

## 2021-04-12 PROCEDURE — 99024 POSTOP FOLLOW-UP VISIT: CPT | Performed by: ORTHOPAEDIC SURGERY

## 2021-04-12 NOTE — PROGRESS NOTES
Attending note:    Care Team:  Annamarie Bryan Sarah Lofgren ID,  Elisabeth Ordoñez,  PCP ( [place] [facility])    DX:   1. Right calf high grade MFH.   2. 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 R knee DJD  4. 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 neoadjuvant chemotherapy, surgical resection Feb 15, 2010. Complications post-op of skin loss with draining seroma, treated with I & D and systemic antibiotics. She had 4 cycles of doxil/ifos finishing about Feb 2010. There was <10% viable cells at surgery. She finished post op XRT about June 2010  2. 6/12/18, R TKA (Jason) [facility]  3. 1/27/2020, skin popliteal fossa punch bx :  No sarcoma  4. 3/27/2020 Debridement right thigh wound and placement of VAC, Dr Hernandez, [facility]  5. 3/30/2020, R TKA aspiration, WBC 17k,, PMNs 94%, MMB: Peptoniphilus asaccharolyticus, Streptococcus constellatus, Clostridium ramosum    6. 4/20/2020, Coverage of right lower extremity popliteal wound with free left anterolateral thigh fasciocutaneous flap with 3 perforators, size 25 x 8 cm. Dr Agosto, [facility]. MMB: Gemella morbillorum, Parvimonas micra, Peptoniphilus asaccharolyticus, Prevotella species. ABx Ertapenem.   7. 6/2/2020, explantation of right total knee arthroplasty with placement of Vanco/gentamicin/ceftazidime PMMA antibiotic spacer knee right, Dr. Gomez, [facility]. MMB: NGTD   8. 3/9/2021, Removal antibiotic spacer and second stage revision reimplantation of right rotating-hinge knee arthroplasty (Jason) [facility], intraoperative cultures x5.    Postoperative knee replacement follow-up clinic visit    Jigna Allen is doing amazingly well after last surgery listed above.  She returns for follow-up 1 month after second stage reconstruction of her knee.  I had initially recommended amputation given the radiation damage, open wound with ulceration and drainage as well as the infected knee  arthroplasty.  She underwent plastic surgical reconstruction of the posterior knee wound followed by two-stage exchange arthroplasty.  After these 3 major operation she is doing extremely well and quite happy with the outcome of her limb salvage procedures.    Preoperative knee pain is  Was resolved  Analgesic medication: none    EXAM:  Incision healing, clean and dry.  Joint range of motion 0-110 degrees, pain free  Effusion: none  Periarticular swelling or leg edema: 1+  Peroneal and tibial nerve function: nl  Gait: Normal                  Intraoperative cultures:  all negative     IMAGING:  Radiographs demonstrate the knee implant in satisfactory position without evidence of any complication.      IMPRESSION:  Excellent outcome to date after knee replacement.     PLAN:    Continue range of motion exercises and stretching.    Quadriceps strengthening exercises.    May be weight bearing as tolerated.    Discontinue DVT prophylaxis meds (i.e., warfarin or ASA) if not required for any other reason.    Refill of:  (meds listed above): not needed.    Patient given instructions re: prophylactic antibiotic recommendations during dental work.    Next follow-up visit at 1 year after surgery or sooner as needed.       He Gomez M.D.  [employer] Family Professor  Oncology and Adult Reconstructive Surgery  Dept Orthopaedic Surgery, [facility]  [phone] office  [email or web address]    Answers for HPI/ROS submitted by the patient on 4/9/2021   General Symptoms: No  Skin Symptoms: No  HENT Symptoms: No  EYE SYMPTOMS: No  HEART SYMPTOMS: No  LUNG SYMPTOMS: No  INTESTINAL SYMPTOMS: No  URINARY SYMPTOMS: No  GYNECOLOGIC SYMPTOMS: No  BREAST SYMPTOMS: No  SKELETAL SYMPTOMS: No  BLOOD SYMPTOMS: No  NERVOUS SYSTEM SYMPTOMS: No  MENTAL HEALTH SYMPTOMS: No

## 2021-04-12 NOTE — LETTER
4/12/2021         RE: Jigna Allen  [address] [place] [address]        Dear Colleague,    Thank you for referring your patient, Jigna Allen, to the [facility] [place]. Please see a copy of my visit note below.    Attending note:    Care Team:  Annamarie Bryan Sarah Lofgren ID,  Elisabeth Ordoñez,  PCP ( [place] [facility])    DX:   1. Right calf high grade MFH.   2. 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 R knee DJD  4. 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 neoadjuvant chemotherapy, surgical resection Feb 15, 2010. Complications post-op of skin loss with draining seroma, treated with I & D and systemic antibiotics. She had 4 cycles of doxil/ifos finishing about Feb 2010. There was <10% viable cells at surgery. She finished post op XRT about June 2010  2. 6/12/18, R TKA (Jason) [facility]  3. 1/27/2020, skin popliteal fossa punch bx :  No sarcoma  4. 3/27/2020 Debridement right thigh wound and placement of VAC, Dr Hernandez, [facility]  5. 3/30/2020, R TKA aspiration, WBC 17k,, PMNs 94%, MMB: Peptoniphilus asaccharolyticus, Streptococcus constellatus, Clostridium ramosum    6. 4/20/2020, Coverage of right lower extremity popliteal wound with free left anterolateral thigh fasciocutaneous flap with 3 perforators, size 25 x 8 cm. Dr Agosto, [facility]. MMB: Gemella morbillorum, Parvimonas micra, Peptoniphilus asaccharolyticus, Prevotella species. ABx Ertapenem.   7. 6/2/2020, explantation of right total knee arthroplasty with placement of Vanco/gentamicin/ceftazidime PMMA antibiotic spacer knee right, Dr. Gomez, [facility]. MMB: NGTD   8. 3/9/2021, Removal antibiotic spacer and second stage revision reimplantation of right rotating-hinge knee arthroplasty (Jason) [facility], intraoperative cultures x5.    Postoperative knee replacement follow-up clinic visit    Jigna RICHARD Tiffany is doing amazingly  well after last surgery listed above.  She returns for follow-up 1 month after second stage reconstruction of her knee.  I had initially recommended amputation given the radiation damage, open wound with ulceration and drainage as well as the infected knee arthroplasty.  She underwent plastic surgical reconstruction of the posterior knee wound followed by two-stage exchange arthroplasty.  After these 3 major operation she is doing extremely well and quite happy with the outcome of her limb salvage procedures.    Preoperative knee pain is  Was resolved  Analgesic medication: none    EXAM:  Incision healing, clean and dry.  Joint range of motion 0-110 degrees, pain free  Effusion: none  Periarticular swelling or leg edema: 1+  Peroneal and tibial nerve function: nl  Gait: Normal                  Intraoperative cultures:  all negative     IMAGING:  Radiographs demonstrate the knee implant in satisfactory position without evidence of any complication.      IMPRESSION:  Excellent outcome to date after knee replacement.     PLAN:    Continue range of motion exercises and stretching.    Quadriceps strengthening exercises.    May be weight bearing as tolerated.    Discontinue DVT prophylaxis meds (i.e., warfarin or ASA) if not required for any other reason.    Refill of:  (meds listed above): not needed.    Patient given instructions re: prophylactic antibiotic recommendations during dental work.    Next follow-up visit at 1 year after surgery or sooner as needed.       He Gomez M.D.  [employer] Family Professor  Oncology and Adult Reconstructive Surgery  Dept Orthopaedic Surgery, [facility]  [phone] office  [email or web address]    Answers for HPI/ROS submitted by the patient on 4/9/2021   General Symptoms: No  Skin Symptoms: No  HENT Symptoms: No  EYE SYMPTOMS: No  HEART SYMPTOMS: No  LUNG SYMPTOMS: No  INTESTINAL SYMPTOMS: No  URINARY SYMPTOMS: No  GYNECOLOGIC SYMPTOMS: No  BREAST SYMPTOMS: No  SKELETAL  SYMPTOMS: No  BLOOD SYMPTOMS: No  NERVOUS SYSTEM SYMPTOMS: No  MENTAL HEALTH SYMPTOMS: No

## 2021-04-12 NOTE — NURSING NOTE
Chief Complaint   Patient presents with     Surgical Followup     5 wk post-op right total knee hinge revision DOS 3/9/21 // pt's been doing PT and is happy with the surgery        86 year old  1935               Pain Assessment  Patient Currently in Pain: No(no pain per pt)                 [facility] - [place], [place] - [address]  [id number] - [place], [place] - [address] AT [facility] [address]  [facility] - [place], [place] - [address]        Allergies   Allergen Reactions     Colchicine      Other reaction(s): Gastrointestinal  diarrhea     Niacin      skin rash     Ferrous Sulfate Rash           Current Outpatient Medications   Medication     acetaminophen (TYLENOL) 325 MG tablet     aspirin (ASA) 81 MG EC tablet     estradiol (ESTRACE) 0.1 MG/GM vaginal cream     levothyroxine (LEVOTHROID) 75 MCG tablet     oxyCODONE (ROXICODONE) 5 MG tablet     polyethylene glycol (MIRALAX) 17 g packet     senna-docusate (SENOKOT-S/PERICOLACE) 8.6-50 MG tablet     No current facility-administered medications for this visit.

## 2021-07-15 ENCOUNTER — TELEPHONE (OUTPATIENT)
Dept: ORTHOPEDICS | Facility: CLINIC | Age: 86
End: 2021-07-15

## 2021-07-15 DIAGNOSIS — Z96.651 S/P TOTAL KNEE ARTHROPLASTY, RIGHT: Primary | ICD-10-CM

## 2021-07-15 DIAGNOSIS — T84.50XD INFECTION OF PROSTHETIC JOINT, SUBSEQUENT ENCOUNTER: ICD-10-CM

## 2021-07-15 RX ORDER — AMOXICILLIN 500 MG/1
TABLET, FILM COATED ORAL
Qty: 12 TABLET | Refills: 3 | Status: SHIPPED | OUTPATIENT
Start: 2021-07-15 | End: 2022-02-21

## 2021-07-15 NOTE — TELEPHONE ENCOUNTER
RN called and spoke with patient. Told patient RN will send the script to her pharmacy. [facility]. RN told he will send 3 refills. Gave her the instructions. Patient expressed understanding.  RN then send script per standing order.    Bob Mcmillan RN        [facility] Call Center    Phone Message    May a detailed message be left on voicemail: yes     Reason for Call: Other: Patient will be having dental work done on 07/19 and 12/20. She is wondering if she can get the anitbiotics for both dates now so she won't have to go back and get it in December. Pharmacy: [facility] [phone]     Action Taken: Message routed to:  [facility] ([facility]): Orthopedics    Travel Screening: Not Applicable

## 2021-09-04 ENCOUNTER — HEALTH MAINTENANCE LETTER (OUTPATIENT)
Age: 86
End: 2021-09-04

## 2021-10-07 DIAGNOSIS — C49.9 SARCOMA OF SOFT TISSUE (H): Primary | ICD-10-CM

## 2021-10-12 ENCOUNTER — LAB (OUTPATIENT)
Dept: LAB | Facility: CLINIC | Age: 86
End: 2021-10-12
Attending: INTERNAL MEDICINE
Payer: COMMERCIAL

## 2021-10-12 ENCOUNTER — ANCILLARY PROCEDURE (OUTPATIENT)
Dept: CT IMAGING | Facility: CLINIC | Age: 86
End: 2021-10-12
Attending: INTERNAL MEDICINE
Payer: COMMERCIAL

## 2021-10-12 DIAGNOSIS — E03.9 HYPOTHYROIDISM, UNSPECIFIED TYPE: ICD-10-CM

## 2021-10-12 DIAGNOSIS — Z98.890 STATUS POST KNEE SURGERY: ICD-10-CM

## 2021-10-12 DIAGNOSIS — R91.8 PULMONARY NODULES: ICD-10-CM

## 2021-10-12 DIAGNOSIS — C49.9 SARCOMA OF SOFT TISSUE (H): ICD-10-CM

## 2021-10-12 LAB
ALBUMIN SERPL-MCNC: 3.5 G/DL (ref 3.4–5)
ALP SERPL-CCNC: 92 U/L (ref 40–150)
ALT SERPL W P-5'-P-CCNC: 18 U/L (ref 0–50)
ANION GAP SERPL CALCULATED.3IONS-SCNC: 6 MMOL/L (ref 3–14)
AST SERPL W P-5'-P-CCNC: 13 U/L (ref 0–45)
BASOPHILS # BLD AUTO: 0 10E3/UL (ref 0–0.2)
BASOPHILS NFR BLD AUTO: 0 %
BILIRUB SERPL-MCNC: 0.8 MG/DL (ref 0.2–1.3)
BUN SERPL-MCNC: 24 MG/DL (ref 7–30)
CALCIUM SERPL-MCNC: 9 MG/DL (ref 8.5–10.1)
CHLORIDE BLD-SCNC: 106 MMOL/L (ref 94–109)
CO2 SERPL-SCNC: 28 MMOL/L (ref 20–32)
CREAT SERPL-MCNC: 0.97 MG/DL (ref 0.52–1.04)
EOSINOPHIL # BLD AUTO: 0.1 10E3/UL (ref 0–0.7)
EOSINOPHIL NFR BLD AUTO: 1 %
ERYTHROCYTE [DISTWIDTH] IN BLOOD BY AUTOMATED COUNT: 13.2 % (ref 10–15)
GFR SERPL CREATININE-BSD FRML MDRD: 54 ML/MIN/1.73M2
GLUCOSE BLD-MCNC: 100 MG/DL (ref 70–99)
HCT VFR BLD AUTO: 41.2 % (ref 35–47)
HGB BLD-MCNC: 13.3 G/DL (ref 11.7–15.7)
IMM GRANULOCYTES # BLD: 0 10E3/UL
IMM GRANULOCYTES NFR BLD: 0 %
LYMPHOCYTES # BLD AUTO: 1.4 10E3/UL (ref 0.8–5.3)
LYMPHOCYTES NFR BLD AUTO: 26 %
MCH RBC QN AUTO: 30.7 PG (ref 26.5–33)
MCHC RBC AUTO-ENTMCNC: 32.3 G/DL (ref 31.5–36.5)
MCV RBC AUTO: 95 FL (ref 78–100)
MONOCYTES # BLD AUTO: 0.5 10E3/UL (ref 0–1.3)
MONOCYTES NFR BLD AUTO: 9 %
NEUTROPHILS # BLD AUTO: 3.5 10E3/UL (ref 1.6–8.3)
NEUTROPHILS NFR BLD AUTO: 64 %
NRBC # BLD AUTO: 0 10E3/UL
NRBC BLD AUTO-RTO: 0 /100
PLATELET # BLD AUTO: 216 10E3/UL (ref 150–450)
POTASSIUM BLD-SCNC: 4.9 MMOL/L (ref 3.4–5.3)
PROT SERPL-MCNC: 7.3 G/DL (ref 6.8–8.8)
RBC # BLD AUTO: 4.33 10E6/UL (ref 3.8–5.2)
SODIUM SERPL-SCNC: 140 MMOL/L (ref 133–144)
WBC # BLD AUTO: 5.5 10E3/UL (ref 4–11)

## 2021-10-12 PROCEDURE — 71250 CT THORAX DX C-: CPT | Mod: GC | Performed by: RADIOLOGY

## 2021-10-12 PROCEDURE — 85025 COMPLETE CBC W/AUTO DIFF WBC: CPT | Performed by: PATHOLOGY

## 2021-10-12 PROCEDURE — 36415 COLL VENOUS BLD VENIPUNCTURE: CPT | Performed by: PATHOLOGY

## 2021-10-12 PROCEDURE — 80053 COMPREHEN METABOLIC PANEL: CPT | Performed by: PATHOLOGY

## 2021-10-12 PROCEDURE — 84443 ASSAY THYROID STIM HORMONE: CPT | Performed by: PATHOLOGY

## 2021-10-12 NOTE — PROGRESS NOTES
10-13-21     I saw Jigna Allen for f/u of a sarcoma of the right calf.      Background: In brief, she was doing well until August 2009. Then after doing a lot of walking while on vacation, she developed some discomfort and swelling in the proximal right calf. 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   She had 4 cycles of doxil/ifos finishing, about Feb 2010. There was <10% viable cells at the time of surgery.   She finished post op XRT about June 2010. She did have a post -op staph infection and had some subsequent wound healing problems.  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  This is doing quite well w no pain.  She can walk quite a bit and walks at least 30 min once a day.      She has a rectocoele and found a pessary very effective.  Occasional incontinence. She is going to start some pelvic therapy through [facility].  -        Interval history:     She had R knee surgery in June 2021 due to infection.     Her  is in hospital and is going to home hospice today Parkinsons and RCC.  She has been caring for him.    She had a TKR and skin graft in March 2021.  She still has some balance / gait issues and will f/u w Dr Gomez after her husbands situation quiets down.    She is covid careful and had all 3 shots.    She remains on 75 mgc/d synthroid.      She is wearing hearing aids again.     Sleeping is not great; her  is pretty restless at night; she may do a sleep check at some point.    Her mood is actually OK now.    The rest of a 10 point ROS is otherwise negative.       -  Background PMH, FH,SH   PH notable for rectocele, diverticulosis, eczema, hyperlipidemia, hypothyroidism, one episode of documented leukopenia. She had cataracts removed.   History of patellofemoral pain syndrome   FH notable for a cancer in the father, uncle, and some cousins and also glioblastoma in a brother. Paternal  "grandmother had stomach cancer. Her father had testicular cancer and  at age 87.   She has two adult children. 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 She does not smoke or abuse alcohol or other drugs.      ALLERGIES: She describes allergies to Niacin and ferrous sulfate manifested as a red rash.   -    On exam she appeared comfortable w normal affect in a wheelchair  /75 (BP Location: Right arm, Patient Position: Chair, Cuff Size: Adult Regular)   Pulse 89   Temp 98  F (36.7  C)   Resp 14   Ht 1.575 m (5' 2.01\")   Wt 60.6 kg (133 lb 8 oz)   SpO2 99%   BMI 24.41 kg/m    HEENT full EOMs  NECK no obvious goiter or mass.  LUNG no resp distress  NEURO normal mentation and speech; Romberg actually pretty good as was heel walking.  EARS decreased hearing  PSYCH mood good    -  Creat 0.97, LFT OK, CBC OK.    -  I reviewed the new CT images of 10-12-21 and there is nothing concern though stable lung nodules remain c/w imaging 3 years ago.    Formal read:  IMPRESSION:   1. Stable appearance of the focal tree-in-bud pulmonary opacities in  the right upper lobe and angular pulmonary nodule of the right lower  lobe, since at least 2018.  2. Other sub-6 mm pulmonary nodules are stable.  3. No new pulmonary nodules.    -     Assessment and Plan: It has now been >11 years since last sarcoma treatment. She unfortunately developed a chronic wound healing issue post adjuvant radiation, and required a wound excision and flap in 2020 and then a spacer w antibiotics and had a TKR 2021.     She feels her balance is not great though her Romberg is fairly good. She will f/u w Dr Gomez after the current situation with her  resolves.    Her lung imaging has been stable for 3 years so at this point we will see her prn.    She will call if she has other questions.      Edouard Bryan M.D.  Professor  Hematology, Oncology and Transplantation  "

## 2021-10-13 ENCOUNTER — ONCOLOGY VISIT (OUTPATIENT)
Dept: ONCOLOGY | Facility: CLINIC | Age: 86
End: 2021-10-13
Attending: INTERNAL MEDICINE
Payer: COMMERCIAL

## 2021-10-13 VITALS
HEART RATE: 89 BPM | RESPIRATION RATE: 14 BRPM | OXYGEN SATURATION: 99 % | SYSTOLIC BLOOD PRESSURE: 135 MMHG | BODY MASS INDEX: 24.56 KG/M2 | HEIGHT: 62 IN | WEIGHT: 133.5 LBS | TEMPERATURE: 98 F | DIASTOLIC BLOOD PRESSURE: 75 MMHG

## 2021-10-13 DIAGNOSIS — R91.8 PULMONARY NODULES: ICD-10-CM

## 2021-10-13 DIAGNOSIS — E03.9 HYPOTHYROIDISM, UNSPECIFIED TYPE: ICD-10-CM

## 2021-10-13 DIAGNOSIS — Z98.890 STATUS POST KNEE SURGERY: ICD-10-CM

## 2021-10-13 DIAGNOSIS — C49.9 SARCOMA OF SOFT TISSUE (H): Primary | ICD-10-CM

## 2021-10-13 LAB — TSH SERPL DL<=0.005 MIU/L-ACNC: 0.72 MU/L (ref 0.4–4)

## 2021-10-13 PROCEDURE — G0463 HOSPITAL OUTPT CLINIC VISIT: HCPCS

## 2021-10-13 PROCEDURE — 99213 OFFICE O/P EST LOW 20 MIN: CPT | Performed by: INTERNAL MEDICINE

## 2021-10-13 ASSESSMENT — MIFFLIN-ST. JEOR: SCORE: 1008.93

## 2021-10-13 ASSESSMENT — PAIN SCALES - GENERAL: PAINLEVEL: NO PAIN (0)

## 2021-10-13 NOTE — NURSING NOTE
"Oncology Rooming Note    October 13, 2021 3:18 PM   Jigna Allen is a 86 year old female who presents for:    Chief Complaint   Patient presents with     Oncology Clinic Visit     [facility] RETURN - SARCOMA     Initial Vitals: /75 (BP Location: Right arm, Patient Position: Chair, Cuff Size: Adult Regular)   Pulse 89   Temp 98  F (36.7  C)   Resp 14   Ht 1.575 m (5' 2.01\")   Wt 60.6 kg (133 lb 8 oz)   SpO2 99%   BMI 24.41 kg/m   Estimated body mass index is 24.41 kg/m  as calculated from the following:    Height as of this encounter: 1.575 m (5' 2.01\").    Weight as of this encounter: 60.6 kg (133 lb 8 oz). Body surface area is 1.63 meters squared.  No Pain (0) Comment: Data Unavailable   No LMP recorded. Patient is postmenopausal.  Allergies reviewed: Yes  Medications reviewed: Yes    Medications: Medication refills not needed today.  Pharmacy name entered into EPIC:    [facility] - [place], [place] - [address]  [id number] - [place], [place] - [address] AT [facility] [address]  [facility] - [place], [place] - [address]    Clinical concerns: No new concerns. Randi was notified.      Sami Linares LPN            "

## 2021-10-13 NOTE — LETTER
10/13/2021         RE: Jigna Allen  [address] [place] [address]        Dear Colleague,    Thank you for referring your patient, Jigna Allen, to the [facility]. Please see a copy of my visit note below.      10-13-21     I saw Jigna Allen for f/u of a sarcoma of the right calf.      Background: In brief, she was doing well until August 2009. Then after doing a lot of walking while on vacation, she developed some discomfort and swelling in the proximal right calf. 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   She had 4 cycles of doxil/ifos finishing, about Feb 2010. There was <10% viable cells at the time of surgery.   She finished post op XRT about June 2010. She did have a post -op staph infection and had some subsequent wound healing problems.  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  This is doing quite well w no pain.  She can walk quite a bit and walks at least 30 min once a day.      She has a rectocoele and found a pessary very effective.  Occasional incontinence. She is going to start some pelvic therapy through [facility].  -        Interval history:     She had R knee surgery in June 2021 due to infection.     Her  is in hospital and is going to home hospice today Parkinsons and RCC.  She has been caring for him.    She had a TKR and skin graft in March 2021.  She still has some balance / gait issues and will f/u w Dr Gomez after her husbands situation quiets down.    She is covid careful and had all 3 shots.    She remains on 75 mgc/d synthroid.      She is wearing hearing aids again.     Sleeping is not great; her  is pretty restless at night; she may do a sleep check at some point.    Her mood is actually OK now.    The rest of a 10 point ROS is otherwise negative.       -  Background PMH, FH,SH   PH notable for rectocele, diverticulosis, eczema,  "hyperlipidemia, hypothyroidism, one episode of documented leukopenia. She had cataracts removed.   History of patellofemoral pain syndrome   FH notable for a cancer in the father, uncle, and some cousins and also glioblastoma in a brother. Paternal grandmother had stomach cancer. Her father had testicular cancer and  at age 87.   She has two adult children. 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 She does not smoke or abuse alcohol or other drugs.      ALLERGIES: She describes allergies to Niacin and ferrous sulfate manifested as a red rash.   -    On exam she appeared comfortable w normal affect in a wheelchair  /75 (BP Location: Right arm, Patient Position: Chair, Cuff Size: Adult Regular)   Pulse 89   Temp 98  F (36.7  C)   Resp 14   Ht 1.575 m (5' 2.01\")   Wt 60.6 kg (133 lb 8 oz)   SpO2 99%   BMI 24.41 kg/m    HEENT full EOMs  NECK no obvious goiter or mass.  LUNG no resp distress  NEURO normal mentation and speech; Romberg actually pretty good as was heel walking.  EARS decreased hearing  PSYCH mood good    -  Creat 0.97, LFT OK, CBC OK.    -  I reviewed the new CT images of 10-12-21 and there is nothing concern though stable lung nodules remain c/w imaging 3 years ago.    Formal read:  IMPRESSION:   1. Stable appearance of the focal tree-in-bud pulmonary opacities in  the right upper lobe and angular pulmonary nodule of the right lower  lobe, since at least 2018.  2. Other sub-6 mm pulmonary nodules are stable.  3. No new pulmonary nodules.    -     Assessment and Plan: It has now been >11 years since last sarcoma treatment. She unfortunately developed a chronic wound healing issue post adjuvant radiation, and required a wound excision and flap in 2020 and then a spacer w antibiotics and had a TKR 2021.     She feels her balance is not great though her Romberg is fairly good. She will f/u w Dr Gomez after the current situation with her  " resolves.    Her lung imaging has been stable for 3 years so at this point we will see her prn.    She will call if she has other questions.      Edouard Bryan M.D.  Professor  Hematology, Oncology and Transplantation      Again, thank you for allowing me to participate in the care of your patient.        Sincerely,        Edouard Bryan MD

## 2021-10-26 ENCOUNTER — TELEPHONE (OUTPATIENT)
Dept: ORTHOPEDICS | Facility: CLINIC | Age: 86
End: 2021-10-26

## 2021-10-26 DIAGNOSIS — M79.89 LEG SWELLING: Primary | ICD-10-CM

## 2021-10-26 DIAGNOSIS — E03.9 HYPOTHYROID: Primary | ICD-10-CM

## 2021-10-26 DIAGNOSIS — Z96.651 S/P TOTAL KNEE ARTHROPLASTY, RIGHT: ICD-10-CM

## 2021-10-26 NOTE — TELEPHONE ENCOUNTER
"Called and spoke with agus and her daughters.  She has 3 wk hx of swelling in ankle, and \"frumpy\" walking.  No injury, temp, pain, swelling, warmth or stiffness.      Also expressed condolences re her 's recent death.    Advised:  1. Check ESR/CRP  2. US of RLE to rule out DVT  3. Clinic appt with me,  If labs are elevated, appt within 1 week for R knee aspiration.  If labs are normal, then in next 3-4 weeks is fine.  4. I will ask clinic staff to call pt and setup above.    MD Pelon Cid Family Professor  Oncology and Adult Reconstructive Surgery  Dept Orthopaedic Surgery, [facility]  [phone] office, [phone] pager  [email or web address]    "

## 2021-10-26 NOTE — TELEPHONE ENCOUNTER
[facility] Call Center    Phone Message    May a detailed message be left on voicemail: yes     Reason for Call: Other: Patient has been having swelling in her Right leg for two weeks, she is concerned of her infection returning. Dr Cervantes advised that she call to Dr Gomez for appt, sched does not have appt available until 01/2022. Please call patient back and advise, her daughter was on call with her. Pt would like a phone call. Stated she is not using My Chart at this time.      Action Taken: Message routed to:  [facility] ([facility]): Ortho    Travel Screening: Not Applicable

## 2021-10-27 ENCOUNTER — ANCILLARY PROCEDURE (OUTPATIENT)
Dept: ULTRASOUND IMAGING | Facility: CLINIC | Age: 86
End: 2021-10-27
Attending: ORTHOPAEDIC SURGERY
Payer: COMMERCIAL

## 2021-10-27 ENCOUNTER — LAB (OUTPATIENT)
Dept: LAB | Facility: CLINIC | Age: 86
End: 2021-10-27
Attending: ORTHOPAEDIC SURGERY
Payer: COMMERCIAL

## 2021-10-27 DIAGNOSIS — M79.89 LEG SWELLING: ICD-10-CM

## 2021-10-27 DIAGNOSIS — E03.9 HYPOTHYROID: ICD-10-CM

## 2021-10-27 LAB
CRP SERPL-MCNC: <2.9 MG/L (ref 0–8)
ERYTHROCYTE [SEDIMENTATION RATE] IN BLOOD BY WESTERGREN METHOD: 12 MM/HR (ref 0–30)
TSH SERPL DL<=0.005 MIU/L-ACNC: 0.68 MU/L (ref 0.4–4)

## 2021-10-27 PROCEDURE — 93971 EXTREMITY STUDY: CPT | Mod: RT | Performed by: RADIOLOGY

## 2021-10-27 PROCEDURE — 36415 COLL VENOUS BLD VENIPUNCTURE: CPT | Performed by: PATHOLOGY

## 2021-10-27 PROCEDURE — 86140 C-REACTIVE PROTEIN: CPT | Performed by: PATHOLOGY

## 2021-10-27 PROCEDURE — 84443 ASSAY THYROID STIM HORMONE: CPT | Performed by: PATHOLOGY

## 2021-10-27 PROCEDURE — 85652 RBC SED RATE AUTOMATED: CPT | Performed by: PATHOLOGY

## 2021-10-27 NOTE — TELEPHONE ENCOUNTER
Follow-up call to Jigna after she had spoken with Dr. Gomez. US and labs scheduled for tomorrow, 10/27, confirmed appt [place] at [facility] [place].  This RN discussed if there are any concerns with her US, they will hold her and contact Dr. Gomez.  Otherwise this RN will monitor her lab results and assess if she needs to be seen within a week or 3-4 wks.   Jigna verbalized understanding. Encouraged her  to reach out to this RN with questions.    ARSALAN Greenwood, RN  RN Care Coordinator, Dr. Jason MOSER [facility]

## 2021-10-28 ENCOUNTER — TELEPHONE (OUTPATIENT)
Dept: OBGYN | Facility: CLINIC | Age: 86
End: 2021-10-28

## 2021-10-28 NOTE — TELEPHONE ENCOUNTER
Call received from patient and her daughter wanting to get in touch with Dr. Santoro to schedule surgery that was recommended for pelvic organ prolapse at last visit in Feb. 2021. Pt has many questions regarding type of surgery, recovery period, and what to expect. No case orders in Epic, pt cannot recall exact procedure.     Offered telephone visit with Dr. Santoro to discuss further. Pt accepted. Scheduled 11/1 at 3:00pm. Pt confirmed availability and denied further questions/concerns.

## 2021-10-28 NOTE — RESULT ENCOUNTER NOTE
Dear Ms. Tiffany:    Jigna, your laboratory test showed no evidence of an infectious process in your leg and the ultrasound test is negative for a blood clot.  This is all good news for you.      I think the leg swelling is likely either related to the repeated operations and the prior history of radiation for your tumor many years ago, or perhaps related to problems outside of your leg such as heart issues.  It is more likely to be related to your heart if the contralateral left leg is swollen.  If not, then I suggest we try compressive stocking on your right lower extremity and I can ask our nurse Nancy to arrange for this appliance.      He Gomez MD  10/28/2021  8:10 AM

## 2021-11-01 ENCOUNTER — PREP FOR PROCEDURE (OUTPATIENT)
Dept: UROLOGY | Facility: CLINIC | Age: 86
End: 2021-11-01

## 2021-11-01 ENCOUNTER — VIRTUAL VISIT (OUTPATIENT)
Dept: UROLOGY | Facility: CLINIC | Age: 86
End: 2021-11-01
Attending: OBSTETRICS & GYNECOLOGY
Payer: COMMERCIAL

## 2021-11-01 DIAGNOSIS — N81.4 UTEROVAGINAL PROLAPSE: Primary | ICD-10-CM

## 2021-11-01 DIAGNOSIS — N81.6 RECTOCELE: ICD-10-CM

## 2021-11-01 DIAGNOSIS — N81.11 CYSTOCELE, MIDLINE: ICD-10-CM

## 2021-11-01 DIAGNOSIS — N39.41 URGE INCONTINENCE: ICD-10-CM

## 2021-11-01 PROCEDURE — 99214 OFFICE O/P EST MOD 30 MIN: CPT | Mod: TEL | Performed by: OBSTETRICS & GYNECOLOGY

## 2021-11-01 NOTE — LETTER
11/1/2021       RE: Jigna Allen  [address] [place] [address]     Dear Colleague,    Thank you for referring your patient, Jigna Allen, to the [facility] [place] at [facility]. Please see a copy of my visit note below.    Jigna Allen is a 86 year old year old who is being evaluated via a billable telephone visit    Subjective     Jigna Allen is a 86 year old year old who presents for a telephone visit today to discuss surgical options to manage her pelvic organ prolapse. I last saw her on 2/15/21 and fitted her with #4 ring with support pessary for her stage 3 prolapse and also briefly discussed surgical options. In her case, because she was not sexually active and doesn't intend to be, we had discussed colpocleisis.Today she says that her pessary is harder to put in and out but once in, it is comfortable. Today she wants to hear about colpocleisis surgery that we had previously discussed. Her  passed away recently (oct 23rd this year) and is still mourning . He had parkinsons and kidney cancer.     This note was copied and pasted from Dr Santoro on 2/1/21      HPI:  Jigna Allen is a 86 year old female who presents for evaluation of a known uterovaginal prolapse.        Pelvic Organ Prolapse Symptoms  Has had bulge/protrusion vaginally for years and in fact was using a pessary for about 3 years through [facility] but stopped using it last March after she was placed in rehab for an infected knee ( requiring removal of knee replacement hardware and placement of skin graft). She says when she used her pessary, it did help her prolapse. She no longer has the pessary (someone threw it out). She is bothered by this bulge and also worried. She is planning to have another knee replacement surgery in march.     Urinary Symptoms  Has some urinary dribbling although doesn't notice clear triggers for this. She also has  Telephone Encounter by Shawene Carrasco RN at 10/23/17 01:22 PM     Author:  Shawnee Carrasco RN Service:  (none) Author Type:  Registered Nurse     Filed:  10/23/17 01:24 PM Encounter Date:  10/23/2017 Status:  Signed     :  Shawnee Carrasco RN (Registered Nurse)            Below faxed[GM1.1M]  Electronically Signed by:    Shawnee Carrasco RN , 10/23/2017[GM1.1T]        Revision History        User Key Date/Time User Provider Type Action    > GM1.1 10/23/17 01:24 PM Shawnee Carrasco RN Registered Nurse Sign    M - Manual, T - Template             "occasional urgency leakage but mostly at night. She denies stress leakage with coughing, laughing etc. Empties her bladder about 3 times during the day and once at night. Denies urgency/frequency/dysuria/Kristine.    Voiding function:   Has started needing to push her bulge in to void. But feels empty once she voids.     Gastrointestinal Symptoms:  Denies chronic diarrhea, constipation. Denies bothersome fecal or flatal incontinence.       Sexual function/Pelvic floor pain/GYN:   Not sexually active for  reasons. Doesn't think she will be.       Relevant Medical History:    Diabetes? No  High Blood pressure? No     Recurrent UTIs? No  Sleep Apnea? No  Obesity? No  History of Blood clots? No  Other medical problems: Hypothyroidism        Physical Exam: /84   Pulse 85   Ht 1.57 m (5' 1.81\")   Wt 58.7 kg (129 lb 6.4 oz)   BMI 23.81 kg/m    Gen:  is alert, comfortable in no acute distress,   Abdomen: Abdomen is soft, non-tender, non-distended,   Lungs: non-labored breathing.       Pelvic Exam:   Normal external female genitalia. The urethra was normal.    Vagina: Stage 3 uterovaginal prolapse, no abnormal discharge   Uterus: Normal size, no lesion on cervix  Ovaries: non palpable  Vulva: no lesions  Rectal: deferred    Pelvic floor strength: 3/5 kegels.    Pelvic floor muscles: No levator myalgia    POPQ EXAM FOR PROLAPSE SEVERITY  (Aa):   1 (Ba):   +4 (C ):    0   (GH):   4.5 (PB):  2 (TVL):  8   (Ap):   +2 (Bp):  +5 (D):   -1     ICS Stage (1-4):  3    Voiding trial:    VOID 200 ml  PVR 0 mL by Bladder ultrasound  Leak with Cough stress test : No, Prolapse reduced Yes with proctoswab             Objective:   Deferred ( virtual visit)     Asssessment and plan  Encounter Diagnoses   Name Primary?     Uterovaginal prolapse Yes     Cystocele, midline      Rectocele      Urge incontinence      Today we discussed at length surgical options. She is 86 and no longer interesterested in being sexually active. We " discussed that given this, probably best to do vaginal native tissue repair that narrows and shortens the vagina ( colpocleisis) that is least invasive, has good durability and likely requires less recovery time. We briefly discussed other surgery options such as reconstructive vaginal or minimally invasive abdominal procedures . We discussed risks of surgery including but not limited to bleeding ( that may necessitate blood transfusion), infection, injury to bowel, bladder, or other surrounding organs, cardiovascular or respiratory complications related to surgery or anesthesia and worsening or new pain in surgical area.  We also discussed that surgery may fail and she may need further treatment.     She would like to proceed with surgery planning for colpocleisis. Surgery request placed     I spent a total of 30  minutes on telephone with  Jigna Allen on the date of the encounter in chart review, patient visit, review of tests, documentation and/or discussion with other providers about the issues documented above.     Again, thank you for allowing me to participate in the care of your patient.      Sincerely,    Nola Santoro MD

## 2021-11-01 NOTE — PROGRESS NOTES
Jigna Allen is a 86 year old year old who is being evaluated via a billable telephone visit    Subjective     Jigna Allen is a 86 year old year old who presents for a telephone visit today to discuss surgical options to manage her pelvic organ prolapse. I last saw her on 2/15/21 and fitted her with #4 ring with support pessary for her stage 3 prolapse and also briefly discussed surgical options. In her case, because she was not sexually active and doesn't intend to be, we had discussed colpocleisis.Today she says that her pessary is harder to put in and out but once in, it is comfortable. Today she wants to hear about colpocleisis surgery that we had previously discussed. Her  passed away recently (oct 23rd this year) and is still mourning . He had parkinsons and kidney cancer.     This note was copied and pasted from Dr Santoro on 2/1/21      HPI:  Jigna Allen is a 86 year old female who presents for evaluation of a known uterovaginal prolapse.        Pelvic Organ Prolapse Symptoms  Has had bulge/protrusion vaginally for years and in fact was using a pessary for about 3 years through [facility] but stopped using it last March after she was placed in rehab for an infected knee ( requiring removal of knee replacement hardware and placement of skin graft). She says when she used her pessary, it did help her prolapse. She no longer has the pessary (someone threw it out). She is bothered by this bulge and also worried. She is planning to have another knee replacement surgery in march.     Urinary Symptoms  Has some urinary dribbling although doesn't notice clear triggers for this. She also has occasional urgency leakage but mostly at night. She denies stress leakage with coughing, laughing etc. Empties her bladder about 3 times during the day and once at night. Denies urgency/frequency/dysuria/Kristine.    Voiding function:   Has started needing to push her bulge in to void. But feels empty once she voids.  "    Gastrointestinal Symptoms:  Denies chronic diarrhea, constipation. Denies bothersome fecal or flatal incontinence.       Sexual function/Pelvic floor pain/GYN:   Not sexually active for  reasons. Doesn't think she will be.       Relevant Medical History:    Diabetes? No  High Blood pressure? No     Recurrent UTIs? No  Sleep Apnea? No  Obesity? No  History of Blood clots? No  Other medical problems: Hypothyroidism        Physical Exam: /84   Pulse 85   Ht 1.57 m (5' 1.81\")   Wt 58.7 kg (129 lb 6.4 oz)   BMI 23.81 kg/m    Gen:  is alert, comfortable in no acute distress,   Abdomen: Abdomen is soft, non-tender, non-distended,   Lungs: non-labored breathing.       Pelvic Exam:   Normal external female genitalia. The urethra was normal.    Vagina: Stage 3 uterovaginal prolapse, no abnormal discharge   Uterus: Normal size, no lesion on cervix  Ovaries: non palpable  Vulva: no lesions  Rectal: deferred    Pelvic floor strength: 3/5 kegels.    Pelvic floor muscles: No levator myalgia    POPQ EXAM FOR PROLAPSE SEVERITY  (Aa):   1 (Ba):   +4 (C ):    0   (GH):   4.5 (PB):  2 (TVL):  8   (Ap):   +2 (Bp):  +5 (D):   -1     ICS Stage (1-4):  3    Voiding trial:    VOID 200 ml  PVR 0 mL by Bladder ultrasound  Leak with Cough stress test : No, Prolapse reduced Yes with proctoswab             Objective:   Deferred ( virtual visit)     Asssessment and plan  Encounter Diagnoses   Name Primary?     Uterovaginal prolapse Yes     Cystocele, midline      Rectocele      Urge incontinence      Today we discussed at length surgical options. She is 86 and no longer interesterested in being sexually active. We discussed that given this, probably best to do vaginal native tissue repair that narrows and shortens the vagina ( colpocleisis) that is least invasive, has good durability and likely requires less recovery time. We briefly discussed other surgery options such as reconstructive vaginal or minimally invasive " abdominal procedures . We discussed risks of surgery including but not limited to bleeding ( that may necessitate blood transfusion), infection, injury to bowel, bladder, or other surrounding organs, cardiovascular or respiratory complications related to surgery or anesthesia and worsening or new pain in surgical area.  We also discussed that surgery may fail and she may need further treatment.     She would like to proceed with surgery planning for colpocleisis. Surgery request placed             I spent a total of 30  minutes on telephone with  Jigna Allen on the date of the encounter in chart review, patient visit, review of tests, documentation and/or discussion with other providers about the issues documented above.

## 2021-11-04 ENCOUNTER — HOSPITAL ENCOUNTER (OUTPATIENT)
Age: 86
Discharge: ANOTHER HOSPITAL | End: 2021-11-04
Payer: MEDICARE

## 2021-11-04 ENCOUNTER — HOSPITAL ENCOUNTER (EMERGENCY)
Age: 86
Discharge: HOME | End: 2021-11-04
Attending: EMERGENCY MEDICINE
Payer: MEDICARE

## 2021-11-04 ENCOUNTER — TELEPHONE (OUTPATIENT)
Dept: OBGYN | Facility: CLINIC | Age: 86
End: 2021-11-04

## 2021-11-04 VITALS
DIASTOLIC BLOOD PRESSURE: 63 MMHG | RESPIRATION RATE: 16 BRPM | WEIGHT: 149.94 LBS | TEMPERATURE: 99 F | OXYGEN SATURATION: 98 % | SYSTOLIC BLOOD PRESSURE: 152 MMHG | HEART RATE: 68 BPM | BODY MASS INDEX: 27 KG/M2

## 2021-11-04 VITALS
WEIGHT: 150 LBS | BODY MASS INDEX: 27.6 KG/M2 | RESPIRATION RATE: 14 BRPM | SYSTOLIC BLOOD PRESSURE: 160 MMHG | HEIGHT: 62 IN | OXYGEN SATURATION: 98 % | HEART RATE: 66 BPM | TEMPERATURE: 97 F | DIASTOLIC BLOOD PRESSURE: 61 MMHG

## 2021-11-04 DIAGNOSIS — Z77.098 ACCIDENTAL EXPOSURE TO CARBON MONOXIDE: Primary | ICD-10-CM

## 2021-11-04 DIAGNOSIS — Z77.29 CARBON MONOXIDE EXPOSURE: Primary | ICD-10-CM

## 2021-11-04 PROCEDURE — 99203 OFFICE O/P NEW LOW 30 MIN: CPT

## 2021-11-04 PROCEDURE — 99283 EMERGENCY DEPT VISIT LOW MDM: CPT | Performed by: EMERGENCY MEDICINE

## 2021-11-05 NOTE — ED PROVIDER NOTES
Patient Seen in: [facility]      History   Patient presents with:  Exposure,Chem Occupational    Stated Complaint: gas smelling for 10 hours    Subjective:   HPI    CHIEF COMPLAINT: Carbon monoxide exposure     HISTORY OF PRESENT ILLNESS: Smoker      Smokeless tobacco: Never Used    Alcohol use: Not on file    Drug use: Not on file             Review of Systems    Positive for stated complaint: gas smelling for 10 hours  Other systems are as noted in HPI.   Constitutional and vital signs rev disposition and plan. Disposition and Plan     Clinical Impression:  Accidental exposure to carbon monoxide  (primary encounter diagnosis)     Disposition:   Ic to ed  11/4/2021  7:42 pm    Follow-up:  No follow-up provider speci

## 2021-11-05 NOTE — ED PROVIDER NOTES
Patient Seen in: THE [facility] [place]      History   Patient presents with:   Other    Stated Complaint: exposure to carbon monoxide today for several hours no headache     Subjective:   HPI    Patient is a 22-year-old female presents BMI 27.42 kg/m²         Physical Exam  GENERAL: Well-developed, well-nourished female sitting up breathing easily in no apparent distress. Patient is nontoxic in appearance. HEENT: Head is normocephalic, atraumatic.  Pupils are 4 mm equally round and reac
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
## 2021-11-12 ENCOUNTER — TELEPHONE (OUTPATIENT)
Dept: OBGYN | Facility: CLINIC | Age: 86
End: 2021-11-12
Payer: COMMERCIAL

## 2021-11-12 DIAGNOSIS — Z11.52 ENCOUNTER FOR SCREENING FOR COVID-19: Primary | ICD-10-CM

## 2021-11-12 NOTE — TELEPHONE ENCOUNTER
Confirmed surgery date, time and location, 1/12/22, arrival time at 8:45a.m with nothing to eat eight hours before scheduled surgery time, clear liquids up to two hours before, h&p within 30 days, patient made aware, COVID testing 96 hours prior, map and letter mailed out.     to complete the following fields:            CHECKLIST     Google Calendar : Yes     Resident notified: Not Applicable     Clinic schedule blocked:  Not Applicable    Patient notified:Yes      Pre op information sent: Yes     Given to patient over the phone.Yes    Comments:

## 2021-11-12 NOTE — TELEPHONE ENCOUNTER
----- Message from Alvarez Arndt sent at 11/12/2021 11:40 AM CST -----  Regarding: covid order  Please place covid order, patient scheduled for surgery on 1/12/22 with Dr. Santoro.  Thanks.

## 2021-12-17 ENCOUNTER — DOCUMENTATION ONLY (OUTPATIENT)
Dept: OTHER | Facility: CLINIC | Age: 86
End: 2021-12-17
Payer: COMMERCIAL

## 2021-12-17 DIAGNOSIS — Z11.59 ENCOUNTER FOR SCREENING FOR OTHER VIRAL DISEASES: ICD-10-CM

## 2021-12-29 LAB
ALT SERPL-CCNC: 12 U/L (ref 0–55)
ALT SERPL-CCNC: 12 U/L (ref 0–55)
CREATININE (EXTERNAL): 0.84 MG/DL (ref 0.55–1.02)
CREATININE (EXTERNAL): 0.84 MG/DL (ref 0.55–1.02)
GFR ESTIMATED (EXTERNAL): >60 ML/MIN/1.73M2
GFR ESTIMATED (EXTERNAL): >60 ML/MIN/1.73M2
GLUCOSE (EXTERNAL): 95 MG/DL (ref 70–100)
GLUCOSE (EXTERNAL): 95 MG/DL (ref 70–100)
HBA1C MFR BLD: 5.7 %
POTASSIUM (EXTERNAL): 5.3 MMOL/L (ref 3.5–5.1)
POTASSIUM (EXTERNAL): 5.3 MMOL/L (ref 3.5–5.1)
TSH SERPL-ACNC: 2.36 UIU/ML (ref 0.3–4.5)
TSH SERPL-ACNC: 2.36 UIU/ML (ref 0.3–4.5)

## 2021-12-30 ENCOUNTER — DOCUMENTATION ONLY (OUTPATIENT)
Dept: OTHER | Facility: CLINIC | Age: 86
End: 2021-12-30
Payer: COMMERCIAL

## 2021-12-30 PROBLEM — Z71.89 ACP (ADVANCE CARE PLANNING): Chronic | Status: RESOLVED | Noted: 2017-09-01 | Resolved: 2021-12-30

## 2022-01-03 ENCOUNTER — TRANSFERRED RECORDS (OUTPATIENT)
Dept: HEALTH INFORMATION MANAGEMENT | Facility: CLINIC | Age: 87
End: 2022-01-03
Payer: COMMERCIAL

## 2022-01-03 LAB — POTASSIUM (EXTERNAL): 4.1 MMOL/L (ref 3.5–5.1)

## 2022-01-07 ENCOUNTER — TELEPHONE (OUTPATIENT)
Dept: ORTHOPEDICS | Facility: CLINIC | Age: 87
End: 2022-01-07
Payer: COMMERCIAL

## 2022-01-07 NOTE — TELEPHONE ENCOUNTER
Call to Jigna to schedule yearly follow up in March.   All questions answered.    CLAY GreenwoodN, RN  RN Care Coordinator, Dr. Jason MOSER [facility]

## 2022-01-10 ENCOUNTER — PREP FOR PROCEDURE (OUTPATIENT)
Dept: UROLOGY | Facility: CLINIC | Age: 87
End: 2022-01-10

## 2022-01-10 ENCOUNTER — VIRTUAL VISIT (OUTPATIENT)
Dept: UROLOGY | Facility: CLINIC | Age: 87
End: 2022-01-10
Attending: OBSTETRICS & GYNECOLOGY
Payer: COMMERCIAL

## 2022-01-10 DIAGNOSIS — N81.4 UTEROVAGINAL PROLAPSE: Primary | ICD-10-CM

## 2022-01-10 DIAGNOSIS — Z98.890 POSTOPERATIVE STATE: ICD-10-CM

## 2022-01-10 PROCEDURE — 99214 OFFICE O/P EST MOD 30 MIN: CPT | Mod: TEL | Performed by: OBSTETRICS & GYNECOLOGY

## 2022-01-10 RX ORDER — PHENAZOPYRIDINE HYDROCHLORIDE 100 MG/1
200 TABLET, FILM COATED ORAL ONCE
Status: CANCELLED | OUTPATIENT
Start: 2022-01-10 | End: 2022-01-10

## 2022-01-10 RX ORDER — IBUPROFEN 600 MG/1
600 TABLET, FILM COATED ORAL EVERY 6 HOURS PRN
Qty: 60 TABLET | Refills: 1 | Status: SHIPPED | OUTPATIENT
Start: 2022-01-10 | End: 2022-02-21

## 2022-01-10 RX ORDER — OXYCODONE HYDROCHLORIDE 5 MG/1
5 TABLET ORAL EVERY 6 HOURS PRN
Qty: 6 TABLET | Refills: 0 | Status: SHIPPED | OUTPATIENT
Start: 2022-01-10 | End: 2022-01-10

## 2022-01-10 RX ORDER — ACETAMINOPHEN 500 MG
500-1000 TABLET ORAL EVERY 6 HOURS PRN
Qty: 60 TABLET | Refills: 1 | Status: SHIPPED | OUTPATIENT
Start: 2022-01-10 | End: 2022-02-21

## 2022-01-10 RX ORDER — IBUPROFEN 600 MG/1
600 TABLET, FILM COATED ORAL EVERY 6 HOURS PRN
Qty: 60 TABLET | Refills: 1 | Status: SHIPPED | OUTPATIENT
Start: 2022-01-10 | End: 2022-01-10

## 2022-01-10 RX ORDER — CEFAZOLIN SODIUM 2 G/100ML
2 INJECTION, SOLUTION INTRAVENOUS SEE ADMIN INSTRUCTIONS
Status: CANCELLED | OUTPATIENT
Start: 2022-01-10

## 2022-01-10 RX ORDER — DOCUSATE SODIUM 250 MG
250 CAPSULE ORAL DAILY
Qty: 100 CAPSULE | Refills: 1 | Status: SHIPPED | OUTPATIENT
Start: 2022-01-10 | End: 2022-01-10

## 2022-01-10 RX ORDER — ACETAMINOPHEN 325 MG/1
975 TABLET ORAL ONCE
Status: CANCELLED | OUTPATIENT
Start: 2022-01-10 | End: 2022-01-10

## 2022-01-10 RX ORDER — CEFAZOLIN SODIUM 2 G/100ML
2 INJECTION, SOLUTION INTRAVENOUS
Status: CANCELLED | OUTPATIENT
Start: 2022-01-10

## 2022-01-10 RX ORDER — DOCUSATE SODIUM 250 MG
250 CAPSULE ORAL DAILY
Qty: 60 CAPSULE | Refills: 1 | Status: SHIPPED | OUTPATIENT
Start: 2022-01-10 | End: 2022-02-21

## 2022-01-10 RX ORDER — ACETAMINOPHEN 500 MG
500-1000 TABLET ORAL EVERY 6 HOURS PRN
Qty: 60 TABLET | Refills: 1 | Status: SHIPPED | OUTPATIENT
Start: 2022-01-10 | End: 2022-01-10

## 2022-01-10 RX ORDER — OXYCODONE HYDROCHLORIDE 5 MG/1
5 TABLET ORAL EVERY 6 HOURS PRN
Qty: 10 TABLET | Refills: 0 | Status: SHIPPED | OUTPATIENT
Start: 2022-01-10 | End: 2022-01-13

## 2022-01-10 NOTE — PROGRESS NOTES
January 10, 2022    Referring Provider: Referred Self  No address on file    Primary Care Provider: Elisabeth Ko    Reason for telephone visit: Preoperative discussions    Planned procedure : 1/12/22    Date of Planned procedure: [place] Colpocleisis, cystoscopy     HPI:  Jigna Allen is a 87 year old female who presents for telephone discussion about her upcoming surgery. She has stage 3 uterovaginal prolapse and urgency urinary incontinence and had previously used pessary which had become increasingly uncomfortable. She is scheduled for vaginal colpocleisis after discussion of pros and cons on prior visit on 11/1/21      This note was copied and pasted from Dr Santoro on 2/1/21      HPI:  Jigna Allen is a 86 year old female who presents for evaluation of a known uterovaginal prolapse.        Pelvic Organ Prolapse Symptoms  Has had bulge/protrusion vaginally for years and in fact was using a pessary for about 3 years through [facility] but stopped using it last March after she was placed in rehab for an infected knee ( requiring removal of knee replacement hardware and placement of skin graft). She says when she used her pessary, it did help her prolapse. She no longer has the pessary (someone threw it out). She is bothered by this bulge and also worried. She is planning to have another knee replacement surgery in march.     Urinary Symptoms  Has some urinary dribbling although doesn't notice clear triggers for this. She also has occasional urgency leakage but mostly at night. She denies stress leakage with coughing, laughing etc. Empties her bladder about 3 times during the day and once at night. Denies urgency/frequency/dysuria/Kristine.    Voiding function:   Has started needing to push her bulge in to void. But feels empty once she voids.     Gastrointestinal Symptoms:  Denies chronic diarrhea, constipation. Denies bothersome fecal or flatal incontinence.       Sexual function/Pelvic floor pain/GYN:  "  Not sexually active for  reasons. Doesn't think she will be.       Relevant Medical History:    Diabetes? No  High Blood pressure? No     Recurrent UTIs? No  Sleep Apnea? No  Obesity? No  History of Blood clots? No  Other medical problems: Hypothyroidism, Sarcoma        Physical Exam: /84   Pulse 85   Ht 1.57 m (5' 1.81\")   Wt 58.7 kg (129 lb 6.4 oz)   BMI 23.81 kg/m    Gen:  is alert, comfortable in no acute distress,   Abdomen: Abdomen is soft, non-tender, non-distended,   Lungs: non-labored breathing.       Pelvic Exam:   Normal external female genitalia. The urethra was normal.    Vagina: Stage 3 uterovaginal prolapse, no abnormal discharge   Uterus: Normal size, no lesion on cervix  Ovaries: non palpable  Vulva: no lesions  Rectal: deferred    Pelvic floor strength: 3/5 kegels.    Pelvic floor muscles: No levator myalgia    POPQ EXAM FOR PROLAPSE SEVERITY  (Aa):   1 (Ba):   +4 (C ):    0   (GH):   4.5 (PB):  2 (TVL):  8   (Ap):   +2 (Bp):  +5 (D):   -1     ICS Stage (1-4):  3    Voiding trial:    VOID 200 ml  PVR 0 mL by Bladder ultrasound  Leak with Cough stress test : No, Prolapse reduced Yes with proctoswab           Surgical History:      Past Surgical History:   Procedure Laterality Date     ARTHROPLASTY KNEE Right 6/12/2018    Procedure: ARTHROPLASTY KNEE;  Right Total Knee Replacement ;  Surgeon: He Gomez MD;  Location: [facility]     ARTHROPLASTY REVISION KNEE Right 3/9/2021    Procedure: Revision Right total hinge Knee arthroplasty;  Surgeon: He Gomez MD;  Location: [facility]     CARPAL TUNNEL RELEASE RT/LT       excise sarcoma Right     right leg     exicision of wound  03/27/2020     GRAFT FREE VASCULARIZED TRANSVERSE RECTUS ABDOMINIS MYOCUTANEOUS Left 4/20/2020    Procedure: With left anterolateral thigh free flap with SPY;  Surgeon: EHSAN De Luna MD;  [facility]     IRRIGATION AND DEBRIDEMENT KNEE, PLACE ANTIBIOTIC CEMENT BEADS / SPACE Right 6/2/2020    " Procedure: manufacture and implantation of Vanco/tobra ANTIBIOTIC SPACER, KNEE;  Surgeon: He Gomez MD;  Location: [facility] OR     KNEE SURGERY       PICC DOUBLE LUMEN PLACEMENT Right 2020    5FR DL PICC inserted at brachial medial, length- 37cm (1cm out). Tip at mid SVC     RECONSTRUCT KNEE Right 2020    Procedure: Right leg reconstruction and wound debridemenr;  Surgeon: EHSAN De Luna MD;  [facility]     REMOVE HARDWARE ARTHROPLASTY KNEE Right 2020    Procedure: Explantation of Right total knee;  Surgeon: He Gomez MD;  [facility]     TUBAL LIGATION         OB/Gyn History:  OB History    Para Term  AB Living   2 2 2 0 0 0   SAB IAB Ectopic Multiple Live Births   0 0 0 0 0      # Outcome Date GA Lbr Gerber/2nd Weight Sex Delivery Anes PTL Lv   2 Term      Vag-Spont      1 Term      Vag-Spont          Medications/Vitamins/Supplements:     Current Outpatient Medications   Medication     amoxicillin (AMOXIL) 500 MG tablet     aspirin (ASA) 81 MG EC tablet     estradiol (ESTRACE) 0.1 MG/GM vaginal cream     levothyroxine (LEVOTHROID) 75 MCG tablet     No current facility-administered medications for this visit.         Medical History:      Past Medical History:   Diagnosis Date     Anemia      Anxiety      Aortic valve sclerosis      Arthritis ?     Complication of anesthesia     slow to wakeup     Hypothyroid      Infection of prosthetic joint, subsequent encounter      Mild tricuspid regurgitation      Mumps ?1945     Primary osteoarthritis of right knee      Sarcoma (H) 2009     ROS  Social History    Social History     Socioeconomic History     Marital status:      Spouse name: Not on file     Number of children: Not on file     Years of education: Not on file     Highest education level: Not on file   Occupational History     Not on file   Tobacco Use     Smoking status: Never Smoker     Smokeless tobacco: Never Used   Substance and Sexual Activity      Alcohol use: Not Currently     Comment: rarely - months      Drug use: Never     Sexual activity: Not Currently     Partners: Male     Birth control/protection: Post-menopausal   Other Topics Concern     Parent/sibling w/ CABG, MI or angioplasty before 65F 55M? No   Social History Narrative     Not on file     Social Determinants of Health     Financial Resource Strain: Not on file   Food Insecurity: Not on file   Transportation Needs: Not on file   Physical Activity: Not on file   Stress: Not on file   Social Connections: Not on file   Intimate Partner Violence: Not At Risk     Fear of Current or Ex-Partner: No     Emotionally Abused: No     Physically Abused: No     Sexually Abused: No   Housing Stability: Not on file       Family History  Family History   Problem Relation Age of Onset     Cancer Father         age 85-87 prostate     Prostate Cancer Father      Other Cancer Father      Hearing Loss Father      Cancer Brother         stemcell;  50 yrs. [place] vet     Other - See Comments Daughter         Slow to wake      Asthma Daughter      Hearing Loss Brother         hearing aid age 77       Allergy    Allergies   Allergen Reactions     Colchicine      Other reaction(s): Gastrointestinal  diarrhea     Niacin      skin rash     Ferrous Sulfate Rash       Current Outpatient Medications   Medication     amoxicillin (AMOXIL) 500 MG tablet     aspirin (ASA) 81 MG EC tablet     estradiol (ESTRACE) 0.1 MG/GM vaginal cream     levothyroxine (LEVOTHROID) 75 MCG tablet     No current facility-administered medications for this visit.       Physical Exam:     Deferred ( virtual visit)      Recent Results (from the past 240 hour(s))   Potassium (External Result)    Collection Time: 22  1:00 PM   Result Value Ref Range    Potassium (External) 4.1 3.5 - 5.1 mmol/L     Lab Results   Component Value Date    WBC 5.5 10/12/2021    WBC 3.7 2020     Lab Results   Component Value Date    RBC 4.33 10/12/2021     RBC 3.69 07/17/2020     Lab Results   Component Value Date    HGB 13.3 10/12/2021    HGB 11.1 03/11/2021     Lab Results   Component Value Date    HCT 41.2 10/12/2021    HCT 34..2 07/17/2020     No components found for: MCT  Lab Results   Component Value Date    MCV 95 10/12/2021    MCV 93 07/17/2020     Lab Results   Component Value Date    MCH 30.7 10/12/2021    MCH 28.2 07/17/2020     Lab Results   Component Value Date    MCHC 32.3 10/12/2021    MCHC 30.4 07/17/2020     Lab Results   Component Value Date    RDW 13.2 10/12/2021    RDW 16.1 07/17/2020     Lab Results   Component Value Date     10/12/2021     07/17/2020     Last Comprehensive Metabolic Panel:  Sodium   Date Value Ref Range Status   10/12/2021 140 133 - 144 mmol/L Final   03/11/2021 141 133 - 144 mmol/L Final     Potassium   Date Value Ref Range Status   10/12/2021 4.9 3.4 - 5.3 mmol/L Final   03/11/2021 3.8 3.4 - 5.3 mmol/L Final     Chloride   Date Value Ref Range Status   10/12/2021 106 94 - 109 mmol/L Final   03/11/2021 108 94 - 109 mmol/L Final     Carbon Dioxide   Date Value Ref Range Status   03/11/2021 28 20 - 32 mmol/L Final     Carbon Dioxide (CO2)   Date Value Ref Range Status   10/12/2021 28 20 - 32 mmol/L Final     Anion Gap   Date Value Ref Range Status   10/12/2021 6 3 - 14 mmol/L Final   03/11/2021 5 3 - 14 mmol/L Final     Glucose   Date Value Ref Range Status   10/12/2021 100 (H) 70 - 99 mg/dL Final   03/11/2021 99 70 - 99 mg/dL Final     Urea Nitrogen   Date Value Ref Range Status   10/12/2021 24 7 - 30 mg/dL Final   03/11/2021 20 7 - 30 mg/dL Final     Creatinine   Date Value Ref Range Status   10/12/2021 0.97 0.52 - 1.04 mg/dL Final   03/11/2021 0.81 0.52 - 1.04 mg/dL Final     GFR Estimate   Date Value Ref Range Status   10/12/2021 54 (L) >60 mL/min/1.73m2 Final     Comment:     As of July 11, 2021, eGFR is calculated by the CKD-EPI creatinine equation, without race adjustment. eGFR can be influenced by muscle  mass, exercise, and diet. The reported eGFR is an estimation only and is only applicable if the renal function is stable.   03/11/2021 67 >60 mL/min/[1.73_m2] Final     Comment:     Non  GFR Calc  Starting 12/18/2018, serum creatinine based estimated GFR (eGFR) will be   calculated using the Chronic Kidney Disease Epidemiology Collaboration   (CKD-EPI) equation.       Calcium   Date Value Ref Range Status   10/12/2021 9.0 8.5 - 10.1 mg/dL Final   03/11/2021 8.4 (L) 8.5 - 10.1 mg/dL Final     Hemoglobin A1C POCT   Date Value Ref Range Status   06/05/2018 5.6 0 - 5.6 % Final     Comment:     Normal <5.7% Prediabetes 5.7-6.4%  Diabetes 6.5% or higher - adopted from ADA   consensus guidelines.       UA RESULTS:  Recent Labs   Lab Test 06/05/18  1122   COLOR Yellow   APPEARANCE Clear   URINEGLC Negative   URINEBILI Negative   URINEKETONE Negative   SG 1.020   UBLD Negative   URINEPH 5.0   PROTEIN Negative   NITRITE Negative   LEUKEST Large*   RBCU 1   WBCU 5           A/P: Jigna Allen is a 87 year old F with     Encounter Diagnosis   Name Primary?     Uterovaginal prolapse Yes       We previously discussed and reinforced today that give that she is  85 yo and no longer interesterested in being sexually active, reasonable to do colpocleisis which is one of the least invasive but still very effective surgery for her stage 3 uterovaginal prolapse. We discussed that this is a vaginal native tissue repair that narrows and shortens the vagina ( colpocleisis) which means that she will not be able to have penetrative intercourse in the future. We had previously discussed other surgery options such as reconstructive vaginal or minimally invasive abdominal procedures but we opted not to pursue these to avoid longer and more invasive surgery . We discussed that it is reasonable to leave her uterus in for this surgery to minimize time of surgery given no current clinical symptoms worrisome for uterine abnormalities  such as post menopausal bleeding. We discussed risks of surgery including but not limited to bleeding ( that may necessitate blood transfusion), infection, injury to bowel, bladder, or other surrounding organs, cardiovascular or respiratory complications related to surgery or anesthesia and worsening or new pain in surgical area.  She also understands that surgery may fail and she may need further treatment.        I spent a total of 30 minutes with  Jigna Allen  on the date of the encounter in chart review, face to face patient visit, review of tests, documentation and/or discussion with other providers about the issues documented above.     Nola Santoro MD, [facility]  , Department of OBGYN  Female Pelvic Medicine and Reconstructive Surgery ( Urogynecology)  CC  Patient Care Team:  Elisabeth Ko MD as PCP - General (Internal Medicine)  Bryon Ojeda MD as MD (Orthopedics)  Edouard Bryan MD as MD (Hematology)  Tai Adams MD as Hospitalist (Internal Medicine)  Fabian Babb RN as Nurse Coordinator (Oncology)  Emily Jefferson MD as MD (Internal Medicine)  EHSAN De Luna MD as MD (Plastic Surgery)  He Gomez MD as MD (Orthopedics)  He Gomez MD as Assigned Musculoskeletal Provider  Edouard Bryan MD as Assigned Cancer Care Provider  Emily Jefferson MD as Assigned Infectious Disease Provider  Nola Santoro MD as Assigned OBGYN Provider  Nancy Burton RN as Registered Nurse (Orthopedics)  [facility] ([facility] ([facility] ([place]))  SELF, REFERRED

## 2022-01-10 NOTE — PROGRESS NOTES
January 10, 2022    Referring Provider: Referred Self  No address on file    Primary Care Provider: Elisabeth Ko    Reason for telephone visit: Preoperative discussions    Planned procedure : 1/12/22    Date of Planned procedure: [place] Colpocleisis, cystoscopy     HPI:  Jigna Allen is a 87 year old female who presents for telephone discussion about her upcoming surgery. She has stage 3 uterovaginal prolapse and urgency urinary incontinence and had previously used pessary which had become increasingly uncomfortable. She is scheduled for vaginal colpocleisis after discussion of pros and cons on prior visit on 11/1/21      This note was copied and pasted from Dr Santoro on 2/1/21      HPI:  Jigna Allen is a 86 year old female who presents for evaluation of a known uterovaginal prolapse.        Pelvic Organ Prolapse Symptoms  Has had bulge/protrusion vaginally for years and in fact was using a pessary for about 3 years through [facility] but stopped using it last March after she was placed in rehab for an infected knee ( requiring removal of knee replacement hardware and placement of skin graft). She says when she used her pessary, it did help her prolapse. She no longer has the pessary (someone threw it out). She is bothered by this bulge and also worried. She is planning to have another knee replacement surgery in march.     Urinary Symptoms  Has some urinary dribbling although doesn't notice clear triggers for this. She also has occasional urgency leakage but mostly at night. She denies stress leakage with coughing, laughing etc. Empties her bladder about 3 times during the day and once at night. Denies urgency/frequency/dysuria/Kristine.    Voiding function:   Has started needing to push her bulge in to void. But feels empty once she voids.     Gastrointestinal Symptoms:  Denies chronic diarrhea, constipation. Denies bothersome fecal or flatal incontinence.       Sexual function/Pelvic floor pain/GYN:  "  Not sexually active for  reasons. Doesn't think she will be.       Relevant Medical History:    Diabetes? No  High Blood pressure? No     Recurrent UTIs? No  Sleep Apnea? No  Obesity? No  History of Blood clots? No  Other medical problems: Hypothyroidism, Sarcoma        Physical Exam: /84   Pulse 85   Ht 1.57 m (5' 1.81\")   Wt 58.7 kg (129 lb 6.4 oz)   BMI 23.81 kg/m    Gen:  is alert, comfortable in no acute distress,   Abdomen: Abdomen is soft, non-tender, non-distended,   Lungs: non-labored breathing.       Pelvic Exam:   Normal external female genitalia. The urethra was normal.    Vagina: Stage 3 uterovaginal prolapse, no abnormal discharge   Uterus: Normal size, no lesion on cervix  Ovaries: non palpable  Vulva: no lesions  Rectal: deferred    Pelvic floor strength: 3/5 kegels.    Pelvic floor muscles: No levator myalgia    POPQ EXAM FOR PROLAPSE SEVERITY  (Aa):   1 (Ba):   +4 (C ):    0   (GH):   4.5 (PB):  2 (TVL):  8   (Ap):   +2 (Bp):  +5 (D):   -1     ICS Stage (1-4):  3    Voiding trial:    VOID 200 ml  PVR 0 mL by Bladder ultrasound  Leak with Cough stress test : No, Prolapse reduced Yes with proctoswab           Surgical History:      Past Surgical History:   Procedure Laterality Date     ARTHROPLASTY KNEE Right 6/12/2018    Procedure: ARTHROPLASTY KNEE;  Right Total Knee Replacement ;  Surgeon: He Gomez MD;  [facility]     ARTHROPLASTY REVISION KNEE Right 3/9/2021    Procedure: Revision Right total hinge Knee arthroplasty;  Surgeon: He Gomez MD;  [facility]: [facility]     CARPAL TUNNEL RELEASE RT/LT       excise sarcoma Right     right leg     exicision of wound  03/27/2020     GRAFT FREE VASCULARIZED TRANSVERSE RECTUS ABDOMINIS MYOCUTANEOUS Left 4/20/2020    Procedure: With left anterolateral thigh free flap with SPY;  Surgeon: EHSAN De Luna MD;  [facility]     IRRIGATION AND DEBRIDEMENT KNEE, PLACE ANTIBIOTIC CEMENT BEADS / SPACE Right 6/2/2020    " Procedure: manufacture and implantation of Vanco/tobra ANTIBIOTIC SPACER, KNEE;  Surgeon: He Gomez MD;  Location: [facility] OR     KNEE SURGERY       PICC DOUBLE LUMEN PLACEMENT Right 2020    5FR DL PICC inserted at brachial medial, length- 37cm (1cm out). Tip at mid SVC     RECONSTRUCT KNEE Right 2020    Procedure: Right leg reconstruction and wound debridemenr;  Surgeon: EHSAN De Luna MD;  [facility]     REMOVE HARDWARE ARTHROPLASTY KNEE Right 2020    Procedure: Explantation of Right total knee;  Surgeon: He Gomez MD;  [facility]     TUBAL LIGATION         OB/Gyn History:  OB History    Para Term  AB Living   2 2 2 0 0 0   SAB IAB Ectopic Multiple Live Births   0 0 0 0 0      # Outcome Date GA Lbr Gerber/2nd Weight Sex Delivery Anes PTL Lv   2 Term      Vag-Spont      1 Term      Vag-Spont          Medications/Vitamins/Supplements:     Current Outpatient Medications   Medication     amoxicillin (AMOXIL) 500 MG tablet     aspirin (ASA) 81 MG EC tablet     estradiol (ESTRACE) 0.1 MG/GM vaginal cream     levothyroxine (LEVOTHROID) 75 MCG tablet     No current facility-administered medications for this visit.         Medical History:      Past Medical History:   Diagnosis Date     Anemia      Anxiety      Aortic valve sclerosis      Arthritis ?     Complication of anesthesia     slow to wakeup     Hypothyroid      Infection of prosthetic joint, subsequent encounter      Mild tricuspid regurgitation      Mumps ?1945     Primary osteoarthritis of right knee      Sarcoma (H) 2009     ROS  Social History    Social History     Socioeconomic History     Marital status:      Spouse name: Not on file     Number of children: Not on file     Years of education: Not on file     Highest education level: Not on file   Occupational History     Not on file   Tobacco Use     Smoking status: Never Smoker     Smokeless tobacco: Never Used   Substance and Sexual Activity      Alcohol use: Not Currently     Comment: rarely - months      Drug use: Never     Sexual activity: Not Currently     Partners: Male     Birth control/protection: Post-menopausal   Other Topics Concern     Parent/sibling w/ CABG, MI or angioplasty before 65F 55M? No   Social History Narrative     Not on file     Social Determinants of Health     Financial Resource Strain: Not on file   Food Insecurity: Not on file   Transportation Needs: Not on file   Physical Activity: Not on file   Stress: Not on file   Social Connections: Not on file   Intimate Partner Violence: Not At Risk     Fear of Current or Ex-Partner: No     Emotionally Abused: No     Physically Abused: No     Sexually Abused: No   Housing Stability: Not on file       Family History  Family History   Problem Relation Age of Onset     Cancer Father         age 85-87 prostate     Prostate Cancer Father      Other Cancer Father      Hearing Loss Father      Cancer Brother         stemcell;  50 yrs. [place] vet     Other - See Comments Daughter         Slow to wake      Asthma Daughter      Hearing Loss Brother         hearing aid age 77       Allergy    Allergies   Allergen Reactions     Colchicine      Other reaction(s): Gastrointestinal  diarrhea     Niacin      skin rash     Ferrous Sulfate Rash       Current Outpatient Medications   Medication     amoxicillin (AMOXIL) 500 MG tablet     aspirin (ASA) 81 MG EC tablet     estradiol (ESTRACE) 0.1 MG/GM vaginal cream     levothyroxine (LEVOTHROID) 75 MCG tablet     No current facility-administered medications for this visit.       Physical Exam:     Deferred ( virtual visit)      Recent Results (from the past 240 hour(s))   Potassium (External Result)    Collection Time: 22  1:00 PM   Result Value Ref Range    Potassium (External) 4.1 3.5 - 5.1 mmol/L     Lab Results   Component Value Date    WBC 5.5 10/12/2021    WBC 3.7 2020     Lab Results   Component Value Date    RBC 4.33 10/12/2021     RBC 3.69 07/17/2020     Lab Results   Component Value Date    HGB 13.3 10/12/2021    HGB 11.1 03/11/2021     Lab Results   Component Value Date    HCT 41.2 10/12/2021    HCT 34..2 07/17/2020     No components found for: MCT  Lab Results   Component Value Date    MCV 95 10/12/2021    MCV 93 07/17/2020     Lab Results   Component Value Date    MCH 30.7 10/12/2021    MCH 28.2 07/17/2020     Lab Results   Component Value Date    MCHC 32.3 10/12/2021    MCHC 30.4 07/17/2020     Lab Results   Component Value Date    RDW 13.2 10/12/2021    RDW 16.1 07/17/2020     Lab Results   Component Value Date     10/12/2021     07/17/2020     Last Comprehensive Metabolic Panel:  Sodium   Date Value Ref Range Status   10/12/2021 140 133 - 144 mmol/L Final   03/11/2021 141 133 - 144 mmol/L Final     Potassium   Date Value Ref Range Status   10/12/2021 4.9 3.4 - 5.3 mmol/L Final   03/11/2021 3.8 3.4 - 5.3 mmol/L Final     Chloride   Date Value Ref Range Status   10/12/2021 106 94 - 109 mmol/L Final   03/11/2021 108 94 - 109 mmol/L Final     Carbon Dioxide   Date Value Ref Range Status   03/11/2021 28 20 - 32 mmol/L Final     Carbon Dioxide (CO2)   Date Value Ref Range Status   10/12/2021 28 20 - 32 mmol/L Final     Anion Gap   Date Value Ref Range Status   10/12/2021 6 3 - 14 mmol/L Final   03/11/2021 5 3 - 14 mmol/L Final     Glucose   Date Value Ref Range Status   10/12/2021 100 (H) 70 - 99 mg/dL Final   03/11/2021 99 70 - 99 mg/dL Final     Urea Nitrogen   Date Value Ref Range Status   10/12/2021 24 7 - 30 mg/dL Final   03/11/2021 20 7 - 30 mg/dL Final     Creatinine   Date Value Ref Range Status   10/12/2021 0.97 0.52 - 1.04 mg/dL Final   03/11/2021 0.81 0.52 - 1.04 mg/dL Final     GFR Estimate   Date Value Ref Range Status   10/12/2021 54 (L) >60 mL/min/1.73m2 Final     Comment:     As of July 11, 2021, eGFR is calculated by the CKD-EPI creatinine equation, without race adjustment. eGFR can be influenced by muscle  mass, exercise, and diet. The reported eGFR is an estimation only and is only applicable if the renal function is stable.   03/11/2021 67 >60 mL/min/[1.73_m2] Final     Comment:     Non  GFR Calc  Starting 12/18/2018, serum creatinine based estimated GFR (eGFR) will be   calculated using the Chronic Kidney Disease Epidemiology Collaboration   (CKD-EPI) equation.       Calcium   Date Value Ref Range Status   10/12/2021 9.0 8.5 - 10.1 mg/dL Final   03/11/2021 8.4 (L) 8.5 - 10.1 mg/dL Final     Hemoglobin A1C POCT   Date Value Ref Range Status   06/05/2018 5.6 0 - 5.6 % Final     Comment:     Normal <5.7% Prediabetes 5.7-6.4%  Diabetes 6.5% or higher - adopted from ADA   consensus guidelines.       UA RESULTS:  Recent Labs   Lab Test 06/05/18  1122   COLOR Yellow   APPEARANCE Clear   URINEGLC Negative   URINEBILI Negative   URINEKETONE Negative   SG 1.020   UBLD Negative   URINEPH 5.0   PROTEIN Negative   NITRITE Negative   LEUKEST Large*   RBCU 1   WBCU 5           A/P: Jigna Allen is a 87 year old F with     Encounter Diagnosis   Name Primary?     Uterovaginal prolapse Yes       We previously discussed and reinforced today that give that she is  87 yo and no longer interesterested in being sexually active, reasonable to do colpocleisis which is one of the least invasive but still very effective surgery for her stage 3 uterovaginal prolapse. We discussed that this is a vaginal native tissue repair that narrows and shortens the vagina ( colpocleisis) which means that she will not be able to have penetrative intercourse in the future. We had previously discussed other surgery options such as reconstructive vaginal or minimally invasive abdominal procedures but we opted not to pursue these to avoid longer and more invasive surgery . We discussed that it is reasonable to leave her uterus in for this surgery to minimize time of surgery given no current clinical symptoms worrisome for uterine abnormalities  such as post menopausal bleeding. We discussed risks of surgery including but not limited to bleeding ( that may necessitate blood transfusion), infection, injury to bowel, bladder, or other surrounding organs, cardiovascular or respiratory complications related to surgery or anesthesia and worsening or new pain in surgical area.  She also understands that surgery may fail and she may need further treatment.        I spent a total of 30 minutes with  Jigna Allen  on the date of the encounter in chart review, face to face patient visit, review of tests, documentation and/or discussion with other providers about the issues documented above.     Nola Santoro MD, [facility]  , Department of OBGYN  Female Pelvic Medicine and Reconstructive Surgery ( Urogynecology)  CC  Patient Care Team:  Elisabeth Ko MD as PCP - General (Internal Medicine)  Bryon Ojeda MD as MD (Orthopedics)  Edouard Bryan MD as MD (Hematology)  Tai Adams MD as Hospitalist (Internal Medicine)  Fabian Babb RN as Nurse Coordinator (Oncology)  Emily Jefferson MD as MD (Internal Medicine)  EHSAN De Luna MD as MD (Plastic Surgery)  He Gomez MD as MD (Orthopedics)  He Gomez MD as Assigned Musculoskeletal Provider  Edouard Bryan MD as Assigned Cancer Care Provider  Emily Jefferson MD as Assigned Infectious Disease Provider  Nola Santoro MD as Assigned OBGYN Provider  Nancy Burton RN as Registered Nurse (Orthopedics)  [facility] ([facility] ([facility] ([place]))  SELF, REFERRED

## 2022-01-10 NOTE — PATIENT INSTRUCTIONS
PATIENT PREOPERATIVE INSTRUCTIONS For Same day procedures:  ---------------------------------  Your surgery is tentatively scheduled for 1/12/22, at       [facility], [address] [address]  ---------------------------------       Plan to arrive about 2 hours before the scheduled time.  You will receive a call  1-2 days before surgery from our preadmission department to confirm your procedure and your arrival time. If you have additional questions or you do not receive a call, you may call the preadmission department at  [phone]. If you need to cancel or reschedule your surgery, please call . Please review the letter you received from our surgery scheduler on information on what time to arrive and where to park your car, how to get ready for surgery etc.      ---------------------------------  Your Medications  1 Week before surgery   STOP taking Aspirin 1 week before surgery, unless advised otherwise.    It is best to avoid Non-Steroidal Anti-Inflammatory Drugs (NSAID's), such as ibuprofen, Motrin, Advil, naprosyn, Aleve and others, for 1 week prior to surgery.   If you need to take medication for pain, acetaminophen (Tylenol) is preferred.  If this isn't enough to manage your arthritis or chronic pain, ask your doctor for an alternative.    STOP taking any of these supplements:  Vitamin E (400 mg is OK), garlic (ajo), Ginkgo (duck foot tree, maidenhair tree, silver apricot), Ginseng, Kava (froilan, intoxicating pepper, kawa), Andrea's wort (katie, goat weed, hardhay, Hypercum, klamatheweed), Echinacea (purple cornflower root), Valerian (all heal, garden heliotrope, vandal root), Bitter orange.  They may interfere with the anesthetic.   Make sure that we know all medications and all supplements that you are taking.    Please cut down or stop tobacco use for at least 4 weeks prior to surgery to optimize your respiratory function and  minimize respiratory complications    Please avoid alcohol for at least 24 hours prior to your surgery    If your doctor has prescribed your post op medications already at your preop visit, you should pick these up and have them available at home for post operative use.   ---------------------------------  The day before surgery  Start MiraLax 1-3 days before surgery if you are prone to constipation.  Otherwise, Take your medications as usual.    The morning of surgery, ok to take your thyroid medication with a sip of water if you take it in the mornings.     Post-Operative Appointments    Lab Tests:  Make sure you get your covid test 1-2 days prior to your surgery and make sure you   ---------------------------------    If you become ill before surgery please call our [facility] Triage Nurses at [phone] or call the hospital .  Call us if you have a cold, cough, fever of 100.4 degrees or more, nausea, vomiting, or chest pain, shortness of breath or other illness between now and your surgery date. In that case, we may need to postpone your surgery until you are well.     DAY BEFORE SURGERY:   ---------------------------------  =======NO food or milk products after MIDNIGHT before surgery========  You can drink clear liquids until 2 HOURS  BEFOREyou check in ( which is 4 hours before your surgery). Water, clear apple juice, 7-up, tea or coffee without cream are all ok.   =========================================================    Confirm Your Medications: Bring a list of all medications (prescription and over-the-counter). Record the last time and dose for each medication.    THIS IS VERY IMPORTANT!  We will use this list to keep your medications on schedule while you are in the hospital.   Avoid alcohol, illegal drugs and cigarettes before surgery.     Preventing Constipation:  Your recovery after surgery is eased by avoiding constipation BEFORE and AFTER surgery.  To make sure  that your bowels empty normally the day of surgery, we recommend that you take 1 capful or packet of Miralax the evening before your surgery.  If you tend to be constipated, you may want to start this 2-3 evenings before your surgery.    Cleanliness: shower or bath the night before and the morning of your procedure. Use antiseptic surgical soap such as hibiclens, scrub care or exidine. You can find these at  your local pharmacy or ask your pharmacist for alternatives.   ---------------------------------    MORNING OF SURGERY     Wear loose fitting clothes and bring a warm pair of socks or slippers. Remove all hairpins, barrettes, wigs, false eyelashes, contact lenses, eyeglasses, make up, and jewelry, especially rings. Avoid wearing fragrances or nail polish.      You will be asked to remove dentures and bridges at the hospital before surgery.     Please wear any hearing devices you may have.     You will need to bring your insurance card and money to pay for your co-pay when you check in at the hospital. Leave other valuables at home.   Expect to meet several members of the operating room team.  We will each ask the same questions many times. This is part of standard process which has been shown to ensure your safety.  We want to be certain about every aspect of your care.   When you enter the operating room, your doctor will introduce you to the operating room team and review the details of the planned surgery with the full team.  ---------------------------------    AFTER SURGERY   Immediately after surgery, you will recovery area (PACU) for 1-2 hours.  I will talk to your family about the surgery while you are in recovery.   Your family cannot visit you immediately.   When you are more fully awake, the nurses will page your family to let them know when you can have a visitor.           You will be discharged home when you are 1) up and around without assistance, 2) drinking liquids and eating some food, 3)  comfortable taking pain pills.        If you are having bladder surgery, we will test how your bladder is emptying before you go home.  Most women are discharged home without a bladder catheter.  If you do go home with a catheter for a few days, we will teach you how to care for it before you go home.       Please arrange to have a ride home. We will give you personal instructions for your care at home.     A family member or friend may  your prescriptions at your preferred pharmacy if you haven't already picked them up prior to your surgery.   ---------------------------------    AT HOME     Arrange for extra help at home after surgery, especially if you live alone or provide care for another person.  You should have someone with you at home for at least 2-3 day(s) after surgery.       Driving:  You may not drive until you are no longer taking narcotic medications and feel strong enough to drive.   ++++++++++++++++++++++++++++++++++++++++++++++++++++  Managing pain after surgery  Pain after surgery is common and expected, but we want to keep you comfortable enough that you can walk around and do light activities as home.   Pain after surgery improves during the first 1-2 weeks as you heal and you will naturally need less medication as time passes.  Right after surgery, it is important to keep your pain under control with regular pain medications, every 4-6 hours.  If you wait too long between doses and the pain becomes severe, you will need more medication to get it back into control.   Keeping the pain in control allows you to sleep well and be more active--helping you to heal more quickly!  A combination of pain medications works best.  Acetaminophen offers mild pain relief with minimal side effects, ibuprofen is stronger and oxycodone is the strongest but also has the most side effects.  We recommend taking one from each group to give a balanced approach and to minimize side effects.    As your pain  improves, we recommend that you reduce the oxycodone first, and once you've stopped the narcotic medications, you can resume driving.  As your pain improves, you may notice that the worst pain occurs at the end of the day when you are tired or after you've been more active.  __________________________________  Acetaminophen (Tylenol): 625 mg by mouth every 6 hours.     For mild pain (1-3 on pain scale).  Common side effects:  Minimal  Warnings: Keep your total acetaminophen intake to less than 3,600 mg per day secondary to prevent liver damage.   Other medications that contain acetaminophen include: Percocet, Vicodin, Tylenol #3-- Don't take more than one from this group at a time.  __________________________________  NSAID (Non-Steroidal Anti-Inflammatory Drug) group  Commonly, we use Ibuprofen (Motrin, Advil 600 mg by mouth every 6 hours.      For moderate pain (3-5 on pain scale).  Common side effects: Mild constipation, may aggravate GERD (gastrointestinal reflux or heartburn)  Warnings:  Don't take drugs in this group if you have low kidney function or renal failure.  Other mediations in this group include:  aspirin, naproxen (Aleve), celecoxib (Celebrex), ketorolac (Toradol), meloxicam (Mobix)--Don't take more than one from this group at a time.   __________________________________  Narcotic group  Commonly, we use oxycodone 5 mg by mouth every 6 hours.      For strong pain (5 or more on pain scale). OK to use 10 mg every 6 hours if the pain is too strong.   Common side effects: Drowsiness, nausea, marked constipation, dizziness  Warnings:  Narcotics in high doses, especially when combined with sedatives or alcohol, can slow your breathing dangerously.   This is more likely when you already have sleep apnea.    Other medications in this group include:  Percocet, Norco, Vicodin, Codeine, Dilaudid, hydromorphone-- Don't take more than one from this group at a time.  __________________________________  For the  first few days, you may need all a medication from all 3 groups.  As your pain improves, reduce the narcotic medication first because it has the most side effects.  As you recover, you may notice that your pain is more bothersome at the end of the day when you are tired or after you have had an active day.  Take more pain medication at these times.      ++++++++++++++++++++++++++++++++++++++++++++++++++++  Activity:  We want you to be up and around, but plan rest often when you feel sore or tired.  Avoid tub baths, sexual activities, and tampons until your doctor advises that you may resume.  Avoid strenuous physical activity like running, jumping, sit-ups and core strengthening. Be careful to avoid falling when it is slippery outside. In general we restrict lifting more than 10 lbs and  having intercourse for the first 6-8 weeks post surgery but this depends on the type of surgery you have. Your doctor will advise you on this.   ++++++++++++++++++++++++++++++++++++++++++++++++++++    Avoiding and Treating Constipation after Surgery      Drink warm liquids (coffee, tea) and relax; go to the toilet with the first urge.    Increase fluids to 70-80 ounces/day    It is normal to not have a bowel movement for 2-3 days after surgery as long as you are passing gas and not having nausea/vomiting/ or excessive bloating.     Support your feet with a small step stool when you sit on the toilet. This helps flex your hips and places your pelvis in a squatting position.     Narcotic pain medications (Oxycodone, Norco, Percocet) are constipating. Use non-narcotic pain medications--acetaminophen (Tylenol) and ibuprofen (Advil, Motrin)--on a regular schedule so that you can minimize your need for narcotic medication.      Pain after surgery also makes it difficult to empty your bowels. Make sure your pain is controlled with pain medication and relax and take the time to allow complete evacuation.  Use stool softeners to counteract  the constipating effects of narcotic medications.    Keep the bowel movements soft after surgery.  Narcotic medications slow down the bowels and allow more water to be removed, making the stools hard and dry.   Medications that hold water in the bowel movement counteract this effect and keep the stools softer.    Take the stool softener, docusate (Colace), twice every day as a scheduled medication for a few weeks until your bowel are regular.     Polyethylene glycol 3350 (Miralax, [id number], Glycolax, LaxaClear, ClearLax) also works to hold water in the bowel movement to keep it softer.  It is stronger than docusate and safe to take daily for ongoing use.  Start with 1 capful mixed in water or juice.  You can adjust the dose as needed (1/2 cap daily if your bowels are too loose, 1-2 caps once or twice daily if too firm). Miralax usually won't work overnight, so start taking it as soon as you get home from the hospital.    Glycerin suppositories may be used to stimulate a bowel movement that is close to passing.    Senna, Sennokot, Dulcolax are all are stimulant laxatives and not good for a long term solution.  It is ok to use these 3-5 days a month if needed.    If you have no bowel movement by the 3rd day after surgery, call our office.       If you have any questions, you may call the  Advice nurses at :                               Colpocleisis     Colpocleisis: This procedure is most appropriate for women who do not desire to maintain sexual function. Using native tissue and strong stitches, the vagina will shortened and narrowed such that intercourse will not be possible. A woman who has this surgery will look the same on the outside of her genital area. Her ability to have orgasm with clitoral stimulation will be similar to before her surgery. The benefit of obliterative surgery is that it is durable, does not involve the risks of graft materials, is less invasive and is associated with quicker  recovery. We discussed the difference between functional and anatomic repairs and the uncertain impact on bladder and bowel function after surgery given their close proximity. We also discussed the risk of occult urinary incontinence (incontinence that was not present previously but becomes apparent after prolapse surgery), which can be reduced by 50% with a prophylactic sling, though does not decrease this risk to zero.   Discussed the hospital stay is typically 1 night, though sometimes 2 nights are needed. Some patients will be unable to void after surgery and will go home with a catheter. They will return to clinic a few days after for a repeat voiding trial and most will spontaneously void at that time although few may need catheterization for longer period of time. Recovery time is typically 6 weeks, though this varies from person to person and we recommend avoiding heavy lifting (>10 lbs.) for 6-8 weeks after surgery, as well as avoiding constipation.   Concurrent Hysterectomy: The risks and benefits of a hysterectomy at the time of colpocleisis were discussed including the increased blood loss, time and complication if a total vaginal hysterectomy is performed vs the risk of undiagnosed uterine pathology if the uterus remains in place.   Risks: We discussed the procedure, alternatives and risks using appropriate images and handouts as needed. Common risks of surgery were reviewed, including blood loss with the possible need for a blood transfusion, infection, complications from anesthesia, cardiopulmonary complications, and injury to surrounding organs, including the bowel, bladder, and ureter as well as injury to blood vessels and nerves, blood clots during or several days to weeks after surgery and postoperative pain that necessitates active management with or without narcotics. If an injury to an organ occurs during surgery, most can be repaired at the time of surgery where abdominal laparotomy may be  necessary. However there are rare occasions where an additional surgery is needed. Attention to careful positioning will be performed, though there is a risk of nerve injury especially with longer procedures. Prophylactic antibiotics will be given at the time of surgery, and sequential compression devices will be placed to prevent blood clots. Longer term risks of recurrent prolapse, urinary incontinence, persistent chronic pelvic pain and regret for loss of sexual function discussed. In your particular case, your risk is increased due to the following comorbidities.     Alternatives: No treatment (expectant management) with kegels alone for possible symptom relief, pessary to support prolapse and non obliterative surgical repair with either native tissue or augmented with synthetic mesh. We discussed that if we chose non obliterative native tissue repair, the outcome may not be as durable as colpocleisis. If we chose mesh augmented repair, the surgery is generally longer and may be associated with higher risk of surgical complications discussed above as well as a small risk of mesh erosion that may necessitate further surgery.      Risks: General surgical risks as reviewed above.       All questions were answered. The consent was reviewed and will be signed on the day of surgery.

## 2022-01-10 NOTE — LETTER
1/10/2022       RE: Jigna Allen  [address] [place] [address]     Dear Colleague,    Thank you for referring your patient, Jigna Allen, to the [facility] [place] at [facility]. Please see a copy of my visit note below.    January 10, 2022    Referring Provider: Referred Self  No address on file    Primary Care Provider: Elisabeth Ko    Reason for telephone visit: Preoperative discussions    Planned procedure : 1/12/22    Date of Planned procedure: [place] Colpocleisis, cystoscopy     HPI:  Jigna Allen is a 87 year old female who presents for telephone discussion about her upcoming surgery. She has stage 3 uterovaginal prolapse and urgency urinary incontinence and had previously used pessary which had become increasingly uncomfortable. She is scheduled for vaginal colpocleisis after discussion of pros and cons on prior visit on 11/1/21      This note was copied and pasted from Dr Santoro on 2/1/21      HPI:  Jigna Allen is a 86 year old female who presents for evaluation of a known uterovaginal prolapse.        Pelvic Organ Prolapse Symptoms  Has had bulge/protrusion vaginally for years and in fact was using a pessary for about 3 years through [facility] but stopped using it last March after she was placed in rehab for an infected knee ( requiring removal of knee replacement hardware and placement of skin graft). She says when she used her pessary, it did help her prolapse. She no longer has the pessary (someone threw it out). She is bothered by this bulge and also worried. She is planning to have another knee replacement surgery in march.     Urinary Symptoms  Has some urinary dribbling although doesn't notice clear triggers for this. She also has occasional urgency leakage but mostly at night. She denies stress leakage with coughing, laughing etc. Empties her bladder about 3 times during the day and once at night.  "Denies urgency/frequency/dysuria/Kristine.    Voiding function:   Has started needing to push her bulge in to void. But feels empty once she voids.     Gastrointestinal Symptoms:  Denies chronic diarrhea, constipation. Denies bothersome fecal or flatal incontinence.       Sexual function/Pelvic floor pain/GYN:   Not sexually active for  reasons. Doesn't think she will be.       Relevant Medical History:    Diabetes? No  High Blood pressure? No     Recurrent UTIs? No  Sleep Apnea? No  Obesity? No  History of Blood clots? No  Other medical problems: Hypothyroidism, Sarcoma        Physical Exam: /84   Pulse 85   Ht 1.57 m (5' 1.81\")   Wt 58.7 kg (129 lb 6.4 oz)   BMI 23.81 kg/m    Gen:  is alert, comfortable in no acute distress,   Abdomen: Abdomen is soft, non-tender, non-distended,   Lungs: non-labored breathing.       Pelvic Exam:   Normal external female genitalia. The urethra was normal.    Vagina: Stage 3 uterovaginal prolapse, no abnormal discharge   Uterus: Normal size, no lesion on cervix  Ovaries: non palpable  Vulva: no lesions  Rectal: deferred    Pelvic floor strength: 3/5 kegels.    Pelvic floor muscles: No levator myalgia    POPQ EXAM FOR PROLAPSE SEVERITY  (Aa):   1 (Ba):   +4 (C ):    0   (GH):   4.5 (PB):  2 (TVL):  8   (Ap):   +2 (Bp):  +5 (D):   -1     ICS Stage (1-4):  3    Voiding trial:    VOID 200 ml  PVR 0 mL by Bladder ultrasound  Leak with Cough stress test : No, Prolapse reduced Yes with proctoswab           Surgical History:      Past Surgical History:   Procedure Laterality Date     ARTHROPLASTY KNEE Right 6/12/2018    Procedure: ARTHROPLASTY KNEE;  Right Total Knee Replacement ;  Surgeon: He Gomez MD;  [facility]     ARTHROPLASTY REVISION KNEE Right 3/9/2021    Procedure: Revision Right total hinge Knee arthroplasty;  Surgeon: He Gomez MD;  [facility]     CARPAL TUNNEL RELEASE RT/LT       excise sarcoma Right     right leg     exicision of wound  " 2020     GRAFT FREE VASCULARIZED TRANSVERSE RECTUS ABDOMINIS MYOCUTANEOUS Left 2020    Procedure: With left anterolateral thigh free flap with SPY;  Surgeon: EHSAN De Luna MD;  [facility]     IRRIGATION AND DEBRIDEMENT KNEE, PLACE ANTIBIOTIC CEMENT BEADS / SPACE Right 2020    Procedure: manufacture and implantation of Vanco/tobra ANTIBIOTIC SPACER, KNEE;  Surgeon: He Gomez MD;  [facility]: [facility] OR     KNEE SURGERY       PICC DOUBLE LUMEN PLACEMENT Right 2020    5FR DL PICC inserted at brachial medial, length- 37cm (1cm out). Tip at mid SVC     RECONSTRUCT KNEE Right 2020    Procedure: Right leg reconstruction and wound debridemenr;  Surgeon: EHSAN De Luna MD;  [facility]     REMOVE HARDWARE ARTHROPLASTY KNEE Right 2020    Procedure: Explantation of Right total knee;  Surgeon: He Gomez MD;  [facility]     TUBAL LIGATION         OB/Gyn History:  OB History    Para Term  AB Living   2 2 2 0 0 0   SAB IAB Ectopic Multiple Live Births   0 0 0 0 0      # Outcome Date GA Lbr Gerber/2nd Weight Sex Delivery Anes PTL Lv   2 Term      Vag-Spont      1 Term      Vag-Spont          Medications/Vitamins/Supplements:     Current Outpatient Medications   Medication     amoxicillin (AMOXIL) 500 MG tablet     aspirin (ASA) 81 MG EC tablet     estradiol (ESTRACE) 0.1 MG/GM vaginal cream     levothyroxine (LEVOTHROID) 75 MCG tablet     No current facility-administered medications for this visit.         Medical History:      Past Medical History:   Diagnosis Date     Anemia      Anxiety      Aortic valve sclerosis      Arthritis ?     Complication of anesthesia     slow to wakeup     Hypothyroid      Infection of prosthetic joint, subsequent encounter      Mild tricuspid regurgitation      Mumps ?1945     Primary osteoarthritis of right knee      Sarcoma (H) 2009     ROS  Social History    Social History     Socioeconomic History     Marital  status:      Spouse name: Not on file     Number of children: Not on file     Years of education: Not on file     Highest education level: Not on file   Occupational History     Not on file   Tobacco Use     Smoking status: Never Smoker     Smokeless tobacco: Never Used   Substance and Sexual Activity     Alcohol use: Not Currently     Comment: rarely - months      Drug use: Never     Sexual activity: Not Currently     Partners: Male     Birth control/protection: Post-menopausal   Other Topics Concern     Parent/sibling w/ CABG, MI or angioplasty before 65F 55M? No   Social History Narrative     Not on file     Social Determinants of Health     Financial Resource Strain: Not on file   Food Insecurity: Not on file   Transportation Needs: Not on file   Physical Activity: Not on file   Stress: Not on file   Social Connections: Not on file   Intimate Partner Violence: Not At Risk     Fear of Current or Ex-Partner: No     Emotionally Abused: No     Physically Abused: No     Sexually Abused: No   Housing Stability: Not on file       Family History  Family History   Problem Relation Age of Onset     Cancer Father         age 85-87 prostate     Prostate Cancer Father      Other Cancer Father      Hearing Loss Father      Cancer Brother         stemcell;  50 yrs. [place] vet     Other - See Comments Daughter         Slow to wake      Asthma Daughter      Hearing Loss Brother         hearing aid age 77       Allergy    Allergies   Allergen Reactions     Colchicine      Other reaction(s): Gastrointestinal  diarrhea     Niacin      skin rash     Ferrous Sulfate Rash       Current Outpatient Medications   Medication     amoxicillin (AMOXIL) 500 MG tablet     aspirin (ASA) 81 MG EC tablet     estradiol (ESTRACE) 0.1 MG/GM vaginal cream     levothyroxine (LEVOTHROID) 75 MCG tablet     No current facility-administered medications for this visit.       Physical Exam:     Deferred ( virtual visit)      Recent Results (from  the past 240 hour(s))   Potassium (External Result)    Collection Time: 01/03/22  1:00 PM   Result Value Ref Range    Potassium (External) 4.1 3.5 - 5.1 mmol/L     Lab Results   Component Value Date    WBC 5.5 10/12/2021    WBC 3.7 07/17/2020     Lab Results   Component Value Date    RBC 4.33 10/12/2021    RBC 3.69 07/17/2020     Lab Results   Component Value Date    HGB 13.3 10/12/2021    HGB 11.1 03/11/2021     Lab Results   Component Value Date    HCT 41.2 10/12/2021    HCT 34..2 07/17/2020     No components found for: MCT  Lab Results   Component Value Date    MCV 95 10/12/2021    MCV 93 07/17/2020     Lab Results   Component Value Date    MCH 30.7 10/12/2021    MCH 28.2 07/17/2020     Lab Results   Component Value Date    MCHC 32.3 10/12/2021    MCHC 30.4 07/17/2020     Lab Results   Component Value Date    RDW 13.2 10/12/2021    RDW 16.1 07/17/2020     Lab Results   Component Value Date     10/12/2021     07/17/2020     Last Comprehensive Metabolic Panel:  Sodium   Date Value Ref Range Status   10/12/2021 140 133 - 144 mmol/L Final   03/11/2021 141 133 - 144 mmol/L Final     Potassium   Date Value Ref Range Status   10/12/2021 4.9 3.4 - 5.3 mmol/L Final   03/11/2021 3.8 3.4 - 5.3 mmol/L Final     Chloride   Date Value Ref Range Status   10/12/2021 106 94 - 109 mmol/L Final   03/11/2021 108 94 - 109 mmol/L Final     Carbon Dioxide   Date Value Ref Range Status   03/11/2021 28 20 - 32 mmol/L Final     Carbon Dioxide (CO2)   Date Value Ref Range Status   10/12/2021 28 20 - 32 mmol/L Final     Anion Gap   Date Value Ref Range Status   10/12/2021 6 3 - 14 mmol/L Final   03/11/2021 5 3 - 14 mmol/L Final     Glucose   Date Value Ref Range Status   10/12/2021 100 (H) 70 - 99 mg/dL Final   03/11/2021 99 70 - 99 mg/dL Final     Urea Nitrogen   Date Value Ref Range Status   10/12/2021 24 7 - 30 mg/dL Final   03/11/2021 20 7 - 30 mg/dL Final     Creatinine   Date Value Ref Range Status   10/12/2021 0.97 0.52  - 1.04 mg/dL Final   03/11/2021 0.81 0.52 - 1.04 mg/dL Final     GFR Estimate   Date Value Ref Range Status   10/12/2021 54 (L) >60 mL/min/1.73m2 Final     Comment:     As of July 11, 2021, eGFR is calculated by the CKD-EPI creatinine equation, without race adjustment. eGFR can be influenced by muscle mass, exercise, and diet. The reported eGFR is an estimation only and is only applicable if the renal function is stable.   03/11/2021 67 >60 mL/min/[1.73_m2] Final     Comment:     Non  GFR Calc  Starting 12/18/2018, serum creatinine based estimated GFR (eGFR) will be   calculated using the Chronic Kidney Disease Epidemiology Collaboration   (CKD-EPI) equation.       Calcium   Date Value Ref Range Status   10/12/2021 9.0 8.5 - 10.1 mg/dL Final   03/11/2021 8.4 (L) 8.5 - 10.1 mg/dL Final     Hemoglobin A1C POCT   Date Value Ref Range Status   06/05/2018 5.6 0 - 5.6 % Final     Comment:     Normal <5.7% Prediabetes 5.7-6.4%  Diabetes 6.5% or higher - adopted from ADA   consensus guidelines.       UA RESULTS:  Recent Labs   Lab Test 06/05/18  1122   COLOR Yellow   APPEARANCE Clear   URINEGLC Negative   URINEBILI Negative   URINEKETONE Negative   SG 1.020   UBLD Negative   URINEPH 5.0   PROTEIN Negative   NITRITE Negative   LEUKEST Large*   RBCU 1   WBCU 5           A/P: Jigna Allen is a 87 year old F with     Encounter Diagnosis   Name Primary?     Uterovaginal prolapse Yes       We previously discussed and reinforced today that give that she is  85 yo and no longer interesterested in being sexually active, reasonable to do colpocleisis which is one of the least invasive but still very effective surgery for her stage 3 uterovaginal prolapse. We discussed that this is a vaginal native tissue repair that narrows and shortens the vagina ( colpocleisis) which means that she will not be able to have penetrative intercourse in the future. We had previously discussed other surgery options such as  reconstructive vaginal or minimally invasive abdominal procedures but we opted not to pursue these to avoid longer and more invasive surgery . We discussed that it is reasonable to leave her uterus in for this surgery to minimize time of surgery given no current clinical symptoms worrisome for uterine abnormalities such as post menopausal bleeding. We discussed risks of surgery including but not limited to bleeding ( that may necessitate blood transfusion), infection, injury to bowel, bladder, or other surrounding organs, cardiovascular or respiratory complications related to surgery or anesthesia and worsening or new pain in surgical area.  She also understands that surgery may fail and she may need further treatment.        I spent a total of 30 minutes with  Jigna Allen  on the date of the encounter in chart review, face to face patient visit, review of tests, documentation and/or discussion with other providers about the issues documented above.     Nola Santoro MD, MCR  , Department of OBGYN  Female Pelvic Medicine and Reconstructive Surgery ( Urogynecology)  CC  Patient Care Team:  Elisabeth Ko MD as PCP - General (Internal Medicine)  Bryon Ojeda MD as MD (Orthopedics)  Edouard Bryan MD as MD (Hematology)  Tai Adams MD as Hospitalist (Internal Medicine)  Fabian Babb RN as Nurse Coordinator (Oncology)  Emily Jefferson MD as MD (Internal Medicine)  HESAN De Luna MD as MD (Plastic Surgery)  He Gomez MD as MD (Orthopedics)  He Gomez MD as Assigned Musculoskeletal Provider  Edouard Bryan MD as Assigned Cancer Care Provider  Emily Jefferson MD as Assigned Infectious Disease Provider  Nola Santoro MD as Assigned OBGYN Provider  Nancy Burton RN as Registered Nurse (Orthopedics)  [facility] ([facility] ([facility] ([place]  SELF, REFERRED

## 2022-01-11 ENCOUNTER — ANESTHESIA EVENT (OUTPATIENT)
Dept: SURGERY | Facility: CLINIC | Age: 87
End: 2022-01-11
Payer: COMMERCIAL

## 2022-01-12 ENCOUNTER — TRANSFERRED RECORDS (OUTPATIENT)
Dept: MULTI SPECIALTY CLINIC | Facility: CLINIC | Age: 87
End: 2022-01-12

## 2022-01-12 ENCOUNTER — ANESTHESIA (OUTPATIENT)
Dept: SURGERY | Facility: CLINIC | Age: 87
End: 2022-01-12
Payer: COMMERCIAL

## 2022-01-12 ENCOUNTER — HOSPITAL ENCOUNTER (OUTPATIENT)
Facility: CLINIC | Age: 87
Discharge: HOME | End: 2022-01-12
Attending: OBSTETRICS & GYNECOLOGY | Admitting: OBSTETRICS & GYNECOLOGY
Payer: COMMERCIAL

## 2022-01-12 VITALS
BODY MASS INDEX: 23.98 KG/M2 | DIASTOLIC BLOOD PRESSURE: 66 MMHG | HEART RATE: 73 BPM | WEIGHT: 130.29 LBS | OXYGEN SATURATION: 98 % | HEIGHT: 62 IN | TEMPERATURE: 98.6 F | SYSTOLIC BLOOD PRESSURE: 104 MMHG | RESPIRATION RATE: 16 BRPM

## 2022-01-12 PROBLEM — I35.8 AORTIC VALVE SCLEROSIS: Status: ACTIVE | Noted: 2022-01-12

## 2022-01-12 LAB — GLUCOSE BLDC GLUCOMTR-MCNC: 98 MG/DL (ref 70–99)

## 2022-01-12 PROCEDURE — 250N000011 HC RX IP 250 OP 636: Performed by: ANESTHESIOLOGY

## 2022-01-12 PROCEDURE — 250N000011 HC RX IP 250 OP 636: Performed by: NURSE ANESTHETIST, CERTIFIED REGISTERED

## 2022-01-12 PROCEDURE — 250N000025 HC SEVOFLURANE, PER MIN: Performed by: OBSTETRICS & GYNECOLOGY

## 2022-01-12 PROCEDURE — 250N000009 HC RX 250: Performed by: OBSTETRICS & GYNECOLOGY

## 2022-01-12 PROCEDURE — 57120 COLPOCLEISIS LE FORT TYPE: CPT | Mod: GC | Performed by: OBSTETRICS & GYNECOLOGY

## 2022-01-12 PROCEDURE — 370N000017 HC ANESTHESIA TECHNICAL FEE, PER MIN: Performed by: OBSTETRICS & GYNECOLOGY

## 2022-01-12 PROCEDURE — 999N000141 HC STATISTIC PRE-PROCEDURE NURSING ASSESSMENT: Performed by: OBSTETRICS & GYNECOLOGY

## 2022-01-12 PROCEDURE — 258N000003 HC RX IP 258 OP 636: Performed by: NURSE ANESTHETIST, CERTIFIED REGISTERED

## 2022-01-12 PROCEDURE — 272N000001 HC OR GENERAL SUPPLY STERILE: Performed by: OBSTETRICS & GYNECOLOGY

## 2022-01-12 PROCEDURE — 710N000010 HC RECOVERY PHASE 1, LEVEL 2, PER MIN: Performed by: OBSTETRICS & GYNECOLOGY

## 2022-01-12 PROCEDURE — 710N000012 HC RECOVERY PHASE 2, PER MINUTE: Performed by: OBSTETRICS & GYNECOLOGY

## 2022-01-12 PROCEDURE — 250N000013 HC RX MED GY IP 250 OP 250 PS 637: Performed by: OBSTETRICS & GYNECOLOGY

## 2022-01-12 PROCEDURE — 82962 GLUCOSE BLOOD TEST: CPT

## 2022-01-12 PROCEDURE — 250N000011 HC RX IP 250 OP 636: Performed by: OBSTETRICS & GYNECOLOGY

## 2022-01-12 PROCEDURE — 250N000009 HC RX 250: Performed by: NURSE ANESTHETIST, CERTIFIED REGISTERED

## 2022-01-12 PROCEDURE — 360N000077 HC SURGERY LEVEL 4, PER MIN: Performed by: OBSTETRICS & GYNECOLOGY

## 2022-01-12 RX ORDER — FENTANYL CITRATE 50 UG/ML
25 INJECTION, SOLUTION INTRAMUSCULAR; INTRAVENOUS EVERY 5 MIN PRN
Status: DISCONTINUED | OUTPATIENT
Start: 2022-01-12 | End: 2022-01-12 | Stop reason: HOSPADM

## 2022-01-12 RX ORDER — SODIUM CHLORIDE, SODIUM LACTATE, POTASSIUM CHLORIDE, CALCIUM CHLORIDE 600; 310; 30; 20 MG/100ML; MG/100ML; MG/100ML; MG/100ML
INJECTION, SOLUTION INTRAVENOUS CONTINUOUS
Status: DISCONTINUED | OUTPATIENT
Start: 2022-01-12 | End: 2022-01-12 | Stop reason: HOSPADM

## 2022-01-12 RX ORDER — OXYCODONE HYDROCHLORIDE 5 MG/1
2.5-5 TABLET ORAL EVERY 6 HOURS PRN
Qty: 6 TABLET | Refills: 0 | Status: CANCELLED | OUTPATIENT
Start: 2022-01-12

## 2022-01-12 RX ORDER — ONDANSETRON 4 MG/1
4 TABLET, ORALLY DISINTEGRATING ORAL EVERY 30 MIN PRN
Status: DISCONTINUED | OUTPATIENT
Start: 2022-01-12 | End: 2022-01-12 | Stop reason: HOSPADM

## 2022-01-12 RX ORDER — FENTANYL CITRATE 50 UG/ML
INJECTION, SOLUTION INTRAMUSCULAR; INTRAVENOUS PRN
Status: DISCONTINUED | OUTPATIENT
Start: 2022-01-12 | End: 2022-01-12

## 2022-01-12 RX ORDER — NALOXONE HYDROCHLORIDE 0.4 MG/ML
0.4 INJECTION, SOLUTION INTRAMUSCULAR; INTRAVENOUS; SUBCUTANEOUS
Status: DISCONTINUED | OUTPATIENT
Start: 2022-01-12 | End: 2022-01-12 | Stop reason: HOSPADM

## 2022-01-12 RX ORDER — CEFAZOLIN SODIUM 2 G/100ML
2 INJECTION, SOLUTION INTRAVENOUS SEE ADMIN INSTRUCTIONS
Status: DISCONTINUED | OUTPATIENT
Start: 2022-01-12 | End: 2022-01-12 | Stop reason: HOSPADM

## 2022-01-12 RX ORDER — BUPIVACAINE HYDROCHLORIDE AND EPINEPHRINE 2.5; 5 MG/ML; UG/ML
INJECTION, SOLUTION INFILTRATION; PERINEURAL PRN
Status: DISCONTINUED | OUTPATIENT
Start: 2022-01-12 | End: 2022-01-12 | Stop reason: HOSPADM

## 2022-01-12 RX ORDER — LIDOCAINE HYDROCHLORIDE 20 MG/ML
INJECTION, SOLUTION INFILTRATION; PERINEURAL PRN
Status: DISCONTINUED | OUTPATIENT
Start: 2022-01-12 | End: 2022-01-12

## 2022-01-12 RX ORDER — ACETAMINOPHEN 325 MG/1
975 TABLET ORAL ONCE
Status: COMPLETED | OUTPATIENT
Start: 2022-01-12 | End: 2022-01-12

## 2022-01-12 RX ORDER — AMOXICILLIN 250 MG
1-2 CAPSULE ORAL 2 TIMES DAILY
Qty: 30 TABLET | Refills: 0 | Status: CANCELLED | OUTPATIENT
Start: 2022-01-12

## 2022-01-12 RX ORDER — CEFAZOLIN SODIUM 2 G/100ML
2 INJECTION, SOLUTION INTRAVENOUS
Status: COMPLETED | OUTPATIENT
Start: 2022-01-12 | End: 2022-01-12

## 2022-01-12 RX ORDER — NALOXONE HYDROCHLORIDE 0.4 MG/ML
0.2 INJECTION, SOLUTION INTRAMUSCULAR; INTRAVENOUS; SUBCUTANEOUS
Status: DISCONTINUED | OUTPATIENT
Start: 2022-01-12 | End: 2022-01-12 | Stop reason: HOSPADM

## 2022-01-12 RX ORDER — ONDANSETRON 2 MG/ML
4 INJECTION INTRAMUSCULAR; INTRAVENOUS EVERY 30 MIN PRN
Status: DISCONTINUED | OUTPATIENT
Start: 2022-01-12 | End: 2022-01-12 | Stop reason: HOSPADM

## 2022-01-12 RX ORDER — OXYCODONE HYDROCHLORIDE 5 MG/1
5 TABLET ORAL
Status: CANCELLED | OUTPATIENT
Start: 2022-01-12

## 2022-01-12 RX ORDER — IBUPROFEN 400 MG/1
400 TABLET, FILM COATED ORAL EVERY 6 HOURS PRN
Qty: 12 TABLET | Refills: 0 | Status: CANCELLED | OUTPATIENT
Start: 2022-01-12

## 2022-01-12 RX ORDER — SODIUM CHLORIDE, SODIUM LACTATE, POTASSIUM CHLORIDE, CALCIUM CHLORIDE 600; 310; 30; 20 MG/100ML; MG/100ML; MG/100ML; MG/100ML
INJECTION, SOLUTION INTRAVENOUS CONTINUOUS PRN
Status: DISCONTINUED | OUTPATIENT
Start: 2022-01-12 | End: 2022-01-12

## 2022-01-12 RX ORDER — PHENAZOPYRIDINE HYDROCHLORIDE 200 MG/1
200 TABLET, FILM COATED ORAL ONCE
Status: COMPLETED | OUTPATIENT
Start: 2022-01-12 | End: 2022-01-12

## 2022-01-12 RX ORDER — OXYCODONE HYDROCHLORIDE 5 MG/1
5 TABLET ORAL EVERY 4 HOURS PRN
Status: DISCONTINUED | OUTPATIENT
Start: 2022-01-12 | End: 2022-01-12 | Stop reason: HOSPADM

## 2022-01-12 RX ORDER — DEXAMETHASONE SODIUM PHOSPHATE 4 MG/ML
INJECTION, SOLUTION INTRA-ARTICULAR; INTRALESIONAL; INTRAMUSCULAR; INTRAVENOUS; SOFT TISSUE PRN
Status: DISCONTINUED | OUTPATIENT
Start: 2022-01-12 | End: 2022-01-12

## 2022-01-12 RX ORDER — PROPOFOL 10 MG/ML
INJECTION, EMULSION INTRAVENOUS PRN
Status: DISCONTINUED | OUTPATIENT
Start: 2022-01-12 | End: 2022-01-12

## 2022-01-12 RX ORDER — ONDANSETRON 2 MG/ML
INJECTION INTRAMUSCULAR; INTRAVENOUS PRN
Status: DISCONTINUED | OUTPATIENT
Start: 2022-01-12 | End: 2022-01-12

## 2022-01-12 RX ORDER — GLYCOPYRROLATE 0.2 MG/ML
INJECTION, SOLUTION INTRAMUSCULAR; INTRAVENOUS PRN
Status: DISCONTINUED | OUTPATIENT
Start: 2022-01-12 | End: 2022-01-12

## 2022-01-12 RX ORDER — ACETAMINOPHEN 325 MG/1
650 TABLET ORAL EVERY 6 HOURS PRN
Qty: 24 TABLET | Refills: 0 | Status: CANCELLED | OUTPATIENT
Start: 2022-01-12

## 2022-01-12 RX ORDER — IBUPROFEN 600 MG/1
600 TABLET, FILM COATED ORAL ONCE
Status: CANCELLED | OUTPATIENT
Start: 2022-01-12 | End: 2022-01-12

## 2022-01-12 RX ADMIN — LIDOCAINE HYDROCHLORIDE 100 MG: 20 INJECTION, SOLUTION INFILTRATION; PERINEURAL at 07:35

## 2022-01-12 RX ADMIN — GLYCOPYRROLATE 0.2 MG: 0.2 INJECTION, SOLUTION INTRAMUSCULAR; INTRAVENOUS at 08:25

## 2022-01-12 RX ADMIN — PHENYLEPHRINE HYDROCHLORIDE 100 MCG: 10 INJECTION INTRAVENOUS at 07:44

## 2022-01-12 RX ADMIN — FENTANYL CITRATE 25 MCG: 50 INJECTION, SOLUTION INTRAMUSCULAR; INTRAVENOUS at 10:12

## 2022-01-12 RX ADMIN — DEXAMETHASONE SODIUM PHOSPHATE 4 MG: 4 INJECTION, SOLUTION INTRAMUSCULAR; INTRAVENOUS at 07:49

## 2022-01-12 RX ADMIN — PHENYLEPHRINE HYDROCHLORIDE 0.5 MCG/KG/MIN: 10 INJECTION INTRAVENOUS at 07:50

## 2022-01-12 RX ADMIN — CEFAZOLIN 2 G: 10 INJECTION, POWDER, FOR SOLUTION INTRAVENOUS at 07:50

## 2022-01-12 RX ADMIN — SODIUM CHLORIDE, POTASSIUM CHLORIDE, SODIUM LACTATE AND CALCIUM CHLORIDE: 600; 310; 30; 20 INJECTION, SOLUTION INTRAVENOUS at 07:35

## 2022-01-12 RX ADMIN — PHENYLEPHRINE HYDROCHLORIDE 0.4 MCG/KG/MIN: 10 INJECTION INTRAVENOUS at 09:02

## 2022-01-12 RX ADMIN — ONDANSETRON 4 MG: 2 INJECTION INTRAMUSCULAR; INTRAVENOUS at 10:07

## 2022-01-12 RX ADMIN — ACETAMINOPHEN 975 MG: 325 TABLET, FILM COATED ORAL at 06:23

## 2022-01-12 RX ADMIN — PROPOFOL 120 MG: 10 INJECTION, EMULSION INTRAVENOUS at 07:35

## 2022-01-12 RX ADMIN — ROCURONIUM BROMIDE 50 MG: 50 INJECTION, SOLUTION INTRAVENOUS at 07:35

## 2022-01-12 RX ADMIN — FENTANYL CITRATE 50 MCG: 50 INJECTION, SOLUTION INTRAMUSCULAR; INTRAVENOUS at 07:35

## 2022-01-12 RX ADMIN — SUGAMMADEX 200 MG: 100 INJECTION, SOLUTION INTRAVENOUS at 10:33

## 2022-01-12 RX ADMIN — PROPOFOL 30 MG: 10 INJECTION, EMULSION INTRAVENOUS at 10:12

## 2022-01-12 RX ADMIN — PHENYLEPHRINE HYDROCHLORIDE 100 MCG: 10 INJECTION INTRAVENOUS at 07:48

## 2022-01-12 RX ADMIN — PHENAZOPYRIDINE HYDROCHLORIDE 200 MG: 200 TABLET, FILM COATED ORAL at 06:23

## 2022-01-12 ASSESSMENT — MIFFLIN-ST. JEOR: SCORE: 989.25

## 2022-01-12 ASSESSMENT — ENCOUNTER SYMPTOMS: DYSRHYTHMIAS: 0

## 2022-01-12 NOTE — ANESTHESIA POSTPROCEDURE EVALUATION
"Patient: Jigna Allen    Procedure: Procedure(s):  COLPOCLEISIS, COMPLETE  CYSTOSCOPY       Diagnosis:Uterovaginal prolapse [N81.4]  Diagnosis Additional Information: No value filed.    Anesthesia Type:  General    Note:     Postop Pain Control: Uneventful            Sign Out: Well controlled pain   PONV: No   Neuro/Psych: Uneventful            Sign Out: Acceptable/Baseline neuro status   Airway/Respiratory: Uneventful            Sign Out: Acceptable/Baseline resp. status   CV/Hemodynamics: Uneventful            Sign Out: Acceptable CV status; No obvious hypovolemia; No obvious fluid overload   Other NRE: NONE   DID A NON-ROUTINE EVENT OCCUR? No           Last vitals:  Vitals Value Taken Time   /65 01/12/22 1130   Temp 37.1  C (98.8  F) 01/12/22 1130   Pulse 83 01/12/22 1140   Resp 25 01/12/22 1141   SpO2 90 % 01/12/22 1145   Vitals shown include unvalidated device data.    Patient Vitals for the past 24 hrs:   BP Temp Temp src Pulse Resp SpO2 Height Weight   01/12/22 1130 112/65 37.1  C (98.8  F) Axillary 101 19 92 % -- --   01/12/22 1115 128/68 -- -- 86 19 97 % -- --   01/12/22 1100 127/67 -- -- 88 19 97 % -- --   01/12/22 1057 138/70 36.8  C (98.2  F) -- 90 (!) 34 97 % -- --   01/12/22 0543 121/78 36.8  C (98.3  F) Oral 76 16 96 % 1.575 m (5' 2\") 59.1 kg (130 lb 4.7 oz)         Electronically Signed By: Rosalinda Briggs MD  January 12, 2022  11:56 AM  "

## 2022-01-12 NOTE — ANESTHESIA CARE TRANSFER NOTE
Patient: Jigna Allen    Procedure: Procedure(s):  COLPOCLEISIS, COMPLETE  CYSTOSCOPY       Diagnosis: Uterovaginal prolapse [N81.4]  Diagnosis Additional Information: No value filed.    Anesthesia Type:   General     Note:    Oropharynx: oropharynx clear of all foreign objects  Level of Consciousness: drowsy  Oxygen Supplementation: face mask    Independent Airway: airway patency satisfactory and stable  Dentition: dentition unchanged  Vital Signs Stable: post-procedure vital signs reviewed and stable  Report to RN Given: handoff report given  Patient transferred to: PACU    Handoff Report: Identifed the Patient, Identified the Reponsible Provider, Reviewed the pertinent medical history, Discussed the surgical course, Reviewed Intra-OP anesthesia mangement and issues during anesthesia, Set expectations for post-procedure period and Allowed opportunity for questions and acknowledgement of understanding      Vitals:  Vitals Value Taken Time   /70 01/12/22 1057   Temp     Pulse 90 01/12/22 1057   Resp 34 01/12/22 1058   SpO2 97 % 01/12/22 1058   Vitals shown include unvalidated device data.    Electronically Signed By: WOOD Alilson CRNA  January 12, 2022  11:00 AM

## 2022-01-12 NOTE — PROGRESS NOTES
"Gynecologic Postoperative Check Note  1/12/2022    S: Pain IS well controlled. Ambulating without pain. Voiding spontaneously and passed active trial of void. Tolerating ice cream and crackers without nausea or vomiting. Denies chest pain, shortness of breath, or other concerns at this time. Does still have some lightheadedness.    O:  Vitals:    01/12/22 1152 01/12/22 1200 01/12/22 1230 01/12/22 1300   BP: 125/84 106/66 114/60 104/66   BP Location:       Pulse:  73     Resp: 20 16 16 16   Temp: 98.8  F (37.1  C)  98.6  F (37  C)    TempSrc: Axillary  Oral    SpO2: 98% 99% 98% 98%   Weight:       Height:         Gen: sitting in chair, NAD  Cardio: RRR, S1/S2, no murmurs  Resp: CTAB, no wheezing or crackles  Abdomen: soft, appropriately tender,   Extremities: Non-tender, trace edema    A: 87 year old POD#0 s/p \"\"[place] colpocleisis, cystoscopy\". Doing well and meeting all postoperative goals for discharge. Passed active trial of void. Will discharge once lightheadedness is improved.    Christel Kearney MD  [facility] OBGYN PGY-4  01/12/2022          "

## 2022-01-12 NOTE — BRIEF OP NOTE
UroGynecology Brief Operation Note  Name: Jigna Allen   MRN: [id number]   : 1935   Procedure date: 2022    Pre-operative diagnosis: Stage III pelvic organ prolapse  Post-operative diagnosis: Same, s/p below procedure    Procedure: [place] colpocleisis, cystoscopy   Anesthesia: General anesthesia    Surgeon: Nola Santoro MD  Assistant(s): Christel Kearney MD PGY4; Keely Edward MD PGY1    Indications: Jigna Allen, 87 year old, is here for above procedure for symptomatic stage 3 pelvic organ prolapse, which she desires definitive treatment.    Estimated blood loss: 30mL  Total IV fluids: 800 mL, Lactated Ringer  Total urine output: 750mL, Pyridium stained urine  Drains: Vera catheter  Specimens: none  Findings:   - EUA: Normal external female genitalia. The urethra was normal. Vagina: Stage 3 uterovaginal prolapse with rectocele, no abnormal discharge. Uterus: Normal size, no lesion on cervix  - Cystoscopy: normal appearing bladder, bilateral ureteral efflux noted.    Complications: None apparent   Condition: Patient taken to recovery in stable condition.    Christel Kearney MD  N OBGYN PGY-4  Gyn resident pager (weekday 6AM-6PM): [phone]  OB G3 pager (weeknight 6PM-6AM, weekend): [phone]  2022 10:52 AM

## 2022-01-12 NOTE — OR NURSING
Trial void with  300cc saline instilled into bladder. Tolerated well. Walked patient to the bathroom and voided 300cc into hat. Bladder scan for 19 ml .

## 2022-01-12 NOTE — ANESTHESIA PREPROCEDURE EVALUATION
Anesthesia Pre-Procedure Evaluation    Patient: Jigna Allen   MRN: [id number] : 1935        Preoperative Diagnosis: Uterovaginal prolapse [N81.4]    Procedure : Procedure(s):  COLPOCLEISIS, COMPLETE  CYSTOSCOPY          Past Medical History:   Diagnosis Date     Anemia      Anxiety      Aortic valve sclerosis      Arthritis ?     Complication of anesthesia     slow to wakeup     Hypothyroid      Infection of prosthetic joint, subsequent encounter      Mild tricuspid regurgitation      Mumps ?194     Primary osteoarthritis of right knee      Sarcoma (H)       Past Surgical History:   Procedure Laterality Date     ARTHROPLASTY KNEE Right 2018    Procedure: ARTHROPLASTY KNEE;  Right Total Knee Replacement ;  Surgeon: He Gomez MD;  [facility]     ARTHROPLASTY REVISION KNEE Right 3/9/2021    Procedure: Revision Right total hinge Knee arthroplasty;  Surgeon: He Gomez MD;  [facility]     CARPAL TUNNEL RELEASE RT/LT       excise sarcoma Right     right leg     exicision of wound  2020     GRAFT FREE VASCULARIZED TRANSVERSE RECTUS ABDOMINIS MYOCUTANEOUS Left 2020    Procedure: With left anterolateral thigh free flap with SPY;  Surgeon: EHSAN De Luna MD;  [facility]     IRRIGATION AND DEBRIDEMENT KNEE, PLACE ANTIBIOTIC CEMENT BEADS / SPACE Right 2020    Procedure: manufacture and implantation of Vanco/tobra ANTIBIOTIC SPACER, KNEE;  Surgeon: He Gomez MD;  [facility]     KNEE SURGERY       PICC DOUBLE LUMEN PLACEMENT Right 2020    5FR DL PICC inserted at brachial medial, length- 37cm (1cm out). Tip at mid SVC     RECONSTRUCT KNEE Right 2020    Procedure: Right leg reconstruction and wound debridemenr;  Surgeon: EHSAN De Luna MD;  [facility]     REMOVE HARDWARE ARTHROPLASTY KNEE Right 2020    Procedure: Explantation of Right total knee;  Surgeon: He Gomez MD;  [facility]     TUBAL LIGATION         Allergies   Allergen Reactions     Colchicine      Other reaction(s): Gastrointestinal  diarrhea     Niacin Unknown     skin rash     Ferrous Sulfate Rash and Unknown      Social History     Tobacco Use     Smoking status: Never Smoker     Smokeless tobacco: Never Used   Substance Use Topics     Alcohol use: Not Currently     Comment: rarely - months       Wt Readings from Last 1 Encounters:   01/12/22 59.1 kg (130 lb 4.7 oz)        Anesthesia Evaluation   Pt has had prior anesthetic. Type: General.    History of anesthetic complications (slow to wake up)       ROS/MED HX  ENT/Pulmonary:       Neurologic:       Cardiovascular:     (+) Dyslipidemia (mixed HLD) -----Previous cardiac testing   Echo: Date: 6/12/214 Results:  EF 65% Ao Sclerosis no stenosis grade 1 diastolic dysfunction  Stress Test: Date: Results:    ECG Reviewed: Date: 12/29/21 Results:  NSR 74 no acute changes  Cath: Date: Results:   (-) syncope and arrhythmias   METS/Exercise Tolerance: 3 - Able to walk 1-2 blocks without stopping    Hematologic:  - neg hematologic  ROS  (-) anemia   Musculoskeletal:       GI/Hepatic:  - neg GI/hepatic ROS  (-) GERD and liver disease   Renal/Genitourinary:  - neg Renal ROS  (-) renal disease   Endo:     (+) thyroid problem, hypothyroidism,  (-) obesity   Psychiatric/Substance Use:     (+) psychiatric history anxiety     Infectious Disease:       Malignancy:   (+) Malignancy (sarcoma [id number]), History of Other.Other CA Remission status post Surgery, Chemo and Radiation.    Other:            Physical Exam    Airway        Mallampati: II   TM distance: > 3 FB   Neck ROM: full   Mouth opening: > 3 cm    Respiratory Devices and Support         Dental  no notable dental history         Cardiovascular   cardiovascular exam normal    Comment: Ao area murmer ECHO 2018 sclerosis no stenosis  Denies syncope presyncope CP   Rhythm and rate: regular and normal   (+) murmur       Pulmonary   pulmonary exam normal        breath  sounds clear to auscultation           OUTSIDE LABS:  CBC:   CBC RESULTS: Recent Labs   Lab Test 10/12/21  1303 03/11/21  0755 03/10/21  0532 07/17/20  0513 07/13/20  0655   WBC 5.5  --   --  3.7*  3.7* 3.5*  3.5*   HGB 13.3 11.1* 11.4* 10.4*  10.4* 10.4*  10.4*   HCT 41.2  --   --  34.2*  34..2* 34.1*  34.1*     --   --  254  254 262  262     Last Comprehensive Metabolic Panel:  Recent Labs   Lab Test 01/12/22  0551 10/12/21  1302 03/11/21  0755 03/10/21  0532 07/17/20  0513   NA  --  140 141  --  140  140   POTASSIUM  --  4.9 3.8 4.0 4.3  4.3   CHLORIDE  --  106 108  --  106  106   CO2  --  28 28  --  28  28   ANIONGAP  --  6 5  --  6  6   BUN  --  24 20  --  22  22   CR  --  0.97 0.81 0.71 0.66  0.66   GFRESTIMATED  --  54* 67 78 >60  >60   GLC 98 100* 99  --  75  75   RAVEN  --  9.0 8.4*  --  8.8  8.8        Lab Results   Component Value Date    WBC 5.5 10/12/2021    WBC 3.7 (L) 07/17/2020    WBC 3.7 (L) 07/17/2020    HGB 13.3 10/12/2021    HGB 11.1 (L) 03/11/2021    HCT 41.2 10/12/2021    HCT 34..2 (L) 07/17/2020    HCT 34.2 (L) 07/17/2020     10/12/2021     07/17/2020     07/17/2020     BMP:   Lab Results   Component Value Date     10/12/2021     03/11/2021    POTASSIUM 4.9 10/12/2021    POTASSIUM 3.8 03/11/2021    CHLORIDE 106 10/12/2021    CHLORIDE 108 03/11/2021    CO2 28 10/12/2021    CO2 28 03/11/2021    BUN 24 10/12/2021    BUN 20 03/11/2021    CR 0.97 10/12/2021    CR 0.81 03/11/2021    GLC 98 01/12/2022     (H) 10/12/2021     COAGS:   Lab Results   Component Value Date    INR 1.45 (H) 06/25/2018     POC:   Lab Results   Component Value Date    BGM 94 03/09/2021     HEPATIC:   Lab Results   Component Value Date    ALBUMIN 3.5 10/12/2021    PROTTOTAL 7.3 10/12/2021    ALT 18 10/12/2021    AST 13 10/12/2021    ALKPHOS 92 10/12/2021    BILITOTAL 0.8 10/12/2021     OTHER:   Lab Results   Component Value Date    PH 7.51 (H) 04/20/2020    LACT 1.0  04/20/2020    A1C 5.6 06/05/2018    RAVEN 9.0 10/12/2021    PHOS 3.3 09/08/2016    MAG 2.1 04/20/2020    TSH 0.68 10/27/2021    CRP <2.9 10/27/2021    SED 12 10/27/2021       Anesthesia Plan    ASA Status:  3   NPO Status:  NPO Appropriate    Anesthesia Type: General.     - Airway: ETT   Induction: Intravenous, Propofol.   Maintenance: Inhalation.        Consents    Anesthesia Plan(s) and associated risks, benefits, and realistic alternatives discussed. Questions answered and patient/representative(s) expressed understanding.    - Discussed:     - Discussed with:  Patient      - Extended Intubation/Ventilatory Support Discussed: No.      - Patient is DNR/DNI Status: No    Use of blood products discussed: No .     Postoperative Care    Pain management: IV analgesics, Oral pain medications, Multi-modal analgesia.   PONV prophylaxis: Ondansetron (or other 5HT-3), Dexamethasone or Solumedrol     Comments:                Rosalinda Briggs MD

## 2022-01-12 NOTE — ANESTHESIA PROCEDURE NOTES
Airway       Patient location during procedure: OR       Procedure Start/Stop Times: 1/12/2022 7:38 AM  Staff -        CRNA: Nissa Macias APRN CRNA       Performed By: CRNAIndications and Patient Condition       Indications for airway management: dariusz-procedural       Induction type:intravenous       Mask difficulty assessment: 1 - vent by mask    Final Airway Details       Final airway type: endotracheal airway       Successful airway: ETT - single and Oral  Endotracheal Airway Details        ETT size (mm): 7.0       Cuffed: yes       Successful intubation technique: direct laryngoscopy       DL Blade Type: MAC 3       Grade View of Cords: 1       Adjucts: stylet       Position: Right       Measured from: gums/teeth       Secured at (cm): 22       Bite block used: None    Post intubation assessment        Placement verified by: capnometry, equal breath sounds and chest rise        Number of attempts at approach: 1       Secured with: silk tape       Ease of procedure: easy       Dentition: Intact and Unchanged

## 2022-01-12 NOTE — OP NOTE
Full Operative Note    Name: Jigna Allen   MRN: [id number]   : 1935   Procedure date: 2022     Pre-operative diagnosis: Stage III pelvic organ prolapse  Post-operative diagnosis: Same, s/p below procedure     Procedure: [place] colpocleisis, cystoscopy   Anesthesia: General anesthesia     Surgeon: Nola Santoro MD  Assistant(s): Christel Kearney MD PGY4; Keely Edward MD PGY1     Indications: Jigna Allen, 87 year old, is here for above procedure for symptomatic stage 3 pelvic organ prolapse, which she desires definitive treatment.     Estimated blood loss: 30mL  Total IV fluids: 800 mL, Lactated Ringer  Total urine output: 750mL, Pyridium stained urine  Drains: Blackburn catheter  Specimens: none  Findings:   - EUA: Normal external female genitalia. The urethra was normal. Vagina: Stage 3 uterovaginal prolapse with rectocele, no abnormal discharge. Uterus: Normal size, no lesion on cervix  - Cystoscopy: normal appearing bladder, bilateral ureteral efflux noted.     Complications: None apparent   Condition: Patient taken to recovery in stable condition.      Pt was taken to the operating room after consent paperwork was reviewed and patient's questions and concerns were addressed. Pt was then placed on the operating table in dorsal position after general anesthesia was placed . Patient was then placed in a dorsal lithotomy position with tong stirrups. Vaginal exam confirmed stage 3 uterovaginal prolapse consistent with exam in the clinic.She was prepped and draped in the normal sterile fashion. A team pause was performed with Scrub nurse, circulator nurse, the two surgeons listed above and anesthesiology team. A blackburn catheter was placed in the bladder . The cervix was grasped with two tenaculums and the anterior and posterior vaginal mucosa was injected with bupivicain with epi. The mucosa was then dissected off the underlying muscularis upto the level of the bladder neck anteriorly and approximately 3  cm from the vaginal opening posteriorly . A 1 cm wide strip of vaginal mucosa was left bilaterally on the left and right sidewalls of the vagina and interupted 2-0 PDS sutures were used to create the lateral gutters. A series of 2-0 PDS sutures were then used in rows of three to imbricate the cervix and the anterior and posterior vaginal walls inwards. Cystoscopy was then performed with a 70 degree scope. Patient had received pyridium in preop to allow good visualization of ureteric efflux. Cystoscopy showed normal bilatera ureteric efflux and normal appearing bladder and urethral mucosa. The distal posterior vaginal mucosa was then further dissected off and a Levator plication done with 3 rows of 2-0 PDS suture with one finger placed rectally to avoid rectal injury.  Following this the vaginal mucosa was closed with 2-0 vicryl suture in a running fashion. At the end of the procedure, the vaginal caliber was reduced to approximately 3 cm deep. All instrument, suture and lap counts were correct. Hemostasis was excellent. Patient tolerated the procedure well.

## 2022-01-12 NOTE — DISCHARGE INSTRUCTIONS
Same-Day Surgery   Adult Discharge Orders & Instructions     For 24 hours after surgery:  1. Get plenty of rest.  A responsible adult must stay with you for at least 24 hours after you leave the hospital.   2. Pain medication can slow your reflexes. Do not drive or use heavy equipment.  If you have weakness or tingling, don't drive or use heavy equipment until this feeling goes away.  3. Mixing alcohol and pain medication can cause dizziness and slow your breathing. It can even be fatal. Do not drink alcohol while taking pain medication.  4. Avoid strenuous or risky activities.  Ask for help when climbing stairs.   5. You may feel lightheaded.  If so, sit for a few minutes before standing.  Have someone help you get up.   6. If you have nausea (feel sick to your stomach), drink only clear liquids such as apple juice, ginger ale, broth or 7-Up.  Rest may also help.  Be sure to drink enough fluids.  Move to a regular diet as you feel able. Take pain medications with a small amount of solid food, such as toast or crackers, to avoid nausea.   7. A slight fever is normal. Call the doctor if your fever is over 100 F (37.7 C) (taken under the tongue) or lasts longer than 24 hours.  8. You may have a dry mouth, muscle aches, trouble sleeping or a sore throat.  These symptoms should go away after 24 hours.  9. Do not make important or legal decisions.   Pain Management:      1. Take pain medication (if prescribed) for pain as directed by your physician.        2. WARNING: If the pain medication you have been prescribed contains Tylenol  (acetaminophen), DO NOT take additional doses of Tylenol (acetaminophen).     Call your doctor for any of the followin.  Signs of infection (fever, growing tenderness at the surgery site, severe pain, a large amount of drainage or bleeding, foul-smelling drainage, redness, swelling).    2.  It has been over 8 to 10 hours since surgery and you are still not able to urinate (pee).    3.   Headache for over 24 hours.    4.  Numbness, tingling or weakness the day after surgery (if you had spinal anesthesia).  To contact a doctor, call _____________________________________ or:      [phone] and ask for the Resident On Call for:          __________________________________________ (answered 24 hours a day)      Emergency Department:  [facility]: [phone]  [facility]: [phone]               Rev. 10/2014     Discharge Instructions: Following a Cystoscopy/Stent and/or Ureteroscopy    Drainage:    Urine may be slightly bloody but should taper off in a few days.    You may have some frequency and urgency for a few days.    Comfort:    A burning sensation when urinating is common. Drinking extra fluids helps decrease this burning feeling and also helps to clear the bloody urine.    Mild abdominal cramps may occur for a few days.    Baths:    Daily tub baths aide in passing urine.    Frequent use of bubble bath is discouraged since it encourages urinary tract infections.    Report to your doctor at once if you experience:    Inability to pass your urine    Continuous or heavy bleeding    Severe Pain    Elevated temperature > than 101.5 for 24 hours    Home Activity:    The day of surgery spend a quiet evening at home.    Increase activity as tolerated.    Rev. 5/12

## 2022-01-24 ENCOUNTER — VIRTUAL VISIT (OUTPATIENT)
Dept: UROLOGY | Facility: CLINIC | Age: 87
End: 2022-01-24
Attending: OBSTETRICS & GYNECOLOGY
Payer: COMMERCIAL

## 2022-01-24 DIAGNOSIS — Z09 POSTOPERATIVE FOLLOW-UP: Primary | ICD-10-CM

## 2022-01-24 PROCEDURE — 99024 POSTOP FOLLOW-UP VISIT: CPT | Performed by: OBSTETRICS & GYNECOLOGY

## 2022-01-24 NOTE — LETTER
1/24/2022       RE: Jigna Allen  [address] [place] [address]     Dear Colleague,    Thank you for referring your patient, Jigna Allen, to the [facility] [place] at [facility]. Please see a copy of my visit note below.    Patient here for billable telephone evaluation   What phone number would you like to be contacted at? [phone]  How would you like to obtain your AVS? Mail a copy    January 24, 2022    Reason for visit:  2 week post op     Procedure Lef[place] Colpocleisis, cystoscopy on 1/12/22    Subjective: . Reports that her pain is resolved. Has pinkish discharge without foul smell. She has some urinary leakage with urgency and she uses a pad for this. This is slightly worse than it was before the surgery. No leakage with stress triggers. She says that she has increased her fluid intake post surgery to avoid infection and she also hasn't been doing her kegel exercises. No voiding difficulties.  Denies constipation or difficulty emptying her bowel. Taking prune juice daily and not using her stool softners or miralax. Minimal to no vaginal bleeding. She is happy she is no longer wearing the pessary.     Assessment and plan  Encounter Diagnosis   Name Primary?     Postoperative follow-up Yes     Doing well overall.   Resume kegel exercises  Follow up with me in 4 weeks as schedule for pelvic exam. Will resume her estrace vaginal cream after her 6 week post op appointment        I spent a total of 15 minutes on the phone with Jigna Allen on the date of the encounter in chart review, patient visit, review of tests, documentation and/or discussion with other providers about the issues documented above.     Again, thank you for allowing me to participate in the care of your patient.      Sincerely,    Nola Santoro MD

## 2022-01-24 NOTE — PROGRESS NOTES
Patient here for billable telephone evaluation   What phone number would you like to be contacted at? [phone]  How would you like to obtain your AVS? Mail a copy    January 24, 2022        Reason for visit:  2 week post op     Procedure Lefmelodyal Colpocleisis, cystoscopy on 1/12/22    Subjective: . Reports that her pain is resolved. Has pinkish discharge without foul smell. She has some urinary leakage with urgency and she uses a pad for this. This is slightly worse than it was before the surgery. No leakage with stress triggers. She says that she has increased her fluid intake post surgery to avoid infection and she also hasn't been doing her kegel exercises. No voiding difficulties.  Denies constipation or difficulty emptying her bowel. Taking prune juice daily and not using her stool softners or miralax. Minimal to no vaginal bleeding. She is happy she is no longer wearing the pessary.     Assessment and plan  Encounter Diagnosis   Name Primary?     Postoperative follow-up Yes     Doing well overall.   Resume kegel exercises  Follow up with me in 4 weeks as schedule for pelvic exam. Will resume her estrace vaginal cream after her 6 week post op appointment        I spent a total of 15 minutes on the phone with Jigna Allen on the date of the encounter in chart review, patient visit, review of tests, documentation and/or discussion with other providers about the issues documented above.

## 2022-02-19 ENCOUNTER — HEALTH MAINTENANCE LETTER (OUTPATIENT)
Age: 87
End: 2022-02-19

## 2022-02-21 ENCOUNTER — OFFICE VISIT (OUTPATIENT)
Dept: UROLOGY | Facility: CLINIC | Age: 87
End: 2022-02-21
Attending: OBSTETRICS & GYNECOLOGY
Payer: COMMERCIAL

## 2022-02-21 VITALS
DIASTOLIC BLOOD PRESSURE: 64 MMHG | HEIGHT: 62 IN | HEART RATE: 84 BPM | SYSTOLIC BLOOD PRESSURE: 103 MMHG | WEIGHT: 128 LBS | BODY MASS INDEX: 23.55 KG/M2

## 2022-02-21 DIAGNOSIS — Z09 POSTOPERATIVE FOLLOW-UP: Primary | ICD-10-CM

## 2022-02-21 DIAGNOSIS — N39.42 URINARY INCONTINENCE WITHOUT SENSORY AWARENESS: ICD-10-CM

## 2022-02-21 PROCEDURE — 99024 POSTOP FOLLOW-UP VISIT: CPT | Performed by: OBSTETRICS & GYNECOLOGY

## 2022-02-21 PROCEDURE — G0463 HOSPITAL OUTPT CLINIC VISIT: HCPCS

## 2022-02-21 ASSESSMENT — PAIN SCALES - GENERAL: PAINLEVEL: NO PAIN (0)

## 2022-02-21 NOTE — LETTER
"2/21/2022       RE: Jigna Allen  [address] [place] [address]     Dear Colleague,    Thank you for referring your patient, Jigna Allen, to the [facility] [place] at [facility]. Please see a copy of my visit note below.    Reason for visit: 6 week post op     Procedure: [place]ef[place] colpocleisis on 1/12/22    Subjective:   Pain control: Well controlled without medication.    Bladder function: Ms. Allen continues to have urinary incontinence worse than before surgery. She wears one thick pad/day for urine leakage, which mostly happens during the day and does not seem to be triggered by coughing/sneezing or urgency and says it just seeps out through the day. To manage the incontinence, she has tried reducing fluid intake to about 3 glasses of fluids daily (1 glass prune juice, 1 glass orange juice, 1 glass water). No voiding difficulties or urinary retention. No vaginal bleeding.    Bowel function: Denies constipation or difficulty emptying her bowels. Drinks prune juice daily and not using stool softeners.    Other: She also reports foul smell without a noticeable discharge. She would like this checked today. She has not done her Kegel exercises or used vaginal estrogen since surgery. She is wondering if she can restart her Kegel exercises.    Objective:   Vitals: /64   Pulse 84   Ht 1.575 m (5' 2.01\")   Wt 58.1 kg (128 lb)   BMI 23.41 kg/m        Pelvic Exam:  Vulva: No external lesions, no adenopathy  Vagina: Moist, pale mucosa, surgically shortened vagina. No abnormal discharge, no lesions. Vaginal mucosa appears clean and non-erythematous.  Cervix: Surgically imbricated. Surgical site healing well, one suture visible.    Assessment and plan  Jigna was seen today for post-op visit.    Diagnoses and all orders for this visit:    Postoperative follow-up    Urinary incontinence without sensory awareness        Jigna Allen is " a 88 yo postmenopausal female presenting for 6-week post-op exam after Leforte colpocleisis for stage 3 pelvic organ prolapse on 1/12/2022.    Normal post op recovery however she does have incontinence through out the day without sensory awareness. Likely related to movement and activities. 1 pad per day.   - Can restart Kegel exercises.   - Can restart Estrace vaginal cream  - Encouraged use of fiber and stool softener if feeling constipated  - Follow up in 3 months virtually to see if we need to do more for her urinary incontinence symptoms    I saw this patient with Dr. Nola Santoro M.D.    Emily Waldrop, MS4  Fourth Year Medical Student  [employer] [facility] [facility]      I spent a total of 20 minutes face to face with Jigna Allen on the date of the encounter in chart review, patient visit, review of tests, documentation and/or discussion with other providers about the issues documented above.

## 2022-02-21 NOTE — PROGRESS NOTES
"Reason for visit: 6 week post op     Procedure: Lef[place] colpocleisis on 1/12/22    Subjective:   Pain control: Well controlled without medication.    Bladder function: Ms. Allen continues to have urinary incontinence worse than before surgery. She wears one thick pad/day for urine leakage, which mostly happens during the day and does not seem to be triggered by coughing/sneezing or urgency and says it just seeps out through the day. To manage the incontinence, she has tried reducing fluid intake to about 3 glasses of fluids daily (1 glass prune juice, 1 glass orange juice, 1 glass water). No voiding difficulties or urinary retention. No vaginal bleeding.    Bowel function: Denies constipation or difficulty emptying her bowels. Drinks prune juice daily and not using stool softeners.    Other: She also reports foul smell without a noticeable discharge. She would like this checked today. She has not done her Kegel exercises or used vaginal estrogen since surgery. She is wondering if she can restart her Kegel exercises.    Objective:   Vitals: /64   Pulse 84   Ht 1.575 m (5' 2.01\")   Wt 58.1 kg (128 lb)   BMI 23.41 kg/m        Pelvic Exam:  Vulva: No external lesions, no adenopathy  Vagina: Moist, pale mucosa, surgically shortened vagina. No abnormal discharge, no lesions. Vaginal mucosa appears clean and non-erythematous.  Cervix: Surgically imbricated. Surgical site healing well, one suture visible.    Assessment and plan  Jigna was seen today for post-op visit.    Diagnoses and all orders for this visit:    Postoperative follow-up    Urinary incontinence without sensory awareness        Jigna Allen is a 86 yo postmenopausal female presenting for 6-week post-op exam after Leforte colpocleisis for stage 3 pelvic organ prolapse on 1/12/2022.    Normal post op recovery however she does have incontinence through out the day without sensory awareness. Likely related to movement and activities. 1 pad per day. "   - Can restart Kegel exercises.   - Can restart Estrace vaginal cream  - Encouraged use of fiber and stool softener if feeling constipated  - Follow up in 3 months virtually to see if we need to do more for her urinary incontinence symptoms    I saw this patient with Dr. Nola Santoro M.D.    Emily Waldrop, MS4  Fourth Year Medical Student  [employer] [facility]      I spent a total of 20 minutes face to face with Jigna Allen on the date of the encounter in chart review, patient visit, review of tests, documentation and/or discussion with other providers about the issues documented above.

## 2022-02-21 NOTE — NURSING NOTE
Post op visit 1-12-22 colpoclesis    Every since  Surgery   Has had  Leaking with out triggering   Goes to the bathroom every three hours to try to help this.  Also has noticed an odor   But no discharge

## 2022-02-23 DIAGNOSIS — Z96.651 S/P TOTAL KNEE ARTHROPLASTY, RIGHT: Primary | ICD-10-CM

## 2022-03-07 ASSESSMENT — ACTIVITIES OF DAILY LIVING (ADL): FUNCTION,_DAILY_LIVING_SCORE: 89.71

## 2022-03-07 ASSESSMENT — KOOS JR: HOW SEVERE IS YOUR KNEE STIFFNESS AFTER FIRST WAKING IN MORNING: MILD

## 2022-03-10 ENCOUNTER — ANCILLARY PROCEDURE (OUTPATIENT)
Dept: GENERAL RADIOLOGY | Facility: CLINIC | Age: 87
End: 2022-03-10
Attending: ORTHOPAEDIC SURGERY
Payer: COMMERCIAL

## 2022-03-10 ENCOUNTER — OFFICE VISIT (OUTPATIENT)
Dept: ORTHOPEDICS | Facility: CLINIC | Age: 87
End: 2022-03-10
Payer: COMMERCIAL

## 2022-03-10 DIAGNOSIS — T84.59XD INFECTION OF PROSTHETIC KNEE JOINT, SUBSEQUENT ENCOUNTER: Primary | ICD-10-CM

## 2022-03-10 DIAGNOSIS — Z96.659 INFECTION OF PROSTHETIC KNEE JOINT, SUBSEQUENT ENCOUNTER: Primary | ICD-10-CM

## 2022-03-10 DIAGNOSIS — Z96.651 S/P TOTAL KNEE ARTHROPLASTY, RIGHT: ICD-10-CM

## 2022-03-10 PROCEDURE — 99213 OFFICE O/P EST LOW 20 MIN: CPT | Performed by: ORTHOPAEDIC SURGERY

## 2022-03-10 PROCEDURE — 73560 X-RAY EXAM OF KNEE 1 OR 2: CPT | Mod: RT | Performed by: RADIOLOGY

## 2022-03-10 NOTE — LETTER
3/10/2022         RE: Jigna Allen  [address] [place] [address]        Dear Colleague,    Thank you for referring your patient, Jigna Allen, to the [facility] [place]. Please see a copy of my visit note below.    Care Team:  Annamarie Bryan Sarah Lofgren ID,  Elisabeth Ordoñez,  PCP ( [facility])    DX:   1. Right calf high grade MFH.   2. 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 R knee DJD  4. 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 neoadjuvant chemotherapy, surgical resection Feb 15, 2010. Complications post-op of skin loss with draining seroma, treated with I & D and systemic antibiotics. She had 4 cycles of doxil/ifos finishing about Feb 2010. There was <10% viable cells at surgery. She finished post op XRT about June 2010  2. 6/12/18, R TKA (Jason) [facility]  3. 1/27/2020, skin popliteal fossa punch bx :  No sarcoma  4. 3/27/2020 Debridement right thigh wound and placement of VAC, Dr Hernandez, [facility]  5. 3/30/2020, R TKA aspiration, WBC 17k,, PMNs 94%, MMB: Peptoniphilus asaccharolyticus, Streptococcus constellatus, Clostridium ramosum    6. 4/20/2020, Coverage of right lower extremity popliteal wound with free left anterolateral thigh fasciocutaneous flap with 3 perforators, size 25 x 8 cm. Dr De Luna, [facility]. MMB: Gemella morbillorum, Parvimonas micra, Peptoniphilus asaccharolyticus, Prevotella species. ABx Ertapenem.   7. 6/2/2020, explantation of right total knee arthroplasty with placement of Vanco/gentamicin/ceftazidime PMMA antibiotic spacer knee right, Dr. Gomez, [facility]. MMB: NGTD   8. 3/9/2021, Removal antibiotic spacer and second stage revision reimplantation of right rotating-hinge knee arthroplasty (Jason) [facility], intraoperative cultures x5.      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  She has made amazing progress.  She has some slight twinging type discomfort along the medial aspect knee joint otherwise has no other complaints.  She is independently ambulatory.  She can fully extend her knee joint has further flexion to 100 degrees.  The knee is stable to varus and valgus stress testing.  There is no swelling erythema or effusion in the knee joint.    Radiographs demonstrate satisfactory position of the rotating-hinge knee arthroplasty in her right knee.    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  Her medial thigh flap remains quite viable and she has no signs of an active infection at the present time.  She is independently ambulatory.    In terms of the medial discomfort a twinge that she appreciates, I am unable to identify an etiology and I suspect it may be musculotendinous in origin.    Return for follow-up in 3 to 5 years time for recheck and radiographs or sooner as needed.    MD Pelon Cid Family Professor  Oncology and Adult Reconstructive Surgery  Dept Orthopaedic Surgery, [facility]  [phone] office, [phone] pager  [email or web address]    Total combined visit time and work time before and after clinic visit = 20 min

## 2022-03-10 NOTE — PROGRESS NOTES
Care Team:  Annamarie Bryan Sarah Lofgren ID,  Elisabeth Ordoñez,  [facility]    DX:   1. Right calf high grade MFH.   2. 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 R knee DJD  4. 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 neoadjuvant chemotherapy, surgical resection Feb 15, 2010. Complications post-op of skin loss with draining seroma, treated with I & D and systemic antibiotics. She had 4 cycles of doxil/ifos finishing about Feb 2010. There was <10% viable cells at surgery. She finished post op XRT about June 2010  2. 6/12/18, R TKA (Jason) [facility]  3. 1/27/2020, skin popliteal fossa punch bx :  No sarcoma  4. 3/27/2020 Debridement right thigh wound and placement of VAC, Dr Hernandez, [facility]  5. 3/30/2020, R TKA aspiration, WBC 17k,, PMNs 94%, MMB: Peptoniphilus asaccharolyticus, Streptococcus constellatus, Clostridium ramosum    6. 4/20/2020, Coverage of right lower extremity popliteal wound with free left anterolateral thigh fasciocutaneous flap with 3 perforators, size 25 x 8 cm. Dr De Luna, [facility]. MMB: Gemella morbillorum, Parvimonas micra, Peptoniphilus asaccharolyticus, Prevotella species. ABx Ertapenem.   7. 6/2/2020, explantation of right total knee arthroplasty with placement of Vanco/gentamicin/ceftazidime PMMA antibiotic spacer knee right, Dr. Gomez, [facility]. MMB: NGTD   8. 3/9/2021, Removal antibiotic spacer and second stage revision reimplantation of right rotating-hinge knee arthroplasty (Jason) [facility], intraoperative cultures x5.      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  She has made amazing progress.  She has some slight twinging type discomfort along the medial aspect knee joint otherwise has no other complaints.  She is independently ambulatory.  She can fully  extend her knee joint has further flexion to 100 degrees.  The knee is stable to varus and valgus stress testing.  There is no swelling erythema or effusion in the knee joint.    Radiographs demonstrate satisfactory position of the rotating-hinge knee arthroplasty in her right knee.    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  Her medial thigh flap remains quite viable and she has no signs of an active infection at the present time.  She is independently ambulatory.    In terms of the medial discomfort a twinge that she appreciates, I am unable to identify an etiology and I suspect it may be musculotendinous in origin.    Return for follow-up in 3 to 5 years time for recheck and radiographs or sooner as needed.    He Gomez MD  [facility] Family Professor  Oncology and Adult Reconstructive Surgery  Dept Orthopaedic Surgery, [facility]  [phone] office, [phone] pager  [email or web address]    Total combined visit time and work time before and after clinic visit = 20 min

## 2022-04-22 ENCOUNTER — APPOINTMENT (OUTPATIENT)
Dept: GENERAL RADIOLOGY | Age: 87
End: 2022-04-22
Attending: EMERGENCY MEDICINE
Payer: MEDICARE

## 2022-04-22 ENCOUNTER — HOSPITAL ENCOUNTER (OUTPATIENT)
Age: 87
Discharge: HOME | End: 2022-04-22
Attending: EMERGENCY MEDICINE
Payer: MEDICARE

## 2022-04-22 VITALS
RESPIRATION RATE: 18 BRPM | OXYGEN SATURATION: 97 % | BODY MASS INDEX: 23.92 KG/M2 | WEIGHT: 130 LBS | DIASTOLIC BLOOD PRESSURE: 76 MMHG | HEART RATE: 72 BPM | TEMPERATURE: 98 F | HEIGHT: 62 IN | SYSTOLIC BLOOD PRESSURE: 154 MMHG

## 2022-04-22 DIAGNOSIS — J06.9 UPPER RESPIRATORY TRACT INFECTION, UNSPECIFIED TYPE: Primary | ICD-10-CM

## 2022-04-22 PROCEDURE — 99214 OFFICE O/P EST MOD 30 MIN: CPT

## 2022-04-22 PROCEDURE — 87637 SARSCOV2&INF A&B&RSV AMP PRB: CPT | Performed by: EMERGENCY MEDICINE

## 2022-04-22 PROCEDURE — 71046 X-RAY EXAM CHEST 2 VIEWS: CPT | Performed by: EMERGENCY MEDICINE

## 2022-04-22 PROCEDURE — 99213 OFFICE O/P EST LOW 20 MIN: CPT

## 2022-04-22 NOTE — ED INITIAL ASSESSMENT (HPI)
Exposed to sick family members. Co congested cough and believes that she has a wheeze since Monday. Denies SOB.   No resp distress noted

## 2022-04-23 LAB
FLUAV + FLUBV RNA SPEC NAA+PROBE: NOT DETECTED
FLUAV + FLUBV RNA SPEC NAA+PROBE: NOT DETECTED
RSV RNA SPEC NAA+PROBE: NOT DETECTED
SARS-COV-2 RNA RESP QL NAA+PROBE: NOT DETECTED

## 2022-05-16 ENCOUNTER — VIRTUAL VISIT (OUTPATIENT)
Dept: UROLOGY | Facility: CLINIC | Age: 87
End: 2022-05-16
Attending: OBSTETRICS & GYNECOLOGY
Payer: COMMERCIAL

## 2022-05-16 VITALS — HEIGHT: 62 IN | WEIGHT: 130 LBS | BODY MASS INDEX: 23.92 KG/M2

## 2022-05-16 DIAGNOSIS — N39.41 URGE INCONTINENCE: Primary | ICD-10-CM

## 2022-05-16 PROCEDURE — 99213 OFFICE O/P EST LOW 20 MIN: CPT | Mod: 95 | Performed by: OBSTETRICS & GYNECOLOGY

## 2022-05-16 NOTE — PROGRESS NOTES
Patient here for billable telephone evaluation   What phone number would you like to be contacted at? [phone]  How would you like to obtain your AVS? MyChart    May 16, 2022    Referring Provider: Referred Self  No address on file    Primary Care Provider: Elisabeth Ko    CC: Urinary incontinence        HPI:   Ms Allen is here for a 3 months follow up following her pelvic organ prolapse surgery (Lefort colpocleisis on 1/12/22). At her 6 week visit, she was having lots of urinary leakage ( worse than before surgery) without sensory awareness or physical triggers. We discussed doing kegel exercises and managing fluids as first line. Today she says she has had some physical therapy before the surgery and she says she was improving then and she would like to try that again as she is still  bothered by urgency leaks. She doesn't feel like she has been doing the pelvic floor exercises religiously due to family situation ( new grandma).  She voids every 2 hours or so and once during the night. During the night, she leaks without sensory awareness. No leakage with coughing, laughing, sneezing etc.       Asssessment and plan  Jigna was seen today for telephone.    Diagnoses and all orders for this visit:    Urge incontinence  -     Physical Therapy Referral; Future      She would like to go back to a pelvic floor physical therapist. She was doing it through [facility] before and open to trying our PTs at  [facility]    Referral placed  She is still avoiding bladder irritants which is great.   We discussed that if her bladder symptoms don't improve, we can try medication or other interventions ( botox, neuromodulation etc)           I spent a total of 20 minutes  with  Jigna Allen  on the date of the encounter in chart review, telephone patient visit (18 minutes of total time spent in telephone discussion), review of tests, documentation and/or discussion with other providers about the issues documented above.      Nola Santoro MD on 5/16/2022 at 9:49 AM

## 2022-05-16 NOTE — LETTER
5/16/2022       RE: Jigna Allen  [address] [place] [address]     Dear Colleague,    Thank you for referring your patient, Jigna Allen, to the [facility] [place] at [facility]. Please see a copy of my visit note below.    Patient here for billable telephone evaluation   What phone number would you like to be contacted at? [phone]  How would you like to obtain your AVS? MyChart    May 16, 2022    Referring Provider: Referred Self  No address on file    Primary Care Provider: Elisabeth Ko    CC: Urinary incontinence        HPI:   Ms Allen is here for a 3 months follow up following her pelvic organ prolapse surgery (Lefort colpocleisis on 1/12/22). At her 6 week visit, she was having lots of urinary leakage ( worse than before surgery) without sensory awareness or physical triggers. We discussed doing kegel exercises and managing fluids as first line. Today she says she has had some physical therapy before the surgery and she says she was improving then and she would like to try that again as she is still  bothered by urgency leaks. She doesn't feel like she has been doing the pelvic floor exercises religiously due to family situation ( new grandma).  She voids every 2 hours or so and once during the night. During the night, she leaks without sensory awareness. No leakage with coughing, laughing, sneezing etc.       Asssessment and plan  Jigna was seen today for telephone.    Diagnoses and all orders for this visit:    Urge incontinence  -     Physical Therapy Referral; Future      She would like to go back to a pelvic floor physical therapist. She was doing it through [facility] before and open to trying our PTs at  [facility]    Referral placed  She is still avoiding bladder irritants which is great.   We discussed that if her bladder symptoms don't improve, we can try medication or other interventions ( botox,  neuromodulation etc)       I spent a total of 20 minutes  with  Jigna Allen  on the date of the encounter in chart review, telephone patient visit (18 minutes of total time spent in telephone discussion), review of tests, documentation and/or discussion with other providers about the issues documented above.     Nola Santoro MD on 5/16/2022 at 9:49 AM

## 2022-06-04 ENCOUNTER — MYC MEDICAL ADVICE (OUTPATIENT)
Dept: ORTHOPEDICS | Facility: CLINIC | Age: 87
End: 2022-06-04
Payer: COMMERCIAL

## 2022-06-04 DIAGNOSIS — Z96.651 S/P TOTAL KNEE ARTHROPLASTY, RIGHT: Primary | ICD-10-CM

## 2022-06-04 NOTE — IP AVS SNAPSHOT
[facility] [address]    [address] [place] [address]    Phone:  [phone]                                       After Visit Summary   6/12/2018    Jigna Allen    MRN: [id number]           After Visit Summary Signature Page     I have received my discharge instructions, and my questions have been answered. I have discussed any challenges I see with this plan with the nurse or doctor.    ..........................................................................................................................................  Patient/Patient Representative Signature      ..........................................................................................................................................  Patient Representative Print Name and Relationship to Patient    ..................................................               ................................................  Date                                            Time    ..........................................................................................................................................  Reviewed by Signature/Title    ...................................................              ..............................................  Date                                                            Time           patient

## 2022-06-06 RX ORDER — AMOXICILLIN 500 MG/1
2000 CAPSULE ORAL
Qty: 4 CAPSULE | Refills: 3 | Status: SHIPPED | OUTPATIENT
Start: 2022-06-06

## 2022-08-12 ENCOUNTER — HOSPITAL ENCOUNTER (OUTPATIENT)
Dept: PHYSICAL THERAPY | Facility: REHABILITATION | Age: 87
Discharge: HOME | End: 2022-08-12
Payer: COMMERCIAL

## 2022-08-12 DIAGNOSIS — N39.42 URINARY INCONTINENCE WITHOUT SENSORY AWARENESS: Primary | ICD-10-CM

## 2022-08-12 DIAGNOSIS — N81.4 UTEROVAGINAL PROLAPSE: ICD-10-CM

## 2022-08-12 DIAGNOSIS — Z98.890 HISTORY OF PELVIC SURGERY: ICD-10-CM

## 2022-08-12 DIAGNOSIS — M62.89 PELVIC FLOOR DYSFUNCTION IN FEMALE: ICD-10-CM

## 2022-08-12 PROCEDURE — 97110 THERAPEUTIC EXERCISES: CPT | Mod: GP | Performed by: PHYSICAL THERAPIST

## 2022-08-12 PROCEDURE — 97162 PT EVAL MOD COMPLEX 30 MIN: CPT | Mod: GP | Performed by: PHYSICAL THERAPIST

## 2022-08-12 NOTE — DISCHARGE INSTRUCTIONS
Performing Pelvic Muscle Exercises    Pelvic muscle exercises, also called pelvic floor or Kegel exercise, strengthen the pelvic floor muscles.  exercises are the most important and effective exercises for improving awareness and maintaining strength in the pelvic floor musculature.  Like other muscles of the body, if the pelvic muscles get weak or overly tense they are no longer efficient at their job.  However, through regular exercise you can build up the muscle strength, endurance and coordination, which can aid in regaining bladder and bowel control.  STARTING KEGELEXERCISES:  Remember, it is important to relax your body and breathe correctly both before and after your exercises.  Holding your breath makes it more difficult for the exercises towork correctly.  Do not strain or bear down as you do the contractions.  You should begin by sitting or lying down in a comfortable, reclined position.  It is a good idea to start to learn to do yourexercises while you are sitting or lying in bed.  Once you have mastered these positions, you can progress to doing them standing upright while waiting in lines, doing chores, brushing your teeth or washing dishes.  It isnecessary to incorporate these exercises into your daily activities so it becomes an unconscious habit.  When you first begin the exercise program, you should concentrate on improving your awareness and control ofthe pelvic floor muscles.  Awareness of the muscles is one of the most difficult things to learn when beginning your strengthening program.  Initially, you should see no other muscles movement when performing yourKegel exercises.  In addition, you will notice that you are able to maintain the contraction for only a short period of time.  You should notice improvement within a very short period of time.  For those who arelearning to relax the pelvic floor muscles because of pelvic pain or pelvic floor muscle spasms, be sure to let the pelvic floor  relax totally, letting go quickly after each muscle contraction.  PERFORMING THE EXERCISES  Tighten, and draw in the muscles around the anal opening and in female the vagina at the same time.  You should feel the muscles lift towards your pubic bone and squeeze the openings close.Bring your pubic bone and tail bone towards each other was you tighten and lift. Only the muscles around the openings should contract.  You should be able to feel the muscles release.  If done properly, no one else can tellif you are doing the exercise.  There are two types of contractions you should perform:  Quick contractions where you tighten lift and release  Endurance contractionswhere you tighten, lift and hold the muscles for up to 10 seconds.    YOUR HOME TREATMENT PROGRAM  PELVIC FLOOR EXERCISES  Your muscle strength, endurance and control willimprove if you challenge your muscles to do more than they are used to doing.  You should try to tighten the muscles strongly during each contraction.  Each contraction should feel the same as the repetitions before.  Thequality of the exercise is more important than the number that you perform.  If you feel the quality of your exercises decline, for example starting to strain, bear down, or using other muscles, stop your exercises and takea break.  To begin, get yourself comfortable in a _______________ position.  Avoid straining or holding your breath, or using other muscles while you exercise.  Do each of the exercises ________ times per day.  QUICK CONTRACTIONS  This should be a very quick contraction and relaxation of your pelvic floor muscles. Stop and sit or stand. Begin with a belly breath, exhale, then contract your pelvic floor muscles and hold for 1-2seconds.  Relax for 1-2 seconds and repeat this exercise 5-6 times.    ENDURANCE CONTRACTIONS  Lie on your back. Do this exercise 3x/day.   This should be a longer contraction and relaxation.  Pull your pelvic floor muscles up and in  for 10 seconds then relax for 10 seconds.  Repeat this 10 times.  Do not hold your breath during these exercises.  It may help to count out loud to make sure that you are breathing throughout your exercises.      Voiding schedule:  Try to maintain a voiding schedule of 2-3 hours during the day.

## 2022-08-12 NOTE — PROGRESS NOTES
[facility]    OUTPATIENT PHYSICAL THERAPY ORTHOPEDIC EVALUATION  PLAN OF TREATMENT FOR OUTPATIENT REHABILITATION  (COMPLETE FOR INITIAL CLAIMS ONLY)  Patient's Last Name, First Name, M.I.  YOB: 1935  TiffanyJigna  N    Provider s Name:  [facility]   Medical Record No.  [id number]   Start of Care Date:  08/12/22   Onset Date:  01/12/22 (prolapse surgery)   Type:     _X__PT   ___OT   ___SLP Medical Diagnosis:  Urge incontinence     PT Diagnosis:  urinary incontinence, pelvic floor dysfunction, history of prolapse and surgical repair   Visits from SOC:  1      _________________________________________________________________________________  Plan of Treatment/Functional Goals:  manual therapy, neuromuscular re-education, ROM, strengthening, stretching, other (see comments)  pelvic floor rehab        Goals  Goal Identifier: HEP  Goal Description: Patient will be independent with home exercise program and self management of symptoms in 12 weeks.       Goal Identifier: nocturia  Goal Description: Patient will void no more than 1x/night in 12 weeks.       Goal Identifier: urinary incontinence  Goal Description: patient will improve pelvic floor function to decrease incontinence episodes by 50% in 12 weeks.       Therapy Frequency:  1 time/week  Predicted Duration of Therapy Intervention:  up to 12 time frame dependent on appt access    Gisela Murray PT                 I CERTIFY THE NEED FOR THESE SERVICES FURNISHED UNDER        THIS PLAN OF TREATMENT AND WHILE UNDER MY CARE     (Physician co-signature of this document indicates review and certification of the therapy plan).                     Certification Date From:  08/12/22   Certification Date To:  11/10/22    Referring Provider:  Nola Santoro MD    Initial Assessment        See Epic Evaluation Start of Care Date:  08/12/22 08/12/22 1400   General Information   Type of Visit Initial OP Ortho PT Evaluation   Start of Care Date 08/12/22   Referring Physician Nola Santoro MD   Patient/Family Goals Statement pelvic floor improvement - urinary continence   Orders Evaluate and Treat   Orders Comment urgency incontinence. Has had PT before and wants to go back as it has helped in the past   Date of Order 05/16/22   Certification Required? Yes   Medical Diagnosis Urge incontinence   Surgical/Medical history reviewed Yes  Past Medical History:   Diagnosis Date    Anemia     Anxiety     Aortic valve sclerosis     Arthritis 1990?    Complication of anesthesia     slow to wakeup    Hypothyroid     Infection of prosthetic joint, subsequent encounter     Mild tricuspid regurgitation     Mumps ?1945    Primary osteoarthritis of right knee     Sarcoma (H) 2009     Past Surgical History:   Procedure Laterality Date    ARTHROPLASTY KNEE Right 6/12/2018    Procedure: ARTHROPLASTY KNEE;  Right Total Knee Replacement ;  Surgeon: He Gomez MD;  [facility]    ARTHROPLASTY REVISION KNEE Right 3/9/2021    Procedure: Revision Right total hinge Knee arthroplasty;  Surgeon: He Goemz MD;  [facility]    CARPAL TUNNEL RELEASE RT/LT      CYSTOSCOPY N/A 1/12/2022    Procedure: CYSTOSCOPY;  Surgeon: Nola Santoro MD;  [facility]    excise sarcoma Right     right leg    exicision of wound  03/27/2020    GRAFT FREE VASCULARIZED TRANSVERSE RECTUS ABDOMINIS MYOCUTANEOUS Left 4/20/2020    Procedure: With left anterolateral thigh free flap with SPY;  Surgeon: EHSAN De Luna MD;  [facility]    IRRIGATION AND DEBRIDEMENT KNEE, PLACE ANTIBIOTIC CEMENT BEADS / SPACE Right 6/2/2020    Procedure: manufacture and implantation of Vanco/tobra ANTIBIOTIC SPACER, KNEE;  Surgeon: He Gomez MD;  [facility]    KNEE SURGERY      LEFORTE COLPOCLEISIS PROCEDURE (VAGINAL OBLITERATION) N/A  1/12/2022    Procedure: COLPOCLEISIS, COMPLETE;  Surgeon: Nola Santoro MD;  [facility]    PICC DOUBLE LUMEN PLACEMENT Right 04/23/2020    5FR DL PICC inserted at brachial medial, length- 37cm (1cm out). Tip at mid SVC    RECONSTRUCT KNEE Right 4/20/2020    Procedure: Right leg reconstruction and wound debridemenr;  Surgeon: EHSAN De Luna MD;  [facility]    REMOVE HARDWARE ARTHROPLASTY KNEE Right 6/2/2020    Procedure: Explantation of Right total knee;  Surgeon: He Gomez MD;  [facility]    TUBAL LIGATION       Patient Active Problem List   Diagnosis    Sarcoma of soft tissue (H)    Macrocytosis    Sarcoma (H)    Post-traumatic osteoarthritis of both knees    Fatigue    Hypothyroidism    Anxiety    Lyme disease    Adjustment disorder with anxious mood    Pulmonary nodules    History of total knee arthroplasty, right    Undifferentiated pleomorphic sarcoma (H)    Surgical wound, non healing    S/P flap graft    Vitamin D deficiency    Tibial plateau fracture, right    Rectocele    PVD (posterior vitreous detachment)    Pseudophakia, both eyes    Pseudogout of knee    Presbyopia    Physical deconditioning    Pelvic relaxation    Patellofemoral pain syndrome    Osteoarthritis of right knee    Open wound of right knee    Open wound of knee, leg (except thigh), and ankle, complicated    Mixed hyperlipidemia    Impaired fasting glucose    Glaucoma suspect    Diverticulosis    Deep vein thrombosis (DVT) prophylaxis prescribed at discharge    Contact dermatitis and eczema    Chronic insomnia    Carpal tunnel syndrome of left wrist    Adjustment disorder with mixed anxiety and depressed mood    Acquired varus deformity knee    Acquired tight Achilles tendon    Infection of prosthetic joint, subsequent encounter    Status post knee surgery    Uterovaginal prolapse    Cystocele, midline    Urge incontinence    Aortic valve sclerosis    Urinary incontinence without sensory awareness         Precautions/Limitations no known precautions/limitations   Presentation and Etiology   Pertinent history of current problem (include personal factors and/or comorbidities that impact the POC) Patient presents to therapy today with complaints of urinary incontinence. She had some mild leakage in the past, worsened after surgery for vaginal vault prolapse in 1/12/22. Now she has to use a large pad instead of a small one. She uses 1 pad during the day, 1 at night. She is unable to anticipate when leakage is going to occur. Frequent nocturnal urination. Decreased perineal sensation. Pain level 0/10. Date of onset/duration of symptoms is 1/12/22. Onset was related to surgery, Seems worse with drinking a lot of water. Bladder control limits prolonged sitting, car trips, activities. Symptoms are getting worse. Patient reports a history of similar symptoms. Patient describes their previous level of function as minimally limited.  Previous treatment includes pelvic floor PT. Pelvic floor: Incontinence rating  5/10. Incontinence frequency  multiple times/day. Voiding frequency  every 2 hours or so. Voiding  3-4x/night. Fluid intake 4-5 glasses/day.   Impairments P. Bowel or bladder problems   Functional Limitations perform activities of daily living;perform desired leisure / sports activities   Onset date of current episode/exacerbation 01/12/22  (prolapse surgery)   Chronicity Chronic   Prior Level of Function   Functional Level Prior Comment not limited   Current Level of Function   Patient role/employment history F. Retired   Fall Risk Screen   Fall screen completed by PT   Have you fallen 2 or more times in the past year? No   Have you fallen and had an injury in the past year? No   Is patient a fall risk? No   Abuse Screen (yes response referral indicated)   Feels Unsafe at Home or Work/School no   Feels Threatened by Someone no   Does Anyone Try to Keep You From Having Contact with Others or Doing Things Outside Your  Home? no   Lumbar Spine/SI Objective Findings   Posture scoliosis, decreased lumbar lordosis, favors (R) knee secondary to surgery and complications.   Flexion ROM WFL   Extension ROM mod loss   Right Side Bending ROM min loss   Left Side Bending ROM min loss   Hip Screen see note. Did not test all (R) hip ROM due to knee limitations.     PROM in degrees    Right Left   Hip Flexion (0-120 ) WFL WFL   Hip Abduction (0-45 ) WFL WFL   Hip External Rotation (0-50 ) NT Min loss        Hip Internal Rotation (0-40 ) NT Min loss   Hip Extension (0-15 )        SLR (L) 60 (R) WNL   Palpation No tenderness of ant/post pelvis.   Pelvic Floor Dysfunction Objective Findings   Pelvic Floor Dysfunction Comments see note for findings.    External Exam  Gapping:   Skin Integrity: normal  Sensation: normal inner/outer labia.   Perineal body mobility: 3/3    Internal Exam  Palpation No tenderness noted with internal exam   Vaginal vault is shortened with surgically related tissue restrictions noted    Muscle tone:   Prolapse: none  Strength - Vaginal  MMT: 3+/5   Endurance: 10 sec  Repetitions: 10 reps  Perineal body lift is fair/poor, mod overuse of abdominals, gluts and adductors.      Planned Therapy Interventions   Planned Therapy Interventions manual therapy;neuromuscular re-education;ROM;strengthening;stretching;other (see comments)   Planned Therapy Interventions Comment pelvic floor rehab   Clinical Impression   Criteria for Skilled Therapeutic Interventions Met yes, treatment indicated   PT Diagnosis urinary incontinence, pelvic floor dysfunction, history of prolapse and surgical repair   Influenced by the following impairments decreased trunk and LE ROM, decreased PF recruitment with overuse of abdominals, gluts and LE.   Functional limitations due to impairments bladder control   Clinical Presentation Evolving/Changing   Clinical Decision Making (Complexity) Moderate complexity   Therapy Frequency 1 time/week   Predicted  Duration of Therapy Intervention (days/wks) up to 12 time frame dependent on appt access   Risk & Benefits of therapy have been explained Yes   Patient, Family & other staff in agreement with plan of care Yes   Pelvic Health Informed Consent Statement Discussed with patient/guardian reason for referral regarding pelvic health needs and external/internal pelvic floor muscle examination.  Opportunity provided to ask questions and verbal consent for assessment and intervention was given.   Clinical Impression Comments Patient presents with worsening symptoms of urinary incontinence since having surgery for prolapse in 1/2022. Complaints of increased urinary incontinence, nocturia. Denies any pain complaints. Findings include limited trunk and LE ROM, spine and pelvic asymmetries, decreased perineal body lift and decreased PF recruitment with substitution patterns noted on exam.   Ortho Goal 1   Goal Identifier HEP   Goal Description Patient will be independent with home exercise program and self management of symptoms in 12 weeks.   Ortho Goal 2   Goal Identifier nocturia   Goal Description Patient will void no more than 1x/night in 12 weeks.   Ortho Goal 3   Goal Identifier urinary incontinence   Goal Description patient will improve pelvic floor function to decrease incontinence episodes by 50% in 12 weeks.   Total Evaluation Time   PT Eval, Moderate Complexity Minutes (35214) 45   Therapy Certification   Certification date from 08/12/22   Certification date to 11/10/22   Medical Diagnosis Urge incontinence

## 2022-08-18 ENCOUNTER — HOSPITAL ENCOUNTER (OUTPATIENT)
Dept: PHYSICAL THERAPY | Facility: REHABILITATION | Age: 87
Discharge: HOME | End: 2022-08-18
Payer: COMMERCIAL

## 2022-08-18 DIAGNOSIS — Z98.890 HISTORY OF PELVIC SURGERY: ICD-10-CM

## 2022-08-18 DIAGNOSIS — N39.42 URINARY INCONTINENCE WITHOUT SENSORY AWARENESS: Primary | ICD-10-CM

## 2022-08-18 DIAGNOSIS — M62.89 PELVIC FLOOR DYSFUNCTION IN FEMALE: ICD-10-CM

## 2022-08-18 DIAGNOSIS — N81.4 UTEROVAGINAL PROLAPSE: ICD-10-CM

## 2022-08-18 PROCEDURE — 97110 THERAPEUTIC EXERCISES: CPT | Mod: GP | Performed by: PHYSICAL THERAPIST

## 2022-08-18 PROCEDURE — 97140 MANUAL THERAPY 1/> REGIONS: CPT | Mod: GP | Performed by: PHYSICAL THERAPIST

## 2022-08-18 NOTE — PROGRESS NOTES
"   08/18/22 1400   Signing Clinician's Name / Credentials   Signing clinician's name / credentials Gisela Murray PT   Session Number   Session Number 2/up to 12   Progress Note/Recertification   Recertification Due Date 11/10/22   Ortho Goal 1   Goal Identifier HEP   Goal Description Patient will be independent with home exercise program and self management of symptoms in 12 weeks.   Ortho Goal 2   Goal Identifier nocturia   Goal Description Patient will void no more than 1x/night in 12 weeks.   Ortho Goal 3   Goal Identifier urinary incontinence   Goal Description patient will improve pelvic floor function to decrease incontinence episodes by 50% in 12 weeks.   Subjective Report   Subjective Report Working on the exercises. No change with bladder control so far. Trying to void every 2.5 hours. Waking 1x/night. Limits fluids before bed.   Objective Measure 1   Details Fascial restrictions (R) LS sympathetic scan.   Objective Measure 2   Details Perineal body lift is fair with cues. Some overuse of abds, gluts, better with coaching.   Therapeutic Procedure/exercise   Therapeutic Procedures: strength, endurance, ROM, flexibillity minutes (86241) 25   Skilled Intervention ther ex   Patient Response good with cues, needs coaching and reinforcement.(*=new)   Treatment Detail supine PF endurance contractions 10x 10\"/2-3 sets/day, monitor abds during exercise, * roll for control 10 x w ball/gr band   Manual Therapy   Manual Therapy: Mobilization, MFR, MLD, friction massage minutes (87805) 30   Skilled Intervention MFR, counterstrain   Patient Response good   Treatment Detail (R) PGLS-N, (R) PELV-N, (R) PGL2-N, (L)PGL1-N, pelvic diaphragm,   Equipment Needs   Equipment Needs green band   Education   Learner Patient   Readiness Eager   Method Booklet/handout;Explanation;Demonstration   Response Demonstrates Understanding   Communication with other professionals   Communication with other professionals ref provider: Maude, " MD Nola   Plan   Homework voiding schedule   Home program kegels in supine, roll for control   Plan for next session No appts scheduled. Pt is on wait list and will schedule appts today. Assess pudendal nn next time. Continue with POC, including progression of HEP, patient education, manual therapy as needed.   Comments   Comments Patient presents with worsening symptoms of urinary incontinence since having surgery for prolapse in 1/2022. Complaints of increased urinary incontinence, nocturia. Denies any pain complaints. Findings include limited trunk and LE ROM, spine and pelvic asymmetries, decreased perineal body lift and decreased PF recruitment with substitution patterns noted on exam.   Total Session Time   Timed Code Treatment Minutes 55   Total Treatment Time (sum of timed and untimed services) 55   AMBULATORY CLINICS ONLY-MEDICAL AND TREATMENT DIAGNOSIS   Medical Diagnosis Urge incontinence   PT Diagnosis urinary incontinence, pelvic floor dysfunction, history of prolapse and surgical repair

## 2022-08-22 NOTE — PLAN OF CARE
7C: Discharge Planner PT   Patient plan for discharge: TCU  Current status: pt completes supine to sit bed mobility with min A, assist to keep RLE elevated per activity orders. Pt able to sit EOB and complete lateral scooting with SBA. Handouts issued for LLE supine exercises as well as theraband UE exercises.   Barriers to return to prior living situation:medical status, assist for mobility, RLE precautions   Recommendations for discharge: TCU  Rationale for recommendations: pt with below baseline mobility, limited by RLE precautions, needing continued therapy to maximize IND.       Entered by: Felipa Corea 04/23/2020 2:50 PM        Spoke to pt and rescheduled appt to see Angelica Brady PA-C to Fro. 8/26, at the [place] location.----- Message from Tremontana Chevalier sent at 8/22/2022 11:29 AM CDT -----  Regarding: FW: appt  Contact: Yovani @ [phone]  Correction:    Yovani Malik calling regarding Appointment Reschedule for # pt clling to reschedule today's appt w/Olga Brady. [facility] will not allow scheduling. Pls call pt @ [phone].      ----- Message -----  From: Tremontana Chevalier  Sent: 8/22/2022  11:22 AM CDT  To: [facility] Staff  Subject: appt                                             Ptrescheduled today's appt angel/Olga Brady. Rescheduled to Thursday 08/25. Pt ct @ [phone]

## 2022-09-15 ENCOUNTER — HOSPITAL ENCOUNTER (OUTPATIENT)
Dept: PHYSICAL THERAPY | Facility: REHABILITATION | Age: 87
Discharge: HOME | End: 2022-09-15
Payer: COMMERCIAL

## 2022-09-15 DIAGNOSIS — Z98.890 HISTORY OF PELVIC SURGERY: ICD-10-CM

## 2022-09-15 DIAGNOSIS — N39.42 URINARY INCONTINENCE WITHOUT SENSORY AWARENESS: Primary | ICD-10-CM

## 2022-09-15 DIAGNOSIS — M62.89 PELVIC FLOOR DYSFUNCTION IN FEMALE: ICD-10-CM

## 2022-09-15 DIAGNOSIS — N81.4 UTEROVAGINAL PROLAPSE: ICD-10-CM

## 2022-09-15 PROCEDURE — 97140 MANUAL THERAPY 1/> REGIONS: CPT | Mod: GP | Performed by: PHYSICAL THERAPIST

## 2022-09-15 PROCEDURE — 97110 THERAPEUTIC EXERCISES: CPT | Mod: GP | Performed by: PHYSICAL THERAPIST

## 2022-09-15 NOTE — PROGRESS NOTES
"   09/15/22 1500   Signing Clinician's Name / Credentials   Signing clinician's name / credentials Gisela Murray PT   Session Number   Session Number 3/up to 12   Progress Note/Recertification   Recertification Due Date 11/10/22   Ortho Goal 1   Goal Identifier HEP   Goal Description Patient will be independent with home exercise program and self management of symptoms in 12 weeks.   Ortho Goal 2   Goal Identifier nocturia   Goal Description Patient will void no more than 1x/night in 12 weeks.   Ortho Goal 3   Goal Identifier urinary incontinence   Goal Description patient will improve pelvic floor function to decrease incontinence episodes by 50% in 12 weeks.   Subjective Report   Subjective Report Not having any success. She is feeling discouraged. She continues to have leakage several times/day. She places toilet paper over her pad and reports it will be damp, but the pad remains dry. Voiding 2x/night, previously 3-4 times/night. Voiding every 2-3 hours.   Objective Measure 1   Details significant overuse of abdominals, gluts and adductors with poor PF recruitment and perineal lift with demonstration of kegels   Objective Measure 2   Details mild/mod fascial restrictions of pelvic somatic nn, bladder sphincter.   Therapeutic Procedure/exercise   Therapeutic Procedures: strength, endurance, ROM, flexibillity minutes (77635) 15   Skilled Intervention ther ex   Patient Response good with cues, needs coaching and reinforcement.(*=new)   Treatment Detail supine PF endurance contractions 10 x 10\"   Manual Therapy   Manual Therapy: Mobilization, MFR, MLD, friction massage minutes (42531) 40   Skilled Intervention MFR, counterstrain   Patient Response good   Treatment Detail pelvic diaphragm, (B) pudendal nn (B) PUD-N, BSPH-V,   Equipment Needs   Equipment Needs green band   Education   Learner Patient   Readiness Eager   Method Booklet/handout;Explanation;Demonstration   Response Demonstrates Understanding   Communication " with other professionals   Communication with other professionals ref provider: Nola Santoro MD   Plan   Homework voiding schedule   Home program kegels in supine, roll for control   Plan for next session Continue with POC, including progression of HEP, patient education, manual therapy as needed.   Comments   Comments Patient presents with worsening symptoms of urinary incontinence since having surgery for prolapse in 1/2022. Complaints of increased urinary incontinence, nocturia. Denies any pain complaints. Findings include limited trunk and LE ROM, spine and pelvic asymmetries, decreased perineal body lift and decreased PF recruitment with substitution patterns noted on exam.   Total Session Time   Timed Code Treatment Minutes 55   Total Treatment Time (sum of timed and untimed services) 55   AMBULATORY CLINICS ONLY-MEDICAL AND TREATMENT DIAGNOSIS   Medical Diagnosis Urge incontinence   PT Diagnosis urinary incontinence, pelvic floor dysfunction, history of prolapse and surgical repair

## 2022-09-30 ENCOUNTER — HOSPITAL ENCOUNTER (OUTPATIENT)
Dept: PHYSICAL THERAPY | Facility: REHABILITATION | Age: 87
Discharge: HOME | End: 2022-09-30
Payer: COMMERCIAL

## 2022-09-30 DIAGNOSIS — M62.89 PELVIC FLOOR DYSFUNCTION IN FEMALE: ICD-10-CM

## 2022-09-30 DIAGNOSIS — N81.4 UTEROVAGINAL PROLAPSE: ICD-10-CM

## 2022-09-30 DIAGNOSIS — N39.42 URINARY INCONTINENCE WITHOUT SENSORY AWARENESS: Primary | ICD-10-CM

## 2022-09-30 DIAGNOSIS — Z98.890 HISTORY OF PELVIC SURGERY: ICD-10-CM

## 2022-09-30 PROCEDURE — 97110 THERAPEUTIC EXERCISES: CPT | Mod: GP | Performed by: PHYSICAL THERAPIST

## 2022-09-30 PROCEDURE — 97140 MANUAL THERAPY 1/> REGIONS: CPT | Mod: GP | Performed by: PHYSICAL THERAPIST

## 2022-09-30 NOTE — PROGRESS NOTES
"   09/30/22 1500   Signing Clinician's Name / Credentials   Signing clinician's name / credentials Gisela Murray, PT   Session Number   Session Number 4/up to 12   Progress Note/Recertification   Recertification Due Date 11/10/22   Ortho Goal 1   Goal Identifier HEP   Goal Description Patient will be independent with home exercise program and self management of symptoms in 12 weeks.   Ortho Goal 2   Goal Identifier nocturia   Goal Description Patient will void no more than 1x/night in 12 weeks.   Ortho Goal 3   Goal Identifier urinary incontinence   Goal Description patient will improve pelvic floor function to decrease incontinence episodes by 50% in 12 weeks.   Subjective Report   Subjective Report Not seeing much change. She is unable to feel if she's leaking. Has been trying to maintain a voiding schedule. Trying to hydrate more, 3-8 oz glasses of water/day. Nocturnal voiding has decreased to 1x/night, previously 2-3x/night. Leaving town for 6 months on 10/21 to stay with her daughter.   Objective Measure 1   Details PF recruitment pattern has improved since the last session. Still demonstrates mild abd overuse, but improves with cues and self monitoring.   Objective Measure 2   Details cranial scan (+) for R>L LS sympathetics. Mod fascial restrictions noted (R) post pelvis.   Therapeutic Procedure/exercise   Therapeutic Procedures: strength, endurance, ROM, flexibillity minutes (28512) 15   Skilled Intervention ther ex   Patient Response good with cues, needs coaching and reinforcement.   Treatment Detail supine PF endurance contractions 10 x 10\"; incorporating pubis to coccyx contraction with perineal lift   Manual Therapy   Manual Therapy: Mobilization, MFR, MLD, friction massage minutes (32980) 40   Skilled Intervention MFR, counterstrain   Patient Response good   Treatment Detail (R) PELV-N, (R) PGLS-N, (R) PS2-N, pelvic diaphragm,   Equipment Needs   Equipment Needs green band   Education   Learner Patient "   Readiness Eager   Method Booklet/handout;Explanation;Demonstration   Response Demonstrates Understanding   Communication with other professionals   Communication with other professionals ref provider: Nola Santoro MD   Plan   Homework voiding schedule   Home program kegels in supine, roll for control   Plan for next session Patient will be leaving [facility] for 6 months on 10/21/22. No further appointments, but is on the wait list. Continue with POC, including progression of HEP, patient education, manual therapy as needed.   Comments   Comments Patient presents with worsening symptoms of urinary incontinence since having surgery for prolapse in 2022. Complaints of increased urinary incontinence, nocturia. Denies any pain complaints. Findings include limited trunk and LE ROM, spine and pelvic asymmetries, decreased perineal body lift and decreased PF recruitment with substitution patterns noted on exam.   Total Session Time   Timed Code Treatment Minutes 55   Total Treatment Time (sum of timed and untimed services) 55   AMBULATORY CLINICS ONLY-MEDICAL AND TREATMENT DIAGNOSIS   Medical Diagnosis Urge incontinence   PT Diagnosis urinary incontinence, pelvic floor dysfunction, history of prolapse and surgical repair                                                                                  [facility]    Outpatient Physical Therapy Discharge Note  Patient: Jigna Allen  : 1935    Beginning/End Dates of Reporting Period:  22 to 22    Referring Provider: Nola Santoro MD    Therapy Diagnosis: urinary incontinence, pelvic floor dysfunction, history of prolapse and surgical repair     Client Self Report: Not seeing much change. She is unable to feel if she's leaking. Has been trying to maintain a voiding schedule. Trying to hydrate more, 3-8 oz glasses of water/day. Nocturnal voiding has decreased to 1x/night, previously 2-3x/night. Leaving [facility] for 6 months on 10/21  to stay with her daughter.    Objective Measurements:     Details: PF recruitment pattern has improved since the last session. Still demonstrates mild abd overuse, but improves with cues and self monitoring.     Details: cranial scan (+) for R>L LS sympathetics. Mod fascial restrictions noted (R) post pelvis.        Goals:      Goal Identifier HEP   Goal Description Patient will be independent with home exercise program and self management of symptoms in 12 weeks.   Target Date     Date Met      Progress (detail required for progress note):  Patient is somewhat independent with HEP, but has required coaching/cues with exercises.     Goal Identifier nocturia   Goal Description Patient will void no more than 1x/night in 12 weeks.   Target Date     Date Met      Progress (detail required for progress note):  Voiding 1x/ night. Goal met.      Goal Identifier urinary incontinence   Goal Description patient will improve pelvic floor function to decrease incontinence episodes by 50% in 12 weeks.   Target Date     Date Met      Progress (detail required for progress note):  Not met     Plan:  Discharge from therapy.    Discharge:    Reason for Discharge: Patient chooses to discontinue therapy.  Patient is leaving town for the winter and is unable to continue PT at this time.     Equipment Issued:     Discharge Plan: Patient to continue home program.

## 2023-04-01 ENCOUNTER — HEALTH MAINTENANCE LETTER (OUTPATIENT)
Age: 88
End: 2023-04-01

## 2024-03-27 ENCOUNTER — OFFICE VISIT (OUTPATIENT)
Dept: INTERNAL MEDICINE CLINIC | Facility: CLINIC | Age: 89
End: 2024-03-27
Payer: COMMERCIAL

## 2024-03-27 ENCOUNTER — LAB ENCOUNTER (OUTPATIENT)
Dept: LAB | Age: 89
End: 2024-03-27
Attending: INTERNAL MEDICINE
Payer: MEDICARE

## 2024-03-27 VITALS
DIASTOLIC BLOOD PRESSURE: 70 MMHG | OXYGEN SATURATION: 99 % | HEIGHT: 62 IN | RESPIRATION RATE: 14 BRPM | HEART RATE: 78 BPM | SYSTOLIC BLOOD PRESSURE: 148 MMHG | TEMPERATURE: 98 F | BODY MASS INDEX: 22.7 KG/M2 | WEIGHT: 123.38 LBS

## 2024-03-27 DIAGNOSIS — R32 URINARY INCONTINENCE, UNSPECIFIED TYPE: ICD-10-CM

## 2024-03-27 DIAGNOSIS — E03.9 HYPOTHYROIDISM, UNSPECIFIED TYPE: ICD-10-CM

## 2024-03-27 DIAGNOSIS — R73.03 PRE-DIABETES: ICD-10-CM

## 2024-03-27 DIAGNOSIS — R03.0 ELEVATED BLOOD PRESSURE READING: ICD-10-CM

## 2024-03-27 DIAGNOSIS — R20.2 TINGLING OF RIGHT UPPER EXTREMITY: ICD-10-CM

## 2024-03-27 DIAGNOSIS — R53.83 FATIGUE, UNSPECIFIED TYPE: ICD-10-CM

## 2024-03-27 DIAGNOSIS — R32 URINARY INCONTINENCE, UNSPECIFIED TYPE: Primary | ICD-10-CM

## 2024-03-27 DIAGNOSIS — K59.00 CONSTIPATION, UNSPECIFIED CONSTIPATION TYPE: ICD-10-CM

## 2024-03-27 LAB
ALBUMIN SERPL-MCNC: 4.5 G/DL (ref 3.2–4.8)
ALBUMIN/GLOB SERPL: 1.4 {RATIO} (ref 1–2)
ALP LIVER SERPL-CCNC: 96 U/L
ALT SERPL-CCNC: 10 U/L
ANION GAP SERPL CALC-SCNC: 5 MMOL/L (ref 0–18)
AST SERPL-CCNC: 19 U/L (ref ?–34)
BASOPHILS # BLD AUTO: 0.02 X10(3) UL (ref 0–0.2)
BASOPHILS NFR BLD AUTO: 0.4 %
BILIRUB SERPL-MCNC: 1 MG/DL (ref 0.2–1.1)
BILIRUB UR QL: NEGATIVE
BUN BLD-MCNC: 19 MG/DL (ref 9–23)
BUN/CREAT SERPL: 22.6 (ref 10–20)
CALCIUM BLD-MCNC: 9.7 MG/DL (ref 8.7–10.4)
CHLORIDE SERPL-SCNC: 105 MMOL/L (ref 98–112)
CLARITY UR: CLEAR
CO2 SERPL-SCNC: 30 MMOL/L (ref 21–32)
CREAT BLD-MCNC: 0.84 MG/DL
DEPRECATED RDW RBC AUTO: 46.5 FL (ref 35.1–46.3)
EGFRCR SERPLBLD CKD-EPI 2021: 67 ML/MIN/1.73M2 (ref 60–?)
EOSINOPHIL # BLD AUTO: 0.08 X10(3) UL (ref 0–0.7)
EOSINOPHIL NFR BLD AUTO: 1.7 %
ERYTHROCYTE [DISTWIDTH] IN BLOOD BY AUTOMATED COUNT: 13.2 % (ref 11–15)
EST. AVERAGE GLUCOSE BLD GHB EST-MCNC: 114 MG/DL (ref 68–126)
FASTING STATUS PATIENT QL REPORTED: NO
GLOBULIN PLAS-MCNC: 3.3 G/DL (ref 2.8–4.4)
GLUCOSE BLD-MCNC: 102 MG/DL (ref 70–99)
GLUCOSE UR-MCNC: NORMAL MG/DL
HBA1C MFR BLD: 5.6 % (ref ?–5.7)
HCT VFR BLD AUTO: 45.4 %
HGB BLD-MCNC: 14.5 G/DL
HGB UR QL STRIP.AUTO: NEGATIVE
IMM GRANULOCYTES # BLD AUTO: 0.01 X10(3) UL (ref 0–1)
IMM GRANULOCYTES NFR BLD: 0.2 %
KETONES UR-MCNC: NEGATIVE MG/DL
LEUKOCYTE ESTERASE UR QL STRIP.AUTO: NEGATIVE
LYMPHOCYTES # BLD AUTO: 1.33 X10(3) UL (ref 1–4)
LYMPHOCYTES NFR BLD AUTO: 28.5 %
MCH RBC QN AUTO: 30.5 PG (ref 26–34)
MCHC RBC AUTO-ENTMCNC: 31.9 G/DL (ref 31–37)
MCV RBC AUTO: 95.4 FL
MONOCYTES # BLD AUTO: 0.38 X10(3) UL (ref 0.1–1)
MONOCYTES NFR BLD AUTO: 8.2 %
NEUTROPHILS # BLD AUTO: 2.84 X10 (3) UL (ref 1.5–7.7)
NEUTROPHILS # BLD AUTO: 2.84 X10(3) UL (ref 1.5–7.7)
NEUTROPHILS NFR BLD AUTO: 61 %
NITRITE UR QL STRIP.AUTO: NEGATIVE
OSMOLALITY SERPL CALC.SUM OF ELEC: 292 MOSM/KG (ref 275–295)
PH UR: 5 [PH] (ref 5–8)
PLATELET # BLD AUTO: 221 10(3)UL (ref 150–450)
POTASSIUM SERPL-SCNC: 4.2 MMOL/L (ref 3.5–5.1)
PROT SERPL-MCNC: 7.8 G/DL (ref 5.7–8.2)
PROT UR-MCNC: NEGATIVE MG/DL
RBC # BLD AUTO: 4.76 X10(6)UL
SODIUM SERPL-SCNC: 140 MMOL/L (ref 136–145)
SP GR UR STRIP: 1.02 (ref 1–1.03)
TSI SER-ACNC: 1.31 MIU/ML (ref 0.55–4.78)
UROBILINOGEN UR STRIP-ACNC: NORMAL
WBC # BLD AUTO: 4.7 X10(3) UL (ref 4–11)

## 2024-03-27 PROCEDURE — 81003 URINALYSIS AUTO W/O SCOPE: CPT

## 2024-03-27 PROCEDURE — 36415 COLL VENOUS BLD VENIPUNCTURE: CPT

## 2024-03-27 PROCEDURE — 84443 ASSAY THYROID STIM HORMONE: CPT

## 2024-03-27 PROCEDURE — 83036 HEMOGLOBIN GLYCOSYLATED A1C: CPT

## 2024-03-27 PROCEDURE — 85025 COMPLETE CBC W/AUTO DIFF WBC: CPT

## 2024-03-27 PROCEDURE — 80053 COMPREHEN METABOLIC PANEL: CPT

## 2024-03-27 PROCEDURE — 99204 OFFICE O/P NEW MOD 45 MIN: CPT | Performed by: INTERNAL MEDICINE

## 2024-03-27 RX ORDER — LEVOTHYROXINE SODIUM 0.07 MG/1
75 TABLET ORAL
COMMUNITY
Start: 2024-02-23

## 2024-03-27 NOTE — PATIENT INSTRUCTIONS
Blood work     Take miralax as needed for constipation - ok to take daily or can take if no bowel movement in 2 or more days     Check blood pressure at home. Sit quietly for a few minutes before checking and sit with your arm supported at the kitchen table.   Omron is a recommended brand of blood pressure cuff   Goal blood pressure is under 140/90.     Call my nurse in a few weeks with 4 or 5 blood pressure readings.

## 2024-03-27 NOTE — PROGRESS NOTES
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     She feels fatigue     Urinary incontinence that is worse     She feels unbalanced when she is walking and likes to hold onto something if she is walking around the block     She exercises at the [facility]    She notes she sleeps \"in chunks\"    R hand tingles at night and when she wakes up in the morning  She has been sleeping with a glove on to help keep her arm straight     She had carpal tunnel surgery in the past     Blood pressure is a little high today and at a recent hand specialist appointment     Often having constipation  She takes prunes to help which cause her gas    Hypothyroidism - on levothyroxine 75mcg for years with no recent dosing changes     She was told she has a \"normal but abnormal heart murmur\"   In 2021, echo showed mild aortic stenosis and mild-mod mitral regurg     Annual wellness exam was 9/5/23    Patient Active Problem List   Diagnosis    Hypothyroidism    Pre-diabetes     Past Surgical History:   Procedure Laterality Date    Knee replacement surgery      Other      sarcoma removal     Family History   Problem Relation Age of Onset    Other (bacterial meningitis) Mother     Prostate Cancer Father     Diabetes Sister 80        I reviewed her's Past Medical History, Past Surgical History, Family History and   Social History updated shows  Social History     Socioeconomic History    Marital status:    Tobacco Use    Smoking status: Never    Smokeless tobacco: Never   Vaping Use    Vaping Use: Never used   Substance and Sexual Activity    Alcohol use: Never    Drug use: Never     Allergies:  Allergies   Allergen Reactions    Ferrous Sulfate UNKNOWN     Niacin UNKNOWN    Colchicine DIARRHEA and OTHER (SEE COMMENTS)     Other reaction(s): Gastrointestinal   diarrhea    diarrhea    Other reaction(s): Gastrointestinal   diarrhea   diarrhea     Current Outpatient Medications   Medication Sig Dispense Refill    levothyroxine 75 MCG Oral Tab Take 1 tablet (75 mcg total) by mouth before breakfast.           REVIEW OF SYSTEMS:   Review of Systems   Constitutional:  Negative for fever.   HENT:  Negative for congestion.    Eyes:  Negative for visual disturbance.   Respiratory:  Negative for shortness of breath.    Cardiovascular:  Negative for chest pain.   Gastrointestinal:  Negative for constipation.   Genitourinary:  Negative for dysuria.   Neurological: Negative.    Hematological: Negative.    Psychiatric/Behavioral: Negative.          EXAM:   /70   Pulse 78   Temp 97.6 °F (36.4 °C) (Temporal)   Resp 14   Ht 5' 2\" (1.575 m)   Wt 123 lb 6.4 oz (56 kg)   SpO2 99%   BMI 22.57 kg/m²  Estimated body mass index is 22.57 kg/m² as calculated from the following:    Height as of this encounter: 5' 2\" (1.575 m).    Weight as of this encounter: 123 lb 6.4 oz (56 kg).   Vital signs reviewed. Appears stated age, well groomed.  Physical Exam  Vitals reviewed.   Constitutional:       General: She is not in acute distress.     Appearance: She is well-developed.   HENT:      Head: Normocephalic and atraumatic.      Right Ear: Tympanic membrane normal.      Left Ear: Tympanic membrane normal.   Eyes:      Conjunctiva/sclera: Conjunctivae normal.   Cardiovascular:      Rate and Rhythm: Normal rate and regular rhythm.      Heart sounds: Normal heart sounds.   Pulmonary:      Effort: Pulmonary effort is normal.      Breath sounds: Normal breath sounds.   Musculoskeletal:      Cervical back: Neck supple.      Right lower leg: No edema.      Left lower leg: No edema.   Skin:     General: Skin is warm and dry.   Neurological:      General: No focal deficit present.      Mental  Status: She is alert.   Psychiatric:         Mood and Affect: Mood normal.          ASSESSMENT AND PLAN:   Christie Adams is a 89 year old female with  1. Urinary incontinence, unspecified type    2. Pre-diabetes    3. Hypothyroidism, unspecified type    4. Fatigue, unspecified type    5. Tingling of right upper extremity    6. Elevated blood pressure reading          The plan is as follows  Christie was seen today for establish care.    Diagnoses and all orders for this visit:    Urinary incontinence, unspecified type - will check Ua  -     UA/M With Culture Reflex [E]; Future    Pre-diabetes -check A1c; cont low carb diet  -     Hemoglobin A1C; Future    Hypothyroidism, unspecified type -cont levothyoxine. Check labs  -     TSH W Reflex To Free T4; Future    Fatigue, unspecified type -check labs  -     Comp Metabolic Panel (14); Future  -     CBC With Differential With Platelet; Future    Tingling of right upper extremity - discussed continuing to wear wrist braces at night and f/u with orthopedics for further eval. She has a history of carpal tunnel surgery in the past  -     Ortho Referral - In Network    Elevated blood pressure reading -   Check blood pressure at home. Sit quietly for a few minutes before checking and sit with your arm supported at the kitchen table.   Omron is a recommended brand of blood pressure cuff   Goal blood pressure is under 140/90.     Call my nurse in a few weeks with 4 or 5 blood pressure readings.     Constipation, unspecified constipation type - take miralax as needed for constipation. Ok to take daily or take as needed if no BM in 2 days or more. Continue plenty of water and fiber in the diet.    Orders Placed This Encounter   Procedures    Hemoglobin A1C    Comp Metabolic Panel (14)    CBC With Differential With Platelet    TSH W Reflex To Free T4    UA/M With Culture Reflex [E]       Meds & Refills for this Visit:  Requested Prescriptions      No prescriptions requested or ordered in  this encounter       Imaging & Consults:  ORTHOPEDIC - INTERNAL    Health Maintenance Due   Topic Date Due    Annual Physical  Never done    COVID-19 Vaccine (7 - 2023-24 season) 09/01/2023    Influenza Vaccine (1) 10/01/2023    Fall Risk Screening (Annual)  01/01/2024     Patient/Caregiver Education: Patient/Caregiver Education: There are no barriers to learning. Medical education done. Outcome: Patient verbalizes understanding. Patient is notified to call with any questions, complications, allergies, or worsening or changing symptoms.  Patient is to call with any side effects or complications from the treatments as a result of today.     Gracy Henderson MD

## 2024-03-28 PROBLEM — R73.03 PRE-DIABETES: Status: ACTIVE | Noted: 2024-03-28

## 2024-03-28 PROBLEM — E03.9 HYPOTHYROIDISM: Status: ACTIVE | Noted: 2024-03-28

## 2024-04-07 NOTE — PLAN OF CARE
VSS. Regular diet. Denies nausea. Pain controlled with PRN tylenol and Oxycodone. Pt did not feel like she needed to void 4 hours after being straight catheterized. Bladder scanned for 515ml. Notified the provider, see notification. Straight cath pt for 400ml. No BM overnight, +gas. Right leg with immobilizer in place and elevated at all times. Flap checks q4hrs and viotpix, WNL-warm, with good cap refill and no numbness and tingling. 2 JUAN R's on the right leg with small amount of output. Left graft site with ace bandage in place, CDI.  PICC-SL. Plan is to discharge to TCU today. Continue POC.    [place]

## 2024-04-24 ENCOUNTER — CARE COORDINATION (OUTPATIENT)
Dept: ORTHOPEDICS | Facility: CLINIC | Age: 89
End: 2024-04-24
Payer: COMMERCIAL

## 2024-04-24 DIAGNOSIS — Z96.651 S/P TOTAL KNEE ARTHROPLASTY, RIGHT: Primary | ICD-10-CM

## 2024-04-24 RX ORDER — AMOXICILLIN 500 MG/1
TABLET, FILM COATED ORAL
Qty: 4 TABLET | Refills: 4 | Status: SHIPPED | OUTPATIENT
Start: 2024-04-24 | End: 2024-09-09

## 2024-04-25 ENCOUNTER — TELEPHONE (OUTPATIENT)
Dept: INTERNAL MEDICINE CLINIC | Facility: CLINIC | Age: 89
End: 2024-04-25

## 2024-04-25 NOTE — TELEPHONE ENCOUNTER
Patient called to inquire about lab results from 3/27. This PSR did relay result note from Dr Henderson, patient would like call back from nurse. Please call back at [phone] and advise.

## 2024-04-25 NOTE — TELEPHONE ENCOUNTER
[phone] spoke to pt, she does not use the [employer] system, so she just wanted to review her specific numbers for her own records.     Reviewed the message with pt from Dr. Henderson, pt thanked us for the clarification.

## 2024-05-27 ENCOUNTER — HOSPITAL ENCOUNTER (EMERGENCY)
Facility: CLINIC | Age: 89
Discharge: HOME | End: 2024-05-27
Attending: EMERGENCY MEDICINE | Admitting: EMERGENCY MEDICINE
Payer: COMMERCIAL

## 2024-05-27 VITALS
HEART RATE: 76 BPM | OXYGEN SATURATION: 97 % | DIASTOLIC BLOOD PRESSURE: 76 MMHG | TEMPERATURE: 97.7 F | RESPIRATION RATE: 18 BRPM | SYSTOLIC BLOOD PRESSURE: 160 MMHG

## 2024-05-27 DIAGNOSIS — N36.2 URETHRAL CARUNCLE: ICD-10-CM

## 2024-05-27 DIAGNOSIS — N95.2 ATROPHIC VAGINITIS: ICD-10-CM

## 2024-05-27 DIAGNOSIS — N95.2 VAGINAL ATROPHY: ICD-10-CM

## 2024-05-27 LAB
ABO/RH(D): NORMAL
ALBUMIN SERPL BCG-MCNC: 4.4 G/DL (ref 3.5–5.2)
ALBUMIN UR-MCNC: NEGATIVE MG/DL
ALP SERPL-CCNC: 88 U/L (ref 40–150)
ALT SERPL W P-5'-P-CCNC: 9 U/L (ref 0–50)
ANION GAP SERPL CALCULATED.3IONS-SCNC: 11 MMOL/L (ref 7–15)
ANTIBODY SCREEN: NEGATIVE
APPEARANCE UR: CLEAR
AST SERPL W P-5'-P-CCNC: 22 U/L (ref 0–45)
BASOPHILS # BLD AUTO: 0 10E3/UL (ref 0–0.2)
BASOPHILS NFR BLD AUTO: 0 %
BILIRUB SERPL-MCNC: 0.7 MG/DL
BILIRUB UR QL STRIP: NEGATIVE
BUN SERPL-MCNC: 16.3 MG/DL (ref 8–23)
CALCIUM SERPL-MCNC: 9.5 MG/DL (ref 8.8–10.2)
CHLORIDE SERPL-SCNC: 102 MMOL/L (ref 98–107)
CLUE CELLS: NORMAL
COLOR UR AUTO: NORMAL
CREAT SERPL-MCNC: 0.79 MG/DL (ref 0.51–0.95)
DEPRECATED HCO3 PLAS-SCNC: 27 MMOL/L (ref 22–29)
EGFRCR SERPLBLD CKD-EPI 2021: 72 ML/MIN/1.73M2
EOSINOPHIL # BLD AUTO: 0.1 10E3/UL (ref 0–0.7)
EOSINOPHIL NFR BLD AUTO: 1 %
ERYTHROCYTE [DISTWIDTH] IN BLOOD BY AUTOMATED COUNT: 13.6 % (ref 10–15)
GLUCOSE SERPL-MCNC: 97 MG/DL (ref 70–99)
GLUCOSE UR STRIP-MCNC: NEGATIVE MG/DL
HCT VFR BLD AUTO: 45.2 % (ref 35–47)
HGB BLD-MCNC: 14.8 G/DL (ref 11.7–15.7)
HGB UR QL STRIP: NEGATIVE
IMM GRANULOCYTES # BLD: 0 10E3/UL
IMM GRANULOCYTES NFR BLD: 0 %
INR PPP: 0.93 (ref 0.85–1.15)
KETONES UR STRIP-MCNC: NEGATIVE MG/DL
LEUKOCYTE ESTERASE UR QL STRIP: NEGATIVE
LYMPHOCYTES # BLD AUTO: 1.2 10E3/UL (ref 0.8–5.3)
LYMPHOCYTES NFR BLD AUTO: 20 %
MCH RBC QN AUTO: 31.8 PG (ref 26.5–33)
MCHC RBC AUTO-ENTMCNC: 32.7 G/DL (ref 31.5–36.5)
MCV RBC AUTO: 97 FL (ref 78–100)
MONOCYTES # BLD AUTO: 0.4 10E3/UL (ref 0–1.3)
MONOCYTES NFR BLD AUTO: 7 %
NEUTROPHILS # BLD AUTO: 4.2 10E3/UL (ref 1.6–8.3)
NEUTROPHILS NFR BLD AUTO: 72 %
NITRATE UR QL: NEGATIVE
NRBC # BLD AUTO: 0 10E3/UL
NRBC BLD AUTO-RTO: 0 /100
PH UR STRIP: 6 [PH] (ref 5–7)
PLATELET # BLD AUTO: 233 10E3/UL (ref 150–450)
POTASSIUM SERPL-SCNC: 4.2 MMOL/L (ref 3.4–5.3)
PROT SERPL-MCNC: 7.4 G/DL (ref 6.4–8.3)
RBC # BLD AUTO: 4.66 10E6/UL (ref 3.8–5.2)
RBC URINE: <1 /HPF
SODIUM SERPL-SCNC: 140 MMOL/L (ref 135–145)
SP GR UR STRIP: 1.01 (ref 1–1.03)
SPECIMEN EXPIRATION DATE: NORMAL
SQUAMOUS EPITHELIAL: <1 /HPF
TRICHOMONAS, WET PREP: NORMAL
UROBILINOGEN UR STRIP-MCNC: NORMAL MG/DL
WBC # BLD AUTO: 5.9 10E3/UL (ref 4–11)
WBC URINE: 1 /HPF
WBC'S/HIGH POWER FIELD, WET PREP: NORMAL
YEAST, WET PREP: NORMAL

## 2024-05-27 PROCEDURE — 82040 ASSAY OF SERUM ALBUMIN: CPT | Performed by: EMERGENCY MEDICINE

## 2024-05-27 PROCEDURE — 81001 URINALYSIS AUTO W/SCOPE: CPT | Performed by: EMERGENCY MEDICINE

## 2024-05-27 PROCEDURE — 86900 BLOOD TYPING SEROLOGIC ABO: CPT | Performed by: EMERGENCY MEDICINE

## 2024-05-27 PROCEDURE — 85025 COMPLETE CBC W/AUTO DIFF WBC: CPT | Performed by: EMERGENCY MEDICINE

## 2024-05-27 PROCEDURE — 99284 EMERGENCY DEPT VISIT MOD MDM: CPT | Mod: 25 | Performed by: EMERGENCY MEDICINE

## 2024-05-27 PROCEDURE — 87210 SMEAR WET MOUNT SALINE/INK: CPT | Performed by: EMERGENCY MEDICINE

## 2024-05-27 PROCEDURE — 36415 COLL VENOUS BLD VENIPUNCTURE: CPT | Performed by: EMERGENCY MEDICINE

## 2024-05-27 PROCEDURE — 85610 PROTHROMBIN TIME: CPT | Performed by: EMERGENCY MEDICINE

## 2024-05-27 PROCEDURE — 99284 EMERGENCY DEPT VISIT MOD MDM: CPT | Mod: GC | Performed by: EMERGENCY MEDICINE

## 2024-05-27 RX ORDER — ESTRADIOL 0.1 MG/G
1 CREAM VAGINAL
Qty: 42.5 G | Refills: 3 | Status: SHIPPED | OUTPATIENT
Start: 2024-05-27

## 2024-05-27 ASSESSMENT — ACTIVITIES OF DAILY LIVING (ADL)
ADLS_ACUITY_SCORE: 37
ADLS_ACUITY_SCORE: 37

## 2024-05-27 ASSESSMENT — COLUMBIA-SUICIDE SEVERITY RATING SCALE - C-SSRS
6. HAVE YOU EVER DONE ANYTHING, STARTED TO DO ANYTHING, OR PREPARED TO DO ANYTHING TO END YOUR LIFE?: NO
1. IN THE PAST MONTH, HAVE YOU WISHED YOU WERE DEAD OR WISHED YOU COULD GO TO SLEEP AND NOT WAKE UP?: NO
2. HAVE YOU ACTUALLY HAD ANY THOUGHTS OF KILLING YOURSELF IN THE PAST MONTH?: NO

## 2024-05-27 NOTE — ED TRIAGE NOTES
Ambulatory to triage for scant rectal bleeding x 2 days  Notices it on pads  Bright red blood  No clots  VSS, no thinners  Hx hemorrhoids       Triage Assessment (Adult)       Row Name 05/27/24 1201          Triage Assessment    Airway WDL WDL        Respiratory WDL    Respiratory WDL WDL        Skin Circulation/Temperature WDL    Skin Circulation/Temperature WDL WDL        Cardiac WDL    Cardiac WDL WDL        Peripheral/Neurovascular WDL    Peripheral Neurovascular WDL WDL        Cognitive/Neuro/Behavioral WDL    Cognitive/Neuro/Behavioral WDL WDL

## 2024-05-27 NOTE — DISCHARGE INSTRUCTIONS
Thank you for coming to the [facility] Emergency Department.     The bleeding you are seeing today seems to be coming from irritation of the vagina and a small caruncle at the urethra opening. These are both caused by low estrogen states and occur after menopause. The tissue is easily irritated from activity, so you may notice increased bleeding after activity or exercise. Application of vaginal estrogen cream can help this heal.     You are safe to travel, but should have this re-evaluated by your new primary care provider in a few weeks when you establish care in [place] if bleeding continues.    You have hemorrhoids, but there are no signs today that they have been bleeding recently.

## 2024-05-27 NOTE — ED PROVIDER NOTES
ED Provider Note  [facility]      History     Chief Complaint   Patient presents with    Rectal Bleeding     HPI  Jigna Allen is a 89 year old female with PMHx of HLD, hypothyroidism, and uterovaginal prolapse with rectocele s/p Le Forte colpocleisis (1/2022) who presented to the ED with 2-3 days of bright red blood on pads.    Ms. Allen notes that approximately 1 week ago, she had a non-specific, spontaneous sensation of prolapse which she manually reduced with external pressure. Following this incident, she noticed several days of brownish discharge on her pads which progressed to 2 to 3 days of bright red blood on her pad. She approximates 1/4 teaspoon on her pad after walking or having a bowel movement. She has not had blood on tissues while wiping, blood covering bowel movements, or blood in the toilet bowl. She has not noticed vaginal bleeding. She has not recently had diarrhea. She has symptoms of constipation (has 2-3 episodes of pellet shaped stools each day and abdominal fullness), but denies needing to strain to have with bowel movements.  With bright red blood in her pads, she decided to come into the ED.     She notes that her last colonoscopy was around age 72 with removal of polyps; she was told that due to her advanced age, she did not need further screening colonoscopies.  She has a history of uterovaginal prolapse with surgical repair (Le Forte colpocleisis 1/2022); she notes that she has not been having the sensation of prolapse aside from the single episode one week ago.    Past Medical History  Past Medical History:   Diagnosis Date    Anemia     Anxiety     Aortic valve sclerosis     Arthritis 1990?    Complication of anesthesia     slow to wakeup    Hypothyroid     Infection of prosthetic joint, subsequent encounter     Mild tricuspid regurgitation     Mumps ?1945    Primary osteoarthritis of right knee     Sarcoma (H) 2009     Past Surgical History:   Procedure  Laterality Date    ARTHROPLASTY KNEE Right 6/12/2018    Procedure: ARTHROPLASTY KNEE;  Right Total Knee Replacement ;  Surgeon: He Gomez MD;  [facility]    ARTHROPLASTY REVISION KNEE Right 3/9/2021    Procedure: Revision Right total hinge Knee arthroplasty;  Surgeon: He Gomez MD;  [facility]: [facility] OR    CARPAL TUNNEL RELEASE RT/LT      CYSTOSCOPY N/A 1/12/2022    Procedure: CYSTOSCOPY;  Surgeon: Nola Santoro MD;  Location: [facility] OR    excise sarcoma Right     right leg    exicision of wound  03/27/2020    GRAFT FREE VASCULARIZED TRANSVERSE RECTUS ABDOMINIS MYOCUTANEOUS Left 4/20/2020    Procedure: With left anterolateral thigh free flap with SPY;  Surgeon: EHSAN De Luna MD;  [facility]    IRRIGATION AND DEBRIDEMENT KNEE, PLACE ANTIBIOTIC CEMENT BEADS / SPACE Right 6/2/2020    Procedure: manufacture and implantation of Vanco/tobra ANTIBIOTIC SPACER, KNEE;  Surgeon: He Gomez MD;  Location[facility] OR    KNEE SURGERY      LEFORTE COLPOCLEISIS PROCEDURE (VAGINAL OBLITERATION) N/A 1/12/2022    Procedure: COLPOCLEISIS, COMPLETE;  Surgeon: Nola Santoro MD;  [facility]    PICC DOUBLE LUMEN PLACEMENT Right 04/23/2020    5FR DL PICC inserted at brachial medial, length- 37cm (1cm out). Tip at mid SVC    RECONSTRUCT KNEE Right 4/20/2020    Procedure: Right leg reconstruction and wound debridemenr;  Surgeon: EHSAN De Luna MD;  [facility]    REMOVE HARDWARE ARTHROPLASTY KNEE Right 6/2/2020    Procedure: Explantation of Right total knee;  Surgeon: He Gomez MD;  [facility]    TUBAL LIGATION       estradiol (ESTRACE) 0.1 MG/GM vaginal cream  amoxicillin (AMOXIL) 500 MG capsule  amoxicillin (AMOXIL) 500 MG tablet  levothyroxine (SYNTHROID/LEVOTHROID) 75 MCG tablet      Allergies   Allergen Reactions    Colchicine      Other reaction(s): Gastrointestinal  diarrhea    Niacin Unknown     skin rash    Ferrous Sulfate Rash and Unknown     Family History  Family History    Problem Relation Age of Onset    Cancer Father         age 85-87 prostate    Prostate Cancer Father     Other Cancer Father     Hearing Loss Father     Cancer Brother         sherry;  50 yrs. [place] vet    Other - See Comments Daughter         Slow to wake     Asthma Daughter     Hearing Loss Brother         hearing aid age 77     Social History   Social History     Tobacco Use    Smoking status: Never    Smokeless tobacco: Never   Substance Use Topics    Alcohol use: Not Currently     Comment: rarely - months     Drug use: Never      A medically appropriate review of systems was performed with pertinent positives and negatives noted in the HPI, and all other systems negative.    Physical Exam   BP: (!) 160/86  Pulse: 81  Temp: 97.9  F (36.6  C)  Resp: 18  SpO2: 97 %    Physical Exam  Exam conducted with a chaperone present.   Constitutional:       Appearance: Normal appearance.   HENT:      Head: Normocephalic and atraumatic.      Nose: No congestion or rhinorrhea.      Mouth/Throat:      Mouth: Mucous membranes are moist.   Eyes:      Conjunctiva/sclera: Conjunctivae normal.      Pupils: Pupils are equal, round, and reactive to light.   Cardiovascular:      Rate and Rhythm: Normal rate.      Heart sounds: Murmur (loud systolic murmur in second intercostal space) heard.   Pulmonary:      Effort: No respiratory distress.      Breath sounds: No wheezing.   Abdominal:      General: Abdomen is flat.   Genitourinary:     General: Normal vulva.      Urethra: Prolapse (trace blood) present.      Vagina: Tenderness (atrophic mucosa) present. No vaginal discharge, bleeding or prolapsed vaginal walls.      Rectum: Guaiac result negative. External hemorrhoid (not engorged, thrombosed, or tender; no clear evidence of bleeding) present. No internal hemorrhoid (not detected on CYNTHIA).   Musculoskeletal:         General: No swelling. Normal range of motion.   Skin:     General: Skin is warm and dry.   Neurological:       General: No focal deficit present.      Mental Status: She is alert and oriented to person, place, and time.      Cranial Nerves: No cranial nerve deficit.   Psychiatric:         Mood and Affect: Mood normal.       ED Course, Procedures, & Data         Results for orders placed or performed during the hospital encounter of 05/27/24   INR     Status: Normal   Result Value Ref Range    INR 0.93 0.85 - 1.15   Comprehensive metabolic panel     Status: Normal   Result Value Ref Range    Sodium 140 135 - 145 mmol/L    Potassium 4.2 3.4 - 5.3 mmol/L    Carbon Dioxide (CO2) 27 22 - 29 mmol/L    Anion Gap 11 7 - 15 mmol/L    Urea Nitrogen 16.3 8.0 - 23.0 mg/dL    Creatinine 0.79 0.51 - 0.95 mg/dL    GFR Estimate 72 >60 mL/min/1.73m2    Calcium 9.5 8.8 - 10.2 mg/dL    Chloride 102 98 - 107 mmol/L    Glucose 97 70 - 99 mg/dL    Alkaline Phosphatase 88 40 - 150 U/L    AST 22 0 - 45 U/L    ALT 9 0 - 50 U/L    Protein Total 7.4 6.4 - 8.3 g/dL    Albumin 4.4 3.5 - 5.2 g/dL    Bilirubin Total 0.7 <=1.2 mg/dL   CBC with platelets and differential     Status: None   Result Value Ref Range    WBC Count 5.9 4.0 - 11.0 10e3/uL    RBC Count 4.66 3.80 - 5.20 10e6/uL    Hemoglobin 14.8 11.7 - 15.7 g/dL    Hematocrit 45.2 35.0 - 47.0 %    MCV 97 78 - 100 fL    MCH 31.8 26.5 - 33.0 pg    MCHC 32.7 31.5 - 36.5 g/dL    RDW 13.6 10.0 - 15.0 %    Platelet Count 233 150 - 450 10e3/uL    % Neutrophils 72 %    % Lymphocytes 20 %    % Monocytes 7 %    % Eosinophils 1 %    % Basophils 0 %    % Immature Granulocytes 0 %    NRBCs per 100 WBC 0 <1 /100    Absolute Neutrophils 4.2 1.6 - 8.3 10e3/uL    Absolute Lymphocytes 1.2 0.8 - 5.3 10e3/uL    Absolute Monocytes 0.4 0.0 - 1.3 10e3/uL    Absolute Eosinophils 0.1 0.0 - 0.7 10e3/uL    Absolute Basophils 0.0 0.0 - 0.2 10e3/uL    Absolute Immature Granulocytes 0.0 <=0.4 10e3/uL    Absolute NRBCs 0.0 10e3/uL   UA with Microscopic reflex to Culture     Status: Normal    Specimen: Urine, Clean Catch   Result  Value Ref Range    Color Urine Straw Colorless, Straw, Light Yellow, Yellow    Appearance Urine Clear Clear    Glucose Urine Negative Negative mg/dL    Bilirubin Urine Negative Negative    Ketones Urine Negative Negative mg/dL    Specific Gravity Urine 1.010 1.003 - 1.035    Blood Urine Negative Negative    pH Urine 6.0 5.0 - 7.0    Protein Albumin Urine Negative Negative mg/dL    Urobilinogen Urine Normal Normal, 2.0 mg/dL    Nitrite Urine Negative Negative    Leukocyte Esterase Urine Negative Negative    RBC Urine <1 <=2 /HPF    WBC Urine 1 <=5 /HPF    Squamous Epithelials Urine <1 <=1 /HPF    Narrative    Urine Culture not indicated   Adult Type and Screen     Status: None   Result Value Ref Range    ABO/RH(D) B NEG     Antibody Screen Negative Negative    SPECIMEN EXPIRATION DATE [id number]    Wet prep     Status: Normal    Specimen: Vagina; Swab   Result Value Ref Range    Trichomonas Absent Absent    Yeast Absent Absent    Clue Cells Absent Absent    WBCs/high power field None None   CBC with platelets differential     Status: None    Narrative    The following orders were created for panel order CBC with platelets differential.  Procedure                               Abnormality         Status                     ---------                               -----------         ------                     CBC with platelets and d...[[id number]]                      Final result                 Please view results for these tests on the individual orders.   ABO/Rh type and screen     Status: None    Narrative    The following orders were created for panel order ABO/Rh type and screen.  Procedure                               Abnormality         Status                     ---------                               -----------         ------                     Adult Type and Screen[[id number]]                            Final result                 Please view results for these tests on the individual orders.      Medications - No data to display  Labs Ordered and Resulted from Time of ED Arrival to Time of ED Departure   INR - Normal       Result Value    INR 0.93     COMPREHENSIVE METABOLIC PANEL - Normal    Sodium 140      Potassium 4.2      Carbon Dioxide (CO2) 27      Anion Gap 11      Urea Nitrogen 16.3      Creatinine 0.79      GFR Estimate 72      Calcium 9.5      Chloride 102      Glucose 97      Alkaline Phosphatase 88      AST 22      ALT 9      Protein Total 7.4      Albumin 4.4      Bilirubin Total 0.7     ROUTINE UA WITH MICROSCOPIC REFLEX TO CULTURE - Normal    Color Urine Straw      Appearance Urine Clear      Glucose Urine Negative      Bilirubin Urine Negative      Ketones Urine Negative      Specific Gravity Urine 1.010      Blood Urine Negative      pH Urine 6.0      Protein Albumin Urine Negative      Urobilinogen Urine Normal      Nitrite Urine Negative      Leukocyte Esterase Urine Negative      RBC Urine <1      WBC Urine 1      Squamous Epithelials Urine <1     WET PREPARATION - Normal    Trichomonas Absent      Yeast Absent      Clue Cells Absent      WBCs/high power field None     CBC WITH PLATELETS AND DIFFERENTIAL    WBC Count 5.9      RBC Count 4.66      Hemoglobin 14.8      Hematocrit 45.2      MCV 97      MCH 31.8      MCHC 32.7      RDW 13.6      Platelet Count 233      % Neutrophils 72      % Lymphocytes 20      % Monocytes 7      % Eosinophils 1      % Basophils 0      % Immature Granulocytes 0      NRBCs per 100 WBC 0      Absolute Neutrophils 4.2      Absolute Lymphocytes 1.2      Absolute Monocytes 0.4      Absolute Eosinophils 0.1      Absolute Basophils 0.0      Absolute Immature Granulocytes 0.0      Absolute NRBCs 0.0     TYPE AND SCREEN, ADULT    ABO/RH(D) B NEG      Antibody Screen Negative      SPECIMEN EXPIRATION DATE [id number]     ABO/RH TYPE AND SCREEN     No orders to display        Assessment & Plan    Jigna Allen is a 89 year old female with PMHx of HLD,  hypothyroidism, and uterovaginal prolapse with rectocele s/p [place] Forte colpocleisis (1/2022) who presented to the ED with 2-3 days of bright red blood on pads.    Differential diagnosis of bright red blood includes vaginal bleed (mucosal irritation due to prolapse, atrophic vaginitis, infection, abnormal uterine bleeding) vs GI bleed (2/2 internal/external hemorrhoids, anal fissures, mucosal irritation due to prolapse, polyp, malignancy).      Physical exam was significant for urethral prolapse/caruncle and atrophic vaginitis.  Exam was reassuring against vaginal infection (no discharge on exam), external hemorrhoids (external hemorrhoids present but not engorged or thrombosed), internal hemorrhoids (not palpated on digital rectal exam), or evidence of GI bleed (no blood on digital rectal exam/bedside guaiac exam negative).  CBC without evidence of anemia, urinalysis without signs of UTI, and wet prep without yeast/clue cells/trichomonas.    Bright red blood on pads may be secondary to irritation of urethral caruncle with movement. Plan for discharge with vaginal estrogen for management of urethral carbuncle/atrophic vaginitis and plan for outpatient follow-up by OB-Gyn or primary care.    I have reviewed the nursing notes. I have reviewed the findings, diagnosis, plan and need for follow up with the patient.    Discharge Medication List as of 5/27/2024  2:38 PM          Final diagnoses:   Urethral caruncle   Atrophic vaginitis     Ashley Phelps MD PGY-1  Internal Medicine      --    ED Attending Physician Attestation    I Yanni Del Rosario MD, cared for this patient with the Resident. I have performed a history and physical examination of the patient and discussed management with the resident. I reviewed the resident's documentation above and agree with the documented findings and plan of care.    Exam with external hemorrhoids without thrombosis. No blood in rectal vault and guaiac negative. Vaginal exam with signs  of atropic vaginitis with pinpoint areas of bleeding, as well as mild urethral prolapse with a caruncle at the inferior portion. Mucosa of the caruncle is friable with signs of prior bleeding. Started on vaginal estrogen. Follow up with Primary Care or OB. Return instructions in the event of recurrent prolapse that is not reducible.    Yanni Del Rosario MD  Emergency Medicine     [facility] EMERGENCY DEPARTMENT  5/27/2024     Yanni Del Rosario MD  05/28/24 1820

## 2024-06-01 ENCOUNTER — HEALTH MAINTENANCE LETTER (OUTPATIENT)
Age: 89
End: 2024-06-01

## 2024-07-18 ENCOUNTER — TELEPHONE (OUTPATIENT)
Dept: ORTHOPEDICS | Facility: CLINIC | Age: 89
End: 2024-07-18
Payer: COMMERCIAL

## 2024-07-18 NOTE — TELEPHONE ENCOUNTER
Other: Pt is having a procedure for her eyelids, wants to know if she will need to take antibiotics for this, or if its only for dental that she need to take it     Could we send this information to you in [address] or would you prefer to receive a phone call?:   Patient would like to be contacted via [address]

## 2024-09-09 ENCOUNTER — MYC REFILL (OUTPATIENT)
Dept: ORTHOPEDICS | Facility: CLINIC | Age: 89
End: 2024-09-09
Payer: COMMERCIAL

## 2024-09-09 DIAGNOSIS — Z96.651 S/P TOTAL KNEE ARTHROPLASTY, RIGHT: ICD-10-CM

## 2024-09-10 RX ORDER — AMOXICILLIN 500 MG/1
TABLET, FILM COATED ORAL
Qty: 4 TABLET | Refills: 4 | Status: SHIPPED | OUTPATIENT
Start: 2024-09-10 | End: 2024-09-12

## 2024-09-12 ENCOUNTER — TELEPHONE (OUTPATIENT)
Dept: ORTHOPEDICS | Facility: CLINIC | Age: 89
End: 2024-09-12
Payer: COMMERCIAL

## 2024-09-12 ENCOUNTER — MYC REFILL (OUTPATIENT)
Dept: ORTHOPEDICS | Facility: CLINIC | Age: 89
End: 2024-09-12
Payer: COMMERCIAL

## 2024-09-12 DIAGNOSIS — Z96.651 S/P TOTAL KNEE ARTHROPLASTY, RIGHT: ICD-10-CM

## 2024-09-12 NOTE — TELEPHONE ENCOUNTER
Pt called, requesting an antibiotic for a dental procedure.     Pt stated she falls into the category of taking the antibiotic for dental procedures, for lifelong, not for temporary, due to previous infection.    Pt's pharmacy is UPDATED to:    [facility]   [address] [place] [address]    Please CALL pt to follow up. Thank you.

## 2024-09-13 RX ORDER — AMOXICILLIN 500 MG/1
TABLET, FILM COATED ORAL
Qty: 4 TABLET | Refills: 4 | Status: SHIPPED | OUTPATIENT
Start: 2024-09-13

## 2025-03-11 NOTE — PROGRESS NOTES
Brief Note:     SW followed up with patients daughter Lawanda to follow up from questions re: concerns with [facility]     Patients daughter and  both had stated a poor experience and SW explained to Lawanda that she would need to call them and explain mom was going to TCU and that they no longer need the services and to end cares.  Lawanda noted understanding and was agreeable.     JOURDAN Blanchard, [facility]   [facility]  Pager: [phone]     [Straight Catheterization] : insertion of a straight catheter [Urinary Frequency] : urinary frequency [Patient] : the patient [___ Fr Straight Tip] : a [unfilled] in [place] straight tip catheter [None] : there were no complications with the catheter insertion [Clear] : clear [No Complications] : no complications [Tolerated Well] : the patient tolerated the procedure well [Post procedure instructions and information given] : Post procedure instructions and information were given and reviewed with patient. [0] : 0

## 2025-03-24 ENCOUNTER — OFFICE VISIT (OUTPATIENT)
Dept: INTERNAL MEDICINE CLINIC | Facility: CLINIC | Age: 89
End: 2025-03-24
Payer: MEDICARE

## 2025-03-24 ENCOUNTER — LAB ENCOUNTER (OUTPATIENT)
Dept: LAB | Age: 89
End: 2025-03-24
Attending: INTERNAL MEDICINE
Payer: MEDICARE

## 2025-03-24 VITALS
SYSTOLIC BLOOD PRESSURE: 130 MMHG | HEIGHT: 62 IN | RESPIRATION RATE: 16 BRPM | BODY MASS INDEX: 22.7 KG/M2 | OXYGEN SATURATION: 98 % | HEART RATE: 72 BPM | TEMPERATURE: 98 F | DIASTOLIC BLOOD PRESSURE: 70 MMHG | WEIGHT: 123.38 LBS

## 2025-03-24 DIAGNOSIS — R26.81 GAIT INSTABILITY: ICD-10-CM

## 2025-03-24 DIAGNOSIS — E78.00 HIGH CHOLESTEROL: ICD-10-CM

## 2025-03-24 DIAGNOSIS — E03.9 HYPOTHYROIDISM, UNSPECIFIED TYPE: Primary | ICD-10-CM

## 2025-03-24 DIAGNOSIS — R53.83 FATIGUE, UNSPECIFIED TYPE: ICD-10-CM

## 2025-03-24 DIAGNOSIS — K62.9 RECTAL ABNORMALITY: ICD-10-CM

## 2025-03-24 DIAGNOSIS — M17.0 PRIMARY OSTEOARTHRITIS OF BOTH KNEES: ICD-10-CM

## 2025-03-24 DIAGNOSIS — E03.9 HYPOTHYROIDISM, UNSPECIFIED TYPE: ICD-10-CM

## 2025-03-24 DIAGNOSIS — H04.203 BILATERAL EPIPHORA: ICD-10-CM

## 2025-03-24 PROBLEM — R01.1 CARDIAC MURMUR: Status: ACTIVE | Noted: 2025-03-24

## 2025-03-24 LAB
ALBUMIN SERPL-MCNC: 4.5 G/DL (ref 3.2–4.8)
ALBUMIN/GLOB SERPL: 1.5 {RATIO} (ref 1–2)
ALP LIVER SERPL-CCNC: 68 U/L
ALT SERPL-CCNC: 16 U/L
ANION GAP SERPL CALC-SCNC: 6 MMOL/L (ref 0–18)
AST SERPL-CCNC: 21 U/L (ref ?–34)
BASOPHILS # BLD AUTO: 0.03 X10(3) UL (ref 0–0.2)
BASOPHILS NFR BLD AUTO: 0.6 %
BILIRUB SERPL-MCNC: 0.8 MG/DL (ref 0.2–1.1)
BUN BLD-MCNC: 17 MG/DL (ref 9–23)
CALCIUM BLD-MCNC: 10 MG/DL (ref 8.7–10.6)
CHLORIDE SERPL-SCNC: 103 MMOL/L (ref 98–112)
CHOLEST SERPL-MCNC: 239 MG/DL (ref ?–200)
CO2 SERPL-SCNC: 31 MMOL/L (ref 21–32)
CREAT BLD-MCNC: 0.92 MG/DL
EGFRCR SERPLBLD CKD-EPI 2021: 60 ML/MIN/1.73M2 (ref 60–?)
EOSINOPHIL # BLD AUTO: 0.17 X10(3) UL (ref 0–0.7)
EOSINOPHIL NFR BLD AUTO: 3.3 %
ERYTHROCYTE [DISTWIDTH] IN BLOOD BY AUTOMATED COUNT: 12.9 %
FASTING PATIENT LIPID ANSWER: NO
FASTING STATUS PATIENT QL REPORTED: NO
GLOBULIN PLAS-MCNC: 3 G/DL (ref 2–3.5)
GLUCOSE BLD-MCNC: 100 MG/DL (ref 70–99)
HCT VFR BLD AUTO: 41.9 %
HDLC SERPL-MCNC: 52 MG/DL (ref 40–59)
HGB BLD-MCNC: 13.6 G/DL
IMM GRANULOCYTES # BLD AUTO: 0.01 X10(3) UL (ref 0–1)
IMM GRANULOCYTES NFR BLD: 0.2 %
LDLC SERPL CALC-MCNC: 162 MG/DL (ref ?–100)
LYMPHOCYTES # BLD AUTO: 1.17 X10(3) UL (ref 1–4)
LYMPHOCYTES NFR BLD AUTO: 22.7 %
MCH RBC QN AUTO: 31.3 PG (ref 26–34)
MCHC RBC AUTO-ENTMCNC: 32.5 G/DL (ref 31–37)
MCV RBC AUTO: 96.3 FL
MONOCYTES # BLD AUTO: 0.39 X10(3) UL (ref 0.1–1)
MONOCYTES NFR BLD AUTO: 7.6 %
NEUTROPHILS # BLD AUTO: 3.39 X10 (3) UL (ref 1.5–7.7)
NEUTROPHILS # BLD AUTO: 3.39 X10(3) UL (ref 1.5–7.7)
NEUTROPHILS NFR BLD AUTO: 65.6 %
NONHDLC SERPL-MCNC: 187 MG/DL (ref ?–130)
OSMOLALITY SERPL CALC.SUM OF ELEC: 292 MOSM/KG (ref 275–295)
PLATELET # BLD AUTO: 221 10(3)UL (ref 150–450)
POTASSIUM SERPL-SCNC: 4 MMOL/L (ref 3.5–5.1)
PROT SERPL-MCNC: 7.5 G/DL (ref 5.7–8.2)
RBC # BLD AUTO: 4.35 X10(6)UL
SODIUM SERPL-SCNC: 140 MMOL/L (ref 136–145)
TRIGL SERPL-MCNC: 136 MG/DL (ref 30–149)
TSI SER-ACNC: 1.08 UIU/ML (ref 0.55–4.78)
VLDLC SERPL CALC-MCNC: 27 MG/DL (ref 0–30)
WBC # BLD AUTO: 5.2 X10(3) UL (ref 4–11)

## 2025-03-24 PROCEDURE — 80053 COMPREHEN METABOLIC PANEL: CPT

## 2025-03-24 PROCEDURE — 80061 LIPID PANEL: CPT

## 2025-03-24 PROCEDURE — 85025 COMPLETE CBC W/AUTO DIFF WBC: CPT

## 2025-03-24 PROCEDURE — 84443 ASSAY THYROID STIM HORMONE: CPT

## 2025-03-24 PROCEDURE — 36415 COLL VENOUS BLD VENIPUNCTURE: CPT

## 2025-03-24 RX ORDER — ESTRADIOL 0.1 MG/G
1 CREAM VAGINAL
COMMUNITY
Start: 2024-05-27 | End: 2025-03-24

## 2025-03-24 RX ORDER — MELATONIN
1000 DAILY
COMMUNITY

## 2025-03-24 RX ORDER — ANASTROZOLE 1 MG/1
1 TABLET ORAL DAILY
COMMUNITY
Start: 2024-10-08

## 2025-03-24 RX ORDER — MIRABEGRON 25 MG/1
25 TABLET, FILM COATED, EXTENDED RELEASE ORAL DAILY
COMMUNITY
Start: 2024-12-30

## 2025-03-24 RX ORDER — ROSUVASTATIN CALCIUM 5 MG/1
5 TABLET, COATED ORAL DAILY
COMMUNITY
Start: 2024-10-07 | End: 2025-03-24

## 2025-03-24 NOTE — PATIENT INSTRUCTIONS
Florajen - refrigerated probiotic ask for at pharmacy    Blood work     Call to schedule appointment with colorectal surgeon Dr. Ruvalcaba    Call to schedule appointment with oncology     Call to schedule appointment for physical therapy

## 2025-03-24 NOTE — PROGRESS NOTES
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  Left breast cancer 9/2024 s/p lumpectomy    She notes she is not walking very well and that is one of the reasons why she came in today    \"Some of my inards are coming out of the rectum\"   Also having constipation intermittently  Denies pain  Denies bleeding    She notes she walks for exercise     Mirabegron -used for frequent urination and it helps    High chol in the past and she would like to check labs     She feels more fatigued than previous       Patient Active Problem List   Diagnosis    Hypothyroidism    Pre-diabetes    Cardiac murmur     Past Surgical History:   Procedure Laterality Date    Knee replacement surgery      Other      sarcoma removal     Family History   Problem Relation Age of Onset    Other (bacterial meningitis) Mother     Prostate Cancer Father     Diabetes Sister 80        I reviewed her's Past Medical History, Past Surgical History, Family History and   Social History updated shows  Social History     Socioeconomic History    Marital status:    Tobacco Use    Smoking status: Never    Smokeless tobacco: Never   Vaping Use    Vaping status: Never Used   Substance and Sexual Activity    Alcohol use: Never    Drug use: Never     Social Drivers of Health     Food Insecurity: No Food Insecurity (3/24/2025)    NCSS - Food Insecurity     Worried About Running Out of Food in the Last Year: No     Ran Out of Food in the Last Year: No   Transportation Needs: No Transportation Needs (3/24/2025)    NCSS - Transportation     Lack of Transportation: No   Housing Stability: Not At Risk (3/24/2025)    NCSS - Housing/Utilities     Has Housing: Yes     Worried About Losing Housing: No     Unable to Get Utilities: No      Allergies:  Allergies[1]  Current Outpatient Medications   Medication Sig Dispense Refill    anastrozole 1 MG Oral Tab tab Take 1 tablet (1 mg total) by mouth daily.      Mirabegron ER 25 MG Oral Tablet 24 Hr Take 1 tablet (25 mg total) by mouth daily.      Calcium-Magnesium-Vitamin D (CALCIUM 1200+D3 OR) Take 1 tablet by mouth daily.      Ergocalciferol (VITAMIN D OR) Take 325 mcg by mouth daily.      cyanocobalamin 1000 MCG Oral Tab Take 1 tablet (1,000 mcg total) by mouth daily.      levothyroxine 75 MCG Oral Tab Take 1 tablet (75 mcg total) by mouth before breakfast.           REVIEW OF SYSTEMS:   Review of Systems   Constitutional:  Negative for fever.   HENT:  Negative for congestion.    Eyes:  Negative for visual disturbance.   Respiratory:  Negative for shortness of breath.    Cardiovascular:  Negative for chest pain.   Gastrointestinal:  Negative for constipation.   Genitourinary:  Negative for dysuria.   Neurological: Negative.    Hematological: Negative.    Psychiatric/Behavioral: Negative.          EXAM:   /70   Pulse 72   Temp 97.6 °F (36.4 °C) (Temporal)   Resp 16   Ht 5' 2\" (1.575 m)   Wt 123 lb 6.4 oz (56 kg)   SpO2 98%   BMI 22.57 kg/m²  Estimated body mass index is 22.57 kg/m² as calculated from the following:    Height as of this encounter: 5' 2\" (1.575 m).    Weight as of this encounter: 123 lb 6.4 oz (56 kg).   Vital signs reviewed. Appears stated age, well groomed.  Physical Exam  Vitals reviewed.   Constitutional:       General: She is not in acute distress.     Appearance: She is well-developed.   HENT:      Head: Normocephalic and atraumatic.   Eyes:      Conjunctiva/sclera: Conjunctivae normal.   Cardiovascular:      Rate and Rhythm: Normal rate and regular rhythm.      Heart sounds: Normal heart sounds.   Pulmonary:      Effort: Pulmonary effort is normal.      Breath sounds: Normal breath sounds.   Musculoskeletal:      Cervical back: Neck supple.   Skin:     General:  Skin is warm and dry.   Neurological:      Mental Status: She is alert.      Hemorrhoid noted on external rectal exam and skin tag.     ASSESSMENT AND PLAN:   Christie Adams is a 89 year old female with  1. Hypothyroidism, unspecified type    2. Rectal abnormality    3. Bilateral epiphora    4. High cholesterol    5. Gait instability    6. Primary osteoarthritis of both knees    7. Fatigue, unspecified type          The plan is as follows  Christie was seen today for new patient.    Diagnoses and all orders for this visit:    Hypothyroidism, unspecified type - cont levothyroxine, check labs  -     TSH W Reflex To Free T4; Future    Rectal abnormality - she notes feeling of prolapse. Hemorrhoid noted on exam; recommend f/u with colorectal surgery for further eval   -     Surgery Referral - In Network    Bilateral epiphora -bilateral eye watering. F/u ophtho  -     Ophthalmology Referral - External    High cholesterol -check labs   -     Comp Metabolic Panel (14); Future  -     Lipid Panel; Future    Gait instability -she requests physical therapy order. Paper copy of order given as she plans to go outside of [facility]   -     Physical Therapy Referral - [facility] Location    Primary osteoarthritis of both knees  -     Physical Therapy Referral - [facility] Location    Fatigue, unspecified type   -     CBC With Differential With Platelet; Future        Orders Placed This Encounter   Procedures    Comp Metabolic Panel (14)    Lipid Panel    TSH W Reflex To Free T4    CBC With Differential With Platelet       Meds & Refills for this Visit:  Requested Prescriptions      No prescriptions requested or ordered in this encounter       Imaging & Consults:  OP REFERRAL TO [facility]  SURGERY - INTERNAL  OPHTHALMOLOGY - EXTERNAL    Health Maintenance Due   Topic Date Due    HTN: BP Follow-Up  04/27/2024    COVID-19 Vaccine (7 - 2024-25 season) 09/01/2024    Annual Physical  09/05/2024    Influenza Vaccine (1)  10/01/2024    Annual Depression Screening  01/01/2025    Fall Risk Screening (Annual)  01/01/2025     Patient/Caregiver Education: Patient/Caregiver Education: There are no barriers to learning. Medical education done. Outcome: Patient verbalizes understanding. Patient is notified to call with any questions, complications, allergies, or worsening or changing symptoms.  Patient is to call with any side effects or complications from the treatments as a result of today.     Send levothyroxine prescription to pharmacy after blood work is received    Gracy Henderson MD         [1]   Allergies  Allergen Reactions    Niacin UNKNOWN    Colchicine DIARRHEA and OTHER (SEE COMMENTS)     Other reaction(s): Gastrointestinal   diarrhea    diarrhea    Other reaction(s): Gastrointestinal   diarrhea   diarrhea    Ferrous Sulfate UNKNOWN and RASH

## 2025-04-03 ENCOUNTER — TELEPHONE (OUTPATIENT)
Dept: INTERNAL MEDICINE CLINIC | Facility: CLINIC | Age: 89
End: 2025-04-03

## 2025-04-03 RX ORDER — OSELTAMIVIR PHOSPHATE 75 MG/1
75 CAPSULE ORAL 2 TIMES DAILY
Qty: 10 CAPSULE | Refills: 0 | Status: SHIPPED | OUTPATIENT
Start: 2025-04-03 | End: 2025-04-08

## 2025-04-03 NOTE — TELEPHONE ENCOUNTER
Action Requested: Summary for Provider     []  Critical Lab, Recommendations Needed  [x] Need Additional Advice  []   FYI    []   Need Orders  [] Need Medications Sent to Pharmacy  []  Other     SUMMARY:     Spoke to patient who reports experiencing flu like symptoms since 4/2. Patient reports headache, lightheadedness, fatigue, congestion, runny nose, and sore throat. Patient states two family members tested positive for influenza  B today and are having similar symptoms. Patient has not taken a flu or Covid test. Patient denies shortness of breath, chest pain, nausea, vomiting, cough, and weakness. Patient has not taken any over the counter medication. Patient is requesting Tamiflu. Patient is aware that PCP is out of office and a covering provider will respond.     This RN recommended patient to stay hydrated, get adequate rest, use a humidifier, and try a nasal wash for congestion. Advised patient if her symptoms worsen she should go to a walk in clinic or immediate care. Patient verbalized understanding.         Dr. Soni- Patient is requesting Tamiflu. Please advise. TY      Reason for call: New Prescription

## 2025-04-03 NOTE — TELEPHONE ENCOUNTER
PT is requesting Tamiflu. Has not tested positive for flu but others in her home are positive and she is having the same symptoms. Asking for it to be sent to [facility] [address]

## 2025-04-10 ENCOUNTER — TELEPHONE (OUTPATIENT)
Dept: INTERNAL MEDICINE CLINIC | Facility: CLINIC | Age: 89
End: 2025-04-10

## 2025-04-10 NOTE — TELEPHONE ENCOUNTER
Lana Machado  PH: [phone]   FAX: [phone]    Requesting orders for PT therapy for dizziness and unsteadiness.

## 2025-04-10 NOTE — TELEPHONE ENCOUNTER
Spoke to Jeannette from [facility] to clarify request. Jeannette is requesting PT orders be faxed to [phone]. Faxed PT order to [phone]. Fax confirmed.    LOV-3/24/25  Gait instability -she requests physical therapy order. Paper copy of order given as she plans to go outside of [facility]   -     Physical Therapy Referral - [facility] Location

## 2025-04-17 ENCOUNTER — TELEPHONE (OUTPATIENT)
Dept: INTERNAL MEDICINE CLINIC | Facility: CLINIC | Age: 89
End: 2025-04-17

## 2025-04-17 NOTE — TELEPHONE ENCOUNTER
Incoming fax from [facility] with PT initial evaluation to be reviewed and signed   Placed in LS in-basket

## 2025-04-18 NOTE — TELEPHONE ENCOUNTER
Signed by LS   Faxed to [facility]   Confirmation received   Sent to scan and copy placed in accordion

## 2025-06-14 ENCOUNTER — HEALTH MAINTENANCE LETTER (OUTPATIENT)
Age: 89
End: 2025-06-14

## (undated) DEVICE — DRSG TELFA 3X8" 1238

## (undated) DEVICE — CAST PADDING 6" STERILE 9046S

## (undated) DEVICE — LINEN GOWN OVERSIZE 5408

## (undated) DEVICE — CLIP GEM MICRO GEM2431

## (undated) DEVICE — LABEL MEDICATION SYSTEM 3303-P

## (undated) DEVICE — SOL NACL 0.9% IRRIG 3000ML BAG 2B7477

## (undated) DEVICE — GLOVE PROTEXIS BLUE W/NEU-THERA 6.5  2D73EB65

## (undated) DEVICE — DRAPE XRAY CASSETTE 20X23

## (undated) DEVICE — LINEN BACK PACK 5440

## (undated) DEVICE — DRSG DRAIN 4X4" 7086

## (undated) DEVICE — DRAPE WARMER 66X44" ORS-300

## (undated) DEVICE — BLADE SAW SAGITTAL STRK 25X90X1.27MM HD SYS 6 6125-127-090

## (undated) DEVICE — SOL NACL 0.9% IRRIG 1000ML BOTTLE 2F7124

## (undated) DEVICE — NDL 21GA 1.5"

## (undated) DEVICE — BONE CLEANING TIP INTERPULSE  0210-010-000

## (undated) DEVICE — SLING ARM XSM 79-99152

## (undated) DEVICE — KIT CULTURE TRANSPORT SYS A.C.T. II DUAL ANEROBE R124022

## (undated) DEVICE — SYR PISTON IRRIGATION 60 ML DYND20325

## (undated) DEVICE — SU VICRYL 0 CT-1 36" J946H

## (undated) DEVICE — DRSG TEGADERM 4X10" 1627

## (undated) DEVICE — NDL 20GA 1.5"

## (undated) DEVICE — SYR BULB IRRIG 50ML LATEX FREE 0035280

## (undated) DEVICE — BLADE SAW RECIP STRK 70X6X0.025MM 0277-096-250S5

## (undated) DEVICE — SENSOR MONITOR SM PATCH TISSUE VIOPTIX OXY-2-SPD-1-P5

## (undated) DEVICE — BASIN SET MAJOR

## (undated) DEVICE — PREP SKIN SCRUB TRAY 4461A

## (undated) DEVICE — GLOVE PROTEXIS BLUE W/NEU-THERA 7.5  2D73EB75

## (undated) DEVICE — DRSG GAUZE 4X4" TRAY 6939

## (undated) DEVICE — SUCTION TIP YANKAUER STR K87

## (undated) DEVICE — PREP POVIDONE-IODINE 7.5% SCRUB 4OZ BOTTLE MDS093945

## (undated) DEVICE — GOWN XLG DISP 9545

## (undated) DEVICE — LINEN ORTHO PACK 5446

## (undated) DEVICE — DRAPE STOCKINETTE 8" 8586

## (undated) DEVICE — WIPES FOLEY CARE SURESTEP PROVON DFC100

## (undated) DEVICE — SU PDS II 2-0 CT-2 27"  Z333H

## (undated) DEVICE — SU SILK 3-0 SH 30" K832H

## (undated) DEVICE — DRAIN JACKSON PRATT RESERVOIR 400ML SU130-1000

## (undated) DEVICE — DRSG TEGADERM 4X4 3/4" 1626W

## (undated) DEVICE — LIGHT HANDLE X2

## (undated) DEVICE — PREP DURAPREP 26ML APL 8630

## (undated) DEVICE — SU ETHIBOND 1 CT-1 30" X425H

## (undated) DEVICE — SOL WATER IRRIG 3000ML BAG 2B7117

## (undated) DEVICE — SU PDS II 0 CT-1 27" Z340H

## (undated) DEVICE — DRAPE MICRO ZEISS PENTERO 120X54" G650DL

## (undated) DEVICE — BLADE SAW RECIP STRK 70X12.5X1.2MM 0277-096-281

## (undated) DEVICE — Device

## (undated) DEVICE — SU ETHIBOND 1 CTX CR 8/18" CX30D

## (undated) DEVICE — ESU ELEC BLADE 2.75" COATED/INSULATED E1455

## (undated) DEVICE — SU PDS II 3-0 PS-2 18" Z497G

## (undated) DEVICE — BONE CLEANING TIP INTERPULSE FEMORAL CANAL 0210-008-000

## (undated) DEVICE — SYR 20ML LL W/O NDL 302830

## (undated) DEVICE — DRSG XEROFORM 5X9" CUR253590W

## (undated) DEVICE — CATH TRAY FOLEY SURESTEP 16FR WDRAIN BAG STLK LATEX A300316A

## (undated) DEVICE — SU VICRYL 2-0 CT 36" J957H

## (undated) DEVICE — BONE CEMENT MIXEVAC HI VAC W/CARTRIDGE 0306-563-000

## (undated) DEVICE — SUCTION MANIFOLD DORNOCH ULTRA CART UL-CL500

## (undated) DEVICE — LINEN TOWEL PACK X30 5481

## (undated) DEVICE — DRAPE ISOLATION BAG 1003

## (undated) DEVICE — DRAPE IOBAN INCISE 23X17" 6650EZ

## (undated) DEVICE — BLADE KNIFE SURG 15 371115

## (undated) DEVICE — SU PDS II 3-0 PS-1 18" Z683G

## (undated) DEVICE — SU ETHILON 2-0 FS 18" 664H

## (undated) DEVICE — GLOVE PROTEXIS BLUE W/NEU-THERA 8.0  2D73EB80

## (undated) DEVICE — LINEN TOWEL PACK X5 5464

## (undated) DEVICE — DRSG STERI STRIP 1/2X4" R1547

## (undated) DEVICE — NDL 18GA 1.5" 305196

## (undated) DEVICE — BONE CEMENT MIXEVAC III HI VAC KIT  0206-015-000

## (undated) DEVICE — SU VICRYL 2-0 CT-1 27" UND J259H

## (undated) DEVICE — DRAPE POUCH INSTRUMENT 1018

## (undated) DEVICE — STRAP KNEE/BODY 31143004

## (undated) DEVICE — BLADE SAW SAGITTAL STRK 25X79.5X1.24MM 4/2000 2108-318-000

## (undated) DEVICE — PITCHER STERILE 1000ML  SSK9004A

## (undated) DEVICE — SOL WATER IRRIG 1000ML BOTTLE 2F7114

## (undated) DEVICE — DRAIN JACKSON PRATT 15FR ROUND SU130-1323

## (undated) DEVICE — BLADE SAW SAGITTAL STRK 18X90X1.37MM HD SYS 6 6118-137-090

## (undated) DEVICE — LINEN GOWN X4 5410

## (undated) DEVICE — DRAPE SHEET MED 44X70" 9355

## (undated) DEVICE — RETR ELASTIC STAYS LONE STAR BLUNT DUAL LEAD 3550-1G

## (undated) DEVICE — SUCTION MANIFOLD NEPTUNE 2 SYS 4 PORT 0702-020-000

## (undated) DEVICE — NDL ANGIOCATH 22GA 1" 4050

## (undated) DEVICE — DRAIN JACKSON PRATT ROUND W/TROCAR 15FR LF JP-HUR151

## (undated) DEVICE — DRSG ABDOMINAL 07 1/2X8" 7197D

## (undated) DEVICE — SYR 01ML 27GA 0.5" NDL TBC 309623

## (undated) DEVICE — SU VICRYL 2-0 CT-2 27" UND J269H

## (undated) DEVICE — ESU PENCIL W/HOLSTER E2350H

## (undated) DEVICE — SPONGE RAY-TEC 4X8" 7318

## (undated) DEVICE — ESU PENCIL SMOKE EVAC W/ROCKER SWITCH 0703-047-000

## (undated) DEVICE — SU MONOCRYL 2-0 SH 27" UND Y417H

## (undated) DEVICE — TOURNIQUET CUFF 30" REPRO BLUE 60-7070-105

## (undated) DEVICE — WIPE INSTRUMENT MEROCEL 400200

## (undated) DEVICE — BAG BIOHAZARD SPECIMEN 9X6" ORANGE/WHITE SBL2X69B

## (undated) DEVICE — SPONGE LAP 18X18" X8435

## (undated) DEVICE — GLOVE PROTEXIS W/NEU-THERA 7.0  2D73TE70

## (undated) DEVICE — DRSG PRIMAPORE 02X3" 7133

## (undated) DEVICE — DRAPE U SPLIT 74X120" 29440

## (undated) DEVICE — CAST PADDING 4" UNSTERILE 9044

## (undated) DEVICE — SU STRATAFIX MONOCRYL 3-0 SPIRAL PS-2 45CM SXMP1B107

## (undated) DEVICE — DRAPE STOCKINETTE 6" 8564

## (undated) DEVICE — BNDG ESMARK 4" STERILE LF 820-3412

## (undated) DEVICE — CATH TRAY FOLEY SURESTEP 16FR W/TMP PRB STLK LATEX A319416AM

## (undated) DEVICE — STPL SKIN 35W ROTATING HEAD PRW35

## (undated) DEVICE — BLADE KNIFE SURG 10 371110

## (undated) DEVICE — ESU GROUND PAD UNIVERSAL W/O CORD

## (undated) DEVICE — SU ETHILON 3-0 PS-1 18" 1663H

## (undated) DEVICE — PREP DURAPREP REMOVER 4OZ 8611

## (undated) DEVICE — PREP CHLORAPREP 26ML TINTED ORANGE  260815

## (undated) DEVICE — SUCTION TIP YANKAUER W/O VENT K86

## (undated) DEVICE — LINEN TOWEL PACK X6 WHITE 5487

## (undated) DEVICE — CLIP HORIZON SM RED WIDE SLOT 001201

## (undated) DEVICE — BNDG ELASTIC 6"X5YDS STERILE 6611-6S

## (undated) DEVICE — NDL COUNTER 40CT  31142311

## (undated) DEVICE — DRAPE STERI TOWEL LG 1010

## (undated) DEVICE — NON-STICK DISPOSABLE BIPOLAR FORCEPS

## (undated) DEVICE — SUCTION CARDIAC FRAZIER TIP 6FR STERILE 3" 10053

## (undated) DEVICE — NDL COUNTER 20CT 31142493

## (undated) DEVICE — PACK LOWER EXTREMITY RIVERSIDE SOP32LEFSX

## (undated) DEVICE — DRAIN JACKSON PRATT CHANNEL 15FR ROUND HUBLESS SIL JP-2228

## (undated) DEVICE — TUBING SUCTION 10'X3/16" N510

## (undated) DEVICE — SPECIMEN CONTAINER 5OZ STERILE 2600SA

## (undated) DEVICE — CLIP GEM MICRO GEM1521

## (undated) DEVICE — TEST TUBE W/SCREW CAP 17361

## (undated) DEVICE — TAPE DURAPORE 3" SILK 1538-3

## (undated) DEVICE — BONE CEMENT NOZZLE THIN FLEX 206-515-000

## (undated) DEVICE — SU ETHILON 8-0 BV130-4 5" 2815G

## (undated) DEVICE — GLOVE PROTEXIS W/NEU-THERA 8.0  2D73TE80

## (undated) DEVICE — SYR 05ML LL W/O NDL

## (undated) DEVICE — SU PDS II 2-0 CT-1 27" Z339H

## (undated) DEVICE — DRAIN JACKSON PRATT RESERVOIR 100ML SU130-1305

## (undated) DEVICE — SYR 10ML LL W/O NDL 302995

## (undated) DEVICE — SU PROLENE 5-0 RB-1DA 36"  8556H

## (undated) DEVICE — SUCTION IRR SYSTEM W/O TIP INTERPULSE HANDPIECE 0210-100-000

## (undated) DEVICE — GLOVE PROTEXIS POWDER FREE 8.0 ORTHOPEDIC 2D73ET80

## (undated) DEVICE — TUBE SUCTION STD KAMVAC 5300-00-500

## (undated) DEVICE — BLADE SAW RECIP STRK LONG 70X12.5X0.9MM 0277-096-278

## (undated) DEVICE — SYR 03ML LL W/O NDL 309657

## (undated) DEVICE — EYE SPONGE SPEAR WECK CEL 0008685

## (undated) DEVICE — CAST PADDING 4" STERILE 9044S

## (undated) DEVICE — APPLICATOR COTTON TIP 3" 9325

## (undated) DEVICE — BLADE SAW RECIP STRK 77.5X11X1.23MM 0277-096-326

## (undated) DEVICE — ADH SKIN CLOSURE PREMIERPRO EXOFIN 1.0ML 3470

## (undated) DEVICE — NDL ANGIOCATH 24GA 0.75" 4053

## (undated) DEVICE — CLOSURE SYS SKIN PREMIERPRO EXOFIN FUSION 4X22CM STRL 3472

## (undated) DEVICE — BLADE CLIPPER SGL USE 9680

## (undated) DEVICE — ESU GROUND PAD ADULT W/CORD E7507

## (undated) DEVICE — GLOVE PROTEXIS POWDER FREE 7.5 ORTHOPEDIC 2D73ET75

## (undated) DEVICE — PREP POVIDONE IODINE SCRUB 7.5% 120ML

## (undated) DEVICE — SU SILK 2-0 PS 18" 1588H

## (undated) DEVICE — KIT SPY ELITE DISP LC3006

## (undated) DEVICE — GOWN IMPERVIOUS SPECIALTY XLG/XLONG 32474

## (undated) DEVICE — TEGADERM ALGINATE

## (undated) DEVICE — CLIP HORIZON MED BLUE 002200

## (undated) DEVICE — LIGHT HANDLE X1 31140133

## (undated) DEVICE — GLOVE PROTEXIS POWDER FREE 8.5 ORTHOPEDIC 2D73ET85

## (undated) RX ORDER — FENTANYL CITRATE 50 UG/ML
INJECTION, SOLUTION INTRAMUSCULAR; INTRAVENOUS
Status: DISPENSED
Start: 2018-06-12

## (undated) RX ORDER — CEFAZOLIN SODIUM 2 G/100ML
INJECTION, SOLUTION INTRAVENOUS
Status: DISPENSED
Start: 2018-06-12

## (undated) RX ORDER — ONDANSETRON 2 MG/ML
INJECTION INTRAMUSCULAR; INTRAVENOUS
Status: DISPENSED
Start: 2021-03-09

## (undated) RX ORDER — HYDROMORPHONE HYDROCHLORIDE 1 MG/ML
INJECTION, SOLUTION INTRAMUSCULAR; INTRAVENOUS; SUBCUTANEOUS
Status: DISPENSED
Start: 2020-06-02

## (undated) RX ORDER — FENTANYL CITRATE 50 UG/ML
INJECTION, SOLUTION INTRAMUSCULAR; INTRAVENOUS
Status: DISPENSED
Start: 2020-04-20

## (undated) RX ORDER — GLYCOPYRROLATE 0.2 MG/ML
INJECTION, SOLUTION INTRAMUSCULAR; INTRAVENOUS
Status: DISPENSED
Start: 2018-06-12

## (undated) RX ORDER — ONDANSETRON 2 MG/ML
INJECTION INTRAMUSCULAR; INTRAVENOUS
Status: DISPENSED
Start: 2022-01-12

## (undated) RX ORDER — MINERAL OIL
OIL (ML) MISCELLANEOUS
Status: DISPENSED
Start: 2020-04-20

## (undated) RX ORDER — GLYCOPYRROLATE 0.2 MG/ML
INJECTION INTRAMUSCULAR; INTRAVENOUS
Status: DISPENSED
Start: 2022-01-12

## (undated) RX ORDER — GLYCOPYRROLATE 0.2 MG/ML
INJECTION, SOLUTION INTRAMUSCULAR; INTRAVENOUS
Status: DISPENSED
Start: 2020-04-20

## (undated) RX ORDER — DEXAMETHASONE SODIUM PHOSPHATE 4 MG/ML
INJECTION, SOLUTION INTRA-ARTICULAR; INTRALESIONAL; INTRAMUSCULAR; INTRAVENOUS; SOFT TISSUE
Status: DISPENSED
Start: 2022-01-12

## (undated) RX ORDER — PHENYLEPHRINE HCL IN 0.9% NACL 1 MG/10 ML
SYRINGE (ML) INTRAVENOUS
Status: DISPENSED
Start: 2018-06-12

## (undated) RX ORDER — PROPOFOL 10 MG/ML
INJECTION, EMULSION INTRAVENOUS
Status: DISPENSED
Start: 2022-01-12

## (undated) RX ORDER — ONDANSETRON 2 MG/ML
INJECTION INTRAMUSCULAR; INTRAVENOUS
Status: DISPENSED
Start: 2020-04-20

## (undated) RX ORDER — EPHEDRINE SULFATE 50 MG/ML
INJECTION, SOLUTION INTRAMUSCULAR; INTRAVENOUS; SUBCUTANEOUS
Status: DISPENSED
Start: 2018-06-12

## (undated) RX ORDER — LIDOCAINE HYDROCHLORIDE 20 MG/ML
INJECTION, SOLUTION EPIDURAL; INFILTRATION; INTRACAUDAL; PERINEURAL
Status: DISPENSED
Start: 2018-06-12

## (undated) RX ORDER — CEFAZOLIN SODIUM 1 G/3ML
INJECTION, POWDER, FOR SOLUTION INTRAMUSCULAR; INTRAVENOUS
Status: DISPENSED
Start: 2020-04-20

## (undated) RX ORDER — OXYCODONE HYDROCHLORIDE 5 MG/1
TABLET ORAL
Status: DISPENSED
Start: 2021-03-09

## (undated) RX ORDER — FENTANYL CITRATE 50 UG/ML
INJECTION, SOLUTION INTRAMUSCULAR; INTRAVENOUS
Status: DISPENSED
Start: 2021-03-09

## (undated) RX ORDER — PAPAVERINE HYDROCHLORIDE 30 MG/ML
INJECTION INTRAMUSCULAR; INTRAVENOUS
Status: DISPENSED
Start: 2020-04-20

## (undated) RX ORDER — HYDROMORPHONE HYDROCHLORIDE 1 MG/ML
INJECTION, SOLUTION INTRAMUSCULAR; INTRAVENOUS; SUBCUTANEOUS
Status: DISPENSED
Start: 2020-04-20

## (undated) RX ORDER — FENTANYL CITRATE-0.9 % NACL/PF 10 MCG/ML
PLASTIC BAG, INJECTION (ML) INTRAVENOUS
Status: DISPENSED
Start: 2022-01-12

## (undated) RX ORDER — ACETAMINOPHEN 325 MG/1
TABLET ORAL
Status: DISPENSED
Start: 2020-06-02

## (undated) RX ORDER — LIDOCAINE HYDROCHLORIDE 20 MG/ML
INJECTION, SOLUTION EPIDURAL; INFILTRATION; INTRACAUDAL; PERINEURAL
Status: DISPENSED
Start: 2022-01-12

## (undated) RX ORDER — LIDOCAINE HYDROCHLORIDE 20 MG/ML
INJECTION, SOLUTION EPIDURAL; INFILTRATION; INTRACAUDAL; PERINEURAL
Status: DISPENSED
Start: 2020-04-20

## (undated) RX ORDER — DEXTROSE MONOHYDRATE, SODIUM CHLORIDE, AND POTASSIUM CHLORIDE 50; 1.49; 4.5 G/1000ML; G/1000ML; G/1000ML
INJECTION, SOLUTION INTRAVENOUS
Status: DISPENSED
Start: 2020-04-20

## (undated) RX ORDER — LIDOCAINE HYDROCHLORIDE AND EPINEPHRINE 10; 10 MG/ML; UG/ML
INJECTION, SOLUTION INFILTRATION; PERINEURAL
Status: DISPENSED
Start: 2020-04-20

## (undated) RX ORDER — ASPIRIN 81 MG/1
TABLET, CHEWABLE ORAL
Status: DISPENSED
Start: 2020-04-20

## (undated) RX ORDER — HEPARIN SODIUM 1000 [USP'U]/ML
INJECTION, SOLUTION INTRAVENOUS; SUBCUTANEOUS
Status: DISPENSED
Start: 2020-04-20

## (undated) RX ORDER — ONDANSETRON 2 MG/ML
INJECTION INTRAMUSCULAR; INTRAVENOUS
Status: DISPENSED
Start: 2018-06-12

## (undated) RX ORDER — VANCOMYCIN HYDROCHLORIDE 1 G/20ML
INJECTION, POWDER, LYOPHILIZED, FOR SOLUTION INTRAVENOUS
Status: DISPENSED
Start: 2020-06-02

## (undated) RX ORDER — EPINEPHRINE 1 MG/ML
INJECTION, SOLUTION INTRAMUSCULAR; SUBCUTANEOUS
Status: DISPENSED
Start: 2020-04-20

## (undated) RX ORDER — CEFAZOLIN SODIUM 2 G/100ML
INJECTION, SOLUTION INTRAVENOUS
Status: DISPENSED
Start: 2021-03-09

## (undated) RX ORDER — LIDOCAINE HYDROCHLORIDE 10 MG/ML
INJECTION, SOLUTION EPIDURAL; INFILTRATION; INTRACAUDAL; PERINEURAL
Status: DISPENSED
Start: 2020-04-23

## (undated) RX ORDER — ONDANSETRON 2 MG/ML
INJECTION INTRAMUSCULAR; INTRAVENOUS
Status: DISPENSED
Start: 2020-06-02

## (undated) RX ORDER — PROPOFOL 10 MG/ML
INJECTION, EMULSION INTRAVENOUS
Status: DISPENSED
Start: 2020-04-20

## (undated) RX ORDER — ACETAMINOPHEN 325 MG/1
TABLET ORAL
Status: DISPENSED
Start: 2022-01-12

## (undated) RX ORDER — ESMOLOL HYDROCHLORIDE 10 MG/ML
INJECTION INTRAVENOUS
Status: DISPENSED
Start: 2020-06-02

## (undated) RX ORDER — FENTANYL CITRATE 50 UG/ML
INJECTION, SOLUTION INTRAMUSCULAR; INTRAVENOUS
Status: DISPENSED
Start: 2020-06-02

## (undated) RX ORDER — HYDROMORPHONE HYDROCHLORIDE 1 MG/ML
INJECTION, SOLUTION INTRAMUSCULAR; INTRAVENOUS; SUBCUTANEOUS
Status: DISPENSED
Start: 2021-03-09

## (undated) RX ORDER — ACETAMINOPHEN 325 MG/1
TABLET ORAL
Status: DISPENSED
Start: 2020-04-20

## (undated) RX ORDER — HYDROMORPHONE HYDROCHLORIDE 1 MG/ML
INJECTION, SOLUTION INTRAMUSCULAR; INTRAVENOUS; SUBCUTANEOUS
Status: DISPENSED
Start: 2018-06-12

## (undated) RX ORDER — CELECOXIB 200 MG/1
CAPSULE ORAL
Status: DISPENSED
Start: 2021-03-09

## (undated) RX ORDER — FENTANYL CITRATE 50 UG/ML
INJECTION, SOLUTION INTRAMUSCULAR; INTRAVENOUS
Status: DISPENSED
Start: 2022-01-12

## (undated) RX ORDER — DEXAMETHASONE SODIUM PHOSPHATE 4 MG/ML
INJECTION, SOLUTION INTRA-ARTICULAR; INTRALESIONAL; INTRAMUSCULAR; INTRAVENOUS; SOFT TISSUE
Status: DISPENSED
Start: 2018-06-12

## (undated) RX ORDER — ACETAMINOPHEN 500 MG
TABLET ORAL
Status: DISPENSED
Start: 2018-06-12

## (undated) RX ORDER — BUPIVACAINE HYDROCHLORIDE 2.5 MG/ML
INJECTION, SOLUTION EPIDURAL; INFILTRATION; INTRACAUDAL
Status: DISPENSED
Start: 2020-04-20

## (undated) RX ORDER — ACETAMINOPHEN 325 MG/1
TABLET ORAL
Status: DISPENSED
Start: 2021-03-09

## (undated) RX ORDER — PHENAZOPYRIDINE HYDROCHLORIDE 200 MG/1
TABLET, FILM COATED ORAL
Status: DISPENSED
Start: 2022-01-12

## (undated) RX ORDER — PHENYLEPHRINE HCL IN 0.9% NACL 1 MG/10 ML
SYRINGE (ML) INTRAVENOUS
Status: DISPENSED
Start: 2020-04-20

## (undated) RX ORDER — TRANEXAMIC ACID 650 MG/1
TABLET ORAL
Status: DISPENSED
Start: 2021-03-09